# Patient Record
Sex: MALE | Race: WHITE | NOT HISPANIC OR LATINO | Employment: OTHER | URBAN - METROPOLITAN AREA
[De-identification: names, ages, dates, MRNs, and addresses within clinical notes are randomized per-mention and may not be internally consistent; named-entity substitution may affect disease eponyms.]

---

## 2017-02-28 ENCOUNTER — ANTICOAGULATION VISIT (OUTPATIENT)
Dept: VASCULAR LAB | Facility: MEDICAL CENTER | Age: 72
End: 2017-02-28
Attending: INTERNAL MEDICINE
Payer: MEDICARE

## 2017-02-28 DIAGNOSIS — I73.9 PVD (PERIPHERAL VASCULAR DISEASE) WITH CLAUDICATION (HCC): ICD-10-CM

## 2017-02-28 DIAGNOSIS — Z79.01 LONG TERM CURRENT USE OF ANTICOAGULANT THERAPY: ICD-10-CM

## 2017-02-28 DIAGNOSIS — I70.90 ARTERY OCCLUSION: ICD-10-CM

## 2017-02-28 LAB
INR BLD: 3.2 (ref 0.9–1.2)
INR PPP: 3.2 (ref 2–3.5)

## 2017-02-28 PROCEDURE — 99212 OFFICE O/P EST SF 10 MIN: CPT | Performed by: NURSE PRACTITIONER

## 2017-02-28 PROCEDURE — 85610 PROTHROMBIN TIME: CPT

## 2017-02-28 NOTE — PROGRESS NOTES
Anticoagulation Summary as of 2/28/2017     INR goal 2.0-3.0   Selected INR 3.2! (2/28/2017)   Maintenance plan 6 mg (6 mg x 1) on Tue, Thu, Sat; 3 mg (6 mg x 0.5) all other days   Weekly total 30 mg   Weekly max dose 33 mg   Plan last modified [place], PHARMD (6/9/2015)   Next INR check 4/11/2017   Target end date Indefinite    Indications   Artery occlusion (CMS-HCC) [I74.9]  Long term current use of anticoagulant therapy [Z79.01]  PVD (peripheral vascular disease) with claudication (CMS-[facility]) [I73.9]         Anticoagulation Episode Summary     INR check location [facility]    Preferred lab [facility]    Send INR reminders to     Comments       Anticoagulation Care Providers     Provider Role Specialty Phone number    Gwyn Silverman M.D. Referring Family Medicine [phone]    Sanchez Gonzalez M.D. Referring Internal Medicine [phone]    [facility] Anticoagulation Services Responsible  [phone]        Anticoagulation Patient Findings    Pt is supratherapeutic today. Denies any changes in medications or diet. Med rec completed and reviewed. Patient denies any s/sx of bleeding or clotting. Will increase Vit K over the next few days, continue dosing as outlined in calendar. Will follow-up with pt in 6 weeks per pt request.    Ruth ROMAN

## 2017-02-28 NOTE — MR AVS SNAPSHOT
Jimenez Bains   2017 10:00 AM   Anticoagulation Visit   MRN: [id number]    Department:  Vascular Medicine   Dept Phone:  [phone]    Description:  Male : 1945   Provider:  [facility] EXAM 4           Allergies as of 2017     Allergen Noted Reactions    No Known Drug Allergy 2014         You were diagnosed with     Artery occlusion (CMS-HCC)   [926294]       PVD (peripheral vascular disease) with claudication (CMS-HCC)   [955535]       Long term current use of anticoagulant therapy   [3570565]         Vital Signs     Smoking Status                   Current Some Day Smoker           Basic Information     Date Of Birth Sex Race Ethnicity Preferred Language    1945 Male White Non- English      Your appointments     2017 10:30 AM   Established Patient with [facility] EXAM 5   [facility]  (--)    [address] [place] [address]   [phone]              Problem List              ICD-10-CM Priority Class Noted - Resolved    DIABETES MELLITUS  Medium  2011 - Present    Artery occlusion (CMS-HCC) I74.9 High  2011 - Present    Abdominal hernia (Chronic) K46.9 Low  2011 - Present    Hypertension (Chronic) I10 Low  2011 - Present    Tobacco abuse (Chronic) Z72.0 High  2011 - Present    Basal cell carcinoma of earlobe (Chronic) C44.211 Low  2011 - Present    Basal cell carcinoma of face (Chronic) C44.310 Low  2011 - Present    PVD (peripheral vascular disease) with claudication (CMS-HCC) (Chronic) I73.9 High  2011 - Present    HTN (hypertension) I10   2012 - Present    Peripheral artery disease    2012 - Present    Limb ischemia I99.8 High  2012 - Present    Ischemia of extremity I99.8   2012 - Present    Lower limb ischemia I99.8   2012 - Present    Generalized atherosclerosis I70.91   2013 - Present    Arterial insufficiency (CMS-HCC) I77.1   2013 -  Present    Atherosclerosis of native artery of extremity (CMS-HCC) I70.209   2/2/2014 - Present    Other specified disorders of arteries and arterioles (CMS-HCC) I77.89   3/17/2014 - Present    Peripheral arterial occlusive disease (CMS-HCC) I77.9   7/29/2014 - Present    Chronic anticoagulation Z79.01   10/21/2014 - Present    Long term current use of anticoagulant therapy Z79.01   5/12/2015 - Present      Health Maintenance        Date Due Completion Dates    DIABETES MONOFILAMENT / LE EXAM 2/3/1945 ---    RETINAL SCREENING 8/3/1962 ---    IMM DTaP/Tdap/Td Vaccine (1 - Tdap) 8/3/1963 ---    COLONOSCOPY 8/3/1994 ---    IMM ZOSTER VACCINE 8/3/2004 ---    IMM PNEUMOCOCCAL 65+ (ADULT) LOW/MEDIUM RISK SERIES (1 of 2 - PCV13) 8/3/2009 ---    FASTING LIPID PROFILE 12/19/2013 12/19/2012, 9/2/2011    A1C SCREENING 5/8/2014 11/8/2013, 12/18/2012, 12/18/2012, 9/2/2011    URINE ACR / MICROALBUMIN 11/8/2014 11/8/2013    SERUM CREATININE 7/31/2015 7/31/2014, 7/29/2014, 1/30/2014, 12/5/2013, 11/8/2013, 12/24/2012, 12/23/2012, 12/22/2012, 12/21/2012, 12/20/2012, 12/19/2012, 12/18/2012, 12/17/2012, 10/18/2011, 9/1/2011    IMM INFLUENZA (1) 9/1/2016 ---            Results     POCT Protime      Component    INR    3.2                        Current Immunizations     No immunizations on file.      Below and/or attached are the medications your provider expects you to take. Review all of your home medications and newly ordered medications with your provider and/or pharmacist. Follow medication instructions as directed by your provider and/or pharmacist. Please keep your medication list with you and share with your provider. Update the information when medications are discontinued, doses are changed, or new medications (including over-the-counter products) are added; and carry medication information at all times in the event of emergency situations     Allergies:  NO KNOWN DRUG ALLERGY - (reactions not documented)                  Medications  Valid as of: February 28, 2017 - 10:27 AM    Generic Name Brand Name Tablet Size Instructions for use    Warfarin Sodium (Tab) COUMADIN 6 MG Take one-half to one (1/2-1) tablet daily as directed by coumadin clinic        .                 Medicines prescribed today were sent to:     [facility] #[id number] - [place], [place] - [address]    [address] [address]    Phone: [phone] Fax: [phone]    Open 24 Hours?: No      Medication refill instructions:       If your prescription bottle indicates you have medication refills left, it is not necessary to call your provider’s office. Please contact your pharmacy and they will refill your medication.    If your prescription bottle indicates you do not have any refills left, you may request refills at any time through one of the following ways: The online [employer] system (except Urgent Care), by calling your provider’s office, or by asking your pharmacy to contact your provider’s office with a refill request. Medication refills are processed only during regular business hours and may not be available until the next business day. Your provider may request additional information or to have a follow-up visit with you prior to refilling your medication.   *Please Note: Medication refills are assigned a new Rx number when refilled electronically. Your pharmacy may indicate that no refills were authorized even though a new prescription for the same medication is available at the pharmacy. Please request the medicine by name with the pharmacy before contacting your provider for a refill.        Warfarin Dosing Calendar   February 2017 Details    Sun Mon Tue Wed Thu Fri Sat        1               2               3               4                 5               6               7               8               9               10               11                 12               13               14               15               16                17               18                 19               20               21               22               23               24               25                 26               27               28   3.2   6 mg   See details           Date Details   02/28 This INR check   INR: 3.2               How to take your warfarin dose     To take:  6 mg Take 1 of the 6 mg tablets.           Warfarin Dosing Calendar   March 2017 Details    Sun Mon Tue Wed Thu Fri Sat        1      3 mg         2      6 mg         3      3 mg         4      6 mg           5      3 mg         6      3 mg         7      6 mg         8      3 mg         9      6 mg         10      3 mg         11      6 mg           12      3 mg         13      3 mg         14      6 mg         15      3 mg         16      6 mg         17      3 mg         18      6 mg           19      3 mg         20      3 mg         21      6 mg         22      3 mg         23      6 mg         24      3 mg         25      6 mg           26      3 mg         27      3 mg         28      6 mg         29      3 mg         30      6 mg         31      3 mg           Date Details   No additional details            How to take your warfarin dose     To take:  3 mg Take 0.5 of a 6 mg tablet.    To take:  6 mg Take 1 of the 6 mg tablets.           Warfarin Dosing Calendar   April 2017 Details    Sun Mon Tue Wed Thu Fri Sat           1      6 mg           2      3 mg         3      3 mg         4      6 mg         5      3 mg         6      6 mg         7      3 mg         8      6 mg           9      3 mg         10      3 mg         11      6 mg         12               13               14               15                 16               17               18               19               20               21               22                 23               24               25               26               27               28               29                 30                       Date Details   No additional details    Date of next INR:  4/11/2017         How to take your warfarin dose     To take:  3 mg Take 0.5 of a 6 mg tablet.    To take:  6 mg Take 1 of the 6 mg tablets.              MyChart Status: Patient Declined

## 2017-04-11 ENCOUNTER — ANTICOAGULATION VISIT (OUTPATIENT)
Dept: VASCULAR LAB | Facility: MEDICAL CENTER | Age: 72
End: 2017-04-11
Attending: INTERNAL MEDICINE
Payer: MEDICARE

## 2017-04-11 DIAGNOSIS — I70.90 ARTERY OCCLUSION: ICD-10-CM

## 2017-04-11 DIAGNOSIS — Z79.01 LONG TERM CURRENT USE OF ANTICOAGULANT THERAPY: ICD-10-CM

## 2017-04-11 DIAGNOSIS — I73.9 PVD (PERIPHERAL VASCULAR DISEASE) WITH CLAUDICATION (HCC): ICD-10-CM

## 2017-04-11 LAB — INR PPP: 4.1 (ref 2–3.5)

## 2017-04-11 PROCEDURE — 85610 PROTHROMBIN TIME: CPT

## 2017-04-11 PROCEDURE — 99212 OFFICE O/P EST SF 10 MIN: CPT

## 2017-04-11 NOTE — MR AVS SNAPSHOT
Jimenez Bains   2017 10:30 AM   Anticoagulation Visit   MRN: [id number]    Department:  Vascular Medicine   Dept Phone:  [phone]    Description:  Male : 1945   Provider:  [facility] EXAM 5           Allergies as of 2017     Allergen Noted Reactions    No Known Drug Allergy 2014         You were diagnosed with     Artery occlusion (CMS-HCC)   [589510]       PVD (peripheral vascular disease) with claudication (CMS-HCC)   [052379]       Long term current use of anticoagulant therapy   [3538729]         Vital Signs     Smoking Status                   Current Some Day Smoker           Basic Information     Date Of Birth Sex Race Ethnicity Preferred Language    1945 Male White Non- English      Your appointments     2017 11:15 AM   Established Patient with [facility] EXAM 5   [facility] for Heart and Vascular Health  (--)    [address] [place] [address]   [phone]              Problem List              ICD-10-CM Priority Class Noted - Resolved    DIABETES MELLITUS  Medium  2011 - Present    Artery occlusion (CMS-HCC) I74.9 High  2011 - Present    Abdominal hernia (Chronic) K46.9 Low  2011 - Present    Hypertension (Chronic) I10 Low  2011 - Present    Tobacco abuse (Chronic) Z72.0 High  2011 - Present    Basal cell carcinoma of earlobe (Chronic) C44.211 Low  2011 - Present    Basal cell carcinoma of face (Chronic) C44.310 Low  2011 - Present    PVD (peripheral vascular disease) with claudication (CMS-HCC) (Chronic) I73.9 High  2011 - Present    HTN (hypertension) I10   2012 - Present    Peripheral artery disease    2012 - Present    Limb ischemia I99.8 High  2012 - Present    Ischemia of extremity I99.8   2012 - Present    Lower limb ischemia I99.8   2012 - Present    Generalized atherosclerosis I70.91   2013 - Present    Arterial insufficiency (CMS-HCC) I77.1   2013 -  Present    Atherosclerosis of native artery of extremity (CMS-HCC) I70.209   2/2/2014 - Present    Other specified disorders of arteries and arterioles (CMS-HCC) I77.89   3/17/2014 - Present    Peripheral arterial occlusive disease (CMS-HCC) I77.9   7/29/2014 - Present    Chronic anticoagulation Z79.01   10/21/2014 - Present    Long term current use of anticoagulant therapy Z79.01   5/12/2015 - Present      Health Maintenance        Date Due Completion Dates    DIABETES MONOFILAMENT / LE EXAM 2/3/1945 ---    RETINAL SCREENING 8/3/1962 ---    IMM DTaP/Tdap/Td Vaccine (1 - Tdap) 8/3/1963 ---    COLONOSCOPY 8/3/1994 ---    IMM ZOSTER VACCINE 8/3/2004 ---    IMM PNEUMOCOCCAL 65+ (ADULT) LOW/MEDIUM RISK SERIES (1 of 2 - PCV13) 8/3/2009 ---    FASTING LIPID PROFILE 12/19/2013 12/19/2012, 9/2/2011    A1C SCREENING 5/8/2014 11/8/2013, 12/18/2012, 12/18/2012, 9/2/2011    URINE ACR / MICROALBUMIN 11/8/2014 11/8/2013    SERUM CREATININE 7/31/2015 7/31/2014, 7/29/2014, 1/30/2014, 12/5/2013, 11/8/2013, 12/24/2012, 12/23/2012, 12/22/2012, 12/21/2012, 12/20/2012, 12/19/2012, 12/18/2012, 12/17/2012, 10/18/2011, 9/1/2011            Results     POCT Protime      Component    INR    4.1                        Current Immunizations     No immunizations on file.      Below and/or attached are the medications your provider expects you to take. Review all of your home medications and newly ordered medications with your provider and/or pharmacist. Follow medication instructions as directed by your provider and/or pharmacist. Please keep your medication list with you and share with your provider. Update the information when medications are discontinued, doses are changed, or new medications (including over-the-counter products) are added; and carry medication information at all times in the event of emergency situations     Allergies:  NO KNOWN DRUG ALLERGY - (reactions not documented)               Medications  Valid as of: April 11, 2017  - 10:32 AM    Generic Name Brand Name Tablet Size Instructions for use    Warfarin Sodium (Tab) COUMADIN 6 MG Take one-half to one (1/2-1) tablet daily as directed by coumadin clinic        .                 Medicines prescribed today were sent to:     [facility]/PHARMACY #[id number] - [place], [place] - [address]    [address] [address]    Phone: [phone] Fax: [phone]    Open 24 Hours?: No      Medication refill instructions:       If your prescription bottle indicates you have medication refills left, it is not necessary to call your provider’s office. Please contact your pharmacy and they will refill your medication.    If your prescription bottle indicates you do not have any refills left, you may request refills at any time through one of the following ways: The online [employer] system (except Urgent Care), by calling your provider’s office, or by asking your pharmacy to contact your provider’s office with a refill request. Medication refills are processed only during regular business hours and may not be available until the next business day. Your provider may request additional information or to have a follow-up visit with you prior to refilling your medication.   *Please Note: Medication refills are assigned a new Rx number when refilled electronically. Your pharmacy may indicate that no refills were authorized even though a new prescription for the same medication is available at the pharmacy. Please request the medicine by name with the pharmacy before contacting your provider for a refill.        Warfarin Dosing Calendar   April 2017 Details    Sun Mon Tue Wed Thu Fri Sat           1                 2               3               4               5               6               7               8                 9               10               11   4.1   Hold   See details      12      3 mg         13      3 mg         14      3 mg         15      6 mg           16      3 mg          17      3 mg         18      6 mg         19      3 mg         20      3 mg         21      3 mg         22      6 mg           23      3 mg         24      3 mg         25      6 mg         26      3 mg         27               28               29                 30                      Date Details   04/11 This INR check   INR: 4.1       Date of next INR:  4/26/2017         How to take your warfarin dose     To take:  3 mg Take 0.5 of a 6 mg tablet.    To take:  6 mg Take 1 of the 6 mg tablets.    Hold Do not take your warfarin dose. See the Details table to the right for additional instructions.                   MyChart Status: Patient Declined

## 2017-04-11 NOTE — PROGRESS NOTES
Anticoagulation Summary as of 4/11/2017     INR goal 2.0-3.0   Selected INR 4.1! (4/11/2017)   Maintenance plan 6 mg (6 mg x 1) on Tue, Sat; 3 mg (6 mg x 0.5) all other days   Weekly total 27 mg   Weekly max dose 33 mg   Plan last modified Sukh Gonzalez, PHARMD (4/11/2017)   Next INR check 4/26/2017   Target end date Indefinite    Indications   Artery occlusion (CMS-[facility]) [I74.9]  Long term current use of anticoagulant therapy [Z79.01]  PVD (peripheral vascular disease) with claudication (CMS-[facility]) [I73.9]         Anticoagulation Episode Summary     INR check location [facility]    Preferred lab [facility]    Send INR reminders to     Comments       Anticoagulation Care Providers     Provider Role Specialty Phone number    Gwyn Silverman M.D. Referring Family Medicine [phone]    Sanchez Gonzalez M.D. Referring Internal Medicine [phone]    [facility] Anticoagulation Services Responsible  [phone]        Anticoagulation Patient Findings      Jimenez Fonseca Herson seen in clinic today  INR  supra-therapeutic.    Denies signs/symptoms of bleeding and/or thrombosis.    Denies changes to diet or medications.   Follow up appointment in 2 week(s).    Hold today then decrease weekly warfarin dose as noted    Sukh Gonzalez, ARTEMD

## 2017-04-12 LAB — INR BLD: 4.1 (ref 0.9–1.2)

## 2017-04-26 ENCOUNTER — ANTICOAGULATION VISIT (OUTPATIENT)
Dept: VASCULAR LAB | Facility: MEDICAL CENTER | Age: 72
End: 2017-04-26
Attending: INTERNAL MEDICINE
Payer: MEDICARE

## 2017-04-26 DIAGNOSIS — Z79.01 LONG TERM CURRENT USE OF ANTICOAGULANT THERAPY: ICD-10-CM

## 2017-04-26 DIAGNOSIS — I73.9 PVD (PERIPHERAL VASCULAR DISEASE) WITH CLAUDICATION (HCC): ICD-10-CM

## 2017-04-26 DIAGNOSIS — I70.90 ARTERY OCCLUSION: ICD-10-CM

## 2017-04-26 LAB — INR PPP: 2.3 (ref 2–3.5)

## 2017-04-26 PROCEDURE — 99211 OFF/OP EST MAY X REQ PHY/QHP: CPT

## 2017-04-26 PROCEDURE — 85610 PROTHROMBIN TIME: CPT

## 2017-04-26 NOTE — MR AVS SNAPSHOT
Jimenez Bains   2017 11:15 AM   Anticoagulation Visit   MRN: [id number]    Department:  Vascular Medicine   Dept Phone:  [phone]    Description:  Male : 1945   Provider:  [facility] EXAM 5           Allergies as of 2017     Allergen Noted Reactions    No Known Drug Allergy 2014         You were diagnosed with     Artery occlusion (CMS-HCC)   [943535]       PVD (peripheral vascular disease) with claudication (CMS-HCC)   [661551]       Long term current use of anticoagulant therapy   [4199540]         Vital Signs     Smoking Status                   Current Some Day Smoker           Basic Information     Date Of Birth Sex Race Ethnicity Preferred Language    1945 Male White Non- English      Your appointments     May 23, 2017 11:15 AM   Established Patient with [facility] EXAM 5   [facility]  (--)    [address] [place] [address]   [phone]              Problem List              ICD-10-CM Priority Class Noted - Resolved    DIABETES MELLITUS  Medium  2011 - Present    Artery occlusion (CMS-HCC) I74.9 High  2011 - Present    Abdominal hernia (Chronic) K46.9 Low  2011 - Present    Hypertension (Chronic) I10 Low  2011 - Present    Tobacco abuse (Chronic) Z72.0 High  2011 - Present    Basal cell carcinoma of earlobe (Chronic) C44.211 Low  2011 - Present    Basal cell carcinoma of face (Chronic) C44.310 Low  2011 - Present    PVD (peripheral vascular disease) with claudication (CMS-HCC) (Chronic) I73.9 High  2011 - Present    HTN (hypertension) I10   2012 - Present    Peripheral artery disease    2012 - Present    Limb ischemia I99.8 High  2012 - Present    Ischemia of extremity I99.8   2012 - Present    Lower limb ischemia I99.8   2012 - Present    Generalized atherosclerosis I70.91   2013 - Present    Arterial insufficiency (CMS-HCC) I77.1   2013 -  Present    Atherosclerosis of native artery of extremity (CMS-HCC) I70.209   2/2/2014 - Present    Other specified disorders of arteries and arterioles (CMS-HCC) I77.89   3/17/2014 - Present    Peripheral arterial occlusive disease (CMS-HCC) I77.9   7/29/2014 - Present    Chronic anticoagulation Z79.01   10/21/2014 - Present    Long term current use of anticoagulant therapy Z79.01   5/12/2015 - Present      Health Maintenance        Date Due Completion Dates    DIABETES MONOFILAMENT / LE EXAM 2/3/1945 ---    RETINAL SCREENING 8/3/1962 ---    IMM DTaP/Tdap/Td Vaccine (1 - Tdap) 8/3/1963 ---    COLONOSCOPY 8/3/1994 ---    IMM ZOSTER VACCINE 8/3/2004 ---    IMM PNEUMOCOCCAL 65+ (ADULT) LOW/MEDIUM RISK SERIES (1 of 2 - PCV13) 8/3/2009 ---    FASTING LIPID PROFILE 12/19/2013 12/19/2012, 9/2/2011    A1C SCREENING 5/8/2014 11/8/2013, 12/18/2012, 12/18/2012, 9/2/2011    URINE ACR / MICROALBUMIN 11/8/2014 11/8/2013    SERUM CREATININE 7/31/2015 7/31/2014, 7/29/2014, 1/30/2014, 12/5/2013, 11/8/2013, 12/24/2012, 12/23/2012, 12/22/2012, 12/21/2012, 12/20/2012, 12/19/2012, 12/18/2012, 12/17/2012, 10/18/2011, 9/1/2011            Results     POCT Protime      Component    INR    2.3                        Current Immunizations     No immunizations on file.      Below and/or attached are the medications your provider expects you to take. Review all of your home medications and newly ordered medications with your provider and/or pharmacist. Follow medication instructions as directed by your provider and/or pharmacist. Please keep your medication list with you and share with your provider. Update the information when medications are discontinued, doses are changed, or new medications (including over-the-counter products) are added; and carry medication information at all times in the event of emergency situations     Allergies:  NO KNOWN DRUG ALLERGY - (reactions not documented)               Medications  Valid as of: April 26, 2017  - 11:23 AM    Generic Name Brand Name Tablet Size Instructions for use    Warfarin Sodium (Tab) COUMADIN 6 MG Take one-half to one (1/2-1) tablet daily as directed by coumadin clinic        .                 Medicines prescribed today were sent to:     [facility]/PHARMACY #[id number] - [place], [place] - [address]    [address] [place] [address]    Phone: [phone] Fax: [phone]    Open 24 Hours?: No      Medication refill instructions:       If your prescription bottle indicates you have medication refills left, it is not necessary to call your provider’s office. Please contact your pharmacy and they will refill your medication.    If your prescription bottle indicates you do not have any refills left, you may request refills at any time through one of the following ways: The online EpiCrystals system (except Urgent Care), by calling your provider’s office, or by asking your pharmacy to contact your provider’s office with a refill request. Medication refills are processed only during regular business hours and may not be available until the next business day. Your provider may request additional information or to have a follow-up visit with you prior to refilling your medication.   *Please Note: Medication refills are assigned a new Rx number when refilled electronically. Your pharmacy may indicate that no refills were authorized even though a new prescription for the same medication is available at the pharmacy. Please request the medicine by name with the pharmacy before contacting your provider for a refill.        Warfarin Dosing Calendar   April 2017 Details    Sun Mon Tue Wed Thu Fri Sat           1                 2               3               4               5               6               7               8                 9               10               11               12               13               14               15                 16               17               18               19                 20               21               22                 23               24               25               26   2.3   3 mg   See details      27      6 mg         28      3 mg         29      6 mg           30      3 mg                Date Details   04/26 This INR check   INR: 2.3               How to take your warfarin dose     To take:  3 mg Take 0.5 of a 6 mg tablet.    To take:  6 mg Take 1 of the 6 mg tablets.           Warfarin Dosing Calendar   May 2017 Details    Sun Mon Tue Wed Thu Fri Sat      1      3 mg         2      6 mg         3      3 mg         4      6 mg         5      3 mg         6      6 mg           7      3 mg         8      3 mg         9      6 mg         10      3 mg         11      6 mg         12      3 mg         13      6 mg           14      3 mg         15      3 mg         16      6 mg         17      3 mg         18      6 mg         19      3 mg         20      6 mg           21      3 mg         22      3 mg         23      6 mg         24      3 mg         25               26               27                 28               29               30               31                   Date Details   No additional details    Date of next INR:  5/24/2017         How to take your warfarin dose     To take:  3 mg Take 0.5 of a 6 mg tablet.    To take:  6 mg Take 1 of the 6 mg tablets.              Alinto Access Code: [email or web address]  Expires: 5/26/2017 11:23 AM    Alinto  A secure, online tool to manage your health information     [employer]’s Alinto® is a secure, online tool that connects you to your personalized health information from the privacy of your home -- day or night - making it very easy for you to manage your healthcare. Once the activation process is completed, you can even access your medical information using the Alinto guillermo, which is available for free in the Apple Guillermo store or Google Play store.     Alinto provides the following levels of access (as  shown below):   My Chart Features   [facility] Doctor [facility]  Specialists [facility]  Urgent  Care Non-[facility]  Primary Care  Doctor   Email your healthcare team securely and privately 24/7 X X X    Manage appointments: schedule your next appointment; view details of past/upcoming appointments X      Request prescription refills. X      View recent personal medical records, including lab and immunizations X X X X   View health record, including health history, allergies, medications X X X X   Read reports about your outpatient visits, procedures, consult and ER notes X X X X   See your discharge summary, which is a recap of your hospital and/or ER visit that includes your diagnosis, lab results, and care plan. X X       How to register for [employer]:  1. Go to  [email or web address].  2. Click on the Sign Up Now box, which takes you to the New Member Sign Up page. You will need to provide the following information:  a. Enter your [employer] Access Code exactly as it appears at the top of this page. (You will not need to use this code after you’ve completed the sign-up process. If you do not sign up before the expiration date, you must request a new code.)   b. Enter your date of birth.   c. Enter your home email address.   d. Click Submit, and follow the next screen’s instructions.  3. Create a [employer] ID. This will be your [employer] login ID and cannot be changed, so think of one that is secure and easy to remember.  4. Create a [employer] password. You can change your password at any time.  5. Enter your Password Reset Question and Answer. This can be used at a later time if you forget your password.   6. Enter your e-mail address. This allows you to receive e-mail notifications when new information is available in [employer].  7. Click Sign Up. You can now view your health information.    For assistance activating your [employer] account, call [phone]

## 2017-04-26 NOTE — PROGRESS NOTES
Anticoagulation Summary as of 4/26/2017     INR goal 2.0-3.0   Selected INR 2.3 (4/26/2017)   Maintenance plan 6 mg (6 mg x 1) on Tue, Thu, Sat; 3 mg (6 mg x 0.5) all other days   Weekly total 30 mg   Weekly max dose 33 mg   Plan last modified Camden Gtz, PHARMD (4/26/2017)   Next INR check 5/24/2017   Target end date Indefinite    Indications   Artery occlusion (CMS-[facility]) [I74.9]  Long term current use of anticoagulant therapy [Z79.01]  PVD (peripheral vascular disease) with claudication (CMS-[facility]) [I73.9]         Anticoagulation Episode Summary     [facility]    Preferred lab [facility]    Send INR reminders to     Comments       Anticoagulation Care Providers     Provider Role Specialty Phone number    Gwyn Silverman M.D. Referring Family Medicine [phone]    Sanchez Gonzalez M.D. Referring Internal Medicine [phone]    [facility] Anticoagulation Services Responsible  [phone]        Anticoagulation Patient Findings    Patient's INR was therapeutic today in clinic.    Pt denies any unusual s/s of bleeding, bruising, clotting or any changes to diet or medications.  Confirmed dosing regimen. Pt was taking original regimen and didn't make the dose change as requested at last visit.  Pt is to continue with current warfarin dosing regimen.    Follow up in 4 weeks. Pt refuses to come any earlier.      Camden Gtz, PHARMD

## 2017-05-02 LAB — INR BLD: 2.3 (ref 0.9–1.2)

## 2017-05-23 ENCOUNTER — ANTICOAGULATION VISIT (OUTPATIENT)
Dept: VASCULAR LAB | Facility: MEDICAL CENTER | Age: 72
End: 2017-05-23
Attending: INTERNAL MEDICINE
Payer: MEDICARE

## 2017-05-23 DIAGNOSIS — I70.90 ARTERY OCCLUSION: ICD-10-CM

## 2017-05-23 DIAGNOSIS — Z79.01 LONG TERM CURRENT USE OF ANTICOAGULANT THERAPY: ICD-10-CM

## 2017-05-23 DIAGNOSIS — I73.9 PVD (PERIPHERAL VASCULAR DISEASE) WITH CLAUDICATION (HCC): ICD-10-CM

## 2017-05-23 DIAGNOSIS — I74.9 EMBOLISM AND THROMBOSIS OF UNSPECIFIED ARTERY (HCC): ICD-10-CM

## 2017-05-23 LAB
INR BLD: 3.9 (ref 0.9–1.2)
INR PPP: 3.9 (ref 2–3.5)

## 2017-05-23 PROCEDURE — 99212 OFFICE O/P EST SF 10 MIN: CPT

## 2017-05-23 PROCEDURE — 85610 PROTHROMBIN TIME: CPT

## 2017-05-23 RX ORDER — WARFARIN SODIUM 6 MG/1
TABLET ORAL
Qty: 90 TAB | Refills: 1 | Status: SHIPPED | OUTPATIENT
Start: 2017-05-23 | End: 2018-01-05

## 2017-05-23 NOTE — MR AVS SNAPSHOT
Jimenez Bains   2017 11:15 AM   Anticoagulation Visit   MRN: [id number]    Department:  Vascular Medicine   Dept Phone:  [phone]    Description:  Male : 1945   Provider:  [facility] EXAM 5           Allergies as of 2017     Allergen Noted Reactions    No Known Drug Allergy 2014         You were diagnosed with     Artery occlusion (CMS-HCC)   [318060]       PVD (peripheral vascular disease) with claudication (CMS-HCC)   [993375]       Long term current use of anticoagulant therapy   [9259064]         Vital Signs     Smoking Status                   Current Some Day Smoker           Basic Information     Date Of Birth Sex Race Ethnicity Preferred Language    1945 Male White Non- English      Your appointments     2017 11:30 AM   Established Patient with [facility] EXAM 5   [facility]  (--)    [address] [place] [address]   [phone]              Problem List              ICD-10-CM Priority Class Noted - Resolved    DIABETES MELLITUS  Medium  2011 - Present    Artery occlusion (CMS-HCC) I74.9 High  2011 - Present    Abdominal hernia (Chronic) K46.9 Low  2011 - Present    Hypertension (Chronic) I10 Low  2011 - Present    Tobacco abuse (Chronic) Z72.0 High  2011 - Present    Basal cell carcinoma of earlobe (Chronic) C44.211 Low  2011 - Present    Basal cell carcinoma of face (Chronic) C44.310 Low  2011 - Present    PVD (peripheral vascular disease) with claudication (CMS-HCC) (Chronic) I73.9 High  2011 - Present    HTN (hypertension) I10   2012 - Present    Peripheral artery disease    2012 - Present    Limb ischemia I99.8 High  2012 - Present    Ischemia of extremity I99.8   2012 - Present    Lower limb ischemia I99.8   2012 - Present    Generalized atherosclerosis I70.91   2013 - Present    Arterial insufficiency (CMS-HCC) I77.1   2013 -  Present    Atherosclerosis of native artery of extremity (CMS-HCC) I70.209   2/2/2014 - Present    Other specified disorders of arteries and arterioles (CMS-HCC) I77.89   3/17/2014 - Present    Peripheral arterial occlusive disease (CMS-HCC) I77.9   7/29/2014 - Present    Chronic anticoagulation Z79.01   10/21/2014 - Present    Long term current use of anticoagulant therapy Z79.01   5/12/2015 - Present      Health Maintenance        Date Due Completion Dates    DIABETES MONOFILAMENT / LE EXAM 2/3/1945 ---    RETINAL SCREENING 8/3/1962 ---    IMM DTaP/Tdap/Td Vaccine (1 - Tdap) 8/3/1963 ---    COLONOSCOPY 8/3/1994 ---    IMM ZOSTER VACCINE 8/3/2004 ---    IMM PNEUMOCOCCAL 65+ (ADULT) LOW/MEDIUM RISK SERIES (1 of 2 - PCV13) 8/3/2009 ---    FASTING LIPID PROFILE 12/19/2013 12/19/2012, 9/2/2011    A1C SCREENING 5/8/2014 11/8/2013, 12/18/2012, 12/18/2012, 9/2/2011    URINE ACR / MICROALBUMIN 11/8/2014 11/8/2013    SERUM CREATININE 7/31/2015 7/31/2014, 7/29/2014, 1/30/2014, 12/5/2013, 11/8/2013, 12/24/2012, 12/23/2012, 12/22/2012, 12/21/2012, 12/20/2012, 12/19/2012, 12/18/2012, 12/17/2012, 10/18/2011, 9/1/2011            Results     POCT Protime      Component    INR    3.9                        Current Immunizations     No immunizations on file.      Below and/or attached are the medications your provider expects you to take. Review all of your home medications and newly ordered medications with your provider and/or pharmacist. Follow medication instructions as directed by your provider and/or pharmacist. Please keep your medication list with you and share with your provider. Update the information when medications are discontinued, doses are changed, or new medications (including over-the-counter products) are added; and carry medication information at all times in the event of emergency situations     Allergies:  NO KNOWN DRUG ALLERGY - (reactions not documented)               Medications  Valid as of: May 23, 2017 -  11:30 AM    Generic Name Brand Name Tablet Size Instructions for use    Warfarin Sodium (Tab) COUMADIN 6 MG Take one-half to one (1/2-1) tablet daily as directed by coumadin clinic        .                 Medicines prescribed today were sent to:     [facility]/PHARMACY #[id number] - [place], [place] - [address]    [address] [facility] [address]    Phone: [phone] Fax: [phone]    Open 24 Hours?: No      Medication refill instructions:       If your prescription bottle indicates you have medication refills left, it is not necessary to call your provider’s office. Please contact your pharmacy and they will refill your medication.    If your prescription bottle indicates you do not have any refills left, you may request refills at any time through one of the following ways: The online [employer] system (except Urgent Care), by calling your provider’s office, or by asking your pharmacy to contact your provider’s office with a refill request. Medication refills are processed only during regular business hours and may not be available until the next business day. Your provider may request additional information or to have a follow-up visit with you prior to refilling your medication.   *Please Note: Medication refills are assigned a new Rx number when refilled electronically. Your pharmacy may indicate that no refills were authorized even though a new prescription for the same medication is available at the pharmacy. Please request the medicine by name with the pharmacy before contacting your provider for a refill.        Warfarin Dosing Calendar   May 2017 Details    Sun Mon Tue Wed Thu Fri Sat      1               2               3               4               5               6                 7               8               9               10               11               12               13                 14               15               16               17               18               19                20                 21               22               23   3.9   Hold   See details      24      3 mg         25      6 mg         26      3 mg         27      6 mg           28      3 mg         29      3 mg         30      6 mg         31      3 mg             Date Details   05/23 This INR check   INR: 3.9               How to take your warfarin dose     To take:  3 mg Take 0.5 of a 6 mg tablet.    To take:  6 mg Take 1 of the 6 mg tablets.    Hold Do not take your warfarin dose. See the Details table to the right for additional instructions.                Warfarin Dosing Calendar   June 2017 Details    Sun Mon Tue Wed Thu Fri Sat         1      6 mg         2      3 mg         3      6 mg           4      3 mg         5      3 mg         6      6 mg         7      3 mg         8               9               10                 11               12               13               14               15               16               17                 18               19               20               21               22               23               24                 25               26               27               28               29               30                 Date Details   No additional details    Date of next INR:  6/7/2017         How to take your warfarin dose     To take:  3 mg Take 0.5 of a 6 mg tablet.    To take:  6 mg Take 1 of the 6 mg tablets.              StubHub Access Code: [email or web address]  Expires: 5/26/2017 11:23 AM    StubHub  A secure, online tool to manage your health information     [employer] StubHub® is a secure, online tool that connects you to your personalized health information from the privacy of your home -- day or night - making it very easy for you to manage your healthcare. Once the activation process is completed, you can even access your medical information using the StubHub guillermo, which is available for free in the Apple Guillermo store or Google Play store.     StubHub  provides the following levels of access (as shown below):   My Chart Features   [facility] Doctor [facility]  Specialists [facility]  Urgent  Care Non-[facility]  Primary Care  Doctor   Email your healthcare team securely and privately 24/7 X X X    Manage appointments: schedule your next appointment; view details of past/upcoming appointments X      Request prescription refills. X      View recent personal medical records, including lab and immunizations X X X X   View health record, including health history, allergies, medications X X X X   Read reports about your outpatient visits, procedures, consult and ER notes X X X X   See your discharge summary, which is a recap of your hospital and/or ER visit that includes your diagnosis, lab results, and care plan. X X       How to register for [employer]:  1. Go to  [email or web address].  2. Click on the Sign Up Now box, which takes you to the New Member Sign Up page. You will need to provide the following information:  a. Enter your Mob[employer] Access Code exactly as it appears at the top of this page. (You will not need to use this code after you’ve completed the sign-up process. If you do not sign up before the expiration date, you must request a new code.)   b. Enter your date of birth.   c. Enter your home email address.   d. Click Submit, and follow the next screen’s instructions.  3. Create a Mobovivo ID. This will be your Mobovivo login ID and cannot be changed, so think of one that is secure and easy to remember.  4. Create a Mobovivo password. You can change your password at any time.  5. Enter your Password Reset Question and Answer. This can be used at a later time if you forget your password.   6. Enter your e-mail address. This allows you to receive e-mail notifications when new information is available in Mobovivo.  7. Click Sign Up. You can now view your health information.    For assistance activating your [employer] account, call [phone]

## 2017-05-23 NOTE — PROGRESS NOTES
Anticoagulation Summary as of 5/23/2017     INR goal 2.0-3.0   Selected INR 3.9! (5/23/2017)   Maintenance plan 6 mg (6 mg x 1) on Tue, Thu, Sat; 3 mg (6 mg x 0.5) all other days   Weekly total 30 mg   Weekly max dose 33 mg   Plan last modified Camden Gtz, PHARMD (4/26/2017)   Next INR check 6/7/2017   Target end date Indefinite    Indications   Artery occlusion (CMS-HCC) [I74.9]  Long term current use of anticoagulant therapy [Z79.01]  PVD (peripheral vascular disease) with claudication (CMS-HCC) [I73.9]         Anticoagulation Episode Summary     INR check location [facility]    Preferred lab [facility]    Send INR reminders to     Comments       Anticoagulation Care Providers     Provider Role Specialty Phone number    Gwyn Silverman M.D. Referring Family Medicine [phone]    Sanchez Gonzalez M.D. Referring Internal Medicine [phone]    [facility] Anticoagulation [facility] Responsible  [phone]        Anticoagulation Patient Findings      Current Outpatient Prescriptions on File Prior to Visit   Medication Sig Dispense Refill   • warfarin (COUMADIN) 6 MG Tab Take one-half to one (1/2-1) tablet daily as directed by coumadin clinic 90 Tab 1     No current facility-administered medications on file prior to visit.       Lab Results   Component Value Date/Time    SODIUM 139 07/31/2014 03:32 AM    POTASSIUM 3.8 07/31/2014 03:32 AM    CHLORIDE 105 07/31/2014 03:32 AM    CO2 29 07/31/2014 03:32 AM    GLUCOSE 197* 07/31/2014 03:32 AM    BUN 21 07/31/2014 03:32 AM    CREATININE 1.13 07/31/2014 03:32 AM          Jimenez Bains seen in clinic today  INR  supra-therapeutic.    Denies signs/symptoms of bleeding and/or thrombosis.    Denies changes to diet or medications.   Follow up appointment in 2 week(s).      Hold today then continue weekly warfarin dose as noted       Sukh Gonzalez, PHARMD

## 2017-06-06 ENCOUNTER — ANTICOAGULATION VISIT (OUTPATIENT)
Dept: VASCULAR LAB | Facility: MEDICAL CENTER | Age: 72
End: 2017-06-06
Attending: INTERNAL MEDICINE
Payer: MEDICARE

## 2017-06-06 DIAGNOSIS — I70.90 ARTERY OCCLUSION: ICD-10-CM

## 2017-06-06 DIAGNOSIS — Z79.01 LONG TERM CURRENT USE OF ANTICOAGULANT THERAPY: ICD-10-CM

## 2017-06-06 DIAGNOSIS — I73.9 PVD (PERIPHERAL VASCULAR DISEASE) WITH CLAUDICATION (HCC): ICD-10-CM

## 2017-06-06 LAB
INR BLD: 1.6 (ref 0.9–1.2)
INR PPP: 1.6 (ref 2–3.5)

## 2017-06-06 PROCEDURE — 85610 PROTHROMBIN TIME: CPT

## 2017-06-06 PROCEDURE — 99212 OFFICE O/P EST SF 10 MIN: CPT

## 2017-06-06 NOTE — PROGRESS NOTES
Anticoagulation Summary as of 6/6/2017     INR goal 2.0-3.0   Selected INR 1.6! (6/6/2017)   Maintenance plan 6 mg (6 mg x 1) on Tue, Thu, Sat; 3 mg (6 mg x 0.5) all other days   Weekly total 30 mg   Weekly max dose 33 mg   Plan last modified Camden Gtz, PHARMD (4/26/2017)   Next INR check 6/20/2017   Target end date Indefinite    Indications   Artery occlusion (CMS-HCC) [I74.9]  Long term current use of anticoagulant therapy [Z79.01]  PVD (peripheral vascular disease) with claudication (CMS-HCC) [I73.9]         Anticoagulation Episode Summary     INR check location [facility]    Preferred lab [facility]    Send INR reminders to     Comments       Anticoagulation Care Providers     Provider Role Specialty Phone number    Gwyn Silverman M.D. Referring Family Medicine [phone]    Sanchez Gonzalez M.D. Referring Internal Medicine [phone]    [facility] Anticoagulation Services Responsible  [phone]        Anticoagulation Patient Findings      Current Outpatient Prescriptions on File Prior to Visit   Medication Sig Dispense Refill   • warfarin (COUMADIN) 6 MG Tab Take one-half to one (1/2-1) tablet daily as directed by coumadin clinic 90 Tab 1     No current facility-administered medications on file prior to visit.       Lab Results   Component Value Date/Time    SODIUM 139 07/31/2014 03:32 AM    POTASSIUM 3.8 07/31/2014 03:32 AM    CHLORIDE 105 07/31/2014 03:32 AM    CO2 29 07/31/2014 03:32 AM    GLUCOSE 197* 07/31/2014 03:32 AM    BUN 21 07/31/2014 03:32 AM    CREATININE 1.13 07/31/2014 03:32 AM          Jimenez Bains seen in clinic today  INR  sub-therapeutic.    Denies signs/symptoms of bleeding and/or thrombosis.    Denies changes to diet or medications.   Follow up appointment in 2 week(s).      1.5tabs today then continue weekly warfarin dose as noted       Sukh Gonzalez, PHARMD

## 2017-06-06 NOTE — MR AVS SNAPSHOT
Jimenez Bains   2017 11:30 AM   Anticoagulation Visit   MRN: [id number]    Department:  Vascular Medicine   Dept Phone:  [phone]    Description:  Male : 1945   Provider:  V EXAM 5           Allergies as of 2017     Allergen Noted Reactions    No Known Drug Allergy 2014         You were diagnosed with     Artery occlusion (CMS-HCC)   [741695]       PVD (peripheral vascular disease) with claudication (CMS-HCC)   [261457]       Long term current use of anticoagulant therapy   [6340274]         Vital Signs     Smoking Status                   Current Some Day Smoker           Basic Information     Date Of Birth Sex Race Ethnicity Preferred Language    1945 Male White Non- English      Your appointments     2017 11:30 AM   Established Patient with IHV EXAM 5   [facility]  (--)    [address] [place] [address]   [phone]            2017 10:45 AM   Established Patient with IHV EXAM 4   [facility]  (--)    [address] [place] [address]   [phone]              Problem List              ICD-10-CM Priority Class Noted - Resolved    DIABETES MELLITUS  Medium  2011 - Present    Artery occlusion (CMS-HCC) I74.9 High  2011 - Present    Abdominal hernia (Chronic) K46.9 Low  2011 - Present    Hypertension (Chronic) I10 Low  2011 - Present    Tobacco abuse (Chronic) Z72.0 High  2011 - Present    Basal cell carcinoma of earlobe (Chronic) C44.211 Low  2011 - Present    Basal cell carcinoma of face (Chronic) C44.310 Low  2011 - Present    PVD (peripheral vascular disease) with claudication (CMS-HCC) (Chronic) I73.9 High  2011 - Present    HTN (hypertension) I10   2012 - Present    Peripheral artery disease    2012 - Present    Limb ischemia I99.8 High  2012 - Present    Ischemia of extremity  I99.8   12/19/2012 - Present    Lower limb ischemia I99.8   12/20/2012 - Present    Generalized atherosclerosis I70.91   11/14/2013 - Present    Arterial insufficiency (CMS-HCC) I77.1   12/4/2013 - Present    Atherosclerosis of native artery of extremity (CMS-HCC) I70.209   2/2/2014 - Present    Other specified disorders of arteries and arterioles (CMS-HCC) I77.89   3/17/2014 - Present    Peripheral arterial occlusive disease (CMS-HCC) I77.9   7/29/2014 - Present    Chronic anticoagulation Z79.01   10/21/2014 - Present    Long term current use of anticoagulant therapy Z79.01   5/12/2015 - Present      Health Maintenance        Date Due Completion Dates    DIABETES MONOFILAMENT / LE EXAM 2/3/1945 ---    RETINAL SCREENING 8/3/1962 ---    IMM DTaP/Tdap/Td Vaccine (1 - Tdap) 8/3/1963 ---    COLONOSCOPY 8/3/1994 ---    IMM ZOSTER VACCINE 8/3/2004 ---    IMM PNEUMOCOCCAL 65+ (ADULT) LOW/MEDIUM RISK SERIES (1 of 2 - PCV13) 8/3/2009 ---    FASTING LIPID PROFILE 12/19/2013 12/19/2012, 9/2/2011    A1C SCREENING 5/8/2014 11/8/2013, 12/18/2012, 12/18/2012, 9/2/2011    URINE ACR / MICROALBUMIN 11/8/2014 11/8/2013    SERUM CREATININE 7/31/2015 7/31/2014, 7/29/2014, 1/30/2014, 12/5/2013, 11/8/2013, 12/24/2012, 12/23/2012, 12/22/2012, 12/21/2012, 12/20/2012, 12/19/2012, 12/18/2012, 12/17/2012, 10/18/2011, 9/1/2011            Results     POCT Protime      Component    INR    1.6                        Current Immunizations     No immunizations on file.      Below and/or attached are the medications your provider expects you to take. Review all of your home medications and newly ordered medications with your provider and/or pharmacist. Follow medication instructions as directed by your provider and/or pharmacist. Please keep your medication list with you and share with your provider. Update the information when medications are discontinued, doses are changed, or new medications (including over-the-counter products) are added; and  carry medication information at all times in the event of emergency situations     Allergies:  NO KNOWN DRUG ALLERGY - (reactions not documented)               Medications  Valid as of: June 06, 2017 - 11:27 AM    Generic Name Brand Name Tablet Size Instructions for use    Warfarin Sodium (Tab) COUMADIN 6 MG Take one-half to one (1/2-1) tablet daily as directed by coumadin clinic        .                 Medicines prescribed today were sent to:     [facility]/PHARMACY #[id number] - [facility] [place] - [address]    [address] [address]    Phone: [phone] Fax: [phone]    Open 24 Hours?: No      Medication refill instructions:       If your prescription bottle indicates you have medication refills left, it is not necessary to call your provider’s office. Please contact your pharmacy and they will refill your medication.    If your prescription bottle indicates you do not have any refills left, you may request refills at any time through one of the following ways: The online [employer]iance system (except Urgent Care), by calling your provider’s office, or by asking your pharmacy to contact your provider’s office with a refill request. Medication refills are processed only during regular business hours and may not be available until the next business day. Your provider may request additional information or to have a follow-up visit with you prior to refilling your medication.   *Please Note: Medication refills are assigned a new Rx number when refilled electronically. Your pharmacy may indicate that no refills were authorized even though a new prescription for the same medication is available at the pharmacy. Please request the medicine by name with the pharmacy before contacting your provider for a refill.        Warfarin Dosing Calendar   June 2017 Details    Sun Mon Tue Wed Thu Fri Sat         1               2               3                 4               5               6   1.6   9 mg   See  details      7      3 mg         8      6 mg         9      3 mg         10      6 mg           11      3 mg         12      3 mg         13      6 mg         14      3 mg         15      6 mg         16      3 mg         17      6 mg           18      3 mg         19      3 mg         20      6 mg         21               22               23               24                 25               26               27               28               29               30                 Date Details   06/06 This INR check   INR: 1.6       Date of next INR:  6/20/2017         How to take your warfarin dose     To take:  3 mg Take 0.5 of a 6 mg tablet.    To take:  6 mg Take 1 of the 6 mg tablets.    To take:  9 mg Take 1.5 of the 6 mg tablets.              Cooking.com Access Code: [email or web address]  Expires: 6/27/2017  4:15 AM    Cooking.com  A secure, online tool to manage your health information     [employer]s Cooking.com® is a secure, online tool that connects you to your personalized health information from the privacy of your home -- day or night - making it very easy for you to manage your healthcare. Once the activation process is completed, you can even access your medical information using the Cooking.com guillermo, which is available for free in the Apple Guillermo store or Google Play store.     Cooking.com provides the following levels of access (as shown below):   My Chart Features   [facility] Doctor [facility]  Specialists [facility]  Urgent  Care Non-[facility]  Primary Care  Doctor   Email your healthcare team securely and privately 24/7 X X X    Manage appointments: schedule your next appointment; view details of past/upcoming appointments X      Request prescription refills. X      View recent personal medical records, including lab and immunizations X X X X   View health record, including health history, allergies, medications X X X X   Read reports about your outpatient visits, procedures, consult and ER notes X X X X   See your  discharge summary, which is a recap of your hospital and/or ER visit that includes your diagnosis, lab results, and care plan. X X       How to register for Mirego:  1. Go to  [email or web address].  2. Click on the Sign Up Now box, which takes you to the New Member Sign Up page. You will need to provide the following information:  a. Enter your Mirego Access Code exactly as it appears at the top of this page. (You will not need to use this code after you’ve completed the sign-up process. If you do not sign up before the expiration date, you must request a new code.)   b. Enter your date of birth.   c. Enter your home email address.   d. Click Submit, and follow the next screen’s instructions.  3. Create a Mirego ID. This will be your Mirego login ID and cannot be changed, so think of one that is secure and easy to remember.  4. Create a Mirego password. You can change your password at any time.  5. Enter your Password Reset Question and Answer. This can be used at a later time if you forget your password.   6. Enter your e-mail address. This allows you to receive e-mail notifications when new information is available in Mirego.  7. Click Sign Up. You can now view your health information.    For assistance activating your Mirego account, call [phone]

## 2017-06-20 ENCOUNTER — ANTICOAGULATION VISIT (OUTPATIENT)
Dept: VASCULAR LAB | Facility: MEDICAL CENTER | Age: 72
End: 2017-06-20
Attending: INTERNAL MEDICINE
Payer: MEDICARE

## 2017-06-20 DIAGNOSIS — I70.90 ARTERY OCCLUSION: ICD-10-CM

## 2017-06-20 DIAGNOSIS — I73.9 PVD (PERIPHERAL VASCULAR DISEASE) WITH CLAUDICATION (HCC): ICD-10-CM

## 2017-06-20 DIAGNOSIS — Z79.01 LONG TERM CURRENT USE OF ANTICOAGULANT THERAPY: ICD-10-CM

## 2017-06-20 LAB — INR PPP: 2.4 (ref 2–3.5)

## 2017-06-20 PROCEDURE — 85610 PROTHROMBIN TIME: CPT

## 2017-06-20 PROCEDURE — 99211 OFF/OP EST MAY X REQ PHY/QHP: CPT

## 2017-06-20 NOTE — MR AVS SNAPSHOT
Jimenez Bains   2017 10:45 AM   Anticoagulation Visit   MRN: [id number]    Department:  Vascular Medicine   Dept Phone:  [phone]    Description:  Male : 1945   Provider:  V EXAM 4           Allergies as of 2017     Allergen Noted Reactions    No Known Drug Allergy 2014         You were diagnosed with     Artery occlusion (CMS-HCC)   [314252]       PVD (peripheral vascular disease) with claudication (CMS-HCC)   [796540]       Long term current use of anticoagulant therapy   [3583385]         Vital Signs     Smoking Status                   Current Some Day Smoker           Basic Information     Date Of Birth Sex Race Ethnicity Preferred Language    1945 Male White Non- English      Your appointments     2017 10:45 AM   Established Patient with IHV EXAM 4   [facility]  (--)    [address] [place] [address]   [phone]            2017 10:45 AM   Established Patient with IHV EXAM 4   [facility]  (--)    [address] [place] [address]   [phone]              Problem List              ICD-10-CM Priority Class Noted - Resolved    DIABETES MELLITUS  Medium  2011 - Present    Artery occlusion (CMS-HCC) I74.9 High  2011 - Present    Abdominal hernia (Chronic) K46.9 Low  2011 - Present    Hypertension (Chronic) I10 Low  2011 - Present    Tobacco abuse (Chronic) Z72.0 High  2011 - Present    Basal cell carcinoma of earlobe (Chronic) C44.211 Low  2011 - Present    Basal cell carcinoma of face (Chronic) C44.310 Low  2011 - Present    PVD (peripheral vascular disease) with claudication (CMS-HCC) (Chronic) I73.9 High  2011 - Present    HTN (hypertension) I10   2012 - Present    Peripheral artery disease    2012 - Present    Limb ischemia I99.8 High  2012 - Present    Ischemia of  extremity I99.8   12/19/2012 - Present    Lower limb ischemia I99.8   12/20/2012 - Present    Generalized atherosclerosis I70.91   11/14/2013 - Present    Arterial insufficiency (CMS-HCC) I77.1   12/4/2013 - Present    Atherosclerosis of native artery of extremity (CMS-HCC) I70.209   2/2/2014 - Present    Other specified disorders of arteries and arterioles (CMS-HCC) I77.89   3/17/2014 - Present    Peripheral arterial occlusive disease (CMS-HCC) I77.9   7/29/2014 - Present    Chronic anticoagulation Z79.01   10/21/2014 - Present    Long term current use of anticoagulant therapy Z79.01   5/12/2015 - Present      Health Maintenance        Date Due Completion Dates    DIABETES MONOFILAMENT / LE EXAM 2/3/1945 ---    RETINAL SCREENING 8/3/1962 ---    IMM DTaP/Tdap/Td Vaccine (1 - Tdap) 8/3/1963 ---    COLONOSCOPY 8/3/1994 ---    IMM ZOSTER VACCINE 8/3/2004 ---    IMM PNEUMOCOCCAL 65+ (ADULT) LOW/MEDIUM RISK SERIES (1 of 2 - PCV13) 8/3/2009 ---    FASTING LIPID PROFILE 12/19/2013 12/19/2012, 9/2/2011    A1C SCREENING 5/8/2014 11/8/2013, 12/18/2012, 12/18/2012, 9/2/2011    URINE ACR / MICROALBUMIN 11/8/2014 11/8/2013    SERUM CREATININE 7/31/2015 7/31/2014, 7/29/2014, 1/30/2014, 12/5/2013, 11/8/2013, 12/24/2012, 12/23/2012, 12/22/2012, 12/21/2012, 12/20/2012, 12/19/2012, 12/18/2012, 12/17/2012, 10/18/2011, 9/1/2011            Results     POCT Protime      Component    INR    2.4                        Current Immunizations     No immunizations on file.      Below and/or attached are the medications your provider expects you to take. Review all of your home medications and newly ordered medications with your provider and/or pharmacist. Follow medication instructions as directed by your provider and/or pharmacist. Please keep your medication list with you and share with your provider. Update the information when medications are discontinued, doses are changed, or new medications (including over-the-counter products) are  added; and carry medication information at all times in the event of emergency situations     Allergies:  NO KNOWN DRUG ALLERGY - (reactions not documented)               Medications  Valid as of: June 20, 2017 - 10:41 AM    Generic Name Brand Name Tablet Size Instructions for use    Warfarin Sodium (Tab) COUMADIN 6 MG Take one-half to one (1/2-1) tablet daily as directed by coumadin clinic        .                 Medicines prescribed today were sent to:     [facility]/PHARMACY #[id number] - [place], [place] - [address]    [address] [address]    Phone: [phone] Fax: [phone]    Open 24 Hours?: No      Medication refill instructions:       If your prescription bottle indicates you have medication refills left, it is not necessary to call your provider’s office. Please contact your pharmacy and they will refill your medication.    If your prescription bottle indicates you do not have any refills left, you may request refills at any time through one of the following ways: The online [email or web address] system (except Urgent Care), by calling your provider’s office, or by asking your pharmacy to contact your provider’s office with a refill request. Medication refills are processed only during regular business hours and may not be available until the next business day. Your provider may request additional information or to have a follow-up visit with you prior to refilling your medication.   *Please Note: Medication refills are assigned a new Rx number when refilled electronically. Your pharmacy may indicate that no refills were authorized even though a new prescription for the same medication is available at the pharmacy. Please request the medicine by name with the pharmacy before contacting your provider for a refill.        Warfarin Dosing Calendar   June 2017 Details    Sun Mon Tue Wed Thu Fri Sat         1               2               3                 4               5               6                7               8               9               10                 11               12               13               14               15               16               17                 18               19               20   2.4   6 mg   See details      21      3 mg         22      6 mg         23      3 mg         24      6 mg           25      3 mg         26      3 mg         27      6 mg         28      3 mg         29      6 mg         30      3 mg           Date Details   06/20 This INR check   INR: 2.4               How to take your warfarin dose     To take:  3 mg Take 0.5 of a 6 mg tablet.    To take:  6 mg Take 1 of the 6 mg tablets.           Warfarin Dosing Calendar   July 2017 Details    Sun Mon Tue Wed Thu Fri Sat           1      6 mg           2      3 mg         3      3 mg         4      6 mg         5      3 mg         6               7               8                 9               10               11               12               13               14               15                 16               17               18               19               20               21               22                 23               24               25               26               27               28               29                 30               31                     Date Details   No additional details    Date of next INR:  7/5/2017         How to take your warfarin dose     To take:  3 mg Take 0.5 of a 6 mg tablet.    To take:  6 mg Take 1 of the 6 mg tablets.              Social Intelligence Access Code: [email or web address]  Expires: 6/27/2017  4:15 AM    Social Intelligence  A secure, online tool to manage your health information     [employer]gs Social Intelligence® is a secure, online tool that connects you to your personalized health information from the privacy of your home -- day or night - making it very easy for you to manage your healthcare. Once the activation process is completed, you can even access your medical  information using the Wipebook guillermo, which is available for free in the Apple Guillermo store or Google Play store.     Wipebook provides the following levels of access (as shown below):   My Chart Features   [facility] Doctor [facility]  Specialists [facility]  Urgent  Care [facility]  Primary Care  Doctor   Email your healthcare team securely and privately 24/7 X X X    Manage appointments: schedule your next appointment; view details of past/upcoming appointments X      Request prescription refills. X      View recent personal medical records, including lab and immunizations X X X X   View health record, including health history, allergies, medications X X X X   Read reports about your outpatient visits, procedures, consult and ER notes X X X X   See your discharge summary, which is a recap of your hospital and/or ER visit that includes your diagnosis, lab results, and care plan. X X       How to register for Wipebook:  1. Go to  [email or web address].  2. Click on the Sign Up Now box, which takes you to the New Member Sign Up page. You will need to provide the following information:  a. Enter your Wipebook Access Code exactly as it appears at the top of this page. (You will not need to use this code after you’ve completed the sign-up process. If you do not sign up before the expiration date, you must request a new code.)   b. Enter your date of birth.   c. Enter your home email address.   d. Click Submit, and follow the next screen’s instructions.  3. Create a Wipebook ID. This will be your Wipebook login ID and cannot be changed, so think of one that is secure and easy to remember.  4. Create a Wipebook password. You can change your password at any time.  5. Enter your Password Reset Question and Answer. This can be used at a later time if you forget your password.   6. Enter your e-mail address. This allows you to receive e-mail notifications when new information is available in Wipebook.  7. Click Sign Up. You can now  view your health information.    For assistance activating your Gousto account, call [phone]

## 2017-06-20 NOTE — PROGRESS NOTES
Anticoagulation Summary as of 6/20/2017     INR goal 2.0-3.0   Selected INR 2.4 (6/20/2017)   Maintenance plan 6 mg (6 mg x 1) on Tue, Thu, Sat; 3 mg (6 mg x 0.5) all other days   Weekly total 30 mg   Weekly max dose 33 mg   Plan last modified Camden Gtz, PHARMD (4/26/2017)   Next INR check 7/5/2017   Target end date Indefinite    Indications   Artery occlusion (CMS-[facility]) [I74.9]  Long term current use of anticoagulant therapy [Z79.01]  PVD (peripheral vascular disease) with claudication (CMS-[facility]) [I73.9]         Anticoagulation Episode Summary     [facility]    Preferred lab [facility]    Send INR reminders to     Comments       Anticoagulation Care Providers     Provider Role Specialty Phone number    Gwyn Silverman M.D. Referring Family Medicine [phone]    Sanchez Gonzalez M.D. Referring Internal Medicine [phone]    [facility] Anticoagulation Services Responsible  [phone]        Anticoagulation Patient Findings    Patient's INR was therapeutic today in clinic.    Pt denies any unusual s/s of bleeding, bruising, clotting or any changes to diet or medications.  Confirmed dosing regimen.  Pt is to continue with current warfarin dosing regimen.    Follow up in 2 weeks.    Camden Gtz, ARTEMD

## 2017-06-21 LAB — INR BLD: 2.4 (ref 0.9–1.2)

## 2017-06-29 ENCOUNTER — TELEPHONE (OUTPATIENT)
Dept: VASCULAR LAB | Facility: MEDICAL CENTER | Age: 72
End: 2017-06-29

## 2017-06-29 NOTE — TELEPHONE ENCOUNTER
[facility]    Left VM with pt to f/u with current PCP to establish a referral.    Camden Gtz, PHARMD

## 2017-07-06 ENCOUNTER — ANTICOAGULATION VISIT (OUTPATIENT)
Dept: VASCULAR LAB | Facility: MEDICAL CENTER | Age: 72
End: 2017-07-06
Attending: INTERNAL MEDICINE
Payer: MEDICARE

## 2017-07-06 VITALS — DIASTOLIC BLOOD PRESSURE: 117 MMHG | SYSTOLIC BLOOD PRESSURE: 167 MMHG | HEART RATE: 70 BPM

## 2017-07-06 DIAGNOSIS — Z79.01 LONG TERM CURRENT USE OF ANTICOAGULANT THERAPY: ICD-10-CM

## 2017-07-06 DIAGNOSIS — I70.90 ARTERY OCCLUSION: ICD-10-CM

## 2017-07-06 DIAGNOSIS — I73.9 PVD (PERIPHERAL VASCULAR DISEASE) WITH CLAUDICATION (HCC): ICD-10-CM

## 2017-07-06 LAB — INR PPP: 2.4 (ref 2–3.5)

## 2017-07-06 PROCEDURE — 99211 OFF/OP EST MAY X REQ PHY/QHP: CPT | Performed by: PHARMACIST

## 2017-07-06 PROCEDURE — 85610 PROTHROMBIN TIME: CPT

## 2017-07-06 NOTE — PROGRESS NOTES
Anticoagulation Summary as of 7/6/2017     INR goal 2.0-3.0   Selected INR 2.4 (7/6/2017)   Maintenance plan 6 mg (6 mg x 1) on Tue, Thu, Sat; 3 mg (6 mg x 0.5) all other days   Weekly total 30 mg   Weekly max dose 33 mg   Plan last modified Camden Gtz, PHARMD (4/26/2017)   Next INR check 8/1/2017   Target end date Indefinite    Indications   Artery occlusion (CMS-HCC) [I74.9]  Long term current use of anticoagulant therapy [Z79.01]  PVD (peripheral vascular disease) with claudication (CMS-HCC) [I73.9]         Anticoagulation Episode Summary     [facility]    Preferred lab [facility]    Send INR reminders to     Comments       Anticoagulation Care Providers     Provider Role Specialty Phone number    Gwyn Silverman M.D. Referring Family Medicine [phone]    Sanchez Gonzalez M.D. Referring Internal Medicine [phone]    [facility] Anticoagulation Services Responsible  [phone]        Anticoagulation Patient Findings   Negatives Missed Doses, Extra Doses, Medication Changes, Antibiotic Use, Diet Changes, Dental/Other Procedures, Hospitalization, Bleeding Gums, Nose Bleeds, Blood in Urine, Blood in Stool, Any Bruising, Other Complaints        Spoke with patient to report a therapeutic INR.    Pt instructed to continue with current warfarin dosing regimen.   Pt denies any s/s of bleeding, bruising, clotting or any changes to diet or medication.    Will follow up in 4 weeks.    Sherlyn Abel, PHARMD

## 2017-07-06 NOTE — MR AVS SNAPSHOT
Jimenez Bains   2017 10:45 AM   Anticoagulation Visit   MRN: [id number]    Department:  Vascular Medicine   Dept Phone:  [phone]    Description:  Male : 1945   Provider:  [facility] EXAM 4           Allergies as of 2017     Allergen Noted Reactions    No Known Drug Allergy 2014         You were diagnosed with     Artery occlusion (CMS-HCC)   [952363]       PVD (peripheral vascular disease) with claudication (CMS-HCC)   [329472]       Long term current use of anticoagulant therapy   [2314396]         Vital Signs     Blood Pressure Pulse Smoking Status             167/117 mmHg 70 Current Some Day Smoker         Basic Information     Date Of Birth Sex Race Ethnicity Preferred Language    1945 Male White Non- English      Your appointments     Aug 01, 2017  9:30 AM   Established Patient with [facility] EXAM 5   [facility]  (--)    [facility] [facility] [address]   [phone]              Problem List              ICD-10-CM Priority Class Noted - Resolved    DIABETES MELLITUS  Medium  2011 - Present    Artery occlusion (CMS-HCC) I74.9 High  2011 - Present    Abdominal hernia (Chronic) K46.9 Low  2011 - Present    Hypertension (Chronic) I10 Low  2011 - Present    Tobacco abuse (Chronic) Z72.0 High  2011 - Present    Basal cell carcinoma of earlobe (Chronic) C44.211 Low  2011 - Present    Basal cell carcinoma of face (Chronic) C44.310 Low  2011 - Present    PVD (peripheral vascular disease) with claudication (CMS-HCC) (Chronic) I73.9 High  2011 - Present    HTN (hypertension) I10   2012 - Present    Peripheral artery disease    2012 - Present    Limb ischemia I99.8 High  2012 - Present    Ischemia of extremity I99.8   2012 - Present    Lower limb ischemia I99.8   2012 - Present    Generalized atherosclerosis I70.91   2013 - Present    Arterial insufficiency  (CMS-HCC) I77.1   12/4/2013 - Present    Atherosclerosis of native artery of extremity (CMS-HCC) I70.209   2/2/2014 - Present    Other specified disorders of arteries and arterioles (CMS-HCC) I77.89   3/17/2014 - Present    Peripheral arterial occlusive disease (CMS-HCC) I77.9   7/29/2014 - Present    Chronic anticoagulation Z79.01   10/21/2014 - Present    Long term current use of anticoagulant therapy Z79.01   5/12/2015 - Present      Health Maintenance        Date Due Completion Dates    DIABETES MONOFILAMENT / LE EXAM 2/3/1945 ---    RETINAL SCREENING 8/3/1962 ---    IMM DTaP/Tdap/Td Vaccine (1 - Tdap) 8/3/1963 ---    COLONOSCOPY 8/3/1994 ---    IMM ZOSTER VACCINE 8/3/2004 ---    IMM PNEUMOCOCCAL 65+ (ADULT) LOW/MEDIUM RISK SERIES (1 of 2 - PCV13) 8/3/2009 ---    FASTING LIPID PROFILE 12/19/2013 12/19/2012, 9/2/2011    A1C SCREENING 5/8/2014 11/8/2013, 12/18/2012, 12/18/2012, 9/2/2011    URINE ACR / MICROALBUMIN 11/8/2014 11/8/2013    SERUM CREATININE 7/31/2015 7/31/2014, 7/29/2014, 1/30/2014, 12/5/2013, 11/8/2013, 12/24/2012, 12/23/2012, 12/22/2012, 12/21/2012, 12/20/2012, 12/19/2012, 12/18/2012, 12/17/2012, 10/18/2011, 9/1/2011    IMM INFLUENZA (1) 9/1/2017 ---            Results     POCT Protime      Component    INR    2.4                        Current Immunizations     No immunizations on file.      Below and/or attached are the medications your provider expects you to take. Review all of your home medications and newly ordered medications with your provider and/or pharmacist. Follow medication instructions as directed by your provider and/or pharmacist. Please keep your medication list with you and share with your provider. Update the information when medications are discontinued, doses are changed, or new medications (including over-the-counter products) are added; and carry medication information at all times in the event of emergency situations     Allergies:  NO KNOWN DRUG ALLERGY - (reactions not  documented)               Medications  Valid as of: July 06, 2017 - 11:19 AM    Generic Name Brand Name Tablet Size Instructions for use    Warfarin Sodium (Tab) COUMADIN 6 MG Take one-half to one (1/2-1) tablet daily as directed by coumadin clinic        .                 Medicines prescribed today were sent to:     [facility]/PHARMACY #[id number] - [place], [place] - [address] [facility]    [address] [facility] [address]    Phone: [phone] Fax: [phone]    Open 24 Hours?: No      Medication refill instructions:       If your prescription bottle indicates you have medication refills left, it is not necessary to call your provider’s office. Please contact your pharmacy and they will refill your medication.    If your prescription bottle indicates you do not have any refills left, you may request refills at any time through one of the following ways: The online Cloudmeter system (except Urgent Care), by calling your provider’s office, or by asking your pharmacy to contact your provider’s office with a refill request. Medication refills are processed only during regular business hours and may not be available until the next business day. Your provider may request additional information or to have a follow-up visit with you prior to refilling your medication.   *Please Note: Medication refills are assigned a new Rx number when refilled electronically. Your pharmacy may indicate that no refills were authorized even though a new prescription for the same medication is available at the pharmacy. Please request the medicine by name with the pharmacy before contacting your provider for a refill.        Warfarin Dosing Calendar   July 2017 Details    Sun Mon Tue Wed Thu Fri Sat           1                 2               3               4               5               6   2.4   6 mg   See details      7      3 mg         8      6 mg           9      3 mg         10      3 mg         11      6 mg         12      3 mg          13      6 mg         14      3 mg         15      6 mg           16      3 mg         17      3 mg         18      6 mg         19      3 mg         20      6 mg         21      3 mg         22      6 mg           23      3 mg         24      3 mg         25      6 mg         26      3 mg         27      6 mg         28      3 mg         29      6 mg           30      3 mg         31      3 mg               Date Details   07/06 This INR check   INR: 2.4               How to take your warfarin dose     To take:  3 mg Take 0.5 of a 6 mg tablet.    To take:  6 mg Take 1 of the 6 mg tablets.           Warfarin Dosing Calendar   August 2017 Details    Sun Mon Tue Wed Thu Fri Sat       1      6 mg         2               3               4               5                 6               7               8               9               10               11               12                 13               14               15               16               17               18               19                 20               21               22               23               24               25               26                 27               28               29               30               31                  Date Details   No additional details    Date of next INR:  8/1/2017         How to take your warfarin dose     To take:  6 mg Take 1 of the 6 mg tablets.              Proximagen Access Code: [email or web address]  Expires: 7/26/2017  3:55 AM    Proximagen  A secure, online tool to manage your health information     [employer] Proximagen® is a secure, online tool that connects you to your personalized health information from the privacy of your home -- day or night - making it very easy for you to manage your healthcare. Once the activation process is completed, you can even access your medical information using the Proximagen guillermo, which is available for free in the Apple Guillermo store or Google Play store.     Proximagen provides the  following levels of access (as shown below):   My Chart Features   [facility] Doctor [facility]  Specialists [facility]  Urgent  Care Non-[facility]  Primary Care  Doctor   Email your healthcare team securely and privately 24/7 X X X    Manage appointments: schedule your next appointment; view details of past/upcoming appointments X      Request prescription refills. X      View recent personal medical records, including lab and immunizations X X X X   View health record, including health history, allergies, medications X X X X   Read reports about your outpatient visits, procedures, consult and ER notes X X X X   See your discharge summary, which is a recap of your hospital and/or ER visit that includes your diagnosis, lab results, and care plan. X X       How to register for Docracy:  1. Go to  [email or web address].  2. Click on the Sign Up Now box, which takes you to the New Member Sign Up page. You will need to provide the following information:  a. Enter your Docracy Access Code exactly as it appears at the top of this page. (You will not need to use this code after you’ve completed the sign-up process. If you do not sign up before the expiration date, you must request a new code.)   b. Enter your date of birth.   c. Enter your home email address.   d. Click Submit, and follow the next screen’s instructions.  3. Create a Docracy ID. This will be your Docracy login ID and cannot be changed, so think of one that is secure and easy to remember.  4. Create a Docracy password. You can change your password at any time.  5. Enter your Password Reset Question and Answer. This can be used at a later time if you forget your password.   6. Enter your e-mail address. This allows you to receive e-mail notifications when new information is available in Docracy.  7. Click Sign Up. You can now view your health information.    For assistance activating your Docracy account, call [phone]

## 2017-07-11 ENCOUNTER — ANTICOAGULATION MONITORING (OUTPATIENT)
Dept: VASCULAR LAB | Facility: MEDICAL CENTER | Age: 72
End: 2017-07-11

## 2017-07-11 DIAGNOSIS — I70.90 ARTERY OCCLUSION: ICD-10-CM

## 2017-07-11 DIAGNOSIS — Z79.01 LONG TERM CURRENT USE OF ANTICOAGULANT THERAPY: ICD-10-CM

## 2017-07-11 DIAGNOSIS — I73.9 PVD (PERIPHERAL VASCULAR DISEASE) WITH CLAUDICATION (HCC): ICD-10-CM

## 2017-08-01 ENCOUNTER — ANTICOAGULATION VISIT (OUTPATIENT)
Dept: VASCULAR LAB | Facility: MEDICAL CENTER | Age: 72
End: 2017-08-01
Attending: INTERNAL MEDICINE
Payer: MEDICARE

## 2017-08-01 DIAGNOSIS — I70.90 ARTERY OCCLUSION: ICD-10-CM

## 2017-08-01 DIAGNOSIS — I73.9 PVD (PERIPHERAL VASCULAR DISEASE) WITH CLAUDICATION (HCC): ICD-10-CM

## 2017-08-01 DIAGNOSIS — Z79.01 LONG TERM CURRENT USE OF ANTICOAGULANT THERAPY: ICD-10-CM

## 2017-08-01 LAB — INR PPP: 3.1 (ref 2–3.5)

## 2017-08-01 PROCEDURE — 99212 OFFICE O/P EST SF 10 MIN: CPT

## 2017-08-01 PROCEDURE — 85610 PROTHROMBIN TIME: CPT

## 2017-08-01 NOTE — MR AVS SNAPSHOT
Jimenez Bains   2017 9:30 AM   Anticoagulation Visit   MRN: [id number]    Department:  Vascular Medicine   Dept Phone:  [phone]    Description:  Male : 1945   Provider:  [facility] EXAM 5           Allergies as of 2017     Allergen Noted Reactions    No Known Drug Allergy 2014         You were diagnosed with     Artery occlusion (CMS-HCC)   [245066]       PVD (peripheral vascular disease) with claudication (CMS-HCC)   [765198]       Long term current use of anticoagulant therapy   [9203012]         Vital Signs     Smoking Status                   Current Some Day Smoker           Basic Information     Date Of Birth Sex Race Ethnicity Preferred Language    1945 Male White Non- English      Your appointments     Sep 12, 2017  9:30 AM   Established Patient with [facility] EXAM 4   [facility]  (--)    [address] [place] [address]   [phone]              Problem List              ICD-10-CM Priority Class Noted - Resolved    DIABETES MELLITUS  Medium  2011 - Present    Artery occlusion (CMS-HCC) I74.9 High  2011 - Present    Abdominal hernia (Chronic) K46.9 Low  2011 - Present    Hypertension (Chronic) I10 Low  2011 - Present    Tobacco abuse (Chronic) Z72.0 High  2011 - Present    Basal cell carcinoma of earlobe (Chronic) C44.211 Low  2011 - Present    Basal cell carcinoma of face (Chronic) C44.310 Low  2011 - Present    PVD (peripheral vascular disease) with claudication (CMS-HCC) (Chronic) I73.9 High  2011 - Present    HTN (hypertension) I10   2012 - Present    Peripheral artery disease    2012 - Present    Limb ischemia I99.8 High  2012 - Present    Ischemia of extremity I99.8   2012 - Present    Lower limb ischemia I99.8   2012 - Present    Generalized atherosclerosis I70.91   2013 - Present    Arterial insufficiency (CMS-HCC) I77.1   2013 -  Present    Atherosclerosis of native artery of extremity (CMS-HCC) I70.209   2/2/2014 - Present    Other specified disorders of arteries and arterioles (CMS-HCC) I77.89   3/17/2014 - Present    Peripheral arterial occlusive disease (CMS-HCC) I77.9   7/29/2014 - Present    Chronic anticoagulation Z79.01   10/21/2014 - Present    Long term current use of anticoagulant therapy Z79.01   5/12/2015 - Present      Health Maintenance        Date Due Completion Dates    DIABETES MONOFILAMENT / LE EXAM 2/3/1945 ---    RETINAL SCREENING 8/3/1962 ---    IMM DTaP/Tdap/Td Vaccine (1 - Tdap) 8/3/1963 ---    COLONOSCOPY 8/3/1994 ---    IMM ZOSTER VACCINE 8/3/2004 ---    IMM PNEUMOCOCCAL 65+ (ADULT) LOW/MEDIUM RISK SERIES (1 of 2 - PCV13) 8/3/2009 ---    FASTING LIPID PROFILE 12/19/2013 12/19/2012, 9/2/2011    A1C SCREENING 5/8/2014 11/8/2013, 12/18/2012, 12/18/2012, 9/2/2011    URINE ACR / MICROALBUMIN 11/8/2014 11/8/2013    SERUM CREATININE 7/31/2015 7/31/2014, 7/29/2014, 1/30/2014, 12/5/2013, 11/8/2013, 12/24/2012, 12/23/2012, 12/22/2012, 12/21/2012, 12/20/2012, 12/19/2012, 12/18/2012, 12/17/2012, 10/18/2011, 9/1/2011    IMM INFLUENZA (1) 9/1/2017 ---            Results     POCT Protime      Component    INR    3.1                        Current Immunizations     No immunizations on file.      Below and/or attached are the medications your provider expects you to take. Review all of your home medications and newly ordered medications with your provider and/or pharmacist. Follow medication instructions as directed by your provider and/or pharmacist. Please keep your medication list with you and share with your provider. Update the information when medications are discontinued, doses are changed, or new medications (including over-the-counter products) are added; and carry medication information at all times in the event of emergency situations     Allergies:  NO KNOWN DRUG ALLERGY - (reactions not documented)                  Medications  Valid as of: August 01, 2017 - 10:24 AM    Generic Name Brand Name Tablet Size Instructions for use    Warfarin Sodium (Tab) COUMADIN 6 MG Take one-half to one (1/2-1) tablet daily as directed by coumadin clinic        .                 Medicines prescribed today were sent to:     [facility]/PHARMACY #[id number] - [place], [place] - [address]    [address] [address]    Phone: [phone] Fax: [phone]    Open 24 Hours?: No      Medication refill instructions:       If your prescription bottle indicates you have medication refills left, it is not necessary to call your provider’s office. Please contact your pharmacy and they will refill your medication.    If your prescription bottle indicates you do not have any refills left, you may request refills at any time through one of the following ways: The online Banki.ru system (except Urgent Care), by calling your provider’s office, or by asking your pharmacy to contact your provider’s office with a refill request. Medication refills are processed only during regular business hours and may not be available until the next business day. Your provider may request additional information or to have a follow-up visit with you prior to refilling your medication.   *Please Note: Medication refills are assigned a new Rx number when refilled electronically. Your pharmacy may indicate that no refills were authorized even though a new prescription for the same medication is available at the pharmacy. Please request the medicine by name with the pharmacy before contacting your provider for a refill.        Warfarin Dosing Calendar   August 2017 Details    Sun Mon Tue Wed Thu Fri Sat       1   3.1   3 mg   See details      2      3 mg         3      6 mg         4      3 mg         5      6 mg           6      3 mg         7      3 mg         8      6 mg         9      3 mg         10      6 mg         11      3 mg         12      6 mg           13       3 mg         14      3 mg         15      6 mg         16      3 mg         17      6 mg         18      3 mg         19      6 mg           20      3 mg         21      3 mg         22      6 mg         23      3 mg         24      6 mg         25      3 mg         26      6 mg           27      3 mg         28      3 mg         29      6 mg         30      3 mg         31      6 mg            Date Details   08/01 This INR check   INR: 3.1               How to take your warfarin dose     To take:  3 mg Take 0.5 of a 6 mg tablet.    To take:  6 mg Take 1 of the 6 mg tablets.           Warfarin Dosing Calendar   September 2017 Details    Sun Mon Tue Wed Thu Fri Sat          1      3 mg         2      6 mg           3      3 mg         4      3 mg         5      6 mg         6      3 mg         7      6 mg         8      3 mg         9      6 mg           10      3 mg         11      3 mg         12      6 mg         13               14               15               16                 17               18               19               20               21               22               23                 24               25               26               27               28               29               30                Date Details   No additional details    Date of next INR:  9/12/2017         How to take your warfarin dose     To take:  3 mg Take 0.5 of a 6 mg tablet.    To take:  6 mg Take 1 of the 6 mg tablets.              Clicktree Access Code: [email or web address]  Expires: 8/24/2017  4:12 AM    Clicktree  A secure, online tool to manage your health information     [employer]’s Clicktree® is a secure, online tool that connects you to your personalized health information from the privacy of your home -- day or night - making it very easy for you to manage your healthcare. Once the activation process is completed, you can even access your medical information using the Clicktree avery, which is available for free in  the Apple Guillermo store or Google Play store.     [employer] provides the following levels of access (as shown below):   My Chart Features   [facility] Doctor [facility]  Specialists [facility]  Urgent  Care Non-[facility]  Primary Care  Doctor   Email your healthcare team securely and privately 24/7 X X X    Manage appointments: schedule your next appointment; view details of past/upcoming appointments X      Request prescription refills. X      View recent personal medical records, including lab and immunizations X X X X   View health record, including health history, allergies, medications X X X X   Read reports about your outpatient visits, procedures, consult and ER notes X X X X   See your discharge summary, which is a recap of your hospital and/or ER visit that includes your diagnosis, lab results, and care plan. X X       How to register for [employer]:  1. Go to  [email or web address].  2. Click on the Sign Up Now box, which takes you to the New Member Sign Up page. You will need to provide the following information:  a. Enter your [employer] Access Code exactly as it appears at the top of this page. (You will not need to use this code after you’ve completed the sign-up process. If you do not sign up before the expiration date, you must request a new code.)   b. Enter your date of birth.   c. Enter your home email address.   d. Click Submit, and follow the next screen’s instructions.  3. Create a [employer] ID. This will be your [employer] login ID and cannot be changed, so think of one that is secure and easy to remember.  4. Create a [employer] password. You can change your password at any time.  5. Enter your Password Reset Question and Answer. This can be used at a later time if you forget your password.   6. Enter your e-mail address. This allows you to receive e-mail notifications when new information is available in [employer].  7. Click Sign Up. You can now view your health information.    For assistance activating your  MyChart account, call [phone]

## 2017-08-01 NOTE — PROGRESS NOTES
Anticoagulation Summary as of 8/1/2017     INR goal 2.0-3.0   Selected INR 3.1! (8/1/2017)   Maintenance plan 6 mg (6 mg x 1) on Tue, Thu, Sat; 3 mg (6 mg x 0.5) all other days   Weekly total 30 mg   Weekly max dose 33 mg   Plan last modified Camden Gtz, PHARMD (4/26/2017)   Next INR check 8/29/2017   Target end date Indefinite    Indications   Artery occlusion (CMS-HCC) [I74.9]  Long term current use of anticoagulant therapy [Z79.01]  PVD (peripheral vascular disease) with claudication (CMS-HCC) [I73.9]         Anticoagulation Episode Summary     INR check location [facility]    Preferred lab [facility]    Send INR reminders to     Comments       Anticoagulation Care Providers     Provider Role Specialty Phone number    Gwyn Silverman M.D. Referring Family Medicine [phone]    Sanchez Gonzalez M.D. Referring Internal Medicine [phone]    [facility] Anticoagulation Services Responsible  [phone]        Anticoagulation Patient Findings      Current Outpatient Prescriptions on File Prior to Visit   Medication Sig Dispense Refill   • warfarin (COUMADIN) 6 MG Tab Take one-half to one (1/2-1) tablet daily as directed by coumadin clinic 90 Tab 1     No current facility-administered medications on file prior to visit.       Lab Results   Component Value Date/Time    SODIUM 139 07/31/2014 03:32 AM    POTASSIUM 3.8 07/31/2014 03:32 AM    CHLORIDE 105 07/31/2014 03:32 AM    CO2 29 07/31/2014 03:32 AM    GLUCOSE 197* 07/31/2014 03:32 AM    BUN 21 07/31/2014 03:32 AM    CREATININE 1.13 07/31/2014 03:32 AM          Jimenez Bains seen in clinic today  INR  supra-therapeutic.    Denies signs/symptoms of bleeding and/or thrombosis.    Denies changes to diet or medications.   Follow up appointment in 6 week(s) per pt     3mg today then continue weekly warfarin dose as noted     Sukh Gonzalez, PHARMD

## 2017-09-12 ENCOUNTER — ANTICOAGULATION VISIT (OUTPATIENT)
Dept: VASCULAR LAB | Facility: MEDICAL CENTER | Age: 72
End: 2017-09-12
Attending: INTERNAL MEDICINE
Payer: MEDICARE

## 2017-09-12 VITALS — HEART RATE: 62 BPM | DIASTOLIC BLOOD PRESSURE: 83 MMHG | SYSTOLIC BLOOD PRESSURE: 144 MMHG

## 2017-09-12 DIAGNOSIS — I70.90 ARTERY OCCLUSION: ICD-10-CM

## 2017-09-12 DIAGNOSIS — I73.9 PVD (PERIPHERAL VASCULAR DISEASE) WITH CLAUDICATION (HCC): ICD-10-CM

## 2017-09-12 DIAGNOSIS — Z79.01 LONG TERM CURRENT USE OF ANTICOAGULANT THERAPY: ICD-10-CM

## 2017-09-12 LAB — INR PPP: 5.4 (ref 2–3.5)

## 2017-09-12 PROCEDURE — 85610 PROTHROMBIN TIME: CPT

## 2017-09-12 PROCEDURE — 99212 OFFICE O/P EST SF 10 MIN: CPT

## 2017-09-12 NOTE — PROGRESS NOTES
Anticoagulation Summary  As of 9/12/2017    INR goal:   2.0-3.0   TTR:   75.0 % (2.3 y)   Today's INR:   5.4!   Maintenance plan:   6 mg (6 mg x 1) on Tue, Thu, Sat; 3 mg (6 mg x 0.5) all other days   Weekly total:   30 mg   Weekly max dose:   33 mg   Plan last modified:   Camden Gtz, PharmD (4/26/2017)   Next INR check:   9/20/2017   Target end date:   Indefinite    Indications    Artery occlusion (CMS-HCC) [I74.9]  Long term current use of anticoagulant therapy [Z79.01]  PVD (peripheral vascular disease) with claudication (CMS-HCC) [I73.9]             Anticoagulation Episode Summary     INR check location:   [facility]    Preferred lab:   [facility]    Send INR reminders to:       Comments:         Anticoagulation Care Providers     Provider Role Specialty Phone number    Gwyn Silverman M.D. Referring Family Medicine [phone]    Sanchez Gonzalez M.D. Referring Internal Medicine [phone]    [facility] Anticoagulation Services Responsible  [phone]        Anticoagulation Patient Findings      HPI:  Jimenez Bains seen in clinic today, on anticoagulation therapy with Warfarin for PVD and Artery occlusion  Changes to current medical/health status since last appt: none  Denies signs/symptoms of bleeding and/or thrombosis since the last appt.    Denies any interval changes to diet  No ETOH.   Denies any interval changes to medications since last appt.   Denies any complications or cost restrictions with current therapy.   BP recorded in vitals.    A/P   INR  supra-therapeutic.   Hold two days then continue weekly warfarin dose as noted      Follow up appointment in 1 week(s).     Sukh Gonzalez, MarieD

## 2017-09-13 LAB — INR BLD: 5.4 (ref 0.9–1.2)

## 2017-09-19 ENCOUNTER — TELEPHONE (OUTPATIENT)
Dept: VASCULAR LAB | Facility: MEDICAL CENTER | Age: 72
End: 2017-09-19

## 2017-09-19 NOTE — TELEPHONE ENCOUNTER
[facility]    Requested referral from Dr Gwyn Silverman.    Camden Gtz, PharmD

## 2017-09-20 ENCOUNTER — ANTICOAGULATION VISIT (OUTPATIENT)
Dept: VASCULAR LAB | Facility: MEDICAL CENTER | Age: 72
End: 2017-09-20
Attending: INTERNAL MEDICINE
Payer: MEDICARE

## 2017-09-20 VITALS — DIASTOLIC BLOOD PRESSURE: 67 MMHG | SYSTOLIC BLOOD PRESSURE: 138 MMHG | HEART RATE: 91 BPM

## 2017-09-20 DIAGNOSIS — I70.90 ARTERY OCCLUSION: ICD-10-CM

## 2017-09-20 DIAGNOSIS — I73.9 PVD (PERIPHERAL VASCULAR DISEASE) WITH CLAUDICATION (HCC): ICD-10-CM

## 2017-09-20 DIAGNOSIS — Z79.01 LONG TERM CURRENT USE OF ANTICOAGULANT THERAPY: ICD-10-CM

## 2017-09-20 LAB
INR BLD: 4.1 (ref 0.9–1.2)
INR PPP: 4.1 (ref 2–3.5)

## 2017-09-20 PROCEDURE — 99212 OFFICE O/P EST SF 10 MIN: CPT | Performed by: PHARMACIST

## 2017-09-20 PROCEDURE — 85610 PROTHROMBIN TIME: CPT

## 2017-09-20 NOTE — PROGRESS NOTES
Anticoagulation Summary  As of 9/20/2017    INR goal:   2.0-3.0   TTR:   74.3 % (2.3 y)   Today's INR:   4.1!   Maintenance plan:   6 mg (6 mg x 1) on Tue, Sat; 3 mg (6 mg x 0.5) all other days   Weekly total:   27 mg   Weekly max dose:   33 mg   Plan last modified:   Sherlyn Abel, PharmD (9/20/2017)   Next INR check:   9/27/2017   Target end date:   Indefinite    Indications    Artery occlusion (CMS-HCC) [I74.9]  Long term current use of anticoagulant therapy [Z79.01]  PVD (peripheral vascular disease) with claudication (CMS-[facility]) [I73.9]             Anticoagulation Episode Summary     [facility]R check location:   [facility]    Preferred lab:   [facility]    Send INR reminders to:       Comments:         Anticoagulation Care Providers     Provider Role Specialty Phone number    Gwyn Silverman M.D. Referring Family Medicine [phone]    Sanchez Gonzalez M.D. Referring Internal Medicine [phone]    [facility] Anticoagulation Services Responsible  [phone]        Anticoagulation Patient Findings      HPI:  Jimenez Bains seen in clinic today, on anticoagulation therapy with warfarin for Artery Occlusion  Changes to current medical/health status since last appt: denies  Denies signs/symptoms of bleeding and/or thrombosis since the last appt.    Denies any interval changes to diet  Denies any interval changes to medications since last appt.   Denies any complications or cost restrictions with current therapy.   BP recorded in vitals.      A/P   INR  is SUPRA-therapeutic.   Will have pt hold his warfarin today and then start a 10% reduced weekly dose    Follow up appointment in 1 week(s).    Sherlyn Abel, PharmD

## 2017-09-27 ENCOUNTER — ANTICOAGULATION VISIT (OUTPATIENT)
Dept: VASCULAR LAB | Facility: MEDICAL CENTER | Age: 72
End: 2017-09-27
Attending: INTERNAL MEDICINE
Payer: MEDICARE

## 2017-09-27 DIAGNOSIS — Z79.01 LONG TERM CURRENT USE OF ANTICOAGULANT THERAPY: ICD-10-CM

## 2017-09-27 DIAGNOSIS — I73.9 PVD (PERIPHERAL VASCULAR DISEASE) WITH CLAUDICATION (HCC): ICD-10-CM

## 2017-09-27 DIAGNOSIS — I70.90 ARTERY OCCLUSION: ICD-10-CM

## 2017-09-27 LAB
INR BLD: 4.9 (ref 0.9–1.2)
INR PPP: 4.9 (ref 2–3.5)

## 2017-09-27 PROCEDURE — 85610 PROTHROMBIN TIME: CPT

## 2017-09-27 PROCEDURE — 99212 OFFICE O/P EST SF 10 MIN: CPT | Performed by: PHARMACIST

## 2017-09-27 NOTE — PROGRESS NOTES
Anticoagulation Summary  As of 9/27/2017    INR goal:   2.0-3.0   TTR:   73.7 % (2.4 y)   Today's INR:   4.9!   Maintenance plan:   3 mg (6 mg x 0.5) every day   Weekly total:   21 mg   Weekly max dose:   33 mg   Plan last modified:   Sherlyn Abel, PharmD (9/27/2017)   Next INR check:   10/11/2017   Target end date:   Indefinite    Indications    Artery occlusion (CMS-[facility]) [I74.9]  Long term current use of anticoagulant therapy [Z79.01]  PVD (peripheral vascular disease) with claudication (CMS-[facility]) [I73.9]             Anticoagulation Episode Summary     [facility]   [facility]    Preferred lab:   [facility]    Send INR reminders to:       Comments:         Anticoagulation Care Providers     Provider Role Specialty Phone number    Gwyn Silverman M.D. Referring Family Medicine [phone]    Sanchez Gonzalez M.D. Referring Internal Medicine [phone]    [facility] Anticoagulation Services Responsible  [phone]        Anticoagulation Patient Findings      HPI:  Jimenez Raymundo Bains seen in clinic today, on anticoagulation therapy with warfarin for artery occlusion  Changes to current medical/health status since last appt: denies  Denies signs/symptoms of bleeding and/or thrombosis since the last appt.    Denies any interval changes to diet  Denies any interval changes to medications since last appt.   Denies any complications or cost restrictions with current therapy.   Declined BP check      A/P   INR  is SUPRA-therapeutic.   Nothing different per pt, did follow instructions last week. Will have pt hold dose today and then start a 20% decreased weekly regimen.     Follow up appointment in 2 week(s) per pt preference.    Sherlyn Abel, PharmD

## 2017-10-03 DIAGNOSIS — I82.90 THROMBUS: ICD-10-CM

## 2017-10-11 ENCOUNTER — ANTICOAGULATION VISIT (OUTPATIENT)
Dept: VASCULAR LAB | Facility: MEDICAL CENTER | Age: 72
End: 2017-10-11
Attending: INTERNAL MEDICINE
Payer: MEDICARE

## 2017-10-11 DIAGNOSIS — I73.9 PVD (PERIPHERAL VASCULAR DISEASE) WITH CLAUDICATION (HCC): ICD-10-CM

## 2017-10-11 DIAGNOSIS — Z79.01 LONG TERM CURRENT USE OF ANTICOAGULANT THERAPY: ICD-10-CM

## 2017-10-11 DIAGNOSIS — I70.90 ARTERY OCCLUSION: ICD-10-CM

## 2017-10-11 LAB
INR BLD: 2.4 (ref 0.9–1.2)
INR PPP: 2.4 (ref 2–3.5)

## 2017-10-11 PROCEDURE — 85610 PROTHROMBIN TIME: CPT

## 2017-10-11 PROCEDURE — 99211 OFF/OP EST MAY X REQ PHY/QHP: CPT | Performed by: NURSE PRACTITIONER

## 2017-10-11 NOTE — PROGRESS NOTES
Anticoagulation Summary  As of 10/11/2017    INR goal:   2.0-3.0   TTR:   72.9 % (2.4 y)   Today's INR:   2.4   Maintenance plan:   3 mg (6 mg x 0.5) every day   Weekly total:   21 mg   Weekly max dose:   33 mg   Plan last modified:   Sherlyn Abel, PharmD (9/27/2017)   Next INR check:   11/1/2017   Target end date:   Indefinite    Indications    Artery occlusion (CMS-[facility]) [I74.9]  Long term current use of anticoagulant therapy [Z79.01]  PVD (peripheral vascular disease) with claudication (CMS-[facility]) [I73.9]             Anticoagulation Episode Summary     INR check location[facility]   [facility]    Preferred lab:   [facility]    Send INR reminders to:       Comments:         Anticoagulation Care Providers     Provider Role Specialty Phone number    Gwyn Silverman M.D. Referring Family Medicine [phone]    Sanchez Gonzalez M.D. Referring Internal Medicine [phone]    [facility] Anticoagulation Services Responsible  [phone]        Anticoagulation Patient Findings      HPI:  Jimenez Bains seen in clinic today for follow up on anticoagulation therapy in the presence of PVD. Denies any changes to current medical/health status since last appointment. Denies any medication or diet changes. No current symptoms of bleeding or thrombosis reported.    A/P:   INR therapeutic. Continue current regimen. BP declined.    Follow up appointment in 3 week(s) per pt's preference.    Next Appointment: Wednesday, November 1, 2017 at 8:30 am.     Debbie ROMAN

## 2017-11-01 ENCOUNTER — ANTICOAGULATION VISIT (OUTPATIENT)
Dept: VASCULAR LAB | Facility: MEDICAL CENTER | Age: 72
End: 2017-11-01
Attending: INTERNAL MEDICINE
Payer: MEDICARE

## 2017-11-01 VITALS — DIASTOLIC BLOOD PRESSURE: 75 MMHG | SYSTOLIC BLOOD PRESSURE: 162 MMHG | HEART RATE: 63 BPM

## 2017-11-01 DIAGNOSIS — Z79.01 LONG TERM CURRENT USE OF ANTICOAGULANT THERAPY: ICD-10-CM

## 2017-11-01 DIAGNOSIS — I73.9 PVD (PERIPHERAL VASCULAR DISEASE) WITH CLAUDICATION (HCC): ICD-10-CM

## 2017-11-01 DIAGNOSIS — I70.90 ARTERY OCCLUSION: ICD-10-CM

## 2017-11-01 LAB
INR BLD: 1.6 (ref 0.9–1.2)
INR PPP: 1.6 (ref 2–3.5)

## 2017-11-01 PROCEDURE — 85610 PROTHROMBIN TIME: CPT

## 2017-11-01 PROCEDURE — 99212 OFFICE O/P EST SF 10 MIN: CPT

## 2017-11-01 NOTE — PROGRESS NOTES
Anticoagulation Summary  As of 11/1/2017    INR goal:   2.0-3.0   TTR:   72.4 % (2.4 y)   Today's INR:   1.6!   Maintenance plan:   3 mg (6 mg x 0.5) every day   Weekly total:   21 mg   Weekly max dose:   33 mg   Plan last modified:   Sherlyn Abel, PharmD (9/27/2017)   Next INR check:   11/15/2017   Target end date:   Indefinite    Indications    Artery occlusion (CMS-HCC) [I74.9]  Long term current use of anticoagulant therapy [Z79.01]  PVD (peripheral vascular disease) with claudication (CMS-[facility]) [I73.9]             Anticoagulation Episode Summary     INR check location[facility]   [facility]    Preferred lab:   [facility]    Send INR reminders to:       Comments:         Anticoagulation Care Providers     Provider Role Specialty Phone number    Gwyn Silverman M.D. Referring Family Medicine [phone]    Sanchez Gonzalez M.D. Referring Internal Medicine [phone]    [facility] Anticoagulation Services Responsible  [phone]        Anticoagulation Patient Findings      HPI:  Jimenez Bains seen in clinic today, on anticoagulation therapy with warfarin for PVD.   Changes to current medical/health status since last appt: None.   Denies signs/symptoms of bleeding and/or thrombosis since the last appt.    States he had large amount of greens this week; does not plan to continue the same diet.  Denies any interval changes to medications since last appt.   Denies any complications or cost restrictions with current therapy.   BP recorded in vitals; elevated today.   Requested pt to bring home values for BP.     A/P   INR  SUB-therapeutic.   Bolus today then Pt is to continue with current warfarin dosing regimen.     Follow up appointment in 2 week(s).    Camden Gtz, PharmD

## 2017-11-15 ENCOUNTER — ANTICOAGULATION VISIT (OUTPATIENT)
Dept: VASCULAR LAB | Facility: MEDICAL CENTER | Age: 72
End: 2017-11-15
Attending: INTERNAL MEDICINE
Payer: MEDICARE

## 2017-11-15 DIAGNOSIS — Z79.01 LONG TERM CURRENT USE OF ANTICOAGULANT THERAPY: ICD-10-CM

## 2017-11-15 DIAGNOSIS — I70.90 ARTERY OCCLUSION: ICD-10-CM

## 2017-11-15 DIAGNOSIS — I73.9 PVD (PERIPHERAL VASCULAR DISEASE) WITH CLAUDICATION (HCC): ICD-10-CM

## 2017-11-15 LAB
INR BLD: 2.6 (ref 0.9–1.2)
INR PPP: 2.6 (ref 2–3.5)

## 2017-11-15 PROCEDURE — 85610 PROTHROMBIN TIME: CPT

## 2017-11-15 PROCEDURE — 99211 OFF/OP EST MAY X REQ PHY/QHP: CPT | Performed by: PHARMACIST

## 2017-11-15 NOTE — PROGRESS NOTES
Anticoagulation Summary  As of 11/15/2017    INR goal:   2.0-3.0   TTR:   72.2 % (2.5 y)   Today's INR:   2.6   Maintenance plan:   3 mg (6 mg x 0.5) every day   Weekly total:   21 mg   Weekly max dose:   33 mg   Plan last modified:   Sherlyn Abel, PharmD (9/27/2017)   Next INR check:   12/13/2017   Target end date:   Indefinite    Indications    Artery occlusion (CMS-HCC) [I74.9]  Long term current use of anticoagulant therapy [Z79.01]  PVD (peripheral vascular disease) with claudication (CMS-HCC) [I73.9]             Anticoagulation Episode Summary     [facility]R check [facility]   [facility]    Preferred lab:   [facility]    Send INR reminders to:       Comments:         Anticoagulation Care Providers     Provider Role Specialty Phone number    Gwyn Silverman M.D. Referring Family Medicine [phone]    Sanchez Gonzalez M.D. Referring Internal Medicine [phone]    [facility] Anticoagulation Services Responsible  [phone]        Anticoagulation Patient Findings  Patient Findings     Negatives:   Signs/symptoms of thrombosis, Signs/symptoms of bleeding, Laboratory test error suspected, Change in health, Change in alcohol use, Change in activity, Upcoming invasive procedure, Emergency department visit, Upcoming dental procedure, Missed doses, Extra doses, Change in medications, Change in diet/appetite, Hospital admission, Bruising, Other complaints        HPI:   Jimenez Bains seen in clinic today, on anticoagulation therapy with warfarin for arterial occlusion   Changes to current medical/health status since last appt: NONE  Denies signs/symptoms of bleeding and/or thrombosis since the last appt.    Denies any interval changes to diet  Denies any interval changes to medications since last appt.   Denies any complications or cost restrictions with current therapy.      Vitals:  Patient declines BP/vitals check today      Asssessment:  INR therapeutic at 2.6      Plan:  Pt is to continue with  current warfarin dosing regimen.     Follow up in 4 weeks per patient request.    Favio Solorzano, PharmD

## 2017-12-13 ENCOUNTER — ANTICOAGULATION VISIT (OUTPATIENT)
Dept: VASCULAR LAB | Facility: MEDICAL CENTER | Age: 72
End: 2017-12-13
Attending: INTERNAL MEDICINE
Payer: MEDICARE

## 2017-12-13 DIAGNOSIS — Z79.01 LONG TERM CURRENT USE OF ANTICOAGULANT THERAPY: ICD-10-CM

## 2017-12-13 DIAGNOSIS — I73.9 PVD (PERIPHERAL VASCULAR DISEASE) WITH CLAUDICATION (HCC): ICD-10-CM

## 2017-12-13 DIAGNOSIS — I70.90 ARTERY OCCLUSION: ICD-10-CM

## 2017-12-13 LAB
INR BLD: 3.1 (ref 0.9–1.2)
INR PPP: 3.1 (ref 2–3.5)

## 2017-12-13 PROCEDURE — 99212 OFFICE O/P EST SF 10 MIN: CPT | Performed by: PHARMACIST

## 2017-12-13 PROCEDURE — 85610 PROTHROMBIN TIME: CPT

## 2017-12-13 NOTE — PROGRESS NOTES
Anticoagulation Summary  As of 12/13/2017    INR goal:   2.0-3.0   TTR:   72.4 % (2.6 y)   Today's INR:   3.1!   Maintenance plan:   3 mg (6 mg x 0.5) every day   Weekly total:   21 mg   Weekly max dose:   33 mg   Plan last modified:   Sherlyn Abel, PharmD (9/27/2017)   Next INR check:   1/17/2018   Target end date:   Indefinite    Indications    Artery occlusion (CMS-HCC) [I74.9]  Long term current use of anticoagulant therapy [Z79.01]  PVD (peripheral vascular disease) with claudication (CMS-[facility]) [I73.9]             Anticoagulation Episode Summary     [facility]R check [facility]   [facility]    Preferred lab:   [facility]    Send INR reminders to:       Comments:         Anticoagulation Care Providers     Provider Role Specialty Phone number    Gwyn Silverman M.D. Referring Family Medicine [phone]    Sanchez Gonzalez M.D. Referring Internal Medicine [phone]    [facility] Anticoagulation Services Responsible  [phone]        Anticoagulation Patient Findings      HPI:  Jimenez Bains seen in clinic today, on anticoagulation therapy with warfarin for PVD  Changes to current medical/health status since last appt: has been eating less greens.  Denies signs/symptoms of bleeding and/or thrombosis since the last appt.    Denies any interval changes to diet  Denies any interval changes to medications since last appt.   Denies any complications or cost restrictions with current therapy.   BP declined      A/P   INR  is slightly supra-therapeutic.   Discussed with pt, he would rather add in the greens than hold a dose of warfarin. So will continue with the same warfarin dosing.     Follow up appointment in 5 week(s) per pt preference.    Sherlyn Abel, PharmD

## 2018-01-05 DIAGNOSIS — I74.9 EMBOLISM AND THROMBOSIS OF ARTERY (HCC): ICD-10-CM

## 2018-01-05 RX ORDER — WARFARIN SODIUM 6 MG/1
TABLET ORAL
Qty: 90 TAB | Refills: 1 | Status: SHIPPED | OUTPATIENT
Start: 2018-01-05 | End: 2018-08-24 | Stop reason: SDUPTHER

## 2018-01-17 ENCOUNTER — ANTICOAGULATION VISIT (OUTPATIENT)
Dept: VASCULAR LAB | Facility: MEDICAL CENTER | Age: 73
End: 2018-01-17
Attending: INTERNAL MEDICINE
Payer: MEDICARE

## 2018-01-17 DIAGNOSIS — I73.9 PVD (PERIPHERAL VASCULAR DISEASE) WITH CLAUDICATION (HCC): ICD-10-CM

## 2018-01-17 DIAGNOSIS — Z79.01 LONG TERM CURRENT USE OF ANTICOAGULANT THERAPY: ICD-10-CM

## 2018-01-17 DIAGNOSIS — I70.90 ARTERY OCCLUSION: ICD-10-CM

## 2018-01-17 LAB — INR PPP: 2.7 (ref 2–3.5)

## 2018-01-17 PROCEDURE — 85610 PROTHROMBIN TIME: CPT

## 2018-01-17 PROCEDURE — 99211 OFF/OP EST MAY X REQ PHY/QHP: CPT | Performed by: PHARMACIST

## 2018-01-17 NOTE — PROGRESS NOTES
Anticoagulation Summary  As of 1/17/2018    INR goal:   2.0-3.0   TTR:   72.5 % (2.7 y)   Today's INR:   2.7   Maintenance plan:   3 mg (6 mg x 0.5) every day   Weekly total:   21 mg   Weekly max dose:   33 mg   Plan last modified:   Sherlyn Abel, PharmD (9/27/2017)   Next INR check:   2/28/2018   Target end date:   Indefinite    Indications    Artery occlusion (CMS-HCC) [I74.9]  Long term current use of anticoagulant therapy [Z79.01]  PVD (peripheral vascular disease) with claudication (CMS-HCC) [I73.9]             Anticoagulation Episode Summary     [facility]   [facility]    Preferred lab:   [facility]    Send INR reminders to:       Comments:         Anticoagulation Care Providers     Provider Role Specialty Phone number    Gwyn Silverman M.D. Referring Family Medicine [phone]    Sanchez Gonzalez M.D. Referring Internal Medicine [phone]    [facility] Anticoagulation Services Responsible  [phone]        Anticoagulation Patient Findings  Patient Findings     Negatives:   Signs/symptoms of thrombosis, Signs/symptoms of bleeding, Laboratory test error suspected, Change in health, Change in alcohol use, Change in activity, Upcoming invasive procedure, Emergency department visit, Upcoming dental procedure, Missed doses, Extra doses, Change in medications, Change in diet/appetite, Hospital admission, Bruising, Other complaints        HPI:   Jimenez Bains seen in clinic today, on anticoagulation therapy with warfarin for blood clot prevention due to history of artery occlusion    Patient's previous INR was supratherapeutic at 3.1 on 12-13-17, at which time patient was instructed to continue with current warfarin regimen.  He returns to clinic today to recheck INR to ensure it is therapeutic and thus preventing possible clotting and/or bleeding/bruising complications.    CHADS-VASc = n/a  (unadjusted ischemic stroke risk/year:  n/a)    Does patient have any changes to current  medical/health status since last appt (Y/N):  No  Does patient have any signs/symptoms of bleeding and/or thrombosis since the last appt (Y/N):  NO  Does patient have any interval changes to diet or medications since last appt (Y/N):  NO  Are there any complications or cost restrictions with current therapy (Y/N):  NO      Vitals:  Patient declines BP/vitals check at today's visit.    Asssessment:      INR therapeutic at 2.7, therefore decreasing risk of blood clots and/or bleeding complications   Reason(s) for out of range INR today:  n/a      Plan:  Pt is to continue with current warfarin dosing regimen.     Follow up:  Because warfarin is a high risk medication and current CHEST guidelines recommend regular monitoring intervals (few days up to 12 weeks), will have patient return to clinic in 6 weeks to recheck INR.    Favio Solorzano, PharmD

## 2018-01-18 LAB — INR BLD: 2.7 (ref 0.9–1.2)

## 2018-02-28 ENCOUNTER — ANTICOAGULATION VISIT (OUTPATIENT)
Dept: VASCULAR LAB | Facility: MEDICAL CENTER | Age: 73
End: 2018-02-28
Attending: INTERNAL MEDICINE
Payer: MEDICARE

## 2018-02-28 DIAGNOSIS — I73.9 PVD (PERIPHERAL VASCULAR DISEASE) WITH CLAUDICATION (HCC): ICD-10-CM

## 2018-02-28 DIAGNOSIS — Z79.01 LONG TERM CURRENT USE OF ANTICOAGULANT THERAPY: ICD-10-CM

## 2018-02-28 DIAGNOSIS — I70.90 ARTERY OCCLUSION: ICD-10-CM

## 2018-02-28 LAB — INR PPP: 2.4 (ref 2–3.5)

## 2018-02-28 PROCEDURE — 85610 PROTHROMBIN TIME: CPT

## 2018-02-28 PROCEDURE — 99211 OFF/OP EST MAY X REQ PHY/QHP: CPT | Performed by: PHARMACIST

## 2018-02-28 NOTE — PROGRESS NOTES
Anticoagulation Summary  As of 2/28/2018    INR goal:   2.0-3.0   TTR:   73.6 % (2.8 y)   Today's INR:   2.4   Maintenance plan:   3 mg (6 mg x 0.5) every day   Weekly total:   21 mg   Weekly max dose:   33 mg   Plan last modified:   Marie MendezD (9/27/2017)   Next INR check:   4/26/2018   Target end date:   Indefinite    Indications    Artery occlusion (CMS-[facility]) [I74.9]  Long term current use of anticoagulant therapy [Z79.01]  PVD (peripheral vascular disease) with claudication (CMS-[facility]) [I73.9]             Anticoagulation Episode Summary     [facility]   [facility]    Preferred lab:   [employer]    Send INR reminders to:       Comments:         Anticoagulation Care Providers     Provider Role Specialty Phone number    Gwyn Silverman M.D. Referring Family Medicine [phone]    Sanchez Gonzalez M.D. Referring Internal Medicine [phone]    [facility] Anticoagulation Services Responsible  [phone]        Anticoagulation Patient Findings      HPI:  Jimenez Bains seen in clinic today, on anticoagulation therapy with warfarin for PVD  Changes to current medical/health status since last appt: denies  Denies signs/symptoms of bleeding and/or thrombosis since the last appt.    Denies any interval changes to diet  Denies any interval changes to medications since last appt.   Denies any complications or cost restrictions with current therapy.   BP declined.      A/P   INR  is-therapeutic.   Will continue with the same warfarin dosing.     Follow up appointment in 8 week(s) at [place].    Sherlyn Abel, MarieD

## 2018-03-01 LAB — INR BLD: 2.4 (ref 0.9–1.2)

## 2018-04-26 ENCOUNTER — APPOINTMENT (OUTPATIENT)
Dept: MEDICAL GROUP | Facility: PHYSICIAN GROUP | Age: 73
End: 2018-04-26
Payer: MEDICARE

## 2018-05-03 ENCOUNTER — ANTICOAGULATION VISIT (OUTPATIENT)
Dept: MEDICAL GROUP | Facility: PHYSICIAN GROUP | Age: 73
End: 2018-05-03
Payer: MEDICARE

## 2018-05-03 DIAGNOSIS — Z79.01 LONG TERM CURRENT USE OF ANTICOAGULANT THERAPY: Primary | ICD-10-CM

## 2018-05-03 DIAGNOSIS — I70.90 ARTERY OCCLUSION: ICD-10-CM

## 2018-05-03 DIAGNOSIS — I73.9 PVD (PERIPHERAL VASCULAR DISEASE) WITH CLAUDICATION (HCC): ICD-10-CM

## 2018-05-03 LAB — INR PPP: 2 (ref 2–3.5)

## 2018-05-03 PROCEDURE — 85610 PROTHROMBIN TIME: CPT | Performed by: INTERNAL MEDICINE

## 2018-05-03 PROCEDURE — 99999 PR NO CHARGE: CPT | Performed by: INTERNAL MEDICINE

## 2018-05-03 NOTE — PROGRESS NOTES
OP Anticoagulation Service Note    Date: 5/3/2018  There were no vitals filed for this visit.    Anticoagulation Summary  As of 5/3/2018    INR goal:   2.0-3.0   TTR:   75.2 % (3 y)   Today's INR:   2.0   Warfarin maintenance plan:   3 mg (6 mg x 0.5) every day   Weekly warfarin total:   21 mg   Weekly max warfarin dose:   33 mg   Plan last modified:   Sherlyn Abel, PharmD (9/27/2017)   Next INR check:   7/12/2018   Target end date:   Indefinite    Indications    Artery occlusion [I70.90]  Long term current use of anticoagulant therapy [Z79.01]  PVD (peripheral vascular disease) with claudication (HCC) [I73.9]             Anticoagulation Episode Summary     [facility]   [facility]    Preferred lab:   [facility]    Send INR reminders to:       Comments:         Anticoagulation Care Providers     Provider Role Specialty Phone number    Gwyn Silverman M.D. Referring Family Medicine [phone]    Sanchez Gonzalez M.D. Referring Internal Medicine [phone]    [facility] Anticoagulation Services Responsible  [phone]        Anticoagulation Patient Findings      HPI:   Jimenez Bains seen in clinic today, they are here today for a INR check on anticoagulation therapy with warfarin because they have a artery occlusion and PVD    The reason for today's visit is to prevent morbidity and mortality from a blood clot  and to reduce the risk of bleeding while on a anticoagulant.     Reason for today's visit (per our collaborative practice policy) is because their last INR was 2.4  on  2/28/2018.   Intervention at the last visit:  none    Additional education provided today regarding reducing bleed risk and dietary constraints:  About diet    Any upcoming procedures:   none    Confirmed warfarin dosing regimen  Interval Changes with foods rich in vitamin K: No  Interval Changes in ETOH:   No  Interval Changes in smoking status:  No  Interval Changes in medication:  No   Cost restriction:   No    S/S of bleeding or bruising:  No  Signs/symptoms  thrombosis since the last appt:  No  Bleed risk is:  moderate,     3 vitals included with today's appt :No  (BP, HR, weight, ht, RR)     Assessment:   INR  therapeutic.     No change in dose needed today, they will need to continue with the same dose and diet to prevent adverse events while on a anticoagulant.     They have a TTR of 75.2  which is not at target (TTR target/goal is 100%) and requires close follow up to prevent a adverse event (the lower the TTR the higher risk of clots, strokes, or bleeding).       Plan:  Continue weekly warfarin dose as noted      Follow up:  Follow up appointment in 10 week(s)       Other info:  Pt educated to contact our clinic with any changes in medications or s/s of bleeding or thrombosis    CHEST guidelines recommend frequent INR monitoring at regular intervals (a few days up to a max of 12 weeks) to ensure they are on the proper dose of warfarin and not having any complications from therapy.  INRs can dramatically change over a short time period due to diet, medications, and medical conditions.

## 2018-10-11 ENCOUNTER — ANTICOAGULATION VISIT (OUTPATIENT)
Dept: MEDICAL GROUP | Facility: PHYSICIAN GROUP | Age: 73
End: 2018-10-11
Payer: MEDICARE

## 2018-10-11 DIAGNOSIS — I73.9 PVD (PERIPHERAL VASCULAR DISEASE) WITH CLAUDICATION (HCC): ICD-10-CM

## 2018-10-11 DIAGNOSIS — Z79.01 LONG TERM CURRENT USE OF ANTICOAGULANT THERAPY: ICD-10-CM

## 2018-10-11 DIAGNOSIS — I70.90 ARTERY OCCLUSION: ICD-10-CM

## 2018-10-11 LAB — INR PPP: 1.1 (ref 2–3.5)

## 2018-10-11 PROCEDURE — 99211 OFF/OP EST MAY X REQ PHY/QHP: CPT | Performed by: INTERNAL MEDICINE

## 2018-10-11 PROCEDURE — 85610 PROTHROMBIN TIME: CPT | Performed by: INTERNAL MEDICINE

## 2018-10-11 NOTE — PROGRESS NOTES
OP Anticoagulation Service Note    Date: 10/11/2018  There were no vitals filed for this visit.    Anticoagulation Summary  As of 10/11/2018    INR goal:   2.0-3.0   TTR:   65.4 % (3.4 y)   Today's INR:   1.1!   Warfarin maintenance plan:   3 mg (6 mg x 0.5) every day   Weekly warfarin total:   21 mg   Weekly max warfarin dose:   33 mg   Plan last modified:   Sherlyn Abel, PharmD (9/27/2017)   Next INR check:   10/18/2018   Target end date:   Indefinite    Indications    Artery occlusion [I70.90]  Long term current use of anticoagulant therapy [Z79.01]  PVD (peripheral vascular disease) with claudication (HCC) [I73.9]             Anticoagulation Episode Summary     [facility]   [facility]    Preferred lab:   [facility]    Send INR reminders to:       Comments:         Anticoagulation Care Providers     Provider Role Specialty Phone number    Gwyn Silverman M.D. Referring Family Medicine [phone]    Sanchez Gonzalez M.D. Referring Internal Medicine [phone]    [facility] Anticoagulation Services Responsible  [phone]        Anticoagulation Patient Findings      HPI:   Jimenez Bains seen in clinic today, they are here today for a INR check on anticoagulation therapy with warfarin because they have a arterial occlusion and peripheral vascular disease.    The reason for today's visit is to prevent morbidity and mortality from a blood clot and to reduce the risk of bleeding while on a anticoagulant.     Additional education provided today regarding reducing bleed risk and dietary constraints:  About how vitamin K and foods work with warfarin and the bleeding risk on a anticoagulant     Any upcoming procedures:   none    Confirmed warfarin dosing regimen  Interval Changes with foods rich in vitamin K: No  Interval Changes in ETOH:   No  Interval Changes in smoking status:  No  Interval Changes in medication:  No   Cost restriction:  No    S/S of bleeding or bruising:   No  Signs/symptoms  thrombosis since the last appt:  No  Bleed risk is:  moderate,     3 vitals included with today's appt : Declined vitals today.  (BP, HR, weight, ht, RR)     Assessment:   INR  sub--therapeutic.       A change is needed today because INR is out of range.  He has been lost to follow-up for some time, approximately 5 months.  He does not denies any missed doses or dietary changes.  He says he might be eating more greens but is not sure.  He said a home monitor would be easier for him to use, I sent in a [employer] home monitor request.    They have a TTR of 65.4  which is not at target (TTR target/goal is 100%) and requires close follow up to prevent a adverse event (the lower the TTR the higher risk of clots, strokes, or bleeding).       Plan:  6 mg daily for 3 days then continue warfarin as noted    Follow up:  Follow up appointment in 1 week(s)       Other info:  Pt educated to contact our clinic with any changes in medications or s/s of bleeding or thrombosis    CHEST guidelines recommend frequent INR monitoring at regular intervals (a few days up to a max of 12 weeks) to ensure they are on the proper dose of warfarin and not having any complications from therapy.  INRs can dramatically change over a short time period due to diet, medications, and medical conditions.

## 2018-10-18 ENCOUNTER — ANTICOAGULATION VISIT (OUTPATIENT)
Dept: MEDICAL GROUP | Facility: PHYSICIAN GROUP | Age: 73
End: 2018-10-18
Payer: MEDICARE

## 2018-10-18 VITALS
DIASTOLIC BLOOD PRESSURE: 86 MMHG | BODY MASS INDEX: 24.38 KG/M2 | WEIGHT: 151 LBS | SYSTOLIC BLOOD PRESSURE: 140 MMHG | HEART RATE: 89 BPM

## 2018-10-18 DIAGNOSIS — I73.9 PVD (PERIPHERAL VASCULAR DISEASE) WITH CLAUDICATION (HCC): ICD-10-CM

## 2018-10-18 DIAGNOSIS — I70.90 ARTERY OCCLUSION: ICD-10-CM

## 2018-10-18 DIAGNOSIS — Z79.01 LONG TERM CURRENT USE OF ANTICOAGULANT THERAPY: ICD-10-CM

## 2018-10-18 LAB — INR PPP: 2.5 (ref 2–3.5)

## 2018-10-18 PROCEDURE — 85610 PROTHROMBIN TIME: CPT | Performed by: FAMILY MEDICINE

## 2018-10-18 PROCEDURE — 99211 OFF/OP EST MAY X REQ PHY/QHP: CPT | Performed by: INTERNAL MEDICINE

## 2018-10-18 NOTE — PROGRESS NOTES
OP Anticoagulation Service Note    Date: 10/18/2018  Vitals:    10/18/18 0848   BP: 140/86   Pulse: 89   Weight: 68.5 kg (151 lb)       Anticoagulation Summary  As of 10/18/2018    INR goal:   2.0-3.0   TTR:   65.3 % (3.4 y)   Today's INR:   2.5   Warfarin maintenance plan:   6 mg (6 mg x 1) on Mon, Fri; 3 mg (6 mg x 0.5) all other days   Weekly warfarin total:   27 mg   Weekly max warfarin dose:   33 mg   Plan last modified:   Sukh Gonzalez, PharmD (10/18/2018)   Next INR check:   11/1/2018   Target end date:   Indefinite    Indications    Artery occlusion [I70.90]  Long term current use of anticoagulant therapy [Z79.01]  PVD (peripheral vascular disease) with claudication (HCC) [I73.9]             Anticoagulation Episode Summary     INR check location:   [facility]    Preferred lab:   [facility]    Send INR reminders to:       Comments:         Anticoagulation Care Providers     Provider Role Specialty Phone number    Gwyn Silverman M.D. Referring Family Medicine [phone]    Sanchez Gonzalez M.D. Referring Internal Medicine [phone]    [facility] Anticoagulation Services Responsible  [phone]        Anticoagulation Patient Findings      HPI:   Jimenez Bains seen in clinic today, they are here today for a INR check on anticoagulation therapy with warfarin because they have peripheral vascular disease    The reason for today's visit is to prevent morbidity and mortality from a blood clot and to reduce the risk of bleeding while on a anticoagulant.     Additional education provided today regarding reducing bleed risk and dietary constraints:  About how vitamin K and foods work with warfarin and the bleeding risk on a anticoagulant     Any upcoming procedures:   none    Confirmed warfarin dosing regimen  Interval Changes with foods rich in vitamin K: No  Interval Changes in ETOH:   No  Interval Changes in smoking status:  No  Interval Changes in medication:  No   Cost restriction:   No    S/S of bleeding or bruising:  No  Signs/symptoms  thrombosis since the last appt:  No  Bleed risk is:  moderate,     3 vitals included with today's appt :  (BP, HR, weight, ht, RR)     Assessment:   INR  therapeutic.       His INR was 1.1 last week I bolused 6 mg for 3 days then had him resume his normal dose.  The dose for the last 7 days was 30 mg so I will increase his weekly dose to 27 mg/week.    They have a TTR of 65.3  which is not at target (TTR target/goal is 100%) and requires close follow up to prevent a adverse event (the lower the TTR the higher risk of clots, strokes, or bleeding).       Plan:  Increase weekly warfarin dose as noted      Follow up:  Follow up appointment in 2 week(s)       Other info:  Pt educated to contact our clinic with any changes in medications or s/s of bleeding or thrombosis    CHEST guidelines recommend frequent INR monitoring at regular intervals (a few days up to a max of 12 weeks) to ensure they are on the proper dose of warfarin and not having any complications from therapy.  INRs can dramatically change over a short time period due to diet, medications, and medical conditions.

## 2018-11-01 ENCOUNTER — ANTICOAGULATION VISIT (OUTPATIENT)
Dept: MEDICAL GROUP | Facility: PHYSICIAN GROUP | Age: 73
End: 2018-11-01
Payer: MEDICARE

## 2018-11-01 DIAGNOSIS — I70.90 ARTERY OCCLUSION: ICD-10-CM

## 2018-11-01 DIAGNOSIS — I73.9 PVD (PERIPHERAL VASCULAR DISEASE) WITH CLAUDICATION (HCC): ICD-10-CM

## 2018-11-01 DIAGNOSIS — Z79.01 LONG TERM CURRENT USE OF ANTICOAGULANT THERAPY: ICD-10-CM

## 2018-11-01 LAB — INR PPP: 1.7 (ref 2–3.5)

## 2018-11-01 PROCEDURE — 99211 OFF/OP EST MAY X REQ PHY/QHP: CPT | Performed by: INTERNAL MEDICINE

## 2018-11-01 PROCEDURE — 85610 PROTHROMBIN TIME: CPT | Performed by: FAMILY MEDICINE

## 2018-11-01 NOTE — PROGRESS NOTES
OP Anticoagulation Service Note    Date: 11/1/2018  There were no vitals filed for this visit.    Anticoagulation Summary  As of 11/1/2018    INR goal:   2.0-3.0   TTR:   65.2 % (3.4 y)   Today's INR:   1.7!   Warfarin maintenance plan:   6 mg (6 mg x 1) on Mon, Fri; 3 mg (6 mg x 0.5) all other days   Weekly warfarin total:   27 mg   Weekly max warfarin dose:   33 mg   Plan last modified:   Sukh Gonzalez, PharmD (10/18/2018)   Next INR check:   11/15/2018   Target end date:   Indefinite    Indications    Artery occlusion [I70.90]  Long term current use of anticoagulant therapy [Z79.01]  PVD (peripheral vascular disease) with claudication (HCC) [I73.9]             Anticoagulation Episode Summary     INR check location:   [facility]    Preferred lab:   [facility]    Send INR reminders to:       Comments:         Anticoagulation Care Providers     Provider Role Specialty Phone number    Gwyn Silverman M.D. Referring Family Medicine [phone]    Sanchez Gonzalez M.D. Referring Internal Medicine [phone]    [facility] Anticoagulation Services Responsible  [phone]        Anticoagulation Patient Findings      HPI:   Jimenez Bains seen in clinic today, they are here today for a INR check on anticoagulation therapy with warfarin because they have PVD    The reason for today's visit is to prevent morbidity and mortality from a blood clot  and to reduce the risk of bleeding while on a anticoagulant.     Additional education provided today regarding reducing bleed risk and dietary constraints:  About how vitamin K and foods work with warfarin and the bleeding risk on a anticoagulant     Any upcoming procedures:   none    Confirmed warfarin dosing regimen  Interval Changes with foods rich in vitamin K: No  Interval Changes in ETOH:   No  Interval Changes in smoking status:  No  Interval Changes in medication:  No   Cost restriction:  No    S/S of bleeding or bruising:  No  Signs/symptoms   thrombosis since the last appt:  No  Bleed risk is:  moderate,     3 vitals included with today's appt :  (BP, HR, weight, ht, RR)     Assessment:   INR  sub-therapeutic.        a change is needed today because INR is out of range.      They have a TTR of 65.2  which is not at target (TTR target/goal is 100%) and requires close follow up to prevent a adverse event (the lower the TTR the higher risk of clots, strokes, or bleeding).       Plan:  6mg today then continue weekly warfarin dose as noted      Follow up:  Follow up appointment in 2 week(s)       Other info:  Pt educated to contact our clinic with any changes in medications or s/s of bleeding or thrombosis    CHEST guidelines recommend frequent INR monitoring at regular intervals (a few days up to a max of 12 weeks) to ensure they are on the proper dose of warfarin and not having any complications from therapy.  INRs can dramatically change over a short time period due to diet, medications, and medical conditions.

## 2019-10-04 ENCOUNTER — ANTICOAGULATION MONITORING (OUTPATIENT)
Dept: VASCULAR LAB | Facility: MEDICAL CENTER | Age: 74
End: 2019-10-04

## 2019-10-04 DIAGNOSIS — I73.9 PVD (PERIPHERAL VASCULAR DISEASE) WITH CLAUDICATION (HCC): ICD-10-CM

## 2019-10-04 DIAGNOSIS — Z79.01 LONG TERM CURRENT USE OF ANTICOAGULANT THERAPY: ICD-10-CM

## 2019-10-04 DIAGNOSIS — I70.90 ARTERY OCCLUSION: ICD-10-CM

## 2019-10-04 NOTE — PROGRESS NOTES
Jimenez Bains  [address]  [facility] [address]          We have been unsuccessful in our attempts to contact you regarding your Anticoagulation Service appointments.  Warfarin is a potent blood-thinning agent that requires frequent monitoring to measure its level in the blood.  If your level becomes too high, you could develop serious, sometimes life-threatening bleeding problems.  If your level becomes too low, life-threatening blood clots or stroke could result.    It is extremely important that you have your lab work drawn as soon as possible.  Alternatively, you may schedule an appointment for a fingerstick INR at our clinic.  We are open Monday-Friday 8 am until 5 pm.  You may reach our Service at [phone].    To monitor your warfarin level effectively, we need to be able to communicate with you.  This is a requirement to be followed by our Service.  If we are unable to contact you on three separate occasions, you will be discharged from the Anticoagulation Service.    If you repeatedly fail to keep your lab appointments, you are at risk of being discharged from the Service.        Sincerely,        Yuan Florentino, PharmD, [facility]  Pharmacy Clinical Supervisor  [facility]  Outpatient Anticoagulation Service

## 2019-10-08 DIAGNOSIS — Z79.01 CHRONIC ANTICOAGULATION: ICD-10-CM

## 2019-11-01 ENCOUNTER — ANTICOAGULATION MONITORING (OUTPATIENT)
Dept: VASCULAR LAB | Facility: MEDICAL CENTER | Age: 74
End: 2019-11-01

## 2019-11-01 DIAGNOSIS — I73.9 PVD (PERIPHERAL VASCULAR DISEASE) WITH CLAUDICATION (HCC): ICD-10-CM

## 2019-11-01 DIAGNOSIS — I70.90 ARTERY OCCLUSION: ICD-10-CM

## 2019-11-01 DIAGNOSIS — Z79.01 LONG TERM CURRENT USE OF ANTICOAGULANT THERAPY: ICD-10-CM

## 2019-11-01 NOTE — PROGRESS NOTES
Anticoagulation clinic    Reminder voice message for patient regarding getting INR done ASAP for anticoagulation clinic.     Sukh Gonzalez, PharmD

## 2019-12-02 ENCOUNTER — ANTICOAGULATION MONITORING (OUTPATIENT)
Dept: VASCULAR LAB | Facility: MEDICAL CENTER | Age: 74
End: 2019-12-02

## 2019-12-02 DIAGNOSIS — I73.9 PVD (PERIPHERAL VASCULAR DISEASE) WITH CLAUDICATION (HCC): ICD-10-CM

## 2019-12-02 DIAGNOSIS — Z79.01 LONG TERM CURRENT USE OF ANTICOAGULANT THERAPY: ICD-10-CM

## 2019-12-02 DIAGNOSIS — I70.90 ARTERY OCCLUSION: ICD-10-CM

## 2020-01-07 ENCOUNTER — TELEPHONE (OUTPATIENT)
Dept: VASCULAR LAB | Facility: MEDICAL CENTER | Age: 75
End: 2020-01-07

## 2020-01-07 NOTE — TELEPHONE ENCOUNTER
Left message for pt to have INR checked  5th call, second letter sent  Rebekah Morris, Clinical Pharmacist, LOREE, CACP

## 2020-01-21 ENCOUNTER — TELEPHONE (OUTPATIENT)
Dept: VASCULAR LAB | Facility: MEDICAL CENTER | Age: 75
End: 2020-01-21

## 2020-01-21 NOTE — TELEPHONE ENCOUNTER
"Spoke with Mrs. Bains to remind Jimenez to check INR.  6th call, 3rd letter sent.  She reports that \"she does not know why he hasn't had his INR checked.\"   We discussed need for adherence/compliance, and that if he does not get his INR checked this week, he will likely be discharged from Clinic.  Rebekah Morris, Clinical Pharmacist, CDE, CACP       "

## 2020-02-11 ENCOUNTER — ANTICOAGULATION MONITORING (OUTPATIENT)
Dept: VASCULAR LAB | Facility: MEDICAL CENTER | Age: 75
End: 2020-02-11

## 2020-02-11 DIAGNOSIS — Z79.01 LONG TERM CURRENT USE OF ANTICOAGULANT THERAPY: ICD-10-CM

## 2020-02-11 DIAGNOSIS — I70.90 ARTERY OCCLUSION: ICD-10-CM

## 2020-02-11 DIAGNOSIS — I73.9 PVD (PERIPHERAL VASCULAR DISEASE) WITH CLAUDICATION (HCC): ICD-10-CM

## 2020-02-12 NOTE — PROGRESS NOTES
Discharged from [facility].      Secondary to non adherence/non compliance  Rebekah Morris, Clinical Pharmacist, CDE, CACP

## 2020-06-30 ENCOUNTER — HOSPITAL ENCOUNTER (INPATIENT)
Facility: MEDICAL CENTER | Age: 75
LOS: 3 days | DRG: 241 | End: 2020-07-03
Attending: EMERGENCY MEDICINE | Admitting: HOSPITALIST
Payer: MEDICARE

## 2020-06-30 ENCOUNTER — APPOINTMENT (OUTPATIENT)
Dept: RADIOLOGY | Facility: MEDICAL CENTER | Age: 75
DRG: 241 | End: 2020-06-30
Attending: EMERGENCY MEDICINE
Payer: MEDICARE

## 2020-06-30 ENCOUNTER — ANESTHESIA (OUTPATIENT)
Dept: SURGERY | Facility: MEDICAL CENTER | Age: 75
DRG: 241 | End: 2020-06-30
Payer: MEDICARE

## 2020-06-30 ENCOUNTER — ANESTHESIA EVENT (OUTPATIENT)
Dept: SURGERY | Facility: MEDICAL CENTER | Age: 75
DRG: 241 | End: 2020-06-30
Payer: MEDICARE

## 2020-06-30 DIAGNOSIS — G89.18 POSTOPERATIVE PAIN: ICD-10-CM

## 2020-06-30 DIAGNOSIS — I99.8 LOWER LIMB ISCHEMIA: ICD-10-CM

## 2020-06-30 DIAGNOSIS — I99.8 ISCHEMIA OF EXTREMITY: Primary | ICD-10-CM

## 2020-06-30 DIAGNOSIS — M79.605 PAIN OF LEFT LOWER EXTREMITY: ICD-10-CM

## 2020-06-30 DIAGNOSIS — I70.202 OCCLUSION OF LEFT FEMORAL ARTERY (HCC): ICD-10-CM

## 2020-06-30 LAB
ABO GROUP BLD: NORMAL
ALBUMIN SERPL BCP-MCNC: 3.7 G/DL (ref 3.2–4.9)
ALBUMIN/GLOB SERPL: 1.1 G/DL
ALP SERPL-CCNC: 112 U/L (ref 30–99)
ALT SERPL-CCNC: 14 U/L (ref 2–50)
ANION GAP SERPL CALC-SCNC: 11 MMOL/L (ref 7–16)
APTT PPP: 34.8 SEC (ref 24.7–36)
AST SERPL-CCNC: 27 U/L (ref 12–45)
BASOPHILS # BLD AUTO: 0.7 % (ref 0–1.8)
BASOPHILS # BLD: 0.05 K/UL (ref 0–0.12)
BILIRUB SERPL-MCNC: 0.9 MG/DL (ref 0.1–1.5)
BLD GP AB SCN SERPL QL: NORMAL
BUN SERPL-MCNC: 21 MG/DL (ref 8–22)
CALCIUM SERPL-MCNC: 9.2 MG/DL (ref 8.5–10.5)
CHLORIDE SERPL-SCNC: 106 MMOL/L (ref 96–112)
CO2 SERPL-SCNC: 24 MMOL/L (ref 20–33)
COVID ORDER STATUS COVID19: NORMAL
CREAT SERPL-MCNC: 1.37 MG/DL (ref 0.5–1.4)
EKG IMPRESSION: NORMAL
EOSINOPHIL # BLD AUTO: 0.07 K/UL (ref 0–0.51)
EOSINOPHIL NFR BLD: 1 % (ref 0–6.9)
ERYTHROCYTE [DISTWIDTH] IN BLOOD BY AUTOMATED COUNT: 46.5 FL (ref 35.9–50)
GLOBULIN SER CALC-MCNC: 3.4 G/DL (ref 1.9–3.5)
GLUCOSE BLD-MCNC: 178 MG/DL (ref 65–99)
GLUCOSE SERPL-MCNC: 141 MG/DL (ref 65–99)
HCT VFR BLD AUTO: 46 % (ref 42–52)
HGB BLD-MCNC: 15.2 G/DL (ref 14–18)
IMM GRANULOCYTES # BLD AUTO: 0.02 K/UL (ref 0–0.11)
IMM GRANULOCYTES NFR BLD AUTO: 0.3 % (ref 0–0.9)
INR PPP: 1.06 (ref 0.87–1.13)
LACTATE BLD-SCNC: 1.5 MMOL/L (ref 0.5–2)
LYMPHOCYTES # BLD AUTO: 1.41 K/UL (ref 1–4.8)
LYMPHOCYTES NFR BLD: 19.6 % (ref 22–41)
MCH RBC QN AUTO: 32.8 PG (ref 27–33)
MCHC RBC AUTO-ENTMCNC: 33 G/DL (ref 33.7–35.3)
MCV RBC AUTO: 99.1 FL (ref 81.4–97.8)
MONOCYTES # BLD AUTO: 0.61 K/UL (ref 0–0.85)
MONOCYTES NFR BLD AUTO: 8.5 % (ref 0–13.4)
NEUTROPHILS # BLD AUTO: 5.05 K/UL (ref 1.82–7.42)
NEUTROPHILS NFR BLD: 69.9 % (ref 44–72)
NRBC # BLD AUTO: 0 K/UL
NRBC BLD-RTO: 0 /100 WBC
PLATELET # BLD AUTO: 217 K/UL (ref 164–446)
PMV BLD AUTO: 9.9 FL (ref 9–12.9)
POTASSIUM SERPL-SCNC: 4.3 MMOL/L (ref 3.6–5.5)
PROT SERPL-MCNC: 7.1 G/DL (ref 6–8.2)
PROTHROMBIN TIME: 14.2 SEC (ref 12–14.6)
RBC # BLD AUTO: 4.64 M/UL (ref 4.7–6.1)
RH BLD: NORMAL
SARS-COV-2 RNA RESP QL NAA+PROBE: NOTDETECTED
SODIUM SERPL-SCNC: 141 MMOL/L (ref 135–145)
SPECIMEN SOURCE: NORMAL
WBC # BLD AUTO: 7.2 K/UL (ref 4.8–10.8)

## 2020-06-30 PROCEDURE — 71045 X-RAY EXAM CHEST 1 VIEW: CPT

## 2020-06-30 PROCEDURE — 160028 HCHG SURGERY MINUTES - 1ST 30 MINS LEVEL 3: Performed by: SURGERY

## 2020-06-30 PROCEDURE — 96374 THER/PROPH/DIAG INJ IV PUSH: CPT

## 2020-06-30 PROCEDURE — 80053 COMPREHEN METABOLIC PANEL: CPT

## 2020-06-30 PROCEDURE — 88307 TISSUE EXAM BY PATHOLOGIST: CPT

## 2020-06-30 PROCEDURE — 85730 THROMBOPLASTIN TIME PARTIAL: CPT

## 2020-06-30 PROCEDURE — 93926 LOWER EXTREMITY STUDY: CPT | Mod: LT

## 2020-06-30 PROCEDURE — 86901 BLOOD TYPING SEROLOGIC RH(D): CPT

## 2020-06-30 PROCEDURE — 502240 HCHG MISC OR SUPPLY RC 0272: Performed by: SURGERY

## 2020-06-30 PROCEDURE — 0Y6D0Z3 DETACHMENT AT LEFT UPPER LEG, LOW, OPEN APPROACH: ICD-10-PCS | Performed by: SURGERY

## 2020-06-30 PROCEDURE — 700111 HCHG RX REV CODE 636 W/ 250 OVERRIDE (IP): Performed by: ANESTHESIOLOGY

## 2020-06-30 PROCEDURE — 160048 HCHG OR STATISTICAL LEVEL 1-5: Performed by: SURGERY

## 2020-06-30 PROCEDURE — 86900 BLOOD TYPING SEROLOGIC ABO: CPT

## 2020-06-30 PROCEDURE — 501445 HCHG STAPLER, SKIN DISP: Performed by: SURGERY

## 2020-06-30 PROCEDURE — 501838 HCHG SUTURE GENERAL: Performed by: SURGERY

## 2020-06-30 PROCEDURE — 700101 HCHG RX REV CODE 250

## 2020-06-30 PROCEDURE — 500881 HCHG PACK, EXTREMITY: Performed by: SURGERY

## 2020-06-30 PROCEDURE — 85610 PROTHROMBIN TIME: CPT

## 2020-06-30 PROCEDURE — A9270 NON-COVERED ITEM OR SERVICE: HCPCS

## 2020-06-30 PROCEDURE — 99291 CRITICAL CARE FIRST HOUR: CPT

## 2020-06-30 PROCEDURE — 700102 HCHG RX REV CODE 250 W/ 637 OVERRIDE(OP)

## 2020-06-30 PROCEDURE — 700111 HCHG RX REV CODE 636 W/ 250 OVERRIDE (IP): Performed by: EMERGENCY MEDICINE

## 2020-06-30 PROCEDURE — 83605 ASSAY OF LACTIC ACID: CPT

## 2020-06-30 PROCEDURE — 82962 GLUCOSE BLOOD TEST: CPT

## 2020-06-30 PROCEDURE — 96376 TX/PRO/DX INJ SAME DRUG ADON: CPT

## 2020-06-30 PROCEDURE — 64445 NJX AA&/STRD SCIATIC NRV IMG: CPT | Performed by: SURGERY

## 2020-06-30 PROCEDURE — 160009 HCHG ANES TIME/MIN: Performed by: SURGERY

## 2020-06-30 PROCEDURE — C9803 HOPD COVID-19 SPEC COLLECT: HCPCS | Performed by: EMERGENCY MEDICINE

## 2020-06-30 PROCEDURE — 160039 HCHG SURGERY MINUTES - EA ADDL 1 MIN LEVEL 3: Performed by: SURGERY

## 2020-06-30 PROCEDURE — 160002 HCHG RECOVERY MINUTES (STAT): Performed by: SURGERY

## 2020-06-30 PROCEDURE — 500440 HCHG DRESSING, STERILE ROLL (KERLIX): Performed by: SURGERY

## 2020-06-30 PROCEDURE — 770006 HCHG ROOM/CARE - MED/SURG/GYN SEMI*

## 2020-06-30 PROCEDURE — U0004 COV-19 TEST NON-CDC HGH THRU: HCPCS

## 2020-06-30 PROCEDURE — 85025 COMPLETE CBC W/AUTO DIFF WBC: CPT

## 2020-06-30 PROCEDURE — 86850 RBC ANTIBODY SCREEN: CPT

## 2020-06-30 PROCEDURE — 160036 HCHG PACU - EA ADDL 30 MINS PHASE I: Performed by: SURGERY

## 2020-06-30 PROCEDURE — 700111 HCHG RX REV CODE 636 W/ 250 OVERRIDE (IP): Performed by: SURGERY

## 2020-06-30 PROCEDURE — 64447 NJX AA&/STRD FEMORAL NRV IMG: CPT | Performed by: SURGERY

## 2020-06-30 PROCEDURE — 700101 HCHG RX REV CODE 250: Performed by: SURGERY

## 2020-06-30 PROCEDURE — 700111 HCHG RX REV CODE 636 W/ 250 OVERRIDE (IP)

## 2020-06-30 PROCEDURE — 160035 HCHG PACU - 1ST 60 MINS PHASE I: Performed by: SURGERY

## 2020-06-30 PROCEDURE — 93926 LOWER EXTREMITY STUDY: CPT | Mod: 26 | Performed by: INTERNAL MEDICINE

## 2020-06-30 PROCEDURE — 88311 DECALCIFY TISSUE: CPT

## 2020-06-30 PROCEDURE — 93005 ELECTROCARDIOGRAM TRACING: CPT | Performed by: EMERGENCY MEDICINE

## 2020-06-30 RX ORDER — OXYCODONE HCL 5 MG/5 ML
SOLUTION, ORAL ORAL
Status: COMPLETED
Start: 2020-06-30 | End: 2020-06-30

## 2020-06-30 RX ORDER — DIPHENHYDRAMINE HYDROCHLORIDE 50 MG/ML
12.5 INJECTION INTRAMUSCULAR; INTRAVENOUS
Status: DISCONTINUED | OUTPATIENT
Start: 2020-06-30 | End: 2020-06-30 | Stop reason: HOSPADM

## 2020-06-30 RX ORDER — SCOLOPAMINE TRANSDERMAL SYSTEM 1 MG/1
1 PATCH, EXTENDED RELEASE TRANSDERMAL
Status: DISCONTINUED | OUTPATIENT
Start: 2020-06-30 | End: 2020-07-03 | Stop reason: HOSPADM

## 2020-06-30 RX ORDER — HEPARIN SODIUM 5000 [USP'U]/100ML
INJECTION, SOLUTION INTRAVENOUS CONTINUOUS
Status: DISCONTINUED | OUTPATIENT
Start: 2020-06-30 | End: 2020-06-30

## 2020-06-30 RX ORDER — TRAZODONE HYDROCHLORIDE 50 MG/1
50 TABLET ORAL NIGHTLY PRN
Status: DISCONTINUED | OUTPATIENT
Start: 2020-06-30 | End: 2020-07-03 | Stop reason: HOSPADM

## 2020-06-30 RX ORDER — DIPHENHYDRAMINE HCL 25 MG
25 TABLET ORAL EVERY 6 HOURS PRN
Status: DISCONTINUED | OUTPATIENT
Start: 2020-06-30 | End: 2020-07-03 | Stop reason: HOSPADM

## 2020-06-30 RX ORDER — OXYCODONE HCL 5 MG/5 ML
5 SOLUTION, ORAL ORAL
Status: COMPLETED | OUTPATIENT
Start: 2020-06-30 | End: 2020-06-30

## 2020-06-30 RX ORDER — OXYCODONE HYDROCHLORIDE 10 MG/1
10 TABLET ORAL
Status: DISCONTINUED | OUTPATIENT
Start: 2020-06-30 | End: 2020-07-03 | Stop reason: HOSPADM

## 2020-06-30 RX ORDER — HEPARIN SODIUM 1000 [USP'U]/ML
2600 INJECTION, SOLUTION INTRAVENOUS; SUBCUTANEOUS PRN
Status: DISCONTINUED | OUTPATIENT
Start: 2020-06-30 | End: 2020-06-30

## 2020-06-30 RX ORDER — HALOPERIDOL 5 MG/ML
1 INJECTION INTRAMUSCULAR EVERY 6 HOURS PRN
Status: DISCONTINUED | OUTPATIENT
Start: 2020-06-30 | End: 2020-07-03 | Stop reason: HOSPADM

## 2020-06-30 RX ORDER — ONDANSETRON 2 MG/ML
4 INJECTION INTRAMUSCULAR; INTRAVENOUS EVERY 4 HOURS PRN
Status: DISCONTINUED | OUTPATIENT
Start: 2020-06-30 | End: 2020-07-03 | Stop reason: HOSPADM

## 2020-06-30 RX ORDER — CEFAZOLIN SODIUM 1 G/3ML
INJECTION, POWDER, FOR SOLUTION INTRAMUSCULAR; INTRAVENOUS PRN
Status: DISCONTINUED | OUTPATIENT
Start: 2020-06-30 | End: 2020-09-22

## 2020-06-30 RX ORDER — LABETALOL HYDROCHLORIDE 5 MG/ML
10 INJECTION, SOLUTION INTRAVENOUS ONCE
Status: COMPLETED | OUTPATIENT
Start: 2020-06-30 | End: 2020-06-30

## 2020-06-30 RX ORDER — OXYCODONE HCL 5 MG/5 ML
10 SOLUTION, ORAL ORAL
Status: COMPLETED | OUTPATIENT
Start: 2020-06-30 | End: 2020-06-30

## 2020-06-30 RX ORDER — HEPARIN SODIUM 1000 [USP'U]/ML
5000 INJECTION, SOLUTION INTRAVENOUS; SUBCUTANEOUS ONCE
Status: COMPLETED | OUTPATIENT
Start: 2020-06-30 | End: 2020-06-30

## 2020-06-30 RX ORDER — DEXTROSE MONOHYDRATE, SODIUM CHLORIDE, AND POTASSIUM CHLORIDE 50; 1.49; 4.5 G/1000ML; G/1000ML; G/1000ML
INJECTION, SOLUTION INTRAVENOUS CONTINUOUS
Status: DISCONTINUED | OUTPATIENT
Start: 2020-06-30 | End: 2020-07-01

## 2020-06-30 RX ORDER — MEPERIDINE HYDROCHLORIDE 25 MG/ML
6.25 INJECTION INTRAMUSCULAR; INTRAVENOUS; SUBCUTANEOUS
Status: DISCONTINUED | OUTPATIENT
Start: 2020-06-30 | End: 2020-06-30 | Stop reason: HOSPADM

## 2020-06-30 RX ORDER — BUPIVACAINE HYDROCHLORIDE AND EPINEPHRINE 5; 5 MG/ML; UG/ML
INJECTION, SOLUTION EPIDURAL; INTRACAUDAL; PERINEURAL
Status: COMPLETED | OUTPATIENT
Start: 2020-06-30 | End: 2020-06-30

## 2020-06-30 RX ORDER — DIPHENHYDRAMINE HYDROCHLORIDE 50 MG/ML
25 INJECTION INTRAMUSCULAR; INTRAVENOUS EVERY 6 HOURS PRN
Status: DISCONTINUED | OUTPATIENT
Start: 2020-06-30 | End: 2020-07-03 | Stop reason: HOSPADM

## 2020-06-30 RX ORDER — ONDANSETRON 2 MG/ML
INJECTION INTRAMUSCULAR; INTRAVENOUS PRN
Status: DISCONTINUED | OUTPATIENT
Start: 2020-06-30 | End: 2020-07-27

## 2020-06-30 RX ORDER — CALCIUM CARBONATE 500 MG/1
500 TABLET, CHEWABLE ORAL
Status: DISCONTINUED | OUTPATIENT
Start: 2020-06-30 | End: 2020-07-03 | Stop reason: HOSPADM

## 2020-06-30 RX ORDER — OXYCODONE HYDROCHLORIDE 5 MG/1
5 TABLET ORAL
Status: DISCONTINUED | OUTPATIENT
Start: 2020-06-30 | End: 2020-07-03 | Stop reason: HOSPADM

## 2020-06-30 RX ORDER — ONDANSETRON 2 MG/ML
4 INJECTION INTRAMUSCULAR; INTRAVENOUS
Status: DISCONTINUED | OUTPATIENT
Start: 2020-06-30 | End: 2020-06-30 | Stop reason: HOSPADM

## 2020-06-30 RX ORDER — DEXAMETHASONE SODIUM PHOSPHATE 4 MG/ML
4 INJECTION, SOLUTION INTRA-ARTICULAR; INTRALESIONAL; INTRAMUSCULAR; INTRAVENOUS; SOFT TISSUE
Status: DISCONTINUED | OUTPATIENT
Start: 2020-06-30 | End: 2020-07-03 | Stop reason: HOSPADM

## 2020-06-30 RX ORDER — LABETALOL HYDROCHLORIDE 5 MG/ML
INJECTION, SOLUTION INTRAVENOUS
Status: COMPLETED
Start: 2020-06-30 | End: 2020-06-30

## 2020-06-30 RX ORDER — HALOPERIDOL 5 MG/ML
1 INJECTION INTRAMUSCULAR
Status: DISCONTINUED | OUTPATIENT
Start: 2020-06-30 | End: 2020-06-30 | Stop reason: HOSPADM

## 2020-06-30 RX ORDER — ACETAMINOPHEN 325 MG/1
650 TABLET ORAL EVERY 6 HOURS
Status: DISCONTINUED | OUTPATIENT
Start: 2020-07-01 | End: 2020-07-03 | Stop reason: HOSPADM

## 2020-06-30 RX ORDER — KETOROLAC TROMETHAMINE 30 MG/ML
15 INJECTION, SOLUTION INTRAMUSCULAR; INTRAVENOUS EVERY 6 HOURS
Status: DISCONTINUED | OUTPATIENT
Start: 2020-06-30 | End: 2020-07-03 | Stop reason: HOSPADM

## 2020-06-30 RX ADMIN — FENTANYL CITRATE 25 MCG: 50 INJECTION INTRAMUSCULAR; INTRAVENOUS at 19:41

## 2020-06-30 RX ADMIN — FENTANYL CITRATE 25 MCG: 50 INJECTION INTRAMUSCULAR; INTRAVENOUS at 19:47

## 2020-06-30 RX ADMIN — ONDANSETRON 4 MG: 2 INJECTION INTRAMUSCULAR; INTRAVENOUS at 18:37

## 2020-06-30 RX ADMIN — BUPIVACAINE HYDROCHLORIDE AND EPINEPHRINE 15 ML: 5; 5 INJECTION, SOLUTION EPIDURAL; INTRACAUDAL; PERINEURAL at 18:25

## 2020-06-30 RX ADMIN — HEPARIN SODIUM 5000 UNITS: 1000 INJECTION, SOLUTION INTRAVENOUS; SUBCUTANEOUS at 15:07

## 2020-06-30 RX ADMIN — KETOROLAC TROMETHAMINE 15 MG: 30 INJECTION, SOLUTION INTRAMUSCULAR at 21:56

## 2020-06-30 RX ADMIN — Medication 10 MG: at 19:48

## 2020-06-30 RX ADMIN — FENTANYL CITRATE 50 MCG: 50 INJECTION INTRAMUSCULAR; INTRAVENOUS at 19:50

## 2020-06-30 RX ADMIN — LABETALOL HYDROCHLORIDE 10 MG: 5 INJECTION, SOLUTION INTRAVENOUS at 20:10

## 2020-06-30 RX ADMIN — OXYCODONE HYDROCHLORIDE 10 MG: 5 SOLUTION ORAL at 19:48

## 2020-06-30 RX ADMIN — POTASSIUM CHLORIDE, DEXTROSE MONOHYDRATE AND SODIUM CHLORIDE: 150; 5; 450 INJECTION, SOLUTION INTRAVENOUS at 22:58

## 2020-06-30 RX ADMIN — HEPARIN SODIUM 1050 UNITS/HR: 5000 INJECTION, SOLUTION INTRAVENOUS at 15:07

## 2020-06-30 RX ADMIN — BUPIVACAINE HYDROCHLORIDE AND EPINEPHRINE 25 ML: 5; 5 INJECTION, SOLUTION EPIDURAL; INTRACAUDAL; PERINEURAL at 18:37

## 2020-06-30 RX ADMIN — CEFAZOLIN SODIUM 2 G: 1 INJECTION, POWDER, FOR SOLUTION INTRAMUSCULAR; INTRAVENOUS at 18:37

## 2020-06-30 RX ADMIN — ENOXAPARIN SODIUM 40 MG: 40 INJECTION SUBCUTANEOUS at 23:34

## 2020-06-30 SDOH — ECONOMIC STABILITY: FOOD INSECURITY: WITHIN THE PAST 12 MONTHS, YOU WORRIED THAT YOUR FOOD WOULD RUN OUT BEFORE YOU GOT MONEY TO BUY MORE.: NEVER TRUE

## 2020-06-30 SDOH — HEALTH STABILITY: MENTAL HEALTH: HOW OFTEN DO YOU HAVE 6 OR MORE DRINKS ON ONE OCCASION?: NEVER

## 2020-06-30 SDOH — ECONOMIC STABILITY: FOOD INSECURITY: WITHIN THE PAST 12 MONTHS, THE FOOD YOU BOUGHT JUST DIDN'T LAST AND YOU DIDN'T HAVE MONEY TO GET MORE.: NEVER TRUE

## 2020-06-30 SDOH — ECONOMIC STABILITY: TRANSPORTATION INSECURITY
IN THE PAST 12 MONTHS, HAS THE LACK OF TRANSPORTATION KEPT YOU FROM MEDICAL APPOINTMENTS OR FROM GETTING MEDICATIONS?: NO

## 2020-06-30 SDOH — HEALTH STABILITY: MENTAL HEALTH: HOW MANY STANDARD DRINKS CONTAINING ALCOHOL DO YOU HAVE ON A TYPICAL DAY?: 1 OR 2

## 2020-06-30 SDOH — HEALTH STABILITY: MENTAL HEALTH: HOW OFTEN DO YOU HAVE A DRINK CONTAINING ALCOHOL?: NEVER

## 2020-06-30 SDOH — ECONOMIC STABILITY: TRANSPORTATION INSECURITY
IN THE PAST 12 MONTHS, HAS LACK OF TRANSPORTATION KEPT YOU FROM MEETINGS, WORK, OR FROM GETTING THINGS NEEDED FOR DAILY LIVING?: NO

## 2020-06-30 ASSESSMENT — LIFESTYLE VARIABLES
ALCOHOL_USE: NO
HAVE YOU EVER FELT YOU SHOULD CUT DOWN ON YOUR DRINKING: NO
AVERAGE NUMBER OF DAYS PER WEEK YOU HAVE A DRINK CONTAINING ALCOHOL: 0
EVER HAD A DRINK FIRST THING IN THE MORNING TO STEADY YOUR NERVES TO GET RID OF A HANGOVER: NO
EVER_SMOKED: YES
HAVE PEOPLE ANNOYED YOU BY CRITICIZING YOUR DRINKING: NO
ON A TYPICAL DAY WHEN YOU DRINK ALCOHOL HOW MANY DRINKS DO YOU HAVE: 0
TOTAL SCORE: 0
HOW MANY TIMES IN THE PAST YEAR HAVE YOU HAD 5 OR MORE DRINKS IN A DAY: 0
EVER FELT BAD OR GUILTY ABOUT YOUR DRINKING: NO
TOTAL SCORE: 0
CONSUMPTION TOTAL: NEGATIVE
TOTAL SCORE: 0
DOES PATIENT WANT TO STOP DRINKING: CANNOT ASSESS

## 2020-06-30 ASSESSMENT — COPD QUESTIONNAIRES
DURING THE PAST 4 WEEKS HOW MUCH DID YOU FEEL SHORT OF BREATH: NONE/LITTLE OF THE TIME
HAVE YOU SMOKED AT LEAST 100 CIGARETTES IN YOUR ENTIRE LIFE: YES
IN THE PAST 12 MONTHS DO YOU DO LESS THAN YOU USED TO BECAUSE OF YOUR BREATHING PROBLEMS: DISAGREE/UNSURE
DO YOU EVER COUGH UP ANY MUCUS OR PHLEGM?: NO/ONLY WITH OCCASIONAL COLDS OR INFECTIONS
COPD SCREENING SCORE: 4

## 2020-06-30 ASSESSMENT — PATIENT HEALTH QUESTIONNAIRE - PHQ9
1. LITTLE INTEREST OR PLEASURE IN DOING THINGS: NOT AT ALL
2. FEELING DOWN, DEPRESSED, IRRITABLE, OR HOPELESS: NOT AT ALL
SUM OF ALL RESPONSES TO PHQ9 QUESTIONS 1 AND 2: 0

## 2020-06-30 NOTE — ED NOTES
Med Rec completed per patient   Allergies reviewed  No ORAL antibiotics in last 14 days    Patient is not currently taking any daily medications

## 2020-06-30 NOTE — ED PROVIDER NOTES
ED Provider  Scribed for Jaylan Gordillo D.O. by David Khan. 2020  12:09 PM    Means of arrival:Wheel Chair  History obtained from:Patient  History limited by: None    CHIEF COMPLAINT  Chief Complaint   Patient presents with   • Leg Pain       HPI  Jimenez Bains is a 74 y.o. male with a history of DVT who presents for evaluation of acute, constant left leg pain onset two days ago. The patient reports the pain is localized below his left knee and is exacerbated with weight bearing activities like walking. No alleviating factors are reported. He endorses associated mild numbness. The patient does have history of a DVT and required a stent be placed in his left leg by Dr. Phan (Vascular) multiple years ago. He has no complaints of shortness of breath, chest pain, nausea, vomiting, or congestion.     REVIEW OF SYSTEMS  See HPI for further details. All other systems are negative.     PAST MEDICAL HISTORY   has a past medical history of Cancer ([facility]), Pain (14), Pain (3/14/14), Personal history of venous thrombosis and embolism, PVD (peripheral vascular disease) ([facility]), and Unspecified hemorrhagic conditions.    SOCIAL HISTORY  Social History     Tobacco Use   • Smoking status: Former Smoker     Packs/day: 0.50     Years: 50.00     Pack years: 25.00     Types: Cigarettes     Last attempt to quit: 2020     Years since quittin.3   • Smokeless tobacco: Never Used   Substance and Sexual Activity   • Alcohol use: No   • Drug use: No   • Sexual activity: None noted       SURGICAL HISTORY   has a past surgical history that includes femoral popliteal bypass (10/18/2011); aortogram with runoff (2013); iliac angioplasty with stent (2013); toe amputation (2013); femoral femoral bypass (2013); femoral femoral bypass (2014); groin exploration (3/17/2014); angiogram (2014); femoral popliteal bypass (2014); and reimplantion revascularization (2014).    CURRENT  "MEDICATIONS  Home Medications     Reviewed by Zion Matt (Pharmacy Tech) on 06/30/20 at 1444  Med List Status: Complete   Medication Last Dose Status        Patient Juanito Taking any Medications                       ALLERGIES  Allergies   Allergen Reactions   • No Known Drug Allergy        PHYSICAL EXAM  VITAL SIGNS: /80   Pulse 98   Temp 35.9 °C (96.7 °F) (Temporal)   Resp 16   Ht 1.676 m (5' 6\")   Wt 72 kg (158 lb 11.7 oz)   SpO2 97%   BMI 25.62 kg/m²   Constitutional: Alert in no apparent distress.  HENT: No signs of trauma, mucous membranes are moist  Eyes: Conjunctiva normal, Non-icteric.   Neck: Normal range of motion, No tenderness, Supple.  Cardiovascular: Regular rate and rhythm, no murmurs.   Thorax & Lungs: Normal breath sounds, No respiratory distress, No wheezing, No chest tenderness.   Abdomen: Bowel sounds normal, Soft, No tenderness, No masses, No pulsatile masses. No peritoneal signs.  Skin: Warm, Dry, normal color.   Extremities: Left lower extremity is mottled from calf and feet, cool and pedal pulse in non palpable, popliteal pulse is absent, amputation to left great toe.   Musculoskeletal: Good range of motion in all major joints. No major deformities noted.   Neurologic: Alert and oriented x4, Normal motor function, Normal sensory function, No focal deficits noted.   Psychiatric: Affect normal, Judgment normal, Mood normal.     DIAGNOSTIC STUDIES / PROCEDURES    EKG  12 Lead EKG interpreted by me shown below.       LABS  Results for orders placed or performed during the hospital encounter of 06/30/20   CBC WITH DIFFERENTIAL   Result Value Ref Range    WBC 7.2 4.8 - 10.8 K/uL    RBC 4.64 (L) 4.70 - 6.10 M/uL    Hemoglobin 15.2 14.0 - 18.0 g/dL    Hematocrit 46.0 42.0 - 52.0 %    MCV 99.1 (H) 81.4 - 97.8 fL    MCH 32.8 27.0 - 33.0 pg    MCHC 33.0 (L) 33.7 - 35.3 g/dL    RDW 46.5 35.9 - 50.0 fL    Platelet Count 217 164 - 446 K/uL    MPV 9.9 9.0 - 12.9 fL    Neutrophils-Polys " 69.90 44.00 - 72.00 %    Lymphocytes 19.60 (L) 22.00 - 41.00 %    Monocytes 8.50 0.00 - 13.40 %    Eosinophils 1.00 0.00 - 6.90 %    Basophils 0.70 0.00 - 1.80 %    Immature Granulocytes 0.30 0.00 - 0.90 %    Nucleated RBC 0.00 /100 WBC    Neutrophils (Absolute) 5.05 1.82 - 7.42 K/uL    Lymphs (Absolute) 1.41 1.00 - 4.80 K/uL    Monos (Absolute) 0.61 0.00 - 0.85 K/uL    Eos (Absolute) 0.07 0.00 - 0.51 K/uL    Baso (Absolute) 0.05 0.00 - 0.12 K/uL    Immature Granulocytes (abs) 0.02 0.00 - 0.11 K/uL    NRBC (Absolute) 0.00 K/uL   COMP METABOLIC PANEL   Result Value Ref Range    Sodium 141 135 - 145 mmol/L    Potassium 4.3 3.6 - 5.5 mmol/L    Chloride 106 96 - 112 mmol/L    Co2 24 20 - 33 mmol/L    Anion Gap 11.0 7.0 - 16.0    Glucose 141 (H) 65 - 99 mg/dL    Bun 21 8 - 22 mg/dL    Creatinine 1.37 0.50 - 1.40 mg/dL    Calcium 9.2 8.5 - 10.5 mg/dL    AST(SGOT) 27 12 - 45 U/L    ALT(SGPT) 14 2 - 50 U/L    Alkaline Phosphatase 112 (H) 30 - 99 U/L    Total Bilirubin 0.9 0.1 - 1.5 mg/dL    Albumin 3.7 3.2 - 4.9 g/dL    Total Protein 7.1 6.0 - 8.2 g/dL    Globulin 3.4 1.9 - 3.5 g/dL    A-G Ratio 1.1 g/dL   LACTIC ACID   Result Value Ref Range    Lactic Acid 1.5 0.5 - 2.0 mmol/L   APTT   Result Value Ref Range    APTT 34.8 24.7 - 36.0 sec   PROTHROMBIN TIME (INR)   Result Value Ref Range    PT 14.2 12.0 - 14.6 sec    INR 1.06 0.87 - 1.13   COD (ADULT)   Result Value Ref Range    ABO Grouping Only A     Rh Grouping Only NEG     Antibody Screen-Cod NEG    COVID/SARS CoV-2 PCR    Specimen: Nasopharyngeal; Respirate   Result Value Ref Range    COVID Order Status Received    SARS-CoV-2, PCR (In-House)   Result Value Ref Range    SARS-CoV-2 Source NP Swab     SARS-CoV-2 by PCR NotDetected    ESTIMATED GFR   Result Value Ref Range    GFR If African American >60 >60 mL/min/1.73 m 2    GFR If Non  51 (A) >60 mL/min/1.73 m 2   EKG (NOW)   Result Value Ref Range    Report       [facility] Emergency  Dept.    Test Date:  2020  Pt Name:    ASHLI HONG                 Department: ER  MRN:        [id number]                      Room:        19  Gender:     Male                         Technician: [id number]  :        [id number]                   Requested By:GHANSHYAM REGAN  Order #:    [id number]                    Reading MD: GHANSHYAM REGAN D.O.    Measurements  Intervals                                Axis  Rate:       75                           P:          32  OH:         160                          QRS:        -46  QRSD:       106                          T:          35  QT:         392  QTc:        438    Interpretive Statements  SINUS RHYTHM  PROBABLE LEFT ATRIAL ABNORMALITY  LEFT ANTERIOR FASCICULAR BLOCK    BORDERLINE T WAVE ABNORMALITIES  Compared to ECG 2013 09:40:45  Left anterior fascicular block now present  Electronically Signed On 2020 15:07:08 PDT by GHANSHYAM REGAN D.O.         All labs reviewed by me.    RADIOLOGY  DX-CHEST-PORTABLE (1 VIEW)   Final Result      No acute cardiac or pulmonary abnormality is noted.      US-EXTREMITY ARTERY LOWER UNILAT LEFT   Final Result        The radiologist's interpretations of all radiological studies have been reviewed by me.    Films have been independently by me      COURSE  Pertinent Labs & Imaging studies reviewed. (See chart for details) records show that he is going to the vascular clinic for anticoagulation.  His last INR was 2020 which was 2.0    12:09 PM - Patient seen and examined at bedside. Discussed plan of care. Ordered for US-Extremity Artery Left Lower Unilateral, DX-Chest (1 view), COVID/SARS CoV-2 PCR, CBC with differential, CMP, Lactic Acid, APTT, PTT, COD, and EKG to evaluate his symptoms.     1:43 PM - Patient was reevaluated at bedside. He reports he is not taking any anticoagulant medications. Paged Vascular.     2:21 PM - Spoke with Dr. Phan (Vascular) who will contact Dr. Goldman to take the patient. He  also recommends a Heparin drip be initiated. Heparin has been ordered.     3:01 PM I discussed the patient's case and the above findings with Dr. Qureshi (Hospitalist) who agreed to evaluate the patient for hospitalization.    CRITICAL CARE  The very real possibilty of a deterioration of this patient's condition required the highest level of my preparedness for sudden, emergent intervention.  I provided critical care services, which included medication orders, frequent reevaluations of the patient's condition and response to treatment, ordering and reviewing test results, and discussing the case with various consultants.  The critical care time associated with the care of the patient was 30 minutes. Review chart for interventions. This time is exclusive of any other billable procedures.     MEDICAL DECISION MAKING  This is a 74 y.o. male who presents pain, coolness, mottling of the skin of the left lower extremity.  He has known history of vascular disease and has had bypass grafts in the past.  I was unable to get popliteal or pedal pulses, ultrasound was done does show no flow test on the femoropopliteal graft.  Patient has a decrease sensation to the leg, decreased movement of the leg.  His lactic acid level at this time is normal.    I spoke with the vascular surgeon, and the patient was placed on a heparin IV drip for anticoagulation, and will be admitted to medicine with a evaluation by vascular surgeon in house.      DISPOSITION:  Patient will be hospitalized by Dr. Qureshi (Hospitalist) in guarded condition.    FINAL IMPRESSION  1. Pain of left lower extremity    2. Occlusion of left femoral artery (HCC)       The critical care time associated with the care of the patient was 30 minutes. Review chart for interventions. This time is exclusive of any other billable procedures.      IDavid (Scribe), am scribing for, and in the presence of, Jaylan Gordillo D.O..    Electronically signed by: David Khan  (Scribe), 6/30/2020    IJaylan D.O. personally performed the services described in this documentation, as scribed by David Khan in my presence, and it is both accurate and complete.    C.    The note accurately reflects work and decisions made by me.  Jaylan Gordillo D.O.  6/30/2020  7:26 PM

## 2020-06-30 NOTE — CONSULTS
"Vascular Surgery Consult Note  -------------------------------------------------------------------------------------------------  Date: 6/30/2020    Consulting Physician: Usman Fisher M.D. [facility]  -------------------------------------------------------------------------------------------------    Reason for consultation:  Left leg pain    HPI:  This is a 74 y.o. male who is presenting with severe left leg pain that started 2 days ago.  The leg is cold and mottled up to the knee, he can't move the foot or toes, and he can't bear weight on it. He is alert and conversant and apart from the leg pain he doesn't have any specific complaints.    He has an extensive vascular history from 2011 to 2014:  8/2011: BCIA stents,  10/2011: L F-AK with 8mm PTFE  11/2013: BCIA stents again  12/2013: L-to-R F-F with 6mm PTFE, remove left big toe  2/2014: Removal infected Fem-Fem and replaced with rGSV  3/2014: R groin debride and vac  7/2014: L EIA to Profunda bypass with 7mm Dacron, then L-to-R profunda-profunda bypass with 7mm dacron    Interestingly he only remembers having a \"stent\" procedure and \"poison in his right groin\" and a \"toe cut off\" and when I discuss the other procedures with him he denies having them and says \"I never had that\" repeatedly. Despite not recalling his surgical history, he is alert and oriented at present.    Past Medical History:   Diagnosis Date   • Cancer (HCC)     skin cancer   • Pain 01-30-14    right lower leg, 8/10   • Pain 3/14/14    8/10 right groin, feet   • Personal history of venous thrombosis and embolism    • PVD (peripheral vascular disease) (HCC)    • Unspecified hemorrhagic conditions     takes coumadin       Past Surgical History:   Procedure Laterality Date   • ANGIOGRAM  7/30/2014    Performed by Aquiles Phan M.D. at SURGERY [facility]   • FEMORAL POPLITEAL BYPASS  7/30/2014    Performed by Aquiles Phan M.D. at [facility]   • REIMPLANTION " REVASCULARIZATION  2014    Performed by Cyn Phan M.D. at [facility]   • GROIN EXPLORATION  3/17/2014    Performed by Cyn Phan M.D. at [facility]   • FEMORAL FEMORAL BYPASS  2014    Performed by Cyn Phan M.D. at [facility]   • TOE AMPUTATION  2013    Performed by Cyn Phan M.D. at [facility]   • FEMORAL FEMORAL BYPASS  2013    Performed by Cyn Phan M.D. at [facility]   • AORTOGRAM WITH RUNOFF  2013    Performed by Cyn Phan M.D. at [facility]   • ILIAC ANGIOPLASTY WITH STENT  2013    Performed by Cyn Phan M.D. at [facility]   • FEMORAL POPLITEAL BYPASS  10/18/2011    Performed by CYN PHAN at [facility]       Current Facility-Administered Medications   Medication Dose Route Frequency Provider Last Rate Last Dose   • heparin injection 2,600 Units  2,600 Units Intravenous PRN Jaylanbenedicto Santosehl, D.O.        And   • heparin infusion 25,000 units in 500 mL 0.45% NACL   Intravenous Continuous Jaylanbenedicto Santosehl, D.O. 21 mL/hr at 20 1507 1,050 Units/hr at 20 1507     No current outpatient medications on file.       Social History     Socioeconomic History   • Marital status:      Spouse name: Not on file   • Number of children: Not on file   • Years of education: Not on file   • Highest education level: Not on file   Occupational History   • Not on file   Social Needs   • Financial resource strain: Not on file   • Food insecurity     Worry: Not on file     Inability: Not on file   • Transportation needs     Medical: Not on file     Non-medical: Not on file   Tobacco Use   • Smoking status: Former Smoker     Packs/day: 0.50     Years: 50.00     Pack years: 25.00     Types: Cigarettes     Last attempt to quit: 2020     Years since quittin.3   • Smokeless tobacco: Never Used   Substance and Sexual Activity   • Alcohol use: No  "  • Drug use: No   • Sexual activity: Not on file   Lifestyle   • Physical activity     Days per week: Not on file     Minutes per session: Not on file   • Stress: Not on file   Relationships   • Social connections     Talks on phone: Not on file     Gets together: Not on file     Attends Yazidi service: Not on file     Active member of club or organization: Not on file     Attends meetings of clubs or organizations: Not on file     Relationship status: Not on file   • Intimate partner violence     Fear of current or ex partner: Not on file     Emotionally abused: Not on file     Physically abused: Not on file     Forced sexual activity: Not on file   Other Topics Concern   • Not on file   Social History Narrative   • Not on file       History reviewed. No pertinent family history.    Allergies:  No known drug allergy    Review of Systems:  Noncontributory except as per HPI      Physical Exam:  /80   Pulse 98   Temp 35.9 °C (96.7 °F) (Temporal)   Resp 16   Ht 1.676 m (5' 6\")   Wt 72 kg (158 lb 11.7 oz)   SpO2 97%     Constitutional: Alert, oriented, no acute distress  HEENT:  nonicteric, 3cm raised fungating basal cell lesion on the right cheek  Neck:   Supple, no JVD,   Cardiovascular: Regular rate and rhythm,   Pulmonary:  Good air entry bilaterally,    Abdominal:  Soft, non-tender, non-tender  Neurological:  CN II-XII grossly intact, no focal deficits except for insensate and paralyzed right lower leg/foot  Vascular:  Nonpalpable femoral pulses. Non-detectable pedal pulses.    Labs:  Recent Labs     06/30/20  1222   WBC 7.2   RBC 4.64*   HEMOGLOBIN 15.2   HEMATOCRIT 46.0   MCV 99.1*   MCH 32.8   MCHC 33.0*   RDW 46.5   PLATELETCT 217   MPV 9.9     Recent Labs     06/30/20  1222   SODIUM 141   POTASSIUM 4.3   CHLORIDE 106   CO2 24   GLUCOSE 141*   BUN 21   CREATININE 1.37   CALCIUM 9.2     Recent Labs     06/30/20  1222   APTT 34.8   INR 1.06     Recent Labs     06/30/20  1222   ASTSGOT 27   ALTSGPT " 14   TBILIRUBIN 0.9   ALKPHOSPHAT 112*   GLOBULIN 3.4   INR 1.06       Radiology:  BLE Duplex:   Left.    Left fem-pop graft showed no flow as well as all native  arteries did not    show any blood flow.   Left EIA showed monophasic flow with velocity 106 cm/sec.      Right CFA showed monophasic flow with velocity 32 cm/sec.    Assessment/Plan:  -  Terminal ischemia of the left lower extremity  -  PAOD  -  Tobacco use    The left lower extremity exhibits critical ischemia of the lower leg and foot. The leg has been pulseless and paralyzed for 2 days.  There are no reasonable options for attempting revascularization.  Recommend left AKA tonight.    The patient and I, as well as participating family members, discussed the recommendation for amputation as well as alternatives. We discussed the pertinent preoperative, intraoperative, and postoperative aspects of the procedure including anticipated recovery and how that could be affected by potential complications.  We discussed potential risks from the surgery including but not limited to:  Bleeding and possible need for transfusion, if applicable. Infection of the incision or any potential implanted materials. Injury to nearby vessels or nerves. Injury to nearby organs. Risk of xray and contrast exposure. Risks of anesthesia.  We also discussed global risks including but not limited to: Blood clots, Stroke, Heart attack, Pulmonary complications, and not surviving the operation or the recovery.  We discussed that recovering and ambulating on an above-knee amputation is difficult and the patient may not walk again and therefore be wheelchair dependent  All questions were answered. They understand and agree to proceed. Informed consent was obtained.     Usman Fisher MD  [facility] (General and Vascular Surgery)  Cell: [phone] (text or call is fine, if you don't reach me please try my office)  Office: [phone]    6/30/2020    4:22  PM  ___________________________________________________________________  Patient:Jimenez Bains   MRN:[id number]   CSN:[id number]

## 2020-06-30 NOTE — PROGRESS NOTES
Triage officer note /transfer note     Room 19    D/W Dr Panda     CC: leg pain     ED course: US showed occluded leg blood vessel. vascular surgery consulted and will see patient. Started on heparin      Need to do: cont heparin, pending vascular consult     Admit to     Inpatient surgery.    Admitting hospitalist is Dr Mcclendon

## 2020-06-30 NOTE — ED TRIAGE NOTES
75 y/o male bib wheelchair to triage with c/o pain and decreased sensation to his left leg. Pt states symptoms began 2 days ago. This RN unable to palpate pedal or posterior tibial pulses, unable to locate with doppler.

## 2020-07-01 PROBLEM — E78.5 HLD (HYPERLIPIDEMIA): Status: ACTIVE | Noted: 2020-07-01

## 2020-07-01 LAB — PATHOLOGY CONSULT NOTE: NORMAL

## 2020-07-01 PROCEDURE — 700102 HCHG RX REV CODE 250 W/ 637 OVERRIDE(OP): Performed by: SURGERY

## 2020-07-01 PROCEDURE — A9270 NON-COVERED ITEM OR SERVICE: HCPCS | Performed by: SURGERY

## 2020-07-01 PROCEDURE — 700105 HCHG RX REV CODE 258: Performed by: HOSPITALIST

## 2020-07-01 PROCEDURE — 99223 1ST HOSP IP/OBS HIGH 75: CPT | Mod: AI | Performed by: HOSPITALIST

## 2020-07-01 PROCEDURE — A9270 NON-COVERED ITEM OR SERVICE: HCPCS | Performed by: HOSPITALIST

## 2020-07-01 PROCEDURE — 700102 HCHG RX REV CODE 250 W/ 637 OVERRIDE(OP): Performed by: HOSPITALIST

## 2020-07-01 PROCEDURE — 770006 HCHG ROOM/CARE - MED/SURG/GYN SEMI*

## 2020-07-01 PROCEDURE — 700111 HCHG RX REV CODE 636 W/ 250 OVERRIDE (IP): Performed by: SURGERY

## 2020-07-01 RX ORDER — HYDRALAZINE HYDROCHLORIDE 20 MG/ML
10 INJECTION INTRAMUSCULAR; INTRAVENOUS EVERY 6 HOURS PRN
Status: DISCONTINUED | OUTPATIENT
Start: 2020-07-01 | End: 2020-07-03 | Stop reason: HOSPADM

## 2020-07-01 RX ORDER — ATORVASTATIN CALCIUM 40 MG/1
40 TABLET, FILM COATED ORAL EVERY EVENING
Status: DISCONTINUED | OUTPATIENT
Start: 2020-07-01 | End: 2020-07-03 | Stop reason: HOSPADM

## 2020-07-01 RX ORDER — SODIUM CHLORIDE 9 MG/ML
INJECTION, SOLUTION INTRAVENOUS CONTINUOUS
Status: DISCONTINUED | OUTPATIENT
Start: 2020-07-01 | End: 2020-07-02

## 2020-07-01 RX ADMIN — SODIUM CHLORIDE: 9 INJECTION, SOLUTION INTRAVENOUS at 20:18

## 2020-07-01 RX ADMIN — SODIUM CHLORIDE: 9 INJECTION, SOLUTION INTRAVENOUS at 09:02

## 2020-07-01 RX ADMIN — KETOROLAC TROMETHAMINE 15 MG: 30 INJECTION, SOLUTION INTRAMUSCULAR at 08:57

## 2020-07-01 RX ADMIN — KETOROLAC TROMETHAMINE 15 MG: 30 INJECTION, SOLUTION INTRAMUSCULAR at 14:52

## 2020-07-01 RX ADMIN — OXYCODONE HYDROCHLORIDE 10 MG: 10 TABLET ORAL at 12:57

## 2020-07-01 RX ADMIN — KETOROLAC TROMETHAMINE 15 MG: 30 INJECTION, SOLUTION INTRAMUSCULAR at 03:38

## 2020-07-01 RX ADMIN — KETOROLAC TROMETHAMINE 15 MG: 30 INJECTION, SOLUTION INTRAMUSCULAR at 21:28

## 2020-07-01 RX ADMIN — ENOXAPARIN SODIUM 40 MG: 40 INJECTION SUBCUTANEOUS at 23:22

## 2020-07-01 RX ADMIN — OXYCODONE HYDROCHLORIDE 10 MG: 10 TABLET ORAL at 01:38

## 2020-07-01 RX ADMIN — ATORVASTATIN CALCIUM 40 MG: 40 TABLET, FILM COATED ORAL at 17:50

## 2020-07-01 ASSESSMENT — ENCOUNTER SYMPTOMS
NAUSEA: 0
DOUBLE VISION: 0
SHORTNESS OF BREATH: 0
VOMITING: 0
HEARTBURN: 0
SPEECH CHANGE: 0
CHILLS: 0
EYE DISCHARGE: 0
ABDOMINAL PAIN: 0
DIZZINESS: 0
BLURRED VISION: 0
BRUISES/BLEEDS EASILY: 0
HALLUCINATIONS: 0
WEAKNESS: 0
DEPRESSION: 0
FOCAL WEAKNESS: 0
FLANK PAIN: 0
MYALGIAS: 0
HEMOPTYSIS: 0
SENSORY CHANGE: 0
COUGH: 0
FEVER: 0
PALPITATIONS: 0

## 2020-07-01 ASSESSMENT — LIFESTYLE VARIABLES: SUBSTANCE_ABUSE: 0

## 2020-07-01 NOTE — CARE PLAN
Problem: Safety  Goal: Will remain free from injury  Outcome: PROGRESSING AS EXPECTED  Note: Call light in reach. Bed locked in lowest position. Treaded socks on patient. Bed alarm in place. Hourly rounding in place. Fall precautions noted.    Goal: Will remain free from falls  Outcome: PROGRESSING AS EXPECTED     Problem: Pain Management  Goal: Pain level will decrease to patient's comfort goal  Outcome: PROGRESSING AS EXPECTED  Note: Patient post surgery, educated patient about available pain medication and uses of medication. Frequent pain assessments and reassessments. Use elevation as non pharmacological pain option.

## 2020-07-01 NOTE — ANESTHESIA PROCEDURE NOTES
Peripheral Block    Date/Time: 6/30/2020 6:25 PM  Performed by: Xu Causey M.D.  Authorized by: Xu Causey M.D.     Start Time:  6/30/2020 6:25 PM  End Time:  6/30/2020 6:27 PM  Reason for Block: at surgeon's request and post-op pain management    patient identified, IV checked, site marked, risks and benefits discussed, surgical consent, monitors and equipment checked, pre-op evaluation and timeout performed    Patient Position:  Supine  Prep: ChloraPrep    Monitoring:  Heart rate, continuous pulse ox and cardiac monitor  Block Region:  Lower Extremity  Lower Extremity - Block Type:  Selective FEMORAL nerve block at the Adductor Canal    Laterality:  Left  Procedures: ultrasound guided  Image captured, interpreted and electronically stored.  Local Infiltration:  Lidocaine  Strength:  1 %  Dose:  3 ml  Block Type:  Single-shot  Needle Length:  100mm  Needle Gauge:  21 G  Needle Localization:  Ultrasound guidance  Injection Assessment:  Negative aspiration for heme, no paresthesia on injection, incremental injection and local visualized surrounding nerve on ultrasound

## 2020-07-01 NOTE — PROGRESS NOTES
2 RN Skin Check    2 RN skin check complete with Irma MYERS.   Devices in place: oxy mask, IV, prevena wound vac to left BKA  Skin assessed under devices: yes, no redness noted behind ears.  Confirmed pressure ulcers found on: n/a.  New potential pressure ulcers noted on n/a. Redness but blanching noted on sacrum. Scar noted on right lower leg. Old lesion on right side of face and nose noted, and generalized scabs noted.  Wound consult placed Yes.  The following interventions in place Pillows, mepilex, left BKA elevated, Q2 hour turns, Q shift skin assessments.

## 2020-07-01 NOTE — OP REPORT
Vascular Surgery Operative Note  --------------------------------------------    Date of Service:          6/30/2020    Patient Name:             Jimenez Bains    Patient MRN:              [id number]    --------------------------------------------------------------------------------------------------    Preoperative Diagnosis:  - Critical ischemia of the left lower extremity with paralysis due to unreconstructable peripheral arterial occlusive disease    Postoperative Diagnosis:  - Critical ischemia of the left lower extremity with paralysis due to unreconstructable peripheral arterial occlusive disease    Procedure:  1) left above knee amputation    -------------------------------------------------------------------------------------------------    Surgeon:                                 Usman Fisher MD    Assistant:   Román ROMAN    Anesthesia:                            General endotracheal anesthesia    IVF:                                        Per anesthesia report    UOP:                                      not measured    EBL:                                        15 cc    Blood Products:                      none    Specimen:   left leg to pathology    Complications:                        none     Disposition:                             Extubated and to recovery in stable condition    -----------------------------------------------------------------------------------------------------    History:  Jimenez Bains is a 74 y.o. male with critical ischemia of the left lower extremity with paralysis due to unreconstructable peripheral arterial occlusive disease.  I explained to the patient that the foot could not be salvaged and that amputation was recommended.  I explained the details of the operation, alternatives, and potential risks, including but not limited to bleeding, infection, risk of non-healing, and risks of anesthesia.  All questions were answered. They understand and agree to  proceed.    Procedure Summary:  The patient was brought to the operating suite, placed supine on the OR table, and GETA was administered by the anesthesia service.  The left leg was prepped and draped in the usual sterile fashion.  A tourniquet was applied to the thigh. Timeout was called to identify the correct patient procedure and equipment.  Everyone was in agreement.    Incision for the stump was marked and then the skin was incised sharply and then a combination of sharp dissection and electrocautery was used to dissect the soft tissues circumferentially around the femur.  Larger vessels were suture ligated as needed.  The femur was transected with the oscillating saw.  The anterior edge of the femur was beveled. The posterior flap was created with the amputation knife, freeing the specimen and allowing it to be passed off the field.  The flap was inspected for hemostasis, copiously irrigated, excess muscle was debrided as needed.  The soft tissue and fascia were approximated with multiple layers of absorbable sutures.  The skin was reapproximated with interrupted vertical mattress 0-prolene sutures and a Prevena dressing was applied.    All sponge, instrument, and needle counts were correct at the conclusion of the case.  Patient was extubated and sent to PACU in stable condition.    Usman Fisher MD  General and Vascular Surgery  [facility]  Cell: [phone]

## 2020-07-01 NOTE — CARE PLAN
Problem: Knowledge Deficit  Goal: Knowledge of disease process/condition, treatment plan, diagnostic tests, and medications will improve  Outcome: PROGRESSING AS EXPECTED  Note: POC discussed and pt is agreeable. All questions answered     Problem: Pain Management  Goal: Pain level will decrease to patient's comfort goal  Outcome: PROGRESSING AS EXPECTED  Note: Pt reports adequate pain control with PRN medications.

## 2020-07-01 NOTE — ANESTHESIA PROCEDURE NOTES
Peripheral Block    Date/Time: 6/30/2020 6:37 PM  Performed by: Xu Causey M.D.  Authorized by: Xu Causey M.D.     Start Time:  6/30/2020 6:37 PM  Reason for Block: at surgeon's request and post-op pain management    patient identified, IV checked, site marked, risks and benefits discussed, surgical consent, monitors and equipment checked, pre-op evaluation and timeout performed    Patient Position:  Supine  Prep: ChloraPrep    Monitoring:  Heart rate, continuous pulse ox and cardiac monitor  Block Region:  Lower Extremity  Lower Extremity - Block Type:  Sciatic, pos/sub - SCIATIC nerve block, posterior or sub-gluteal approach    Laterality:  Left  Procedures: ultrasound guided  Image captured, interpreted and electronically stored.  Local Infiltration:  Lidocaine  Strength:  1 %  Dose:  3 ml  Block Type:  Single-shot  Needle Length:  100mm  Needle Gauge:  21 G  Needle Localization:  Ultrasound guidance  Injection Assessment:  Negative aspiration for heme, no paresthesia on injection, incremental injection and local visualized surrounding nerve on ultrasound

## 2020-07-01 NOTE — ASSESSMENT & PLAN NOTE
Found to have left fem pop bypass occlusion   POD 2 from left sided AKA   Vascular surgery consulted and OK for discharge from vascular surgery end will need to follow up for removal of dressing 10 days after surgery  Cont with pain control   Pt/ot eval when appropriate   SNF Placement orders placed today

## 2020-07-01 NOTE — PROGRESS NOTES
Per order RN stopped IV fluids and took patient off oxy mask and placed on room air at 0200. Patient on continuous pulse ox monitoring to monitor oxygen saturation.

## 2020-07-01 NOTE — PROGRESS NOTES
Assumed care at 0700, report received from night shift RN.  Pt is sitting up in bed, awake. A&O x 4 , on RA , reports 0/10 pain . Bed is locked and in the lowest position. Call light and belongings within reach. Plan of care discussed and whiteboard updated, Pt has no questions at this time.

## 2020-07-01 NOTE — WOUND TEAM
Received consult for prevena wound vac and sacrum. Prevena on Left AKA. Sacrum intact and blanching. Pt does not require advanced wound care. Re consult as needed for new wounds or related concerns.

## 2020-07-01 NOTE — PROGRESS NOTES
RN assumed care at . Patient arrived via gurney from PACU. When patient arrived from PACU, patient drowsy, but easy to arouse. Originally when patient arrived to floor, patient was oriented to name, , place, but disoriented to year. Now at , patient oriented x4, but forgetful at times. Patient left BKA dressing clean dry and intact and connected to provena wound vac. Assessment completed, vitals stable. Post op vitals in place. Bed locked in lowest position. Call light in reach. Fall precautions maintained. Bed alarm in place. RN to continue to monitor.

## 2020-07-01 NOTE — H&P
Hospital Medicine History & Physical Note    Date of Service  7/1/2020    Primary Care Physician  Pcp Pt States None    Code Status  Full Code    Chief Complaint  Chief Complaint   Patient presents with   • Leg Pain       History of Presenting Illness  74 y.o. male who presented 6/30/2020 with Past medical history of DVT, known peripheral vascular disease who presented to the hospital with acute constant left-sided leg pain.  Started 2 days prior to arrival.  Localized below the knee and worse with any movement.  He also had some mild numbness and tingling to the left lower extremity.  Patient was found to have evidence of arterial occlusion and taken to the operating room for left-sided AKA.  Otherwise no known alleviating or exacerbating factors to his symptoms.    Review of Systems  Review of Systems   Constitutional: Positive for malaise/fatigue. Negative for chills and fever.   HENT: Negative for congestion, hearing loss and tinnitus.    Eyes: Negative for blurred vision, double vision and discharge.   Respiratory: Negative for cough, hemoptysis and shortness of breath.    Cardiovascular: Negative for chest pain, palpitations and leg swelling.   Gastrointestinal: Negative for abdominal pain, heartburn, nausea and vomiting.   Genitourinary: Negative for dysuria and flank pain.   Musculoskeletal: Positive for joint pain. Negative for myalgias.   Skin: Negative for rash.   Neurological: Negative for dizziness, sensory change, speech change, focal weakness and weakness.   Endo/Heme/Allergies: Negative for environmental allergies. Does not bruise/bleed easily.   Psychiatric/Behavioral: Negative for depression, hallucinations and substance abuse.       Past Medical History   has a past medical history of Cancer ([facility]), Hypertension, Pain (01-30-14), Pain (3/14/14), Personal history of venous thrombosis and embolism, PVD (peripheral vascular disease) ([facility]), and Unspecified hemorrhagic conditions.    Surgical History    has a past surgical history that includes femoral popliteal bypass (10/18/2011); aortogram with runoff (11/14/2013); iliac angioplasty with stent (11/14/2013); toe amputation (12/4/2013); femoral femoral bypass (12/4/2013); femoral femoral bypass (2/2/2014); groin exploration (3/17/2014); angiogram (7/30/2014); femoral popliteal bypass (7/30/2014); and reimplantion revascularization (7/30/2014).     Family History  Reviewed and not pertinent     Social History   reports that he quit smoking about 4 months ago. His smoking use included cigarettes. He has a 25.00 pack-year smoking history. He has never used smokeless tobacco. He reports that he does not drink alcohol or use drugs.    Allergies  Allergies   Allergen Reactions   • No Known Drug Allergy        Medications  None       Physical Exam  Temp:  [35.9 °C (96.7 °F)-37.2 °C (98.9 °F)] 36.6 °C (97.9 °F)  Pulse:  [62-98] 64  Resp:  [16-29] 18  BP: (116-198)/(51-91) 159/69  SpO2:  [92 %-100 %] 95 %    Physical Exam  Vitals signs reviewed.   Constitutional:       General: He is not in acute distress.     Appearance: He is ill-appearing.   HENT:      Head: Normocephalic and atraumatic.      Nose: No congestion.      Mouth/Throat:      Mouth: Mucous membranes are dry.   Eyes:      Extraocular Movements: Extraocular movements intact.      Pupils: Pupils are equal, round, and reactive to light.   Neck:      Musculoskeletal: Neck supple.   Cardiovascular:      Rate and Rhythm: Normal rate and regular rhythm.      Pulses: Normal pulses.      Heart sounds: Normal heart sounds.   Pulmonary:      Effort: Pulmonary effort is normal. No respiratory distress.      Breath sounds: Normal breath sounds. No wheezing.   Abdominal:      General: Bowel sounds are normal. There is no distension.      Palpations: Abdomen is soft.      Tenderness: There is no abdominal tenderness.   Musculoskeletal:         General: No swelling.      Comments: Left sided AKA with wound vac in place    Skin:     General: Skin is warm and dry.      Capillary Refill: Capillary refill takes 2 to 3 seconds.   Neurological:      General: No focal deficit present.      Mental Status: He is alert and oriented to person, place, and time. Mental status is at baseline.   Psychiatric:         Mood and Affect: Mood normal.         Behavior: Behavior normal.         Thought Content: Thought content normal.         Judgment: Judgment normal.         Laboratory:  Recent Labs     06/30/20  1222   WBC 7.2   RBC 4.64*   HEMOGLOBIN 15.2   HEMATOCRIT 46.0   MCV 99.1*   MCH 32.8   MCHC 33.0*   RDW 46.5   PLATELETCT 217   MPV 9.9     Recent Labs     06/30/20  1222   SODIUM 141   POTASSIUM 4.3   CHLORIDE 106   CO2 24   GLUCOSE 141*   BUN 21   CREATININE 1.37   CALCIUM 9.2     Recent Labs     06/30/20  1222   ALTSGPT 14   ASTSGOT 27   ALKPHOSPHAT 112*   TBILIRUBIN 0.9   GLUCOSE 141*     Recent Labs     06/30/20  1222   APTT 34.8   INR 1.06     No results for input(s): NTPROBNP in the last 72 hours.      No results for input(s): TROPONINT in the last 72 hours.    Imaging:  DX-CHEST-PORTABLE (1 VIEW)   Final Result      No acute cardiac or pulmonary abnormality is noted.      US-EXTREMITY ARTERY LOWER UNILAT LEFT   Final Result            Assessment/Plan:  Anticipate greater than 2 midnight hospital stay    * Lower limb ischemia- (present on admission)  Assessment & Plan  Found to have left fem pop bypass occlusion   POD 1 from left sided AKA   Vascular surgery consulted   Cont with pain control   Pt/ot eval when appropriate   IVF       PVD (peripheral vascular disease) with claudication (HCC)- (present on admission)  Assessment & Plan  Defer antiplatelet therapy/anticoag to vascular surgery recs    HLD (hyperlipidemia)  Assessment & Plan  Check lipid panel   Started on statin therapy     HTN (hypertension)- (present on admission)  Assessment & Plan  PRN hydralazine ordered to keep SBP <160

## 2020-07-01 NOTE — ANESTHESIA PREPROCEDURE EVALUATION
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)   (+) Generalized atherosclerosis   (+) HTN (hypertension)   (+) Hypertension   (+) Ischemia of extremity   (+) Limb ischemia   (+) Lower limb ischemia   (+) Other specified disorders of arteries and arterioles (HCC)   (+) PVD (peripheral vascular disease) with claudication (HCC)   (+) Peripheral arterial occlusive disease (HCC)   (+) Peripheral artery disease       Physical Exam    Airway   Mallampati: II  TM distance: >3 FB  Neck ROM: full       Cardiovascular - normal exam  Rhythm: regular  Rate: normal  (-) murmur     Dental - normal exam           Pulmonary - normal exam  Breath sounds clear to auscultation     Abdominal    Neurological - normal exam                 Anesthesia Plan    ASA 4- EMERGENT       Plan - general and peripheral nerve block     Peripheral nerve block will be post-op pain control        Induction: intravenous    Postoperative Plan: Postoperative administration of opioids is intended.    Pertinent diagnostic labs and testing reviewed    Informed Consent:    Anesthetic plan and risks discussed with patient.    Use of blood products discussed with: patient whom consented to blood products.

## 2020-07-02 PROCEDURE — 700111 HCHG RX REV CODE 636 W/ 250 OVERRIDE (IP): Performed by: SURGERY

## 2020-07-02 PROCEDURE — 700102 HCHG RX REV CODE 250 W/ 637 OVERRIDE(OP): Performed by: HOSPITALIST

## 2020-07-02 PROCEDURE — A9270 NON-COVERED ITEM OR SERVICE: HCPCS | Performed by: SURGERY

## 2020-07-02 PROCEDURE — 700102 HCHG RX REV CODE 250 W/ 637 OVERRIDE(OP): Performed by: SURGERY

## 2020-07-02 PROCEDURE — 770006 HCHG ROOM/CARE - MED/SURG/GYN SEMI*

## 2020-07-02 PROCEDURE — 97161 PT EVAL LOW COMPLEX 20 MIN: CPT

## 2020-07-02 PROCEDURE — 99232 SBSQ HOSP IP/OBS MODERATE 35: CPT | Performed by: HOSPITALIST

## 2020-07-02 PROCEDURE — A9270 NON-COVERED ITEM OR SERVICE: HCPCS | Performed by: HOSPITALIST

## 2020-07-02 PROCEDURE — 97166 OT EVAL MOD COMPLEX 45 MIN: CPT

## 2020-07-02 PROCEDURE — 700105 HCHG RX REV CODE 258: Performed by: HOSPITALIST

## 2020-07-02 PROCEDURE — 700111 HCHG RX REV CODE 636 W/ 250 OVERRIDE (IP): Performed by: HOSPITALIST

## 2020-07-02 RX ORDER — OXYCODONE HYDROCHLORIDE AND ACETAMINOPHEN 5; 325 MG/1; MG/1
1-2 TABLET ORAL EVERY 6 HOURS PRN
Qty: 40 TAB | Refills: 0 | Status: SHIPPED | OUTPATIENT
Start: 2020-07-02 | End: 2020-07-07

## 2020-07-02 RX ADMIN — KETOROLAC TROMETHAMINE 15 MG: 30 INJECTION, SOLUTION INTRAMUSCULAR at 23:02

## 2020-07-02 RX ADMIN — KETOROLAC TROMETHAMINE 15 MG: 30 INJECTION, SOLUTION INTRAMUSCULAR at 03:04

## 2020-07-02 RX ADMIN — HYDRALAZINE HYDROCHLORIDE 10 MG: 20 INJECTION INTRAMUSCULAR; INTRAVENOUS at 08:27

## 2020-07-02 RX ADMIN — ATORVASTATIN CALCIUM 40 MG: 40 TABLET, FILM COATED ORAL at 17:41

## 2020-07-02 RX ADMIN — OXYCODONE HYDROCHLORIDE 10 MG: 10 TABLET ORAL at 18:15

## 2020-07-02 RX ADMIN — KETOROLAC TROMETHAMINE 15 MG: 30 INJECTION, SOLUTION INTRAMUSCULAR at 08:34

## 2020-07-02 RX ADMIN — ENOXAPARIN SODIUM 40 MG: 40 INJECTION SUBCUTANEOUS at 23:01

## 2020-07-02 RX ADMIN — HYDRALAZINE HYDROCHLORIDE 10 MG: 20 INJECTION INTRAMUSCULAR; INTRAVENOUS at 09:27

## 2020-07-02 RX ADMIN — ACETAMINOPHEN 650 MG: 325 TABLET, FILM COATED ORAL at 12:11

## 2020-07-02 RX ADMIN — KETOROLAC TROMETHAMINE 15 MG: 30 INJECTION, SOLUTION INTRAMUSCULAR at 17:41

## 2020-07-02 RX ADMIN — SODIUM CHLORIDE: 9 INJECTION, SOLUTION INTRAVENOUS at 05:44

## 2020-07-02 ASSESSMENT — COGNITIVE AND FUNCTIONAL STATUS - GENERAL
MOVING TO AND FROM BED TO CHAIR: A LOT
MOVING FROM LYING ON BACK TO SITTING ON SIDE OF FLAT BED: UNABLE
DAILY ACTIVITIY SCORE: 18
DAILY ACTIVITIY SCORE: 19
DRESSING REGULAR UPPER BODY CLOTHING: A LITTLE
DRESSING REGULAR LOWER BODY CLOTHING: A LITTLE
CLIMB 3 TO 5 STEPS WITH RAILING: TOTAL
HELP NEEDED FOR BATHING: A LITTLE
SUGGESTED CMS G CODE MODIFIER DAILY ACTIVITY: CK
SUGGESTED CMS G CODE MODIFIER MOBILITY: CL
DRESSING REGULAR UPPER BODY CLOTHING: A LITTLE
SUGGESTED CMS G CODE MODIFIER DAILY ACTIVITY: CK
STANDING UP FROM CHAIR USING ARMS: A LOT
MOBILITY SCORE: 12
DRESSING REGULAR LOWER BODY CLOTHING: A LITTLE
SUGGESTED CMS G CODE MODIFIER MOBILITY: CL
CLIMB 3 TO 5 STEPS WITH RAILING: TOTAL
TURNING FROM BACK TO SIDE WHILE IN FLAT BAD: A LITTLE
TOILETING: A LOT
WALKING IN HOSPITAL ROOM: TOTAL
MOBILITY SCORE: 10
STANDING UP FROM CHAIR USING ARMS: A LOT
PERSONAL GROOMING: A LITTLE
TURNING FROM BACK TO SIDE WHILE IN FLAT BAD: A LITTLE
MOVING FROM LYING ON BACK TO SITTING ON SIDE OF FLAT BED: A LOT
HELP NEEDED FOR BATHING: A LOT
MOVING TO AND FROM BED TO CHAIR: A LITTLE
WALKING IN HOSPITAL ROOM: TOTAL
TOILETING: A LITTLE

## 2020-07-02 ASSESSMENT — ENCOUNTER SYMPTOMS
MYALGIAS: 0
BRUISES/BLEEDS EASILY: 0
FEVER: 0
DIZZINESS: 0
FALLS: 1
SPEECH CHANGE: 0
FLANK PAIN: 0
HALLUCINATIONS: 0
HEARTBURN: 0
DOUBLE VISION: 0
HEMOPTYSIS: 0
ABDOMINAL PAIN: 0
CHILLS: 0
DEPRESSION: 0
NAUSEA: 0
PALPITATIONS: 0
EYE DISCHARGE: 0
COUGH: 0
BLURRED VISION: 0
WEAKNESS: 0
FOCAL WEAKNESS: 0
VOMITING: 0
SENSORY CHANGE: 0
SHORTNESS OF BREATH: 0

## 2020-07-02 ASSESSMENT — LIFESTYLE VARIABLES: SUBSTANCE_ABUSE: 0

## 2020-07-02 ASSESSMENT — GAIT ASSESSMENTS: GAIT LEVEL OF ASSIST: UNABLE TO PARTICIPATE

## 2020-07-02 ASSESSMENT — ACTIVITIES OF DAILY LIVING (ADL): TOILETING: INDEPENDENT

## 2020-07-02 NOTE — CARE PLAN
Problem: Safety  Goal: Will remain free from falls  Outcome: PROGRESSING AS EXPECTED  Note: Patient uses call light appropriately.     Problem: Pain Management  Goal: Pain level will decrease to patient's comfort goal  Outcome: PROGRESSING AS EXPECTED  Intervention: Educate and implement non-pharmacologic comfort measures. Examples: relaxation, distration, play therapy, activity therapy, massage, etc.  Flowsheets (Taken 7/2/2020 0014)  Intervention: Cold Pack  Note: Offered patient cold pack for any pain and to let us know when he needed it.

## 2020-07-02 NOTE — PROGRESS NOTES
"Patient had unassisted fall at 0640 bed alarm sounded off. VS WNL, Patient is AOx4, no injury noticed. He did not call and stated that \"he forgot he only had one leg\" and then laughed. Physician and family were notified. Pharmacy called and med rec received. Reeducated on the use of the call light and fall precautions when he needs us and how important it is to his safety. Patient verbalized understanding.   "

## 2020-07-02 NOTE — THERAPY
"Occupational Therapy   Initial Evaluation     Patient Name: Jimenez Bains  Age:  74 y.o., Sex:  male  Medical Record #: [id number]  Today's Date: 7/2/2020     Precautions  Precautions: Fall Risk(L AKA 6/30)  Comments: AKA 6/30    Assessment  Patient is 74 y.o. male admitted for worsening LLE pain. Pt has a PMHX of DVT, PVD, HLD and HTN. This admission pt is dx w/lower limb ischemia and required L AKA. Pt is POD#1 and did have a fall this am, pt expresses sadness regarding his situation. Pt is demonstrating newly impaired balance given AKA, and is generally deconditioned. He will benefit from acute OT and will require post acute placement     Plan  Recommend Occupational Therapy 4 times per week until therapy goals are met for the following treatments:  Adaptive Equipment, Manual Therapy Techniques, Neuro Re-Education / Balance, Self Care/Activities of Daily Living, Therapeutic Activities and Therapeutic Exercises.    Discharge recommendations:  Recommend post-acute placement for additional occupational therapy services prior to discharge home.      Subjective    \"I haven't told my family yet what has happened\"     Objective   07/02/20 0929   Prior Living Situation   Housing / Facility Mobile Home   Bathroom Set up Bathtub / Shower Combination;Shower Chair   Equipment Owned Front-Wheel Walker;Crutches;Quad Cane   Lives with - Patient's Self Care Capacity Other (Comments)   Comments pt reports living w/granddaughter but likley won't be able to assist him    Prior Level of ADL Function   Self Feeding Independent   Grooming / Hygiene Independent   Bathing Independent   Dressing Independent   Toileting Independent   Prior Level of IADL Function   Medication Management Independent   Laundry Independent   Kitchen Mobility Independent   Finances Independent   Home Management Independent   Shopping Independent   Prior Level Of Mobility Independent Without Device in Community   Driving / Transportation Driving Independent "   Cognition    Comments cooperative, appears to be struggling w/the emotional aspect of his situation, limited new learning    Strength Upper Body   Gross Strength Generalized Weakness, Equal Bilaterally.    Bed Mobility    Supine to Sit Supervised   Sit to Supine Supervised   Scooting Minimal Assist   ADL Assessment   Grooming Minimal Assist;Seated   Upper Body Dressing Minimal Assist   Lower Body Dressing Minimal Assist   Functional Mobility   Sit to Stand Moderate Assist   Mobility EOB sit>stand then BTB    Activity Tolerance   Comments did have c/o discomfort at surgical site post session    Short Term Goals   Short Term Goal # 1 pt will complete grooming seated w/set up    Short Term Goal # 2 pt will complete txf to BSC w/spv    Short Term Goal # 3 pt will complete LB dressing w/mod I (long sitting in bed)    Anticipated Discharge Equipment   DC Equipment Unable To Determine At This Time

## 2020-07-02 NOTE — FACE TO FACE
Face to Face Note  -  Durable Medical Equipment    Usman Fisher M.D. - NPI: [id number]  I certify that this patient is under my care and that they had a durable medical equipment(DME)face to face encounter by myself that meets the physician DME face-to-face encounter requirements with this patient on:    Date of encounter:   Patient:                    MRN:                       YOB: 2021  Jimenez Bains  [id number]  1945     The encounter with the patient was in whole, or in part, for the following medical condition, which is the primary reason for durable medical equipment:  Post-Op Surgery    I certify that, based on my findings, the following durable medical equipment is medically necessary:  Wheel Chair.    HOME O2 Saturation Measurements:(Values must be present for Home Oxygen orders)         ,     ,         My Clinical findings support the need for the above equipment due to:  Other - new left above-knee amputation    Supporting Symptoms: new amputee, impaired mobility

## 2020-07-02 NOTE — FACE TO FACE
Face to Face Supporting Documentation - Home Health    The encounter with this patient was in whole or in part the primary reason for home health admission.    Date of encounter:   Patient:                    MRN:                       YOB: 2021  Jimenez Bains  [id number]  1945     Home health to see patient for:  Physical Therapy evaluation and treatment    Skilled need for:  Surgical Aftercare physical therpay     Skilled nursing interventions to include:  Comment: PT     Homebound status evidenced by:  Need the aid of supportive devices such as crutches, canes, wheelchairs or walkers. Leaving home requires a considerable and taxing effort. There is a normal inability to leave the home.    Community Physician to provide follow up care: Pcp Pt States None     Optional Interventions? No      I certify the face to face encounter for this home health care referral meets the CMS requirements and the encounter/clinical assessment with the patient was, in whole, or in part, for the medical condition(s) listed above, which is the primary reason for home health care. Based on my clinical findings: the service(s) are medically necessary, support the need for home health care, and the homebound criteria are met.  I certify that this patient has had a face to face encounter by myself.  Adriane Chen M.D. - NPI: [id number]

## 2020-07-02 NOTE — PROGRESS NOTES
"Discharge Instructions    Procedure: Lower Extremity Amputation    1. ACTIVITIES: As tolerated.    2. WOUND: OK to shower and get the stump and bandage wet.  The purple bandage is called a \"Prevena\" bandage and it will be removed in clinic on 7/8/20.    3. PAIN MEDICATION: You will be given a prescription for pain medication at discharge. Please take these as directed. It is important to remember not to take medications on an empty stomach as this may cause nausea. Also, you may get a prescription for a stool softener which you should take as long as you are taking pain medication.    4. BOWEL FUNCTION: After surgery, it is not uncommon for patients to experience constipation. This is due to decreasing activity levels as well as pain medications. You may need to use a laxative (Milk of Magnesia, Ex-lax; Senokot, etc.) if you go more than 1-2 days without a bowel movement.    8 .CALL IF YOU HAVE: (1) Fevers to more than 101.5 F, (2) Unusual or excessive pain, (3) Drainage or fluid from incision that may be foul smelling, increased tenderness or soreness at the wound or the wound edges are no longer together, redness or swelling at the incision site.    If you have any additional questions, please do not hesitate to call the office and speak to either myself or the physician on call.     Office address:   Usman Fisher MD, [facility]  [address] [place] [id number]  [phone]      "

## 2020-07-02 NOTE — DISCHARGE PLANNING
Anticipated Discharge Disposition: Home with HH, 24/7 support from family and Wheelchair     Action: LSW spoke with pt at bedside, along with his daughter, Lien Samaniego. LSW informed pt of what the MD is requested in regards to discharge planning. Pt stated he is going home and will not go to a SNF/Rehab. Lien stated that pt has 4 family members, herself included, that do not work and either live with him or across the street, that can take care of him 24/7.     LSW discussed this with MD. MD agreed for pt to discharge home with HH and 24/7 support.     LSW spoke with pt and Lien to discuss plan. Lien inquired about a wheelchair. Pt stated he may have one at the house. Lien stated she would look but would like to have a referral be sent anyway. Pt does not care which place it is sent as long as Medicare covers it. LSW sent a blank referral to [facility].     Line stated she would like to look into HH agencies before making a decision. Pt agreed that whichever HH agencies Lien chooses are ok with him. Lien stated she would call LSW with her choices. LSW left number at bedside.     Pt does not have a PCP. LSW left a message with scheduling requesting a PCP for pt.     Barriers to Discharge: None    Plan: Awaiting HH choice. Awaiting  acceptance.     Care Transition Team Assessment    Information Source  Orientation : Oriented x 4  Information Given By: Patient, Relative(Child)  Informant's Name: Jimenez  Who is responsible for making decisions for patient? : Patient    Readmission Evaluation  Is this a readmission?: No    Elopement Risk  Legal Hold: No  Ambulatory or Self Mobile in Wheelchair: No-Not an Elopement Risk  Elopement Risk: Not at Risk for Elopement    Interdisciplinary Discharge Planning  Does Admitting Nurse Feel This Could be a Complex Discharge?: No  Lives with - Patient's Self Care Capacity: Other (Comments)(granddaughter)  Patient or legal guardian wants to designate a  caregiver (see row info): No  Support Systems: Family Member(s)  Housing / Facility: Mobile Home  Do You Take your Prescribed Medications Regularly: Yes  Able to Return to Previous ADL's: Future Time w/Therapy  Mobility Issues: Yes  Prior Services: None  Patient Expects to be Discharged to:: home with [facility]  Assistance Needed: No  Durable Medical Equipment: Not Applicable    Discharge Preparedness  What is your plan after discharge?: Home with help, Home health care  What are your discharge supports?: Child, Other (comment)(Cousin, Granddaughter)  Prior Functional Level: Independent with Activities of Daily Living, Independent with Medication Management    Functional Assesment  Prior Functional Level: Independent with Activities of Daily Living, Independent with Medication Management    Finances  Financial Barriers to Discharge: No    Vision / Hearing Impairment  Vision Impairment : No  Hearing Impairment : No    Domestic Abuse  Have you ever been the victim of abuse or violence?: No  Physical Abuse or Sexual Abuse: No  Verbal Abuse or Emotional Abuse: No  Possible Abuse Reported to:: Not Applicable    Psychological Assessment  History of Substance Abuse: None    Discharge Risks or Barriers  Discharge risks or barriers?: No PCP  Patient risk factors: No PCP    Anticipated Discharge Information  Anticipated discharge disposition: Home, HHC, DME  Discharge Address: [address] [place] [address]  Discharge Contact Phone Number: [phone]

## 2020-07-02 NOTE — PROGRESS NOTES
"Bedside report received from Patricia MYERS . Assumed care of pt at 0645 . Pt is awake at this time with no signs of distress. Plan of care discussed with pt. Pt is refusing to allow the  to draw his blood this AM, pt states \"They already have enough of my blood'. Pt was educated extensively about the need to take medications he was prescribed at home. Pt declined despite education. Pt is A&O x 4, not knowing month but knowing year. Pt is on RA. Bed alarm is on, bed in lowest position, bed rails up x 2, belongings and call light within reach. Hourly rounding in place.       Pt was hypertensive with a blood pressure of 170/74. PRN was given - see MAR. Will monitor blood pressure.     0927- 170/80, PRN given again - see MAR  "

## 2020-07-02 NOTE — PROGRESS NOTES
Report received from SHAZIA Willis RN at 1900. Assessment complete. Informed patient of when his next pain medication was, offered ice for his leg if needed. Patient has no other needs at this time. Call light and table within reach. Bed is locked and in lowest position. RN and CNA contact updated. Bed  Alarm on

## 2020-07-02 NOTE — PROGRESS NOTES
"Vascular    CHARLES  Pain controlled  /62   Pulse 97   Temp 37.9 °C (100.2 °F) (Temporal)   Resp 18   Ht 1.676 m (5' 6\")   Wt 72 kg (158 lb 11.7 oz)   SpO2 92%   BMI 25.62 kg/m²   Prevena CDI    WBC 7  Hgb 15    A/P)  6/30/20: Left AKA    Doing well  Home with Prevena once arrangements made  Rx for percocet printed and put in the chart   Weds 7/8    Usman Fisher MD  [facility] (General and Vascular Surgery)  Cell: [phone] (text or call is fine, if you don't reach me please try my office)  Office: [phone]  __________________________________________________________________  Patient:Jimenez Bains   MRN:[id number]   CSN:[id number]    7/2/2020    3:47 PM      "

## 2020-07-02 NOTE — PROGRESS NOTES
[facility] Medicine Daily Progress Note    Date of Service  7/2/2020    Chief Complaint  74 y.o. male admitted 6/30/2020 with Left lower limb ischemia status post AKA.    Hospital Course  74 y.o. male who presented 6/30/2020 with Past medical history of DVT, known peripheral vascular disease who presented to the hospital with acute constant left-sided leg pain.  Started 2 days prior to arrival.  Localized below the knee and worse with any movement.  He also had some mild numbness and tingling to the left lower extremity.  Patient was found to have evidence of arterial occlusion and taken to the operating room for left-sided AKA.    Interval Problem Update  This patient is postop day 2 from left-sided AKA. He did have a ground-level fall this morning.  Secondary to trying to attempt to stand on one leg.  He forgot that he had an AKA.  He had no injuries no loss of consciousness or head trauma.  .    Consultants/Specialty  Vascular surgery    Code Status  Full    Disposition  SNF    Review of Systems  Review of Systems   Constitutional: Positive for malaise/fatigue. Negative for chills and fever.   HENT: Negative for congestion, hearing loss and tinnitus.    Eyes: Negative for blurred vision, double vision and discharge.   Respiratory: Negative for cough, hemoptysis and shortness of breath.    Cardiovascular: Negative for chest pain, palpitations and leg swelling.   Gastrointestinal: Negative for abdominal pain, heartburn, nausea and vomiting.   Genitourinary: Negative for dysuria and flank pain.   Musculoskeletal: Positive for falls and joint pain. Negative for myalgias.   Skin: Negative for rash.   Neurological: Negative for dizziness, sensory change, speech change, focal weakness and weakness.   Endo/Heme/Allergies: Negative for environmental allergies. Does not bruise/bleed easily.   Psychiatric/Behavioral: Negative for depression, hallucinations and substance abuse.        Physical Exam  Temp:  [36.9 °C (98.5  °F)-37.6 °C (99.7 °F)] 37.4 °C (99.3 °F)  Pulse:  [66-87] 66  Resp:  [17-18] 18  BP: (134-171)/(61-86) 170/74  SpO2:  [90 %-92 %] 90 %    Physical Exam  Vitals signs reviewed.   Constitutional:       General: He is not in acute distress.     Appearance: He is ill-appearing.   HENT:      Head: Normocephalic and atraumatic.      Comments: Right sided facial lesion      Nose: No congestion.      Mouth/Throat:      Mouth: Mucous membranes are moist.   Eyes:      Extraocular Movements: Extraocular movements intact.      Pupils: Pupils are equal, round, and reactive to light.   Neck:      Musculoskeletal: Neck supple.   Cardiovascular:      Rate and Rhythm: Normal rate and regular rhythm.      Pulses: Normal pulses.      Heart sounds: Normal heart sounds.   Pulmonary:      Effort: Pulmonary effort is normal. No respiratory distress.      Breath sounds: Normal breath sounds. No wheezing.   Abdominal:      General: Bowel sounds are normal. There is no distension.      Palpations: Abdomen is soft.      Tenderness: There is no abdominal tenderness.   Musculoskeletal:         General: No swelling.      Comments: Left sided AKA, wound vac in place    Skin:     General: Skin is warm and dry.      Capillary Refill: Capillary refill takes less than 2 seconds.   Neurological:      General: No focal deficit present.      Mental Status: He is alert and oriented to person, place, and time. Mental status is at baseline.   Psychiatric:         Mood and Affect: Mood normal.         Behavior: Behavior normal.         Thought Content: Thought content normal.         Judgment: Judgment normal.         Fluids    Intake/Output Summary (Last 24 hours) at 7/2/2020 1024  Last data filed at 7/2/2020 0531  Gross per 24 hour   Intake 1440 ml   Output 600 ml   Net 840 ml       Laboratory  Recent Labs     06/30/20  1222   WBC 7.2   RBC 4.64*   HEMOGLOBIN 15.2   HEMATOCRIT 46.0   MCV 99.1*   MCH 32.8   MCHC 33.0*   RDW 46.5   PLATELETCT 217   MPV 9.9      Recent Labs     06/30/20  1222   SODIUM 141   POTASSIUM 4.3   CHLORIDE 106   CO2 24   GLUCOSE 141*   BUN 21   CREATININE 1.37   CALCIUM 9.2     Recent Labs     06/30/20  1222   APTT 34.8   INR 1.06               Imaging  DX-CHEST-PORTABLE (1 VIEW)   Final Result      No acute cardiac or pulmonary abnormality is noted.      US-EXTREMITY ARTERY LOWER UNILAT LEFT   Final Result           Assessment/Plan  * Lower limb ischemia- (present on admission)  Assessment & Plan  Found to have left fem pop bypass occlusion   POD 2 from left sided AKA   Vascular surgery consulted and OK for discharge from vascular surgery end will need to follow up for removal of dressing 10 days after surgery  Cont with pain control   Pt/ot eval when appropriate   SNF Placement orders placed today       PVD (peripheral vascular disease) with claudication (HCC)- (present on admission)  Assessment & Plan  Defer antiplatelet therapy/anticoag to vascular surgery recs as an outpatient     HLD (hyperlipidemia)  Assessment & Plan  Check lipid panel   Started on statin therapy     HTN (hypertension)- (present on admission)  Assessment & Plan  Uncontrolled PRN hydralazine ordered to keep SBP <160        VTE prophylaxis: lovenox

## 2020-07-03 VITALS
OXYGEN SATURATION: 94 % | WEIGHT: 158.73 LBS | TEMPERATURE: 99.8 F | BODY MASS INDEX: 25.51 KG/M2 | RESPIRATION RATE: 18 BRPM | DIASTOLIC BLOOD PRESSURE: 65 MMHG | HEART RATE: 80 BPM | HEIGHT: 66 IN | SYSTOLIC BLOOD PRESSURE: 168 MMHG

## 2020-07-03 PROCEDURE — 97535 SELF CARE MNGMENT TRAINING: CPT

## 2020-07-03 PROCEDURE — 97530 THERAPEUTIC ACTIVITIES: CPT

## 2020-07-03 PROCEDURE — A9270 NON-COVERED ITEM OR SERVICE: HCPCS | Performed by: SURGERY

## 2020-07-03 PROCEDURE — 99239 HOSP IP/OBS DSCHRG MGMT >30: CPT | Performed by: HOSPITALIST

## 2020-07-03 PROCEDURE — 97116 GAIT TRAINING THERAPY: CPT

## 2020-07-03 PROCEDURE — 700111 HCHG RX REV CODE 636 W/ 250 OVERRIDE (IP): Performed by: SURGERY

## 2020-07-03 PROCEDURE — 700102 HCHG RX REV CODE 250 W/ 637 OVERRIDE(OP): Performed by: SURGERY

## 2020-07-03 RX ORDER — ATORVASTATIN CALCIUM 40 MG/1
40 TABLET, FILM COATED ORAL EVERY EVENING
Qty: 30 TAB | Refills: 0 | Status: SHIPPED | OUTPATIENT
Start: 2020-07-03 | End: 2020-07-18

## 2020-07-03 RX ADMIN — KETOROLAC TROMETHAMINE 15 MG: 30 INJECTION, SOLUTION INTRAMUSCULAR at 13:23

## 2020-07-03 RX ADMIN — KETOROLAC TROMETHAMINE 15 MG: 30 INJECTION, SOLUTION INTRAMUSCULAR at 05:57

## 2020-07-03 RX ADMIN — OXYCODONE 5 MG: 5 TABLET ORAL at 09:18

## 2020-07-03 RX ADMIN — ACETAMINOPHEN 650 MG: 325 TABLET, FILM COATED ORAL at 13:22

## 2020-07-03 ASSESSMENT — GAIT ASSESSMENTS
ASSISTIVE DEVICE: FRONT WHEEL WALKER
GAIT LEVEL OF ASSIST: MINIMAL ASSIST
DISTANCE (FEET): 12

## 2020-07-03 ASSESSMENT — COGNITIVE AND FUNCTIONAL STATUS - GENERAL
PERSONAL GROOMING: A LITTLE
MOVING TO AND FROM BED TO CHAIR: A LITTLE
MOVING FROM LYING ON BACK TO SITTING ON SIDE OF FLAT BED: A LITTLE
TOILETING: A LITTLE
DAILY ACTIVITIY SCORE: 17
MOBILITY SCORE: 17
SUGGESTED CMS G CODE MODIFIER MOBILITY: CK
SUGGESTED CMS G CODE MODIFIER DAILY ACTIVITY: CK
STANDING UP FROM CHAIR USING ARMS: A LITTLE
DRESSING REGULAR LOWER BODY CLOTHING: A LOT
CLIMB 3 TO 5 STEPS WITH RAILING: A LOT
TURNING FROM BACK TO SIDE WHILE IN FLAT BAD: A LITTLE
DRESSING REGULAR UPPER BODY CLOTHING: A LITTLE
HELP NEEDED FOR BATHING: A LOT
WALKING IN HOSPITAL ROOM: A LITTLE

## 2020-07-03 NOTE — DISCHARGE PLANNING
Agency/Facility Name: [facility]  Spoke To: Lida  Outcome: Per Lida, referral is accepted. Start of care will be on Sunday 7/5 or Monday 7/6. Lida will establish PCP for pt. Per Lida, she spoke with pt dtr who was agreeable.     LSW notified.

## 2020-07-03 NOTE — DISCHARGE PLANNING
Care Transition Team Discharge Planning    Anticipated Discharge Disposition: d/c home w/ HH and w/c w/ familyi    Action: Lsw spoke to dtr Lien. She will accept a referral to [facility]. She understands pt will have to have an established PCP to qualify for HH except for [facility] who has an MD on staff.    Lien has her dtr looking for a w/c at local pharmacies as [facility] is not open today and they do not have a w/c at home. Pt has an electric scooter, but it is too big to safely move around in their home.     Dtr is aware pt can d/c once they have a w/c and HH has accepted pt.    Lsw faxed choice for [facility] to [facility].     Barriers to Discharge: see above    Plan: f/u w/ [facility], medical team, etc

## 2020-07-03 NOTE — DISCHARGE PLANNING
[facility]  Outcome: Per voice recording [facility] office is closed today.

## 2020-07-03 NOTE — DISCHARGE PLANNING
Received Choice form at 0800  Agency[facility]Facility Name: [facility]   Referral sent per [facility] form @ 0844   For DME Wheelchair

## 2020-07-03 NOTE — DISCHARGE PLANNING
Care Transition Team Discharge Planning    Anticipated Discharge Disposition: d/c home w/ HH and w/c    Action: Lsw spoke to SAMIRA Rajan. He indicates therapy just assessed pt and he is not going to be able to traverse the 5 steps at his home. He has no ramp.    Lsw called the family member who indicated he and family are prepared and able to assist pt up the steps w/o a ramp at this time. They have a plan for a ramp to be built here shortly.    Lsw provided the 2 local resources to have ramps built w/ obinna support for low income individuals.    [facility] indicates [employer] has dropped off the w/c in the room.    RN states he sees the w/c now. He will call the family and advise they can p/u pt today as HH will see pt either Sunday or Monday coming up. RN is aware family is searching for a w/c at local pharmacies. He will advise them to stop looking as his INS has covered the cost.      Barriers to Discharge: none    Plan: pt to d/c home today w/ family and all needed supplies/services

## 2020-07-03 NOTE — DISCHARGE SUMMARY
Discharge Summary    CHIEF COMPLAINT ON ADMISSION  Chief Complaint   Patient presents with   • Leg Pain       Reason for Admission  Leg Pain     Admission Date  6/30/2020    CODE STATUS  Full Code    HPI & HOSPITAL COURSE  This is a 74 y.o. male here with 74 y.o. male who presented 6/30/2020 with Past medical history of DVT, known peripheral vascular disease who presented to the hospital with acute constant left-sided leg pain.  Started 2 days prior to arrival.  Localized below the knee and worse with any movement.  He also had some mild numbness and tingling to the left lower extremity.  Patient was found to have evidence of arterial occlusion and taken to the operating room for left-sided AKA.Patient status post left-sided AKA.  Follow-up with Dr. Fisher vascular surgery as instructed.  Will go home with home health per his request and family's request.       Therefore, he is discharged in good and stable condition to home with organized home healthcare and close outpatient follow-up.    The patient met 2-midnight criteria for an inpatient stay at the time of discharge.    Discharge Date  7/3/2020    FOLLOW UP ITEMS POST DISCHARGE  Follow up with Dr. Fisher     DISCHARGE DIAGNOSES  Principal Problem (Resolved):    Lower limb ischemia POA: Yes  Active Problems:    PVD (peripheral vascular disease) with claudication (HCC) (Chronic) POA: Yes    HTN (hypertension) POA: Yes    HLD (hyperlipidemia) POA: Unknown      FOLLOW UP  No future appointments.  Usman Fisher M.D.  [address] [place] [address] [address]  [facility] [id number]  [phone]    Schedule an appointment as soon as possible for a visit on 7/8/2020  For Progress Check      MEDICATIONS ON DISCHARGE     Medication List      START taking these medications      Instructions   atorvastatin 40 MG Tabs  Commonly known as:  LIPITOR   Take 1 Tab by mouth every evening.  Dose:  40 mg     oxyCODONE-acetaminophen 5-325 MG Tabs  Commonly known as:  PERCOCET   Take 1-2 Tabs  by mouth every 6 hours as needed for Moderate Pain or Severe Pain (1 tab for moderate, 2 tabs for severe) for up to 5 days.  Dose:  1-2 Tab            Allergies  Allergies   Allergen Reactions   • No Known Drug Allergy        DIET  Orders Placed This Encounter   Procedures   • Diet Order Regular     Standing Status:   Standing     Number of Occurrences:   1     Order Specific Question:   Diet:     Answer:   Regular [1]       ACTIVITY  As tolerated.  Weight bearing as tolerated    CONSULTATIONS  Dr. Fisher     PROCEDURES  Date of Service:          6/30/2020     Patient Name:             Jimenez Bains     Patient MRN:              [id number]     --------------------------------------------------------------------------------------------------     Preoperative Diagnosis:  - Critical ischemia of the left lower extremity with paralysis due to unreconstructable peripheral arterial occlusive disease     Postoperative Diagnosis:  - Critical ischemia of the left lower extremity with paralysis due to unreconstructable peripheral arterial occlusive disease     Procedure:  1) left above knee amputation    LABORATORY  Lab Results   Component Value Date    SODIUM 141 06/30/2020    POTASSIUM 4.3 06/30/2020    CHLORIDE 106 06/30/2020    CO2 24 06/30/2020    GLUCOSE 141 (H) 06/30/2020    BUN 21 06/30/2020    CREATININE 1.37 06/30/2020        Lab Results   Component Value Date    WBC 7.2 06/30/2020    HEMOGLOBIN 15.2 06/30/2020    HEMATOCRIT 46.0 06/30/2020    PLATELETCT 217 06/30/2020        Total time of the discharge process exceeds 33 minutes.

## 2020-07-03 NOTE — CARE PLAN
Problem: Communication  Goal: The ability to communicate needs accurately and effectively will improve  Outcome: PROGRESSING AS EXPECTED   Pt uses call light appropriately and communicates needs effectively  Problem: Safety  Goal: Will remain free from injury  Outcome: PROGRESSING AS EXPECTED     Problem: Knowledge Deficit  Goal: Knowledge of disease process/condition, treatment plan, diagnostic tests, and medications will improve  Outcome: PROGRESSING AS EXPECTED

## 2020-07-03 NOTE — CARE PLAN
"  Problem: Communication  Goal: The ability to communicate needs accurately and effectively will improve  Outcome: PROGRESSING SLOWER THAN EXPECTED  Intervention: [place] patient and significant other/support system to call light to alert staff of needs  Note: Patient was reeducated about the importance of using the call light to call us for assistance with any needs. Reoriented to call light and bed alarm is on and locked in lowest position.     Problem: Knowledge Deficit  Goal: Knowledge of disease process/condition, treatment plan, diagnostic tests, and medications will improve  Outcome: PROGRESSING SLOWER THAN EXPECTED  Note: Reassured patient the need to have daily labs drawn in order to accurately provide care. Stated \"I am in my 70's\" and that it did not matter at this point. Will continue to educate and reassure the needs for diagnostic test.      "

## 2020-07-03 NOTE — PROGRESS NOTES
Report received from PAUL Sutton RN at 1900 . Assumed care, assessment complete. Pt is A & O x 4 although he is slow to respond to questions about orientation.  Pt complains of mild pain, pain medication not due per MAR. Fall precautions and appropriate signs in place. Reeducated on the importance of the use of the call light for assistance. Pt oriented to call light, CNA and RN extension number provided.  Bed alarm in use and lowest position. Pt denies any additional needs at this time. Call light and table within reach.

## 2020-07-03 NOTE — THERAPY
"PT contact note     Patient Name: Jimenez Bains  Age:  74 y.o., Sex:  male  Medical Record #: [id number]  Today's Date: 7/3/2020    Discussed with RN:       07/03/20 2580   Interdisciplinary Plan of Care Collaboration   IDT Collaboration with  Nursing   Collaboration Comments Pt is found in bed, falling asleep during conversation. Per chart, pt is to be discharged to home today with HH and w/c, no mention of ramp in dc plan. Pt was asked how he plans to get up the 5 stairs into home. Pt reports that he will \"climb them\". Pt was educated that it would be more like \"hopping\" if he does not have a ramp and this am, pt was too tired after 12 feet of ambulation to attempt stairs. Pt reports plan for ramp eventually, but will not be done today. Ed done re option for family to tilt w/c backwards and bump w/c up backwards, but pt was unable to name a family member who could help do this. If not, pt was educated about sit on second step and scoot up in sitting. Pt falling asleep during this part of conversation. Pt is unsafe to attempt stairs and will need a safe d/c plan to enter his home. Nsg updated and social work will call family.      "

## 2020-07-03 NOTE — DISCHARGE PLANNING
Agency/Facility Name: [facility]   Spoke To: Lorin (Answering Service)   Outcome: Per Lorin, will let Wiliam know to call [facility] regarding referral. [facility] provided contact information.

## 2020-07-03 NOTE — DISCHARGE INSTRUCTIONS
"Discharge Instructions     Procedure: Lower Extremity Amputation     1. ACTIVITIES: As tolerated.     2. WOUND: OK to shower and get the stump and bandage wet.  The purple bandage is called a \"Prevena\" bandage and it will be removed in clinic on 7/8/20.     3. PAIN MEDICATION: You will be given a prescription for pain medication at discharge. Please take these as directed. It is important to remember not to take medications on an empty stomach as this may cause nausea. Also, you may get a prescription for a stool softener which you should take as long as you are taking pain medication.     4. BOWEL FUNCTION: After surgery, it is not uncommon for patients to experience constipation. This is due to decreasing activity levels as well as pain medications. You may need to use a laxative (Milk of Magnesia, Ex-lax; Senokot, etc.) if you go more than 1-2 days without a bowel movement.     8 .CALL IF YOU HAVE: (1) Fevers to more than 101.5 F, (2) Unusual or excessive pain, (3) Drainage or fluid from incision that may be foul smelling, increased tenderness or soreness at the wound or the wound edges are no longer together, redness or swelling at the incision site.     If you have any additional questions, please do not hesitate to call the office and speak to either myself or the physician on call.      Office address:   Usman Fisher MD, [facility]  [address] [place] [id number]  [phone]     Discharge Instructions    Discharged to home by car with relative. Discharged via wheelchair, hospital escort: Yes.  Special equipment needed: Wheelchair    Be sure to schedule a follow-up appointment with your primary care doctor or any specialists as instructed.     Discharge Plan:   Diet Plan: Discussed  Activity Level: Discussed  Smoking Cessation Offered: Patient Refused  Confirmed Follow up Appointment: Patient to Call and Schedule Appointment  Confirmed Symptoms Management: Discussed  Medication " Reconciliation Updated: Yes    I understand that a diet low in cholesterol, fat, and sodium is recommended for good health. Unless I have been given specific instructions below for another diet, I accept this instruction as my diet prescription.   Other diet: N/A    Special Instructions: None    · Is patient discharged on Warfarin / Coumadin?   No     Depression / Suicide Risk    As you are discharged from this [facility] facility, it is important to learn how to keep safe from harming yourself.    Recognize the warning signs:  · Abrupt changes in personality, positive or negative- including increase in energy   · Giving away possessions  · Change in eating patterns- significant weight changes-  positive or negative  · Change in sleeping patterns- unable to sleep or sleeping all the time   · Unwillingness or inability to communicate  · Depression  · Unusual sadness, discouragement and loneliness  · Talk of wanting to die  · Neglect of personal appearance   · Rebelliousness- reckless behavior  · Withdrawal from people/activities they love  · Confusion- inability to concentrate     If you or a loved one observes any of these behaviors or has concerns about self-harm, here's what you can do:  · Talk about it- your feelings and reasons for harming yourself  · Remove any means that you might use to hurt yourself (examples: pills, rope, extension cords, firearm)  · Get professional help from the community (Mental Health, Substance Abuse, psychological counseling)  · Do not be alone:Call your Safe Contact- someone whom you trust who will be there for you.  · Call your local CRISIS HOTLINE 875-9107 or 825-455-9034  · Call your local Children's Mobile Crisis Response Team [place] (723) 990-6185 or www.GroundLink  · Call the toll free National Suicide Prevention Hotlines   · National Suicide Prevention Lifeline 123-529-ZEDA (7735)  · National Hope Line Network 800-SUICIDE (137-7351)      COVID-19  COVID-19 is a  respiratory infection that is caused by a virus called severe acute respiratory syndrome coronavirus 2 (SARS-CoV-2). The disease is also known as coronavirus disease or novel coronavirus. In some people, the virus may not cause any symptoms. In others, it may cause a serious infection. The infection can get worse quickly and can lead to complications, such as:  · Pneumonia, or infection of the lungs.  · Acute respiratory distress syndrome or ARDS. This is fluid build-up in the lungs.  · Acute respiratory failure. This is a condition in which there is not enough oxygen passing from the lungs to the body.  · Sepsis or septic shock. This is a serious bodily reaction to an infection.  · Blood clotting problems.  · Secondary infections due to bacteria or fungus.  The virus that causes COVID-19 is contagious. This means that it can spread from person to person through droplets from coughs and sneezes (respiratory secretions).  What are the causes?  This illness is caused by a virus. You may catch the virus by:  · Breathing in droplets from an infected person's cough or sneeze.  · Touching something, like a table or a doorknob, that was exposed to the virus (contaminated) and then touching your mouth, nose, or eyes.  What increases the risk?  Risk for infection  You are more likely to be infected with this virus if you:  · Live in or travel to an area with a COVID-19 outbreak.  · Come in contact with a sick person who recently traveled to an area with a COVID-19 outbreak.  · Provide care for or live with a person who is infected with COVID-19.  Risk for serious illness  You are more likely to become seriously ill from the virus if you:  · Are 65 years of age or older.  · Have a long-term disease that lowers your body's ability to fight infection (immunocompromised).  · Live in a nursing home or long-term care facility.  · Have a long-term (chronic) disease such as:  ? Chronic lung disease, including chronic obstructive  pulmonary disease or asthma  ? Heart disease.  ? Diabetes.  ? Chronic kidney disease.  ? Liver disease.  · Are obese.  What are the signs or symptoms?  Symptoms of this condition can range from mild to severe. Symptoms may appear any time from 2 to 14 days after being exposed to the virus. They include:  · A fever.  · A cough.  · Difficulty breathing.  · Chills.  · Muscle pains.  · A sore throat.  · Loss of taste or smell.  Some people may also have stomach problems, such as nausea, vomiting, or diarrhea.  Other people may not have any symptoms of COVID-19.  How is this diagnosed?  This condition may be diagnosed based on:  · Your signs and symptoms, especially if:  ? You live in an area with a COVID-19 outbreak.  ? You recently traveled to or from an area where the virus is common.  ? You provide care for or live with a person who was diagnosed with COVID-19.  · A physical exam.  · Lab tests, which may include:  ? A nasal swab to take a sample of fluid from your nose.  ? A throat swab to take a sample of fluid from your throat.  ? A sample of mucus from your lungs (sputum).  ? Blood tests.  · Imaging tests, which may include, X-rays, CT scan, or ultrasound.  How is this treated?  At present, there is no medicine to treat COVID-19. Medicines that treat other diseases are being used on a trial basis to see if they are effective against COVID-19.  Your health care provider will talk with you about ways to treat your symptoms. For most people, the infection is mild and can be managed at home with rest, fluids, and over-the-counter medicines.  Treatment for a serious infection usually takes places in a hospital intensive care unit (ICU). It may include one or more of the following treatments. These treatments are given until your symptoms improve.  · Receiving fluids and medicines through an IV.  · Supplemental oxygen. Extra oxygen is given through a tube in the nose, a face mask, or a salazar.  · Positioning you to lie on  your stomach (prone position). This makes it easier for oxygen to get into the lungs.  · Continuous positive airway pressure (CPAP) or bi-level positive airway pressure (BPAP) machine. This treatment uses mild air pressure to keep the airways open. A tube that is connected to a motor delivers oxygen to the body.  · Ventilator. This treatment moves air into and out of the lungs by using a tube that is placed in your windpipe.  · Tracheostomy. This is a procedure to create a hole in the neck so that a breathing tube can be inserted.  · Extracorporeal membrane oxygenation (ECMO). This procedure gives the lungs a chance to recover by taking over the functions of the heart and lungs. It supplies oxygen to the body and removes carbon dioxide.  Follow these instructions at home:  Lifestyle  · If you are sick, stay home except to get medical care. Your health care provider will tell you how long to stay home. Call your health care provider before you go for medical care.  · Rest at home as told by your health care provider.  · Do not use any products that contain nicotine or tobacco, such as cigarettes, e-cigarettes, and chewing tobacco. If you need help quitting, ask your health care provider.  · Return to your normal activities as told by your health care provider. Ask your health care provider what activities are safe for you.  General instructions  · Take over-the-counter and prescription medicines only as told by your health care provider.  · Drink enough fluid to keep your urine pale yellow.  · Keep all follow-up visits as told by your health care provider. This is important.  How is this prevented?    There is no vaccine to help prevent COVID-19 infection. However, there are steps you can take to protect yourself and others from this virus.  To protect yourself:   · Do not travel to areas where COVID-19 is a risk. The areas where COVID-19 is reported change often. To identify high-risk areas and travel restrictions,  check the [facility] travel website: wwwnc.cdc.gov/travel/notices  · If you live in, or must travel to, an area where COVID-19 is a risk, take precautions to avoid infection.  ? Stay away from people who are sick.  ? Wash your hands often with soap and water for 20 seconds. If soap and water are not available, use an alcohol-based hand .  ? Avoid touching your mouth, face, eyes, or nose.  ? Avoid going out in public, follow guidance from your state and local health authorities.  ? If you must go out in public, wear a cloth face covering or face mask.  ? Disinfect objects and surfaces that are frequently touched every day. This may include:  § Counters and tables.  § Doorknobs and light switches.  § Sinks and faucets.  § Electronics, such as phones, remote controls, keyboards, computers, and tablets.  To protect others:  If you have symptoms of COVID-19, take steps to prevent the virus from spreading to others.  · If you think you have a COVID-19 infection, contact your health care provider right away. Tell your health care team that you think you may have a COVID-19 infection.  · Stay home. Leave your house only to seek medical care. Do not use public transport.  · Do not travel while you are sick.  · Wash your hands often with soap and water for 20 seconds. If soap and water are not available, use alcohol-based hand .  · Stay away from other members of your household. Let healthy household members care for children and pets, if possible. If you have to care for children or pets, wash your hands often and wear a mask. If possible, stay in your own room, separate from others. Use a different bathroom.  · Make sure that all people in your household wash their hands well and often.  · Cough or sneeze into a tissue or your sleeve or elbow. Do not cough or sneeze into your hand or into the air.  · Wear a cloth face covering or face mask.  Where to find more information  · Centers for Disease Control and  Prevention: www.cdc.gov/coronavirus/2019-ncov/index.html  · World Health Organization: www.who.int/health-topics/coronavirus  Contact a health care provider if:  · You live in or have traveled to an area where COVID-19 is a risk and you have symptoms of the infection.  · You have had contact with someone who has COVID-19 and you have symptoms of the infection.  Get help right away if:  · You have trouble breathing.  · You have pain or pressure in your chest.  · You have confusion.  · You have bluish lips and fingernails.  · You have difficulty waking from sleep.  · You have symptoms that get worse.  These symptoms may represent a serious problem that is an emergency. Do not wait to see if the symptoms will go away. Get medical help right away. Call your local emergency services (911 in the [place] Do not drive yourself to the hospital. Let the emergency medical personnel know if you think you have COVID-19.  Summary  · COVID-19 is a respiratory infection that is caused by a virus. It is also known as coronavirus disease or novel coronavirus. It can cause serious infections, such as pneumonia, acute respiratory distress syndrome, acute respiratory failure, or sepsis.  · The virus that causes COVID-19 is contagious. This means that it can spread from person to person through droplets from coughs and sneezes.  · You are more likely to develop a serious illness if you are 65 years of age or older, have a weak immunity, live in a nursing home, or have chronic disease.  · There is no medicine to treat COVID-19. Your health care provider will talk with you about ways to treat your symptoms.  · Take steps to protect yourself and others from infection. Wash your hands often and disinfect objects and surfaces that are frequently touched every day. Stay away from people who are sick and wear a mask if you are sick.  This information is not intended to replace advice given to you by your health care provider. Make sure you  discuss any questions you have with your health care provider.  Document Released: 01/23/2020 Document Revised: 05/14/2020 Document Reviewed: 01/23/2020  Elsevier Patient Education © 2020 [employer]

## 2020-07-03 NOTE — PROGRESS NOTES
Assumed care at 0700, report received from NOC RN.  Pt A&O x 4, states pain is 7/10. Will continue to monitor. Bed locked, 2 rails up, bed in lowest position. Call light in place, belongings at bedside, no needs at this time and hourly rounding in place.

## 2020-07-03 NOTE — DISCHARGE PLANNING
Care Transition Team Discharge Planning    Anticipated Discharge Disposition: d/c home w/ HH and w/c    Action: Lsw conducted chart review. Lsw spoke to [facility]. Lsw called pt's dtrLien, and spoke to family member on house phone. Family accepted Lsw's contact information and will have Lien contact Lsw to discuss HH and Advanced agency being able to accept pt w/o an established PCP while other agencies would require pt see and establish w/ a PCP first before they would be able to visit.     Barriers to Discharge: see above    Plan: f/u w/ JESIKA Emmanuel, medical team

## 2020-07-03 NOTE — THERAPY
"Occupational Therapy  Daily Treatment     Patient Name: Jimenez Bains  Age:  74 y.o., Sex:  male  Medical Record #: [id number]  Today's Date: 7/3/2020     Precautions  Precautions: Fall Risk  Comments:  AKA 6/30    Assessment  Pt seen for OT tx, pt is motivated for activity, but does appear to lack insight into his deficits. On going phantom pains w/AKA, impaired balance and strength impacting ADL's.     Plan  Continue current treatment plan.    Discharge recommendations:  Recommend post-acute placement for additional occupational therapy services prior to discharge home- Would be best served for optimal recovery and maximum level of independence to receive post acute services, however if pt has 24/7 assist at d/c and someone who is currently able to physically assist pt w/txfs than may be able to d/c home w/HH needs a ramp at this time to enter home.       Subjective  \"If I have the WC I can get out of here\"     Objective   07/03/20 0829   Cognition    Safety Awareness Impaired;Impulsive   Comments cooperative, wanting to d/c home, not aware of new deficits    Bed Mobility    Supine to Sit Supervised   Skilled Intervention Compensatory Strategies;Facilitation;Verbal Cuing   Activities of Daily Living   Grooming Supervision;Seated   Upper Body Dressing Minimal Assist   Lower Body Dressing Maximal Assist   Skilled Intervention Verbal Cuing;Tactile Cuing;Facilitation;Compensatory Strategies   Comments attempted to have pt don LB clothing in long sitting, limited ROM, max w/donning EOB    Functional Mobility   Sit to Stand Moderate Assist   Bed, Chair, Wheelchair Transfer Maximal Assist   Mobility EOB sit>stand x2 then txf to WC    Skilled Intervention Facilitation;Verbal Cuing   Short Term Goals   Short Term Goal # 1 pt will complete grooming seated w/set up    Goal Outcome # 1 Progressing slower than expected   Short Term Goal # 2 pt will complete txf to BSC w/spv    Goal Outcome # 2 Goal not met   Short Term Goal # " 3 pt will complete LB dressing w/mod I (long sitting in bed)    Goal Outcome # 3 Goal not met   Anticipated Discharge Equipment   DC Equipment Wheelchair;Ramp   Session Information   Date / Session Number  7/3 #2 (2/4, 7/8)

## 2020-07-03 NOTE — DISCHARGE PLANNING
Care Transition Team Discharge Planning    Anticipated Discharge Disposition: d/c home w/ family, HH, DME-w/c    Action: Lsw spoke to MD who requested f/u regarding HH referral.    Lsw explained [facility] is only agency to immediately assist pt following d/c as they do not require a PCP. Lsw is waiting to hear back from pt's dtr, Lien, who is reportedly at the vet office.    Barriers to Discharge: see above    Plan: f/u w/ family, [facility], medical team

## 2020-07-03 NOTE — DISCHARGE PLANNING
Agency/Facility Name: [facility]  Spoke To: Camden  Outcome:  Per Camden, wheelchair was delivered to pt.

## 2020-07-03 NOTE — DISCHARGE PLANNING
Received Choice form at 2422  Agency/Facility Name: [facility]   Referral sent per Choice form @ [id number]

## 2020-07-03 NOTE — DISCHARGE PLANNING
Agency/Facility Name: [facility]   Spoke To: Lida   Outcome: [facility] faxed discharge summary per request to fax # [phone]. Notified Lida pt is discharging today.

## 2020-07-03 NOTE — DISCHARGE PLANNING
Agency/Facility Name: [facility]   Spoke To: Wiliam  Outcome: Per Wiliam, referral was not received. [facility] manually faxed referral per request to fax # [phone].

## 2020-07-15 NOTE — DOCUMENTATION QUERY
[facility]                                                                       Query Response Note      PATIENT:               ASHLI HONG  ACCT #:                  [id number]  MRN:                     [id number]  :                      1945  ADMIT DATE:       2020 11:24 AM  DISCH DATE:        7/3/2020 4:10 PM  RESPONDING  PROVIDER #:        [id number]           QUERY TEXT:    Patient underwent an AKA on ,The level of amputation is not documented in the Operative Report.  An ICD 10 procedure code cannot be assigned.  Please specify the level of amputation.    NOTE:  If an appropriate response is not listed below, please respond with a new note.      The patient's Clinical Indicators include:  op report -Incision for the stump was marked and then the skin was incised sharply and then a combination of sharp dissection and electrocautery was used to dissect the soft tissues circumferentially around the femur.  Larger vessels were suture ligated as needed.  The femur was transected with the oscillating saw.  The anterior edge of the femur was beveled.  Options provided:   -- Low   -- Medium   -- High   -- Complete   -- Unable to determine      Query created by: Olga Lidia Sharma on 7/10/2020 3:15 PM    RESPONSE TEXT:    Low          Electronically signed by:  SYBIL PHILLIPS MD 7/15/2020 12:48 PM

## 2020-07-18 ENCOUNTER — HOSPITAL ENCOUNTER (EMERGENCY)
Facility: MEDICAL CENTER | Age: 75
End: 2020-07-18
Attending: EMERGENCY MEDICINE
Payer: MEDICARE

## 2020-07-18 VITALS
WEIGHT: 154.76 LBS | BODY MASS INDEX: 24.98 KG/M2 | SYSTOLIC BLOOD PRESSURE: 141 MMHG | HEART RATE: 88 BPM | DIASTOLIC BLOOD PRESSURE: 70 MMHG | RESPIRATION RATE: 16 BRPM | TEMPERATURE: 98.6 F | OXYGEN SATURATION: 92 %

## 2020-07-18 DIAGNOSIS — Z51.89 ENCOUNTER FOR WOUND RE-CHECK: ICD-10-CM

## 2020-07-18 PROCEDURE — 700101 HCHG RX REV CODE 250: Performed by: EMERGENCY MEDICINE

## 2020-07-18 PROCEDURE — 99282 EMERGENCY DEPT VISIT SF MDM: CPT

## 2020-07-18 RX ADMIN — MICROFIBRILLAR COLLAGEN HEMOSTAT POWDER: POWDER at 20:30

## 2020-07-18 ASSESSMENT — FIBROSIS 4 INDEX: FIB4 SCORE: 2.49

## 2020-07-19 NOTE — ED TRIAGE NOTES
Chief Complaint   Patient presents with   • Wound Check     pt had AKA done on 6/30. incision well approximated. staples in place. pt reports bleeding that began yesterday to surgical site that stopped just prior to coming to ED.        Pt to triage for above complaint. Had left AKA performed on 6/30 2/2 arterial occlusion. Staples in place to surgical side. Incision well approximated. slight redness noted to area. Pt reports pain 6/10 to surgical site. No bleeding noted in traige    Pt/staff masked and in appropriate PPE during encounter.  Pt denies fever/travel or being in contact with anyone testing positive for COVID/Corona.  Explained to pt triage process, made pt aware to tell this RN/staff of any changes/concerns, pt verbalized understanding of process and instructions given. Pt to ER lobby.

## 2020-07-19 NOTE — DISCHARGE INSTRUCTIONS
Call your surgeon on Monday to notify him you are in the emergency department after your fall.  If the wound continues to bleed, you develop redness or a fever or you have worsening pain return for a recheck.

## 2020-07-19 NOTE — ED PROVIDER NOTES
ED Provider Note    Scribed for Tierney Ludwig M.D. by Carline Obregon. 7/18/2020, 7:59 PM.    Primary care provider: Pcp Pt States None  Means of arrival: wheel chair  History obtained from: patient  History limited by: none    CHIEF COMPLAINT  Chief Complaint   Patient presents with   • Wound Check     pt had AKA done on 6/30. incision well approximated. staples in place. pt reports bleeding that began yesterday to surgical site that stopped just prior to coming to ED.        HPI  Jimenez Bains is a 74 y.o. male who presents to the Emergency Department for evaluation of a surgical site along his left AKA stump. Patient underwent this AKA 18 days ago on 6/30/2020, completed by Dr. Fisher, Surgery, needed secondary to an arterial occlusion. He followed up with Dr. Fisher's office yesterday to have the surgical staples removed yesterday, however, once the surgeon evaluated the stump, it was advised that they remain in place for a few more days. Yesterday evening, after that follow up the patient lost his balance and fell to the ground. The surgical site began to bleed soaking through bandages, and continued to bleed at an amount that concerned him and his family members. This bleeding discontinued just prior to arrival to the ED today. Patient is not currently on any blood thinners. No complaints of fever, purulent drainage from the stump, increased pain or swelling to the site. Patient is still experiencing post op pain along his stump, however, this is managed with prescribed pain medication.    PPE Note: I personally donned full PPE for all patient encounters during this visit, including with an N95 respirator mask, gloves, gown, and goggles.     Scribe remained outside the patient's room and did not have any contact with the patient for the duration of patient encounter.     REVIEW OF SYSTEMS  Pertinent positives include regular post op AKA pain, fall, bleeding from AKA. Pertinent negatives include no fever,  purulent drainage from the stump, increased pain or swelling to the site.    PAST MEDICAL HISTORY   has a past medical history of Cancer (HCC), Hypertension, Pain (14), Pain (3/14/14), Personal history of venous thrombosis and embolism, PVD (peripheral vascular disease) ([facility]), and Unspecified hemorrhagic conditions.    SURGICAL HISTORY   has a past surgical history that includes femoral popliteal bypass (10/18/2011); aortogram with runoff (2013); iliac angioplasty with stent (2013); toe amputation (2013); femoral femoral bypass (2013); femoral femoral bypass (2014); groin exploration (3/17/2014); angiogram (2014); femoral popliteal bypass (2014); reimplantion revascularization (2014); and knee amputation above (Left, 2020).    SOCIAL HISTORY  Social History     Tobacco Use   • Smoking status: Former Smoker     Packs/day: 0.50     Years: 50.00     Pack years: 25.00     Types: Cigarettes     Last attempt to quit: 2020     Years since quittin.3   • Smokeless tobacco: Never Used   Substance Use Topics   • Alcohol use: No     Frequency: Never     Drinks per session: 1 or 2     Binge frequency: Never     Comment: patient quit in    • Drug use: No      Social History     Substance and Sexual Activity   Drug Use No       FAMILY HISTORY  History reviewed. No pertinent family history.    CURRENT MEDICATIONS  Home Medications     Reviewed by Bibiana Moreno R.N. (Registered Nurse) on 20 at 1941  Med List Status: Not Addressed   Medication Last Dose Status        Patient Juanito Taking any Medications                       ALLERGIES  Allergies   Allergen Reactions   • No Known Drug Allergy        PHYSICAL EXAM  VITAL SIGNS: /64   Pulse 91   Temp 37.2 °C (99 °F) (Temporal)   Resp 16   Wt 70.2 kg (154 lb 12.2 oz)   SpO2 90%   BMI 24.98 kg/m²       Constitutional:  Alert in no apparent distress.  HENT: Normocephalic, Bilateral external ears normal.  Nose normal.   Eyes: Pupils are equal and reactive. Conjunctiva normal, non-icteric.   Thorax & Lungs: Easy unlabored respirations  Skin: Visualized skin is  Dry, see extremity exam  Extremities:  Left AKA, with staples in place. No active bleeding. No warmth or erythema to the stump. No wound dehiscence, staples and sutures intact  Neurologic: Alert, Grossly non-focal.   Psychiatric: Affect and Mood normal                                                 COURSE & MEDICAL DECISION MAKING  Nursing notes, VS, PMSFHx reviewed in chart.     7:59 PM Patient seen and examined at bedside. The patient presents in no obvious distress 18 days post AKA for evaluation of  bleeding from the stump that began yesterday evening after a fall. I informed the patient and his family that at this time, the surgical site looks good. There is currently no active bleeding, wound dehiscence, or indications of any process that requires intervention.  Do not suspect infection.  The family does note that they are just concerned that the site could start bleeding again, such as like the last night.  I explained that I would order for a specific type of dressing, called Surgecell, which should help maintain the surgical site and prevent any further heavy bleeding. They understand and agree with this plan. Patient will be discharged home with this.    Patient on reevaluation did have a very small area that appeared to be oozing a little bit of blood.  The Surgicel was placed over the wound and help stop the bleeding.  I do not believe further intervention is necessary at this time.  Wound care instructions were given.  The patient will return for new or worsening symptoms and is stable at the time of discharge.    DISPOSITION:  Patient will be discharged home in stable condition.    FOLLOW UP:  Usman Fisher M.D.  [address] [place] [address] [id number] [phone]    Call in 1 day  If symptoms worsen, return to the  er.      OUTPATIENT MEDICATIONS:  There are no discharge medications for this patient.      FINAL IMPRESSION  1. Encounter for wound re-check          I, Carline Obregon (Scribe), am scribing for, and in the presence of, Tierney Ludwig M.D..    Electronically signed by: Carline Obregon (Scribe), 7/18/2020    ITierney M.D. personally performed the services described in this documentation, as scribed by Carline Obregon in my presence, and it is both accurate and complete.    The note accurately reflects work and decisions made by me.  Tierney Ludwig M.D.  7/18/2020  9:09 PM

## 2020-07-25 NOTE — PROGRESS NOTES
COVID-19 Pre-surgery screenin. Do you have an undiagnosed respiratory illness or symptoms such as coughing or sneezing? NO  a. Onset of Sx N/A  b. Acute vs. chronic respiratory illness N/A    2. Do you have an unexplained fever greater than 100.4 degrees Fahrenheit or 38 degrees Celsius?     NO BUT HE HAS HAD A TEMP OF  degrees -pt's daughter states he has had no other symptoms-she believes that the reason for the temp is because the home health nurse checks his temperature in the afternoon when his room is very warm and lilliam. I alerted the pt's daughter that if anything changes and he develops other symptoms, they must call us either  or Monday prior to arrival to surgery    3. Have you had direct exposure to a patient who tested positive for Covid-19?    NO    4. Have you traveled outside [facility] in the last 14 days? NO    5. Have you had any loss of your sense of taste or smell? Have you had N/V or sore throat? NO    Patient has been informed of visitor policy and asked to wear a mask upon entering the hospital   YES

## 2020-07-26 RX ORDER — SODIUM CHLORIDE, SODIUM LACTATE, POTASSIUM CHLORIDE, CALCIUM CHLORIDE 600; 310; 30; 20 MG/100ML; MG/100ML; MG/100ML; MG/100ML
INJECTION, SOLUTION INTRAVENOUS CONTINUOUS
Status: CANCELLED | OUTPATIENT
Start: 2020-07-26 | End: 2020-07-26

## 2020-07-27 ENCOUNTER — ANESTHESIA (OUTPATIENT)
Dept: SURGERY | Facility: MEDICAL CENTER | Age: 75
DRG: 475 | End: 2020-07-27
Payer: MEDICARE

## 2020-07-27 ENCOUNTER — HOSPITAL ENCOUNTER (INPATIENT)
Facility: MEDICAL CENTER | Age: 75
LOS: 16 days | DRG: 475 | End: 2020-08-12
Attending: SURGERY | Admitting: SURGERY
Payer: MEDICARE

## 2020-07-27 ENCOUNTER — ANESTHESIA EVENT (OUTPATIENT)
Dept: SURGERY | Facility: MEDICAL CENTER | Age: 75
DRG: 475 | End: 2020-07-27
Payer: MEDICARE

## 2020-07-27 DIAGNOSIS — I77.9 PERIPHERAL ARTERIAL OCCLUSIVE DISEASE (HCC): ICD-10-CM

## 2020-07-27 DIAGNOSIS — I73.9 PVD (PERIPHERAL VASCULAR DISEASE) WITH CLAUDICATION (HCC): Chronic | ICD-10-CM

## 2020-07-27 PROBLEM — T87.9 AKA STUMP COMPLICATION (HCC): Status: ACTIVE | Noted: 2020-07-27

## 2020-07-27 PROBLEM — E11.9 DIABETES (HCC): Status: ACTIVE | Noted: 2020-07-27

## 2020-07-27 LAB
COVID ORDER STATUS COVID19: NORMAL
EST. AVERAGE GLUCOSE BLD GHB EST-MCNC: 163 MG/DL
GLUCOSE BLD-MCNC: 241 MG/DL (ref 65–99)
GLUCOSE BLD-MCNC: 298 MG/DL (ref 65–99)
GLUCOSE BLD-MCNC: 377 MG/DL (ref 65–99)
GLUCOSE BLD-MCNC: 441 MG/DL (ref 65–99)
GRAM STN SPEC: ABNORMAL
HBA1C MFR BLD: 7.3 % (ref 0–5.6)
PATHOLOGY CONSULT NOTE: NORMAL
SARS-COV-2 RDRP RESP QL NAA+PROBE: NOTDETECTED
SIGNIFICANT IND 70042: ABNORMAL
SITE SITE: ABNORMAL
SOURCE SOURCE: ABNORMAL
SPECIMEN SOURCE: NORMAL

## 2020-07-27 PROCEDURE — 700111 HCHG RX REV CODE 636 W/ 250 OVERRIDE (IP): Performed by: SURGERY

## 2020-07-27 PROCEDURE — 87186 SC STD MICRODIL/AGAR DIL: CPT | Mod: 91

## 2020-07-27 PROCEDURE — 87116 MYCOBACTERIA CULTURE: CPT

## 2020-07-27 PROCEDURE — 87147 CULTURE TYPE IMMUNOLOGIC: CPT

## 2020-07-27 PROCEDURE — 160048 HCHG OR STATISTICAL LEVEL 1-5: Performed by: SURGERY

## 2020-07-27 PROCEDURE — 99223 1ST HOSP IP/OBS HIGH 75: CPT | Performed by: HOSPITALIST

## 2020-07-27 PROCEDURE — 501445 HCHG STAPLER, SKIN DISP: Performed by: SURGERY

## 2020-07-27 PROCEDURE — C9803 HOPD COVID-19 SPEC COLLECT: HCPCS | Performed by: SURGERY

## 2020-07-27 PROCEDURE — 160035 HCHG PACU - 1ST 60 MINS PHASE I: Performed by: SURGERY

## 2020-07-27 PROCEDURE — 160002 HCHG RECOVERY MINUTES (STAT): Performed by: SURGERY

## 2020-07-27 PROCEDURE — 87206 SMEAR FLUORESCENT/ACID STAI: CPT

## 2020-07-27 PROCEDURE — 36415 COLL VENOUS BLD VENIPUNCTURE: CPT

## 2020-07-27 PROCEDURE — 500881 HCHG PACK, EXTREMITY: Performed by: SURGERY

## 2020-07-27 PROCEDURE — 87076 CULTURE ANAEROBE IDENT EACH: CPT

## 2020-07-27 PROCEDURE — 700111 HCHG RX REV CODE 636 W/ 250 OVERRIDE (IP): Performed by: NURSE PRACTITIONER

## 2020-07-27 PROCEDURE — 88307 TISSUE EXAM BY PATHOLOGIST: CPT

## 2020-07-27 PROCEDURE — 87015 SPECIMEN INFECT AGNT CONCNTJ: CPT | Mod: 91

## 2020-07-27 PROCEDURE — 500371 HCHG DRAIN, BLAKE 10MM: Performed by: SURGERY

## 2020-07-27 PROCEDURE — A9270 NON-COVERED ITEM OR SERVICE: HCPCS | Performed by: SURGERY

## 2020-07-27 PROCEDURE — 501838 HCHG SUTURE GENERAL: Performed by: SURGERY

## 2020-07-27 PROCEDURE — U0004 COV-19 TEST NON-CDC HGH THRU: HCPCS

## 2020-07-27 PROCEDURE — A9270 NON-COVERED ITEM OR SERVICE: HCPCS | Performed by: HOSPITALIST

## 2020-07-27 PROCEDURE — 87102 FUNGUS ISOLATION CULTURE: CPT

## 2020-07-27 PROCEDURE — 700105 HCHG RX REV CODE 258: Performed by: SURGERY

## 2020-07-27 PROCEDURE — 160036 HCHG PACU - EA ADDL 30 MINS PHASE I: Performed by: SURGERY

## 2020-07-27 PROCEDURE — 0Y6D0Z2 DETACHMENT AT LEFT UPPER LEG, MID, OPEN APPROACH: ICD-10-PCS | Performed by: SURGERY

## 2020-07-27 PROCEDURE — 770006 HCHG ROOM/CARE - MED/SURG/GYN SEMI*

## 2020-07-27 PROCEDURE — 87077 CULTURE AEROBIC IDENTIFY: CPT | Mod: 91

## 2020-07-27 PROCEDURE — 700101 HCHG RX REV CODE 250: Performed by: ANESTHESIOLOGY

## 2020-07-27 PROCEDURE — 87070 CULTURE OTHR SPECIMN AEROBIC: CPT

## 2020-07-27 PROCEDURE — 82962 GLUCOSE BLOOD TEST: CPT | Mod: 91

## 2020-07-27 PROCEDURE — 87075 CULTR BACTERIA EXCEPT BLOOD: CPT

## 2020-07-27 PROCEDURE — 700102 HCHG RX REV CODE 250 W/ 637 OVERRIDE(OP): Performed by: SURGERY

## 2020-07-27 PROCEDURE — 700105 HCHG RX REV CODE 258: Performed by: HOSPITALIST

## 2020-07-27 PROCEDURE — 700111 HCHG RX REV CODE 636 W/ 250 OVERRIDE (IP): Performed by: ANESTHESIOLOGY

## 2020-07-27 PROCEDURE — 88311 DECALCIFY TISSUE: CPT

## 2020-07-27 PROCEDURE — 87205 SMEAR GRAM STAIN: CPT

## 2020-07-27 PROCEDURE — 700102 HCHG RX REV CODE 250 W/ 637 OVERRIDE(OP): Performed by: HOSPITALIST

## 2020-07-27 PROCEDURE — 160039 HCHG SURGERY MINUTES - EA ADDL 1 MIN LEVEL 3: Performed by: SURGERY

## 2020-07-27 PROCEDURE — 160028 HCHG SURGERY MINUTES - 1ST 30 MINS LEVEL 3: Performed by: SURGERY

## 2020-07-27 PROCEDURE — A9270 NON-COVERED ITEM OR SERVICE: HCPCS | Performed by: NURSE PRACTITIONER

## 2020-07-27 PROCEDURE — 83036 HEMOGLOBIN GLYCOSYLATED A1C: CPT

## 2020-07-27 PROCEDURE — 160009 HCHG ANES TIME/MIN: Performed by: SURGERY

## 2020-07-27 PROCEDURE — 700102 HCHG RX REV CODE 250 W/ 637 OVERRIDE(OP): Performed by: NURSE PRACTITIONER

## 2020-07-27 RX ORDER — IPRATROPIUM BROMIDE AND ALBUTEROL SULFATE 2.5; .5 MG/3ML; MG/3ML
3 SOLUTION RESPIRATORY (INHALATION)
Status: DISCONTINUED | OUTPATIENT
Start: 2020-07-27 | End: 2020-07-27 | Stop reason: HOSPADM

## 2020-07-27 RX ORDER — SODIUM CHLORIDE, SODIUM LACTATE, POTASSIUM CHLORIDE, CALCIUM CHLORIDE 600; 310; 30; 20 MG/100ML; MG/100ML; MG/100ML; MG/100ML
INJECTION, SOLUTION INTRAVENOUS CONTINUOUS
Status: DISPENSED | OUTPATIENT
Start: 2020-07-27 | End: 2020-07-27

## 2020-07-27 RX ORDER — KETOROLAC TROMETHAMINE 30 MG/ML
INJECTION, SOLUTION INTRAMUSCULAR; INTRAVENOUS PRN
Status: DISCONTINUED | OUTPATIENT
Start: 2020-07-27 | End: 2020-07-27 | Stop reason: SURG

## 2020-07-27 RX ORDER — MEPERIDINE HYDROCHLORIDE 25 MG/ML
25 INJECTION INTRAMUSCULAR; INTRAVENOUS; SUBCUTANEOUS
Status: DISCONTINUED | OUTPATIENT
Start: 2020-07-27 | End: 2020-07-27 | Stop reason: HOSPADM

## 2020-07-27 RX ORDER — HEPARIN SODIUM 5000 [USP'U]/ML
5000 INJECTION, SOLUTION INTRAVENOUS; SUBCUTANEOUS EVERY 8 HOURS
Status: DISCONTINUED | OUTPATIENT
Start: 2020-07-27 | End: 2020-07-27

## 2020-07-27 RX ORDER — DIPHENHYDRAMINE HYDROCHLORIDE 50 MG/ML
12.5 INJECTION INTRAMUSCULAR; INTRAVENOUS
Status: DISCONTINUED | OUTPATIENT
Start: 2020-07-27 | End: 2020-07-27 | Stop reason: HOSPADM

## 2020-07-27 RX ORDER — SODIUM CHLORIDE, SODIUM LACTATE, POTASSIUM CHLORIDE, CALCIUM CHLORIDE 600; 310; 30; 20 MG/100ML; MG/100ML; MG/100ML; MG/100ML
INJECTION, SOLUTION INTRAVENOUS CONTINUOUS
Status: DISCONTINUED | OUTPATIENT
Start: 2020-07-27 | End: 2020-07-27 | Stop reason: HOSPADM

## 2020-07-27 RX ORDER — DEXTROSE MONOHYDRATE 25 G/50ML
50 INJECTION, SOLUTION INTRAVENOUS
Status: DISCONTINUED | OUTPATIENT
Start: 2020-07-27 | End: 2020-08-12 | Stop reason: HOSPADM

## 2020-07-27 RX ORDER — OXYCODONE HCL 5 MG/5 ML
10 SOLUTION, ORAL ORAL
Status: DISCONTINUED | OUTPATIENT
Start: 2020-07-27 | End: 2020-07-27 | Stop reason: HOSPADM

## 2020-07-27 RX ORDER — ENALAPRILAT 1.25 MG/ML
1.25 INJECTION INTRAVENOUS EVERY 6 HOURS PRN
Status: DISCONTINUED | OUTPATIENT
Start: 2020-07-27 | End: 2020-08-12 | Stop reason: HOSPADM

## 2020-07-27 RX ORDER — BISACODYL 10 MG
10 SUPPOSITORY, RECTAL RECTAL
Status: DISCONTINUED | OUTPATIENT
Start: 2020-07-27 | End: 2020-08-12 | Stop reason: HOSPADM

## 2020-07-27 RX ORDER — ACETAMINOPHEN 325 MG/1
650 TABLET ORAL EVERY 6 HOURS
Status: DISPENSED | OUTPATIENT
Start: 2020-07-27 | End: 2020-08-01

## 2020-07-27 RX ORDER — POLYETHYLENE GLYCOL 3350 17 G/17G
1 POWDER, FOR SOLUTION ORAL
Status: DISCONTINUED | OUTPATIENT
Start: 2020-07-27 | End: 2020-08-12 | Stop reason: HOSPADM

## 2020-07-27 RX ORDER — CEFAZOLIN SODIUM 1 G/3ML
INJECTION, POWDER, FOR SOLUTION INTRAMUSCULAR; INTRAVENOUS PRN
Status: DISCONTINUED | OUTPATIENT
Start: 2020-07-27 | End: 2020-07-27 | Stop reason: SURG

## 2020-07-27 RX ORDER — HYDROMORPHONE HYDROCHLORIDE 1 MG/ML
0.5 INJECTION, SOLUTION INTRAMUSCULAR; INTRAVENOUS; SUBCUTANEOUS
Status: DISCONTINUED | OUTPATIENT
Start: 2020-07-27 | End: 2020-07-27 | Stop reason: HOSPADM

## 2020-07-27 RX ORDER — ONDANSETRON 2 MG/ML
4 INJECTION INTRAMUSCULAR; INTRAVENOUS
Status: DISCONTINUED | OUTPATIENT
Start: 2020-07-27 | End: 2020-07-27 | Stop reason: HOSPADM

## 2020-07-27 RX ORDER — HYDRALAZINE HYDROCHLORIDE 20 MG/ML
10 INJECTION INTRAMUSCULAR; INTRAVENOUS
Status: DISCONTINUED | OUTPATIENT
Start: 2020-07-27 | End: 2020-07-27 | Stop reason: HOSPADM

## 2020-07-27 RX ORDER — OXYCODONE HYDROCHLORIDE AND ACETAMINOPHEN 5; 325 MG/1; MG/1
1 TABLET ORAL PRN
Status: ON HOLD | COMMUNITY
End: 2020-07-27

## 2020-07-27 RX ORDER — ONDANSETRON 2 MG/ML
INJECTION INTRAMUSCULAR; INTRAVENOUS PRN
Status: DISCONTINUED | OUTPATIENT
Start: 2020-07-27 | End: 2020-07-27 | Stop reason: SURG

## 2020-07-27 RX ORDER — OXYCODONE HCL 5 MG/5 ML
5 SOLUTION, ORAL ORAL
Status: DISCONTINUED | OUTPATIENT
Start: 2020-07-27 | End: 2020-07-27 | Stop reason: HOSPADM

## 2020-07-27 RX ORDER — AMOXICILLIN 250 MG
2 CAPSULE ORAL 2 TIMES DAILY
Status: DISCONTINUED | OUTPATIENT
Start: 2020-07-27 | End: 2020-08-12 | Stop reason: HOSPADM

## 2020-07-27 RX ORDER — HYDROMORPHONE HYDROCHLORIDE 1 MG/ML
0.1 INJECTION, SOLUTION INTRAMUSCULAR; INTRAVENOUS; SUBCUTANEOUS
Status: DISCONTINUED | OUTPATIENT
Start: 2020-07-27 | End: 2020-07-27 | Stop reason: HOSPADM

## 2020-07-27 RX ORDER — ONDANSETRON 4 MG/1
4 TABLET, ORALLY DISINTEGRATING ORAL EVERY 4 HOURS PRN
Status: DISCONTINUED | OUTPATIENT
Start: 2020-07-27 | End: 2020-08-12 | Stop reason: HOSPADM

## 2020-07-27 RX ORDER — DEXAMETHASONE SODIUM PHOSPHATE 4 MG/ML
INJECTION, SOLUTION INTRA-ARTICULAR; INTRALESIONAL; INTRAMUSCULAR; INTRAVENOUS; SOFT TISSUE PRN
Status: DISCONTINUED | OUTPATIENT
Start: 2020-07-27 | End: 2020-07-27 | Stop reason: SURG

## 2020-07-27 RX ORDER — SODIUM CHLORIDE 9 MG/ML
INJECTION, SOLUTION INTRAVENOUS CONTINUOUS
Status: DISCONTINUED | OUTPATIENT
Start: 2020-07-27 | End: 2020-08-12 | Stop reason: HOSPADM

## 2020-07-27 RX ORDER — SODIUM CHLORIDE 9 MG/ML
INJECTION, SOLUTION INTRAVENOUS
Status: COMPLETED
Start: 2020-07-27 | End: 2020-07-27

## 2020-07-27 RX ORDER — MIDAZOLAM HYDROCHLORIDE 1 MG/ML
INJECTION INTRAMUSCULAR; INTRAVENOUS PRN
Status: DISCONTINUED | OUTPATIENT
Start: 2020-07-27 | End: 2020-07-27 | Stop reason: SURG

## 2020-07-27 RX ORDER — ONDANSETRON 2 MG/ML
4 INJECTION INTRAMUSCULAR; INTRAVENOUS EVERY 4 HOURS PRN
Status: DISCONTINUED | OUTPATIENT
Start: 2020-07-27 | End: 2020-08-12 | Stop reason: HOSPADM

## 2020-07-27 RX ORDER — ROCURONIUM BROMIDE 10 MG/ML
INJECTION, SOLUTION INTRAVENOUS PRN
Status: DISCONTINUED | OUTPATIENT
Start: 2020-07-27 | End: 2020-07-27 | Stop reason: SURG

## 2020-07-27 RX ORDER — HYDROMORPHONE HYDROCHLORIDE 1 MG/ML
0.2 INJECTION, SOLUTION INTRAMUSCULAR; INTRAVENOUS; SUBCUTANEOUS
Status: DISCONTINUED | OUTPATIENT
Start: 2020-07-27 | End: 2020-07-27 | Stop reason: HOSPADM

## 2020-07-27 RX ORDER — LIDOCAINE HYDROCHLORIDE 20 MG/ML
INJECTION, SOLUTION EPIDURAL; INFILTRATION; INTRACAUDAL; PERINEURAL PRN
Status: DISCONTINUED | OUTPATIENT
Start: 2020-07-27 | End: 2020-07-27 | Stop reason: SURG

## 2020-07-27 RX ORDER — ATORVASTATIN CALCIUM 40 MG/1
40 TABLET, FILM COATED ORAL EVERY EVENING
Status: DISCONTINUED | OUTPATIENT
Start: 2020-07-27 | End: 2020-08-12 | Stop reason: HOSPADM

## 2020-07-27 RX ORDER — MIDAZOLAM HYDROCHLORIDE 1 MG/ML
1 INJECTION INTRAMUSCULAR; INTRAVENOUS
Status: DISCONTINUED | OUTPATIENT
Start: 2020-07-27 | End: 2020-07-27 | Stop reason: HOSPADM

## 2020-07-27 RX ORDER — LABETALOL HYDROCHLORIDE 5 MG/ML
5 INJECTION, SOLUTION INTRAVENOUS
Status: DISCONTINUED | OUTPATIENT
Start: 2020-07-27 | End: 2020-07-27 | Stop reason: HOSPADM

## 2020-07-27 RX ORDER — HYDROCODONE BITARTRATE AND ACETAMINOPHEN 5; 325 MG/1; MG/1
1-2 TABLET ORAL EVERY 6 HOURS PRN
Status: DISCONTINUED | OUTPATIENT
Start: 2020-07-27 | End: 2020-07-27

## 2020-07-27 RX ADMIN — INSULIN HUMAN 6 UNITS: 100 INJECTION, SOLUTION PARENTERAL at 21:06

## 2020-07-27 RX ADMIN — ATORVASTATIN CALCIUM 40 MG: 40 TABLET, FILM COATED ORAL at 17:19

## 2020-07-27 RX ADMIN — FENTANYL CITRATE 50 MCG: 50 INJECTION INTRAMUSCULAR; INTRAVENOUS at 11:25

## 2020-07-27 RX ADMIN — ROCURONIUM BROMIDE 50 MG: 10 INJECTION, SOLUTION INTRAVENOUS at 10:17

## 2020-07-27 RX ADMIN — VANCOMYCIN HYDROCHLORIDE 1500 MG: 500 INJECTION, POWDER, LYOPHILIZED, FOR SOLUTION INTRAVENOUS at 14:36

## 2020-07-27 RX ADMIN — PROPOFOL 100 MG: 10 INJECTION, EMULSION INTRAVENOUS at 10:17

## 2020-07-27 RX ADMIN — PIPERACILLIN AND TAZOBACTAM 3.38 G: 3; .375 INJECTION, POWDER, LYOPHILIZED, FOR SOLUTION INTRAVENOUS; PARENTERAL at 23:19

## 2020-07-27 RX ADMIN — FENTANYL CITRATE 50 MCG: 50 INJECTION INTRAMUSCULAR; INTRAVENOUS at 12:04

## 2020-07-27 RX ADMIN — HYDROMORPHONE HYDROCHLORIDE 0.2 MG: 1 INJECTION, SOLUTION INTRAMUSCULAR; INTRAVENOUS; SUBCUTANEOUS at 12:10

## 2020-07-27 RX ADMIN — ONDANSETRON 4 MG: 2 INJECTION INTRAMUSCULAR; INTRAVENOUS at 10:30

## 2020-07-27 RX ADMIN — INSULIN HUMAN 3 UNITS: 100 INJECTION, SOLUTION PARENTERAL at 16:15

## 2020-07-27 RX ADMIN — HYDROMORPHONE HYDROCHLORIDE 0.2 MG: 1 INJECTION, SOLUTION INTRAMUSCULAR; INTRAVENOUS; SUBCUTANEOUS at 12:26

## 2020-07-27 RX ADMIN — HYDROMORPHONE HYDROCHLORIDE 0.2 MG: 1 INJECTION, SOLUTION INTRAMUSCULAR; INTRAVENOUS; SUBCUTANEOUS at 11:30

## 2020-07-27 RX ADMIN — Medication: at 15:23

## 2020-07-27 RX ADMIN — FENTANYL CITRATE 50 MCG: 50 INJECTION INTRAMUSCULAR; INTRAVENOUS at 12:12

## 2020-07-27 RX ADMIN — ENOXAPARIN SODIUM 40 MG: 40 INJECTION SUBCUTANEOUS at 15:23

## 2020-07-27 RX ADMIN — PIPERACILLIN AND TAZOBACTAM 3.38 G: 3; .375 INJECTION, POWDER, LYOPHILIZED, FOR SOLUTION INTRAVENOUS; PARENTERAL at 16:16

## 2020-07-27 RX ADMIN — SODIUM CHLORIDE: 9 INJECTION, SOLUTION INTRAVENOUS at 14:23

## 2020-07-27 RX ADMIN — FENTANYL CITRATE 50 MCG: 50 INJECTION INTRAMUSCULAR; INTRAVENOUS at 10:17

## 2020-07-27 RX ADMIN — ACETAMINOPHEN 650 MG: 325 TABLET, FILM COATED ORAL at 17:19

## 2020-07-27 RX ADMIN — KETOROLAC TROMETHAMINE 30 MG: 30 INJECTION, SOLUTION INTRAMUSCULAR at 11:08

## 2020-07-27 RX ADMIN — MIDAZOLAM HYDROCHLORIDE 2 MG: 1 INJECTION, SOLUTION INTRAMUSCULAR; INTRAVENOUS at 10:10

## 2020-07-27 RX ADMIN — FENTANYL CITRATE 50 MCG: 50 INJECTION INTRAMUSCULAR; INTRAVENOUS at 11:08

## 2020-07-27 RX ADMIN — CEFAZOLIN 1 G: 330 INJECTION, POWDER, FOR SOLUTION INTRAMUSCULAR; INTRAVENOUS at 10:10

## 2020-07-27 RX ADMIN — POVIDONE-IODINE 15 ML: 10 SOLUTION TOPICAL at 08:54

## 2020-07-27 RX ADMIN — DEXAMETHASONE SODIUM PHOSPHATE 8 MG: 4 INJECTION, SOLUTION INTRA-ARTICULAR; INTRALESIONAL; INTRAMUSCULAR; INTRAVENOUS; SOFT TISSUE at 10:30

## 2020-07-27 RX ADMIN — SODIUM CHLORIDE, POTASSIUM CHLORIDE, SODIUM LACTATE AND CALCIUM CHLORIDE 1000 ML: 600; 310; 30; 20 INJECTION, SOLUTION INTRAVENOUS at 08:48

## 2020-07-27 RX ADMIN — LIDOCAINE HYDROCHLORIDE 40 MG: 20 INJECTION, SOLUTION EPIDURAL; INFILTRATION; INTRACAUDAL at 10:17

## 2020-07-27 RX ADMIN — SUGAMMADEX 200 MG: 100 INJECTION, SOLUTION INTRAVENOUS at 11:08

## 2020-07-27 ASSESSMENT — ENCOUNTER SYMPTOMS
MYALGIAS: 0
BRUISES/BLEEDS EASILY: 0
PALPITATIONS: 0
HEMOPTYSIS: 0
SENSORY CHANGE: 0
FOCAL WEAKNESS: 0
DOUBLE VISION: 0
BLURRED VISION: 0
COUGH: 0
HEARTBURN: 0
WEAKNESS: 1
EYE DISCHARGE: 0
ABDOMINAL PAIN: 0
CHILLS: 0
SHORTNESS OF BREATH: 0
HALLUCINATIONS: 0
SPEECH CHANGE: 0
FEVER: 1
FLANK PAIN: 0
DIZZINESS: 0
NAUSEA: 0
DEPRESSION: 0
VOMITING: 0

## 2020-07-27 ASSESSMENT — PATIENT HEALTH QUESTIONNAIRE - PHQ9
2. FEELING DOWN, DEPRESSED, IRRITABLE, OR HOPELESS: NOT AT ALL
1. LITTLE INTEREST OR PLEASURE IN DOING THINGS: NOT AT ALL
SUM OF ALL RESPONSES TO PHQ9 QUESTIONS 1 AND 2: 0

## 2020-07-27 ASSESSMENT — COPD QUESTIONNAIRES
DO YOU EVER COUGH UP ANY MUCUS OR PHLEGM?: NO/ONLY WITH OCCASIONAL COLDS OR INFECTIONS
DURING THE PAST 4 WEEKS HOW MUCH DID YOU FEEL SHORT OF BREATH: NONE/LITTLE OF THE TIME
COPD SCREENING SCORE: 4
HAVE YOU SMOKED AT LEAST 100 CIGARETTES IN YOUR ENTIRE LIFE: YES
IN THE PAST 12 MONTHS DO YOU DO LESS THAN YOU USED TO BECAUSE OF YOUR BREATHING PROBLEMS: DISAGREE/UNSURE

## 2020-07-27 ASSESSMENT — PAIN SCALES - GENERAL: PAIN_LEVEL: 6

## 2020-07-27 ASSESSMENT — COGNITIVE AND FUNCTIONAL STATUS - GENERAL
SUGGESTED CMS G CODE MODIFIER MOBILITY: CL
TOILETING: A LITTLE
MOVING FROM LYING ON BACK TO SITTING ON SIDE OF FLAT BED: A LITTLE
HELP NEEDED FOR BATHING: A LITTLE
DRESSING REGULAR LOWER BODY CLOTHING: A LITTLE
TURNING FROM BACK TO SIDE WHILE IN FLAT BAD: A LITTLE
STANDING UP FROM CHAIR USING ARMS: A LOT
DAILY ACTIVITIY SCORE: 21
MOBILITY SCORE: 13
CLIMB 3 TO 5 STEPS WITH RAILING: TOTAL
SUGGESTED CMS G CODE MODIFIER DAILY ACTIVITY: CJ
WALKING IN HOSPITAL ROOM: TOTAL
MOVING TO AND FROM BED TO CHAIR: A LITTLE

## 2020-07-27 ASSESSMENT — LIFESTYLE VARIABLES
ALCOHOL_USE: NO
SUBSTANCE_ABUSE: 0
EVER_SMOKED: YES
TOTAL SCORE: 0
HOW MANY TIMES IN THE PAST YEAR HAVE YOU HAD 5 OR MORE DRINKS IN A DAY: 0
ON A TYPICAL DAY WHEN YOU DRINK ALCOHOL HOW MANY DRINKS DO YOU HAVE: 0
CONSUMPTION TOTAL: NEGATIVE
HAVE PEOPLE ANNOYED YOU BY CRITICIZING YOUR DRINKING: NO
TOTAL SCORE: 0
AVERAGE NUMBER OF DAYS PER WEEK YOU HAVE A DRINK CONTAINING ALCOHOL: 0
EVER HAD A DRINK FIRST THING IN THE MORNING TO STEADY YOUR NERVES TO GET RID OF A HANGOVER: NO
DOES PATIENT WANT TO STOP DRINKING: NO
EVER FELT BAD OR GUILTY ABOUT YOUR DRINKING: NO
HAVE YOU EVER FELT YOU SHOULD CUT DOWN ON YOUR DRINKING: NO
TOTAL SCORE: 0

## 2020-07-27 ASSESSMENT — FIBROSIS 4 INDEX
FIB4 SCORE: 2.49

## 2020-07-27 NOTE — ANESTHESIA POSTPROCEDURE EVALUATION
Patient: Jimenez Bains    Procedure Summary     Date:  07/27/20 Room / Location:  [facility] 12 / SURGERY [facility]    Anesthesia Start:  1010 Anesthesia Stop:  1118    Procedure:  AMPUTATION, ABOVE KNEE-FOR REVISION (Left Leg Upper) Diagnosis:  (DEHISCENCE OF AMPUTATION STUMP)    Surgeon:  Usman Fisher M.D. Responsible Provider:  Tre Clemons M.D.    Anesthesia Type:  general ASA Status:  3          Final Anesthesia Type: general  Last vitals  BP   Blood Pressure : (!) 162/77    Temp   37.4 °C (99.3 °F)    Pulse   Pulse: 95   Resp   (!) 25    SpO2   97 %      Anesthesia Post Evaluation    Patient location during evaluation: PACU  Patient participation: complete - patient participated  Level of consciousness: awake and alert  Pain score: 6    Airway patency: patent  Anesthetic complications: no  Cardiovascular status: hemodynamically stable  Respiratory status: acceptable  Hydration status: euvolemic    PONV: none           Nurse Pain Score: 8 (NPRS)

## 2020-07-27 NOTE — ASSESSMENT & PLAN NOTE
Infected AKA, s/p I&D   Cultures positive for MRSA and group D enterococcus, ID consulted   S/p I&D Jul 27, 31 & 8/2      I.D input is noted  Surgical input is noted and pt is cleared for discharge   Vancomycin if the facility has pharmacist to dose and unasyn  And the last day would be 9/6/10  OR   unasyn + daptomycin and the last day would be 9/6/20  Wound care  Wound vac is in place  WILL D/W THE staff

## 2020-07-27 NOTE — ASSESSMENT & PLAN NOTE
With hyperglycemia  Glycated hemoglobin 7.3%  Short acting insulin   Accu-Checks, hypoglycemia protocol

## 2020-07-27 NOTE — CONSULTS
Hospital Medicine Consultation    Date of Service  7/27/2020    Referring Physician  Usman Fisher M.D.    Consulting Physician  Adriane Chen M.D.    Reason for Consultation  Medical management    History of Presenting Illness  74 y.o. male who presented 7/27/2020 with past medical history of hypertension, chronic pain, history of DVT and peripheral vascular disease who presents to the hospital with revision of necrotic left AKA stump.  This patient will be admitted to the hospital for cancer pain control and IV antibiotic therapy.  He was found to have necrotic stump and revision done by Dr. Fisher earlier today.  He  was found to have dirty wound with fever this morning.  Cultures were taken in the operating room. Otherwise at the time of my examination he denies any pain. No known alleviating or exacerbating factors to his symptoms.     Review of Systems  Review of Systems   Constitutional: Positive for fever and malaise/fatigue. Negative for chills.   HENT: Negative for congestion, hearing loss and tinnitus.    Eyes: Negative for blurred vision, double vision and discharge.   Respiratory: Negative for cough, hemoptysis and shortness of breath.    Cardiovascular: Positive for leg swelling. Negative for chest pain and palpitations.   Gastrointestinal: Negative for abdominal pain, heartburn, nausea and vomiting.   Genitourinary: Negative for dysuria and flank pain.   Musculoskeletal: Negative for joint pain and myalgias.   Skin: Negative for rash.   Neurological: Positive for weakness. Negative for dizziness, sensory change, speech change and focal weakness.   Endo/Heme/Allergies: Negative for environmental allergies. Does not bruise/bleed easily.   Psychiatric/Behavioral: Negative for depression, hallucinations and substance abuse.       Past Medical History   has a past medical history of Cancer (HCC), Hypertension, Pain (01-30-14), Pain (3/14/14), Personal history of venous thrombosis and embolism,  PVD (peripheral vascular disease) (HCC), and Unspecified hemorrhagic conditions.    Surgical History   has a past surgical history that includes femoral popliteal bypass (10/18/2011); aortogram with runoff (11/14/2013); iliac angioplasty with stent (11/14/2013); toe amputation (12/4/2013); femoral femoral bypass (12/4/2013); femoral femoral bypass (2/2/2014); groin exploration (3/17/2014); angiogram (7/30/2014); femoral popliteal bypass (7/30/2014); reimplantion revascularization (7/30/2014); and knee amputation above (Left, 6/30/2020).    Family History  Reviewed and not pertinent    Social History   reports that he quit smoking about 4 months ago. His smoking use included cigarettes. He has a 25.00 pack-year smoking history. He has never used smokeless tobacco. He reports that he does not drink alcohol or use drugs.    Medications  None       Allergies  Allergies   Allergen Reactions   • No Known Drug Allergy        Physical Exam  Temp:  [37.4 °C (99.3 °F)-38.1 °C (100.5 °F)] 37.4 °C (99.3 °F)  Pulse:  [87-95] 92  Resp:  [15-48] 15  BP: (139-162)/() 160/78  SpO2:  [91 %-99 %] 91 %    Physical Exam  Vitals signs reviewed.   Constitutional:       General: He is not in acute distress.     Appearance: He is not ill-appearing.   HENT:      Head: Normocephalic and atraumatic.      Ears:      Comments: Right ear lesion      Nose: No congestion.      Mouth/Throat:      Mouth: Mucous membranes are dry.      Comments: Right mid face lesion   Eyes:      Extraocular Movements: Extraocular movements intact.      Pupils: Pupils are equal, round, and reactive to light.   Neck:      Musculoskeletal: Neck supple.   Cardiovascular:      Rate and Rhythm: Normal rate and regular rhythm.      Pulses: Normal pulses.      Heart sounds: Normal heart sounds.   Pulmonary:      Effort: Pulmonary effort is normal. No respiratory distress.      Breath sounds: Normal breath sounds. No wheezing.   Abdominal:      General: Bowel sounds are  normal. There is no distension.      Palpations: Abdomen is soft.      Tenderness: There is no abdominal tenderness.   Musculoskeletal:         General: No swelling.      Comments: Left AKA wound vac   Skin:     General: Skin is warm and dry.      Capillary Refill: Capillary refill takes 2 to 3 seconds.   Neurological:      General: No focal deficit present.      Mental Status: He is alert and oriented to person, place, and time. Mental status is at baseline.   Psychiatric:         Mood and Affect: Mood normal.         Behavior: Behavior normal.         Thought Content: Thought content normal.         Judgment: Judgment normal.         Fluids  Date 07/27/20 0700 - 07/28/20 0659   Shift 7775-4228 9055-0230 9724-6489 24 Hour Total   INTAKE   I.V. 1000   1000   Shift Total 1000   1000   OUTPUT   Blood 25   25   Shift Total 25   25   Weight (kg) 55.2 55.2 55.2 55.2       Laboratory                          Imaging  No orders to display       Assessment/Plan  * AKA stump complication (HCC)  Assessment & Plan  Infected AKA, s/p I&D today   Cont with IV zosyn and vanco   Follow cutlures  Plan repeat I&D in 24 hr   Dr Fisher following   Pain control, pt/ot  Wound care     Diabetes (HCC)  Assessment & Plan  SSI ordered   accu checks     HLD (hyperlipidemia)- (present on admission)  Assessment & Plan  Resume home statin therpay     Arterial insufficiency (HCC)- (present on admission)  Assessment & Plan  Known history of  Needs antiplatelet and statin when cleared by vascular    HTN (hypertension)- (present on admission)  Assessment & Plan  Prn anti hypertensive medications ordered    Basal cell carcinoma of face- (present on admission)  Assessment & Plan  Chronic     Basal cell carcinoma of earlobe- (present on admission)  Assessment & Plan  chroinc     Ppx: lovenox

## 2020-07-27 NOTE — ANESTHESIA PROCEDURE NOTES
Airway    Date/Time: 7/27/2020 10:18 AM  Performed by: Tre Clemons M.D.  Authorized by: Tre Clemons M.D.     Location:  OR  Urgency:  Elective  Indications for Airway Management:  Anesthesia      Spontaneous Ventilation: absent    Sedation Level:  Deep  Preoxygenated: Yes    Patient Position:  Sniffing  Final Airway Type:  Endotracheal airway  Final Endotracheal Airway:  ETT  Cuffed: Yes    Technique Used for Successful ETT Placement:  Direct laryngoscopy    Insertion Site:  Oral  Blade Type:  August  Laryngoscope Blade/Videolaryngoscope Blade Size:  3  ETT Size (mm):  7.5  Measured from:  Teeth  ETT to Teeth (cm):  24  Placement Verified by: auscultation and capnometry    Cormack-Lehane Classification:  Grade I - full view of glottis  Number of Attempts at Approach:  1   Pt arrived to OR with LOWER Dentures only.

## 2020-07-27 NOTE — PROGRESS NOTES
"Pharmacy Kinetics 74 y.o. male on vancomycin day # 1 2020    Currently on Vancomycin 1500 mg iv (load) followed by vancomycin 1250 mg IV q24 hrs at 1430 starting 20.  Provider specified end date: TBD, 72 hr total empiric stop time placed per protocol.  Will be further evaluated.    Indication for Treatment: SSTI of left AKA stump    Pertinent history per medical record: Admitted on 2020 for scheduled surgery for revision of necrotic left AKA stump.  Patient is now s/p stump revision.  Procedure note states extensive necrotic muscle and fascia with purulent fluid in the muscle.  Cultures sent.  Vancomycin and zosyn ordered empirically post-op.  Wound VAC in place.  CMP ordered with AM labs .     Other antibiotics: Zosyn 3.375g IV q8 hrs x7 days (ending 8/3/20)    Allergies: No known drug allergy     List concerns for renal function: SCr 1.37 on 20, DM    Pertinent cultures to date:   20 - Left leg vascular graft - preliminary: Many Gram positive cocci. Rare Gram negative rods.     No results for input(s): WBC, NEUTSPOLYS, BANDSSTABS in the last 72 hours.  No results for input(s): BUN, CREATININE, ALBUMIN in the last 72 hours.  No results for input(s): VANCOTROUGH, VANCOPEAK, VANCORANDOM in the last 72 hours.    Intake/Output Summary (Last 24 hours) at 2020 1310  Last data filed at 2020 1118  Gross per 24 hour   Intake 1000 ml   Output 25 ml   Net 975 ml      /78   Pulse 92   Temp 37.4 °C (99.3 °F) (Temporal)   Resp 15   Ht 1.676 m (5' 6\")   Wt 55.2 kg (121 lb 11.1 oz)   SpO2 91%  Temp (24hrs), Av.7 °C (99.9 °F), Min:37.4 °C (99.3 °F), Max:38.1 °C (100.5 °F)      A/P   1. Vancomycin dose change: new start, no changes  2. Next vancomycin level: No trough scheduled at this time.  3. Goal trough: 10-15 mcg/mL  4. Comments: Most recent SCr on 6/30/20 elevated at 1.37.  CMP ordered w/ AM labs . Surgical plan is to return to OR in 48 hrs for wound VAC removal, stump " irrigation, and further evaluation.  Vancomycin trough has not been scheduled at this time.  Pharmacy will evaluate renal labs when available in AM and scheduled trough level accordingly prior to 2nd or 3rd total dose.  Pharmacy will continue to monitor.     Marie ReinosoD

## 2020-07-27 NOTE — ANESTHESIA PREPROCEDURE EVALUATION
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)   (+) Generalized atherosclerosis   (+) HTN (hypertension)   (+) Ischemia of extremity   (+) Limb ischemia   (+) Other specified disorders of arteries and arterioles (HCC)   (+) PVD (peripheral vascular disease) with claudication (HCC)   (+) Peripheral arterial occlusive disease (HCC)   (+) Peripheral artery disease       Physical Exam    Airway   Mallampati: II  TM distance: >3 FB  Neck ROM: full       Cardiovascular - normal exam  Rhythm: regular  Rate: normal  (-) murmur     Dental - normal exam           Pulmonary - normal exam  Breath sounds clear to auscultation     Abdominal    Neurological - normal exam                 Anesthesia Plan    ASA 3       Plan - general       Airway plan will be ETT        Induction: intravenous    Postoperative Plan: Postoperative administration of opioids is intended.    Pertinent diagnostic labs and testing reviewed    Informed Consent:    Anesthetic plan and risks discussed with patient.    Use of blood products discussed with: patient whom consented to blood products.

## 2020-07-27 NOTE — OR NURSING
Called family member with patient status and room delay; will contact via cell phone when room assigned.

## 2020-07-27 NOTE — PROGRESS NOTES
Triage note:      Consulted by     He did a revision on infected left AKA stump, s/p wound vac placement.  On PCA and empiric abx      Consulting MD: Dr. Chen

## 2020-07-27 NOTE — OP REPORT
Operative Report     Patient:   Jimenez Bains  :    1945  MRN:    [id number]  [facility]:    [id number]    Date of Surgery:  2020    Surgeon:   Usman Fisher M.D.     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 Pre-operative antibiotics were administered prior to the procedure. Anesthesia was smoothly induced. The patient was prepped and draped in the usual sterile fashion. A surgical time out was called to identify the correct patient, procedure, and equipment.  Everyone was in agreement.    An elliptical incision was made around the end of the stump to incorporate all the necrotic skin.  The necrotic skin extended up the stump for a length of about 3 to 4cm circumferentially around the entire incision.  This elliptical segment of skin which included the old incision was then removed from the underlying soft tissue.  There was a significant amount of necrotic muscle tissue and purulent drainage predominantly from the medial aspect of the stump but also involving the lateral aspect as well.  The necrotic muscle tissue was sharply debrided and cautery was used as needed for hemostasis.  We encountered a prosthetic bypass conduit on the medial aspect of the stump and  when I grasped it in order to resect it up higher into the soft tissue it actually  entirely from the soft tissue and came out as a single piece of prosthetic that would have been long enough to reach all the way up to the groin.  Fortunately there was no bleeding at all from wherever the grafted  from proximally.  It is possible this graft may have been removed from the artery at a previous operation and was lying dormant in the thigh.  The distal segment of the prosthetic graft was sent for culture and sensitivity.    Once all the muscle had been debrided back to healthy tissue we then transected the bone about 4 cm higher than the initial level of transection and we beveled the anterior edge with the oscillating saw.  Grossly the bone appeared healthy at this level.  The soft tissues were copiously irrigated with hydrogen peroxide and also pulse irrigation and then scrubbed clean.  Hemostasis was achieved with stick ties and cautery as needed.  The anterior and posterior flaps were held in approximation and it appeared that the soft tissues would closed nicely after controlling the infection.    At this point I applied a black foam wound VAC to the end of the stump and it was connected to suction and found to have a good seal.    Sponge and needle counts were correct at the end of the case.     The patient was awakened from anesthesia and was taken to the recovery room in stable condition.     Postoperative plan:  Patient will be admitted to the hospital for pain control and IV antibiotic therapy.  We will start him on vancomycin and Zosyn empirically.  He will be brought back to the operating room in 48 hours to remove the wound VAC and irrigate the stump again and if there is no gross evidence of necrotic tissue or ongoing infection then we will likely be able to close the incision at that time.    Usman Fisher MD  General and Vascular Surgery  [facility]  Cell  [phone]  [facility] [phone]

## 2020-07-27 NOTE — PROGRESS NOTES
General Surgery H&P  -------------------------------------------------------------------------------------------------  Date: 7/27/2020    Surgeon: Usman Fisher M.D. - [facility]  -------------------------------------------------------------------------------------------------    HPI:  This is a 74 y.o. male who is presenting today for scheduled surgery for revision of necrotic Left AKA stump  No specific complaints at this time. Questions addressed.      Past Medical History:   Diagnosis Date   • Cancer (HCC)     skin cancer   • Hypertension    • Pain 01-30-14    right lower leg, 8/10   • Pain 3/14/14    8/10 right groin, feet   • Personal history of venous thrombosis and embolism    • PVD (peripheral vascular disease) ([facility])    • Unspecified hemorrhagic conditions     takes coumadin       Past Surgical History:   Procedure Laterality Date   • KNEE AMPUTATION ABOVE Left 6/30/2020    Procedure: AMPUTATION, ABOVE KNEE;  Surgeon: Usman Fisher M.D.;  Location: [facility];  Service: Vascular   • ANGIOGRAM  7/30/2014    Performed by Aquiles Phan M.D. at [facility]   • FEMORAL POPLITEAL BYPASS  7/30/2014    Performed by Aquiles Phan M.D. at [facility]   • REIMPLANTION REVASCULARIZATION  7/30/2014    Performed by Aquiles Phan M.D. at [facility]   • GROIN EXPLORATION  3/17/2014    Performed by Aquiles Phan M.D. at [facility]   • FEMORAL FEMORAL BYPASS  2/2/2014    Performed by Aquiles Phan M.D. at [facility]   • TOE AMPUTATION  12/4/2013    Performed by Aquiles Phan M.D. at [facility]   • FEMORAL FEMORAL BYPASS  12/4/2013    Performed by Aquiles Phan M.D. at [facility]   • AORTOGRAM WITH RUNOFF  11/14/2013    Performed by Aquiles Phan M.D. at [facility]   • ILIAC ANGIOPLASTY WITH STENT  11/14/2013    Performed by Aquiles Phan M.D. at [facility]   • FEMORAL  POPLITEAL BYPASS  10/18/2011    Performed by CYN JOYA at [facility]       Current Facility-Administered Medications   Medication Dose Route Frequency Provider Last Rate Last Dose   • lidocaine (XYLOCAINE) 1 % injection 0.5 mL  0.5 mL Intradermal Once PRN Usman Fisher M.D.       • lactated ringers infusion   Intravenous Continuous Usman Fisher M.D. 10 mL/hr at 20 0848 1,000 mL at 20 0848     Facility-Administered Medications Ordered in Other Encounters   Medication Dose Route Frequency Provider Last Rate Last Dose   • fentaNYL (SUBLIMAZE) injection    PRN Xu Causey M.D.   50 mcg at 20   • propofol    PRTAHIR Causey M.D.   140 mg at 20   • ondansetron (ZOFRAN) syringe/vial injection    PRTAHIR Causey M.D.   4 mg at 20   • ceFAZolin (ANCEF) injection    PRTAHIR Causey M.D.   2 g at 20       Social History     Socioeconomic History   • Marital status:      Spouse name: Not on file   • Number of children: Not on file   • Years of education: Not on file   • Highest education level: Not on file   Occupational History   • Not on file   Social Needs   • Financial resource strain: Not on file   • Food insecurity     Worry: Never true     Inability: Never true   • Transportation needs     Medical: No     Non-medical: No   Tobacco Use   • Smoking status: Former Smoker     Packs/day: 0.50     Years: 50.00     Pack years: 25.00     Types: Cigarettes     Last attempt to quit: 2020     Years since quittin.4   • Smokeless tobacco: Never Used   Substance and Sexual Activity   • Alcohol use: No     Frequency: Never     Drinks per session: 1 or 2     Binge frequency: Never     Comment: patient quit in    • Drug use: No   • Sexual activity: Not on file   Lifestyle   • Physical activity     Days per week: Not on file     Minutes per session: Not on file   • Stress: Not on file   Relationships   • Social  "connections     Talks on phone: Not on file     Gets together: Not on file     Attends Buddhist service: Not on file     Active member of club or organization: Not on file     Attends meetings of clubs or organizations: Not on file     Relationship status: Not on file   • Intimate partner violence     Fear of current or ex partner: Not on file     Emotionally abused: Not on file     Physically abused: Not on file     Forced sexual activity: Not on file   Other Topics Concern   • Not on file   Social History Narrative   • Not on file       History reviewed. No pertinent family history.    Allergies:  No known drug allergy    Review of Systems:  Noncontributory except as per HPI    Physical Exam:  /78   Pulse 89   Temp (!) 38.1 °C (100.5 °F) (Temporal)   Resp 18   Ht 1.676 m (5' 6\")   Wt 55.2 kg (121 lb 11.1 oz)   SpO2 91%     Constitutional: Alert, oriented, no acute distress  HEENT:  Normocephalic and atraumatic, EOMI  Neck:   Supple, no JVD,   Cardiovascular: Regular rate and rhythm,   Pulmonary:  Good air entry bilaterally,    Abdominal:  Soft, non-tender, non-distended  Musculoskeletal: No edema, no tenderness except on the necrotic left AKA  Neurological:  CN II-XII grossly intact, no focal deficits  Skin:   Skin is warm and dry. No rash noted.  Psychiatric:  Normal mood and affect.    Labs:                    Assessment/Plan:  This is a 74 y.o. male here today for elective surgery: revision of necrotic left AKA stump    I explained the details of the operation, alternatives, and potential risks, including but not limited to bleeding, infection, and risks of anesthesia.  All questions were answered. Patient understands and agrees to proceed.      Usman Fisher MD  [facility] (General and Vascular Surgery)  Cell: [phone] (text or call is fine, if you don't reach me please try my office)  Office: [phone]    7/27/2020    9:54 " AM  ___________________________________________________________________  Patient:Jimenez Bains   MRN:[id number]   CSN:[id number]

## 2020-07-27 NOTE — ANESTHESIA TIME REPORT
Anesthesia Start and Stop Event Times     Date Time Event    7/27/2020 0936 Ready for Procedure     1010 Anesthesia Start     1118 Anesthesia Stop        Responsible Staff  07/27/20    Name Role Begin End    Tre Clemons M.D. Anesth 1010 1118        Preop Diagnosis (Free Text):  Pre-op Diagnosis     DEHISCENCE OF AMPUTATION STUMP        Preop Diagnosis (Codes):    Post op Diagnosis  Dehiscence of amputation stump      Premium Reason  Non-Premium    Comments:

## 2020-07-28 LAB
ALBUMIN SERPL BCP-MCNC: 1.7 G/DL (ref 3.2–4.9)
ALBUMIN/GLOB SERPL: 0.5 G/DL
ALP SERPL-CCNC: 108 U/L (ref 30–99)
ALT SERPL-CCNC: 29 U/L (ref 2–50)
ANION GAP SERPL CALC-SCNC: 11 MMOL/L (ref 7–16)
AST SERPL-CCNC: 30 U/L (ref 12–45)
BASOPHILS # BLD AUTO: 0.3 % (ref 0–1.8)
BASOPHILS # BLD: 0.06 K/UL (ref 0–0.12)
BILIRUB SERPL-MCNC: 0.4 MG/DL (ref 0.1–1.5)
BUN SERPL-MCNC: 24 MG/DL (ref 8–22)
CALCIUM SERPL-MCNC: 8.1 MG/DL (ref 8.5–10.5)
CHLORIDE SERPL-SCNC: 103 MMOL/L (ref 96–112)
CO2 SERPL-SCNC: 21 MMOL/L (ref 20–33)
CREAT SERPL-MCNC: 1.11 MG/DL (ref 0.5–1.4)
EOSINOPHIL # BLD AUTO: 0 K/UL (ref 0–0.51)
EOSINOPHIL NFR BLD: 0 % (ref 0–6.9)
ERYTHROCYTE [DISTWIDTH] IN BLOOD BY AUTOMATED COUNT: 45.3 FL (ref 35.9–50)
GLOBULIN SER CALC-MCNC: 3.7 G/DL (ref 1.9–3.5)
GLUCOSE BLD-MCNC: 174 MG/DL (ref 65–99)
GLUCOSE BLD-MCNC: 225 MG/DL (ref 65–99)
GLUCOSE BLD-MCNC: 229 MG/DL (ref 65–99)
GLUCOSE SERPL-MCNC: 251 MG/DL (ref 65–99)
HCT VFR BLD AUTO: 33.7 % (ref 42–52)
HGB BLD-MCNC: 10.7 G/DL (ref 14–18)
IMM GRANULOCYTES # BLD AUTO: 0.2 K/UL (ref 0–0.11)
IMM GRANULOCYTES NFR BLD AUTO: 1 % (ref 0–0.9)
INR PPP: 1.2 (ref 0.87–1.13)
LYMPHOCYTES # BLD AUTO: 0.88 K/UL (ref 1–4.8)
LYMPHOCYTES NFR BLD: 4.3 % (ref 22–41)
MCH RBC QN AUTO: 31.8 PG (ref 27–33)
MCHC RBC AUTO-ENTMCNC: 31.8 G/DL (ref 33.7–35.3)
MCV RBC AUTO: 100.3 FL (ref 81.4–97.8)
MONOCYTES # BLD AUTO: 0.52 K/UL (ref 0–0.85)
MONOCYTES NFR BLD AUTO: 2.5 % (ref 0–13.4)
NEUTROPHILS # BLD AUTO: 18.99 K/UL (ref 1.82–7.42)
NEUTROPHILS NFR BLD: 91.9 % (ref 44–72)
NRBC # BLD AUTO: 0 K/UL
NRBC BLD-RTO: 0 /100 WBC
PLATELET # BLD AUTO: 318 K/UL (ref 164–446)
PMV BLD AUTO: 10.2 FL (ref 9–12.9)
POTASSIUM SERPL-SCNC: 4.4 MMOL/L (ref 3.6–5.5)
PROT SERPL-MCNC: 5.4 G/DL (ref 6–8.2)
PROTHROMBIN TIME: 15.6 SEC (ref 12–14.6)
RBC # BLD AUTO: 3.36 M/UL (ref 4.7–6.1)
RHODAMINE-AURAMINE STN SPEC: NORMAL
SIGNIFICANT IND 70042: NORMAL
SITE SITE: NORMAL
SODIUM SERPL-SCNC: 135 MMOL/L (ref 135–145)
SOURCE SOURCE: NORMAL
WBC # BLD AUTO: 20.7 K/UL (ref 4.8–10.8)

## 2020-07-28 PROCEDURE — 80053 COMPREHEN METABOLIC PANEL: CPT

## 2020-07-28 PROCEDURE — A9270 NON-COVERED ITEM OR SERVICE: HCPCS | Performed by: NURSE PRACTITIONER

## 2020-07-28 PROCEDURE — 700105 HCHG RX REV CODE 258: Performed by: HOSPITALIST

## 2020-07-28 PROCEDURE — 85025 COMPLETE CBC W/AUTO DIFF WBC: CPT

## 2020-07-28 PROCEDURE — 36415 COLL VENOUS BLD VENIPUNCTURE: CPT

## 2020-07-28 PROCEDURE — 770021 HCHG ROOM/CARE - ISO PRIVATE

## 2020-07-28 PROCEDURE — 700111 HCHG RX REV CODE 636 W/ 250 OVERRIDE (IP): Performed by: HOSPITALIST

## 2020-07-28 PROCEDURE — 82962 GLUCOSE BLOOD TEST: CPT

## 2020-07-28 PROCEDURE — 85610 PROTHROMBIN TIME: CPT

## 2020-07-28 PROCEDURE — 93005 ELECTROCARDIOGRAM TRACING: CPT | Performed by: HOSPITALIST

## 2020-07-28 PROCEDURE — 51798 US URINE CAPACITY MEASURE: CPT

## 2020-07-28 PROCEDURE — A9270 NON-COVERED ITEM OR SERVICE: HCPCS | Performed by: HOSPITALIST

## 2020-07-28 PROCEDURE — 700102 HCHG RX REV CODE 250 W/ 637 OVERRIDE(OP): Performed by: NURSE PRACTITIONER

## 2020-07-28 PROCEDURE — 99232 SBSQ HOSP IP/OBS MODERATE 35: CPT | Performed by: HOSPITALIST

## 2020-07-28 PROCEDURE — 700102 HCHG RX REV CODE 250 W/ 637 OVERRIDE(OP): Performed by: HOSPITALIST

## 2020-07-28 PROCEDURE — 700111 HCHG RX REV CODE 636 W/ 250 OVERRIDE (IP): Performed by: SURGERY

## 2020-07-28 PROCEDURE — 700105 HCHG RX REV CODE 258: Performed by: SURGERY

## 2020-07-28 RX ORDER — HALOPERIDOL 5 MG/ML
2-5 INJECTION INTRAMUSCULAR EVERY 6 HOURS PRN
Status: DISCONTINUED | OUTPATIENT
Start: 2020-07-28 | End: 2020-07-29

## 2020-07-28 RX ORDER — HALOPERIDOL 5 MG/ML
2-5 INJECTION INTRAMUSCULAR EVERY 6 HOURS PRN
Status: DISCONTINUED | OUTPATIENT
Start: 2020-07-28 | End: 2020-07-28

## 2020-07-28 RX ADMIN — INSULIN HUMAN 2 UNITS: 100 INJECTION, SOLUTION PARENTERAL at 11:39

## 2020-07-28 RX ADMIN — ACETAMINOPHEN 650 MG: 325 TABLET, FILM COATED ORAL at 11:40

## 2020-07-28 RX ADMIN — VANCOMYCIN HYDROCHLORIDE 1250 MG: 500 INJECTION, POWDER, LYOPHILIZED, FOR SOLUTION INTRAVENOUS at 21:47

## 2020-07-28 RX ADMIN — HALOPERIDOL LACTATE 5 MG: 5 INJECTION, SOLUTION INTRAMUSCULAR at 20:05

## 2020-07-28 RX ADMIN — INSULIN HUMAN 2 UNITS: 100 INJECTION, SOLUTION PARENTERAL at 06:26

## 2020-07-28 RX ADMIN — ACETAMINOPHEN 650 MG: 325 TABLET, FILM COATED ORAL at 23:08

## 2020-07-28 RX ADMIN — SODIUM CHLORIDE: 9 INJECTION, SOLUTION INTRAVENOUS at 04:37

## 2020-07-28 RX ADMIN — PIPERACILLIN AND TAZOBACTAM 3.38 G: 3; .375 INJECTION, POWDER, LYOPHILIZED, FOR SOLUTION INTRAVENOUS; PARENTERAL at 21:46

## 2020-07-28 RX ADMIN — INSULIN HUMAN 1 UNITS: 100 INJECTION, SOLUTION PARENTERAL at 20:15

## 2020-07-28 RX ADMIN — ACETAMINOPHEN 650 MG: 325 TABLET, FILM COATED ORAL at 04:55

## 2020-07-28 RX ADMIN — SODIUM CHLORIDE: 9 INJECTION, SOLUTION INTRAVENOUS at 15:00

## 2020-07-28 RX ADMIN — PIPERACILLIN AND TAZOBACTAM 3.38 G: 3; .375 INJECTION, POWDER, LYOPHILIZED, FOR SOLUTION INTRAVENOUS; PARENTERAL at 08:26

## 2020-07-28 RX ADMIN — ENOXAPARIN SODIUM 40 MG: 40 INJECTION SUBCUTANEOUS at 04:56

## 2020-07-28 ASSESSMENT — ENCOUNTER SYMPTOMS
MYALGIAS: 1
NEUROLOGICAL NEGATIVE: 1
EYES NEGATIVE: 1
CARDIOVASCULAR NEGATIVE: 1
BACK PAIN: 1
RESPIRATORY NEGATIVE: 1
PSYCHIATRIC NEGATIVE: 1
CONSTITUTIONAL NEGATIVE: 1
GASTROINTESTINAL NEGATIVE: 1

## 2020-07-28 NOTE — DISCHARGE PLANNING
"Care Transition Team Assessment      Anticipated Discharge Disposition:     Action:   · Discussed pt's POC with DONNIE Holder liaison.   · Completed chart review  · Per chart  · 7/27: revision of left AKA with vac placement    · Per MD note pt back to OR tomorrow, 7/29, for wshout and hopefully closure of the stump.   · Tx empiric ABX and f/u cultures    Barriers to Discharge: medical clearance     Plan: Continue to collaborate with the pt, pt's family, and health care team to provide social and discharge support as needed.      1230: Discussed pt's POC in IDT rounds. MD adjusted pain medication.     1630: Discussed pt with Hunter GARZON liaison for [facility]. Pt is on service with [facility]. Brain states [facility] is establishing the pt with PCP and will update CM.     Assessment:    LOUIS HODGES attempted to assess the pt at bedside. Pt was disoriented. LOUIS HODGES spoke with Lien Samaniego, pt's daughter JAEL, on the phone to obtain the information used in this assessment. Lien verified the accuracy of the demographic face sheet.    Lien states the pt lives in a double wide mobile home with his granddaughter, her , and child. Lien states 6-7 stairs lead up to the porch and entry to the pt's home; the family is working on building a ramp up to the front porch. Pt has a WC at home. Lien lives across the street from the pt's home. Pt has consistent family support. Lien states the pt has someone to care for him 24/7. Lien states the pt refused SNF in the past and \"that is why he is in this position.\"    Prior to this hospitalization the pt was in service with [facility]. Prior to the Left AKA, pt was completely independent with all ADLS/IADLS, except for driving. Lien states the pt has impaired eye sight, which inhibits his ability to drive.     Pt uses the [facility] on [address]. Pt does not currently have a PCP.    Lien denies knowledge of the pt having a history of mental " health disorders or substance abuse history.     Pt has a wheel chair, shower chair, and transfer bench at home.           Information Source  Orientation : Disoriented to Event, Disoriented to Time  Information Given By: Relative  Informant's Name: Lien Clark  Who is responsible for making decisions for patient? : Patient         Elopement Risk  Legal Hold: No  Ambulatory or Self Mobile in Wheelchair: No-Not an Elopement Risk  Elopement Risk: Not at Risk for Elopement    Interdisciplinary Discharge Planning  Does Admitting Nurse Feel This Could be a Complex Discharge?: Yes  Primary Care Physician: no primary care provider   Lives with - Patient's Self Care Capacity: Adult Children  Patient or legal guardian wants to designate a caregiver (see row info): No  Support Systems: Children, Family Member(s)  Housing / Facility: Mobile Home  Name of Care Facility: home health([facility] )  Able to Return to Previous ADL's: No  Mobility Issues: Yes  Prior Services: Other (Comments)([facility] )  Assistance Needed: Yes  DME Provider / Phone: pt has a WC    Discharge Preparedness  What is your plan after discharge?: Skilled nursing facility  What are your discharge supports?: Child, Other (comment)(grandchildren)  Prior Functional Level: Independent with Activities of Daily Living, Independent with Medication Management, Wheelchair Dependent  Difficulity with ADLs: Walking, None  Difficulity with IADLs: Cooking, Driving    Functional Assesment  Prior Functional Level: Independent with Activities of Daily Living, Independent with Medication Management, Wheelchair Dependent    Finances  Financial Barriers to Discharge: No  Prescription Coverage: Yes    Vision / Hearing Impairment  Vision Impairment : Yes  Right Eye Vision: Impaired  Left Eye Vision: Impaired  Hearing Impairment : No         Advance Directive  Advance Directive?: None  Advance Directive offered?: AD Booklet given    Domestic Abuse  Have you ever  been the victim of abuse or violence?: No  Physical Abuse or Sexual Abuse: No  Verbal Abuse or Emotional Abuse: No  Possible Abuse Reported to:: Not Applicable    Psychological Assessment  History of Substance Abuse: None  History of Psychiatric Problems: No  Non-compliant with Treatment: No  Newly Diagnosed Illness: Yes    Discharge Risks or Barriers  Discharge risks or barriers?: No PCP  Patient risk factors: Complex medical needs    Anticipated Discharge Information  Anticipated discharge disposition: SNF  Discharge Address: [address] [place] [address]. [place], [place]   Discharge Contact Phone Number: [phone]

## 2020-07-28 NOTE — PROGRESS NOTES
"9650-6509: AOx2, d/o time and situation. VSS on RA. C/o pain to left AKA stump, fentanyl PCA in use with good effect. LR running at 100. Incontinent b/b, no BM this shift. Skin per flowsheet. Wound vac in place to left AKA stump. Safety maintained, bed alarm set. WCTM.     1849-2214 Pt found to have removed all mepilex dressings and his IV. When this RN asked why the pt responded \"because it is poison\". Pt unable to correctly answer orientation questions, d/o time, situation and place. Expressing paranoia and refusing all care including IV placement, FSBS wound care and medications. RN attempted to reorient and educate pt, no evidence of learning. MD aware, getting order for haldol and EKG to check QTc prior to haldol. Pharm made aware IV Abx need to be re-timed. WCTM    1730: Pt refusing dinner tray    1830: Awaiting EKG  "

## 2020-07-28 NOTE — WOUND TEAM
Wound consult received for sacrum and groin. Pt refused this RN to assess. Update Primary RN Hollie Carerno overlay in place. Per RN also has Sacral Mepilex in place with q2 turns. Wound team will attempt to reassess.

## 2020-07-28 NOTE — THERAPY
PT consult received, eval attempted. Pt very lethargic upon PT arrival, unable to maintain alertness to answer PT's questions. Pt has PCA pump in use. Will defer PT evaluation until pt is able to participate more meaningfully.     Radha Sauer, PT, DPT Pager: [phone]

## 2020-07-28 NOTE — CARE PLAN
Problem: Knowledge Deficit  Goal: Knowledge of disease process/condition, treatment plan, diagnostic tests, and medications will improve  Outcome: PROGRESSING AS EXPECTED  Intervention: Explain information regarding disease process/condition, treatment plan, diagnostic tests, and medications and document in education  Note: Pt updated no POC      Problem: Pain Management  Goal: Pain level will decrease to patient's comfort goal  Outcome: PROGRESSING AS EXPECTED  Intervention: Follow pain managment plan developed in collaboration with patient and Interdisciplinary Team  Note: Pt educated on PCA pump bolus and self managing pain

## 2020-07-28 NOTE — PROGRESS NOTES
When RN was attempting to straight cath pt urine was coming around catheter unable to get accurate measurement so a repeat bladder scan was performed to make sure bladder was emptied BS 93ml

## 2020-07-28 NOTE — CARE PLAN
Problem: Communication  Goal: The ability to communicate needs accurately and effectively will improve  Outcome: PROGRESSING AS EXPECTED   Pt updated on POC  Problem: Safety  Goal: Will remain free from falls  Outcome: PROGRESSING AS EXPECTED   Pt use call light appropriately   Problem: Pain Management  Goal: Pain level will decrease to patient's comfort goal  Outcome: PROGRESSING AS EXPECTED   Pain managed with PCA fentanyl   Problem: Skin Integrity  Goal: Risk for impaired skin integrity will decrease  Outcome: PROGRESSING AS EXPECTED   Potential for skin breakdown, wound nurse consulted. Waffle overlay placed on bed, preventive meplixes applied and pt turned and repositioned every 2 hrs

## 2020-07-28 NOTE — PROGRESS NOTES
RN paged MD @[phone] to notify that pt has not voided since beginning of shift. . Dr. Jasmine returned call @[phone] and  gave order to straight cath pt.

## 2020-07-28 NOTE — PROGRESS NOTES
Report received   Assumed care of patient at 1900  Pt is A&O x4  RA denies SOB.  Pain denies managed with PCA fentanyl  Nausea denies  Tolerating Regular Diet   Surgical wound to left stump with wound vac and dressing c/d/i  + Urine output  Last BM 7/27, PTA  SCD's Right leg only, left AKA  Bed in lowest position and locked.  Bed alarm in place  Pt resting comfortably now.  Review plan of care with patient  Call light within reach  Hourly rounds in place  All needs met at this time

## 2020-07-28 NOTE — PROGRESS NOTES
2 RN skin check complete.   Generalized Skin is dry fragile and flaky   Dry blanching redness noted to back   Missing part of left ear  Cancerous growth noted to right cheek and nose  Right elbow pink/ red, nonblanching mepilex applied  Left elbow pink and blanching; mepilex applied   Coccyx/sacrum bright red and blanching, mepilex applied  Old scab noted to right knee  Left leg AKA with surgical dressing and wound vac in place. Dressing c/d/i, elevated on pillow  Left inner thigh/groin redness noted  Groin/scrotum red and blanching, janette cream applied  Right inner thigh old healed incison  Right foot dry and intact  PIV in right forearm  Devices in place SCD to right leg, skin assessed under.  Wound consult placed   The following interventions in place preventive mepilex applied, waffle overlay placed on bed, pt turned and repositioned every 2hrs. Bed alarm in place

## 2020-07-28 NOTE — PROGRESS NOTES
[facility] Medicine Daily Progress Note    Date of Service  7/28/2020    Chief Complaint  74 y.o. male admitted 7/27/2020 with with a history of hypertension, chronic pain, history of DVT, peripheral vascular disease, basal cell carcinoma of the face and ear, admitted with difficulty with a recent AKA site, admitted with vascular surgery for revision of a necrotic left AKA stump.    Hospital Course    Admitted for revision of infected necrotic left AKA stump      Interval Problem Update  Patient seen and examined today.    Patient tolerating treatment and therapies.  All Data, Medication data reviewed.  Case discussed with nursing as available.  Plan of Care reviewed with patient and notified of changes.  7/28 the patient complaining of pain, will have additional revision later today, denies fevers or chills, the patient lives in [place], appears poorly kempt.  He denies nausea vomiting or other acute difficulty, preliminary culture positive for MRSA    Consultants/Specialty  Vascular surgery    Code Status  Full code    Disposition  TBD    Review of Systems  Review of Systems   Constitutional: Negative.    HENT: Negative.         Facial cancer   Eyes: Negative.    Respiratory: Negative.    Cardiovascular: Negative.    Gastrointestinal: Negative.    Genitourinary: Negative.    Musculoskeletal: Positive for back pain, joint pain and myalgias.   Skin: Negative.    Neurological: Negative.    Endo/Heme/Allergies: Negative.    Psychiatric/Behavioral: Negative.    All other systems reviewed and are negative.       Physical Exam  Temp:  [36.1 °C (97 °F)-38.1 °C (100.5 °F)] 36.7 °C (98.1 °F)  Pulse:  [60-95] 66  Resp:  [15-48] 20  BP: (118-162)/() 121/61  SpO2:  [91 %-99 %] 94 %    Physical Exam  Vitals signs and nursing note reviewed.   Constitutional:       Appearance: He is well-developed.      Comments: Pt seen and examined.   HENT:      Head: Normocephalic and atraumatic.      Comments: Right facial, right ear  cancer lesion  Eyes:      Pupils: Pupils are equal, round, and reactive to light.   Neck:      Musculoskeletal: Normal range of motion and neck supple.   Cardiovascular:      Rate and Rhythm: Normal rate.      Heart sounds: Normal heart sounds.   Pulmonary:      Effort: Pulmonary effort is normal.      Breath sounds: Normal breath sounds.   Abdominal:      General: Bowel sounds are normal.      Palpations: Abdomen is soft.   Genitourinary:     Penis: Normal.    Musculoskeletal: Normal range of motion.      Comments: Left AKA, dressing in place   Skin:     General: Skin is warm and dry.   Neurological:      Mental Status: He is alert and oriented to person, place, and time.   Psychiatric:         Behavior: Behavior normal.         Fluids    Intake/Output Summary (Last 24 hours) at 7/28/2020 0751  Last data filed at 7/28/2020 0634  Gross per 24 hour   Intake 1127.5 ml   Output 465 ml   Net 662.5 ml       Laboratory  Recent Labs     07/28/20  0538   WBC 20.7*   RBC 3.36*   HEMOGLOBIN 10.7*   HEMATOCRIT 33.7*   .3*   MCH 31.8   MCHC 31.8*   RDW 45.3   PLATELETCT 318   MPV 10.2     Recent Labs     07/28/20  0538   SODIUM 135   POTASSIUM 4.4   CHLORIDE 103   CO2 21   GLUCOSE 251*   BUN 24*   CREATININE 1.11   CALCIUM 8.1*     Recent Labs     07/28/20  0538   INR 1.20*               Imaging  No orders to display        Assessment/Plan  * AKA stump complication (HCC)  Assessment & Plan  Infected AKA, s/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&D   Cont with IV zosyn and vanco, preliminary culture positive for MRSA  Plan repeat I&D   Dr Fisher following   Pain control, pt/ot  Wound care     Diabetes (HCC)- (present on admission)  Assessment & Plan  SSI ordered   accu checks     HLD (hyperlipidemia)- (present on admission)  Assessment & Plan  Resume home statin therpay     Arterial insufficiency (HCC)- (present on admission)  Assessment & Plan  Known history of  Needs antiplatelet and statin when cleared by vascular    HTN (hypertension)- (present on  admission)  Assessment & Plan  Prn anti hypertensive medications ordered    Basal cell carcinoma of face- (present on admission)  Assessment & Plan  Chronic     Basal cell carcinoma of earlobe- (present on admission)  Assessment & Plan  Chronic, outpatient care     Plan  Continue with IV antibiotics until cultures are finalizing  Continue with pain control, surgical care as planned   to assist  See orders  Medically complex high risk  Would need to evaluate home environment and see if the patient needs to be discharged to skilled nursing to allow appropriate wound healing    VTE prophylaxis: Lovenox    I have performed a physical exam and reviewed and updated ROS and Plan today . In review of yesterday's note , there are no changes except as documented above.        Please note that this dictation was created using voice recognition software. I have made every reasonable attempt to correct obvious errors, but I expect that there are errors of grammar and possibly context that I did not discover before finalizing the note.

## 2020-07-28 NOTE — PROGRESS NOTES
"Bedside report received.  Assessment complete.  A&O x 4. Patient calls appropriately.  Patient turns with one assist. Bed alarm on.   Patient has 0/10 pain. Pain managed with prescribed medications.  Denies N&V. Tolerating regular diet.  Surgical dressing and wound vac CDI.  + void, + flatus, last BM 7/27 PTA.  Patient denies SOB.  SCD's on RLE.  Patient is calm and cooperative.  Review plan with of care with patient. Call light and personal belongings with in reach. Hourly rounding in place. All needs met at this time.  /74   Pulse 74   Temp 36.5 °C (97.7 °F) (Temporal)   Resp 16   Ht 1.676 m (5' 6\")   Wt 63 kg (138 lb 14.2 oz)   SpO2 99%   BMI 22.42 kg/m²     "

## 2020-07-29 ENCOUNTER — ANESTHESIA (OUTPATIENT)
Dept: SURGERY | Facility: MEDICAL CENTER | Age: 75
DRG: 475 | End: 2020-07-29
Payer: MEDICARE

## 2020-07-29 ENCOUNTER — ANESTHESIA EVENT (OUTPATIENT)
Dept: SURGERY | Facility: MEDICAL CENTER | Age: 75
DRG: 475 | End: 2020-07-29
Payer: MEDICARE

## 2020-07-29 PROBLEM — R41.0 DELIRIUM: Status: ACTIVE | Noted: 2020-07-29

## 2020-07-29 LAB
ANION GAP SERPL CALC-SCNC: 5 MMOL/L (ref 7–16)
BUN SERPL-MCNC: 15 MG/DL (ref 8–22)
CALCIUM SERPL-MCNC: 8.1 MG/DL (ref 8.5–10.5)
CHLORIDE SERPL-SCNC: 105 MMOL/L (ref 96–112)
CO2 SERPL-SCNC: 26 MMOL/L (ref 20–33)
CREAT SERPL-MCNC: 1.13 MG/DL (ref 0.5–1.4)
EKG IMPRESSION: NORMAL
ERYTHROCYTE [DISTWIDTH] IN BLOOD BY AUTOMATED COUNT: 44.9 FL (ref 35.9–50)
GLUCOSE BLD-MCNC: 151 MG/DL (ref 65–99)
GLUCOSE BLD-MCNC: 151 MG/DL (ref 65–99)
GLUCOSE BLD-MCNC: 254 MG/DL (ref 65–99)
GLUCOSE SERPL-MCNC: 166 MG/DL (ref 65–99)
HCT VFR BLD AUTO: 33.4 % (ref 42–52)
HGB BLD-MCNC: 10.7 G/DL (ref 14–18)
MAGNESIUM SERPL-MCNC: 1.9 MG/DL (ref 1.5–2.5)
MCH RBC QN AUTO: 31.7 PG (ref 27–33)
MCHC RBC AUTO-ENTMCNC: 32 G/DL (ref 33.7–35.3)
MCV RBC AUTO: 98.8 FL (ref 81.4–97.8)
PHOSPHATE SERPL-MCNC: 1.8 MG/DL (ref 2.5–4.5)
PLATELET # BLD AUTO: 330 K/UL (ref 164–446)
PMV BLD AUTO: 10.2 FL (ref 9–12.9)
POTASSIUM SERPL-SCNC: 4.2 MMOL/L (ref 3.6–5.5)
RBC # BLD AUTO: 3.38 M/UL (ref 4.7–6.1)
SODIUM SERPL-SCNC: 136 MMOL/L (ref 135–145)
WBC # BLD AUTO: 13 K/UL (ref 4.8–10.8)

## 2020-07-29 PROCEDURE — 99232 SBSQ HOSP IP/OBS MODERATE 35: CPT | Performed by: HOSPITALIST

## 2020-07-29 PROCEDURE — 160048 HCHG OR STATISTICAL LEVEL 1-5: Performed by: SURGERY

## 2020-07-29 PROCEDURE — 80048 BASIC METABOLIC PNL TOTAL CA: CPT

## 2020-07-29 PROCEDURE — 770021 HCHG ROOM/CARE - ISO PRIVATE

## 2020-07-29 PROCEDURE — 700105 HCHG RX REV CODE 258: Performed by: SURGERY

## 2020-07-29 PROCEDURE — 160027 HCHG SURGERY MINUTES - 1ST 30 MINS LEVEL 2: Performed by: SURGERY

## 2020-07-29 PROCEDURE — 82962 GLUCOSE BLOOD TEST: CPT | Mod: 91

## 2020-07-29 PROCEDURE — A9270 NON-COVERED ITEM OR SERVICE: HCPCS | Performed by: ANESTHESIOLOGY

## 2020-07-29 PROCEDURE — 700105 HCHG RX REV CODE 258: Performed by: HOSPITALIST

## 2020-07-29 PROCEDURE — 700102 HCHG RX REV CODE 250 W/ 637 OVERRIDE(OP): Performed by: ANESTHESIOLOGY

## 2020-07-29 PROCEDURE — 160038 HCHG SURGERY MINUTES - EA ADDL 1 MIN LEVEL 2: Performed by: SURGERY

## 2020-07-29 PROCEDURE — 160002 HCHG RECOVERY MINUTES (STAT): Performed by: SURGERY

## 2020-07-29 PROCEDURE — 85027 COMPLETE CBC AUTOMATED: CPT

## 2020-07-29 PROCEDURE — 501445 HCHG STAPLER, SKIN DISP: Performed by: SURGERY

## 2020-07-29 PROCEDURE — 84100 ASSAY OF PHOSPHORUS: CPT

## 2020-07-29 PROCEDURE — 700102 HCHG RX REV CODE 250 W/ 637 OVERRIDE(OP): Performed by: NURSE PRACTITIONER

## 2020-07-29 PROCEDURE — 700105 HCHG RX REV CODE 258: Performed by: ANESTHESIOLOGY

## 2020-07-29 PROCEDURE — 700105 HCHG RX REV CODE 258

## 2020-07-29 PROCEDURE — 36415 COLL VENOUS BLD VENIPUNCTURE: CPT

## 2020-07-29 PROCEDURE — A9270 NON-COVERED ITEM OR SERVICE: HCPCS | Performed by: NURSE PRACTITIONER

## 2020-07-29 PROCEDURE — 501838 HCHG SUTURE GENERAL: Performed by: SURGERY

## 2020-07-29 PROCEDURE — 700101 HCHG RX REV CODE 250: Performed by: SURGERY

## 2020-07-29 PROCEDURE — 700111 HCHG RX REV CODE 636 W/ 250 OVERRIDE (IP): Performed by: SURGERY

## 2020-07-29 PROCEDURE — 93010 ELECTROCARDIOGRAM REPORT: CPT | Performed by: INTERNAL MEDICINE

## 2020-07-29 PROCEDURE — 83735 ASSAY OF MAGNESIUM: CPT

## 2020-07-29 PROCEDURE — 700101 HCHG RX REV CODE 250: Performed by: ANESTHESIOLOGY

## 2020-07-29 PROCEDURE — 0KBP0ZZ EXCISION OF LEFT HIP MUSCLE, OPEN APPROACH: ICD-10-PCS | Performed by: SURGERY

## 2020-07-29 PROCEDURE — A9270 NON-COVERED ITEM OR SERVICE: HCPCS | Performed by: HOSPITALIST

## 2020-07-29 PROCEDURE — 160009 HCHG ANES TIME/MIN: Performed by: SURGERY

## 2020-07-29 PROCEDURE — 700111 HCHG RX REV CODE 636 W/ 250 OVERRIDE (IP): Performed by: ANESTHESIOLOGY

## 2020-07-29 PROCEDURE — 700102 HCHG RX REV CODE 250 W/ 637 OVERRIDE(OP): Performed by: HOSPITALIST

## 2020-07-29 PROCEDURE — 160035 HCHG PACU - 1ST 60 MINS PHASE I: Performed by: SURGERY

## 2020-07-29 RX ORDER — HYDROMORPHONE HYDROCHLORIDE 1 MG/ML
0.4 INJECTION, SOLUTION INTRAMUSCULAR; INTRAVENOUS; SUBCUTANEOUS
Status: DISCONTINUED | OUTPATIENT
Start: 2020-07-29 | End: 2020-07-29 | Stop reason: HOSPADM

## 2020-07-29 RX ORDER — HALOPERIDOL 5 MG/ML
1 INJECTION INTRAMUSCULAR
Status: DISCONTINUED | OUTPATIENT
Start: 2020-07-29 | End: 2020-07-29 | Stop reason: HOSPADM

## 2020-07-29 RX ORDER — OXYCODONE HCL 5 MG/5 ML
10 SOLUTION, ORAL ORAL
Status: COMPLETED | OUTPATIENT
Start: 2020-07-29 | End: 2020-07-29

## 2020-07-29 RX ORDER — ONDANSETRON 2 MG/ML
INJECTION INTRAMUSCULAR; INTRAVENOUS PRN
Status: DISCONTINUED | OUTPATIENT
Start: 2020-07-29 | End: 2020-07-29 | Stop reason: SURG

## 2020-07-29 RX ORDER — HYDROMORPHONE HYDROCHLORIDE 2 MG/ML
INJECTION, SOLUTION INTRAMUSCULAR; INTRAVENOUS; SUBCUTANEOUS PRN
Status: DISCONTINUED | OUTPATIENT
Start: 2020-07-29 | End: 2020-07-29 | Stop reason: SURG

## 2020-07-29 RX ORDER — MAGNESIUM SULFATE HEPTAHYDRATE 500 MG/ML
INJECTION, SOLUTION INTRAMUSCULAR; INTRAVENOUS PRN
Status: DISCONTINUED | OUTPATIENT
Start: 2020-07-29 | End: 2020-07-29 | Stop reason: SURG

## 2020-07-29 RX ORDER — HALOPERIDOL 5 MG/ML
2-5 INJECTION INTRAMUSCULAR EVERY 6 HOURS PRN
Status: DISCONTINUED | OUTPATIENT
Start: 2020-07-29 | End: 2020-08-12 | Stop reason: HOSPADM

## 2020-07-29 RX ORDER — LORAZEPAM 2 MG/ML
0.5 INJECTION INTRAMUSCULAR
Status: DISCONTINUED | OUTPATIENT
Start: 2020-07-29 | End: 2020-07-29 | Stop reason: HOSPADM

## 2020-07-29 RX ORDER — PHENYLEPHRINE HCL IN 0.9% NACL 0.5 MG/5ML
SYRINGE (ML) INTRAVENOUS PRN
Status: DISCONTINUED | OUTPATIENT
Start: 2020-07-29 | End: 2020-07-29 | Stop reason: SURG

## 2020-07-29 RX ORDER — OXYCODONE HCL 5 MG/5 ML
5 SOLUTION, ORAL ORAL
Status: COMPLETED | OUTPATIENT
Start: 2020-07-29 | End: 2020-07-29

## 2020-07-29 RX ORDER — SODIUM CHLORIDE 9 MG/ML
INJECTION, SOLUTION INTRAVENOUS
Status: COMPLETED
Start: 2020-07-29 | End: 2020-07-29

## 2020-07-29 RX ORDER — DIPHENHYDRAMINE HYDROCHLORIDE 50 MG/ML
12.5 INJECTION INTRAMUSCULAR; INTRAVENOUS
Status: DISCONTINUED | OUTPATIENT
Start: 2020-07-29 | End: 2020-07-29 | Stop reason: HOSPADM

## 2020-07-29 RX ORDER — LIDOCAINE HYDROCHLORIDE 20 MG/ML
INJECTION, SOLUTION EPIDURAL; INFILTRATION; INTRACAUDAL; PERINEURAL PRN
Status: DISCONTINUED | OUTPATIENT
Start: 2020-07-29 | End: 2020-07-29 | Stop reason: SURG

## 2020-07-29 RX ORDER — LABETALOL HYDROCHLORIDE 5 MG/ML
5 INJECTION, SOLUTION INTRAVENOUS
Status: DISCONTINUED | OUTPATIENT
Start: 2020-07-29 | End: 2020-07-29 | Stop reason: HOSPADM

## 2020-07-29 RX ORDER — HYDROMORPHONE HYDROCHLORIDE 1 MG/ML
0.2 INJECTION, SOLUTION INTRAMUSCULAR; INTRAVENOUS; SUBCUTANEOUS
Status: DISCONTINUED | OUTPATIENT
Start: 2020-07-29 | End: 2020-07-29 | Stop reason: HOSPADM

## 2020-07-29 RX ORDER — HYDRALAZINE HYDROCHLORIDE 20 MG/ML
5 INJECTION INTRAMUSCULAR; INTRAVENOUS
Status: DISCONTINUED | OUTPATIENT
Start: 2020-07-29 | End: 2020-07-29 | Stop reason: HOSPADM

## 2020-07-29 RX ORDER — DEXMEDETOMIDINE HYDROCHLORIDE 100 UG/ML
INJECTION, SOLUTION INTRAVENOUS PRN
Status: DISCONTINUED | OUTPATIENT
Start: 2020-07-29 | End: 2020-07-29 | Stop reason: SURG

## 2020-07-29 RX ORDER — SODIUM CHLORIDE, SODIUM LACTATE, POTASSIUM CHLORIDE, CALCIUM CHLORIDE 600; 310; 30; 20 MG/100ML; MG/100ML; MG/100ML; MG/100ML
INJECTION, SOLUTION INTRAVENOUS
Status: DISCONTINUED | OUTPATIENT
Start: 2020-07-29 | End: 2020-07-29 | Stop reason: SURG

## 2020-07-29 RX ORDER — MAGNESIUM HYDROXIDE 1200 MG/15ML
LIQUID ORAL
Status: COMPLETED | OUTPATIENT
Start: 2020-07-29 | End: 2020-07-29

## 2020-07-29 RX ORDER — MEPERIDINE HYDROCHLORIDE 25 MG/ML
6.25 INJECTION INTRAMUSCULAR; INTRAVENOUS; SUBCUTANEOUS
Status: DISCONTINUED | OUTPATIENT
Start: 2020-07-29 | End: 2020-07-29 | Stop reason: HOSPADM

## 2020-07-29 RX ORDER — ROCURONIUM BROMIDE 10 MG/ML
INJECTION, SOLUTION INTRAVENOUS PRN
Status: DISCONTINUED | OUTPATIENT
Start: 2020-07-29 | End: 2020-07-29 | Stop reason: SURG

## 2020-07-29 RX ORDER — HYDROMORPHONE HYDROCHLORIDE 1 MG/ML
0.1 INJECTION, SOLUTION INTRAMUSCULAR; INTRAVENOUS; SUBCUTANEOUS
Status: DISCONTINUED | OUTPATIENT
Start: 2020-07-29 | End: 2020-07-29 | Stop reason: HOSPADM

## 2020-07-29 RX ORDER — ONDANSETRON 2 MG/ML
4 INJECTION INTRAMUSCULAR; INTRAVENOUS
Status: DISCONTINUED | OUTPATIENT
Start: 2020-07-29 | End: 2020-07-29 | Stop reason: HOSPADM

## 2020-07-29 RX ADMIN — INSULIN HUMAN 3 UNITS: 100 INJECTION, SOLUTION PARENTERAL at 21:04

## 2020-07-29 RX ADMIN — ACETAMINOPHEN 650 MG: 325 TABLET, FILM COATED ORAL at 17:01

## 2020-07-29 RX ADMIN — SODIUM CHLORIDE: 9 INJECTION, SOLUTION INTRAVENOUS at 14:58

## 2020-07-29 RX ADMIN — DOCUSATE SODIUM 50 MG AND SENNOSIDES 8.6 MG 2 TABLET: 8.6; 5 TABLET, FILM COATED ORAL at 17:01

## 2020-07-29 RX ADMIN — FENTANYL CITRATE 100 MCG: 50 INJECTION, SOLUTION INTRAMUSCULAR; INTRAVENOUS at 12:46

## 2020-07-29 RX ADMIN — DEXMEDETOMIDINE HYDROCHLORIDE 20 MCG: 100 INJECTION, SOLUTION INTRAVENOUS at 12:10

## 2020-07-29 RX ADMIN — PIPERACILLIN AND TAZOBACTAM 3.38 G: 3; .375 INJECTION, POWDER, LYOPHILIZED, FOR SOLUTION INTRAVENOUS; PARENTERAL at 21:06

## 2020-07-29 RX ADMIN — SODIUM CHLORIDE 500 ML: 9 INJECTION, SOLUTION INTRAVENOUS at 21:10

## 2020-07-29 RX ADMIN — OXYCODONE HYDROCHLORIDE 10 MG: 5 SOLUTION ORAL at 13:32

## 2020-07-29 RX ADMIN — HYDROMORPHONE HYDROCHLORIDE 1000 MCG: 2 INJECTION, SOLUTION INTRAMUSCULAR; INTRAVENOUS; SUBCUTANEOUS at 12:45

## 2020-07-29 RX ADMIN — ACETAMINOPHEN 650 MG: 325 TABLET, FILM COATED ORAL at 04:50

## 2020-07-29 RX ADMIN — Medication 50 MCG: at 12:05

## 2020-07-29 RX ADMIN — FENTANYL CITRATE 50 MCG: 50 INJECTION, SOLUTION INTRAMUSCULAR; INTRAVENOUS at 12:03

## 2020-07-29 RX ADMIN — PIPERACILLIN AND TAZOBACTAM 3.38 G: 3; .375 INJECTION, POWDER, LYOPHILIZED, FOR SOLUTION INTRAVENOUS; PARENTERAL at 04:50

## 2020-07-29 RX ADMIN — Medication 50 MCG: at 12:14

## 2020-07-29 RX ADMIN — VANCOMYCIN HYDROCHLORIDE 1 G: 1 INJECTION, POWDER, LYOPHILIZED, FOR SOLUTION INTRAVENOUS at 12:10

## 2020-07-29 RX ADMIN — MAGNESIUM SULFATE HEPTAHYDRATE 2 G: 500 INJECTION, SOLUTION INTRAMUSCULAR; INTRAVENOUS at 12:30

## 2020-07-29 RX ADMIN — PROPOFOL 100 MG: 10 INJECTION, EMULSION INTRAVENOUS at 12:05

## 2020-07-29 RX ADMIN — ATORVASTATIN CALCIUM 40 MG: 40 TABLET, FILM COATED ORAL at 17:01

## 2020-07-29 RX ADMIN — FENTANYL CITRATE 50 MCG: 50 INJECTION, SOLUTION INTRAMUSCULAR; INTRAVENOUS at 12:32

## 2020-07-29 RX ADMIN — SODIUM PHOSPHATE, MONOBASIC, MONOHYDRATE 30 MMOL: 276; 142 INJECTION, SOLUTION INTRAVENOUS at 17:01

## 2020-07-29 RX ADMIN — FENTANYL CITRATE 50 MCG: 50 INJECTION, SOLUTION INTRAMUSCULAR; INTRAVENOUS at 12:26

## 2020-07-29 RX ADMIN — SODIUM CHLORIDE, POTASSIUM CHLORIDE, SODIUM LACTATE AND CALCIUM CHLORIDE: 600; 310; 30; 20 INJECTION, SOLUTION INTRAVENOUS at 12:00

## 2020-07-29 RX ADMIN — INSULIN HUMAN 1 UNITS: 100 INJECTION, SOLUTION PARENTERAL at 06:09

## 2020-07-29 RX ADMIN — SUGAMMADEX 200 MG: 100 INJECTION, SOLUTION INTRAVENOUS at 12:45

## 2020-07-29 RX ADMIN — ROCURONIUM BROMIDE 50 MG: 10 INJECTION, SOLUTION INTRAVENOUS at 12:05

## 2020-07-29 RX ADMIN — LIDOCAINE HYDROCHLORIDE 40 MG: 20 INJECTION, SOLUTION EPIDURAL; INFILTRATION; INTRACAUDAL at 12:04

## 2020-07-29 RX ADMIN — INSULIN HUMAN 1 UNITS: 100 INJECTION, SOLUTION PARENTERAL at 17:04

## 2020-07-29 RX ADMIN — ONDANSETRON 4 MG: 2 INJECTION INTRAMUSCULAR; INTRAVENOUS at 12:10

## 2020-07-29 ASSESSMENT — PAIN SCALES - GENERAL: PAIN_LEVEL: 3

## 2020-07-29 ASSESSMENT — ENCOUNTER SYMPTOMS
CARDIOVASCULAR NEGATIVE: 1
RESPIRATORY NEGATIVE: 1
BACK PAIN: 1
EYES NEGATIVE: 1
NEUROLOGICAL NEGATIVE: 1
CONSTITUTIONAL NEGATIVE: 1
GASTROINTESTINAL NEGATIVE: 1
PSYCHIATRIC NEGATIVE: 1
MYALGIAS: 1

## 2020-07-29 ASSESSMENT — PATIENT HEALTH QUESTIONNAIRE - PHQ9
SUM OF ALL RESPONSES TO PHQ9 QUESTIONS 1 AND 2: 0
1. LITTLE INTEREST OR PLEASURE IN DOING THINGS: NOT AT ALL
2. FEELING DOWN, DEPRESSED, IRRITABLE, OR HOPELESS: NOT AT ALL

## 2020-07-29 NOTE — CARE PLAN
Problem: Safety  Goal: Will remain free from injury  Outcome: PROGRESSING AS EXPECTED  Goal: Will remain free from falls  Outcome: PROGRESSING AS EXPECTED   Bed alarm in place, pt uses call light appropriately   Problem: Pain Management  Goal: Pain level will decrease to patient's comfort goal  Outcome: PROGRESSING AS EXPECTED   Pain managed with PCA fentanyl   Problem: Psychosocial Needs:  Goal: Level of anxiety will decrease  Outcome: PROGRESSING AS EXPECTED   At beginning of shift pt confused and disoriented x4 now pt back alert and oriented x4. Compliant with instructions

## 2020-07-29 NOTE — PROGRESS NOTES
Report received   Assumed care of patient at 1905 at this time pt is confused & disoriented unable to answer orientation question. Agitated and restless, refusing all treatment. PRN haldol given. Pt then calmed down and was back alert and oriented x4. RN was able to reinsert PIV and get IV fluids, PCA fentanyl, and IV abx restarted. Pt ate dinner that he had previously refused.   Pain reported at 5/10. Educated on use of PCA fentanyl, pt able to return demonstration and push buttom  Nausea denies  RA denies SOB  Tolerating Diabetic Diet and NPO at midnight   Wound vac to left stump c/d/i  Condom cath applied due to incontinence   + BM this shift, incontience   SCD's right leg, left AKA  Daughter called to check on father and was updated on his improving conditon  Bed in lowest position and locked.  Bed alarm in place  Pt has waffle overlay in place and q2 hour turns   Pt resting comfortably now.  Review plan of care with patient  Call light within reach  Hourly rounds in place  All needs met at this time

## 2020-07-29 NOTE — PROGRESS NOTES
"Pharmacy Kinetics      74 y.o. male on Vancomycin Day # 3             2020     Currently on Vancomycin 1,250 mg iv q24hr (1200) - starting tomorrow.   Doses of IV Vancomycin received to date:   · 20: Vancomycin 1,500 mg loading dose at 1436   · 20: Vancomycin 1,250 mg at 2147 (delayed d/t loss of IV access)  · 20: Vancomycin 1,000 mg in OR at 1210      Provider specified end date: TBD - ID consulted. Generic stop date of 20 currently applied to order.      Indication for Treatment: SSTI of left AKA stump     Pertinent history per medical record: Admitted on 2020 for scheduled surgery for revision of necrotic left AKA stump.  Patient is now s/p stump revision.  Procedure note states extensive necrotic muscle and fascia with purulent fluid in the muscle.  Cultures sent.  Vancomycin and zosyn ordered empirically post-op.  Wound VAC in place.      Other antibiotics: Zosyn 3.375g IV q8 hrs x 7 days (ending 8/3/20)     Allergies: No known drug allergy      List concerns for renal function: DM +hyperglycemia (Hgb A1c 7.3), Hypoalbuminemia     Pertinent cultures to date:   20: Left leg vascular graft tissue culture - MRSA (Vanco MASOOD 1) and Group D Enterococcus (further speciation pending)    Recent Labs     20  0538 20  0645   WBC 20.7* 13.0*   NEUTSPOLYS 91.90*  --      Recent Labs     20  0538 20  0645   BUN 24* 15   CREATININE 1.11 1.13   ALBUMIN 1.7*  --      No results for input(s): VANCOTROUGH, VANCOPEAK, VANCORANDOM in the last 72 hours.    Intake/Output Summary (Last 24 hours) at 2020 1532  Last data filed at 2020 1330  Gross per 24 hour   Intake 1496.3 ml   Output 1555 ml   Net -58.7 ml      /65   Pulse 91   Temp 37.2 °C (99 °F) (Temporal)   Resp 18   Ht 1.676 m (5' 6\")   Wt 63 kg (138 lb 14.2 oz)   SpO2 97%  Temp (24hrs), Av.9 °C (98.4 °F), Min:36.4 °C (97.5 °F), Max:37.5 °C (99.5 °F)      A/P   1. Vancomycin dose change: No. "   2. Next vancomycin level: Tomorrow 7/30 at 1130   3. Goal trough: 10 - 15 mcg/mL   4. Comments:   · Plan for a preliminary Vancomycin level tomorrow morning taking into consideration the dosing schedule as outlined up top.   · Renal indices are stable, minimal concerns for accumulation provided renal function remains stable. Leukocytosis resolving. VS WNL. Tmax 99.9 F.   · OR excursion today with Dr. Fisher for stump washout and possible closure.   · ID consulted today - continue current ABX for now, full consult pending.     Kristin Obregon, PharmD, BCPS

## 2020-07-29 NOTE — PROGRESS NOTES
RN paged MD @1909 Dr. Martinez returned page @1952. Rn notified of EKG results; borderline prolonged QT interval and QTc 490. MD gave ok to give 5mg of haldol

## 2020-07-29 NOTE — PROGRESS NOTES
2 RN skin check complete.     Generalized Skin is dry fragile and flaky   Dry blanching redness noted to back   Missing part of left ear  Cancerous growth noted to right cheek and nose  Right elbow pink/ red, and scab will reapply mepilex  Left elbow pink and blanching; will reapply mepilex   Coccyx/sacrum bright red and blanching,will reapply mepilex  Old scab noted to right knee  Left leg AKA wound vac in place.  elevated on pillow  Left inner thigh/groin redness noted  Groin/scrotum red and blanching, janette cream applied  Healed incision in right inner thigh  Right foot dry and intact  PIV in right forearm  Devices in place SCD to right leg    The following interventions in place preventive mepilex reapplied, waffle overlay placed on bed, pt turned and repositioned every 2hrs. Bed alarm in place

## 2020-07-29 NOTE — ANESTHESIA POSTPROCEDURE EVALUATION
Patient: Jimenez Bains    Procedure Summary     Date:  07/29/20 Room / Location:  [place] [id number] / SURGERY [facility]    Anesthesia Start:  1200 Anesthesia Stop:  1303    Procedure:  IRRIGATION AND DEBRIDEMENT, WOUND- WAHOUT AND CLOSURE OF ABOVE KNEE AMPUTATION (Left ) Diagnosis:      Surgeon:  Usman Fisher M.D. Responsible Provider:  Chelsea Vega M.D.    Anesthesia Type:  general ASA Status:  3          Final Anesthesia Type: general  Last vitals  BP   Blood Pressure : 159/75    Temp   36.7 °C (98.1 °F)    Pulse   Pulse: 85   Resp   16    SpO2   96 %      Anesthesia Post Evaluation    Patient location during evaluation: PACU  Patient participation: complete - patient participated  Level of consciousness: awake and alert  Pain score: 3    Airway patency: patent  Anesthetic complications: no  Cardiovascular status: hemodynamically stable  Respiratory status: acceptable  Hydration status: euvolemic    PONV: none           Nurse Pain Score: 3 (NPRS)

## 2020-07-29 NOTE — ANESTHESIA TIME REPORT
Anesthesia Start and Stop Event Times     Date Time Event    7/29/2020 1200 Anesthesia Start     1303 Anesthesia Stop        Responsible Staff  07/29/20    Name Role Begin End    Chelsea Vega M.D. Anesth 1154 1303        Preop Diagnosis (Free Text):  Pre-op Diagnosis             Preop Diagnosis (Codes):    Post op Diagnosis  Amputation stump infection (HCC)      Premium Reason  Non-Premium    Comments:

## 2020-07-29 NOTE — PROGRESS NOTES
[facility] Medicine Daily Progress Note    Date of Service  7/29/2020    Chief Complaint  74 y.o. male admitted 7/27/2020 with with a history of hypertension, chronic pain, history of DVT, peripheral vascular disease, basal cell carcinoma of the face and ear, admitted with difficulty with a recent AKA site, admitted with vascular surgery for revision of a necrotic left AKA stump.    Hospital Course    Admitted for revision of infected necrotic left AKA stump      Interval Problem Update  Patient seen and examined today.    Patient tolerating treatment and therapies.  All Data, Medication data reviewed.  Case discussed with nursing as available.  Plan of Care reviewed with patient and notified of changes.  7/28 the patient complaining of pain, will have additional revision later today, denies fevers or chills, the patient lives in [place], appears poorly kempt.  He denies nausea vomiting or other acute difficulty, preliminary culture positive for MRSA  7/29 the patient is undergoing additional I&D today with surgery, cultures are positive for MRSA and group D enterococcus.  The patient had an episode of agitation and delirium yesterday, this apparently cleared somewhat  Consultants/Specialty  Vascular surgery  Infectious disease  Code Status  Full code    Disposition  TBD    Review of Systems  Review of Systems   Constitutional: Negative.    HENT: Negative.         Facial cancer   Eyes: Negative.    Respiratory: Negative.    Cardiovascular: Negative.    Gastrointestinal: Negative.    Genitourinary: Negative.    Musculoskeletal: Positive for back pain, joint pain and myalgias.   Skin: Negative.    Neurological: Negative.    Endo/Heme/Allergies: Negative.    Psychiatric/Behavioral: Negative.    All other systems reviewed and are negative.       Physical Exam  Temp:  [36.8 °C (98.2 °F)-37.1 °C (98.8 °F)] 37.1 °C (98.8 °F)  Pulse:  [64-90] 72  Resp:  [16-18] 16  BP: (112-157)/(60-66) 152/60  SpO2:  [93 %-97 %] 95  %    Physical Exam  Vitals signs and nursing note reviewed.   Constitutional:       Appearance: He is well-developed.      Comments: Pt seen and examined.   HENT:      Head: Normocephalic and atraumatic.      Comments: Right facial, right ear cancer lesion  Eyes:      Pupils: Pupils are equal, round, and reactive to light.   Neck:      Musculoskeletal: Normal range of motion and neck supple.   Cardiovascular:      Rate and Rhythm: Normal rate.      Heart sounds: Normal heart sounds.   Pulmonary:      Effort: Pulmonary effort is normal.      Breath sounds: Normal breath sounds.   Abdominal:      General: Bowel sounds are normal.      Palpations: Abdomen is soft.   Genitourinary:     Penis: Normal.    Musculoskeletal: Normal range of motion.      Comments: Left AKA, dressing in place   Skin:     General: Skin is warm and dry.   Neurological:      Mental Status: He is alert and oriented to person, place, and time.   Psychiatric:         Behavior: Behavior normal.         Fluids    Intake/Output Summary (Last 24 hours) at 7/29/2020 1126  Last data filed at 7/29/2020 0824  Gross per 24 hour   Intake 236.3 ml   Output 900 ml   Net -663.7 ml       Laboratory  Recent Labs     07/28/20  0538 07/29/20  0645   WBC 20.7* 13.0*   RBC 3.36* 3.38*   HEMOGLOBIN 10.7* 10.7*   HEMATOCRIT 33.7* 33.4*   .3* 98.8*   MCH 31.8 31.7   MCHC 31.8* 32.0*   RDW 45.3 44.9   PLATELETCT 318 330   MPV 10.2 10.2     Recent Labs     07/28/20  0538 07/29/20  0645   SODIUM 135 136   POTASSIUM 4.4 4.2   CHLORIDE 103 105   CO2 21 26   GLUCOSE 251* 166*   BUN 24* 15   CREATININE 1.11 1.13   CALCIUM 8.1* 8.1*     Recent Labs     07/28/20  0538   INR 1.20*               Imaging  No orders to display        Assessment/Plan  * AKA stump complication (HCC)  Assessment & Plan  Infected AKA, s/p I&D   Cultures positive for MRSA and group D enterococcus, ID consult  Plan repeat I&D   Dr Fisher following   Pain control, pt/ot  Wound care      Delirium  Assessment & Plan  Intermittent, likely multifactorial    Diabetes (HCC)- (present on admission)  Assessment & Plan  SSI ordered   accu checks     HLD (hyperlipidemia)- (present on admission)  Assessment & Plan  Resume home statin therpay     Arterial insufficiency (HCC)- (present on admission)  Assessment & Plan  Known history of  Needs antiplatelet and statin when cleared by vascular    HTN (hypertension)- (present on admission)  Assessment & Plan  Prn anti hypertensive medications ordered    Basal cell carcinoma of face- (present on admission)  Assessment & Plan  Chronic     Basal cell carcinoma of earlobe- (present on admission)  Assessment & Plan  Chronic, outpatient care     Plan  ID consult in light of the patient's MRSA and enterococcus infection  Continue with pain control, surgical care as planned   to assist  See orders  Medically complex high risk  Would need to evaluate home environment and see if the patient needs to be discharged to skilled nursing to allow appropriate wound healing    VTE prophylaxis: Lovenox    I have performed a physical exam and reviewed and updated ROS and Plan today . In review of yesterday's note , there are no changes except as documented above.        Please note that this dictation was created using voice recognition software. I have made every reasonable attempt to correct obvious errors, but I expect that there are errors of grammar and possibly context that I did not discover before finalizing the note.

## 2020-07-29 NOTE — PROGRESS NOTES
Report received from [facility] PACU RN. Pt arrived back to the floor via bed with transport. Pt denies any pain or discomfort. Wound vac dressing to the right BKA in place with scant serosanguinous output, intact, on continuous suction. No further needs at this time, hourly rounding in progress.

## 2020-07-29 NOTE — WOUND TEAM
[facility]  Inpatient Services  Initial Wound and Skin Care Evaluation    Admission Date: 7/27/2020     Last order of IP CONSULT TO WOUND CARE was found on 7/27/2020 from Hospital Encounter on 7/24/2020     HPI, PMH, SH: Reviewed    Unit where seen by Wound Team: [id number]     WOUND CONSULT/FOLLOW UP RELATED TO:  Bilateral groin and buttocks/sacrum/coccyx      Self Report / Pain Level:  Pain with movement of left limb        OBJECTIVE:  In bed, surgery today with Dr. Fisher.  Black foam wound VAC dressing in place.     WOUND TYPE, LOCATION, CHARACTERISTICS (Pressure Injuries: location, stage, POA or date identified)      Wound 07/27/20 Pressure Injury Leg;Groin Left sDTI? POA (Active)   Wound Image   07/29/20 1500   Site Assessment Purple;Red    Periwound Assessment Red    Margins Attached edges    Closure None    Drainage Amount None    Treatments Site care    Dressing Cleansing/Solutions Not Applicable    Dressing Options Open to Air    Dressing Status Clean;Dry;Intact    Dressing Change/Treatment Frequency As Needed    NEXT Weekly Photo (Inpatient Only) 08/05/20    Wound Length (cm) 0.9 cm    Wound Width (cm) 14 cm    Wound Surface Area (cm^2) 12.6 cm^2    WOUND NURSE ONLY - Time Spent with Patient (mins) 60    Number of days: 2        Vascular:    PETRONA:   EPTRONA Results, Last 30 Days Us-extremity Artery Lower Unilat Left    Result Date: 6/30/2020  Narrative Lower Extremity  Arterial Duplex Report  Vascular Laboratory  CONCLUSIONS  Left.  Left fem-pop graft showed no flow as well as all native  arteries did not  show any blood flow.  Left EIA showed monophasic flow with velocity 106 cm/sec.  ASHLI HONG  Exam Date:     06/30/2020 12:33  Room #:     Inpatient  Priority:     Stat  Ht (in):     66      Wt (lb):     158  Ordering Physician:        GHANSHYAM REGAN  Referring Physician:  Sonographer:               Lupillo Cast RDCS, RVT  Study Type:                 Complete Unilateral  Technical Quality:         Adequate  Age:    74    Gender:     M  MRN:    [id number]  :    1945      BSA:    1.81  Indications:     Pain in Limb  CPT Codes:       51056  ICD Codes:       729.5  History:         Previous Vascular Surgery - Left Femoral to Popliteal Bypass                    Graft  Limitations:                RIGHT  Waveform        Peak Systolic Velocity (cm/s)                  Prox    Prox-Mid  Mid    Mid-Dist  Distal  Monophasic                        32                       CFA                                                             PFA                                                             SFA                                                             POP                                                             AT                                                             PT                                                             SIRI                LEFT  Waveform        Peak Systolic Velocity (cm/s)                  Prox    Prox-Mid  Mid    Mid-Dist  Distal                                                             CFA                                                             PFA                                                             SFA                                                             POP                                                             AT                                                             PT                                                             SIRI  FINDINGS  Left.  Left fem-pop graft showed no flow as well as all native  arteries did not  show any blood flow.  Left EIA showed monophasic flow with velocity 106 cm/sec.  Right CFA showed monophasic flow with velocity 32 cm/sec.  Gage Melo MD  (Electronically Signed)  Final Date:      2020                   13:36      Lab Values:    Lab Results   Component Value Date/Time    WBC 13.0 (H) 2020 06:45 AM    RBC 3.38 (L) 2020 06:45  AM    HEMOGLOBIN 10.7 (L) 07/29/2020 06:45 AM    HEMATOCRIT 33.4 (L) 07/29/2020 06:45 AM    HBA1C 7.3 (H) 07/27/2020 04:57 PM        Culture Results show:  No results found for this or any previous visit (from the past 720 hour(s)).    INTERVENTIONS BY WOUND TEAM:  Assessed bilateral groin.  Left groin with dark discoloration.  New photo taken.  Left ELIDA.  Turned patient to right side with RN.  Buttocks assessed, peeled back mepilex, area noted to be pink and blanching.  mepilex replaced.  Pillow placed to left side.  Right heel floated with pillow.         Interdisciplinary consultation: Patient, Bedside RN (Hernandez)     EVALUATION: patient went to surgery today with Dr. Fisher for left stump revision.  Wound VAC in place.  Patient with poor blood flow.  Continue to monitor left groin discoloration.      Goals: Steady decrease in wound area and depth weekly.    NURSING PLAN OF CARE ORDERS (X):    Dressing changes: See Dressing Care orders:   Skin care: See Skin Care orders: X  Rectal tube care: See Rectal Tube Care orders:   Other orders:    RSKIN:   CURRENTLY IN PLACE (X), APPLIED THIS VISIT (A), ORDERED (O):   Q shift Alonso:  X  Q shift pressure point assessments:  X  Pressure redistribution mattress          X  Low Airloss          Bariatric SHANNAN         Bariatric foam           Heel float boots     Heel Silicone dressing      X  Float Heels off Bed with Pillows             A  Barrier wipes         Barrier Cream         Barrier paste          Sacral silicone dressing         Silicone O2 tubing         Anchorfast         Cannula fixation Device (Tender )          Gray Foam Ear protectors     High flow offloading Clip    Elastic head band offloading device                                                      Trach with Optifoam split foam       Z Yvan Pillow                             Waffle cushion        Waffle Overlay       X  Rectal tube or BMS    Purwick/Condom Cath          Antifungal tx      Interdry           Reposition q 2 hours      TAPs Turning system                 Up to chair        Ambulate      PT/OT        Dietician        Diabetes Education      PO     TF     TPN     NPO  X # days   Other        WOUND TEAM PLAN OF CARE:   Dressing changes by wound team:                   Follow up 3 times weekly:                NPWT change 3 times weekly:     Follow up 1-2 times weekly:    X  Follow up Bi-Monthly:                   Follow up as needed:       Other (explain):     Anticipated discharge plans: TBA  LTACH:        SNF/Rehab:                  Home Health Care:           Outpatient Wound Center:            Self Care:

## 2020-07-29 NOTE — OR NURSING
"Pt. transported back to room.Verbalized \"some pain\"-oral PRN med. given.MAR.New condom cath placed.  "

## 2020-07-29 NOTE — THERAPY
PT consult received. Planning on return to OR today for washout and possible closure. Will initiate PT evaluation following return to OR. Thanks    Radha Sauer, PT, DPT Pager: [phone]

## 2020-07-29 NOTE — CARE PLAN
A/O x4. VSS. Pain managed well with fentanyl PCA. No concerns, care continues.     Problem: Communication  Goal: The ability to communicate needs accurately and effectively will improve  Outcome: PROGRESSING AS EXPECTED     Problem: Safety  Goal: Will remain free from injury  Outcome: PROGRESSING AS EXPECTED  Goal: Will remain free from falls  Outcome: PROGRESSING AS EXPECTED     Problem: Infection  Goal: Will remain free from infection  Outcome: PROGRESSING AS EXPECTED     Problem: Venous Thromboembolism (VTW)/Deep Vein Thrombosis (DVT) Prevention:  Goal: Patient will participate in Venous Thrombosis (VTE)/Deep Vein Thrombosis (DVT)Prevention Measures  Outcome: PROGRESSING AS EXPECTED     Problem: Bowel/Gastric:  Goal: Normal bowel function is maintained or improved  Outcome: PROGRESSING AS EXPECTED  Goal: Will not experience complications related to bowel motility  Outcome: PROGRESSING AS EXPECTED     Problem: Knowledge Deficit  Goal: Knowledge of disease process/condition, treatment plan, diagnostic tests, and medications will improve  Outcome: PROGRESSING AS EXPECTED  Goal: Knowledge of the prescribed therapeutic regimen will improve  Outcome: PROGRESSING AS EXPECTED     Problem: Discharge Barriers/Planning  Goal: Patient's continuum of care needs will be met  Outcome: PROGRESSING AS EXPECTED     Problem: Pain Management  Goal: Pain level will decrease to patient's comfort goal  Outcome: PROGRESSING AS EXPECTED     Problem: Fluid Volume:  Goal: Will maintain balanced intake and output  Outcome: PROGRESSING AS EXPECTED     Problem: Respiratory:  Goal: Respiratory status will improve  Outcome: PROGRESSING AS EXPECTED     Problem: Skin Integrity  Goal: Risk for impaired skin integrity will decrease  Outcome: PROGRESSING AS EXPECTED     Problem: Mobility  Goal: Risk for activity intolerance will decrease  Outcome: PROGRESSING AS EXPECTED     Problem: Psychosocial Needs:  Goal: Level of anxiety will  decrease  Outcome: PROGRESSING AS EXPECTED

## 2020-07-29 NOTE — PROGRESS NOTES
Vascular    CHARLES    OR today for stump washout, possible closure    Tentatively around noon      Usman Fisher MD  [facility] (General and Vascular Surgery)  Cell: [phone] (text or call is fine, if you don't reach me please try my office)  Office: [phone]  __________________________________________________________________  Patient:Jimenez Fonseca Bains   MRN:[id number]   CSN:[id number]    7/29/2020    8:54 AM

## 2020-07-29 NOTE — ANESTHESIA PROCEDURE NOTES
Airway    Date/Time: 7/29/2020 12:06 PM  Performed by: Chelsea Vega M.D.  Authorized by: Chelsea Vega M.D.     Location:  OR  Urgency:  Elective  Difficult Airway: No    Indications for Airway Management:  Anesthesia      Spontaneous Ventilation: absent    Sedation Level:  Deep  Preoxygenated: Yes    Patient Position:  Sniffing  MILS Maintained Throughout: Yes    Mask Difficulty Assessment:  0 - not attempted  Final Airway Type:  Endotracheal airway  Final Endotracheal Airway:  ETT  Cuffed: Yes    Technique Used for Successful ETT Placement:  Direct laryngoscopy    Insertion Site:  Oral  Blade Type:  August  Laryngoscope Blade/Videolaryngoscope Blade Size:  3  ETT Size (mm):  7.5  Measured from:  Teeth  ETT to Teeth (cm):  22  Placement Verified by: auscultation and capnometry    Cormack-Lehane Classification:  Grade I - full view of glottis  Number of Attempts at Approach:  1  Ventilation Between Attempts:  None  Number of Other Approaches Attempted:  0

## 2020-07-29 NOTE — CONSULTS
ID consult from Dr Danielson  Briefly 74 y.o. male admitted 7/27/2020 with basal cell cancer and peripheral vascular disease who presents to the hospital for revision of necrotic left AKA stump   On 6/30/2020 had LLE critical limb ischemia and underwent AKA  Staples removed 7/17  Seen in ED 7/18 after falling on his stump +bleeding. No infection seen at that time. Surgicel placed over bleeding site  Surgical site subsequently necrosed.    Admitted 7/27 and on S/p I and D 7/27: extensive necrotic muscle and fascia with purulent fluid in the muscle seen intra-op  In OR today for repeat I and D and possible closure   Wound cxs are MRSA and enterococcus  No pictures in media for review  Continue current abx for now  Full consult to follow

## 2020-07-29 NOTE — PROGRESS NOTES
RN paged MD at [phone], returned page [phone]. Rn requesting order to hold lovenox this am due to pt going to surgery. MD gave order

## 2020-07-29 NOTE — THERAPY
Missed Therapy     Patient Name: Jimenez Bains  Age:  74 y.o., Sex:  male  Medical Record #: [id number]  Today's Date: 7/29/2020    Discussed missed therapy with nursing    Planned return to OR today, will hold OT eval until post op.

## 2020-07-29 NOTE — ANESTHESIA PREPROCEDURE EVALUATION
75 yo M with PAD, HTN, BCC, T2DM presenting for I&D and washout of L AKA    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)   (+) Generalized atherosclerosis   (+) HTN (hypertension)   (+) Ischemia of extremity   (+) Limb ischemia   (+) Other specified disorders of arteries and arterioles (HCC)   (+) PVD (peripheral vascular disease) with claudication (HCC)   (+) Peripheral arterial occlusive disease (HCC)   (+) Peripheral artery disease       Physical Exam    Airway   Mallampati: III  TM distance: >3 FB  Neck ROM: full       Cardiovascular - normal exam  Rhythm: regular  Rate: normal  (-) murmur     Dental   (+) upper dentures, lower dentures        Facial Hair   Pulmonary - normal exam  Breath sounds clear to auscultation     Abdominal   (-) obese     Neurological - normal exam         Other findings: Large eschar over facial tumor on right cheek              Anesthesia Plan    ASA 3 (PAD)   ASA physical status 3 criteria: other (comment)    Plan - general       Airway plan will be ETT        Induction: intravenous    Postoperative Plan: Postoperative administration of opioids is intended.    Pertinent diagnostic labs and testing reviewed    Informed Consent:    Anesthetic plan and risks discussed with patient.    Use of blood products discussed with: patient whom consented to blood products.

## 2020-07-30 LAB
ALBUMIN SERPL BCP-MCNC: 1.9 G/DL (ref 3.2–4.9)
ALBUMIN/GLOB SERPL: 0.6 G/DL
ALP SERPL-CCNC: 138 U/L (ref 30–99)
ALT SERPL-CCNC: 35 U/L (ref 2–50)
ANION GAP SERPL CALC-SCNC: 8 MMOL/L (ref 7–16)
AST SERPL-CCNC: 67 U/L (ref 12–45)
BASOPHILS # BLD AUTO: 0.1 % (ref 0–1.8)
BASOPHILS # BLD: 0.02 K/UL (ref 0–0.12)
BILIRUB SERPL-MCNC: 0.5 MG/DL (ref 0.1–1.5)
BUN SERPL-MCNC: 12 MG/DL (ref 8–22)
CALCIUM SERPL-MCNC: 7.8 MG/DL (ref 8.5–10.5)
CHLORIDE SERPL-SCNC: 100 MMOL/L (ref 96–112)
CO2 SERPL-SCNC: 24 MMOL/L (ref 20–33)
CREAT SERPL-MCNC: 0.96 MG/DL (ref 0.5–1.4)
EOSINOPHIL # BLD AUTO: 0.04 K/UL (ref 0–0.51)
EOSINOPHIL NFR BLD: 0.3 % (ref 0–6.9)
ERYTHROCYTE [DISTWIDTH] IN BLOOD BY AUTOMATED COUNT: 45.7 FL (ref 35.9–50)
GLOBULIN SER CALC-MCNC: 3.3 G/DL (ref 1.9–3.5)
GLUCOSE BLD-MCNC: 142 MG/DL (ref 65–99)
GLUCOSE BLD-MCNC: 176 MG/DL (ref 65–99)
GLUCOSE BLD-MCNC: 180 MG/DL (ref 65–99)
GLUCOSE BLD-MCNC: 193 MG/DL (ref 65–99)
GLUCOSE SERPL-MCNC: 154 MG/DL (ref 65–99)
HCT VFR BLD AUTO: 33 % (ref 42–52)
HGB BLD-MCNC: 10.5 G/DL (ref 14–18)
IMM GRANULOCYTES # BLD AUTO: 0.14 K/UL (ref 0–0.11)
IMM GRANULOCYTES NFR BLD AUTO: 1 % (ref 0–0.9)
LYMPHOCYTES # BLD AUTO: 1.01 K/UL (ref 1–4.8)
LYMPHOCYTES NFR BLD: 7.5 % (ref 22–41)
MAGNESIUM SERPL-MCNC: 2.2 MG/DL (ref 1.5–2.5)
MCH RBC QN AUTO: 31.3 PG (ref 27–33)
MCHC RBC AUTO-ENTMCNC: 31.8 G/DL (ref 33.7–35.3)
MCV RBC AUTO: 98.5 FL (ref 81.4–97.8)
MONOCYTES # BLD AUTO: 0.37 K/UL (ref 0–0.85)
MONOCYTES NFR BLD AUTO: 2.7 % (ref 0–13.4)
NEUTROPHILS # BLD AUTO: 11.94 K/UL (ref 1.82–7.42)
NEUTROPHILS NFR BLD: 88.4 % (ref 44–72)
NRBC # BLD AUTO: 0 K/UL
NRBC BLD-RTO: 0 /100 WBC
PLATELET # BLD AUTO: 326 K/UL (ref 164–446)
PMV BLD AUTO: 10.4 FL (ref 9–12.9)
POTASSIUM SERPL-SCNC: 3.9 MMOL/L (ref 3.6–5.5)
PROT SERPL-MCNC: 5.2 G/DL (ref 6–8.2)
RBC # BLD AUTO: 3.35 M/UL (ref 4.7–6.1)
SODIUM SERPL-SCNC: 132 MMOL/L (ref 135–145)
VANCOMYCIN TROUGH SERPL-MCNC: 10.7 UG/ML (ref 10–20)
WBC # BLD AUTO: 13.5 K/UL (ref 4.8–10.8)

## 2020-07-30 PROCEDURE — 700105 HCHG RX REV CODE 258: Performed by: HOSPITALIST

## 2020-07-30 PROCEDURE — 99232 SBSQ HOSP IP/OBS MODERATE 35: CPT | Performed by: HOSPITALIST

## 2020-07-30 PROCEDURE — 36415 COLL VENOUS BLD VENIPUNCTURE: CPT

## 2020-07-30 PROCEDURE — 97166 OT EVAL MOD COMPLEX 45 MIN: CPT

## 2020-07-30 PROCEDURE — 83735 ASSAY OF MAGNESIUM: CPT

## 2020-07-30 PROCEDURE — 700111 HCHG RX REV CODE 636 W/ 250 OVERRIDE (IP): Performed by: INTERNAL MEDICINE

## 2020-07-30 PROCEDURE — A9270 NON-COVERED ITEM OR SERVICE: HCPCS | Performed by: HOSPITALIST

## 2020-07-30 PROCEDURE — 82962 GLUCOSE BLOOD TEST: CPT

## 2020-07-30 PROCEDURE — A9270 NON-COVERED ITEM OR SERVICE: HCPCS | Performed by: NURSE PRACTITIONER

## 2020-07-30 PROCEDURE — 97162 PT EVAL MOD COMPLEX 30 MIN: CPT

## 2020-07-30 PROCEDURE — 700105 HCHG RX REV CODE 258: Performed by: INTERNAL MEDICINE

## 2020-07-30 PROCEDURE — 99223 1ST HOSP IP/OBS HIGH 75: CPT | Performed by: INTERNAL MEDICINE

## 2020-07-30 PROCEDURE — 700111 HCHG RX REV CODE 636 W/ 250 OVERRIDE (IP): Performed by: HOSPITALIST

## 2020-07-30 PROCEDURE — 80053 COMPREHEN METABOLIC PANEL: CPT

## 2020-07-30 PROCEDURE — 700102 HCHG RX REV CODE 250 W/ 637 OVERRIDE(OP): Performed by: NURSE PRACTITIONER

## 2020-07-30 PROCEDURE — 85025 COMPLETE CBC W/AUTO DIFF WBC: CPT

## 2020-07-30 PROCEDURE — 700102 HCHG RX REV CODE 250 W/ 637 OVERRIDE(OP): Performed by: HOSPITALIST

## 2020-07-30 PROCEDURE — 700111 HCHG RX REV CODE 636 W/ 250 OVERRIDE (IP): Performed by: NURSE PRACTITIONER

## 2020-07-30 PROCEDURE — 770021 HCHG ROOM/CARE - ISO PRIVATE

## 2020-07-30 PROCEDURE — 700105 HCHG RX REV CODE 258: Performed by: SURGERY

## 2020-07-30 PROCEDURE — 97535 SELF CARE MNGMENT TRAINING: CPT

## 2020-07-30 PROCEDURE — 80202 ASSAY OF VANCOMYCIN: CPT

## 2020-07-30 PROCEDURE — 700111 HCHG RX REV CODE 636 W/ 250 OVERRIDE (IP): Performed by: SURGERY

## 2020-07-30 RX ORDER — HALOPERIDOL 5 MG/ML
4 INJECTION INTRAMUSCULAR EVERY 8 HOURS PRN
Status: DISCONTINUED | OUTPATIENT
Start: 2020-07-30 | End: 2020-08-12 | Stop reason: HOSPADM

## 2020-07-30 RX ORDER — LORAZEPAM 2 MG/ML
.5-1 INJECTION INTRAMUSCULAR EVERY 6 HOURS PRN
Status: DISCONTINUED | OUTPATIENT
Start: 2020-07-30 | End: 2020-08-12 | Stop reason: HOSPADM

## 2020-07-30 RX ADMIN — ATORVASTATIN CALCIUM 40 MG: 40 TABLET, FILM COATED ORAL at 17:35

## 2020-07-30 RX ADMIN — ENOXAPARIN SODIUM 40 MG: 40 INJECTION SUBCUTANEOUS at 05:46

## 2020-07-30 RX ADMIN — ACETAMINOPHEN 650 MG: 325 TABLET, FILM COATED ORAL at 11:55

## 2020-07-30 RX ADMIN — VANCOMYCIN HYDROCHLORIDE 1250 MG: 500 INJECTION, POWDER, LYOPHILIZED, FOR SOLUTION INTRAVENOUS at 11:53

## 2020-07-30 RX ADMIN — Medication: at 03:28

## 2020-07-30 RX ADMIN — DOCUSATE SODIUM 50 MG AND SENNOSIDES 8.6 MG 2 TABLET: 8.6; 5 TABLET, FILM COATED ORAL at 17:35

## 2020-07-30 RX ADMIN — INSULIN HUMAN 1 UNITS: 100 INJECTION, SOLUTION PARENTERAL at 11:53

## 2020-07-30 RX ADMIN — SODIUM CHLORIDE: 9 INJECTION, SOLUTION INTRAVENOUS at 05:49

## 2020-07-30 RX ADMIN — HALOPERIDOL LACTATE 4 MG: 5 INJECTION, SOLUTION INTRAMUSCULAR at 15:58

## 2020-07-30 RX ADMIN — INSULIN HUMAN 1 UNITS: 100 INJECTION, SOLUTION PARENTERAL at 22:06

## 2020-07-30 RX ADMIN — INSULIN HUMAN 1 UNITS: 100 INJECTION, SOLUTION PARENTERAL at 16:43

## 2020-07-30 RX ADMIN — ACETAMINOPHEN 650 MG: 325 TABLET, FILM COATED ORAL at 05:46

## 2020-07-30 RX ADMIN — DOCUSATE SODIUM 50 MG AND SENNOSIDES 8.6 MG 2 TABLET: 8.6; 5 TABLET, FILM COATED ORAL at 05:46

## 2020-07-30 RX ADMIN — ACETAMINOPHEN 650 MG: 325 TABLET, FILM COATED ORAL at 17:35

## 2020-07-30 RX ADMIN — PIPERACILLIN AND TAZOBACTAM 3.38 G: 3; .375 INJECTION, POWDER, LYOPHILIZED, FOR SOLUTION INTRAVENOUS; PARENTERAL at 05:46

## 2020-07-30 RX ADMIN — AMPICILLIN SODIUM AND SULBACTAM SODIUM 3 G: 2; 1 INJECTION, POWDER, FOR SOLUTION INTRAMUSCULAR; INTRAVENOUS at 17:35

## 2020-07-30 RX ADMIN — SODIUM CHLORIDE: 9 INJECTION, SOLUTION INTRAVENOUS at 20:40

## 2020-07-30 ASSESSMENT — ENCOUNTER SYMPTOMS
EYE REDNESS: 0
STRIDOR: 0
CHILLS: 0
VOMITING: 0
ABDOMINAL PAIN: 0
MEMORY LOSS: 1
FLANK PAIN: 0
SPEECH CHANGE: 0
BACK PAIN: 1
MYALGIAS: 1
EYE DISCHARGE: 0
SHORTNESS OF BREATH: 0
EYE PAIN: 0

## 2020-07-30 ASSESSMENT — COGNITIVE AND FUNCTIONAL STATUS - GENERAL
DAILY ACTIVITIY SCORE: 14
MOBILITY SCORE: 9
TOILETING: A LOT
WALKING IN HOSPITAL ROOM: TOTAL
STANDING UP FROM CHAIR USING ARMS: TOTAL
SUGGESTED CMS G CODE MODIFIER DAILY ACTIVITY: CK
DRESSING REGULAR UPPER BODY CLOTHING: A LOT
MOVING FROM LYING ON BACK TO SITTING ON SIDE OF FLAT BED: UNABLE
TURNING FROM BACK TO SIDE WHILE IN FLAT BAD: A LITTLE
DRESSING REGULAR LOWER BODY CLOTHING: A LOT
EATING MEALS: A LITTLE
HELP NEEDED FOR BATHING: A LOT
CLIMB 3 TO 5 STEPS WITH RAILING: TOTAL
SUGGESTED CMS G CODE MODIFIER MOBILITY: CM
MOVING TO AND FROM BED TO CHAIR: A LOT
PERSONAL GROOMING: A LITTLE

## 2020-07-30 ASSESSMENT — ACTIVITIES OF DAILY LIVING (ADL): TOILETING: INDEPENDENT

## 2020-07-30 ASSESSMENT — GAIT ASSESSMENTS: GAIT LEVEL OF ASSIST: UNABLE TO PARTICIPATE

## 2020-07-30 NOTE — PROGRESS NOTES
[facility] Medicine Daily Progress Note    Date of Service  7/30/2020    Chief Complaint  Necrotic left above-knee amputation stump    Hospital Course      74 y.o. male with a past medical history of hypertension, chronic pain, peripheral vascular disease, basal cell carcinoma of the face admitted 7/27/2020 with with vascular surgery for revision of a necrotic left AKA stump. 7/27 I&D Cul > MRSA & enterococcus. ID consulted . Repeat ID on 7/29          Interval Problem Update  Heart rate 80s-90s, saturating well on room air this morning.  Intermittently agitated confused.  Intermittently refusing care, try to avoid sedating medications unless patient poses a threat on self.  Discussed with nursing.  Status post third I&D yesterday, continue antibiotics according to infectious disease recommendations  Discussed with patient's nurse and with multidisciplinary team during rounds including , and charge nurse.      Consultants/Specialty  Vascular surgery  Infectious disease    Code Status  Full code    Disposition  TBD, intravenous antibiotics    Review of Systems  Review of Systems   Constitutional: Positive for malaise/fatigue. Negative for chills.   HENT:        Facial cancer   Eyes: Negative for pain, discharge and redness.   Respiratory: Negative for shortness of breath and stridor.    Cardiovascular: Negative for chest pain.   Gastrointestinal: Negative for abdominal pain and vomiting.   Genitourinary: Negative for flank pain.   Musculoskeletal: Positive for back pain, joint pain and myalgias.   Skin: Negative for rash.   Neurological: Negative for speech change.   Endo/Heme/Allergies: Negative.    Psychiatric/Behavioral: Positive for memory loss.      Physical Exam  Temp:  [36.6 °C (97.8 °F)-37.7 °C (99.9 °F)] 37.2 °C (98.9 °F)  Pulse:  [79-98] 82  Resp:  [15-18] 18  BP: (108-160)/(61-71) 159/69  SpO2:  [91 %-96 %] 92 %    Physical Exam  Vitals signs and nursing note reviewed.   Constitutional:        Appearance: He is well-developed. He is ill-appearing.      Comments: Chronically ill-appearing   HENT:      Head: Normocephalic and atraumatic.      Comments: Right facial, right ear cancer lesion     Nose:      Comments: Skin lesions on the nares and right face consistent with skin cancer     Mouth/Throat:      Mouth: Mucous membranes are moist.   Eyes:      Pupils: Pupils are equal, round, and reactive to light.   Neck:      Musculoskeletal: Normal range of motion and neck supple.   Cardiovascular:      Rate and Rhythm: Normal rate.      Heart sounds: Normal heart sounds.   Pulmonary:      Effort: Pulmonary effort is normal.      Breath sounds: Normal breath sounds.   Abdominal:      General: Bowel sounds are normal.      Palpations: Abdomen is soft.   Genitourinary:     Penis: Normal.    Musculoskeletal: Normal range of motion.      Comments: Left AKA, dressing in place   Skin:     General: Skin is warm and dry.      Capillary Refill: Capillary refill takes 2 to 3 seconds.   Neurological:      Mental Status: He is alert. He is disoriented.      Comments: Intermittently and variably oriented x1-x3, intermittently agitated.   Psychiatric:      Comments: Intermittently agitated       Fluids    Intake/Output Summary (Last 24 hours) at 7/30/2020 1524  Last data filed at 7/30/2020 1200  Gross per 24 hour   Intake 1704 ml   Output 1400 ml   Net 304 ml     Laboratory  Recent Labs     07/28/20  0538 07/29/20  0645 07/30/20  0535   WBC 20.7* 13.0* 13.5*   RBC 3.36* 3.38* 3.35*   HEMOGLOBIN 10.7* 10.7* 10.5*   HEMATOCRIT 33.7* 33.4* 33.0*   .3* 98.8* 98.5*   MCH 31.8 31.7 31.3   MCHC 31.8* 32.0* 31.8*   RDW 45.3 44.9 45.7   PLATELETCT 318 330 326   MPV 10.2 10.2 10.4     Recent Labs     07/28/20  0538 07/29/20  0645 07/30/20  0535   SODIUM 135 136 132*   POTASSIUM 4.4 4.2 3.9   CHLORIDE 103 105 100   CO2 21 26 24   GLUCOSE 251* 166* 154*   BUN 24* 15 12   CREATININE 1.11 1.13 0.96   CALCIUM 8.1* 8.1* 7.8*     Recent  Labs     07/28/20  0538   INR 1.20*               Imaging  No orders to display     Assessment/Plan  * AKA stump complication (HCC)  Assessment & Plan  Infected AKA, s/p I&D   Cultures positive for MRSA and group D enterococcus, ID consulted   S/p I&D Jul 27 and 29  Pain control,  PT/OT, wound care, antibiotics per ID     Delirium  Assessment & Plan  Intermittent, multifactorial, infection, anesthesia    Arterial insufficiency (HCC)- (present on admission)  Assessment & Plan  Known history, admitted for amputation  Consider antiplatelet when okay with vascular    Diabetes (HCC)- (present on admission)  Assessment & Plan  With hyperglycemia  Glycated hemoglobin 7.3%  Short acting insulin   Accu-Checks, hypoglycemia protocol     HLD (hyperlipidemia)- (present on admission)  Assessment & Plan  Resume home statin therpay      HTN (hypertension)- (present on admission)  Assessment & Plan  As needed enalapril    Basal cell carcinoma of face- (present on admission)  Assessment & Plan  Chronic, outpatient follow-up    Basal cell carcinoma of earlobe- (present on admission)  Assessment & Plan  Chronic, outpatient follow-up     VTE prophylaxis: Lovenox

## 2020-07-30 NOTE — PROGRESS NOTES
"This RN rounded on pt, and noticed pt agitated and pulling at IV lines.   This RN asked pt to stop pulling on IV and covered the IV site. Pt then ripped the IV line and broke it off, then pulled out PIV.  This RN educated the pt on the importance of the IV for both pain medication admin and Antibx admin. When questioned why he pulled it off pt replied \" I don't need it anymore\"   Pt will not answer any orientation questions at this time.   Updated Dr. Miguel, and MD is placing orders for meds   "

## 2020-07-30 NOTE — CARE PLAN
Problem: Communication  Goal: The ability to communicate needs accurately and effectively will improve  Outcome: PROGRESSING AS EXPECTED   Pt updated on POC  Problem: Safety  Goal: Will remain free from injury  Outcome: PROGRESSING AS EXPECTED  Goal: Will remain free from falls  Outcome: PROGRESSING AS EXPECTED   Pt uses call light   Problem: Pain Management  Goal: Pain level will decrease to patient's comfort goal  Outcome: PROGRESSING AS EXPECTED   Pain managed with fentanyl PCA  Problem: Skin Integrity  Goal: Risk for impaired skin integrity will decrease  Outcome: PROGRESSING AS EXPECTED   Pt turned and repositioned every 2 hrs, preventive mepilex to bilateral elbows and sacrum, waffle overlay on mattress

## 2020-07-30 NOTE — PROGRESS NOTES
"Bedside report received.  Assessment complete.  A&O x 2. Patient disoriented to time and situation. \"I don't know the date, 2011? 2019? \" Pt reoriented and educated to use call light for assistnace  Patient  Q2 turns with x1 assist. Bed alarm on.   Patient has 5/10 pain. Pain managed PCA.  Denies N&V. Tolerating regular diabetic diet.  Surgical incision to L BKA with wound vac, no leaks at this time.  + void with condom cath on, + flatus, + BM.  Patient denies SOB.  Review plan with of care with patient. Call light and personal belongings with in reach. Hourly rounding in place. All needs met at this time.  "

## 2020-07-30 NOTE — THERAPY
"Physical Therapy   Initial Evaluation     Patient Name: Jimenez Bains  Age:  74 y.o., Sex:  male  Medical Record #: [id number]  Today's Date: 7/30/2020     Precautions: (P) Fall Risk, Non Weight Bearing Left Lower Extremity    Assessment  Patient is 74 y.o. male s/p L AKA debridement presents with AMS, pain and impaired mobility. Patient is a poor historian and reports that he hasn't been OOB since his AKA. However, notes indicate that he was DC'd home. He was unable to tolerate EOB today and will benefit from continued PT prior to DC. He will need to demo bed mobility, transfers and WC mobility indep for safe DC home. Will need to ensure patient's family has a built a ramp to get into the mobile home. For now, rec placement for further PT.       Plan    Recommend Physical Therapy 3 times per week until therapy goals are met for the following treatments:  Bed Mobility, Gait Training, Manual Therapy, Neuro Re-Education / Balance, Self Care/Home Evaluation, Stair Training, Therapeutic Activities and Therapeutic Exercises    Discharge recommendations:  Recommend post-acute placement for continued physical therapy services prior to discharge home.       Subjective    \"I don't know\"     Objective       07/30/20 1030   Precautions   Precautions Fall Risk;Non Weight Bearing Left Lower Extremity   Comments AKA 6/30   Pain 0 - 10 Group   Location Leg   Location Orientation Left   Pain Rating Scale (NPRS) 8   Therapist Pain Assessment During Activity   Prior Living Situation   Prior Services Unable To Determine At This Time   Housing / Facility Mobile Home   Steps Into Home 5   Steps In Home 0   Bathroom Set up Walk In Shower   Equipment Owned Quad Cane;Front-Wheel Walker;Tub / Shower Seat;Wheelchair   Lives with - Patient's Self Care Capacity Adult Children   Comments Patient is a poor historian. He reports that he doesn't have a WC at home. However, per PT notes from one month ago, he DC'd home with a WC and assist from " family. Patient reports that he's not sure if anyone will be able to help him    Prior Level of Functional Mobility   Bed Mobility Independent   Transfer Status Required Assist   Ambulation Dependent   Distance Ambulation (Feet)   (uses WC since AKA 1 month ago)   Assistive Devices Used Wheelchair   Stairs Dependent   Comments per notes patient's family was planning on building a ramp   History of Falls   History of Falls   (unknown)   Cognition    Cognition / Consciousness X   Orientation Level Not Oriented to Month;Not Oriented to Day;Not Oriented to Reason   Level of Consciousness Alert   Safety Awareness Impulsive;Impaired   Attention Impaired   Sequencing Impaired   Comments patient demos difficulty problem-solving mobility despite Max cueing    Active ROM Lower Body    Active ROM Lower Body  X   Comments L AKA   Strength Lower Body   Lower Body Strength  X   Comments L AKA, R LE with AG strength observed    Strength Upper Body   Upper Body Strength  X   Comments generalized weakness bilaterally    Balance Assessment   Comments unable to assess, as patient unable to get to EOB    Gait Analysis   Gait Level Of Assist Unable to Participate   Bed Mobility    Supine to Sit Maximal Assist  (Max A to get close to EOB but patient in pain and refused)   Scooting Supervised   Rolling Supervised   Skilled Intervention Compensatory Strategies   Functional Mobility   Sit to Stand Unable to Participate   Short Term Goals    Short Term Goal # 1 in 6 visits patient will perform bed mobility with supervision for safe DC home   Short Term Goal # 2 in 6 visits patient will perform all transfers Lucille for safe DC home   Short Term Goal # 3 in 6 visits patient will self-propel 400' in WC with supervision    Education Group   Education Provided Role of Physical Therapist;Weight Bearing Precautions   Role of Physical Therapist Patient Response Patient;Acceptance;Explanation;Demonstration;Verbal Demonstration   Problem List     Problems Pain;Impaired Bed Mobility;Impaired Transfers;Impaired Ambulation;Functional Strength Deficit;Impaired Balance;Decreased Activity Tolerance;Safety Awareness Deficits / Cognition;Motor Planning / Sequencing       Aurea Tavares, PT, DPT, GCS

## 2020-07-30 NOTE — CONSULTS
"DATE OF SERVICE:  07/30/2020    INFECTIOUS DISEASE CONSULTATION    REASON FOR CONSULT:  Infected above-the-knee amputation and vascular graft.      CONSULTING PHYSICIAN:  Abel Danielson MD    HISTORY OF PRESENT ILLNESS:  Please note majority of the history is obtained   from the treating physicians and the medical records as patient is a poor   historian.  This is a 74-year-old white male with severe peripheral vascular   disease who underwent an above-the-knee amputation on 06/30/2020 due to   critical limb ischemia.  He had been by report doing fairly well   postoperatively, was planned to have staple removal in midJuly; however, they   were left in place and the patient subsequently had a ground level fall.  He   \"lost his balance.\"  He struck his surgical site, which \"began to bleed,   soaking through his bandages\" and due to continued bleeding, he decided to   come to the ER for further evaluation on 07/18.  He was not on blood thinners   at that time.  He had no fever, purulent drainage or increased pain or   swelling of the site.  Per the ED physician evaluation, he did have a small   area that had persistent oozing, so Surgicel was placed over that site.  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  On 07/27/2020, he underwent partial prosthetic graft removal, revision   of the left AKA with transection of the mid femur and VAC placement.  During   surgery, he was found to have extensive necrotic muscle and fascia with   purulent fluid within the muscle.  The prosthetic graft material was planned   initially for resection, but came out as a single piece with concern that it   reached all the way up to the groin.  He has been started on vancomycin and   Zosyn.  Patient is denying any current fever, chills, nausea, vomiting, or   diarrhea.  His pain is currently controlled.  He is denying any side effects   related to the antibiotics.  Wound cultures " and prosthetic graft cultures are   positive for MRSA and Enterococcus.  He is currently on vancomycin and Zosyn. Infectious Diseases consulted for antibiotic recommendation and management    REVIEW OF SYSTEMS:  Negative for fever or chills.  He does have pain in his   surgical site.  All other systems are reviewed and are negative.      ALLERGIES:  He has no known drug allergies.      PAST MEDICAL HISTORY:  DVT, chronic pain, hypertension, facial cancer,   peripheral vascular disease.      PAST SURGICAL HISTORY:  He is status post fem-pop bypass in 10/2011,   angioplasty with stent in 2013, toe amputation in 2013, fem-fem bypass in   12/2013, revision of fem-fem bypass in 02/2014, groin exploration in 03/2014,   angiogram and another fem-pop bypass on 07/30/2014, reimplantation   revascularization 07/30/2014, above-the-knee amputation on 06/30/2020.      FAMILY HISTORY:  Patient denied diabetes, heart disease.      SOCIAL HISTORY:  He is a smoker, quit about 4 months ago.  Denies alcohol or   illicit drug use.      PHYSICAL EXAMINATION:    GENERAL:  He is a chronically ill-appearing disheveled male in no acute   distress, looks older than his stated age.    VITAL SIGNS:  T-max on admission was 100.5, current temperature 99, blood   pressure 159/69, pulse of 82, respiratory rate 18, oxygen saturation 92-96% on   room air.  He weighs 63 kilos.    HEENT:  Shows a large nodular lesion on his right cheek, possible associated   lesions on his nose with overlying eschar versus necrosis of the skin.    NECK:  Supple.  There is no JVD or stridor.    CARDIOVASCULAR:  Regular rate and rhythm, unable to auscultate murmurs, rubs,   or gallops.    CHEST:  Grossly clear to auscultation bilaterally, unlabored.  There is no   wheezing or rhonchi.    ABDOMEN:  Soft, nontender, nondistended.  There is no rebound or guarding.    EXTREMITIES:  Show no cyanosis or clubbing on the right.  He has a left high   above-the-knee amputation  with wound VAC in place.  There is no surrounding   erythema or necrosis of the tissues.    SKIN:  Otherwise, warm.  He has facial lesions as stated above.    NEUROLOGIC:  He is awake and alert.  He does follow commands, but he is not   able to really answer any questions or explain why he was admitted to the   hospital or what the events preceded this hospitalization.    PSYCHIATRIC:  Affect is flat.      CURRENT LABORATORY DATA:  White blood cell count on admission was 20.7, now   13.5, H and H of 10.5 and 33, platelets of 326.  Sodium 132, potassium 3.9,   chloride 100, bicarbonate 24, glucose 154, BUN 12, creatinine 0.96, AST of 67,   , alkaline phosphatase 138.  Glycosylated hemoglobin was 7.3.  COVID   was negative on 06/30 and 07/27.  Pathology from his left above-the-knee   amputation revision showed segment of bone demonstrating hemorrhage and   necrosis with bacterial colonization at the prior amputation site.  Bone   marrow from new margin appeared viable, but did show fat necrosis, histiocytes   and some adjacent inflammation.  Prior amputation site showed vascular margin   with moderate stenosis, there is dystrophic calcifications, no osteomyelitis.        ASSESSMENT AND PLAN:  A 74-year-old male with severe peripheral vascular   disease, admitted with kamryn necrosis of his left above-the-knee amputation   site, significant associated purulent myositis, osteomyelitis and   polymicrobial infection.  Concerningly, vascular graft material was found to   be affected and those cultures are also positive.  He is status post revision   amputation on 07/27, but margins are not clear.  Operative cultures are   showing methicillin-resistant staphylococcus aureus and Enterococcus faecalis,   not vancomycin resistant enterococcus.  He is currently receiving vancomycin   and Zosyn.  We will change the Zosyn to Unasyn for the enterococcus and also   for potential concomitant anaerobes given the significant  tissue necrosis.  We   will continue the vancomycin and continue to monitor vanco troughs and renal   function for nephrotoxicity.  He does have diabetes, his hemoglobin A1c was   7.3.  He will need good blood sugar control.  He does have hyperlipidemia, he   is on a statin.  So if he is to be changed to daptomycin some point, this will   need to be monitored closely.  He does have severe arterial insufficiency.    His prior amputation did show evidence of vascular calcifications and vascular   disease at the amputation site.  Anticipate he may have continued poor wound   healing.  He has basal cell carcinomas of the face that are quite large,   should have oncology evaluation if feasible and/or surgical.  Further   recommendations per culture results and clinical course.  Discussed with   hospitalist.      Thank you and we will follow with you.       ____________________________________     MD AMAURY MELENDEZ / BISI    DD:  07/30/2020 12:55:24  DT:  07/30/2020 16:01:49    D#:  [id number]  Job#:  [id number]

## 2020-07-30 NOTE — PROGRESS NOTES
"Pharmacy Kinetics 74 y.o. male on vancomycin day # 4 2020    Currently on Vancomycin 1,250 mg iv q24hr (1200)   Doses of IV Vancomycin received to date:   ? 20: Vancomycin 1,500 mg loading dose at 1436   ? 20: Vancomycin 1,250 mg at 2147 (delayed d/t loss of IV access)  ? 20: Vancomycin 1,000 mg in OR at 1210      Provider specified end date: TBD - ID consulted. Generic stop date of 20 currently applied to order.      Indication for Treatment: SSTI of left AKA stump     Pertinent history per medical record: Admitted on 2020 for scheduled surgery for revision of necrotic left AKA stump.  Patient is now s/p stump revision.  Procedure note states extensive necrotic muscle and fascia with purulent fluid in the muscle.  Cultures sent.  Vancomycin and zosyn ordered empirically post-op.  Wound VAC in place.      Other antibiotics: Unasyn     Allergies: No known drug allergy      List concerns for renal function: DM +hyperglycemia (Hgb A1c 7.3), Hypoalbuminemia     Pertinent cultures to date:   20: Left leg vascular graft tissue culture - MRSA (Vanco MASOOD 1) and Group D Enterococcus (further speciation pending)    Recent Labs     20  0538 20  0645 20  0535   WBC 20.7* 13.0* 13.5*   NEUTSPOLYS 91.90*  --  88.40*     Recent Labs     20  0538 20  0645 20  0535   BUN 24* 15 12   CREATININE 1.11 1.13 0.96   ALBUMIN 1.7*  --  1.9*     Recent Labs     20  1149   VANCOTROUGH 10.7       Intake/Output Summary (Last 24 hours) at 2020 1318  Last data filed at 2020 1200  Gross per 24 hour   Intake 2364 ml   Output 1400 ml   Net 964 ml      /69   Pulse 82   Temp 37.2 °C (98.9 °F) (Temporal)   Resp 18   Ht 1.676 m (5' 6\")   Wt 63 kg (138 lb 14.2 oz)   SpO2 92%  Temp (24hrs), Av.1 °C (98.8 °F), Min:36.4 °C (97.6 °F), Max:37.7 °C (99.9 °F)      A/P   1. Vancomycin dose change:Not indicated at this time. Continue with vancomycin 1250 mg IV " q24H (1200)  2. Next vancomycin level: ~3 days or sooner pending renal function  3. Goal trough: 10-15 mcg/mL  4. Comments: Vancomycin trough therapeutic. Scr improving. CMP X 7 days ordered to assess renal function while on vancomycin. Patient was switched from Zosyn to Unasyn. ID is following. Pharmacy will continue to monitor.    Bia Harris, MarieD, BCPS

## 2020-07-30 NOTE — CARE PLAN
Problem: Pain Management  Goal: Pain level will decrease to patient's comfort goal  Outcome: PROGRESSING AS EXPECTED   PCA in use, pt pain is managed.   Problem: Mobility  Goal: Risk for activity intolerance will decrease  Outcome: PROGRESSING SLOWER THAN EXPECTED   PT worked with pt, did not tolerate well. L AKA is very painful with movement

## 2020-07-30 NOTE — PROGRESS NOTES
Bedside Report received   Assumed care of patient at 1859.    Pt is A&O x3 disoriented to time  RA denies SOB  Pain reported at 3/10. Pain managed with fentanyl PCA  Nausea denies  Tolerating Diabetic Diet   Wound vac to left stump c/d/i  Preventive mepilex to bilateral elbows and sacrum   + Urine output, condom cath in place  last BM 7/28   + Flatus  SCD's right leg only  Daughter visiting   Bed in lowest position and locked.  Bed alarm in place  Pt resting comfortably now.  Review plan of care with patient  Call light within reach  Hourly rounds in place  All needs met at this time

## 2020-07-30 NOTE — PROGRESS NOTES
2 RN skin check    Generalized Skin is dry fragile and flaky   Dry blanching redness noted to back   Missing part of left ear  Cancerous growth noted to right cheek and nose  Right elbow pink/ red, and scab will reapply mepilex  Left elbow pink and blanching; will reapply mepilex   Coccyx/sacrum bright red and blanching,will reapply mepilex  Old scab noted to right knee  Left leg AKA wound vac in place.  elevated on pillow  Left inner thigh/groin redness noted  Groin/scrotum red and blanching, janette cream applied  Healed incision in right inner thigh  Right foot dry and intact  PIV in right forearm  Devices in place SCD to right leg     The following interventions in place preventive mepilex reapplied, waffle overlay placed on bed, pt turned and repositioned every 2hrs. Bed alarm in place

## 2020-07-31 ENCOUNTER — ANESTHESIA EVENT (OUTPATIENT)
Dept: SURGERY | Facility: MEDICAL CENTER | Age: 75
DRG: 475 | End: 2020-07-31
Payer: MEDICARE

## 2020-07-31 ENCOUNTER — ANESTHESIA (OUTPATIENT)
Dept: SURGERY | Facility: MEDICAL CENTER | Age: 75
DRG: 475 | End: 2020-07-31
Payer: MEDICARE

## 2020-07-31 LAB
ALBUMIN SERPL BCP-MCNC: 2.1 G/DL (ref 3.2–4.9)
ALBUMIN/GLOB SERPL: 0.7 G/DL
ALP SERPL-CCNC: 138 U/L (ref 30–99)
ALT SERPL-CCNC: 36 U/L (ref 2–50)
ANION GAP SERPL CALC-SCNC: 10 MMOL/L (ref 7–16)
AST SERPL-CCNC: 65 U/L (ref 12–45)
BASOPHILS # BLD AUTO: 0.2 % (ref 0–1.8)
BASOPHILS # BLD: 0.02 K/UL (ref 0–0.12)
BILIRUB SERPL-MCNC: 0.5 MG/DL (ref 0.1–1.5)
BUN SERPL-MCNC: 9 MG/DL (ref 8–22)
CALCIUM SERPL-MCNC: 8.1 MG/DL (ref 8.5–10.5)
CHLORIDE SERPL-SCNC: 100 MMOL/L (ref 96–112)
CO2 SERPL-SCNC: 24 MMOL/L (ref 20–33)
CREAT SERPL-MCNC: 0.85 MG/DL (ref 0.5–1.4)
EOSINOPHIL # BLD AUTO: 0.07 K/UL (ref 0–0.51)
EOSINOPHIL NFR BLD: 0.5 % (ref 0–6.9)
ERYTHROCYTE [DISTWIDTH] IN BLOOD BY AUTOMATED COUNT: 45.2 FL (ref 35.9–50)
GLOBULIN SER CALC-MCNC: 3.1 G/DL (ref 1.9–3.5)
GLUCOSE BLD-MCNC: 128 MG/DL (ref 65–99)
GLUCOSE BLD-MCNC: 132 MG/DL (ref 65–99)
GLUCOSE BLD-MCNC: 144 MG/DL (ref 65–99)
GLUCOSE SERPL-MCNC: 159 MG/DL (ref 65–99)
HCT VFR BLD AUTO: 32.3 % (ref 42–52)
HGB BLD-MCNC: 10.3 G/DL (ref 14–18)
IMM GRANULOCYTES # BLD AUTO: 0.11 K/UL (ref 0–0.11)
IMM GRANULOCYTES NFR BLD AUTO: 0.9 % (ref 0–0.9)
LYMPHOCYTES # BLD AUTO: 1.02 K/UL (ref 1–4.8)
LYMPHOCYTES NFR BLD: 8 % (ref 22–41)
MAGNESIUM SERPL-MCNC: 1.9 MG/DL (ref 1.5–2.5)
MCH RBC QN AUTO: 31.6 PG (ref 27–33)
MCHC RBC AUTO-ENTMCNC: 31.9 G/DL (ref 33.7–35.3)
MCV RBC AUTO: 99.1 FL (ref 81.4–97.8)
MONOCYTES # BLD AUTO: 0.35 K/UL (ref 0–0.85)
MONOCYTES NFR BLD AUTO: 2.7 % (ref 0–13.4)
NEUTROPHILS # BLD AUTO: 11.16 K/UL (ref 1.82–7.42)
NEUTROPHILS NFR BLD: 87.7 % (ref 44–72)
NRBC # BLD AUTO: 0 K/UL
NRBC BLD-RTO: 0 /100 WBC
PLATELET # BLD AUTO: 362 K/UL (ref 164–446)
PMV BLD AUTO: 9.3 FL (ref 9–12.9)
POTASSIUM SERPL-SCNC: 3.6 MMOL/L (ref 3.6–5.5)
PROT SERPL-MCNC: 5.2 G/DL (ref 6–8.2)
RBC # BLD AUTO: 3.26 M/UL (ref 4.7–6.1)
SODIUM SERPL-SCNC: 134 MMOL/L (ref 135–145)
WBC # BLD AUTO: 12.7 K/UL (ref 4.8–10.8)

## 2020-07-31 PROCEDURE — 501838 HCHG SUTURE GENERAL: Performed by: SURGERY

## 2020-07-31 PROCEDURE — 700105 HCHG RX REV CODE 258: Performed by: HOSPITALIST

## 2020-07-31 PROCEDURE — 83735 ASSAY OF MAGNESIUM: CPT

## 2020-07-31 PROCEDURE — 700111 HCHG RX REV CODE 636 W/ 250 OVERRIDE (IP): Performed by: ANESTHESIOLOGY

## 2020-07-31 PROCEDURE — 502240 HCHG MISC OR SUPPLY RC 0272: Performed by: SURGERY

## 2020-07-31 PROCEDURE — 99233 SBSQ HOSP IP/OBS HIGH 50: CPT | Performed by: INTERNAL MEDICINE

## 2020-07-31 PROCEDURE — A9270 NON-COVERED ITEM OR SERVICE: HCPCS | Performed by: HOSPITALIST

## 2020-07-31 PROCEDURE — 160038 HCHG SURGERY MINUTES - EA ADDL 1 MIN LEVEL 2: Performed by: SURGERY

## 2020-07-31 PROCEDURE — 85025 COMPLETE CBC W/AUTO DIFF WBC: CPT

## 2020-07-31 PROCEDURE — 80053 COMPREHEN METABOLIC PANEL: CPT

## 2020-07-31 PROCEDURE — 700105 HCHG RX REV CODE 258: Performed by: INTERNAL MEDICINE

## 2020-07-31 PROCEDURE — 700102 HCHG RX REV CODE 250 W/ 637 OVERRIDE(OP): Performed by: ANESTHESIOLOGY

## 2020-07-31 PROCEDURE — 160048 HCHG OR STATISTICAL LEVEL 1-5: Performed by: SURGERY

## 2020-07-31 PROCEDURE — 160027 HCHG SURGERY MINUTES - 1ST 30 MINS LEVEL 2: Performed by: SURGERY

## 2020-07-31 PROCEDURE — 82962 GLUCOSE BLOOD TEST: CPT | Mod: 91

## 2020-07-31 PROCEDURE — 700105 HCHG RX REV CODE 258: Performed by: SURGERY

## 2020-07-31 PROCEDURE — 501445 HCHG STAPLER, SKIN DISP: Performed by: SURGERY

## 2020-07-31 PROCEDURE — 770021 HCHG ROOM/CARE - ISO PRIVATE

## 2020-07-31 PROCEDURE — 700101 HCHG RX REV CODE 250: Performed by: ANESTHESIOLOGY

## 2020-07-31 PROCEDURE — 700102 HCHG RX REV CODE 250 W/ 637 OVERRIDE(OP): Performed by: NURSE PRACTITIONER

## 2020-07-31 PROCEDURE — 0KBP0ZZ EXCISION OF LEFT HIP MUSCLE, OPEN APPROACH: ICD-10-PCS | Performed by: SURGERY

## 2020-07-31 PROCEDURE — 36415 COLL VENOUS BLD VENIPUNCTURE: CPT

## 2020-07-31 PROCEDURE — 99232 SBSQ HOSP IP/OBS MODERATE 35: CPT | Performed by: HOSPITALIST

## 2020-07-31 PROCEDURE — 700105 HCHG RX REV CODE 258: Performed by: ANESTHESIOLOGY

## 2020-07-31 PROCEDURE — 700102 HCHG RX REV CODE 250 W/ 637 OVERRIDE(OP): Performed by: HOSPITALIST

## 2020-07-31 PROCEDURE — 160009 HCHG ANES TIME/MIN: Performed by: SURGERY

## 2020-07-31 PROCEDURE — 160002 HCHG RECOVERY MINUTES (STAT): Performed by: SURGERY

## 2020-07-31 PROCEDURE — 160035 HCHG PACU - 1ST 60 MINS PHASE I: Performed by: SURGERY

## 2020-07-31 PROCEDURE — 700111 HCHG RX REV CODE 636 W/ 250 OVERRIDE (IP): Performed by: INTERNAL MEDICINE

## 2020-07-31 PROCEDURE — 160036 HCHG PACU - EA ADDL 30 MINS PHASE I: Performed by: SURGERY

## 2020-07-31 PROCEDURE — A9270 NON-COVERED ITEM OR SERVICE: HCPCS | Performed by: NURSE PRACTITIONER

## 2020-07-31 PROCEDURE — 700111 HCHG RX REV CODE 636 W/ 250 OVERRIDE (IP): Performed by: SURGERY

## 2020-07-31 PROCEDURE — A9270 NON-COVERED ITEM OR SERVICE: HCPCS | Performed by: ANESTHESIOLOGY

## 2020-07-31 RX ORDER — OXYCODONE HCL 5 MG/5 ML
10 SOLUTION, ORAL ORAL
Status: COMPLETED | OUTPATIENT
Start: 2020-07-31 | End: 2020-07-31

## 2020-07-31 RX ORDER — SODIUM CHLORIDE, SODIUM LACTATE, POTASSIUM CHLORIDE, CALCIUM CHLORIDE 600; 310; 30; 20 MG/100ML; MG/100ML; MG/100ML; MG/100ML
INJECTION, SOLUTION INTRAVENOUS
Status: DISCONTINUED | OUTPATIENT
Start: 2020-07-31 | End: 2020-07-31 | Stop reason: HOSPADM

## 2020-07-31 RX ORDER — CEFAZOLIN SODIUM 1 G/3ML
INJECTION, POWDER, FOR SOLUTION INTRAMUSCULAR; INTRAVENOUS PRN
Status: DISCONTINUED | OUTPATIENT
Start: 2020-07-31 | End: 2020-07-31 | Stop reason: SURG

## 2020-07-31 RX ORDER — HYDROMORPHONE HYDROCHLORIDE 1 MG/ML
0.4 INJECTION, SOLUTION INTRAMUSCULAR; INTRAVENOUS; SUBCUTANEOUS
Status: DISCONTINUED | OUTPATIENT
Start: 2020-07-31 | End: 2020-07-31 | Stop reason: HOSPADM

## 2020-07-31 RX ORDER — HYDROMORPHONE HYDROCHLORIDE 1 MG/ML
0.1 INJECTION, SOLUTION INTRAMUSCULAR; INTRAVENOUS; SUBCUTANEOUS
Status: DISCONTINUED | OUTPATIENT
Start: 2020-07-31 | End: 2020-07-31 | Stop reason: HOSPADM

## 2020-07-31 RX ORDER — HALOPERIDOL 5 MG/ML
1 INJECTION INTRAMUSCULAR
Status: DISCONTINUED | OUTPATIENT
Start: 2020-07-31 | End: 2020-07-31 | Stop reason: HOSPADM

## 2020-07-31 RX ORDER — OXYCODONE HCL 5 MG/5 ML
5 SOLUTION, ORAL ORAL
Status: COMPLETED | OUTPATIENT
Start: 2020-07-31 | End: 2020-07-31

## 2020-07-31 RX ORDER — LIDOCAINE HYDROCHLORIDE 20 MG/ML
INJECTION, SOLUTION EPIDURAL; INFILTRATION; INTRACAUDAL; PERINEURAL PRN
Status: DISCONTINUED | OUTPATIENT
Start: 2020-07-31 | End: 2020-07-31 | Stop reason: SURG

## 2020-07-31 RX ORDER — ONDANSETRON 2 MG/ML
4 INJECTION INTRAMUSCULAR; INTRAVENOUS
Status: DISCONTINUED | OUTPATIENT
Start: 2020-07-31 | End: 2020-07-31 | Stop reason: HOSPADM

## 2020-07-31 RX ORDER — ONDANSETRON 2 MG/ML
INJECTION INTRAMUSCULAR; INTRAVENOUS PRN
Status: DISCONTINUED | OUTPATIENT
Start: 2020-07-31 | End: 2020-07-31 | Stop reason: SURG

## 2020-07-31 RX ORDER — DEXMEDETOMIDINE HYDROCHLORIDE 100 UG/ML
INJECTION, SOLUTION INTRAVENOUS PRN
Status: DISCONTINUED | OUTPATIENT
Start: 2020-07-31 | End: 2020-07-31 | Stop reason: SURG

## 2020-07-31 RX ORDER — HYDROMORPHONE HYDROCHLORIDE 1 MG/ML
0.2 INJECTION, SOLUTION INTRAMUSCULAR; INTRAVENOUS; SUBCUTANEOUS
Status: DISCONTINUED | OUTPATIENT
Start: 2020-07-31 | End: 2020-07-31 | Stop reason: HOSPADM

## 2020-07-31 RX ORDER — HYDROMORPHONE HYDROCHLORIDE 2 MG/ML
INJECTION, SOLUTION INTRAMUSCULAR; INTRAVENOUS; SUBCUTANEOUS PRN
Status: DISCONTINUED | OUTPATIENT
Start: 2020-07-31 | End: 2020-07-31 | Stop reason: SURG

## 2020-07-31 RX ADMIN — ONDANSETRON 4 MG: 2 INJECTION INTRAMUSCULAR; INTRAVENOUS at 17:36

## 2020-07-31 RX ADMIN — ACETAMINOPHEN 650 MG: 325 TABLET, FILM COATED ORAL at 06:05

## 2020-07-31 RX ADMIN — AMPICILLIN SODIUM AND SULBACTAM SODIUM 3 G: 2; 1 INJECTION, POWDER, FOR SOLUTION INTRAMUSCULAR; INTRAVENOUS at 06:04

## 2020-07-31 RX ADMIN — ATORVASTATIN CALCIUM 40 MG: 40 TABLET, FILM COATED ORAL at 20:44

## 2020-07-31 RX ADMIN — VANCOMYCIN HYDROCHLORIDE 1250 MG: 500 INJECTION, POWDER, LYOPHILIZED, FOR SOLUTION INTRAVENOUS at 12:12

## 2020-07-31 RX ADMIN — PROPOFOL 50 MG: 10 INJECTION, EMULSION INTRAVENOUS at 17:04

## 2020-07-31 RX ADMIN — HYDROMORPHONE HYDROCHLORIDE 1 MG: 2 INJECTION, SOLUTION INTRAMUSCULAR; INTRAVENOUS; SUBCUTANEOUS at 17:00

## 2020-07-31 RX ADMIN — OXYCODONE HYDROCHLORIDE 10 MG: 5 SOLUTION ORAL at 18:22

## 2020-07-31 RX ADMIN — SODIUM CHLORIDE: 9 INJECTION, SOLUTION INTRAVENOUS at 20:36

## 2020-07-31 RX ADMIN — SODIUM CHLORIDE: 9 INJECTION, SOLUTION INTRAVENOUS at 06:09

## 2020-07-31 RX ADMIN — ENOXAPARIN SODIUM 40 MG: 40 INJECTION SUBCUTANEOUS at 06:04

## 2020-07-31 RX ADMIN — HYDROMORPHONE HYDROCHLORIDE 1 MG: 2 INJECTION, SOLUTION INTRAMUSCULAR; INTRAVENOUS; SUBCUTANEOUS at 17:10

## 2020-07-31 RX ADMIN — AMPICILLIN SODIUM AND SULBACTAM SODIUM 3 G: 2; 1 INJECTION, POWDER, FOR SOLUTION INTRAMUSCULAR; INTRAVENOUS at 12:12

## 2020-07-31 RX ADMIN — SODIUM CHLORIDE, POTASSIUM CHLORIDE, SODIUM LACTATE AND CALCIUM CHLORIDE: 600; 310; 30; 20 INJECTION, SOLUTION INTRAVENOUS at 16:54

## 2020-07-31 RX ADMIN — DEXMEDETOMIDINE HYDROCHLORIDE 20 MCG: 100 INJECTION, SOLUTION INTRAVENOUS at 17:04

## 2020-07-31 RX ADMIN — AMPICILLIN SODIUM AND SULBACTAM SODIUM 3 G: 2; 1 INJECTION, POWDER, FOR SOLUTION INTRAMUSCULAR; INTRAVENOUS at 23:44

## 2020-07-31 RX ADMIN — CEFAZOLIN 2 G: 330 INJECTION, POWDER, FOR SOLUTION INTRAMUSCULAR; INTRAVENOUS at 17:00

## 2020-07-31 RX ADMIN — LIDOCAINE HYDROCHLORIDE 60 MG: 20 INJECTION, SOLUTION EPIDURAL; INFILTRATION; INTRACAUDAL at 17:04

## 2020-07-31 RX ADMIN — AMPICILLIN SODIUM AND SULBACTAM SODIUM 3 G: 2; 1 INJECTION, POWDER, FOR SOLUTION INTRAMUSCULAR; INTRAVENOUS at 00:30

## 2020-07-31 ASSESSMENT — ENCOUNTER SYMPTOMS
EYE REDNESS: 0
SPEECH CHANGE: 0
BACK PAIN: 1
MEMORY LOSS: 1
CHILLS: 0
MYALGIAS: 1
SHORTNESS OF BREATH: 0
STRIDOR: 0
ABDOMINAL PAIN: 0
VOMITING: 0
EYE DISCHARGE: 0
EYE PAIN: 0
FLANK PAIN: 0
FEVER: 0

## 2020-07-31 ASSESSMENT — PAIN SCALES - GENERAL: PAIN_LEVEL: 5

## 2020-07-31 NOTE — PROGRESS NOTES
"Pharmacy Kinetics 74 y.o. male on vancomycin day # 5 2020    Currently on Vancomycin 1,250 mg iv q24hr (1200)   Doses of IV Vancomycin received to date:   ? 20: Vancomycin 1,500 mg loading dose at 1436   ? 20: Vancomycin 1,250 mg at 2147 (delayed d/t loss of IV access)  ? 20: Vancomycin 1,000 mg in OR at 1210      Provider specified end date: TBD - ID consulted. Generic stop date of 20 currently applied to order.      Indication for Treatment: SSTI of left AKA stump     Pertinent history per medical record: Admitted on 2020 for scheduled surgery for revision of necrotic left AKA stump.  Patient is now s/p stump revision.  Procedure note states extensive necrotic muscle and fascia with purulent fluid in the muscle.  Cultures sent.  Vancomycin and zosyn ordered empirically post-op.  Wound VAC in place.      Other antibiotics: Unasyn     Allergies: No known drug allergy      List concerns for renal function: DM +hyperglycemia (Hgb A1c 7.3), Hypoalbuminemia     Pertinent cultures to date:   20: Left leg vascular graft tissue culture - MRSA (Vanco MASOOD 1) and Group D Enterococcus     Recent Labs     20  0645 20  0535 20  0632   WBC 13.0* 13.5* 12.7*   NEUTSPOLYS  --  88.40* 87.70*     Recent Labs     20  0645 20  0535 20  0632   BUN 15 12 9   CREATININE 1.13 0.96 0.85   ALBUMIN  --  1.9* 2.1*     Recent Labs     20  1149   VANCOTROUGH 10.7       Intake/Output Summary (Last 24 hours) at 2020 0908  Last data filed at 2020 0709  Gross per 24 hour   Intake 816.45 ml   Output 1200 ml   Net -383.55 ml      /93   Pulse 79   Temp 36.4 °C (97.6 °F) (Temporal)   Resp 17   Ht 1.676 m (5' 6\")   Wt 63 kg (138 lb 14.2 oz)   SpO2 98%  Temp (24hrs), Av.2 °C (98.9 °F), Min:36.4 °C (97.6 °F), Max:37.5 °C (99.5 °F)      A/P   1. Vancomycin dose change:Not indicated at this time. Continue with vancomycin 1250 mg IV q24H (1200)  2. Next " vancomycin level: ~3 days or sooner pending renal function  3. Goal trough: 10-15 mcg/mL  4. Comments: Scr stable. Daily CMP to monitor renal function while on vancomycin. WBC trending down. Patient is being followed by ID. Pharmacy will continue to monitor.       Bia Harris, MarieD, BCPS

## 2020-07-31 NOTE — ANESTHESIA PREPROCEDURE EVALUATION
75 y/o male with HTN, PVD, Type 2 DM, with recent BKA now with infection of stump with MRSA; No problems with anesthesia in the past, Has had some delerium with this hospitalization.    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)   (+) Generalized atherosclerosis   (+) HTN (hypertension)   (+) Ischemia of extremity   (+) Limb ischemia   (+) Other specified disorders of arteries and arterioles (HCC)   (+) PVD (peripheral vascular disease) with claudication (HCC)   (+) Peripheral arterial occlusive disease (HCC)   (+) Peripheral artery disease       Physical Exam    Airway   Mallampati: II  TM distance: >3 FB  Neck ROM: full       Cardiovascular - normal exam  Rhythm: regular  Rate: normal  (-) murmur     Dental   (+) upper dentures, lower dentures           Pulmonary - normal exam  Breath sounds clear to auscultation     Abdominal    Neurological - normal exam                 Anesthesia Plan    ASA 3   ASA physical status 3 criteria: diabetes - poorly controlled    Plan - general       Airway plan will be LMA        Induction: intravenous      Pertinent diagnostic labs and testing reviewed    Informed Consent:    Anesthetic plan and risks discussed with patient.

## 2020-07-31 NOTE — PROGRESS NOTES
[facility] Medicine Daily Progress Note    Date of Service  7/31/2020    Chief Complaint  Necrotic left above-knee amputation stump    Hospital Course      74 y.o. male with a past medical history of hypertension, chronic pain, peripheral vascular disease, basal cell carcinoma of the face admitted 7/27/2020 with with vascular surgery for revision of a necrotic left AKA stump. 7/27 I&D Cul > MRSA & enterococcus. ID consulted . Repeat ID on 7/29          Interval Problem Update  Heart rate 70s-100, saturating well on room air.  More alert and oriented, x2-x3.  Plan for I&D today.  Continue antibiotics according to infectious disease recommendations, anticipated 6 weeks  Discussed with patient's nurse and with multidisciplinary team during rounds including , and charge nurse.      Consultants/Specialty  Vascular surgery  Infectious disease    Code Status  Full code    Disposition  TBD, intravenous antibiotics, order 7/31, anticipated need for wound VAC    Review of Systems  Review of Systems   Constitutional: Positive for malaise/fatigue. Negative for chills.   HENT:        Facial cancer   Eyes: Negative for pain, discharge and redness.   Respiratory: Negative for shortness of breath and stridor.    Cardiovascular: Negative for chest pain.   Gastrointestinal: Negative for abdominal pain and vomiting.   Genitourinary: Negative for flank pain.   Musculoskeletal: Positive for back pain, joint pain and myalgias.   Skin: Negative for rash.   Neurological: Negative for speech change.   Endo/Heme/Allergies: Negative.    Psychiatric/Behavioral: Positive for memory loss.      Physical Exam  Temp:  [36.4 °C (97.6 °F)-37.5 °C (99.5 °F)] 36.5 °C (97.7 °F)  Pulse:  [] 87  Resp:  [16-19] 16  BP: (139-177)/(61-93) 171/84  SpO2:  [90 %-98 %] 91 %    Physical Exam  Vitals signs and nursing note reviewed.   Constitutional:       Appearance: He is well-developed. He is ill-appearing.      Comments: Chronically  ill-appearing   HENT:      Head: Normocephalic and atraumatic.      Comments: Right facial, right ear cancer lesion     Nose:      Comments: Skin lesions on the nares and right face consistent with skin cancer     Mouth/Throat:      Mouth: Mucous membranes are moist.   Eyes:      Pupils: Pupils are equal, round, and reactive to light.   Neck:      Musculoskeletal: Normal range of motion and neck supple.   Cardiovascular:      Rate and Rhythm: Normal rate.      Heart sounds: Normal heart sounds.   Pulmonary:      Effort: Pulmonary effort is normal.      Breath sounds: Normal breath sounds.   Abdominal:      General: Bowel sounds are normal.      Palpations: Abdomen is soft.   Genitourinary:     Penis: Normal.    Musculoskeletal: Normal range of motion.      Comments: Left AKA, dressing in place   Skin:     General: Skin is warm and dry.      Capillary Refill: Capillary refill takes 2 to 3 seconds.   Neurological:      Mental Status: He is alert. He is disoriented.      Comments: Intermittently and variably oriented x1-x3, intermittently agitated.   Psychiatric:      Comments: Intermittently agitated       Fluids    Intake/Output Summary (Last 24 hours) at 7/31/2020 1544  Last data filed at 7/31/2020 1300  Gross per 24 hour   Intake 16.45 ml   Output 2500 ml   Net -2483.55 ml     Laboratory  Recent Labs     07/29/20  0645 07/30/20  0535 07/31/20  0632   WBC 13.0* 13.5* 12.7*   RBC 3.38* 3.35* 3.26*   HEMOGLOBIN 10.7* 10.5* 10.3*   HEMATOCRIT 33.4* 33.0* 32.3*   MCV 98.8* 98.5* 99.1*   MCH 31.7 31.3 31.6   MCHC 32.0* 31.8* 31.9*   RDW 44.9 45.7 45.2   PLATELETCT 330 326 362   MPV 10.2 10.4 9.3     Recent Labs     07/29/20  0645 07/30/20  0535 07/31/20  0632   SODIUM 136 132* 134*   POTASSIUM 4.2 3.9 3.6   CHLORIDE 105 100 100   CO2 26 24 24   GLUCOSE 166* 154* 159*   BUN 15 12 9   CREATININE 1.13 0.96 0.85   CALCIUM 8.1* 7.8* 8.1*                   Imaging  No orders to display     Assessment/Plan  * AKA stump  complication (HCC)  Assessment & Plan  Infected AKA, s/p I&D   Cultures positive for MRSA and group D enterococcus, ID consulted   S/p I&D Jul 27 & 31  Pain control,  PT/OT, wound care, antibiotics per ID     Delirium  Assessment & Plan  Intermittent, multifactorial, infection, anesthesia    Arterial insufficiency (HCC)- (present on admission)  Assessment & Plan  Known history, admitted for amputation  Consider antiplatelet when okay with vascular    Diabetes (HCC)- (present on admission)  Assessment & Plan  With hyperglycemia  Glycated hemoglobin 7.3%  Short acting insulin   Accu-Checks, hypoglycemia protocol     HLD (hyperlipidemia)- (present on admission)  Assessment & Plan  Resume home statin therpay      HTN (hypertension)- (present on admission)  Assessment & Plan  As needed enalapril    Basal cell carcinoma of face- (present on admission)  Assessment & Plan  Chronic, outpatient follow-up    Basal cell carcinoma of earlobe- (present on admission)  Assessment & Plan  Chronic, outpatient follow-up     VTE prophylaxis: Lovenox

## 2020-07-31 NOTE — PROGRESS NOTES
Infectious Disease Progress Note    Author: Nara Wade M.D. 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    Review of Systems:  Review of Systems   Unable to perform ROS: Mental status change   Constitutional: Negative for fever.       Hemodynamics:  Temp (24hrs), Av.2 °C (98.9 °F), Min:36.4 °C (97.6 °F), Max:37.5 °C (99.5 °F)  Temperature: 36.4 °C (97.6 °F)  Pulse  Av  Min: 60  Max: 103   Blood Pressure : 148/93       Physical Exam:  Physical Exam  Vitals signs and nursing note reviewed.   Constitutional:       General: He is not in acute distress.     Appearance: He is ill-appearing. He is not toxic-appearing or diaphoretic.      Comments: disheveled   HENT:      Nose: No rhinorrhea.      Mouth/Throat:      Pharynx: No oropharyngeal exudate.   Eyes:      General: No scleral icterus.     Extraocular Movements: Extraocular movements intact.      Pupils: Pupils are equal, round, and reactive to light.   Cardiovascular:      Rate and Rhythm: Normal rate.   Pulmonary:      Effort: Pulmonary effort is normal. No respiratory distress.      Breath sounds: Normal breath sounds. No stridor.   Abdominal:      General: There is no distension.      Palpations: Abdomen is soft.   Musculoskeletal:      Comments: VAC Left AKA site   Skin:     General: Skin is warm.      Coloration: Skin is not jaundiced.      Findings: Bruising present.   Neurological:      Comments: disoriented         Meds:    Current Facility-Administered Medications:   •   ampicillin-sulbactam (UNASYN) IV  •  haloperidol lactate  •  LORazepam  •  haloperidol lactate  •  vancomycin  •  acetaminophen  •  fentaNYL  •  MD Alert...Vancomycin per Pharmacy  •  senna-docusate **AND** polyethylene glycol/lytes **AND** magnesium hydroxide **AND** bisacodyl  •  NS  •  ondansetron  •  ondansetron  •  enalaprilat  •  atorvastatin  •  insulin regular **AND** POC Blood Glucose **AND** NOTIFY MD and PharmD **AND** glucose **AND** dextrose 50%  •  enoxaparin (LOVENOX) injection    Facility-Administered Medications Ordered in Other Encounters:   •  fentaNYL  •  propofol  •  ceFAZolin    Labs:  Recent Labs     07/29/20  0645 07/30/20  0535 07/31/20  0632   WBC 13.0* 13.5* 12.7*   RBC 3.38* 3.35* 3.26*   HEMOGLOBIN 10.7* 10.5* 10.3*   HEMATOCRIT 33.4* 33.0* 32.3*   MCV 98.8* 98.5* 99.1*   MCH 31.7 31.3 31.6   RDW 44.9 45.7 45.2   PLATELETCT 330 326 362   MPV 10.2 10.4 9.3   NEUTSPOLYS  --  88.40* 87.70*   LYMPHOCYTES  --  7.50* 8.00*   MONOCYTES  --  2.70 2.70   EOSINOPHILS  --  0.30 0.50   BASOPHILS  --  0.10 0.20     Recent Labs     07/29/20  0645 07/30/20  0535 07/31/20  0632   SODIUM 136 132* 134*   POTASSIUM 4.2 3.9 3.6   CHLORIDE 105 100 100   CO2 26 24 24   GLUCOSE 166* 154* 159*   BUN 15 12 9     Recent Labs     07/29/20  0645 07/30/20  0535 07/31/20  0632   ALBUMIN  --  1.9* 2.1*   TBILIRUBIN  --  0.5 0.5   ALKPHOSPHAT  --  138* 138*   TOTPROTEIN  --  5.2* 5.2*   ALTSGPT  --  35 36   ASTSGOT  --  67* 65*   CREATININE 1.13 0.96 0.85       Imaging:  No results found.    Micro:  Results     Procedure Component Value Units Date/Time    Fungal Culture [[id number]] Collected:  07/27/20 1040    Order Status:  Completed Specimen:  Tissue Updated:  07/30/20 0811     Significant Indicator NEG     Source TISS     Site Left Leg Vascular Graft     Culture Result No fungal growth to date.    Narrative:       CALL  [place]  141 tel. [phone],  CALLED  141 tel. [phone] 07/28/2020, 13:59, RB PERF. RESULTS  CALLED  TO:Taty Panda Rn  Surgery Specimen    AFB Culture [[id number]] Collected:  07/27/20 1040    Order Status:  Completed Specimen:  Tissue Updated:  07/30/20 0811     Significant Indicator NEG     Source TISS     Site Left Leg Vascular Graft     Culture Result Culture in progress.     AFB Smear Results No acid fast bacilli seen.    Narrative:       CALL  [place]  141 tel. [phone],  CALLED  141 tel. [phone] 07/28/2020, 13:59, RB PERF. RESULTS CALLED  TO:Taty Panda Rn  Surgery Specimen    CULTURE TISSUE W/ GRM STAIN [[id number]]  (Abnormal)  (Susceptibility) Collected:  07/27/20 1040    Order Status:  Completed Specimen:  Tissue Updated:  07/30/20 0811     Significant Indicator POS     Source TISS     Site Left Leg Vascular Graft     Culture Result -     Gram Stain Result Few WBCs.  Many Gram positive cocci.  Rare Gram negative rods.       Culture Result Methicillin Resistant Staphylococcus aureus  Moderate growth  This isolate is presumed to be clindamycin resistant based on  detection of inducible resistance.  Clindamycin may still  be effective in some patients.        Enterococcus faecalis  Light growth  Combination therapy with ampicillin, penicillin, or  vancomycin (for susceptible strains) plus an aminoglycoside  is usually indicated for serious enterococcal infections,  such as endocarditis unless high-level resistance to both  gentamicin and streptomycin is documented; such combinations  are predicted to result in synergistic killing of the  Enterococcus.  The susceptibility profile for this organism indicates that  Streptomycin would not be an effective component of  combination therapy.  The susceptibility profile for this organism indicates that  Gentamycin would not be an effective component of combination  therapy.      Narrative:       CALL  [place]  141 tel. [phone],  CALLED  141 tel. [phone] 07/28/2020, 13:59, RB PERF. RESULTS CALLED  TO:Taty Panda Rn  Surgery Specimen    Susceptibility      Methicillin resistant staphylococcus aureus (1)     Antibiotic Interpretation Microscan Method Status    Azithromycin Resistant >4 mcg/mL MASOOD Preliminary    Clindamycin Resistant <=0.5 mcg/mL MASOOD Preliminary    Cefazolin Resistant <=8 mcg/mL MASOOD Preliminary    Ceftaroline Sensitive <=0.5 mcg/mL MASOOD Preliminary    Daptomycin Sensitive <=1 mcg/mL MASOOD Preliminary    Erythromycin Resistant >4 mcg/mL MASOOD Preliminary    Ampicillin/sulbactam Resistant <=8/4 mcg/mL MASOOD Preliminary    Oxacillin Resistant >2 mcg/mL MASOOD Preliminary    Penicillin Resistant >8 mcg/mL MASOOD Preliminary    Trimeth/Sulfa Sensitive <=0.5/9.5 mcg/mL MASOOD Preliminary    Tetracycline Resistant >8 mcg/mL MASOOD Preliminary    Vancomycin Sensitive 1 mcg/mL MASOOD Preliminary          Enterococcus faecalis (2)     Antibiotic Interpretation Microscan Method Status    Daptomycin Sensitive <=1 mcg/mL MASOOD Preliminary    Strep Synergy Resistant >1000 mcg/mL MASOOD Preliminary    Penicillin Sensitive 8 mcg/mL MASOOD Preliminary    Vancomycin Sensitive 1 mcg/mL MASOOD Preliminary    Ampicillin Sensitive <=2 mcg/mL MASOOD Preliminary    Gent Synergy Resistant >500 mcg/mL MASOOD Preliminary                   Anaerobic Culture [[id number]] Collected:  07/27/20 1040    Order Status:  Completed Specimen:  Tissue Updated:  07/30/20 0811     Significant Indicator NEG     Source TISS     Site Left Leg Vascular Graft     Culture Result Light growth possible anaerobes;  further incubation required.      Narrative:       CALL  [place]  141 tel. [phone],  CALLED  141 tel. [phone] 07/28/2020, 13:59, RB PERF. RESULTS CALLED  TO:Taty 08100 Rn  Surgery Specimen    GRAM STAIN [[id number]]  (Abnormal) Collected:  07/27/20 1040    Order Status:  Completed Specimen:  Tissue Updated:  07/28/20 1846     Significant Indicator .     Source TISS     Site Left Leg Vascular Graft     Gram Stain Result Few WBCs.  Many Gram positive cocci.  Rare Gram negative rods.      Narrative:       CALL  [place]  141 tel.  [phone],  CALLED  141 tel. [phone] 07/28/2020, 13:59, RB PERF. RESULTS CALLED  TO:Taty Panda Rn  Surgery Specimen    Acid Fast Stain [[id number]] Collected:  07/27/20 1040    Order Status:  Completed Specimen:  Tissue Updated:  07/28/20 1846     Significant Indicator NEG     Source TISS     Site Left Leg Vascular Graft     AFB Smear Results No acid fast bacilli seen.    Narrative:       CALL  [place]  141 tel. [phone],  CALLED  141 tel. [phone] 07/28/2020, 13:59, RB PERF. RESULTS CALLED  TO:Taty Panda Rn  Surgery Specimen    SARS-CoV-2, PCR (In-House) [[id number]] Collected:  07/27/20 0735    Order Status:  Completed Updated:  07/27/20 0823     SARS-CoV-2 Source Nasal Swab     SARS-CoV-2 (RdRp gene) NotDetected     Comment: [facility] providers: PLEASE REFER TO DE-ESCALATION AND RETESTING PROTOCOL  on [email or web address]  **The Abbott ID Now COVID-19 Test has been made available for use under the  Emergency Use Authorization (EUA) only.         Narrative:       Collected By:ARIK HATHAWAY  Pt for surgery today, please run rapid test.  Thank you!  Is patient being admitted?->Yes  Does this patient meet criteria for Rush/Cepheid per [facility]  Inpatient Workflow? (See workflow link below)->Yes  Expected turn around time?->Rush (Cepheid 2-4 hours)    COVID/SARS CoV-2 PCR [[id number]] Collected:  07/27/20 0735    Order Status:  Completed Specimen:  Respirate from Nasal Updated:  07/27/20 0755     COVID Order Status Received     Comment: The order for SARS CoV-2 testing has been received by the  Laboratory. This result is neither positive nor negative.  Final results of testing will report in 24-48 hours, separately.         Narrative:       Collected By:ARIK HATHAWAY  Pt for surgery today, please run rapid test.  Thank you!  Is patient being admitted?->Yes  Does this patient meet criteria for [facility]? (See workflow link below)->Yes  Expected turn around time?->Rush  (Cepheid 2-4 hours)          Assessment:  Active Hospital Problems    Diagnosis   • *AKA stump complication (HCC) [T87.9]   • Delirium [R41.0]   • Arterial insufficiency (HCC) [I77.1]   • Diabetes (HCC) [E11.9]   • HLD (hyperlipidemia) [E78.5]   • HTN (hypertension) [I10]   • Basal cell carcinoma of earlobe [C44.211]   • Basal cell carcinoma of face [C44.310]       Plan:  Infected above-the-knee amputation/OM left  Infected vascular graft   Afebrile  Decreased leukocytosis  s/p revision amputation on 07/27, but margins are not clear.    Operative cultures are MRSA and Efaecalis  Continue vancomycin and Unasyn  Continue to monitor vanco troughs and renal function for nephrotoxicity.    Anticipate 6 week course    Diabetes  Hemoglobin A1c 7.3.   Keep BS under 150 to help control current infection    Severe arterial insufficiency/PVD.    His amputation did show evidence of vascular calcifications and vascular   disease at the amputation site.    Anticipate he may have continued poor wound healing.    May require additional surgery    Basal cell carcinomas, face and ear  Per oncology    Discussed with internal medicine.

## 2020-07-31 NOTE — PROGRESS NOTES
"Vascular    CHARLES  Pain controlled    BP (!) 171/84   Pulse 87   Temp 36.5 °C (97.7 °F) (Temporal)   Resp 16   Ht 1.676 m (5' 6\")   Wt 63 kg (138 lb 14.2 oz)   SpO2 91%   BMI 22.42 kg/m²     Vac intact    A/P)  OR today for repeat washout, debridement, and possibly at least partial closure of the incision.    Most likely will still need vac therapy for a while to continue controlling the infection    Usman Fisher MD  [facility] (General and Vascular Surgery)  Cell: [phone] (text or call is fine, if you don't reach me please try my office)  Office: [phone]  __________________________________________________________________  Patient:Jimenez Bains   MRN:[id number]   CSN:[id number]    7/31/2020    2:06 PM    "

## 2020-07-31 NOTE — PROGRESS NOTES
"Bedside report received.  Assessment complete.  A&O x person and situ. Patient calls appropriately.  Patient turns Q2 with one assist. Bed alarm on.   Patient has 7/10 pain. Pain managed with PCA and prescribed medications.  Denies N&V. NPO diet.  Surgical dressing / wound vac CDI.  + void, + flatus, + BM.  Patient denies SOB.  SCD's on.  Patient is calm and cooperative.  Review plan with of care with patient. Call light and personal belongings with in reach. Hourly rounding in place. All needs met at this time.  /93   Pulse 79   Temp 36.4 °C (97.6 °F) (Temporal)   Resp 17   Ht 1.676 m (5' 6\")   Wt 63 kg (138 lb 14.2 oz)   SpO2 98%   BMI 22.42 kg/m²     "

## 2020-07-31 NOTE — PROGRESS NOTES
Report received   Assumed care of patient at 1915   Pt is A&O x 3, disoriented to time - per report pt will have occasional episodes of disorientation to situation and in need of reorientation  Pain reported at 2/10 - PCA pump in place  Denies N/V/D, Denies chest pain, denies dizziness, denies SOB  Tolerating Diet - NPO at midnight for I&D tomorrow  Wound vac to left AKA - CDI, serosanguinous output in cannister  Adequate Urine output - condom cath in place  7/29 last incontinent BM   Passing Flatus    SCD's in place  Bed in lowest position and locked.  Bed alarm in place  Pt resting comfortably now.  Review plan of care with patient  Call light within reach  Hourly rounds in place  All needs met at this time

## 2020-07-31 NOTE — THERAPY
"Occupational Therapy   Initial Evaluation     Patient Name: Jimenez Bains  Age:  74 y.o., Sex:  male  Medical Record #: [id number]  Today's Date: 7/30/2020     Precautions  Precautions: Fall Risk, Non Weight Bearing Left Lower Extremity  Comments: AKA debridement 7/29    Assessment  Patient seen for OT eval s/p L AKA debridement initially admitted with AMS, pain and impaired mobility complicated by recent hospitalization. Now L LE nonweightbearing and recently agitated necessitating haldol. Rec post acute placement    Plan    Recommend Occupational Therapy 3 times per week until therapy goals are met for the following treatments:  Self Care/Activities of Daily Living, Therapeutic Activities and Therapeutic Exercises.    Discharge recommendations:  Recommend post-acute placement for additional occupational therapy services prior to discharge home.    Subjective    \"I can't move but I can sit up\"     Objective       07/30/20 1620   Prior Living Situation   Prior Services Unable To Determine At This Time   Housing / Facility Mobile Home   Steps Into Home 5   Steps In Home 0   Bathroom Set up Walk In Shower   Equipment Owned Quad Cane;Tub / Shower Seat;Wheelchair;Front-Wheel Walker   Lives with - Patient's Self Care Capacity Adult Children   Comments Patient issued haldol prior to OT session. Patient agreeable to session however poor historian at baseline resulting in poor confirmation of chart information   Prior Level of ADL Function   Self Feeding Independent   Grooming / Hygiene Independent   Bathing Independent   Dressing Independent   Toileting Independent   Prior Level of IADL Function   Medication Management Independent   Laundry Independent   Kitchen Mobility Independent   Finances Independent   Home Management Independent   Shopping Independent   Prior Level Of Mobility Independent Without Device in Community   Driving / Transportation Driving Independent   Precautions   Precautions Fall Risk;Non Weight " "Bearing Left Lower Extremity   Comments AKA debridement 7/29   Cognition    Cognition / Consciousness X   Comments friendly however increased agitation as frustrated with inability to perform tasks \"right\"   Balance Assessment   Sitting Balance (Static) Fair   Sitting Balance (Dynamic) Fair -   Bed Mobility    Supine to Sit Maximal Assist   ADL Assessment   Eating Supervision   Grooming Minimal Assist   Lower Body Dressing Maximal Assist   Activity Tolerance   Sitting Edge of Bed < 5 minutes total   Patient / Family Goals   Patient / Family Goal #1 to eat   Short Term Goals   Short Term Goal # 1 pt will complete grooming seated w/set up    Short Term Goal # 2 pt will complete txf to BSC w/spv    Short Term Goal # 3 pt will complete LB dressing w/mod I (long sitting in bed)    Anticipated Discharge Equipment   DC Equipment Unable To Determine At This Time                 "

## 2020-08-01 LAB
ALBUMIN SERPL BCP-MCNC: 2.4 G/DL (ref 3.2–4.9)
ALBUMIN/GLOB SERPL: 0.8 G/DL
ALP SERPL-CCNC: 154 U/L (ref 30–99)
ALT SERPL-CCNC: 30 U/L (ref 2–50)
ANION GAP SERPL CALC-SCNC: 12 MMOL/L (ref 7–16)
AST SERPL-CCNC: 62 U/L (ref 12–45)
BACTERIA SPEC ANAEROBE CULT: ABNORMAL
BACTERIA TISS AEROBE CULT: ABNORMAL
BASOPHILS # BLD AUTO: 0.1 % (ref 0–1.8)
BASOPHILS # BLD: 0.01 K/UL (ref 0–0.12)
BILIRUB SERPL-MCNC: 0.4 MG/DL (ref 0.1–1.5)
BUN SERPL-MCNC: 14 MG/DL (ref 8–22)
CALCIUM SERPL-MCNC: 7.9 MG/DL (ref 8.5–10.5)
CHLORIDE SERPL-SCNC: 101 MMOL/L (ref 96–112)
CO2 SERPL-SCNC: 24 MMOL/L (ref 20–33)
CREAT SERPL-MCNC: 1.08 MG/DL (ref 0.5–1.4)
EOSINOPHIL # BLD AUTO: 0.09 K/UL (ref 0–0.51)
EOSINOPHIL NFR BLD: 0.7 % (ref 0–6.9)
ERYTHROCYTE [DISTWIDTH] IN BLOOD BY AUTOMATED COUNT: 46.3 FL (ref 35.9–50)
GLOBULIN SER CALC-MCNC: 3.2 G/DL (ref 1.9–3.5)
GLUCOSE BLD-MCNC: 107 MG/DL (ref 65–99)
GLUCOSE BLD-MCNC: 156 MG/DL (ref 65–99)
GLUCOSE BLD-MCNC: 169 MG/DL (ref 65–99)
GLUCOSE BLD-MCNC: 215 MG/DL (ref 65–99)
GLUCOSE SERPL-MCNC: 200 MG/DL (ref 65–99)
GRAM STN SPEC: ABNORMAL
HCT VFR BLD AUTO: 35.6 % (ref 42–52)
HGB BLD-MCNC: 11.1 G/DL (ref 14–18)
IMM GRANULOCYTES # BLD AUTO: 0.07 K/UL (ref 0–0.11)
IMM GRANULOCYTES NFR BLD AUTO: 0.5 % (ref 0–0.9)
LYMPHOCYTES # BLD AUTO: 0.88 K/UL (ref 1–4.8)
LYMPHOCYTES NFR BLD: 6.8 % (ref 22–41)
MAGNESIUM SERPL-MCNC: 2 MG/DL (ref 1.5–2.5)
MCH RBC QN AUTO: 31.3 PG (ref 27–33)
MCHC RBC AUTO-ENTMCNC: 31.2 G/DL (ref 33.7–35.3)
MCV RBC AUTO: 100.3 FL (ref 81.4–97.8)
MONOCYTES # BLD AUTO: 0.46 K/UL (ref 0–0.85)
MONOCYTES NFR BLD AUTO: 3.6 % (ref 0–13.4)
NEUTROPHILS # BLD AUTO: 11.39 K/UL (ref 1.82–7.42)
NEUTROPHILS NFR BLD: 88.3 % (ref 44–72)
NRBC # BLD AUTO: 0 K/UL
NRBC BLD-RTO: 0 /100 WBC
PLATELET # BLD AUTO: 399 K/UL (ref 164–446)
PMV BLD AUTO: 9.2 FL (ref 9–12.9)
POTASSIUM SERPL-SCNC: 3.9 MMOL/L (ref 3.6–5.5)
PROT SERPL-MCNC: 5.6 G/DL (ref 6–8.2)
RBC # BLD AUTO: 3.55 M/UL (ref 4.7–6.1)
SIGNIFICANT IND 70042: ABNORMAL
SIGNIFICANT IND 70042: ABNORMAL
SITE SITE: ABNORMAL
SITE SITE: ABNORMAL
SODIUM SERPL-SCNC: 137 MMOL/L (ref 135–145)
SOURCE SOURCE: ABNORMAL
SOURCE SOURCE: ABNORMAL
WBC # BLD AUTO: 12.9 K/UL (ref 4.8–10.8)

## 2020-08-01 PROCEDURE — 80053 COMPREHEN METABOLIC PANEL: CPT

## 2020-08-01 PROCEDURE — 700111 HCHG RX REV CODE 636 W/ 250 OVERRIDE (IP): Performed by: SURGERY

## 2020-08-01 PROCEDURE — 700105 HCHG RX REV CODE 258: Performed by: SURGERY

## 2020-08-01 PROCEDURE — 83735 ASSAY OF MAGNESIUM: CPT

## 2020-08-01 PROCEDURE — 770021 HCHG ROOM/CARE - ISO PRIVATE

## 2020-08-01 PROCEDURE — 700105 HCHG RX REV CODE 258: Performed by: HOSPITALIST

## 2020-08-01 PROCEDURE — 700102 HCHG RX REV CODE 250 W/ 637 OVERRIDE(OP): Performed by: NURSE PRACTITIONER

## 2020-08-01 PROCEDURE — 700111 HCHG RX REV CODE 636 W/ 250 OVERRIDE (IP): Performed by: INTERNAL MEDICINE

## 2020-08-01 PROCEDURE — A9270 NON-COVERED ITEM OR SERVICE: HCPCS | Performed by: NURSE PRACTITIONER

## 2020-08-01 PROCEDURE — A9270 NON-COVERED ITEM OR SERVICE: HCPCS | Performed by: HOSPITALIST

## 2020-08-01 PROCEDURE — 85025 COMPLETE CBC W/AUTO DIFF WBC: CPT

## 2020-08-01 PROCEDURE — 99233 SBSQ HOSP IP/OBS HIGH 50: CPT | Performed by: INTERNAL MEDICINE

## 2020-08-01 PROCEDURE — 82962 GLUCOSE BLOOD TEST: CPT | Mod: 91

## 2020-08-01 PROCEDURE — 99232 SBSQ HOSP IP/OBS MODERATE 35: CPT | Performed by: HOSPITALIST

## 2020-08-01 PROCEDURE — 700102 HCHG RX REV CODE 250 W/ 637 OVERRIDE(OP): Performed by: HOSPITALIST

## 2020-08-01 PROCEDURE — 36415 COLL VENOUS BLD VENIPUNCTURE: CPT

## 2020-08-01 PROCEDURE — 700105 HCHG RX REV CODE 258: Performed by: INTERNAL MEDICINE

## 2020-08-01 RX ADMIN — ATORVASTATIN CALCIUM 40 MG: 40 TABLET, FILM COATED ORAL at 17:47

## 2020-08-01 RX ADMIN — INSULIN HUMAN 1 UNITS: 100 INJECTION, SOLUTION PARENTERAL at 16:54

## 2020-08-01 RX ADMIN — VANCOMYCIN HYDROCHLORIDE 1250 MG: 500 INJECTION, POWDER, LYOPHILIZED, FOR SOLUTION INTRAVENOUS at 12:14

## 2020-08-01 RX ADMIN — SODIUM CHLORIDE: 9 INJECTION, SOLUTION INTRAVENOUS at 21:23

## 2020-08-01 RX ADMIN — SODIUM CHLORIDE: 9 INJECTION, SOLUTION INTRAVENOUS at 07:27

## 2020-08-01 RX ADMIN — INSULIN HUMAN 1 UNITS: 100 INJECTION, SOLUTION PARENTERAL at 21:20

## 2020-08-01 RX ADMIN — ACETAMINOPHEN 650 MG: 325 TABLET, FILM COATED ORAL at 04:59

## 2020-08-01 RX ADMIN — INSULIN HUMAN 2 UNITS: 100 INJECTION, SOLUTION PARENTERAL at 11:20

## 2020-08-01 RX ADMIN — ENOXAPARIN SODIUM 40 MG: 40 INJECTION SUBCUTANEOUS at 05:00

## 2020-08-01 RX ADMIN — AMPICILLIN SODIUM AND SULBACTAM SODIUM 3 G: 2; 1 INJECTION, POWDER, FOR SOLUTION INTRAMUSCULAR; INTRAVENOUS at 17:47

## 2020-08-01 RX ADMIN — AMPICILLIN SODIUM AND SULBACTAM SODIUM 3 G: 2; 1 INJECTION, POWDER, FOR SOLUTION INTRAMUSCULAR; INTRAVENOUS at 04:59

## 2020-08-01 RX ADMIN — AMPICILLIN SODIUM AND SULBACTAM SODIUM 3 G: 2; 1 INJECTION, POWDER, FOR SOLUTION INTRAMUSCULAR; INTRAVENOUS at 12:14

## 2020-08-01 ASSESSMENT — COGNITIVE AND FUNCTIONAL STATUS - GENERAL
DRESSING REGULAR LOWER BODY CLOTHING: A LOT
MOBILITY SCORE: 10
SUGGESTED CMS G CODE MODIFIER MOBILITY: CL
MOVING FROM LYING ON BACK TO SITTING ON SIDE OF FLAT BED: UNABLE
TURNING FROM BACK TO SIDE WHILE IN FLAT BAD: A LITTLE
DAILY ACTIVITIY SCORE: 20
TOILETING: A LITTLE
MOVING TO AND FROM BED TO CHAIR: A LOT
CLIMB 3 TO 5 STEPS WITH RAILING: TOTAL
SUGGESTED CMS G CODE MODIFIER DAILY ACTIVITY: CJ
WALKING IN HOSPITAL ROOM: TOTAL
STANDING UP FROM CHAIR USING ARMS: A LOT
HELP NEEDED FOR BATHING: A LITTLE

## 2020-08-01 ASSESSMENT — ENCOUNTER SYMPTOMS
BACK PAIN: 1
MEMORY LOSS: 1
EYE DISCHARGE: 0
SHORTNESS OF BREATH: 0
EYE REDNESS: 0
EYE PAIN: 0
FLANK PAIN: 0
VOMITING: 0
SPEECH CHANGE: 0
STRIDOR: 0
FEVER: 0
MYALGIAS: 1
CHILLS: 0
DIARRHEA: 0
ABDOMINAL PAIN: 0
NAUSEA: 0

## 2020-08-01 NOTE — DISCHARGE PLANNING
Anticipated Discharge Disposition: TBD    Action: Spoke to pts daughter Lien by phone, Lien states that her father might be resistant to SNF as his wife  one week after coming home from a SNF.  Lien states she would like to wait until after pt has surgery to assess whether or not pt will need or want SNF.    Barriers to Discharge: Medical Clearance, SNF choice and acceptance    Plan: f/u with medical team, pt, pts daughter Lien

## 2020-08-01 NOTE — ANESTHESIA TIME REPORT
Anesthesia Start and Stop Event Times     Date Time Event    7/31/2020 1405 Ready for Procedure     1654 Anesthesia Start     1754 Anesthesia Stop        Responsible Staff  07/31/20    Name Role Begin End    Lluvia Baca M.D. Anesth 1654 1754        Preop Diagnosis (Free Text):  Pre-op Diagnosis     NECROTIC LEFT AKA        Preop Diagnosis (Codes):    Post op Diagnosis  AKA stump complication (HCC)      Premium Reason  A. 3PM - 7AM    Comments:

## 2020-08-01 NOTE — CARE PLAN
Problem: Communication  Goal: The ability to communicate needs accurately and effectively will improve  Outcome: PROGRESSING AS EXPECTED     Problem: Safety  Goal: Will remain free from injury  Outcome: PROGRESSING AS EXPECTED  Goal: Will remain free from falls  Outcome: PROGRESSING AS EXPECTED     Problem: Pain Management  Goal: Pain level will decrease to patient's comfort goal  Outcome: PROGRESSING AS EXPECTED     Problem: Fluid Volume:  Goal: Will maintain balanced intake and output  Outcome: PROGRESSING AS EXPECTED

## 2020-08-01 NOTE — PROGRESS NOTES
"Pharmacy Kinetics 74 y.o. male on vancomycin day # 6 2020    Currently on Vancomycin 1250 mg iv q24hr (1200)  Provider specified end date: tbd - tentative plan for 6 weeks from date of final surgery    Indication for Treatment: SSTI of left AKA stump    Pertinent history per medical record: Admitted on 2020 for scheduled surgery for revision of necrotic left AKA stump.  Patient is now s/p stump revision.  Procedure note states extensive necrotic muscle and fascia with purulent fluid in the muscle.  Cultures sent.  Vancomycin and zosyn ordered empirically post-op.  Wound VAC in place. ID is following    Other antibiotics: Unasyn 3g IV q6H    Allergies: No known drug allergy     List concerns for renal function: low albumin, age    Pertinent cultures to date:    L Leg vascular graft Cx: MRSA, E. Faecalis      MRSA nares swab if pneumonia is a concern (ordered/positive/negative/n-a): n/a    Recent Labs     20  0535 20  0632   WBC 13.5* 12.7*   NEUTSPOLYS 88.40* 87.70*     Recent Labs     20  0535 20  0632   BUN 12 9   CREATININE 0.96 0.85   ALBUMIN 1.9* 2.1*     Recent Labs     20  1149   VANCOTROUGH 10.7       Intake/Output Summary (Last 24 hours) at 2020 1520  Last data filed at 2020 0729  Gross per 24 hour   Intake 265.25 ml   Output 50 ml   Net 215.25 ml      /67   Pulse 89   Temp 37.2 °C (99 °F) (Temporal)   Resp 17   Ht 1.676 m (5' 6\")   Wt 63 kg (138 lb 14.2 oz)   SpO2 93%  Temp (24hrs), Av.1 °C (98.8 °F), Min:36.8 °C (98.3 °F), Max:37.3 °C (99.1 °F)      A/P   1. Vancomycin dose change: none  2. Next vancomycin level: 1-2 days; not ordered  3. Goal trough: 10-15 mcg/mL  4. Comments: Renal function adequate; SCR and BUN appear to be below baseline, estimated CrCl 67 mL/min, urine output appears to be adequate (2 voids charted today). Cultures with MRSA, per ID continue vancomycin. Previous vancomycin trough within goal. Will continue current dose " of vancomycin and tentatively plan on checking another vancomycin trough in 1-2 days. Pharmacy will continue to follow.     Ines Ventura, MarieD

## 2020-08-01 NOTE — PROGRESS NOTES
Hospital Medicine Daily Progress Note    Date of Service  8/1/2020    Chief Complaint  Necrotic left above-knee amputation stump    Hospital Course      74 y.o. male with a past medical history of hypertension, chronic pain, peripheral vascular disease, basal cell carcinoma of the face admitted 7/27/2020 with with vascular surgery for revision of a necrotic left AKA stump. 7/27 I&D Cul > MRSA & enterococcus. ID consulted . Repeat ID on 7/29          Interval Problem Update  Hemodynamically stable overnight, saturating well on room air this morning.  Wound VAC in place  Surgery following, plan for debridement and washout tomorrow  Alert and oriented, x2 this morning  Continue antibiotics according to infectious disease recommendations, anticipated ~ 6 weeks  Discussed with patient, and patient's nurse      Consultants/Specialty  Vascular surgery  Infectious disease     Code Status  Full code    Disposition  TBD, intravenous antibiotics, OR 8/2, anticipated need for wound VAC    Review of Systems  Review of Systems   Constitutional: Positive for malaise/fatigue. Negative for chills.   HENT:        Facial cancer   Eyes: Negative for pain, discharge and redness.   Respiratory: Negative for shortness of breath and stridor.    Cardiovascular: Negative for chest pain.   Gastrointestinal: Negative for abdominal pain and vomiting.   Genitourinary: Negative for flank pain.   Musculoskeletal: Positive for back pain, joint pain and myalgias.   Skin: Negative for rash.   Neurological: Negative for speech change.   Endo/Heme/Allergies: Negative.    Psychiatric/Behavioral: Positive for memory loss.      Physical Exam  Temp:  [36.5 °C (97.7 °F)-37.3 °C (99.1 °F)] 37.2 °C (99 °F)  Pulse:  [] 89  Resp:  [16-26] 17  BP: (137-171)/(60-90) 141/67  SpO2:  [89 %-100 %] 93 %    Physical Exam  Vitals signs and nursing note reviewed.   Constitutional:       Appearance: He is well-developed. He is ill-appearing.      Comments:  Chronically ill-appearing   HENT:      Head: Normocephalic and atraumatic.      Comments: Right facial, right ear cancer lesion     Nose:      Comments: Skin lesions on the nares and right face consistent with skin cancer     Mouth/Throat:      Mouth: Mucous membranes are moist.   Eyes:      Pupils: Pupils are equal, round, and reactive to light.   Neck:      Musculoskeletal: Normal range of motion and neck supple.   Cardiovascular:      Rate and Rhythm: Normal rate.      Heart sounds: Normal heart sounds.   Pulmonary:      Effort: Pulmonary effort is normal.      Breath sounds: Normal breath sounds.   Abdominal:      General: Bowel sounds are normal.      Palpations: Abdomen is soft.   Genitourinary:     Penis: Normal.    Musculoskeletal: Normal range of motion.      Comments: Left AKA, dressing in place   Skin:     General: Skin is warm and dry.      Capillary Refill: Capillary refill takes 2 to 3 seconds.   Neurological:      Mental Status: He is alert. He is disoriented.      Comments: Intermittently and variably oriented x1-x3, intermittently agitated.   Psychiatric:      Comments: Intermittently agitated       Fluids    Intake/Output Summary (Last 24 hours) at 8/1/2020 1228  Last data filed at 8/1/2020 0729  Gross per 24 hour   Intake 265.25 ml   Output 2050 ml   Net -1784.75 ml     Laboratory  Recent Labs     07/30/20  0535 07/31/20  0632   WBC 13.5* 12.7*   RBC 3.35* 3.26*   HEMOGLOBIN 10.5* 10.3*   HEMATOCRIT 33.0* 32.3*   MCV 98.5* 99.1*   MCH 31.3 31.6   MCHC 31.8* 31.9*   RDW 45.7 45.2   PLATELETCT 326 362   MPV 10.4 9.3     Recent Labs     07/30/20  0535 07/31/20  0632   SODIUM 132* 134*   POTASSIUM 3.9 3.6   CHLORIDE 100 100   CO2 24 24   GLUCOSE 154* 159*   BUN 12 9   CREATININE 0.96 0.85   CALCIUM 7.8* 8.1*                   Imaging  No orders to display     Assessment/Plan  * AKA stump complication (HCC)  Assessment & Plan  Infected AKA, s/p I&D   Cultures positive for MRSA and group D enterococcus,  ID consulted   S/p I&D Jul 27 & 31  Pain control,  PT/OT, wound care, antibiotics per ID   Plan for washout and debridement 8/2     Delirium  Assessment & Plan  Intermittent, multifactorial, infection, anesthesia    Arterial insufficiency (HCC)- (present on admission)  Assessment & Plan  Known history, admitted for amputation  Consider antiplatelet when okay with vascular    Diabetes (HCC)- (present on admission)  Assessment & Plan  With hyperglycemia  Glycated hemoglobin 7.3%  Short acting insulin   Accu-Checks, hypoglycemia protocol     HLD (hyperlipidemia)- (present on admission)  Assessment & Plan  Resume home statin therpay      HTN (hypertension)- (present on admission)  Assessment & Plan  As needed enalapril    Basal cell carcinoma of face- (present on admission)  Assessment & Plan  Chronic, outpatient follow-up    Basal cell carcinoma of earlobe- (present on admission)  Assessment & Plan  Chronic, outpatient follow-up     VTE prophylaxis: Lovenox

## 2020-08-01 NOTE — OR NURSING
Report  Pain tolerable. rec'd oxycodone 10 mg PO at 1822.  Dilaudid 2 mg IV in surgery by Dr Baca.    Reports he feels OK. Does not need anything at this time.  Waiting for after shift change to give report.

## 2020-08-01 NOTE — OP REPORT
Operative Report     Patient:   Jimenez Bains  :    1945  MRN:    [id number]  [facility]:    [id number]    Date of Surgery:  20    Surgeon:   Usman Fisher M.D.     Assistant:    None    Pre-operative Diagnosis:    - severe necrosis and infection of recent left AKA stump    Post-operative Diagnosis:   - severe necrosis and infection of recent left AKA stump    Procedure:     - sharp excisional debridement of left AKA stump including muscle and fascia, 8 cubic cm of tissue removed  - application of negative pressure dressing greater than 50 sq cm    Anesthesia:   General    Blood Loss:   Minimal    Specimen:   None sent    Findings:   Significantly improvement overall with much less necrotic tissue and less purulent drainage    Wound classification: Dirty/infected    Disposition:   PACU in stable condition      Procedure in detail:  The patient was identified in the pre-operative holding area and brought to the operating room. Pre-operative antibiotics were administered prior to the procedure. Anesthesia was smoothly induced. The patient was prepped and draped in the usual sterile fashion. A surgical time out was called to identify the correct patient, procedure, and equipment.  Everyone was in agreement.    The existing wound VAC bandage was removed and the stump was examined.  There was much less necrotic tissue and less purulent drainage compared to the previous operation.  We performed a sharp excisional debridement removing about 8 cm³ of necrotic muscle and fascia.  We then copiously irrigated with hydrogen peroxide and saline including pulse irrigation.  We used interrupted 0 Vicryl sutures to secure the vastus medialis muscle over the end of the femur. We packed Kerlix gauze into the infected tract where the graft has been removed previously along the medial aspect of the stump.  We then applied a black foam negative pressure dressing and it was connected to suction and found to have a good  seal.    Sponge and needle counts were correct at the end of the case.     The patient was awakened from anesthesia and was taken to the recovery room in stable condition.     Postoperative plan:  Patient will be brought back to the operating room in 48 hours for repeat washout and likely we will be able to at least partially close the incision..    Usman Fisher MD  General and Vascular Surgery  [facility]  Cell [phone]  Office [phone]

## 2020-08-01 NOTE — ANESTHESIA POSTPROCEDURE EVALUATION
Patient: Jimenez Bains    Procedure Summary     Date:  07/31/20 Room / Location:  [place] [address] / SURGERY [facility]    Anesthesia Start:  1654 Anesthesia Stop:  1754    Procedure:  IRRIGATION AND DEBRIDEMENT, WOUND- WASH OUT OF AKA, WOUND VAC (Left Leg Upper) Diagnosis:  (NECROTIC LEFT AKA)    Surgeon:  Usman Fisher M.D. Responsible Provider:  Lluvia Baca M.D.    Anesthesia Type:  general ASA Status:  3          Final Anesthesia Type: general  Last vitals  BP   Blood Pressure : (!) 163/83    Temp   36.9 °C (98.5 °F)    Pulse   Pulse: 99   Resp   (!) 21    SpO2   97 %      Anesthesia Post Evaluation    Patient location during evaluation: PACU  Patient participation: complete - patient participated  Level of consciousness: awake and alert  Pain score: 5    Airway patency: patent  Anesthetic complications: no  Cardiovascular status: hemodynamically stable  Respiratory status: acceptable  Hydration status: euvolemic    PONV: none

## 2020-08-01 NOTE — OR NURSING
Report to   I and D left stump. packed w/ kerlix and wound vac placed on top.    rec'd dilaudid 2 mg Iv in OR and oxycodone 10 mg PO at 1822. Report pain tolerable.     VSS HR 90 -150's systolic. 96% 2L NC . Surgical mask on. RR 17.    ( O2 tank is full).

## 2020-08-01 NOTE — PROGRESS NOTES
Report received from PACU post I&D with Dr. AGUILLON  Assumed care of patient at 2000   Pt is A&O x 3 - Disoriented to time  Pain reported at 4/10 - PCA pump in place  Denies N/V/D, Denies dizziness, denies SOB, denies chest pain   Tolerating Diet - low appetite  Wound vac to left aka   Adequate Urine output  7/31 last BM - loose  Passing Flatus    SCD's in place  Family updated over the phone  Bed in lowest position and locked.  Bed alarm in place  Pt resting comfortably now.  Review plan of care with patient  Call light within reach  Hourly rounds in place  All needs met at this time

## 2020-08-01 NOTE — ANESTHESIA PROCEDURE NOTES
Airway    Date/Time: 7/31/2020 5:05 PM  Performed by: Lluvia Baca M.D.  Authorized by: Lluvia Baca M.D.     Location:  OR  Urgency:  Elective  Indications for Airway Management:  Anesthesia      Spontaneous Ventilation: absent    Sedation Level:  Deep  Preoxygenated: Yes    Patient Position:  Sniffing  Mask Difficulty Assessment:  0 - not attempted  Final Airway Type:  Supraglottic airway  Final Supraglottic Airway:  Standard LMA    SGA Size:  4  Cuff Pressure (cm H2O):  40  Airway Seal Pressure (cm H2O):  20  Number of Attempts at Approach:  1   Cuff pressure measured via integrated color-based cuff pressure indicator.

## 2020-08-01 NOTE — PROGRESS NOTES
Infectious Disease Progress Note    Author: Nara Wade M.D. Date & Time of service: 2020  12:16 PM    Chief Complaint:  Infected above-the-knee amputation and vascular graft       Interval History:   74-year-old male with severe peripheral vascular disease, admitted with kamryn necrosis of his left above-the-knee amputation   site, significant associated purulent myositis, osteomyelitis and polymicrobial infection after AKA for critical limb ischemia   AF WBC 12.7 disoriented no acute distress   AF plan for OR again tomorrow noted-VAC in place-denies SE abx. No new complaints    Labs Reviewed, Medications Reviewed and Wound Reviewed.    Review of Systems:  Review of Systems   Constitutional: Negative for fever.   Cardiovascular: Negative for chest pain.   Gastrointestinal: Negative for diarrhea, nausea and vomiting.   Musculoskeletal: Positive for joint pain and myalgias.   All other systems reviewed and are negative.      Hemodynamics:  Temp (24hrs), Av.1 °C (98.7 °F), Min:36.5 °C (97.7 °F), Max:37.3 °C (99.1 °F)  Temperature: 37.2 °C (99 °F)  Pulse  Av.6  Min: 60  Max: 103   Blood Pressure : 141/67       Physical Exam:  Physical Exam  Vitals signs and nursing note reviewed.   Constitutional:       General: He is not in acute distress.     Appearance: He is ill-appearing. He is not toxic-appearing or diaphoretic.      Comments: disheveled   HENT:      Nose: No rhinorrhea.   Eyes:      General: No scleral icterus.     Extraocular Movements: Extraocular movements intact.      Pupils: Pupils are equal, round, and reactive to light.   Cardiovascular:      Rate and Rhythm: Normal rate.   Pulmonary:      Effort: Pulmonary effort is normal. No respiratory distress.      Breath sounds: No stridor. No wheezing.   Abdominal:      General: There is no distension.      Palpations: Abdomen is soft.      Tenderness: There is no abdominal tenderness.   Musculoskeletal:      Comments: VAC Left AKA  site-no surrounding erythema   Skin:     General: Skin is warm.      Coloration: Skin is not jaundiced.      Findings: Bruising present.   Neurological:      General: No focal deficit present.      Mental Status: He is alert.   Psychiatric:      Comments: Poor insight         Meds:    Current Facility-Administered Medications:   •  ampicillin-sulbactam (UNASYN) IV  •  haloperidol lactate  •  LORazepam  •  haloperidol lactate  •  vancomycin  •  fentaNYL  •  MD Alert...Vancomycin per Pharmacy  •  senna-docusate **AND** polyethylene glycol/lytes **AND** magnesium hydroxide **AND** bisacodyl  •  NS  •  ondansetron  •  ondansetron  •  enalaprilat  •  atorvastatin  •  insulin regular **AND** POC Blood Glucose **AND** NOTIFY MD and PharmD **AND** glucose **AND** dextrose 50%  •  enoxaparin (LOVENOX) injection    Facility-Administered Medications Ordered in Other Encounters:   •  fentaNYL  •  propofol  •  ceFAZolin    Labs:  Recent Labs     07/30/20  0535 07/31/20  0632   WBC 13.5* 12.7*   RBC 3.35* 3.26*   HEMOGLOBIN 10.5* 10.3*   HEMATOCRIT 33.0* 32.3*   MCV 98.5* 99.1*   MCH 31.3 31.6   RDW 45.7 45.2   PLATELETCT 326 362   MPV 10.4 9.3   NEUTSPOLYS 88.40* 87.70*   LYMPHOCYTES 7.50* 8.00*   MONOCYTES 2.70 2.70   EOSINOPHILS 0.30 0.50   BASOPHILS 0.10 0.20     Recent Labs     07/30/20  0535 07/31/20  0632   SODIUM 132* 134*   POTASSIUM 3.9 3.6   CHLORIDE 100 100   CO2 24 24   GLUCOSE 154* 159*   BUN 12 9     Recent Labs     07/30/20  0535 07/31/20  0632   ALBUMIN 1.9* 2.1*   TBILIRUBIN 0.5 0.5   ALKPHOSPHAT 138* 138*   TOTPROTEIN 5.2* 5.2*   ALTSGPT 35 36   ASTSGOT 67* 65*   CREATININE 0.96 0.85       Imaging:  No results found.    Micro:  Results     Procedure Component Value Units Date/Time    Fungal Culture [[id number]] Collected:  07/27/20 1040    Order Status:  Completed Specimen:  Tissue Updated:  07/30/20 0811     Significant Indicator NEG     Source TISS     Site Left Leg Vascular Graft     Culture Result No fungal  growth to date.    Narrative:       CALL  [place]  141 tel. [phone],  CALLED  141 tel. [phone] 07/28/2020, 13:59, RB PERF. RESULTS CALLED  TO:Taty Panda Rn  Surgery Specimen    AFB Culture [[id number]] Collected:  07/27/20 1040    Order Status:  Completed Specimen:  Tissue Updated:  07/30/20 0811     Significant Indicator NEG     Source TISS     Site Left Leg Vascular Graft     Culture Result Culture in progress.     AFB Smear Results No acid fast bacilli seen.    Narrative:       CALL  [place]  141 tel. [phone],  CALLED  141 tel. [phone] 07/28/2020, 13:59, RB PERF. RESULTS CALLED  TO:Taty Panda Rn  Surgery Specimen    CULTURE TISSUE W/ GRM STAIN [[id number]]  (Abnormal)  (Susceptibility) Collected:  07/27/20 1040    Order Status:  Completed Specimen:  Tissue Updated:  07/30/20 0811     Significant Indicator POS     Source TISS     Site Left Leg Vascular Graft     Culture Result -     Gram Stain Result Few WBCs.  Many Gram positive cocci.  Rare Gram negative rods.       Culture Result Methicillin Resistant Staphylococcus aureus  Moderate growth  This isolate is presumed to be clindamycin resistant based on  detection of inducible resistance.  Clindamycin may still  be effective in some patients.        Enterococcus faecalis  Light growth  Combination therapy with ampicillin, penicillin, or  vancomycin (for susceptible strains) plus an aminoglycoside  is usually indicated for serious enterococcal infections,  such as endocarditis unless high-level resistance to both  gentamicin and streptomycin is documented; such combinations  are predicted to result in synergistic killing of the  Enterococcus.  The susceptibility profile for this organism indicates that  Streptomycin would not be an effective component of  combination therapy.  The susceptibility profile for this organism indicates that  Gentamycin would not be an effective component of combination  therapy.      Narrative:       CALL  [place]  141 tel.  [phone],  CALLED  141 tel. [phone] 07/28/2020, 13:59, RB PERF. RESULTS CALLED  TO:Taty Panda Rn  Surgery Specimen    Susceptibility     Methicillin resistant staphylococcus aureus (1)     Antibiotic Interpretation Microscan Method Status    Azithromycin Resistant >4 mcg/mL MASOOD Preliminary    Clindamycin Resistant <=0.5 mcg/mL MASOOD Preliminary    Cefazolin Resistant <=8 mcg/mL MASOOD Preliminary    Ceftaroline Sensitive <=0.5 mcg/mL MASOOD Preliminary    Daptomycin Sensitive <=1 mcg/mL MASOOD Preliminary    Erythromycin Resistant >4 mcg/mL MASOOD Preliminary    Ampicillin/sulbactam Resistant <=8/4 mcg/mL MASOOD Preliminary    Oxacillin Resistant >2 mcg/mL MASOOD Preliminary    Penicillin Resistant >8 mcg/mL MASOOD Preliminary    Trimeth/Sulfa Sensitive <=0.5/9.5 mcg/mL MASOOD Preliminary    Tetracycline Resistant >8 mcg/mL MASOOD Preliminary    Vancomycin Sensitive 1 mcg/mL MASOOD Preliminary          Enterococcus faecalis (2)     Antibiotic Interpretation Microscan Method Status    Daptomycin Sensitive <=1 mcg/mL MASOOD Preliminary    Strep Synergy Resistant >1000 mcg/mL MASOOD Preliminary    Penicillin Sensitive 8 mcg/mL MASOOD Preliminary    Vancomycin Sensitive 1 mcg/mL MASOOD Preliminary    Ampicillin Sensitive <=2 mcg/mL MASOOD Preliminary    Gent Synergy Resistant >500 mcg/mL MASOOD Preliminary                   Anaerobic Culture [[id number]] Collected:  07/27/20 1040    Order Status:  Completed Specimen:  Tissue Updated:  07/30/20 0811     Significant Indicator NEG     Source TISS     Site Left Leg Vascular Graft     Culture Result Light growth possible anaerobes;  further incubation required.      Narrative:       CALL  [place]  141 tel. [phone],  CALLED  141 tel. [phone] 07/28/2020, 13:59, RB PERF. RESULTS CALLED  TO:Taty Panda Rn  Surgery Specimen    GRAM STAIN [[id number]]  (Abnormal) Collected:  07/27/20 1040    Order Status:  Completed Specimen:  Tissue Updated:  07/28/20 1846     Significant Indicator .     Source TISS     Site Left Leg  Vascular Graft     Gram Stain Result Few WBCs.  Many Gram positive cocci.  Rare Gram negative rods.      Narrative:       CALL  [place]  141 tel. [phone],  CALLED  141 tel. [phone] 07/28/2020, 13:59, RB PERF. RESULTS CALLED  TO:Taty Panda Rn  Surgery Specimen    Acid Fast Stain [[id number]] Collected:  07/27/20 1040    Order Status:  Completed Specimen:  Tissue Updated:  07/28/20 1846     Significant Indicator NEG     Source TISS     Site Left Leg Vascular Graft     AFB Smear Results No acid fast bacilli seen.    Narrative:       CALL  [place]  141 tel. [phone],  CALLED  141 tel. [phone] 07/28/2020, 13:59, RB PERF. RESULTS CALLED  TO:Taty Panda Rn  Surgery Specimen    SARS-CoV-2, PCR (In-House) [[id number]] Collected:  07/27/20 0735    Order Status:  Completed Updated:  07/27/20 0823     SARS-CoV-2 Source Nasal Swab     SARS-CoV-2 (RdRp gene) NotDetected     Comment: [facility] providers: PLEASE REFER TO DE-ESCALATION AND RETESTING PROTOCOL  on [email or web address]  **The Abbott ID Now COVID-19 Test has been made available for use under the  Emergency Use Authorization (EUA) only.         Narrative:       Collected By:ARIK HATHAWAY  Pt for surgery today, please run rapid test.  Thank you!  Is patient being admitted?->Yes  Does this patient meet criteria for Rush/Cepheid per [facility]  Inpatient Workflow? (See workflow link below)->Yes  Expected turn around time?->Rush (Cepheid 2-4 hours)    COVID/SARS CoV-2 PCR [[id number]] Collected:  07/27/20 0735    Order Status:  Completed Specimen:  Respirate from Nasal Updated:  07/27/20 0755     COVID Order Status Received     Comment: The order for SARS CoV-2 testing has been received by the  Laboratory. This result is neither positive nor negative.  Final results of testing will report in 24-48 hours, separately.         Narrative:       Collected By:ARIK HATHAWAY  Pt for surgery today, please run rapid test.  Thank you!  Is patient being admitted?->Yes  Does  this patient meet criteria for Rush/Cepheid [facility]  [facility] Workflow? (See workflow link below)->Yes  Expected turn around time?->Rush (Cepheid 2-4 hours)          Assessment:  Active Hospital Problems    Diagnosis   • *AKA stump complication (HCC) [T87.9]   • Delirium [R41.0]   • Arterial insufficiency (HCC) [I77.1]   • Diabetes (HCC) [E11.9]   • HLD (hyperlipidemia) [E78.5]   • HTN (hypertension) [I10]   • Basal cell carcinoma of earlobe [C44.211]   • Basal cell carcinoma of face [C44.310]       Plan:  Infected above-the-knee amputation/OM left  Infected vascular graft   Afebrile  Decreased leukocytosis at last check  s/p revision amputation on 07/27, but margins are not clear.    Repeat I and D necritc muscle and fascia 7/31-less necrosis and purulence than 7/27  Operative cultures are MRSA and Efaecalis  Continue vancomycin and Unasyn  Continue to monitor vanco troughs and renal function for nephrotoxicity.    Anticipate 6 week course from date of final surgery  Plan for OR again tomorrow    Diabetes  Hemoglobin A1c 7.3.   Keep BS under 150 to help control current infection      Severe arterial insufficiency/PVD.    His amputation did show evidence of vascular calcifications and vascular   disease at the amputation site.    Anticipate he may have continued poor wound healing.      Basal cell carcinomas, face and ear  Per oncology    Prognosis guarded

## 2020-08-01 NOTE — PROGRESS NOTES
"Vascular    CHARLES  Pain controlled    /67   Pulse 89   Temp 37.2 °C (99 °F) (Temporal)   Resp 17   Ht 1.676 m (5' 6\")   Wt 63 kg (138 lb 14.2 oz)   SpO2 93%   BMI 22.42 kg/m²     Vac intact    A/P)  OR tomorrow for repeat washout, debridement, and possibly at least partial closure of the incision.    Most likely will still need vac therapy for a while to continue controlling the infection    Usman Fisher MD  [facility] (General and Vascular Surgery)  Cell: [phone] (text or call is fine, if you don't reach me please try my office)  Office: [phone]  __________________________________________________________________  Patient:Jimenez Bains   MRN:[id number]   CSN:[id number]    "

## 2020-08-01 NOTE — PROGRESS NOTES
"Vascular    CHARLES  Pain controlled    /67   Pulse 89   Temp 37.2 °C (99 °F) (Temporal)   Resp 17   Ht 1.676 m (5' 6\")   Wt 63 kg (138 lb 14.2 oz)   SpO2 93%   BMI 22.42 kg/m²     Vac intact    A/P)  OR tomorrow for repeat washout, debridement, and possibly at least partial closure of the incision.    Most likely will still need vac therapy for a while to continue controlling the infection    Tentatively 1300 Sunday    Usman Fisher MD  [facility] (General and Vascular Surgery)  Cell: [phone] (text or call is fine, if you don't reach me please try my office)  Office: [phone]  __________________________________________________________________  Patient:Jimenez Raymundo Bains   MRN:[id number]   CSN:[id number]    "

## 2020-08-02 ENCOUNTER — ANESTHESIA EVENT (OUTPATIENT)
Dept: SURGERY | Facility: MEDICAL CENTER | Age: 75
DRG: 475 | End: 2020-08-02
Payer: MEDICARE

## 2020-08-02 ENCOUNTER — ANESTHESIA (OUTPATIENT)
Dept: SURGERY | Facility: MEDICAL CENTER | Age: 75
DRG: 475 | End: 2020-08-02
Payer: MEDICARE

## 2020-08-02 LAB
ALBUMIN SERPL BCP-MCNC: 2.1 G/DL (ref 3.2–4.9)
ALBUMIN/GLOB SERPL: 0.6 G/DL
ALP SERPL-CCNC: 141 U/L (ref 30–99)
ALT SERPL-CCNC: 31 U/L (ref 2–50)
ANION GAP SERPL CALC-SCNC: 11 MMOL/L (ref 7–16)
AST SERPL-CCNC: 66 U/L (ref 12–45)
BASOPHILS # BLD AUTO: 0.1 % (ref 0–1.8)
BASOPHILS # BLD: 0.01 K/UL (ref 0–0.12)
BILIRUB SERPL-MCNC: 0.4 MG/DL (ref 0.1–1.5)
BUN SERPL-MCNC: 10 MG/DL (ref 8–22)
CALCIUM SERPL-MCNC: 7.8 MG/DL (ref 8.5–10.5)
CHLORIDE SERPL-SCNC: 103 MMOL/L (ref 96–112)
CO2 SERPL-SCNC: 24 MMOL/L (ref 20–33)
CREAT SERPL-MCNC: 0.81 MG/DL (ref 0.5–1.4)
EKG IMPRESSION: NORMAL
EKG IMPRESSION: NORMAL
EOSINOPHIL # BLD AUTO: 0.09 K/UL (ref 0–0.51)
EOSINOPHIL NFR BLD: 0.8 % (ref 0–6.9)
ERYTHROCYTE [DISTWIDTH] IN BLOOD BY AUTOMATED COUNT: 45.8 FL (ref 35.9–50)
GLOBULIN SER CALC-MCNC: 3.4 G/DL (ref 1.9–3.5)
GLUCOSE BLD-MCNC: 111 MG/DL (ref 65–99)
GLUCOSE BLD-MCNC: 112 MG/DL (ref 65–99)
GLUCOSE BLD-MCNC: 116 MG/DL (ref 65–99)
GLUCOSE SERPL-MCNC: 139 MG/DL (ref 65–99)
HCT VFR BLD AUTO: 33.7 % (ref 42–52)
HGB BLD-MCNC: 10.7 G/DL (ref 14–18)
IMM GRANULOCYTES # BLD AUTO: 0.06 K/UL (ref 0–0.11)
IMM GRANULOCYTES NFR BLD AUTO: 0.5 % (ref 0–0.9)
LYMPHOCYTES # BLD AUTO: 0.99 K/UL (ref 1–4.8)
LYMPHOCYTES NFR BLD: 8.7 % (ref 22–41)
MAGNESIUM SERPL-MCNC: 2 MG/DL (ref 1.5–2.5)
MCH RBC QN AUTO: 31.7 PG (ref 27–33)
MCHC RBC AUTO-ENTMCNC: 31.8 G/DL (ref 33.7–35.3)
MCV RBC AUTO: 99.7 FL (ref 81.4–97.8)
MONOCYTES # BLD AUTO: 0.43 K/UL (ref 0–0.85)
MONOCYTES NFR BLD AUTO: 3.8 % (ref 0–13.4)
NEUTROPHILS # BLD AUTO: 9.75 K/UL (ref 1.82–7.42)
NEUTROPHILS NFR BLD: 86.1 % (ref 44–72)
NRBC # BLD AUTO: 0 K/UL
NRBC BLD-RTO: 0 /100 WBC
PLATELET # BLD AUTO: 251 K/UL (ref 164–446)
PMV BLD AUTO: 10.3 FL (ref 9–12.9)
POTASSIUM SERPL-SCNC: 3.7 MMOL/L (ref 3.6–5.5)
PROT SERPL-MCNC: 5.5 G/DL (ref 6–8.2)
RBC # BLD AUTO: 3.38 M/UL (ref 4.7–6.1)
SODIUM SERPL-SCNC: 138 MMOL/L (ref 135–145)
WBC # BLD AUTO: 11.3 K/UL (ref 4.8–10.8)

## 2020-08-02 PROCEDURE — 700111 HCHG RX REV CODE 636 W/ 250 OVERRIDE (IP): Performed by: HOSPITALIST

## 2020-08-02 PROCEDURE — 160002 HCHG RECOVERY MINUTES (STAT): Performed by: SURGERY

## 2020-08-02 PROCEDURE — 93005 ELECTROCARDIOGRAM TRACING: CPT | Performed by: NURSE PRACTITIONER

## 2020-08-02 PROCEDURE — 93005 ELECTROCARDIOGRAM TRACING: CPT | Performed by: SURGERY

## 2020-08-02 PROCEDURE — 160036 HCHG PACU - EA ADDL 30 MINS PHASE I: Performed by: SURGERY

## 2020-08-02 PROCEDURE — 0KBP0ZZ EXCISION OF LEFT HIP MUSCLE, OPEN APPROACH: ICD-10-PCS | Performed by: SURGERY

## 2020-08-02 PROCEDURE — 700105 HCHG RX REV CODE 258: Performed by: INTERNAL MEDICINE

## 2020-08-02 PROCEDURE — 99232 SBSQ HOSP IP/OBS MODERATE 35: CPT | Performed by: HOSPITALIST

## 2020-08-02 PROCEDURE — 500371 HCHG DRAIN, BLAKE 10MM: Performed by: SURGERY

## 2020-08-02 PROCEDURE — 700105 HCHG RX REV CODE 258: Performed by: HOSPITALIST

## 2020-08-02 PROCEDURE — 500881 HCHG PACK, EXTREMITY: Performed by: SURGERY

## 2020-08-02 PROCEDURE — 700101 HCHG RX REV CODE 250: Performed by: ANESTHESIOLOGY

## 2020-08-02 PROCEDURE — 700105 HCHG RX REV CODE 258: Performed by: ANESTHESIOLOGY

## 2020-08-02 PROCEDURE — 501445 HCHG STAPLER, SKIN DISP: Performed by: SURGERY

## 2020-08-02 PROCEDURE — 160039 HCHG SURGERY MINUTES - EA ADDL 1 MIN LEVEL 3: Performed by: SURGERY

## 2020-08-02 PROCEDURE — 83735 ASSAY OF MAGNESIUM: CPT

## 2020-08-02 PROCEDURE — 93010 ELECTROCARDIOGRAM REPORT: CPT | Mod: 77 | Performed by: INTERNAL MEDICINE

## 2020-08-02 PROCEDURE — 160028 HCHG SURGERY MINUTES - 1ST 30 MINS LEVEL 3: Performed by: SURGERY

## 2020-08-02 PROCEDURE — 700111 HCHG RX REV CODE 636 W/ 250 OVERRIDE (IP): Performed by: ANESTHESIOLOGY

## 2020-08-02 PROCEDURE — 36415 COLL VENOUS BLD VENIPUNCTURE: CPT

## 2020-08-02 PROCEDURE — 160035 HCHG PACU - 1ST 60 MINS PHASE I: Performed by: SURGERY

## 2020-08-02 PROCEDURE — 160048 HCHG OR STATISTICAL LEVEL 1-5: Performed by: SURGERY

## 2020-08-02 PROCEDURE — 93010 ELECTROCARDIOGRAM REPORT: CPT | Performed by: INTERNAL MEDICINE

## 2020-08-02 PROCEDURE — 700111 HCHG RX REV CODE 636 W/ 250 OVERRIDE (IP): Performed by: NURSE PRACTITIONER

## 2020-08-02 PROCEDURE — 160009 HCHG ANES TIME/MIN: Performed by: SURGERY

## 2020-08-02 PROCEDURE — 700111 HCHG RX REV CODE 636 W/ 250 OVERRIDE (IP): Performed by: SURGERY

## 2020-08-02 PROCEDURE — 501838 HCHG SUTURE GENERAL: Performed by: SURGERY

## 2020-08-02 PROCEDURE — 85025 COMPLETE CBC W/AUTO DIFF WBC: CPT

## 2020-08-02 PROCEDURE — 700105 HCHG RX REV CODE 258: Performed by: SURGERY

## 2020-08-02 PROCEDURE — 82962 GLUCOSE BLOOD TEST: CPT | Mod: 91

## 2020-08-02 PROCEDURE — 770021 HCHG ROOM/CARE - ISO PRIVATE

## 2020-08-02 PROCEDURE — 80053 COMPREHEN METABOLIC PANEL: CPT

## 2020-08-02 PROCEDURE — 700111 HCHG RX REV CODE 636 W/ 250 OVERRIDE (IP): Performed by: INTERNAL MEDICINE

## 2020-08-02 PROCEDURE — 99233 SBSQ HOSP IP/OBS HIGH 50: CPT | Performed by: INTERNAL MEDICINE

## 2020-08-02 RX ORDER — SODIUM CHLORIDE, SODIUM LACTATE, POTASSIUM CHLORIDE, CALCIUM CHLORIDE 600; 310; 30; 20 MG/100ML; MG/100ML; MG/100ML; MG/100ML
1000 INJECTION, SOLUTION INTRAVENOUS CONTINUOUS
Status: DISCONTINUED | OUTPATIENT
Start: 2020-08-02 | End: 2020-08-02 | Stop reason: HOSPADM

## 2020-08-02 RX ORDER — HYDROMORPHONE HYDROCHLORIDE 1 MG/ML
0.4 INJECTION, SOLUTION INTRAMUSCULAR; INTRAVENOUS; SUBCUTANEOUS
Status: DISCONTINUED | OUTPATIENT
Start: 2020-08-02 | End: 2020-08-02 | Stop reason: HOSPADM

## 2020-08-02 RX ORDER — HYDROMORPHONE HYDROCHLORIDE 1 MG/ML
1 INJECTION, SOLUTION INTRAMUSCULAR; INTRAVENOUS; SUBCUTANEOUS
Status: DISCONTINUED | OUTPATIENT
Start: 2020-08-02 | End: 2020-08-12 | Stop reason: HOSPADM

## 2020-08-02 RX ORDER — HYDROMORPHONE HYDROCHLORIDE 1 MG/ML
0.1 INJECTION, SOLUTION INTRAMUSCULAR; INTRAVENOUS; SUBCUTANEOUS
Status: DISCONTINUED | OUTPATIENT
Start: 2020-08-02 | End: 2020-08-02 | Stop reason: HOSPADM

## 2020-08-02 RX ORDER — ONDANSETRON 2 MG/ML
4 INJECTION INTRAMUSCULAR; INTRAVENOUS
Status: DISCONTINUED | OUTPATIENT
Start: 2020-08-02 | End: 2020-08-02 | Stop reason: HOSPADM

## 2020-08-02 RX ORDER — LIDOCAINE HYDROCHLORIDE 20 MG/ML
INJECTION, SOLUTION EPIDURAL; INFILTRATION; INTRACAUDAL; PERINEURAL PRN
Status: DISCONTINUED | OUTPATIENT
Start: 2020-08-02 | End: 2020-08-02 | Stop reason: SURG

## 2020-08-02 RX ORDER — CEFAZOLIN SODIUM 1 G/3ML
INJECTION, POWDER, FOR SOLUTION INTRAMUSCULAR; INTRAVENOUS PRN
Status: DISCONTINUED | OUTPATIENT
Start: 2020-08-02 | End: 2020-08-02 | Stop reason: SURG

## 2020-08-02 RX ORDER — HYDRALAZINE HYDROCHLORIDE 20 MG/ML
5 INJECTION INTRAMUSCULAR; INTRAVENOUS
Status: DISCONTINUED | OUTPATIENT
Start: 2020-08-02 | End: 2020-08-02 | Stop reason: HOSPADM

## 2020-08-02 RX ORDER — SODIUM CHLORIDE, SODIUM LACTATE, POTASSIUM CHLORIDE, CALCIUM CHLORIDE 600; 310; 30; 20 MG/100ML; MG/100ML; MG/100ML; MG/100ML
INJECTION, SOLUTION INTRAVENOUS
Status: DISCONTINUED | OUTPATIENT
Start: 2020-08-02 | End: 2020-08-02

## 2020-08-02 RX ORDER — HYDROMORPHONE HYDROCHLORIDE 1 MG/ML
0.2 INJECTION, SOLUTION INTRAMUSCULAR; INTRAVENOUS; SUBCUTANEOUS
Status: DISCONTINUED | OUTPATIENT
Start: 2020-08-02 | End: 2020-08-02 | Stop reason: HOSPADM

## 2020-08-02 RX ADMIN — AMPICILLIN SODIUM AND SULBACTAM SODIUM 3 G: 2; 1 INJECTION, POWDER, FOR SOLUTION INTRAMUSCULAR; INTRAVENOUS at 00:42

## 2020-08-02 RX ADMIN — AMPICILLIN SODIUM AND SULBACTAM SODIUM 3 G: 2; 1 INJECTION, POWDER, FOR SOLUTION INTRAMUSCULAR; INTRAVENOUS at 12:00

## 2020-08-02 RX ADMIN — LIDOCAINE HYDROCHLORIDE 80 MG: 20 INJECTION, SOLUTION EPIDURAL; INFILTRATION; INTRACAUDAL at 17:30

## 2020-08-02 RX ADMIN — Medication: at 19:46

## 2020-08-02 RX ADMIN — PROPOFOL 150 MG: 10 INJECTION, EMULSION INTRAVENOUS at 17:30

## 2020-08-02 RX ADMIN — SODIUM CHLORIDE: 9 INJECTION, SOLUTION INTRAVENOUS at 05:31

## 2020-08-02 RX ADMIN — FENTANYL CITRATE 50 MCG: 50 INJECTION INTRAMUSCULAR; INTRAVENOUS at 17:30

## 2020-08-02 RX ADMIN — ENALAPRILAT 1.25 MG: 1.25 INJECTION INTRAVENOUS at 01:56

## 2020-08-02 RX ADMIN — ENALAPRILAT 1.25 MG: 1.25 INJECTION INTRAVENOUS at 12:22

## 2020-08-02 RX ADMIN — VANCOMYCIN HYDROCHLORIDE 1250 MG: 500 INJECTION, POWDER, LYOPHILIZED, FOR SOLUTION INTRAVENOUS at 13:29

## 2020-08-02 RX ADMIN — FENTANYL CITRATE 50 MCG: 50 INJECTION INTRAMUSCULAR; INTRAVENOUS at 17:45

## 2020-08-02 RX ADMIN — CEFAZOLIN 2 G: 330 INJECTION, POWDER, FOR SOLUTION INTRAMUSCULAR; INTRAVENOUS at 17:30

## 2020-08-02 RX ADMIN — AMPICILLIN SODIUM AND SULBACTAM SODIUM 3 G: 2; 1 INJECTION, POWDER, FOR SOLUTION INTRAMUSCULAR; INTRAVENOUS at 05:26

## 2020-08-02 RX ADMIN — SODIUM CHLORIDE, POTASSIUM CHLORIDE, SODIUM LACTATE AND CALCIUM CHLORIDE: 600; 310; 30; 20 INJECTION, SOLUTION INTRAVENOUS at 17:26

## 2020-08-02 ASSESSMENT — ENCOUNTER SYMPTOMS
SHORTNESS OF BREATH: 0
DIARRHEA: 0
CHILLS: 0
ABDOMINAL PAIN: 0
FEVER: 0
MEMORY LOSS: 1
EYE REDNESS: 0
MYALGIAS: 1
EYE PAIN: 0
SPEECH CHANGE: 0
STRIDOR: 0
EYE DISCHARGE: 0
NAUSEA: 0
COUGH: 0
BACK PAIN: 1
VOMITING: 0
FLANK PAIN: 0

## 2020-08-02 ASSESSMENT — PAIN SCALES - GENERAL: PAIN_LEVEL: 0

## 2020-08-02 NOTE — PROGRESS NOTES
Infectious Disease Progress Note    Author: Nara Wade M.D. 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 No new complaints   AF WBC 11.3 NPO for return to OR today-denies SE abx. Pain is controlled. No new complaints    Labs Reviewed, Medications Reviewed and Wound Reviewed.    Review of Systems:  Review of Systems   Constitutional: Negative for chills and fever.   Respiratory: Negative for cough and shortness of breath.    Cardiovascular: Negative for chest pain.   Gastrointestinal: Negative for diarrhea, nausea and vomiting.   Musculoskeletal: Positive for joint pain and myalgias.   Skin: Negative for rash.   All other systems reviewed and are negative.      Hemodynamics:  Temp (24hrs), Av.9 °C (98.5 °F), Min:36.4 °C (97.6 °F), Max:37.4 °C (99.4 °F)  Temperature: 36.7 °C (98 °F)  Pulse  Av  Min: 47  Max: 103   Blood Pressure : (!) 176/70(RN notified)       Physical Exam:  Physical Exam  Vitals signs and nursing note reviewed.   Constitutional:       General: He is not in acute distress.     Appearance: He is ill-appearing. He is not toxic-appearing or diaphoretic.      Comments: disheveled   HENT:      Nose: No congestion or rhinorrhea.   Eyes:      General: No scleral icterus.     Extraocular Movements: Extraocular movements intact.      Pupils: Pupils are equal, round, and reactive to light.   Cardiovascular:      Rate and Rhythm: Normal rate.   Pulmonary:      Effort: Pulmonary effort is normal. No respiratory distress.    Breath sounds: No stridor. No wheezing or rhonchi.   Abdominal:      General: There is no distension.      Palpations: Abdomen is soft.      Tenderness: There is no abdominal tenderness. There is no guarding.   Musculoskeletal:      Comments: VAC Left AKA site-no surrounding erythema   Skin:     General: Skin is warm.      Coloration: Skin is not jaundiced.      Findings: Bruising present.   Neurological:      General: No focal deficit present.      Mental Status: He is alert.   Psychiatric:         Mood and Affect: Mood normal.      Comments: Poor insight         Meds:    Current Facility-Administered Medications:   •  ampicillin-sulbactam (UNASYN) IV  •  haloperidol lactate  •  LORazepam  •  haloperidol lactate  •  vancomycin  •  fentaNYL  •  MD Alert...Vancomycin per Pharmacy  •  senna-docusate **AND** polyethylene glycol/lytes **AND** magnesium hydroxide **AND** bisacodyl  •  NS  •  ondansetron  •  ondansetron  •  enalaprilat  •  atorvastatin  •  insulin regular **AND** POC Blood Glucose **AND** NOTIFY MD and PharmD **AND** glucose **AND** dextrose 50%  •  enoxaparin (LOVENOX) injection    Facility-Administered Medications Ordered in Other Encounters:   •  fentaNYL  •  propofol  •  ceFAZolin    Labs:  Recent Labs     07/31/20 0632 08/01/20 1829 08/02/20 0621   WBC 12.7* 12.9* 11.3*   RBC 3.26* 3.55* 3.38*   HEMOGLOBIN 10.3* 11.1* 10.7*   HEMATOCRIT 32.3* 35.6* 33.7*   MCV 99.1* 100.3* 99.7*   MCH 31.6 31.3 31.7   RDW 45.2 46.3 45.8   PLATELETCT 362 399 251   MPV 9.3 9.2 10.3   NEUTSPOLYS 87.70* 88.30* 86.10*   LYMPHOCYTES 8.00* 6.80* 8.70*   MONOCYTES 2.70 3.60 3.80   EOSINOPHILS 0.50 0.70 0.80   BASOPHILS 0.20 0.10 0.10     Recent Labs     07/31/20 0632 08/01/20 1829 08/02/20 0621   SODIUM 134* 137 138   POTASSIUM 3.6 3.9 3.7   CHLORIDE 100 101 103   CO2 24 24 24   GLUCOSE 159* 200* 139*   BUN 9 14 10     Recent Labs     07/31/20  0632 08/01/20  1829 08/02/20  0621   ALBUMIN 2.1* 2.4* 2.1*   TBILIRUBIN  0.5 0.4 0.4   ALKPHOSPHAT 138* 154* 141*   TOTPROTEIN 5.2* 5.6* 5.5*   ALTSGPT 36 30 31   ASTSGOT 65* 62* 66*   CREATININE 0.85 1.08 0.81       Imaging:  No results found.    Micro:  Results     Procedure Component Value Units Date/Time    Anaerobic Culture [[id number]]  (Abnormal) Collected:  07/27/20 1040    Order Status:  Completed Specimen:  Tissue Updated:  08/01/20 1803     Significant Indicator POS     Source TISS     Site Left Leg Vascular Graft     Culture Result -      Peptostreptococcus asaccharolyticus  Light growth        Prevotella oralis  Light growth        Propionibacterium species  Light growth      Narrative:       CALL  [place]  141 tel. [phone],  CALLED  141 tel. [phone] 07/28/2020, 13:59, RB PERF. RESULTS CALLED  TO:Taty Panda Rn  Surgery Specimen    Fungal Culture [[id number]] Collected:  07/27/20 1040    Order Status:  Completed Specimen:  Tissue Updated:  08/01/20 1803     Significant Indicator NEG     Source TISS     Site Left Leg Vascular Graft     Culture Result No fungal growth to date.    Narrative:       CALL  [place]  141 tel. [phone],  CALLED  141 tel. [phone] 07/28/2020, 13:59, RB PERF. RESULTS CALLED  TO:Taty Panda Rn  Surgery Specimen    AFB Culture [[id number]] Collected:  07/27/20 1040    Order Status:  Completed Specimen:  Tissue Updated:  08/01/20 1803     Significant Indicator NEG     Source TISS     Site Left Leg Vascular Graft     Culture Result Culture in progress.     AFB Smear Results No acid fast bacilli seen.    Narrative:       CALL  [place]  141 tel. [phone],  CALLED  141 tel. [phone] 07/28/2020, 13:59, RB PERF. RESULTS CALLED  TO:Taty Panda Rn  Surgery Specimen    CULTURE TISSUE W/ GRM STAIN [[id number]]  (Abnormal)  (Susceptibility) Collected:  07/27/20 1040    Order Status:  Completed Specimen:  Tissue Updated:  08/01/20 1803     Significant Indicator POS     Source TISS     Site Left Leg Vascular Graft     Culture Result -     Gram Stain Result Few  WBCs.  Many Gram positive cocci.  Rare Gram negative rods.       Culture Result Methicillin Resistant Staphylococcus aureus  Moderate growth  This isolate is presumed to be clindamycin resistant based on  detection of inducible resistance.  Clindamycin may still  be effective in some patients.        Enterococcus faecalis  Light growth  Combination therapy with ampicillin, penicillin, or  vancomycin (for susceptible strains) plus an aminoglycoside  is usually indicated for serious enterococcal infections,  such as endocarditis unless high-level resistance to both  gentamicin and streptomycin is documented; such combinations  are predicted to result in synergistic killing of the  Enterococcus.  The susceptibility profile for this organism indicates that  Streptomycin would not be an effective component of  combination therapy.  The susceptibility profile for this organism indicates that  Gentamycin would not be an effective component of combination  therapy.      Narrative:       CALL  [place]  141 tel. [phone],  CALLED  141 tel. [phone] 07/28/2020, 13:59, RB PERF. RESULTS CALLED  TO:Taty [id number]   Surgery Specimen    Susceptibility     Methicillin resistant staphylococcus aureus (1)     Antibiotic Interpretation Microscan Method Status    Azithromycin Resistant >4 mcg/mL MASOOD Final    Clindamycin Resistant <=0.5 mcg/mL MASOOD Final    Cefazolin Resistant <=8 mcg/mL MASOOD Final    Ceftaroline Sensitive <=0.5 mcg/mL MASOOD Final    Daptomycin Sensitive <=1 mcg/mL MASOOD Final    Erythromycin Resistant >4 mcg/mL MASOOD Final    Ampicillin/sulbactam Resistant <=8/4 mcg/mL MASOOD Final    Oxacillin Resistant >2 mcg/mL MASOOD Final    Penicillin Resistant >8 mcg/mL MASOOD Final    Trimeth/Sulfa Sensitive <=0.5/9.5 mcg/mL MASOOD Final    Tetracycline Resistant >8 mcg/mL MASOOD Final    Vancomycin Sensitive 1 mcg/mL MASOOD Final          Enterococcus faecalis (2)     Antibiotic Interpretation Microscan Method Status    Daptomycin Sensitive <=1 mcg/mL  MASOOD Final    Strep Synergy Resistant >1000 mcg/mL MASOOD Final    Penicillin Sensitive 8 mcg/mL MASOOD Final    Vancomycin Sensitive 1 mcg/mL MASOOD Final    Ampicillin Sensitive <=2 mcg/mL MASOOD Final    Gent Synergy Resistant >500 mcg/mL MASOOD Final                   GRAM STAIN [[id number]]  (Abnormal) Collected:  07/27/20 1040    Order Status:  Completed Specimen:  Tissue Updated:  07/28/20 1846     Significant Indicator .     Source TISS     Site Left Leg Vascular Graft     Gram Stain Result Few WBCs.  Many Gram positive cocci.  Rare Gram negative rods.      Narrative:       CALL  [place]  141 tel. [phone],  CALLED  141 tel. [phone] 07/28/2020, 13:59, RB PERF. RESULTS CALLED  TO:Taty Panda Rn  Surgery Specimen    Acid Fast Stain [[id number]] Collected:  07/27/20 1040    Order Status:  Completed Specimen:  Tissue Updated:  07/28/20 1846     Significant Indicator NEG     Source TISS     Site Left Leg Vascular Graft     AFB Smear Results No acid fast bacilli seen.    Narrative:       CALL  [place]  141 tel. [phone],  CALLED  141 tel. [phone] 07/28/2020, 13:59, RB PERF. RESULTS CALLED  TO:Taty Panda Rn  Surgery Specimen    SARS-CoV-2, PCR (In-House) [[id number]] Collected:  07/27/20 0735    Order Status:  Completed Updated:  07/27/20 0823     SARS-CoV-2 Source Nasal Swab     SARS-CoV-2 (RdRp gene) NotDetected     Comment: [facility] providers: PLEASE REFER TO DE-ESCALATION AND RETESTING PROTOCOL  on [email or web address]  **The Abbott ID Now COVID-19 Test has been made available for use under the  Emergency Use Authorization (EUA) only.         Narrative:       Collected By:ARIK HATHAWAY Pt for surgery today, please run rapid test.  Thank you!  Is patient being admitted?->Yes  Does this patient meet criteria for Rush/Cepheid per [facility]  Inpatient Workflow? (See workflow link below)->Yes  Expected turn around time?->Rush (Cepheid 2-4 hours)    COVID/SARS CoV-2 PCR [[id number]] Collected:  07/27/20 0735    Order Status:   Completed Specimen:  Respirate from Nasal Updated:  07/27/20 0755     COVID Order Status Received     Comment: The order for SARS CoV-2 testing has been received by the  Laboratory. This result is neither positive nor negative.  Final results of testing will report in 24-48 hours, separately.         Narrative:       Collected By:ARIK HATHAWAY  Pt for surgery today, please run rapid test.  Thank you!  Is patient being admitted?->Yes  Does this patient meet criteria for Rush/Cepheid per [facility]  Inpatient Workflow? (See workflow link below)->Yes  Expected turn around time?->Rush (Cepheid 2-4 hours)          Assessment:  Active Hospital Problems    Diagnosis   • *AKA stump complication (HCC) [T87.9]   • Delirium [R41.0]   • Arterial insufficiency (HCC) [I77.1]   • Diabetes (HCC) [E11.9]   • HLD (hyperlipidemia) [E78.5]   • HTN (hypertension) [I10]   • Basal cell carcinoma of earlobe [C44.211]   • Basal cell carcinoma of face [C44.310]       Plan:  Infected above-the-knee amputation/OM left  Infected vascular graft   Afebrile  Mild leukocytosis   s/p revision amputation on 07/27, but margins are not clear.    Repeat I and D necrotic muscle and fascia 7/31-less necrosis and purulence than 7/27  Operative cultures are polymicrobial: MRSA, Efaecalis, peptostrep, Prevotella, and propionibacterium  Continue vancomycin and Unasyn  Continue to monitor vanco troughs and renal function for nephrotoxicity.    Anticipate 6 week course from date of final surgery  Plan for OR again today    Diabetes, chronic  Hemoglobin A1c 7.3.   Keep BS under 150 to help control current infection  -116    Severe arterial insufficiency/PVD.    His amputation did show evidence of vascular calcifications and vascular   disease at the amputation site.    Anticipate he will have continued poor wound healing.      Basal cell carcinomas, face and ear  Per oncology    Prognosis guarded

## 2020-08-02 NOTE — PROGRESS NOTES
Hospital Medicine Daily Progress Note    Date of Service  8/2/2020    Chief Complaint  Necrotic left above-knee amputation stump    Hospital Course      74 y.o. male with a past medical history of hypertension, chronic pain, peripheral vascular disease, basal cell carcinoma of the face admitted 7/27/2020 with with vascular surgery for revision of a necrotic left AKA stump. 7/27 I&D Cul > MRSA & enterococcus. ID consulted . Repeat ID on 7/29          Interval Problem Update  Intermittent bradycardia, without hypotension likely 2/2 narcotics, on PCA, encourage weaning off as tolerated. D/W nursing  Plan for I&D / washout today   Wound VAC in place  Alert and intermittently oriented 1-3, , Ox2 this morning  Sugars reasonability controlled    Continue antibiotics according to infectious disease recommendations, anticipated ~ 6 weeks  Discussed with patient, and patient's nurse       Consultants/Specialty  Vascular surgery  Infectious disease     Code Status  Full code    Disposition  TBD, intravenous antibiotics, OR 8/2, anticipated need for wound VAC    Review of Systems  Review of Systems   Constitutional: Positive for malaise/fatigue. Negative for chills.   HENT:        Facial cancer   Eyes: Negative for pain, discharge and redness.   Respiratory: Negative for shortness of breath and stridor.    Cardiovascular: Negative for chest pain.   Gastrointestinal: Negative for abdominal pain and vomiting.   Genitourinary: Negative for flank pain.   Musculoskeletal: Positive for back pain, joint pain and myalgias.   Skin: Negative for rash.   Neurological: Negative for speech change.   Endo/Heme/Allergies: Negative.    Psychiatric/Behavioral: Positive for memory loss.      Physical Exam  Temp:  [36.4 °C (97.6 °F)-37.4 °C (99.4 °F)] 36.4 °C (97.6 °F)  Pulse:  [47-95] 78  Resp:  [16-17] 16  BP: (141-165)/(64-72) 152/69  SpO2:  [90 %-98 %] 98 %    Physical Exam  Vitals signs and nursing note reviewed.   Constitutional:        Appearance: He is well-developed. He is ill-appearing.      Comments: Chronically ill-appearing   HENT:      Head: Normocephalic and atraumatic.      Comments: Right facial, right ear cancer lesion     Nose:      Comments: Skin lesions on the nares and right face consistent with skin cancer     Mouth/Throat:      Mouth: Mucous membranes are moist.   Eyes:      Pupils: Pupils are equal, round, and reactive to light.   Neck:      Musculoskeletal: Normal range of motion and neck supple.   Cardiovascular:      Rate and Rhythm: Normal rate.      Heart sounds: Normal heart sounds.   Pulmonary:      Effort: Pulmonary effort is normal.      Breath sounds: Normal breath sounds.   Abdominal:      General: Bowel sounds are normal.      Palpations: Abdomen is soft.   Genitourinary:     Penis: Normal.    Musculoskeletal: Normal range of motion.      Comments: Left AKA, dressing in place   Skin:     General: Skin is warm and dry.      Capillary Refill: Capillary refill takes 2 to 3 seconds.   Neurological:      Mental Status: He is alert. He is disoriented.      Comments: Intermittently and variably oriented x1-x3, intermittently agitated.   Psychiatric:      Comments: Intermittently agitated       Fluids    Intake/Output Summary (Last 24 hours) at 8/2/2020 0835  Last data filed at 8/2/2020 0646  Gross per 24 hour   Intake 1611.5 ml   Output 50 ml   Net 1561.5 ml     Laboratory  Recent Labs     07/31/20 0632 08/01/20 1829 08/02/20  0621   WBC 12.7* 12.9* 11.3*   RBC 3.26* 3.55* 3.38*   HEMOGLOBIN 10.3* 11.1* 10.7*   HEMATOCRIT 32.3* 35.6* 33.7*   MCV 99.1* 100.3* 99.7*   MCH 31.6 31.3 31.7   MCHC 31.9* 31.2* 31.8*   RDW 45.2 46.3 45.8   PLATELETCT 362 399 251   MPV 9.3 9.2 10.3     Recent Labs     07/31/20 0632 08/01/20 1829 08/02/20 0621   SODIUM 134* 137 138   POTASSIUM 3.6 3.9 3.7   CHLORIDE 100 101 103   CO2 24 24 24   GLUCOSE 159* 200* 139*   BUN 9 14 10   CREATININE 0.85 1.08 0.81   CALCIUM 8.1* 7.9* 7.8*                    Imaging  No orders to display     Assessment/Plan  * AKA stump complication (HCC)  Assessment & Plan  Infected AKA, s/p I&D   Cultures positive for MRSA and group D enterococcus, ID consulted   S/p I&D Jul 27 & 31  Pain control,  PT/OT, wound care, antibiotics per ID   Plan for washout and debridement 8/2     Delirium  Assessment & Plan  Intermittent, multifactorial, infection, anesthesia    Arterial insufficiency (HCC)- (present on admission)  Assessment & Plan  Known history, admitted for amputation  Consider antiplatelet when okay with vascular    Diabetes (HCC)- (present on admission)  Assessment & Plan  With hyperglycemia  Glycated hemoglobin 7.3%  Short acting insulin   Accu-Checks, hypoglycemia protocol     HLD (hyperlipidemia)- (present on admission)  Assessment & Plan  Resume home statin therpay      HTN (hypertension)- (present on admission)  Assessment & Plan  As needed enalapril    Basal cell carcinoma of face- (present on admission)  Assessment & Plan  Chronic, outpatient follow-up    Basal cell carcinoma of earlobe- (present on admission)  Assessment & Plan  Chronic, outpatient follow-up     VTE prophylaxis: Lovenox

## 2020-08-02 NOTE — PROGRESS NOTES
Pt HR dropped to 40s for 1 min then back up to 80bpm 2x, then sustained in 40s for 5 min. Notified hospitalist, Matt Banks, EKG ordered.

## 2020-08-02 NOTE — PROGRESS NOTES
Pt AO x 3, disoriented to time and occasionally situation.   Vitals signs stable  Pt denies chest pain or SOB  O2 sat >90% on 2L breathing unlabored.   Pt denies pain, fentanyl drip running per orders.   Pt denies N/V/D  Pt is incontinent, condom cath in place   L AKA WV in place, no air leaks.     Updated plan of care discussed with pt. Safety education done. Falls precautions in place.   Pt safety maintained. Hourly rounding done.

## 2020-08-02 NOTE — PROGRESS NOTES
"Vascular    CHARLES  Pain controlled    /66   Pulse 80   Temp 37 °C (98.6 °F) (Temporal)   Resp 17   Ht 1.676 m (5' 6\")   Wt 63 kg (138 lb 14.2 oz)   SpO2 97%   BMI 22.42 kg/m²     Vac intact    A/P)  OR today for repeat washout, debridement, and possibly at least partial closure of the incision.    Most likely will still need vac therapy for a while to continue controlling the infection    Tentatively 1600 today    Usman Fisher MD  [facility] (General and Vascular Surgery)  Cell: [phone] (text or call is fine, if you don't reach me please try my office)  Office: [phone]  __________________________________________________________________  Patient:Jimenez Raymundo Bains   MRN:[id number]   CSN:[id number]    "

## 2020-08-02 NOTE — PROGRESS NOTES
"Pharmacy Kinetics 74 y.o. male on vancomycin day # 7  2020    Currently on Vancomycin 1250 mg iv q24hr (1200)  Provider specified end date: planning for 6 weeks from date of final surgery per ID    Indication for Treatment: SSTI, osteomyelitis of left AKA stump    Pertinent history per medical record: Admitted on 2020 for scheduled surgery for revision of necrotic left AKA stump.  Patient is now s/p stump revision.  Procedure note states extensive necrotic muscle and fascia with purulent fluid in the muscle.  Cultures sent.  Vancomycin and zosyn ordered empirically post-op.  Wound VAC in place. ID is following    Other antibiotics: ampicillin/sulbactam 3 grams iv q6h    Allergies: No known drug allergy     List concerns for renal function: age, low albumin    Pertinent cultures to date:    L-leg vascular graft: MRSA, E faecalis    MRSA nares swab if pneumonia is a concern: n-a    Recent Labs     20  0632 20  18220  0621   WBC 12.7* 12.9* 11.3*   NEUTSPOLYS 87.70* 88.30* 86.10*     Recent Labs     20  0632 20  1829 20  0621   BUN 9 14 10   CREATININE 0.85 1.08 0.81   ALBUMIN 2.1* 2.4* 2.1*       Intake/Output Summary (Last 24 hours) at 2020 1321  Last data filed at 2020 0815  Gross per 24 hour   Intake 2011.5 ml   Output 350 ml   Net 1661.5 ml      BP (!) 176/70   Pulse 80   Temp 36.7 °C (98 °F) (Temporal)   Resp 16   Ht 1.676 m (5' 6\")   Wt 63 kg (138 lb 14.2 oz)   SpO2 99%  Temp (24hrs), Av.9 °C (98.5 °F), Min:36.4 °C (97.6 °F), Max:37.4 °C (99.4 °F)      A/P   1. Vancomycin dose change: continue current regimen  2. Next vancomycin level: 8/3 at 1130  3. Goal trough: 12-16 mcg/mL  4. Assessment: repeat washout and debridement with partial closure of incision.  Renal indices remain stable.    5. Plan:  Continue current regimen with a follow up level tomorrow.    Marysol Sharma, Pharm.D., BCPS        "

## 2020-08-02 NOTE — PROGRESS NOTES
EKG done. Notified [facility] regarding prolonged QT interval. No further orders, will continue to monitor.

## 2020-08-02 NOTE — PROGRESS NOTES
Bedside report received.  Assessment complete.  A&O x 2. Patient disoriented to time and event.  Patient up with max assist.    Patient has 0/10 pain. Pain managed by Fentanyl PCA.   Denies N&V. Tolerating regular diet. NPO since 0000  Wound vac to left AKA, CDI, functioning properly at 125. Scabbing to face, ELIDA.   + void via condom cath, - flatus, - BM.  Patient denies SOB.  SCD's on.  Patient on contact isolation for MRSA in wound.  NOC RN reports intermittent period of sustained bradycardia in high 30's-40's, non symptomatic.   Review plan with of care with patient. Call light and personal belongings with in reach. Hourly rounding in place. All needs met at this time.

## 2020-08-03 LAB
ALBUMIN SERPL BCP-MCNC: 2.1 G/DL (ref 3.2–4.9)
ALBUMIN/GLOB SERPL: 0.7 G/DL
ALP SERPL-CCNC: 145 U/L (ref 30–99)
ALT SERPL-CCNC: 38 U/L (ref 2–50)
ANION GAP SERPL CALC-SCNC: 12 MMOL/L (ref 7–16)
AST SERPL-CCNC: 113 U/L (ref 12–45)
BASOPHILS # BLD AUTO: 0.2 % (ref 0–1.8)
BASOPHILS # BLD: 0.02 K/UL (ref 0–0.12)
BILIRUB SERPL-MCNC: 0.4 MG/DL (ref 0.1–1.5)
BUN SERPL-MCNC: 12 MG/DL (ref 8–22)
CALCIUM SERPL-MCNC: 7.5 MG/DL (ref 8.5–10.5)
CHLORIDE SERPL-SCNC: 103 MMOL/L (ref 96–112)
CO2 SERPL-SCNC: 22 MMOL/L (ref 20–33)
CREAT SERPL-MCNC: 0.92 MG/DL (ref 0.5–1.4)
EOSINOPHIL # BLD AUTO: 0.08 K/UL (ref 0–0.51)
EOSINOPHIL NFR BLD: 0.9 % (ref 0–6.9)
ERYTHROCYTE [DISTWIDTH] IN BLOOD BY AUTOMATED COUNT: 44.8 FL (ref 35.9–50)
GLOBULIN SER CALC-MCNC: 2.9 G/DL (ref 1.9–3.5)
GLUCOSE BLD-MCNC: 134 MG/DL (ref 65–99)
GLUCOSE BLD-MCNC: 174 MG/DL (ref 65–99)
GLUCOSE BLD-MCNC: 204 MG/DL (ref 65–99)
GLUCOSE BLD-MCNC: 79 MG/DL (ref 65–99)
GLUCOSE SERPL-MCNC: 136 MG/DL (ref 65–99)
HCT VFR BLD AUTO: 30.9 % (ref 42–52)
HGB BLD-MCNC: 10.2 G/DL (ref 14–18)
IMM GRANULOCYTES # BLD AUTO: 0.05 K/UL (ref 0–0.11)
IMM GRANULOCYTES NFR BLD AUTO: 0.6 % (ref 0–0.9)
LYMPHOCYTES # BLD AUTO: 0.59 K/UL (ref 1–4.8)
LYMPHOCYTES NFR BLD: 6.5 % (ref 22–41)
MAGNESIUM SERPL-MCNC: 1.9 MG/DL (ref 1.5–2.5)
MCH RBC QN AUTO: 32.1 PG (ref 27–33)
MCHC RBC AUTO-ENTMCNC: 33 G/DL (ref 33.7–35.3)
MCV RBC AUTO: 97.2 FL (ref 81.4–97.8)
MONOCYTES # BLD AUTO: 0.27 K/UL (ref 0–0.85)
MONOCYTES NFR BLD AUTO: 3 % (ref 0–13.4)
NEUTROPHILS # BLD AUTO: 8.06 K/UL (ref 1.82–7.42)
NEUTROPHILS NFR BLD: 88.8 % (ref 44–72)
NRBC # BLD AUTO: 0 K/UL
NRBC BLD-RTO: 0 /100 WBC
PLATELET # BLD AUTO: 226 K/UL (ref 164–446)
PMV BLD AUTO: 10.5 FL (ref 9–12.9)
POTASSIUM SERPL-SCNC: 3.2 MMOL/L (ref 3.6–5.5)
PROT SERPL-MCNC: 5 G/DL (ref 6–8.2)
RBC # BLD AUTO: 3.18 M/UL (ref 4.7–6.1)
SODIUM SERPL-SCNC: 137 MMOL/L (ref 135–145)
VANCOMYCIN TROUGH SERPL-MCNC: 10.3 UG/ML (ref 10–20)
WBC # BLD AUTO: 9.1 K/UL (ref 4.8–10.8)

## 2020-08-03 PROCEDURE — 770021 HCHG ROOM/CARE - ISO PRIVATE

## 2020-08-03 PROCEDURE — 700105 HCHG RX REV CODE 258: Performed by: HOSPITALIST

## 2020-08-03 PROCEDURE — 85025 COMPLETE CBC W/AUTO DIFF WBC: CPT

## 2020-08-03 PROCEDURE — A9270 NON-COVERED ITEM OR SERVICE: HCPCS | Performed by: HOSPITALIST

## 2020-08-03 PROCEDURE — 99232 SBSQ HOSP IP/OBS MODERATE 35: CPT | Performed by: HOSPITALIST

## 2020-08-03 PROCEDURE — 82962 GLUCOSE BLOOD TEST: CPT | Mod: 91

## 2020-08-03 PROCEDURE — 700105 HCHG RX REV CODE 258: Performed by: INTERNAL MEDICINE

## 2020-08-03 PROCEDURE — 700111 HCHG RX REV CODE 636 W/ 250 OVERRIDE (IP): Performed by: INTERNAL MEDICINE

## 2020-08-03 PROCEDURE — 83735 ASSAY OF MAGNESIUM: CPT

## 2020-08-03 PROCEDURE — 99232 SBSQ HOSP IP/OBS MODERATE 35: CPT | Performed by: INTERNAL MEDICINE

## 2020-08-03 PROCEDURE — 700105 HCHG RX REV CODE 258

## 2020-08-03 PROCEDURE — 80053 COMPREHEN METABOLIC PANEL: CPT

## 2020-08-03 PROCEDURE — 700102 HCHG RX REV CODE 250 W/ 637 OVERRIDE(OP): Performed by: HOSPITALIST

## 2020-08-03 PROCEDURE — 700111 HCHG RX REV CODE 636 W/ 250 OVERRIDE (IP): Performed by: HOSPITALIST

## 2020-08-03 PROCEDURE — 80202 ASSAY OF VANCOMYCIN: CPT

## 2020-08-03 PROCEDURE — 700111 HCHG RX REV CODE 636 W/ 250 OVERRIDE (IP): Performed by: SURGERY

## 2020-08-03 PROCEDURE — 36415 COLL VENOUS BLD VENIPUNCTURE: CPT

## 2020-08-03 RX ORDER — POTASSIUM CHLORIDE 20 MEQ/1
40 TABLET, EXTENDED RELEASE ORAL ONCE
Status: COMPLETED | OUTPATIENT
Start: 2020-08-03 | End: 2020-08-03

## 2020-08-03 RX ORDER — SODIUM CHLORIDE 9 MG/ML
INJECTION, SOLUTION INTRAVENOUS
Status: COMPLETED
Start: 2020-08-03 | End: 2020-08-03

## 2020-08-03 RX ADMIN — AMPICILLIN SODIUM AND SULBACTAM SODIUM 3 G: 2; 1 INJECTION, POWDER, FOR SOLUTION INTRAMUSCULAR; INTRAVENOUS at 00:08

## 2020-08-03 RX ADMIN — INSULIN HUMAN 2 UNITS: 100 INJECTION, SOLUTION PARENTERAL at 22:13

## 2020-08-03 RX ADMIN — SODIUM CHLORIDE 1000 ML: 9 INJECTION, SOLUTION INTRAVENOUS at 04:35

## 2020-08-03 RX ADMIN — ENOXAPARIN SODIUM 40 MG: 40 INJECTION SUBCUTANEOUS at 04:35

## 2020-08-03 RX ADMIN — ATORVASTATIN CALCIUM 40 MG: 40 TABLET, FILM COATED ORAL at 18:13

## 2020-08-03 RX ADMIN — AMPICILLIN SODIUM AND SULBACTAM SODIUM 3 G: 2; 1 INJECTION, POWDER, FOR SOLUTION INTRAMUSCULAR; INTRAVENOUS at 04:35

## 2020-08-03 RX ADMIN — POTASSIUM CHLORIDE 40 MEQ: 1500 TABLET, EXTENDED RELEASE ORAL at 18:13

## 2020-08-03 RX ADMIN — VANCOMYCIN HYDROCHLORIDE 1250 MG: 500 INJECTION, POWDER, LYOPHILIZED, FOR SOLUTION INTRAVENOUS at 13:50

## 2020-08-03 RX ADMIN — AMPICILLIN SODIUM AND SULBACTAM SODIUM 3 G: 2; 1 INJECTION, POWDER, FOR SOLUTION INTRAMUSCULAR; INTRAVENOUS at 12:48

## 2020-08-03 RX ADMIN — INSULIN HUMAN 1 UNITS: 100 INJECTION, SOLUTION PARENTERAL at 18:13

## 2020-08-03 RX ADMIN — AMPICILLIN SODIUM AND SULBACTAM SODIUM 3 G: 2; 1 INJECTION, POWDER, FOR SOLUTION INTRAMUSCULAR; INTRAVENOUS at 18:17

## 2020-08-03 RX ADMIN — SODIUM CHLORIDE: 9 INJECTION, SOLUTION INTRAVENOUS at 14:03

## 2020-08-03 ASSESSMENT — ENCOUNTER SYMPTOMS
BACK PAIN: 1
ABDOMINAL PAIN: 0
VOMITING: 0
CONSTIPATION: 0
EYE REDNESS: 0
SHORTNESS OF BREATH: 0
DIARRHEA: 0
COUGH: 0
EYE DISCHARGE: 0
STRIDOR: 0
CHILLS: 0
MEMORY LOSS: 1
MYALGIAS: 1
EYE PAIN: 0
FLANK PAIN: 0
FEVER: 0
SPEECH CHANGE: 0
NAUSEA: 0

## 2020-08-03 NOTE — ANESTHESIA POSTPROCEDURE EVALUATION
Patient: Jimenez Bains    Procedure Summary     Date:  08/02/20 Room / Location:  [facility] 05 / SURGERY [facility]    Anesthesia Start:  1726 Anesthesia Stop:  1809    Procedure:  I&D AKA WOUND (Left Leg Upper) Diagnosis:  (left AKA site infection)    Surgeon:  Usman Fisher M.D. Responsible Provider:  Alberto Dunne M.D.    Anesthesia Type:  general ASA Status:  3          Final Anesthesia Type: general  Last vitals  BP   Blood Pressure : 159/73    Temp   36.8 °C (98.3 °F)    Pulse   Pulse: 83   Resp   19    SpO2   94 %      Anesthesia Post Evaluation    Patient location during evaluation: PACU  Patient participation: complete - patient participated  Level of consciousness: awake and alert  Pain score: 0    Airway patency: patent  Anesthetic complications: no  Cardiovascular status: hemodynamically stable  Respiratory status: acceptable  Hydration status: euvolemic    PONV: none           Nurse Pain Score: 0 (NPRS)

## 2020-08-03 NOTE — PROGRESS NOTES
"Pharmacy Kinetics 75 y.o. male on vancomycin day # 8 (8/3/2020)    Currently on Vancomycin 1250 mg iv q24hr (1200)  Provider specified end date: planning for 6 weeks from date of final surgery per ID     Indication for Treatment: SSTI, osteomyelitis of left AKA stump     Pertinent history per medical record: Admitted on 2020 for scheduled surgery for revision of necrotic left AKA stump.  Patient is now s/p stump revision.  Procedure note states extensive necrotic muscle and fascia with purulent fluid in the muscle.  Cultures sent.  Vancomycin and zosyn ordered empirically post-op.  Wound VAC in place. ID is following     Other antibiotics: ampicillin/sulbactam 3 grams iv q6h     Allergies: No known drug allergy      List concerns for renal function: age, low albumin     Pertinent cultures to date:    L-leg vascular graft: MRSA, E faecalis     MRSA nares swab if pneumonia is a concern: n-a    Recent Labs     20  1126   WBC 12.9* 11.3* 9.1   NEUTSPOLYS 88.30* 86.10* 88.80*     Recent Labs     20  1126   BUN 14 10 12   CREATININE 1.08 0.81 0.92   ALBUMIN 2.4* 2.1* 2.1*     Recent Labs     20  1126   VANCOTROUGH 10.3       Intake/Output Summary (Last 24 hours) at 8/3/2020 1333  Last data filed at 8/3/2020 0940  Gross per 24 hour   Intake 3045.7 ml   Output 1330 ml   Net 1715.7 ml      /48   Pulse 73   Temp 37.2 °C (98.9 °F) (Temporal)   Resp 18   Ht 1.676 m (5' 6\")   Wt 63 kg (138 lb 14.2 oz)   SpO2 94%  Temp (24hrs), Av °C (98.6 °F), Min:36.7 °C (98.1 °F), Max:37.2 °C (98.9 °F)      A/P   1. Vancomycin dose change: Not indicated at this time, continue with  Vancomycin 1250 mg iv q24hr (1200)  2. Next vancomycin level: ~4 days or sooner pending renal function (will need to be ordered)  3. Goal trough: 10-15 mcg/mL  4. Comments: Vancomycin trough remains therapeutic. No changes to current regimen. Scr stable- patient " has Scr on file to continue monitoring renal function. ID is following. WBC trending down. Pharmacy will continue to monitor.    Bia Harris, PharmD, BCPS

## 2020-08-03 NOTE — CARE PLAN
Problem: Pain Management  Goal: Pain level will decrease to patient's comfort goal  Outcome: PROGRESSING AS EXPECTED  Note: Assess pain and sedation Q4H, Fentanyl PCA in place      Problem: Skin Integrity  Goal: Risk for impaired skin integrity will decrease  Outcome: PROGRESSING AS EXPECTED  Note: Waffle mattress in place, Q2H turns, frequent incontinence checks, mepilex contraindicated due to stool incontinence

## 2020-08-03 NOTE — PROGRESS NOTES
Hospital Medicine Daily Progress Note    Date of Service  8/3/2020    Chief Complaint  Necrotic left above-knee amputation stump    Hospital Course      74 y.o. male with a past medical history of hypertension, chronic pain, peripheral vascular disease, basal cell carcinoma of the face admitted 7/27/2020 with with vascular surgery for revision of a necrotic left AKA stump. 7/27 I&D Cul > MRSA & enterococcus. ID consulted . Repeat ID on 7/29           Interval Problem Update  No longer having bradycardia  More alert and oriented today, x3 this morning    Hypokalemia, replacing    Sugars reasonability controlled  80's-130's   Continue antibiotics according to infectious disease recommendations, anticipated ~ 6 weeks  Discussed with patient, patient's nurse and with multidisciplinary team during rounds including , and charge nurse.      Consultants/Specialty  Vascular surgery  Infectious disease      Code Status  Full code     Disposition  TBD, intravenous antibiotics, OR 8/2, anticipated need for wound VAC     Review of Systems  Review of Systems   Constitutional: Positive for malaise/fatigue. Negative for chills.   HENT:        Facial cancer   Eyes: Negative for pain, discharge and redness.   Respiratory: Negative for shortness of breath and stridor.    Cardiovascular: Negative for chest pain.   Gastrointestinal: Negative for abdominal pain and vomiting.   Genitourinary: Negative for flank pain.   Musculoskeletal: Positive for back pain, joint pain and myalgias.   Skin: Negative for rash.   Neurological: Negative for speech change.   Endo/Heme/Allergies: Negative.    Psychiatric/Behavioral: Positive for memory loss.      Physical Exam  Temp:  [36.7 °C (98.1 °F)-37.3 °C (99.1 °F)] 37.3 °C (99.1 °F)  Pulse:  [72-92] 92  Resp:  [14-19] 18  BP: (118-163)/(48-85) 136/53  SpO2:  [90 %-99 %] 90 %    Physical Exam  Vitals signs and nursing note reviewed.   Constitutional:       Appearance: He is well-developed.  He is ill-appearing.      Comments: Chronically ill-appearing   HENT:      Head: Normocephalic and atraumatic.      Comments: Right facial, right ear cancer lesion     Nose:      Comments: Skin lesions on the nares and right face consistent with skin cancer     Mouth/Throat:      Mouth: Mucous membranes are moist.   Eyes:      Pupils: Pupils are equal, round, and reactive to light.   Neck:      Musculoskeletal: Normal range of motion and neck supple.   Cardiovascular:      Rate and Rhythm: Normal rate.      Heart sounds: Normal heart sounds.   Pulmonary:      Effort: Pulmonary effort is normal.      Breath sounds: Normal breath sounds.   Abdominal:      General: Bowel sounds are normal.      Palpations: Abdomen is soft.   Genitourinary:     Penis: Normal.    Musculoskeletal: Normal range of motion.      Comments: Left AKA, dressing in place   Skin:     General: Skin is warm and dry.      Capillary Refill: Capillary refill takes 2 to 3 seconds.   Neurological:      Mental Status: He is alert. He is disoriented.      Comments: Intermittently and variably oriented x1-x3, intermittently agitated.   Psychiatric:      Comments: Intermittently agitated       Fluids    Intake/Output Summary (Last 24 hours) at 8/3/2020 1546  Last data filed at 8/3/2020 1130  Gross per 24 hour   Intake 3285.7 ml   Output 1530 ml   Net 1755.7 ml     Laboratory  Recent Labs     08/01/20  1829 08/02/20  0621 08/03/20  1126   WBC 12.9* 11.3* 9.1   RBC 3.55* 3.38* 3.18*   HEMOGLOBIN 11.1* 10.7* 10.2*   HEMATOCRIT 35.6* 33.7* 30.9*   .3* 99.7* 97.2   MCH 31.3 31.7 32.1   MCHC 31.2* 31.8* 33.0*   RDW 46.3 45.8 44.8   PLATELETCT 399 251 226   MPV 9.2 10.3 10.5     Recent Labs     08/01/20  1829 08/02/20  0621 08/03/20  1126   SODIUM 137 138 137   POTASSIUM 3.9 3.7 3.2*   CHLORIDE 101 103 103   CO2 24 24 22   GLUCOSE 200* 139* 136*   BUN 14 10 12   CREATININE 1.08 0.81 0.92   CALCIUM 7.9* 7.8* 7.5*                   Imaging  No orders to  display     Assessment/Plan  * AKA stump complication (HCC)  Assessment & Plan  Infected AKA, s/p I&D   Cultures positive for MRSA and group D enterococcus, ID consulted   S/p I&D Jul 27, 31 & 8/2      Pain control,  PT/OT, wound care, antibiotics per ID      Delirium  Assessment & Plan  Intermittent, multifactorial, infection, anesthesia    Arterial insufficiency (HCC)- (present on admission)  Assessment & Plan  Known history, admitted for amputation  Consider antiplatelet when okay with vascular    Diabetes (HCC)- (present on admission)  Assessment & Plan  With hyperglycemia  Glycated hemoglobin 7.3%  Short acting insulin   Accu-Checks, hypoglycemia protocol     HLD (hyperlipidemia)- (present on admission)  Assessment & Plan  Resume home statin therpay      HTN (hypertension)- (present on admission)  Assessment & Plan  As needed enalapril    Basal cell carcinoma of face- (present on admission)  Assessment & Plan  Chronic, outpatient follow-up     Basal cell carcinoma of earlobe- (present on admission)  Assessment & Plan  Chronic, outpatient follow-up     VTE prophylaxis: Lovenox

## 2020-08-03 NOTE — PROGRESS NOTES
2 RN skin check complete    Generalized flaky, red fragile skin  Elbows pink and blanching BL  Sacrum red with blanching  Left AKA with wound vac, CDI  Red linear chris noted to patient's left groin/buttock area, non blanching   Red area noted to patient's penis, non blanching  Left heel with red/pink, blanching, ELIDA    Barrier cream applied, frequent incontinence checks, waffle mattress in place, Q2H turns, condom cath

## 2020-08-03 NOTE — PROGRESS NOTES
Bedside report received.  Assessment complete.  A&O x 3, Disoriented to time Patient calls appropriately.  Patient up with max assist. Bed alarm on.   Patient has 2/10 pain. Fentanyl PCA in place  Denies N&V. Tolerating diabetic diet.  Right AKA with wound vac, functioning properly at 125.  + void via condom cath, + flatus, - BM.  Patient denies SOB.  SCD's on.    Review plan with of care with patient. Call light and personal belongings with in reach. Hourly rounding in place. All needs met at this time.

## 2020-08-03 NOTE — ANESTHESIA PREPROCEDURE EVALUATION
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)   (+) Generalized atherosclerosis   (+) HTN (hypertension)   (+) Ischemia of extremity   (+) Limb ischemia   (+) Other specified disorders of arteries and arterioles ([facility])   (+) PVD (peripheral vascular disease) with claudication ([facility])   (+) Peripheral arterial occlusive disease (HCC)   (+) Peripheral artery disease      Other   (+) AKA stump complication ([facility])   (+) Delirium   (+) Diabetes ([facility])       Physical Exam    Airway   Mallampati: IV  TM distance: >3 FB  Neck ROM: full       Cardiovascular - normal exam  Rhythm: regular  Rate: normal  (-) murmur     Dental - normal exam  (+) lower dentures, upper dentures             Pulmonary - normal exam  Breath sounds clear to auscultation     Abdominal    Neurological - normal exam                 Anesthesia Plan    ASA 3 (PAD; )   ASA physical status 3 criteria: diabetes - poorly controlled    Plan - general       Airway plan will be LMA      Plan Factors:   Patient was not previously instructed to abstain from smoking on day of procedure.  Patient did not smoke on day of procedure.      Induction: intravenous    Postoperative Plan: Postoperative administration of opioids is intended.    Pertinent diagnostic labs and testing reviewed    Informed Consent:    Anesthetic plan and risks discussed with patient.    Use of blood products discussed with: patient whom consented to blood products.

## 2020-08-03 NOTE — ANESTHESIA QCDR
2019 [facility] Clinical Data Registry (for Quality Improvement)     Postoperative nausea/vomiting risk protocol (Adult = 18 yrs and Pediatric 3-17 yrs)- (430 and 463)  General inhalation anesthetic (NOT TIVA) with PONV risk factors: No  Provision of anti-emetic therapy with at least 2 different classes of agents: N/A  Patient DID NOT receive anti-emetic therapy and reason is documented in Medical Record: N/A    Multimodal Pain Management- (477)  Non-emergent surgery AND patient age >= 18: No  Use of Multimodal Pain Management, two or more drugs and/or interventions, NOT including systemic opioids:   Exception: Documented allergy to multiple classes of analgesics:     Smoking Abstinence (404)  Patient is current smoker (cigarette, pipe, e-cig, marijuanna): No  Elective Surgery:   Abstinence instructions provided prior to day of surgery:   Patient abstained from smoking on day of surgery:     Pre-Op Beta-Blocker in Isolated CABG (44)  Isolated CABG AND patient age >= 18: No  Beta-blocker admin within 24 hours of surgical incision:   Exception:of medical reason(s) for not administering beta blocker within 24 hours prior to surgical incision (e.g., not  indicated,other medical reason):     PACU assessment of acute postoperative pain prior to Anesthesia Care End- Applies to Patients Age = 18- (ABG7)  Initial PACU pain score is which of the following: < 7/10  Patient unable to report pain score: N/A    Post-anesthetic transfer of care checklist/protocol to PACU/ICU- (426 and 427)  Upon conclusion of case, patient transferred to which of the following locations: PACU/Non-ICU  Use of transfer checklist/protocol: Yes  Exclusion: Service Performed in Patient Hospital Room (and thus did not require transfer): N/A  Unplanned admission to ICU related to anesthesia service up through end of PACU care- (MD51)  Unplanned admission to ICU (not initially anticipated at anesthesia start time): No

## 2020-08-03 NOTE — ANESTHESIA PROCEDURE NOTES
Airway    Date/Time: 8/2/2020 5:30 PM  Performed by: Alberto Dunne M.D.  Authorized by: Alberto Dunne M.D.     Location:  OR  Urgency:  Elective  Indications for Airway Management:  Anesthesia      Spontaneous Ventilation: absent    Sedation Level:  Deep  Preoxygenated: Yes    Patient Position:  Sniffing  MILS Maintained Throughout: No    Mask Difficulty Assessment:  0 - not attempted  Final Airway Type:  Supraglottic airway  Final Supraglottic Airway:  Standard LMA    SGA Size:  4  Number of Attempts at Approach:  1  Number of Other Approaches Attempted:  0

## 2020-08-03 NOTE — OR NURSING
Pt recovering well at this time. Denies pain or nausea. A&Ox4, VSS. Incision to LLE is CDI with wound vac in place. Pt daughter was called and updated with pt status and plan to return to room shortly. Pt has no belongings in PACU. Currently resting in bed in no distress.

## 2020-08-03 NOTE — PROGRESS NOTES
2 RN skin check complete     Generalized flaky, red fragile skin  Elbows pink and blanching BL  Sacrum red with blanching  Left AKA with wound vac, CDI  Red linear chris noted to patient's left groin/buttock area, non blanching   Red area noted to patient's penis, non blanching  Left heel with red/pink, blanching, ELIDA     Barrier cream applied, frequent incontinence checks, waffle mattress in place, Pt declines Q2 turns despite education, pt declines mepelixs to elbows, condom cath to down bag, pillows in use for positioning of extremities. .

## 2020-08-03 NOTE — WOUND TEAM
"Received consult for wound team to change VAC.  Per Dr Fisher, \"Two wounds along an AKA incision: one is 4cm long by 2cm wide, the other 3cm long by 2cm wide. There is kerlix packing in the medial wound attached to the black foam. Vac was placed on Sunday 8/2/20.  First change 8/4 or 8/5 at bedside please. Dilaudid premedication ordered on PRN basis.\" Wound team to see pt either tomorrow or Wed for 1st drsg change.    "

## 2020-08-03 NOTE — DISCHARGE PLANNING
Anticipated Discharge Disposition: TBD    Action:   · Completed chart review  · Per CM note pt's family is resistant to SNF placement.   · Pt on Vancomycin and Unasyn for an anticipated 6 week course from date of final surgery.   · Will reassess was pt becomes stable for placement    Barriers to Discharge: medical stability, placement    Plan: Continue to collaborate with the pt, pt's family, and health care team to provide social and discharge support as needed.

## 2020-08-03 NOTE — ANESTHESIA TIME REPORT
Anesthesia Start and Stop Event Times     Date Time Event    8/2/2020 05:16 PM Ready for Procedure    8/2/2020 05:26 PM Anesthesia Start    8/2/2020 06:09 PM Anesthesia Stop        Responsible Staff  08/02/20    Name Role Begin End    Alberto Dunne M.D. Anesthesiologist 08/02/20 05:26 PM 08/02/20 06:09 PM        Preop Diagnosis (Free Text):  Pre-op Diagnosis     left AKA site infection        Preop Diagnosis (Codes):    Post op Diagnosis  Infected open wound  s/p Left AKA    Premium Reason  E. Weekend    Comments: Wound washout/I & D w/ application of wound vac - left AKA

## 2020-08-03 NOTE — PROGRESS NOTES
Infectious Disease Progress Note    Author: Nieves Solomon M.D. Date & Time of service: 8/3/2020  7:39 AM    Chief Complaint:  Infected above-the-knee amputation and vascular graft       Interval History:   AF WBC 12.7 disoriented no acute distress   AF plan for OR again tomorrow noted-VAC in place-denies SE abx. No new complaints   AF WBC 11.3 NPO for return to OR today-denies SE abx. Pain is controlled. No new complaints     Review of Systems:  Review of Systems   Constitutional: Negative for chills and fever.   Respiratory: Negative for cough and shortness of breath.    Cardiovascular: Negative for chest pain.   Gastrointestinal: Negative for abdominal pain, constipation, diarrhea, nausea and vomiting.   Musculoskeletal: Positive for myalgias.       Hemodynamics:  Temp (24hrs), Av.9 °C (98.5 °F), Min:36.7 °C (98 °F), Max:37.2 °C (98.9 °F)  Temperature: 37.2 °C (98.9 °F)  Pulse  Av  Min: 47  Max: 103   Blood Pressure : 150/60       Physical Exam:  Physical Exam  Constitutional:       Appearance: Normal appearance.   HENT:      Head:      Comments: Large discolored lesion on right face  Cardiovascular:      Rate and Rhythm: Normal rate and regular rhythm.   Pulmonary:      Effort: Pulmonary effort is normal.      Breath sounds: Normal breath sounds.   Abdominal:      General: Abdomen is flat. Bowel sounds are normal.      Palpations: Abdomen is soft.   Musculoskeletal:         General: Swelling present. No tenderness.      Left lower leg: Edema present.      Comments: Left leg ANUPAM, edema, no erythema, no significant tenderness, wound VAC in place   Skin:     General: Skin is warm and dry.   Neurological:      General: No focal deficit present.      Mental Status: He is alert.   Psychiatric:         Mood and Affect: Mood normal.         Behavior: Behavior normal.         Meds:    Current Facility-Administered Medications:   •  HYDROmorphone  •  ampicillin-sulbactam (UNASYN) IV  •   haloperidol lactate  •  LORazepam  •  haloperidol lactate  •  vancomycin  •  fentaNYL  •  MD Alert...Vancomycin per Pharmacy  •  senna-docusate **AND** polyethylene glycol/lytes **AND** magnesium hydroxide **AND** bisacodyl  •  NS  •  ondansetron  •  ondansetron  •  enalaprilat  •  atorvastatin  •  insulin regular **AND** POC Blood Glucose **AND** NOTIFY MD and PharmD **AND** glucose **AND** dextrose 50%  •  enoxaparin (LOVENOX) injection    Facility-Administered Medications Ordered in Other Encounters:   •  fentaNYL  •  propofol  •  ceFAZolin    Labs:  Recent Labs     08/01/20  1829 08/02/20  0621   WBC 12.9* 11.3*   RBC 3.55* 3.38*   HEMOGLOBIN 11.1* 10.7*   HEMATOCRIT 35.6* 33.7*   .3* 99.7*   MCH 31.3 31.7   RDW 46.3 45.8   PLATELETCT 399 251   MPV 9.2 10.3   NEUTSPOLYS 88.30* 86.10*   LYMPHOCYTES 6.80* 8.70*   MONOCYTES 3.60 3.80   EOSINOPHILS 0.70 0.80   BASOPHILS 0.10 0.10     Recent Labs     08/01/20 1829 08/02/20  0621   SODIUM 137 138   POTASSIUM 3.9 3.7   CHLORIDE 101 103   CO2 24 24   GLUCOSE 200* 139*   BUN 14 10     Recent Labs     08/01/20 1829 08/02/20  0621   ALBUMIN 2.4* 2.1*   TBILIRUBIN 0.4 0.4   ALKPHOSPHAT 154* 141*   TOTPROTEIN 5.6* 5.5*   ALTSGPT 30 31   ASTSGOT 62* 66*   CREATININE 1.08 0.81       Imaging:  No results found.    Micro:  Results     Procedure Component Value Units Date/Time    Anaerobic Culture [[id number]]  (Abnormal) Collected:  07/27/20 1040    Order Status:  Completed Specimen:  Tissue Updated:  08/01/20 1803     Significant Indicator POS     Source TISS     Site Left Leg Vascular Graft     Culture Result -      Peptostreptococcus asaccharolyticus  Light growth        Prevotella oralis  Light growth        Propionibacterium species  Light growth      Narrative:       CALL  [place]  141 tel. [phone],  CALLED  141 tel. [phone] 07/28/2020, 13:59, RB PERF. RESULTS CALLED  TO:Taty [id number] Rn  Surgery Specimen    Fungal Culture [[id number]] Collected:  07/27/20 1040     Order Status:  Completed Specimen:  Tissue Updated:  08/01/20 1803     Significant Indicator NEG     Source TISS     Site Left Leg Vascular Graft     Culture Result No fungal growth to date.    Narrative:       CALL  [place]  141 tel. [phone],  CALLED  141 tel. [phone] 07/28/2020, 13:59, RB PERF. RESULTS CALLED  TO:Taty Panda Rn  Surgery Specimen    AFB Culture [[id number]] Collected:  07/27/20 1040    Order Status:  Completed Specimen:  Tissue Updated:  08/01/20 1803     Significant Indicator NEG     Source TISS     Site Left Leg Vascular Graft     Culture Result Culture in progress.     AFB Smear Results No acid fast bacilli seen.    Narrative:       CALL  [place]  141 tel. [phone],  CALLED  141 tel. [phone] 07/28/2020, 13:59, RB PERF. RESULTS CALLED  TO:Taty Panda Rn  Surgery Specimen    CULTURE TISSUE W/ GRM STAIN [[id number]]  (Abnormal)  (Susceptibility) Collected:  07/27/20 1040    Order Status:  Completed Specimen:  Tissue Updated:  08/01/20 1803     Significant Indicator POS     Source TISS     Site Left Leg Vascular Graft     Culture Result -     Gram Stain Result Few WBCs.  Many Gram positive cocci.  Rare Gram negative rods.       Culture Result Methicillin Resistant Staphylococcus aureus  Moderate growth  This isolate is presumed to be clindamycin resistant based on  detection of inducible resistance.  Clindamycin may still  be effective in some patients.        Enterococcus faecalis  Light growth  Combination therapy with ampicillin, penicillin, or  vancomycin (for susceptible strains) plus an aminoglycoside  is usually indicated for serious enterococcal infections,  such as endocarditis unless high-level resistance to both  gentamicin and streptomycin is documented; such combinations  are predicted to result in synergistic killing of the  Enterococcus.  The susceptibility profile for this organism indicates that  Streptomycin would not be an effective component of  combination therapy.  The  susceptibility profile for this organism indicates that  Gentamycin would not be an effective component of combination  therapy.      Narrative:       CALL  [place]  141 tel. [phone],  CALLED  141 tel. [phone] 07/28/2020, 13:59, RB PERF. RESULTS CALLED  TO:Taty Mccarthy37 Rn  Surgery Specimen    Susceptibility     Methicillin resistant staphylococcus aureus (1)     Antibiotic Interpretation Microscan Method Status    Azithromycin Resistant >4 mcg/mL MASOOD Final    Clindamycin Resistant <=0.5 mcg/mL MASOOD Final    Cefazolin Resistant <=8 mcg/mL MASOOD Final    Ceftaroline Sensitive <=0.5 mcg/mL MASOOD Final    Daptomycin Sensitive <=1 mcg/mL MASOOD Final    Erythromycin Resistant >4 mcg/mL MASOOD Final    Ampicillin/sulbactam Resistant <=8/4 mcg/mL MASOOD Final    Oxacillin Resistant >2 mcg/mL MASOOD Final    Penicillin Resistant >8 mcg/mL MASOOD Final    Trimeth/Sulfa Sensitive <=0.5/9.5 mcg/mL MASOOD Final    Tetracycline Resistant >8 mcg/mL MASOOD Final    Vancomycin Sensitive 1 mcg/mL MASOOD Final          Enterococcus faecalis (2)     Antibiotic Interpretation Microscan Method Status    Daptomycin Sensitive <=1 mcg/mL MASOOD Final    Strep Synergy Resistant >1000 mcg/mL MASOOD Final    Penicillin Sensitive 8 mcg/mL MASOOD Final    Vancomycin Sensitive 1 mcg/mL MASOOD Final    Ampicillin Sensitive <=2 mcg/mL MASOOD Final    Gent Synergy Resistant >500 mcg/mL MASOOD Final                   GRAM STAIN [[id number]]  (Abnormal) Collected:  07/27/20 1040    Order Status:  Completed Specimen:  Tissue Updated:  07/28/20 1846     Significant Indicator .     Source TISS     Site Left Leg Vascular Graft     Gram Stain Result Few WBCs.  Many Gram positive cocci.  Rare Gram negative rods.      Narrative:       CALL  [place]  141 tel. [phone],  CALLED  141 tel. [phone] 07/28/2020, 13:59, RB PERF. RESULTS CALLED  TO:Taty Mccarthy37 Rn  Surgery Specimen    Acid Fast Stain [[id number]] Collected:  07/27/20 1040    Order Status:  Completed Specimen:  Tissue Updated:  07/28/20 1846      Significant Indicator NEG     Source TISS     Site Left Leg Vascular Graft     AFB Smear Results No acid fast bacilli seen.    Narrative:       CALL  [place]  141 tel. [phone],  CALLED  141 tel. [phone] 07/28/2020, 13:59, RB PERF. RESULTS CALLED  TO:Taty [id number] Rn  Surgery Specimen    SARS-CoV-2, PCR (In-House) [[id number]] Collected:  07/27/20 0735    Order Status:  Completed Updated:  07/27/20 0823     SARS-CoV-2 Source Nasal Swab     SARS-CoV-2 (RdRp gene) NotDetected     Comment: [facility] providers: PLEASE REFER TO DE-ESCALATION AND RETESTING PROTOCOL  on [email or web address]  **The Abbott ID Now COVID-19 Test has been made available for use under the  Emergency Use Authorization (EUA) only.         Narrative:       Collected By:ARIK HATHAWAY  Pt for surgery today, please run rapid test.  Thank you!  Is patient being admitted?->Yes  Does this patient meet criteria for Rush/Cepheid per [facility]  Inpatient Workflow? (See workflow link below)->Yes  Expected turn around time?->Rush (Cepheid 2-4 hours)    COVID/SARS CoV-2 PCR [[id number]] Collected:  07/27/20 0735    Order Status:  Completed Specimen:  Respirate from Nasal Updated:  07/27/20 0755     COVID Order Status Received     Comment: The order for SARS CoV-2 testing has been received by the  Laboratory. This result is neither positive nor negative.  Final results of testing will report in 24-48 hours, separately.         Narrative:       Collected By:ARIK HATHAWAY  Pt for surgery today, please run rapid test.  Thank you!  Is patient being admitted?->Yes  Does this patient meet criteria for Rush/Cepheid per [facility]  Inpatient Workflow? (See workflow link below)->Yes  Expected turn around time?->Rush (Cepheid 2-4 hours)          Assessment:  Active Hospital Problems    Diagnosis   • *AKA stump complication (HCC) [T87.9]   • Delirium [R41.0]   • Arterial insufficiency (HCC) [I77.1]   • PVD (peripheral vascular disease) with claudication (HCC) [I73.9]     • Diabetes (HCC) [E11.9]   • HLD (hyperlipidemia) [E78.5]   • HTN (hypertension) [I10]   • Basal cell carcinoma of earlobe [C44.211]   • Basal cell carcinoma of face [C44.310]   • Long term current use of anticoagulant therapy [Z79.01]   • Chronic anticoagulation [Z79.01]   • Ischemia of extremity [I99.8]     Interval 24 hours:      AF, O2 RA  Labs reviewed, WBC 11.3  Studies reviewed  Micro reviewed    Events: I&D on 8/2     Patient reporting some pain at the amputation site.  Patient continued on Unasyn and vancomycin.      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       Plan:  Infected above-the-knee amputation/OM left  Infected vascular graft  Leukocytosis - mild, stable   s/p revision amputation on 07/27, but margins are not clear.    Repeat I and D necrotic muscle and fascia 7/31-less necrosis and purulence than 7/27  Operative cultures are polymicrobial: MRSA, Efaecalis, peptostrep, Prevotella, and propionibacterium    --- Continue vancomycin and Unasyn. Anticipate 6 week course from date of final surgery. Graft appears to have been removed entirely per op note on 7/27.   --- Follow-up op note from 8/2     Diabetes, chronic  Hemoglobin A1c 7.3.  --- Monitor and control blood sugar      Severe arterial insufficiency/PAD.    His amputation did show evidence of vascular calcifications and vascular   disease at the amputation site.    Anticipate he will have continued poor wound healing.       Basal cell carcinomas, face and ear  Per oncology     Discussed with internal medicine, Dr. Miguel. ID will follow.

## 2020-08-03 NOTE — PROGRESS NOTES
"Vascular    CHARLES  Pain controlled  More alert, no complaints    /48   Pulse 73   Temp 37.2 °C (98.9 °F) (Temporal)   Resp 18   Ht 1.676 m (5' 6\")   Wt 63 kg (138 lb 14.2 oz)   SpO2 94%   BMI 22.42 kg/m²     Vac intact    A/P)  At this point I do not believe the patient will need any additional surgical debridements. He does however still have a small wound vac in his incision and I have asked wound care to evaluate to start doing bedside changes.  If we heal the remainder of the wound with vac therapy then the patient has a better change of avoiding any additional infection or surgery.      Appreciate Hospitalist and ID support  Following along.    Usman Fisher MD  [facility] (General and Vascular Surgery)  Cell: [phone] (text or call is fine, if you don't reach me please try my office)  Office: [phone]  __________________________________________________________________  Patient:Jimenez Bains   MRN:[id number]   CSN:[id number]    "

## 2020-08-03 NOTE — CARE PLAN
Problem: Communication  Goal: The ability to communicate needs accurately and effectively will improve  Outcome: PROGRESSING AS EXPECTED  Note: Pt demonstrates appropriate use of call light to alert staff to needs.       Problem: Venous Thromboembolism (VTW)/Deep Vein Thrombosis (DVT) Prevention:  Goal: Patient will participate in Venous Thrombosis (VTE)/Deep Vein Thrombosis (DVT)Prevention Measures  Outcome: PROGRESSING AS EXPECTED  Note: Pt on mechanical VTE and Lovenox. Educated on VTE risk and verbalizes teaching.

## 2020-08-03 NOTE — PROGRESS NOTES
Bedside report received.  Assessment complete.  Pt on Isolation for MRSA in wound  A&O x 2. Pt disoriented to time and event.  Patient up with max assist. Bed alarm on.   Patient has 0/10 pain. Pain managed with Fentanyl PCA.  Denies N&V. Tolerating NPO diet.  Wound vac to L AKA, CDI no leaks present.  + void via condom cath, - flatus, - BM.  Patient denies SOB.  SCD's on.  Review plan with of care with patient. Call light and personal belongings with in reach. Hourly rounding in place. All needs met at this time.

## 2020-08-04 LAB
ALBUMIN SERPL BCP-MCNC: 1.9 G/DL (ref 3.2–4.9)
ALBUMIN/GLOB SERPL: 0.7 G/DL
ALP SERPL-CCNC: 131 U/L (ref 30–99)
ALT SERPL-CCNC: 36 U/L (ref 2–50)
ANION GAP SERPL CALC-SCNC: 8 MMOL/L (ref 7–16)
AST SERPL-CCNC: 86 U/L (ref 12–45)
BASOPHILS # BLD AUTO: 0.3 % (ref 0–1.8)
BASOPHILS # BLD: 0.02 K/UL (ref 0–0.12)
BILIRUB SERPL-MCNC: 0.4 MG/DL (ref 0.1–1.5)
BUN SERPL-MCNC: 11 MG/DL (ref 8–22)
CALCIUM SERPL-MCNC: 7.3 MG/DL (ref 8.5–10.5)
CHLORIDE SERPL-SCNC: 105 MMOL/L (ref 96–112)
CO2 SERPL-SCNC: 23 MMOL/L (ref 20–33)
CREAT SERPL-MCNC: 0.85 MG/DL (ref 0.5–1.4)
EOSINOPHIL # BLD AUTO: 0.1 K/UL (ref 0–0.51)
EOSINOPHIL NFR BLD: 1.5 % (ref 0–6.9)
ERYTHROCYTE [DISTWIDTH] IN BLOOD BY AUTOMATED COUNT: 45.6 FL (ref 35.9–50)
GLOBULIN SER CALC-MCNC: 2.7 G/DL (ref 1.9–3.5)
GLUCOSE BLD-MCNC: 114 MG/DL (ref 65–99)
GLUCOSE BLD-MCNC: 154 MG/DL (ref 65–99)
GLUCOSE BLD-MCNC: 163 MG/DL (ref 65–99)
GLUCOSE BLD-MCNC: 205 MG/DL (ref 65–99)
GLUCOSE SERPL-MCNC: 127 MG/DL (ref 65–99)
HCT VFR BLD AUTO: 28.2 % (ref 42–52)
HGB BLD-MCNC: 9.2 G/DL (ref 14–18)
IMM GRANULOCYTES # BLD AUTO: 0.04 K/UL (ref 0–0.11)
IMM GRANULOCYTES NFR BLD AUTO: 0.6 % (ref 0–0.9)
LYMPHOCYTES # BLD AUTO: 0.91 K/UL (ref 1–4.8)
LYMPHOCYTES NFR BLD: 13.3 % (ref 22–41)
MAGNESIUM SERPL-MCNC: 1.9 MG/DL (ref 1.5–2.5)
MCH RBC QN AUTO: 31.8 PG (ref 27–33)
MCHC RBC AUTO-ENTMCNC: 32.6 G/DL (ref 33.7–35.3)
MCV RBC AUTO: 97.6 FL (ref 81.4–97.8)
MONOCYTES # BLD AUTO: 0.31 K/UL (ref 0–0.85)
MONOCYTES NFR BLD AUTO: 4.5 % (ref 0–13.4)
NEUTROPHILS # BLD AUTO: 5.44 K/UL (ref 1.82–7.42)
NEUTROPHILS NFR BLD: 79.8 % (ref 44–72)
NRBC # BLD AUTO: 0 K/UL
NRBC BLD-RTO: 0 /100 WBC
PLATELET # BLD AUTO: 290 K/UL (ref 164–446)
PMV BLD AUTO: 9.7 FL (ref 9–12.9)
POTASSIUM SERPL-SCNC: 3.5 MMOL/L (ref 3.6–5.5)
PROT SERPL-MCNC: 4.6 G/DL (ref 6–8.2)
RBC # BLD AUTO: 2.89 M/UL (ref 4.7–6.1)
SODIUM SERPL-SCNC: 136 MMOL/L (ref 135–145)
WBC # BLD AUTO: 6.8 K/UL (ref 4.8–10.8)

## 2020-08-04 PROCEDURE — 700111 HCHG RX REV CODE 636 W/ 250 OVERRIDE (IP): Performed by: INTERNAL MEDICINE

## 2020-08-04 PROCEDURE — 80053 COMPREHEN METABOLIC PANEL: CPT

## 2020-08-04 PROCEDURE — 99232 SBSQ HOSP IP/OBS MODERATE 35: CPT | Performed by: FAMILY MEDICINE

## 2020-08-04 PROCEDURE — 770021 HCHG ROOM/CARE - ISO PRIVATE

## 2020-08-04 PROCEDURE — 83735 ASSAY OF MAGNESIUM: CPT

## 2020-08-04 PROCEDURE — 82962 GLUCOSE BLOOD TEST: CPT

## 2020-08-04 PROCEDURE — 700105 HCHG RX REV CODE 258: Performed by: HOSPITALIST

## 2020-08-04 PROCEDURE — 97530 THERAPEUTIC ACTIVITIES: CPT

## 2020-08-04 PROCEDURE — 36415 COLL VENOUS BLD VENIPUNCTURE: CPT

## 2020-08-04 PROCEDURE — 700105 HCHG RX REV CODE 258: Performed by: INTERNAL MEDICINE

## 2020-08-04 PROCEDURE — 700111 HCHG RX REV CODE 636 W/ 250 OVERRIDE (IP): Performed by: SURGERY

## 2020-08-04 PROCEDURE — 700111 HCHG RX REV CODE 636 W/ 250 OVERRIDE (IP): Performed by: HOSPITALIST

## 2020-08-04 PROCEDURE — A9270 NON-COVERED ITEM OR SERVICE: HCPCS | Performed by: HOSPITALIST

## 2020-08-04 PROCEDURE — 700105 HCHG RX REV CODE 258: Performed by: FAMILY MEDICINE

## 2020-08-04 PROCEDURE — 85025 COMPLETE CBC W/AUTO DIFF WBC: CPT

## 2020-08-04 PROCEDURE — 700102 HCHG RX REV CODE 250 W/ 637 OVERRIDE(OP): Performed by: HOSPITALIST

## 2020-08-04 RX ADMIN — VANCOMYCIN HYDROCHLORIDE 1250 MG: 500 INJECTION, POWDER, LYOPHILIZED, FOR SOLUTION INTRAVENOUS at 13:21

## 2020-08-04 RX ADMIN — INSULIN HUMAN 1 UNITS: 100 INJECTION, SOLUTION PARENTERAL at 11:26

## 2020-08-04 RX ADMIN — AMPICILLIN SODIUM AND SULBACTAM SODIUM 3 G: 2; 1 INJECTION, POWDER, FOR SOLUTION INTRAMUSCULAR; INTRAVENOUS at 23:07

## 2020-08-04 RX ADMIN — AMPICILLIN SODIUM AND SULBACTAM SODIUM 3 G: 2; 1 INJECTION, POWDER, FOR SOLUTION INTRAMUSCULAR; INTRAVENOUS at 16:57

## 2020-08-04 RX ADMIN — SODIUM CHLORIDE: 9 INJECTION, SOLUTION INTRAVENOUS at 15:55

## 2020-08-04 RX ADMIN — AMPICILLIN SODIUM AND SULBACTAM SODIUM 3 G: 2; 1 INJECTION, POWDER, FOR SOLUTION INTRAMUSCULAR; INTRAVENOUS at 04:59

## 2020-08-04 RX ADMIN — INSULIN HUMAN 1 UNITS: 100 INJECTION, SOLUTION PARENTERAL at 16:58

## 2020-08-04 RX ADMIN — SODIUM CHLORIDE: 9 INJECTION, SOLUTION INTRAVENOUS at 05:00

## 2020-08-04 RX ADMIN — INSULIN HUMAN 2 UNITS: 100 INJECTION, SOLUTION PARENTERAL at 20:23

## 2020-08-04 RX ADMIN — AMPICILLIN SODIUM AND SULBACTAM SODIUM 3 G: 2; 1 INJECTION, POWDER, FOR SOLUTION INTRAMUSCULAR; INTRAVENOUS at 11:18

## 2020-08-04 RX ADMIN — DOCUSATE SODIUM 50 MG AND SENNOSIDES 8.6 MG 2 TABLET: 8.6; 5 TABLET, FILM COATED ORAL at 04:59

## 2020-08-04 RX ADMIN — AMPICILLIN SODIUM AND SULBACTAM SODIUM 3 G: 2; 1 INJECTION, POWDER, FOR SOLUTION INTRAMUSCULAR; INTRAVENOUS at 00:04

## 2020-08-04 RX ADMIN — ENOXAPARIN SODIUM 40 MG: 40 INJECTION SUBCUTANEOUS at 04:59

## 2020-08-04 RX ADMIN — ATORVASTATIN CALCIUM 40 MG: 40 TABLET, FILM COATED ORAL at 16:57

## 2020-08-04 ASSESSMENT — COGNITIVE AND FUNCTIONAL STATUS - GENERAL
MOVING FROM LYING ON BACK TO SITTING ON SIDE OF FLAT BED: UNABLE
MOBILITY SCORE: 6
MOVING TO AND FROM BED TO CHAIR: UNABLE
CLIMB 3 TO 5 STEPS WITH RAILING: TOTAL
SUGGESTED CMS G CODE MODIFIER MOBILITY: CN
WALKING IN HOSPITAL ROOM: TOTAL
TURNING FROM BACK TO SIDE WHILE IN FLAT BAD: UNABLE
STANDING UP FROM CHAIR USING ARMS: TOTAL

## 2020-08-04 ASSESSMENT — GAIT ASSESSMENTS: GAIT LEVEL OF ASSIST: UNABLE TO PARTICIPATE

## 2020-08-04 NOTE — PROGRESS NOTES
"Pharmacy Kinetics 75 y.o. male on vancomycin day #9 (2020)    Currently on Vancomycin 1250 mg iv q24hr (1200)  Provider specified end date: planning for 6 weeks from date of final surgery per ID     Indication for Treatment: SSTI, osteomyelitis of left AKA stump     Pertinent history per medical record: Admitted on 2020 for scheduled surgery for revision of necrotic left AKA stump.  Patient is now s/p stump revision.  Procedure note states extensive necrotic muscle and fascia with purulent fluid in the muscle.  Cultures sent.  Vancomycin and zosyn ordered empirically post-op.  Wound VAC in place. ID is following     Other antibiotics: ampicillin/sulbactam 3 grams iv q6h     Allergies: No known drug allergy      List concerns for renal function: age, low albumin     Pertinent cultures to date:    L-leg vascular graft: MRSA, E faecalis     MRSA nares swab if pneumonia is a concern: n-a       Recent Labs     20  1829 20  0621 20  1126 20  0703   WBC 12.9* 11.3* 9.1 6.8   NEUTSPOLYS 88.30* 86.10* 88.80* 79.80*     Recent Labs     20  1829 20  0621 20  1126 20  0703   BUN 14 10 12 11   CREATININE 1.08 0.81 0.92 0.85   ALBUMIN 2.4* 2.1* 2.1* 1.9*     Recent Labs     20  1126   VANCOTROUGH 10.3       Intake/Output Summary (Last 24 hours) at 2020 1004  Last data filed at 2020 0750  Gross per 24 hour   Intake 2497.5 ml   Output 950 ml   Net 1547.5 ml      /65   Pulse 71   Temp 37.1 °C (98.7 °F) (Temporal)   Resp 18   Ht 1.676 m (5' 6\")   Wt 63 kg (138 lb 14.2 oz)   SpO2 96%  Temp (24hrs), Av.4 °C (99.3 °F), Min:37.1 °C (98.7 °F), Max:37.8 °C (100 °F)      A/P   1. Vancomycin dose change:Not indicated at this time, continue with  Vancomycin 1250 mg iv q24hr (1200)  2. Next vancomycin level: 2-3 days or sooner pending renal function (will need to be ordered)  3. Goal trough: 10-15 mcg/mL  4. Comments: Scr stable. WBC trending down. No " changes to current regimen. Pharmacy will continue to monitor.    Bia Harris, PharmD

## 2020-08-04 NOTE — CARE PLAN
Problem: Knowledge Deficit  Goal: Knowledge of the prescribed therapeutic regimen will improve  Outcome: PROGRESSING AS EXPECTED  Note: Pt educated on POC, verbalizes understanding.      Problem: Pain Management  Goal: Pain level will decrease to patient's comfort goal  Outcome: PROGRESSING AS EXPECTED  Note: Pt was educated on 0-10 pain scale, non-pharm methods of pain relief and MAR. Pt demonstrates understanding by rating pain as a 2 on 0-10 pain scale. Pt states that their pain is well controlled with PCA.

## 2020-08-04 NOTE — CARE PLAN
Problem: Infection  Goal: Will remain free from infection  Outcome: PROGRESSING AS EXPECTED  Note: Wound vac in place. IV abx in use. Contact precautions in use for MRSA in wound.      Problem: Mobility  Goal: Risk for activity intolerance will decrease  Outcome: PROGRESSING SLOWER THAN EXPECTED  Note: Worked with PT and OT today. Tired easily, tolerated poorly due to pain.

## 2020-08-04 NOTE — PROGRESS NOTES
Bedside report received.  Assessment complete.  Pt on Isolation for MRSA in wound  A&O x 3. Pt disoriented to time and event.  Patient up with max assist. Bed alarm on.   Patient has 0/10 pain. Pain managed with Fentanyl PCA.  Denies N&V. Tolerating DM diet.  Wound vac to  L AKA, CDI no leaks present.  + void via condom cath, - flatus, - BM.  Patient denies SOB.  SCD's on.  Review plan with of care with patient. Call light and personal belongings with in reach. Hourly rounding in place. All needs met at this time.

## 2020-08-04 NOTE — THERAPY
Physical Therapy   Daily Treatment     Patient Name: Jimenez Bains  Age:  75 y.o., Sex:  male  Medical Record #: [id number]  Today's Date: 8/4/2020     Precautions: Fall Risk, Non Weight Bearing Left Lower Extremity    Assessment  Pt seen for PT treatment session, confused and reported L LE pain throughout mobility. Pt heavily soiled upon PT arrival. Required Max A to roll multiple times to provide pericare and change bed linens. Pt required Max A x2 people to complete supine to sit EOB, tolerated sitting EOB ~5 min with encouragement. Unable to attempt standing today. PT will continue to follow while in house    Plan    Continue current treatment plan.    Discharge recommendations:  Recommend post-acute placement for continued physical therapy services prior to discharge home.     Per prior admission PT/OT notes, pt lives with his granddtr and her spouse. Granddtr is pregnant and unable to physically assist upon DC home. Pt has 5-6 steps to enter home and unknown if ramp access has been built. Currently pt is not physically capable or safe to DC home unless family is able to provide total care, including management of WC up/down steps.          08/04/20 0930   Precautions   Precautions Fall Risk;Non Weight Bearing Left Lower Extremity   Comments AKA debridement 7/29/2020, inital AKA 6/30   Vitals   O2 Delivery Device Nasal Cannula   Pain 0 - 10 Group   Therapist Pain Assessment During Activity;Nurse Notified  (L LE pain during mobility)   Cognition    Level of Consciousness Confused   Safety Awareness Impaired   Attention Impaired   Comments requires extra cues and time to initiate mobility   Balance   Sitting Balance (Static) Fair -   Sitting Balance (Dynamic) Poor +   Weight Shift Sitting Poor   Skilled Intervention Verbal Cuing;Sequencing;Compensatory Strategies   Comments able to maintain balance at EOB once positioned at EOB   Gait Analysis   Gait Level Of Assist Unable to Participate   Bed Mobility    Supine  to Sit Maximal Assist  (x2 people)   Sit to Supine Maximal Assist  (x2 people)   Scooting Maximal Assist   Rolling Maximum Assist to Lt.;Maximal Assist to Rt.   Skilled Intervention Verbal Cuing;Sequencing;Postural Facilitation   Functional Mobility   Sit to Stand Unable to Participate   Activity Tolerance   Sitting Edge of Bed ~5 min with encouragement   Short Term Goals    Short Term Goal # 1 in 6 visits patient will perform bed mobility with supervision for safe DC home   Goal Outcome # 1 goal not met   Short Term Goal # 2 in 6 visits patient will perform all transfers Lucille for safe DC home   Goal Outcome # 2 Goal not met   Short Term Goal # 3 in 6 visits patient will self-propel 400' in WC with supervision    Goal Outcome # 3 Goal not met

## 2020-08-04 NOTE — PROGRESS NOTES
Hospital Medicine Daily Progress Note    Date of Service  8/4/2020    Chief Complaint  75 y.o. male admitted 7/27/2020 with    Leg Pain         Hospital Course    74 y.o. male with a past medical history of hypertension, chronic pain, peripheral vascular disease, basal cell carcinoma of the face admitted 7/27/2020 with with vascular surgery for revision of a necrotic left AKA stump. 7/27 I&D Cul > MRSA & enterococcus. ID consulted . Repeat ID on 7/29    Interval Problem Update  none    Consultants/Specialty  Vascular surgery  Infectious disease      Code Status  full    Disposition  pending    Review of Systems  Review of Systems   Unable to perform ROS: Acuity of condition        Physical Exam  Temp:  [37.1 °C (98.7 °F)-37.8 °C (100 °F)] 37.1 °C (98.7 °F)  Pulse:  [66-85] 71  Resp:  [18] 18  BP: (144-157)/(60-66) 151/65  SpO2:  [94 %-97 %] 96 %    Physical Exam  Constitutional:       General: He is not in acute distress.     Appearance: He is not toxic-appearing.   HENT:      Head: Normocephalic.      Comments: Cancer lesion on the right side of the face     Nose: No congestion or rhinorrhea.      Mouth/Throat:      Mouth: Mucous membranes are dry.   Eyes:      Extraocular Movements: Extraocular movements intact.      Pupils: Pupils are equal, round, and reactive to light.   Neck:      Musculoskeletal: Normal range of motion and neck supple.   Cardiovascular:      Rate and Rhythm: Normal rate and regular rhythm.      Pulses: Normal pulses.      Heart sounds: Normal heart sounds.   Pulmonary:      Effort: Pulmonary effort is normal.      Breath sounds: Normal breath sounds.   Abdominal:      General: Abdomen is flat.      Palpations: Abdomen is soft.   Musculoskeletal:      Comments: The incision site on left AKA is noted   Skin:     General: Skin is warm and dry.   Neurological:      Mental Status: He is alert. He is disoriented.         Fluids    Intake/Output Summary (Last 24 hours) at 8/4/2020 1446  Last data  filed at 8/4/2020 0750  Gross per 24 hour   Intake 1857.5 ml   Output 750 ml   Net 1107.5 ml       Laboratory  Recent Labs     08/02/20  0621 08/03/20  1126 08/04/20  0703   WBC 11.3* 9.1 6.8   RBC 3.38* 3.18* 2.89*   HEMOGLOBIN 10.7* 10.2* 9.2*   HEMATOCRIT 33.7* 30.9* 28.2*   MCV 99.7* 97.2 97.6   MCH 31.7 32.1 31.8   MCHC 31.8* 33.0* 32.6*   RDW 45.8 44.8 45.6   PLATELETCT 251 226 290   MPV 10.3 10.5 9.7     Recent Labs     08/02/20 0621 08/03/20  1126 08/04/20  0703   SODIUM 138 137 136   POTASSIUM 3.7 3.2* 3.5*   CHLORIDE 103 103 105   CO2 24 22 23   GLUCOSE 139* 136* 127*   BUN 10 12 11   CREATININE 0.81 0.92 0.85   CALCIUM 7.8* 7.5* 7.3*                   Imaging       Assessment/Plan  * AKA stump complication (HCC)  Assessment & Plan  Infected AKA, s/p I&D   Cultures positive for MRSA and group D enterococcus, ID consulted   S/p I&D Jul 27, 31 & 8/2      I.D input is noted  Vancomycin   unasyn    Delirium  Assessment & Plan  Intermittent, multifactorial, infection, anesthesia    Arterial insufficiency (HCC)- (present on admission)  Assessment & Plan  Known history, admitted for amputation  Consider antiplatelet when okay with vascular    Diabetes (HCC)- (present on admission)  Assessment & Plan  With hyperglycemia  Glycated hemoglobin 7.3%  Short acting insulin   Accu-Checks, hypoglycemia protocol     HLD (hyperlipidemia)- (present on admission)  Assessment & Plan  Resume home statin therpay      HTN (hypertension)- (present on admission)  Assessment & Plan  As needed enalapril    Basal cell carcinoma of face- (present on admission)  Assessment & Plan  Chronic, outpatient follow-up     Basal cell carcinoma of earlobe- (present on admission)  Assessment & Plan  Chronic, outpatient follow-up       VTE prophylaxis: lovenox

## 2020-08-04 NOTE — PROGRESS NOTES
Brief ID note    Patient is scheduled for VAC change tomorrow and will examine wound with wound care team.  Per surgical note it appears that no further debridements are planned.     --- Pathology report is somewhat ambiguous as to whether the margins are clear or not.  We will need to review final op note, examined wound and then make assessment for antibiotic duration.        Nieves Solomon MD  Infectious Diseases

## 2020-08-04 NOTE — PROGRESS NOTES
"Vascular    CHARLES  Pain controlled  emotional today    /65   Pulse 71   Temp 37.1 °C (98.7 °F) (Temporal)   Resp 18   Ht 1.676 m (5' 6\")   Wt 63 kg (138 lb 14.2 oz)   SpO2 96%   BMI 22.42 kg/m²     Vac intact    A/P)  At this point I do not believe the patient will need any additional surgical debridements. He does however still have a small wound vac in his incision and I have asked wound care to evaluate to start doing bedside changes.  If we heal the remainder of the wound with vac therapy then the patient has a better chance of avoiding any additional infection or surgery.      Anticipate bedside vac change today or tomorrow.    Appreciate Hospitalist and ID support  Following along.    Usman Fisher MD  [facility] (General and Vascular Surgery)  Cell: [phone] (text or call is fine, if you don't reach me please try my office)  Office: [phone]  __________________________________________________________________  Patient:Jimenez Bains   MRN:[id number]   CSN:[id number]    "

## 2020-08-04 NOTE — PROGRESS NOTES
"Pt A&Ox2-3 this AM, disoriented to year but knows august and the president, intermittently disoriented to event, this AM pt states \"I just don't know what happened to my leg\" pt tearful.   L AKA with wound vac, c/o pain 7/10 PCA in use.   Worked with PT/OT this AM.   Tolerating diet, denies n/v. + bowel sounds, + flatus, LBM this AM 8/4.  Saturating >90% on RA.  Pt ambulates MAX assist to edge of bed, didn't tolerate well.  Updated on plan of care. Safety education provided. Bed locked in low. Call light within reach. Rounding in place.   "

## 2020-08-04 NOTE — PROGRESS NOTES
2 RN skin check complete     Generalized flaky, red fragile skin with scattered abbrasions  Elbows pink and blanching BL  Sacrum red with blanching  Left AKA with wound vac, CDI  Red linear chris noted to patient's left groin/buttock area, non blanching   Red area noted to patient's penis, non blanching  Left heel with red/pink, blanching, ELIDA     Barrier cream applied, frequent incontinence checks, waffle mattress in place, Pt declines Q2 turns despite education, pt declines mepelixs to elbows, condom cath to down bag, pillows in use for positioning of extremities. .

## 2020-08-05 LAB
ALBUMIN SERPL BCP-MCNC: 2.1 G/DL (ref 3.2–4.9)
ALBUMIN/GLOB SERPL: 0.7 G/DL
ALP SERPL-CCNC: 141 U/L (ref 30–99)
ALT SERPL-CCNC: 41 U/L (ref 2–50)
ANION GAP SERPL CALC-SCNC: 11 MMOL/L (ref 7–16)
AST SERPL-CCNC: 79 U/L (ref 12–45)
BILIRUB SERPL-MCNC: 0.4 MG/DL (ref 0.1–1.5)
BUN SERPL-MCNC: 8 MG/DL (ref 8–22)
CALCIUM SERPL-MCNC: 7.9 MG/DL (ref 8.5–10.5)
CHLORIDE SERPL-SCNC: 105 MMOL/L (ref 96–112)
CO2 SERPL-SCNC: 24 MMOL/L (ref 20–33)
CREAT SERPL-MCNC: 0.86 MG/DL (ref 0.5–1.4)
GLOBULIN SER CALC-MCNC: 3.2 G/DL (ref 1.9–3.5)
GLUCOSE BLD-MCNC: 107 MG/DL (ref 65–99)
GLUCOSE BLD-MCNC: 114 MG/DL (ref 65–99)
GLUCOSE BLD-MCNC: 130 MG/DL (ref 65–99)
GLUCOSE BLD-MCNC: 170 MG/DL (ref 65–99)
GLUCOSE SERPL-MCNC: 125 MG/DL (ref 65–99)
POTASSIUM SERPL-SCNC: 3.7 MMOL/L (ref 3.6–5.5)
PROT SERPL-MCNC: 5.3 G/DL (ref 6–8.2)
SODIUM SERPL-SCNC: 140 MMOL/L (ref 135–145)

## 2020-08-05 PROCEDURE — 97535 SELF CARE MNGMENT TRAINING: CPT

## 2020-08-05 PROCEDURE — 700105 HCHG RX REV CODE 258: Performed by: FAMILY MEDICINE

## 2020-08-05 PROCEDURE — 700105 HCHG RX REV CODE 258: Performed by: INTERNAL MEDICINE

## 2020-08-05 PROCEDURE — 302098 PASTE RING (FLAT): Performed by: FAMILY MEDICINE

## 2020-08-05 PROCEDURE — A9270 NON-COVERED ITEM OR SERVICE: HCPCS | Performed by: HOSPITALIST

## 2020-08-05 PROCEDURE — 700111 HCHG RX REV CODE 636 W/ 250 OVERRIDE (IP): Performed by: HOSPITALIST

## 2020-08-05 PROCEDURE — 700105 HCHG RX REV CODE 258: Performed by: HOSPITALIST

## 2020-08-05 PROCEDURE — 99233 SBSQ HOSP IP/OBS HIGH 50: CPT | Performed by: INTERNAL MEDICINE

## 2020-08-05 PROCEDURE — 700111 HCHG RX REV CODE 636 W/ 250 OVERRIDE (IP): Performed by: INTERNAL MEDICINE

## 2020-08-05 PROCEDURE — A9270 NON-COVERED ITEM OR SERVICE: HCPCS | Performed by: FAMILY MEDICINE

## 2020-08-05 PROCEDURE — 700111 HCHG RX REV CODE 636 W/ 250 OVERRIDE (IP): Performed by: NURSE PRACTITIONER

## 2020-08-05 PROCEDURE — 99232 SBSQ HOSP IP/OBS MODERATE 35: CPT | Performed by: FAMILY MEDICINE

## 2020-08-05 PROCEDURE — 36415 COLL VENOUS BLD VENIPUNCTURE: CPT

## 2020-08-05 PROCEDURE — 97605 NEG PRS WND THER DME<=50SQCM: CPT

## 2020-08-05 PROCEDURE — 770001 HCHG ROOM/CARE - MED/SURG/GYN PRIV*

## 2020-08-05 PROCEDURE — 700102 HCHG RX REV CODE 250 W/ 637 OVERRIDE(OP): Performed by: HOSPITALIST

## 2020-08-05 PROCEDURE — 80053 COMPREHEN METABOLIC PANEL: CPT

## 2020-08-05 PROCEDURE — 700101 HCHG RX REV CODE 250: Performed by: SURGERY

## 2020-08-05 PROCEDURE — 700111 HCHG RX REV CODE 636 W/ 250 OVERRIDE (IP): Performed by: SURGERY

## 2020-08-05 PROCEDURE — 82962 GLUCOSE BLOOD TEST: CPT

## 2020-08-05 PROCEDURE — 700102 HCHG RX REV CODE 250 W/ 637 OVERRIDE(OP): Performed by: FAMILY MEDICINE

## 2020-08-05 RX ORDER — SODIUM HYPOCHLORITE 1.25 MG/ML
SOLUTION TOPICAL DAILY
Status: DISCONTINUED | OUTPATIENT
Start: 2020-08-05 | End: 2020-08-12 | Stop reason: HOSPADM

## 2020-08-05 RX ORDER — GABAPENTIN 100 MG/1
100 CAPSULE ORAL 3 TIMES DAILY
Status: DISCONTINUED | OUTPATIENT
Start: 2020-08-05 | End: 2020-08-12 | Stop reason: HOSPADM

## 2020-08-05 RX ORDER — LIDOCAINE HYDROCHLORIDE 40 MG/ML
SOLUTION TOPICAL
Status: COMPLETED | OUTPATIENT
Start: 2020-08-05 | End: 2020-08-07

## 2020-08-05 RX ADMIN — INSULIN HUMAN 1 UNITS: 100 INJECTION, SOLUTION PARENTERAL at 11:25

## 2020-08-05 RX ADMIN — GABAPENTIN 100 MG: 100 CAPSULE ORAL at 17:07

## 2020-08-05 RX ADMIN — AMPICILLIN SODIUM AND SULBACTAM SODIUM 3 G: 2; 1 INJECTION, POWDER, FOR SOLUTION INTRAMUSCULAR; INTRAVENOUS at 23:44

## 2020-08-05 RX ADMIN — Medication: at 19:40

## 2020-08-05 RX ADMIN — AMPICILLIN SODIUM AND SULBACTAM SODIUM 3 G: 2; 1 INJECTION, POWDER, FOR SOLUTION INTRAMUSCULAR; INTRAVENOUS at 11:19

## 2020-08-05 RX ADMIN — SODIUM CHLORIDE 1000 ML: 9 INJECTION, SOLUTION INTRAVENOUS at 22:00

## 2020-08-05 RX ADMIN — AMPICILLIN SODIUM AND SULBACTAM SODIUM 3 G: 2; 1 INJECTION, POWDER, FOR SOLUTION INTRAMUSCULAR; INTRAVENOUS at 06:16

## 2020-08-05 RX ADMIN — DAKIN'S SOLUTION 0.125% (QUARTER STRENGTH) 1 ML: 0.12 SOLUTION at 17:08

## 2020-08-05 RX ADMIN — ATORVASTATIN CALCIUM 40 MG: 40 TABLET, FILM COATED ORAL at 17:07

## 2020-08-05 RX ADMIN — AMPICILLIN SODIUM AND SULBACTAM SODIUM 3 G: 2; 1 INJECTION, POWDER, FOR SOLUTION INTRAMUSCULAR; INTRAVENOUS at 17:08

## 2020-08-05 RX ADMIN — VANCOMYCIN HYDROCHLORIDE 1250 MG: 500 INJECTION, POWDER, LYOPHILIZED, FOR SOLUTION INTRAVENOUS at 12:48

## 2020-08-05 RX ADMIN — HYDROMORPHONE HYDROCHLORIDE 1 MG: 1 INJECTION, SOLUTION INTRAMUSCULAR; INTRAVENOUS; SUBCUTANEOUS at 14:50

## 2020-08-05 RX ADMIN — ENOXAPARIN SODIUM 40 MG: 40 INJECTION SUBCUTANEOUS at 06:16

## 2020-08-05 ASSESSMENT — ENCOUNTER SYMPTOMS
DIARRHEA: 0
FEVER: 0
NAUSEA: 0
CONSTIPATION: 0
ABDOMINAL PAIN: 0
MYALGIAS: 1
CHILLS: 0
VOMITING: 0

## 2020-08-05 ASSESSMENT — COGNITIVE AND FUNCTIONAL STATUS - GENERAL
DRESSING REGULAR LOWER BODY CLOTHING: A LOT
HELP NEEDED FOR BATHING: A LOT
DAILY ACTIVITIY SCORE: 16
DRESSING REGULAR UPPER BODY CLOTHING: A LITTLE
TOILETING: A LOT
SUGGESTED CMS G CODE MODIFIER DAILY ACTIVITY: CK
PERSONAL GROOMING: A LITTLE

## 2020-08-05 NOTE — PROGRESS NOTES
"Pharmacy Kinetics 75 y.o. male on vancomycin day # 10 (2020)       Currently on Vancomycin 1250 mg iv q24hr (1200)  Provider specified end date: planning for 6 weeks from date of final surgery per ID     Indication for Treatment: SSTI, osteomyelitis of left AKA stump     Pertinent history per medical record: Admitted on 2020 for scheduled surgery for revision of necrotic left AKA stump.  Patient is now s/p stump revision.  Procedure note states extensive necrotic muscle and fascia with purulent fluid in the muscle.  Cultures sent.  Vancomycin and zosyn ordered empirically post-op.  Wound VAC in place. ID is following     Other antibiotics: ampicillin/sulbactam 3 grams iv q6h     Allergies: No known drug allergy      List concerns for renal function: age, low albumin     Pertinent cultures to date:    L-leg vascular graft: MRSA, E faecalis     MRSA nares swab if pneumonia is a concern: n-a       Recent Labs     20  1126 20  0703   WBC 9.1 6.8   NEUTSPOLYS 88.80* 79.80*     Recent Labs     20  1126 20  0703 20  0836   BUN 12 11 8   CREATININE 0.92 0.85 0.86   ALBUMIN 2.1* 1.9* 2.1*     Recent Labs     20  1126   VANCOTROUGH 10.3       Intake/Output Summary (Last 24 hours) at 2020 1125  Last data filed at 2020 0616  Gross per 24 hour   Intake 3705.93 ml   Output 800 ml   Net 2905.93 ml      /58   Pulse 73   Temp 37.3 °C (99.2 °F) (Temporal)   Resp 18   Ht 1.676 m (5' 6\")   Wt 63 kg (138 lb 14.2 oz)   SpO2 94%  Temp (24hrs), Av.3 °C (99.2 °F), Min:37.2 °C (98.9 °F), Max:37.6 °C (99.6 °F)      A/P   1. Vancomycin dose change: Not indicated at this time, continue with  Vancomycin 1250 mg iv q24hr (1200)  2. Next vancomycin level: 2-3 days or sooner pending renal function (will need to be ordered)  3. Goal trough: 10-15 mcg/mL  4. Comments: Patient seen by vascular this AM and they are not recommending further surgery. Additionally, ID waiting for op " note in order to make final assessment for antibiotic duration. Patient's renal function stable. No change to current regimen. Pharmacy will continue to monitor.    Bia Harris, PharmD, BCPS

## 2020-08-05 NOTE — PROGRESS NOTES
Hospital Medicine Daily Progress Note    Date of Service  8/5/2020    Chief Complaint  75 y.o. male admitted 7/27/2020 with    Leg Pain         Hospital Course    74 y.o. male with a past medical history of hypertension, chronic pain, peripheral vascular disease, basal cell carcinoma of the face admitted 7/27/2020 with with vascular surgery for revision of a necrotic left AKA stump. 7/27 I&D Cul > MRSA & enterococcus. ID consulted . Repeat ID on 7/29    Interval Problem Update  none    Consultants/Specialty  Vascular surgery  Infectious disease      Code Status  full    Disposition  pending    Review of Systems  Review of Systems   Unable to perform ROS: Acuity of condition        Physical Exam  Temp:  [37.2 °C (98.9 °F)-37.6 °C (99.6 °F)] 37.6 °C (99.6 °F)  Pulse:  [71-86] 71  Resp:  [16-18] 18  BP: (143-147)/(57-62) 143/57  SpO2:  [93 %-96 %] 93 %    Physical Exam  Constitutional:       General: He is not in acute distress.     Appearance: He is not toxic-appearing.   HENT:      Head: Normocephalic.      Comments: Cancer lesion on the right side of the face     Nose: No congestion or rhinorrhea.      Mouth/Throat:      Mouth: Mucous membranes are dry.   Eyes:      Extraocular Movements: Extraocular movements intact.      Pupils: Pupils are equal, round, and reactive to light.   Neck:      Musculoskeletal: Normal range of motion and neck supple.   Cardiovascular:      Rate and Rhythm: Normal rate and regular rhythm.      Pulses: Normal pulses.      Heart sounds: Normal heart sounds.   Pulmonary:      Effort: Pulmonary effort is normal.      Breath sounds: Normal breath sounds.   Abdominal:      General: Abdomen is flat.      Palpations: Abdomen is soft.   Musculoskeletal:      Comments: The incision site on left AKA is noted   Skin:     General: Skin is warm and dry.   Neurological:      Mental Status: He is alert. He is disoriented.         Fluids    Intake/Output Summary (Last 24 hours) at 8/5/2020 0962  Last  data filed at 8/5/2020 0616  Gross per 24 hour   Intake 3705.93 ml   Output 800 ml   Net 2905.93 ml       Laboratory  Recent Labs     08/03/20  1126 08/04/20  0703   WBC 9.1 6.8   RBC 3.18* 2.89*   HEMOGLOBIN 10.2* 9.2*   HEMATOCRIT 30.9* 28.2*   MCV 97.2 97.6   MCH 32.1 31.8   MCHC 33.0* 32.6*   RDW 44.8 45.6   PLATELETCT 226 290   MPV 10.5 9.7     Recent Labs     08/03/20  1126 08/04/20  0703   SODIUM 137 136   POTASSIUM 3.2* 3.5*   CHLORIDE 103 105   CO2 22 23   GLUCOSE 136* 127*   BUN 12 11   CREATININE 0.92 0.85   CALCIUM 7.5* 7.3*                   Imaging       Assessment/Plan  * AKA stump complication (HCC)  Assessment & Plan  Infected AKA, s/p I&D   Cultures positive for MRSA and group D enterococcus, ID consulted   S/p I&D Jul 27, 31 & 8/2      I.D input is noted  Vancomycin   unasyn  Wound care  Needs placement    Delirium  Assessment & Plan  Intermittent, multifactorial, infection, anesthesia    Arterial insufficiency (HCC)- (present on admission)  Assessment & Plan  Known history, admitted for amputation  Consider antiplatelet when okay with vascular    Diabetes (HCC)- (present on admission)  Assessment & Plan  With hyperglycemia  Glycated hemoglobin 7.3%  Short acting insulin   Accu-Checks, hypoglycemia protocol     HLD (hyperlipidemia)- (present on admission)  Assessment & Plan  Resume home statin therpay      HTN (hypertension)- (present on admission)  Assessment & Plan  As needed enalapril    Basal cell carcinoma of face- (present on admission)  Assessment & Plan  Chronic, outpatient follow-up  With dermatology    Basal cell carcinoma of earlobe- (present on admission)  Assessment & Plan  Chronic, outpatient follow-up       VTE prophylaxis: lovenox

## 2020-08-05 NOTE — PROGRESS NOTES
Infectious Disease Progress Note    Author: Nieves Solomon M.D. Date & Time of service: 2020  3:13 PM    Chief Complaint:  Infected above-the-knee amputation and vascular graft       Interval History:   AF WBC 12.7 disoriented no acute distress   AF plan for OR again tomorrow noted-VAC in place-denies SE abx. No new complaints   AF WBC 11.3 NPO for return to OR today-denies SE abx. Pain is controlled. No new complaints  8/3 AF, O2 RA,  I&D on       Review of Systems:  Review of Systems   Constitutional: Negative for chills and fever.   Gastrointestinal: Negative for abdominal pain, constipation, diarrhea, nausea and vomiting.   Musculoskeletal: Positive for joint pain and myalgias.       Hemodynamics:  Temp (24hrs), Av.2 °C (99 °F), Min:36.9 °C (98.4 °F), Max:37.6 °C (99.6 °F)  Temperature: 36.9 °C (98.4 °F)  Pulse  Av.5  Min: 47  Max: 103   Blood Pressure : 141/57       Physical Exam:  Physical Exam  Constitutional:       Appearance: Normal appearance.   Cardiovascular:      Rate and Rhythm: Normal rate and regular rhythm.      Heart sounds: Normal heart sounds.   Pulmonary:      Effort: Pulmonary effort is normal.      Comments: Diffuse crackles  Abdominal:      General: Abdomen is flat. Bowel sounds are normal.      Palpations: Abdomen is soft.   Musculoskeletal:         General: Swelling, tenderness, deformity and signs of injury present.      Left lower leg: Edema present.      Comments: Viewed left leg wound during wound VAC exchange, deep tunneling, necrotic tissue and slough   Skin:     General: Skin is warm and dry.   Neurological:      General: No focal deficit present.      Mental Status: He is alert and oriented to person, place, and time.   Psychiatric:         Mood and Affect: Mood normal.         Behavior: Behavior normal.      Comments: Emotional due to pain from dressing change         Meds:    Current Facility-Administered Medications:   •  HYDROmorphone  •   ampicillin-sulbactam (UNASYN) IV  •  haloperidol lactate  •  LORazepam  •  haloperidol lactate  •  vancomycin  •  fentaNYL  •  MD Alert...Vancomycin per Pharmacy  •  senna-docusate **AND** polyethylene glycol/lytes **AND** magnesium hydroxide **AND** bisacodyl  •  NS  •  ondansetron  •  ondansetron  •  enalaprilat  •  atorvastatin  •  insulin regular **AND** POC Blood Glucose **AND** NOTIFY MD and PharmD **AND** glucose **AND** dextrose 50%  •  enoxaparin (LOVENOX) injection    Facility-Administered Medications Ordered in Other Encounters:   •  fentaNYL  •  propofol  •  ceFAZolin    Labs:  Recent Labs     08/03/20  1126 08/04/20  0703   WBC 9.1 6.8   RBC 3.18* 2.89*   HEMOGLOBIN 10.2* 9.2*   HEMATOCRIT 30.9* 28.2*   MCV 97.2 97.6   MCH 32.1 31.8   RDW 44.8 45.6   PLATELETCT 226 290   MPV 10.5 9.7   NEUTSPOLYS 88.80* 79.80*   LYMPHOCYTES 6.50* 13.30*   MONOCYTES 3.00 4.50   EOSINOPHILS 0.90 1.50   BASOPHILS 0.20 0.30     Recent Labs     08/03/20  1126 08/04/20  0703 08/05/20  0836   SODIUM 137 136 140   POTASSIUM 3.2* 3.5* 3.7   CHLORIDE 103 105 105   CO2 22 23 24   GLUCOSE 136* 127* 125*   BUN 12 11 8     Recent Labs     08/03/20  1126 08/04/20  0703 08/05/20  0836   ALBUMIN 2.1* 1.9* 2.1*   TBILIRUBIN 0.4 0.4 0.4   ALKPHOSPHAT 145* 131* 141*   TOTPROTEIN 5.0* 4.6* 5.3*   ALTSGPT 38 36 41   ASTSGOT 113* 86* 79*   CREATININE 0.92 0.85 0.86       Imaging:  No results found.    Micro:  Results     Procedure Component Value Units Date/Time    Anaerobic Culture [[id number]]  (Abnormal) Collected: 07/27/20 1040    Order Status: Completed Specimen: Tissue Updated: 08/01/20 1803     Significant Indicator POS     Source TISS     Site Left Leg Vascular Graft     Culture Result -      Peptostreptococcus asaccharolyticus  Light growth        Prevotella oralis  Light growth        Propionibacterium species  Light growth      Narrative:      CALL  [place]  141 tel. [phone],  CALLED  141 tel. [phone] 07/28/2020, 13:59, RB PERF.  RESULTS CALLED  TO:Taty Panda Rn  Surgery Specimen    Fungal Culture [[id number]] Collected: 07/27/20 1040    Order Status: Completed Specimen: Tissue Updated: 08/01/20 1803     Significant Indicator NEG     Source TISS     Site Left Leg Vascular Graft     Culture Result No fungal growth to date.    Narrative:      CALL  [place]  141 tel. [phone],  CALLED  141 tel. [phone] 07/28/2020, 13:59, RB PERF. RESULTS CALLED  TO:Taty Panda Rn  Surgery Specimen    AFB Culture [[id number]] Collected: 07/27/20 1040    Order Status: Completed Specimen: Tissue Updated: 08/01/20 1803     Significant Indicator NEG     Source TISS     Site Left Leg Vascular Graft     Culture Result Culture in progress.     AFB Smear Results No acid fast bacilli seen.    Narrative:      CALL  [place]  141 tel. [phone],  CALLED  141 tel. [phone] 07/28/2020, 13:59, RB PERF. RESULTS CALLED  TO:Taty Panda Rn  Surgery Specimen    CULTURE TISSUE W/ GRM STAIN [[id number]]  (Abnormal)  (Susceptibility) Collected: 07/27/20 1040    Order Status: Completed Specimen: Tissue Updated: 08/01/20 1803     Significant Indicator POS     Source TISS     Site Left Leg Vascular Graft     Culture Result -     Gram Stain Result Few WBCs.  Many Gram positive cocci.  Rare Gram negative rods.       Culture Result Methicillin Resistant Staphylococcus aureus  Moderate growth  This isolate is presumed to be clindamycin resistant based on  detection of inducible resistance.  Clindamycin may still  be effective in some patients.        Enterococcus faecalis  Light growth  Combination therapy with ampicillin, penicillin, or  vancomycin (for susceptible strains) plus an aminoglycoside  is usually indicated for serious enterococcal infections,  such as endocarditis unless high-level resistance to both  gentamicin and streptomycin is documented; such combinations  are predicted to result in synergistic killing of the  Enterococcus.  The susceptibility profile for this organism  indicates that  Streptomycin would not be an effective component of  combination therapy.  The susceptibility profile for this organism indicates that  Gentamycin would not be an effective component of combination  therapy.      Narrative:      CALL  [place]  141 tel. [phone],  CALLED  141 tel. [phone] 07/28/2020, 13:59, RB PERF. RESULTS CALLED  TO:Taty [id number] Rn  Surgery Specimen    Susceptibility     Methicillin resistant staphylococcus aureus (1)     Antibiotic Interpretation Microscan Method Status    Azithromycin Resistant >4 mcg/mL MASOOD Final    Clindamycin Resistant <=0.5 mcg/mL MASOOD Final    Cefazolin Resistant <=8 mcg/mL MASOOD Final    Ceftaroline Sensitive <=0.5 mcg/mL MASOOD Final    Daptomycin Sensitive <=1 mcg/mL MASOOD Final    Erythromycin Resistant >4 mcg/mL MASOOD Final    Ampicillin/sulbactam Resistant <=8/4 mcg/mL MASOOD Final    Oxacillin Resistant >2 mcg/mL MASOOD Final    Penicillin Resistant >8 mcg/mL MASOOD Final    Trimeth/Sulfa Sensitive <=0.5/9.5 mcg/mL MASOOD Final    Tetracycline Resistant >8 mcg/mL MASOOD Final    Vancomycin Sensitive 1 mcg/mL MASOOD Final          Enterococcus faecalis (2)     Antibiotic Interpretation Microscan Method Status    Daptomycin Sensitive <=1 mcg/mL MASOOD Final    Strep Synergy Resistant >1000 mcg/mL MASOOD Final    Penicillin Sensitive 8 mcg/mL MASOOD Final    Vancomycin Sensitive 1 mcg/mL MASOOD Final    Ampicillin Sensitive <=2 mcg/mL MASOOD Final    Gent Synergy Resistant >500 mcg/mL MASOOD Final                         Assessment:  Active Hospital Problems    Diagnosis   • *AKA stump complication (HCC) [T87.9]   • Delirium [R41.0]   • Arterial insufficiency (HCC) [I77.1]   • PVD (peripheral vascular disease) with claudication (HCC) [I73.9]   • Diabetes (HCC) [E11.9]   • HLD (hyperlipidemia) [E78.5]   • HTN (hypertension) [I10]   • Basal cell carcinoma of earlobe [C44.211]   • Basal cell carcinoma of face [C44.310]   • Long term current use of anticoagulant therapy [Z79.01]   • Chronic  anticoagulation [Z79.01]   • Ischemia of extremity [I99.8]     Interval 24 hours:      AF, O2 0.5 L NC   Labs reviewed, Vanco trough 10.3  Micro reviewed    Wound VAC exchange today, strongly painful for the patient.  Discussed with wound team and significant slough and necrotic material with deep tunneling.  Patient continued on antibiotics as below.       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       Plan:  Infected above-the-knee amputation/OM left  Infected vascular graft- now removed   Leukocytosis - improved   s/p revision amputation on 07/27, but margins are not clear.    Repeat I and D necrotic muscle and fascia 7/31-less necrosis and purulence than 7/27  Operative cultures are polymicrobial: MRSA, Efaecalis, peptostrep, Prevotella, and propionibacterium     --- Continue vancomycin and Unasyn. Anticipate 6 week course from date of final surgery. Graft appears to have been removed entirely per op note on 7/27.   --- Due to appearance of wound today may need further procedures     Diabetes, chronic  Hemoglobin A1c 7.3.  --- Monitor and control blood sugar      Severe arterial insufficiency/PAD.    His amputation did show evidence of vascular calcifications and vascular   disease at the amputation site.    Anticipate he will have continued poor wound healing.       Basal cell carcinomas, face and ear  Per oncology     Discussed with wound care team at bedside.  ID will follow.

## 2020-08-05 NOTE — PROGRESS NOTES
"Bedside report received.  Assessment complete.  Pt on Isolation for MRSA in wound  A&O x 2. Pt disoriented to time/year but knows it's August and event states \"they are here for pain in their leg\" .  Patient up with max assist. Bed alarm on.   Patient has 2/10 pain. Pain managed with Fentanyl PCA.  Denies N&V. Tolerating DM diet.  Wound vac to  L AKA, CDI no leaks present.  + void via condom cath, - flatus, - BM.  Patient denies SOB.  SCD's on.  Review plan with of care with patient. Call light and personal belongings with in reach. Hourly rounding in place. All needs met at this time.                    "

## 2020-08-05 NOTE — THERAPY
"Occupational Therapy  Daily Treatment     Patient Name: Jimenez Bains  Age:  75 y.o., Sex:  male  Medical Record #: [id number]  Today's Date: 8/5/2020     Precautions  Precautions: Fall Risk, Non Weight Bearing Left Lower Extremity  Comments: wound vac, initial AKA 6/30, debridement 7/29    Assessment    Pt making progress toward goals. He demonstrates improved cognition and increased independence with bed mobility. Pt limited by pain today.     Plan    Continue current treatment plan.    DC Equipment Recommendations: Unable to determine at this time  Discharge Recommendations: Recommend post-acute placement for additional occupational therapy services prior to discharge home    Subjective    \"Sure. I'll try.\"     Objective       08/05/20 1120   Bed Mobility    Supine to Sit Minimal Assist  (with use of side rail and bed flat, exit R side of bed  )   Sit to Supine Minimal Assist   Scooting Minimal Assist   Activities of Daily Living   Upper Body Dressing Minimal Assist  (change gown )   Toileting   (condom cath in place )   Comments Pt limited by pain after sitting EOB, extensive time helping pt re-position in bed to increase comfort   Functional Mobility   Sit to Stand Unable to Participate   Activity Tolerance   Sitting Edge of Bed 15 min    Short Term Goals   Short Term Goal # 1 pt will complete grooming seated w/set up    Goal Outcome # 1 Progressing as expected   Short Term Goal # 2 pt will complete txf to BSC w/spv    Goal Outcome # 2 Progressing slower than expected   Short Term Goal # 3 pt will complete LB dressing w/mod I (long sitting in bed)    Goal Outcome # 3 Progressing slower than expected         "

## 2020-08-05 NOTE — PROGRESS NOTES
2RN skin check    L AKA with wound vac.   Scab R anterior shin.  Scab R lateral elbow.  Sacrum red blanching, no mepilex due to incontinence.   Linear red chris to penis, non blanching.  Scrotum red/dermatits.  Linear chris L buttock, non blanching.  L heel red/pink resolved at this time.    Interventions in place: Waffle cushion, silicone nasal cannula, condom cath, frequent incontinent checks, q2hr turns, pillows in use to float heels and elbows.

## 2020-08-05 NOTE — PROGRESS NOTES
Pt A&Ox2-3, disoriented to year and intermittently event. Calm this AM, less tearful.   L AKA with wound vac, c/o pain 3/10 PCA in use.   Worked with PT/OT this AM.   Tolerating diet, denies n/v. + bowel sounds, + flatus, LBM yesterday 8/4.  Saturating >90% on RA.  Pt ambulates MAX assist to edge of bed, didn't tolerate well.  Updated on plan of care. Safety education provided. Bed locked in low. Call light within reach. Rounding in place.

## 2020-08-05 NOTE — PROGRESS NOTES
[facility] Medicine Daily Progress Note    Date of Service  8/5/2020    Chief Complaint  75 y.o. male admitted 7/27/2020 with    Leg Pain         Hospital Course    74 y.o. male with a past medical history of hypertension, chronic pain, peripheral vascular disease, basal cell carcinoma of the face admitted 7/27/2020 with with vascular surgery for revision of a necrotic left AKA stump. 7/27 I&D Cul > MRSA & enterococcus. ID consulted . Repeat ID on 7/29    Interval Problem Update  none    Consultants/Specialty  Vascular surgery  Infectious disease      Code Status  full    Disposition  pending    Review of Systems  Review of Systems   Unable to perform ROS: Acuity of condition        Physical Exam  Temp:  [37.2 °C (98.9 °F)-37.6 °C (99.6 °F)] 37.6 °C (99.6 °F)  Pulse:  [71-86] 71  Resp:  [16-18] 18  BP: (143-147)/(57-62) 143/57  SpO2:  [93 %-96 %] 93 %    Physical Exam  Constitutional:       Appearance: He is not toxic-appearing or diaphoretic.   HENT:      Head: Normocephalic.      Comments: Cancer lesion on the right side of the face     Nose: Nose normal.      Mouth/Throat:      Mouth: Mucous membranes are dry.   Eyes:      Extraocular Movements: Extraocular movements intact.      Conjunctiva/sclera: Conjunctivae normal.   Neck:      Musculoskeletal: No neck rigidity or muscular tenderness.   Cardiovascular:      Rate and Rhythm: Normal rate and regular rhythm.      Heart sounds: No murmur. No friction rub.   Pulmonary:      Effort: No respiratory distress.      Breath sounds: No stridor.   Abdominal:      General: Abdomen is flat. Bowel sounds are normal.   Musculoskeletal:      Comments: The incision site on left AKA is noted   Skin:     General: Skin is warm and dry.      Comments: Has cancer lesion on th right side of the face   Neurological:      Mental Status: He is alert. He is disoriented.         Fluids    Intake/Output Summary (Last 24 hours) at 8/5/2020 0932  Last data filed at 8/5/2020 0616  Gross per 24  hour   Intake 3705.93 ml   Output 800 ml   Net 2905.93 ml       Laboratory  Recent Labs     08/03/20  1126 08/04/20  0703   WBC 9.1 6.8   RBC 3.18* 2.89*   HEMOGLOBIN 10.2* 9.2*   HEMATOCRIT 30.9* 28.2*   MCV 97.2 97.6   MCH 32.1 31.8   MCHC 33.0* 32.6*   RDW 44.8 45.6   PLATELETCT 226 290   MPV 10.5 9.7     Recent Labs     08/03/20  1126 08/04/20  0703   SODIUM 137 136   POTASSIUM 3.2* 3.5*   CHLORIDE 103 105   CO2 22 23   GLUCOSE 136* 127*   BUN 12 11   CREATININE 0.92 0.85   CALCIUM 7.5* 7.3*                   Imaging       Assessment/Plan  * AKA stump complication (HCC)  Assessment & Plan  Infected AKA, s/p I&D   Cultures positive for MRSA and group D enterococcus, ID consulted   S/p I&D Jul 27, 31 & 8/2      I.D input is noted  Vancomycin   unasyn  Wound care  Needs placement    Delirium  Assessment & Plan  Intermittent, multifactorial, infection, anesthesia    Arterial insufficiency (HCC)- (present on admission)  Assessment & Plan  Known history, admitted for amputation  Consider antiplatelet when okay with vascular    Diabetes (HCC)- (present on admission)  Assessment & Plan  With hyperglycemia  Glycated hemoglobin 7.3%  Short acting insulin   Accu-Checks, hypoglycemia protocol     HLD (hyperlipidemia)- (present on admission)  Assessment & Plan  Resume home statin therpay      HTN (hypertension)- (present on admission)  Assessment & Plan  As needed enalapril    Basal cell carcinoma of face- (present on admission)  Assessment & Plan  Chronic, outpatient follow-up     Basal cell carcinoma of earlobe- (present on admission)  Assessment & Plan  Chronic, outpatient follow-up       VTE prophylaxis: lovenox

## 2020-08-05 NOTE — PROGRESS NOTES
2 RN skin check complete     Generalized flaky, red fragile skin with scattered abbrasions  Elbows pink and blanching BL  Sacrum red with blanching  Left AKA with wound vac, CDI  Red linear chris noted to patient's left groin/buttock area, non blanching   Red area noted to patient's penis, non blanching  Scrotum red/dermatitis  Left heel with red/pink, blanching, ELIDA     Barrier cream applied, frequent incontinence checks, waffle mattress in place, Q2 turns in place, pt declines mepelixs to elbows, condom cath to down bag, pillows in use for positioning of extremities. .

## 2020-08-05 NOTE — WOUND TEAM
[facility]  Inpatient Services  Initial Wound and Skin Care Evaluation    Admission Date: 7/27/2020     Last order of IP CONSULT TO WOUND CARE was found on 8/2/2020 from Hospital Encounter on 7/24/2020       HPI, PMH, SH: Reviewed    Unit where seen by Wound Team: [id number]     WOUND CONSULT/FOLLOW UP RELATED TO: Vac change     Self Report / Pain Level:  Premedicated with IV pain meds, liquid 4% lidocaine.      OBJECTIVE:  Pt lying in bed, vac dressing in place. On waffle overlay.    WOUND TYPE, LOCATION, CHARACTERISTICS (Pressure Injuries: location, stage, POA or date identified)    Negative Pressure Wound Therapy 06/30/20 (Active)   Vacuum Serial Number [id number] 07/31/20    NPWT Pump Mode / Pressure Setting Intermittent;125 mmHg 08/05/20    Dressing Type Gray Foam (Cleanse Choice)    Number of Foam Pieces Used 6    Canister Changed No    Output (mL) 0 mL    NEXT Dressing Change/Treatment Date 08/07/20    VAC VeraFlo Irrigant 1/4 Strength Dakins    VAC VeraFlo Soak Time (mins) 5    VAC VeraFlo Instill Volume (ml) 16    VAC VeraFlo - Therapy Time (hrs) 1    VAC VeraFlo Pressure (mm/Hg) Intermittent;125 mmHg        Wound 06/30/20 Full Thickness Wound Leg Left open surgical full thickness (Active)   Wound Image   08/05/20     Site Assessment Tan;Pale;Pink;Slough 08/05/20     Periwound Assessment Intact;Pink     Margins Defined edges;Unattached edges    Closure Secondary intention    Drainage Amount Small    Drainage Description Serosanguineous    Treatments Cleansed;Site care    Wound Cleansing Approved Wound Cleanser    Periwound Protectant Benzoin;Drape;Paste Ring    Dressing Cleansing/Solutions 1/4 Strength Dakin's Solution    Dressing Options Wound Vac    Dressing Changed New    Dressing Status Clean;Dry;Intact    Dressing Change/Treatment Frequency Monday, Wednesday, Friday, and As Needed    NEXT Dressing Change/Treatment Date 08/07/20    NEXT Weekly Photo (Inpatient Only) 08/12/20    Non-staged  Wound Description Full thickness    Wound Length (cm) 2.5 cm    Wound Width (cm) 3.5 cm    Wound Depth (cm) 4.5 cm    Wound Surface Area (cm^2) 8.75 cm^2    Wound Volume (cm^3) 39.38 cm^3    Tunneling (cm) 8 cm    Tunneling Clock Position of Wound 12    Shape Irregular    Wound Odor None    Pulses N/A    Exposed Structures Muscle;MARYJANE    WOUND NURSE ONLY - Time Spent with Patient (mins) 120      Left / Medial: 2.5 x 3.5 x 4.5 -- 8 cm at 12 o'clock    Right / Lateral: 1.5 x 3.5 x 2.4      Vascular:    PETRONA:   No results found.      Lab Values:    Lab Results   Component Value Date/Time    WBC 6.8 08/04/2020 07:03 AM    RBC 2.89 (L) 08/04/2020 07:03 AM    HEMOGLOBIN 9.2 (L) 08/04/2020 07:03 AM    HEMATOCRIT 28.2 (L) 08/04/2020 07:03 AM    HBA1C 7.3 (H) 07/27/2020 04:57 PM          Culture:   Obtained 7/27:  Culture Result Abnormal   Peptostreptococcus asaccharolyticus   Light growth     Culture Result Abnormal   Prevotella oralis   Light growth     Culture Result Abnormal   Propionibacterium species   Light growth          INTERVENTIONS BY WOUND TEAM:  Wound vac removed from wound, all foam removed. Kerlix packing removed. Cleansed with wound cleanser. Measurements and photos taken. Updated Dr. Fisher on wound. Dr. Solomon in to see patient. In with [facility] Rep Michelle to see pt. Yana wound prepped with benzoin, and paste ring. Sutures covered in Mepitel, draped. Gray thin foam to depth on left wound, arrow point. Cleanse choice honey comb to both wound beds, covered in thin gray foam. Both wounds bridged. Sealed with drape, button applied and trac pad applied. Veraflo therapy initiated with Dakins 1/4 strength. Instilling 16 ml for 5 minutes every 1 hour.     Interdisciplinary consultation: Patient, Bedside RN, Dr. Fisher, Dr. Solomon, [facility] Rep Michelle      EVALUATION:  Veraflo therapy with Dakins will help debride slough and promote granulation tissue.     Goals: Steady decrease in wound area and depth weekly.    NURSING  PLAN OF CARE ORDERS (X):    Dressing changes: See Dressing Care orders: X  Skin care: See Skin Care orders:    Rectal tube care: See Rectal Tube Care orders:   Other orders:    RSKIN:   CURRENTLY IN PLACE (X), APPLIED THIS VISIT (A), ORDERED (O):    Q shift Alonso:    Q shift pressure point assessments:    Pressure redistribution mattress      X      Low Airloss          Bariatric SHANNAN         Bariatric foam           Heel float boots     Heel Silicone dressing        Float Heels off Bed with Pillows               Barrier wipes         Barrier Cream         Barrier paste          Sacral silicone dressing         Silicone O2 tubing         Anchorfast         Cannula fixation Device (Tender )          Gray Foam Ear protectors           Trach with Optifoam split foam                 Waffle cushion        Waffle Overlay       X  Rectal tube or BMS    Purwick/Condom Cath          Antifungal tx      Interdry          Reposition q 2 hours      X  Up to chair        Ambulate      PT/OT        Dietician        Diabetes Education      PO     TF     TPN     NPO   # days   Other        WOUND TEAM PLAN OF CARE    Dressing changes by wound team:          Follow up 1-2 times weekly:               Follow up 3 times weekly:                NPWT change 3 times weekly:   X  Follow up as needed:       Other (explain):     Anticipated discharge plans:  LTACH:        SNF/Rehab:  X                Home Care:           Outpatient Wound Center:            Self Care:

## 2020-08-05 NOTE — CARE PLAN
Problem: Pain Management  Goal: Pain level will decrease to patient's comfort goal  Outcome: PROGRESSING AS EXPECTED  Note: PCA in use with positive effect.      Problem: Infection  Goal: Will remain free from infection  Outcome: PROGRESSING AS EXPECTED  Note: Wound vac in place. IV abx in use. Contact precautions in use for MRSA in wound.      Problem: Mobility  Goal: Risk for activity intolerance will decrease  Outcome: PROGRESSING SLOWER THAN EXPECTED  Note: Worked with PT and OT today. Tired easily, tolerated poorly due to pain.

## 2020-08-06 LAB
ALBUMIN SERPL BCP-MCNC: 2.2 G/DL (ref 3.2–4.9)
ALBUMIN/GLOB SERPL: 0.6 G/DL
ALP SERPL-CCNC: 140 U/L (ref 30–99)
ALT SERPL-CCNC: 40 U/L (ref 2–50)
ANION GAP SERPL CALC-SCNC: 10 MMOL/L (ref 7–16)
AST SERPL-CCNC: 63 U/L (ref 12–45)
BASOPHILS # BLD AUTO: 0.7 % (ref 0–1.8)
BASOPHILS # BLD: 0.04 K/UL (ref 0–0.12)
BILIRUB SERPL-MCNC: 0.5 MG/DL (ref 0.1–1.5)
BUN SERPL-MCNC: 10 MG/DL (ref 8–22)
CALCIUM SERPL-MCNC: 8.3 MG/DL (ref 8.5–10.5)
CHLORIDE SERPL-SCNC: 102 MMOL/L (ref 96–112)
CO2 SERPL-SCNC: 27 MMOL/L (ref 20–33)
CREAT SERPL-MCNC: 0.88 MG/DL (ref 0.5–1.4)
EOSINOPHIL # BLD AUTO: 0.08 K/UL (ref 0–0.51)
EOSINOPHIL NFR BLD: 1.3 % (ref 0–6.9)
ERYTHROCYTE [DISTWIDTH] IN BLOOD BY AUTOMATED COUNT: 47.1 FL (ref 35.9–50)
GLOBULIN SER CALC-MCNC: 3.4 G/DL (ref 1.9–3.5)
GLUCOSE BLD-MCNC: 108 MG/DL (ref 65–99)
GLUCOSE BLD-MCNC: 121 MG/DL (ref 65–99)
GLUCOSE BLD-MCNC: 131 MG/DL (ref 65–99)
GLUCOSE BLD-MCNC: 170 MG/DL (ref 65–99)
GLUCOSE SERPL-MCNC: 113 MG/DL (ref 65–99)
HCT VFR BLD AUTO: 33.3 % (ref 42–52)
HGB BLD-MCNC: 10.5 G/DL (ref 14–18)
IMM GRANULOCYTES # BLD AUTO: 0.02 K/UL (ref 0–0.11)
IMM GRANULOCYTES NFR BLD AUTO: 0.3 % (ref 0–0.9)
LYMPHOCYTES # BLD AUTO: 0.96 K/UL (ref 1–4.8)
LYMPHOCYTES NFR BLD: 16 % (ref 22–41)
MCH RBC QN AUTO: 31.3 PG (ref 27–33)
MCHC RBC AUTO-ENTMCNC: 31.5 G/DL (ref 33.7–35.3)
MCV RBC AUTO: 99.1 FL (ref 81.4–97.8)
MONOCYTES # BLD AUTO: 0.21 K/UL (ref 0–0.85)
MONOCYTES NFR BLD AUTO: 3.5 % (ref 0–13.4)
NEUTROPHILS # BLD AUTO: 4.69 K/UL (ref 1.82–7.42)
NEUTROPHILS NFR BLD: 78.2 % (ref 44–72)
NRBC # BLD AUTO: 0 K/UL
NRBC BLD-RTO: 0 /100 WBC
PLATELET # BLD AUTO: 262 K/UL (ref 164–446)
PMV BLD AUTO: 9.9 FL (ref 9–12.9)
POTASSIUM SERPL-SCNC: 3.7 MMOL/L (ref 3.6–5.5)
PROT SERPL-MCNC: 5.6 G/DL (ref 6–8.2)
RBC # BLD AUTO: 3.36 M/UL (ref 4.7–6.1)
SODIUM SERPL-SCNC: 139 MMOL/L (ref 135–145)
WBC # BLD AUTO: 6 K/UL (ref 4.8–10.8)

## 2020-08-06 PROCEDURE — A9270 NON-COVERED ITEM OR SERVICE: HCPCS | Performed by: HOSPITALIST

## 2020-08-06 PROCEDURE — 700111 HCHG RX REV CODE 636 W/ 250 OVERRIDE (IP): Performed by: INTERNAL MEDICINE

## 2020-08-06 PROCEDURE — 770001 HCHG ROOM/CARE - MED/SURG/GYN PRIV*

## 2020-08-06 PROCEDURE — 36415 COLL VENOUS BLD VENIPUNCTURE: CPT

## 2020-08-06 PROCEDURE — 82962 GLUCOSE BLOOD TEST: CPT

## 2020-08-06 PROCEDURE — 85025 COMPLETE CBC W/AUTO DIFF WBC: CPT

## 2020-08-06 PROCEDURE — 700102 HCHG RX REV CODE 250 W/ 637 OVERRIDE(OP): Performed by: HOSPITALIST

## 2020-08-06 PROCEDURE — 700111 HCHG RX REV CODE 636 W/ 250 OVERRIDE (IP): Performed by: SURGERY

## 2020-08-06 PROCEDURE — 700105 HCHG RX REV CODE 258: Performed by: FAMILY MEDICINE

## 2020-08-06 PROCEDURE — 700102 HCHG RX REV CODE 250 W/ 637 OVERRIDE(OP): Performed by: FAMILY MEDICINE

## 2020-08-06 PROCEDURE — 700105 HCHG RX REV CODE 258: Performed by: INTERNAL MEDICINE

## 2020-08-06 PROCEDURE — 99232 SBSQ HOSP IP/OBS MODERATE 35: CPT | Performed by: FAMILY MEDICINE

## 2020-08-06 PROCEDURE — 700111 HCHG RX REV CODE 636 W/ 250 OVERRIDE (IP): Performed by: HOSPITALIST

## 2020-08-06 PROCEDURE — A9270 NON-COVERED ITEM OR SERVICE: HCPCS | Performed by: FAMILY MEDICINE

## 2020-08-06 PROCEDURE — 97535 SELF CARE MNGMENT TRAINING: CPT | Mod: CQ

## 2020-08-06 PROCEDURE — 80053 COMPREHEN METABOLIC PANEL: CPT

## 2020-08-06 PROCEDURE — 97530 THERAPEUTIC ACTIVITIES: CPT | Mod: CQ

## 2020-08-06 PROCEDURE — 700105 HCHG RX REV CODE 258: Performed by: HOSPITALIST

## 2020-08-06 RX ADMIN — VANCOMYCIN HYDROCHLORIDE 1250 MG: 500 INJECTION, POWDER, LYOPHILIZED, FOR SOLUTION INTRAVENOUS at 13:18

## 2020-08-06 RX ADMIN — SODIUM CHLORIDE 1000 ML: 9 INJECTION, SOLUTION INTRAVENOUS at 23:10

## 2020-08-06 RX ADMIN — AMPICILLIN SODIUM AND SULBACTAM SODIUM 3 G: 2; 1 INJECTION, POWDER, FOR SOLUTION INTRAMUSCULAR; INTRAVENOUS at 06:14

## 2020-08-06 RX ADMIN — GABAPENTIN 100 MG: 100 CAPSULE ORAL at 11:50

## 2020-08-06 RX ADMIN — AMPICILLIN SODIUM AND SULBACTAM SODIUM 3 G: 2; 1 INJECTION, POWDER, FOR SOLUTION INTRAMUSCULAR; INTRAVENOUS at 23:10

## 2020-08-06 RX ADMIN — ENOXAPARIN SODIUM 40 MG: 40 INJECTION SUBCUTANEOUS at 06:13

## 2020-08-06 RX ADMIN — GABAPENTIN 100 MG: 100 CAPSULE ORAL at 06:14

## 2020-08-06 RX ADMIN — AMPICILLIN SODIUM AND SULBACTAM SODIUM 3 G: 2; 1 INJECTION, POWDER, FOR SOLUTION INTRAMUSCULAR; INTRAVENOUS at 11:50

## 2020-08-06 RX ADMIN — DOCUSATE SODIUM 50 MG AND SENNOSIDES 8.6 MG 2 TABLET: 8.6; 5 TABLET, FILM COATED ORAL at 16:58

## 2020-08-06 RX ADMIN — INSULIN HUMAN 1 UNITS: 100 INJECTION, SOLUTION PARENTERAL at 21:00

## 2020-08-06 RX ADMIN — DAKIN'S SOLUTION 0.125% (QUARTER STRENGTH) 200 ML: 0.12 SOLUTION at 06:29

## 2020-08-06 RX ADMIN — AMPICILLIN SODIUM AND SULBACTAM SODIUM 3 G: 2; 1 INJECTION, POWDER, FOR SOLUTION INTRAMUSCULAR; INTRAVENOUS at 16:59

## 2020-08-06 RX ADMIN — ATORVASTATIN CALCIUM 40 MG: 40 TABLET, FILM COATED ORAL at 16:59

## 2020-08-06 RX ADMIN — GABAPENTIN 100 MG: 100 CAPSULE ORAL at 16:59

## 2020-08-06 ASSESSMENT — COGNITIVE AND FUNCTIONAL STATUS - GENERAL
STANDING UP FROM CHAIR USING ARMS: TOTAL
CLIMB 3 TO 5 STEPS WITH RAILING: TOTAL
TURNING FROM BACK TO SIDE WHILE IN FLAT BAD: UNABLE
MOBILITY SCORE: 6
MOVING TO AND FROM BED TO CHAIR: UNABLE
MOVING FROM LYING ON BACK TO SITTING ON SIDE OF FLAT BED: UNABLE
WALKING IN HOSPITAL ROOM: TOTAL
SUGGESTED CMS G CODE MODIFIER MOBILITY: CN

## 2020-08-06 ASSESSMENT — GAIT ASSESSMENTS: GAIT LEVEL OF ASSIST: UNABLE TO PARTICIPATE

## 2020-08-06 NOTE — CARE PLAN
Problem: Communication  Goal: The ability to communicate needs accurately and effectively will improve  Outcome: PROGRESSING AS EXPECTED  Note: Updated on POC, pt able to communicate needs appropriately, and call light is within reach     Problem: Infection  Goal: Will remain free from infection  Outcome: PROGRESSING AS EXPECTED  Note: IV ABX in place, hand hygiene performed before entering and after exiting room, contact isolation precautions in place

## 2020-08-06 NOTE — PROGRESS NOTES
Bedside report received.  Assessment complete.  A&O x 3, disoriented to time. Patient calls appropriately.  Patient turns with 2 assist. Bed alarm on.   Pain managed with prescribed medications.  PCA running  Denies N&V. Tolerating diet.  Surgical incision to L leg at AKA site, wound vac in place.  Lesions to face x2, one on R cheek, one on R nare  + void via condom cath, + flatus, + BM.  Patient denies SOB.  SCD on RLE  Review plan with of care with patient. Call light and personal belongings with in reach. Hourly rounding in place. All needs met at this time.

## 2020-08-06 NOTE — PROGRESS NOTES
[facility] Medicine Daily Progress Note    Date of Service  8/6/2020    Chief Complaint  75 y.o. male admitted 7/27/2020 with    Leg Pain         Hospital Course    74 y.o. male with a past medical history of hypertension, chronic pain, peripheral vascular disease, basal cell carcinoma of the face admitted 7/27/2020 with with vascular surgery for revision of a necrotic left AKA stump. 7/27 I&D Cul > MRSA & enterococcus. ID consulted . Repeat ID on 7/29    Interval Problem Update  none    Consultants/Specialty  Vascular surgery  Infectious disease      Code Status  full    Disposition  pending    Review of Systems  Review of Systems   Unable to perform ROS: Acuity of condition        Physical Exam  Temp:  [36.6 °C (97.8 °F)-37.2 °C (99 °F)] 37.2 °C (99 °F)  Pulse:  [67-86] 67  Resp:  [18] 18  BP: (141-159)/(49-69) 149/49  SpO2:  [92 %-97 %] 95 %    Physical Exam  Constitutional:       General: He is not in acute distress.     Appearance: He is not toxic-appearing.   HENT:      Head: Normocephalic.      Comments: Cancer lesion on the right side of the face     Nose: No congestion or rhinorrhea.      Mouth/Throat:      Mouth: Mucous membranes are dry.   Eyes:      Extraocular Movements: Extraocular movements intact.      Pupils: Pupils are equal, round, and reactive to light.   Neck:      Musculoskeletal: Normal range of motion and neck supple.   Cardiovascular:      Rate and Rhythm: Normal rate and regular rhythm.      Pulses: Normal pulses.      Heart sounds: Normal heart sounds.   Pulmonary:      Breath sounds: No wheezing or rhonchi.   Abdominal:      General: Abdomen is flat.      Palpations: Abdomen is soft.   Musculoskeletal:      Comments: The incision site on left AKA is noted   Skin:     Coloration: Skin is not jaundiced or pale.      Comments: Has cancer lesion on th right side of the face   Neurological:      Mental Status: He is alert. He is disoriented.         Fluids    Intake/Output Summary (Last 24  hours) at 8/6/2020 1009  Last data filed at 8/6/2020 0500  Gross per 24 hour   Intake 2138.5 ml   Output 1400 ml   Net 738.5 ml       Laboratory  Recent Labs     08/03/20  1126 08/04/20  0703 08/06/20  0928   WBC 9.1 6.8 6.0   RBC 3.18* 2.89* 3.36*   HEMOGLOBIN 10.2* 9.2* 10.5*   HEMATOCRIT 30.9* 28.2* 33.3*   MCV 97.2 97.6 99.1*   MCH 32.1 31.8 31.3   MCHC 33.0* 32.6* 31.5*   RDW 44.8 45.6 47.1   PLATELETCT 226 290 262   MPV 10.5 9.7 9.9     Recent Labs     08/03/20  1126 08/04/20  0703 08/05/20  0836   SODIUM 137 136 140   POTASSIUM 3.2* 3.5* 3.7   CHLORIDE 103 105 105   CO2 22 23 24   GLUCOSE 136* 127* 125*   BUN 12 11 8   CREATININE 0.92 0.85 0.86   CALCIUM 7.5* 7.3* 7.9*                   Imaging       Assessment/Plan  * AKA stump complication (HCC)  Assessment & Plan  Infected AKA, s/p I&D   Cultures positive for MRSA and group D enterococcus, ID consulted   S/p I&D Jul 27, 31 & 8/2      I.D input is noted  Surgical input is noted  Vancomycin   unasyn  Wound care  Probably going to O.R    Delirium  Assessment & Plan  Intermittent, multifactorial, infection, anesthesia    Arterial insufficiency (HCC)- (present on admission)  Assessment & Plan  Known history, admitted for amputation  Consider antiplatelet when okay with vascular    Diabetes (HCC)- (present on admission)  Assessment & Plan  With hyperglycemia  Glycated hemoglobin 7.3%  Short acting insulin   Accu-Checks, hypoglycemia protocol     HLD (hyperlipidemia)- (present on admission)  Assessment & Plan  Resume home statin therpay      HTN (hypertension)- (present on admission)  Assessment & Plan  As needed enalapril    Basal cell carcinoma of face- (present on admission)  Assessment & Plan  Chronic, outpatient follow-up  With dermatology    Basal cell carcinoma of earlobe- (present on admission)  Assessment & Plan  Chronic, outpatient follow-up       VTE prophylaxis: lovenox

## 2020-08-06 NOTE — PROGRESS NOTES
"Pharmacy Kinetics 75 y.o. male on vancomycin day # 11(2020)    Currently on Vancomycin 1250 mg iv q24hr (1200)    Provider specified end date: planning for 6 weeks from date of final surgery per ID     Indication for Treatment: SSTI, osteomyelitis of left AKA stump     Pertinent history per medical record: Admitted on 2020 for scheduled surgery for revision of necrotic left AKA stump.  Patient is now s/p stump revision.  Procedure note states extensive necrotic muscle and fascia with purulent fluid in the muscle.  Cultures sent.  Vancomycin and zosyn ordered empirically post-op.  Wound VAC in place. ID is following     Other antibiotics: ampicillin/sulbactam 3 grams iv q6h     Allergies: No known drug allergy      List concerns for renal function: age, low albumin     Pertinent cultures to date:    L-leg vascular graft: MRSA, E faecalis     MRSA nares swab if pneumonia is a concern: n-a       Recent Labs     20  1126 20  0703   WBC 9.1 6.8   NEUTSPOLYS 88.80* 79.80*     Recent Labs     20  1126 20  0703 20  0836   BUN 12 11 8   CREATININE 0.92 0.85 0.86   ALBUMIN 2.1* 1.9* 2.1*     Recent Labs     20  1126   VANCOTROUGH 10.3       Intake/Output Summary (Last 24 hours) at 2020 0945  Last data filed at 2020 0500  Gross per 24 hour   Intake 2138.5 ml   Output 1400 ml   Net 738.5 ml      /49   Pulse 67   Temp 37.2 °C (99 °F) (Temporal)   Resp 18   Ht 1.676 m (5' 6\")   Wt 63 kg (138 lb 14.2 oz)   SpO2 95%  Temp (24hrs), Av.8 °C (98.2 °F), Min:36.6 °C (97.8 °F), Max:37.2 °C (99 °F)      A/P   1. Vancomycin dose change: Continue  vancomycin 1250 mg iv q24hr (1200)  2. Next vancomycin level: 20 @ 1130 (ordered)  3. Goal trough: 10-15 mcg/mL  4. Comments: Patient being followed by ID. Has been therapeutic on current dose of vancomycin. Will order repeat trough in AM to ensure adequate clearance. Pharmacy will continue to monitor.    Bia Harris, " PharmD, BCPS

## 2020-08-06 NOTE — THERAPY
Physical Therapy   Daily Treatment     Patient Name: Jimenez Bains  Age:  75 y.o., Sex:  male  Medical Record #: [id number]  Today's Date: 8/6/2020     Precautions: Fall Risk, Non Weight Bearing Left Lower Extremity    Assessment    Pt pleasant upon arrival and agreeable. He reports he has consistent pain but willing to try. As we reached EOB position with modA. Pt immediately tearful and laying back supine. Expressed frustrations and feeling sad. Provided emotional support and active listening. Educated on exercises for RLE to maintain strength. Will continue to follow while in house.     Plan    Continue current treatment plan.     Recommend post-acute placement for continued physical therapy services prior to discharge home.            08/06/20 1026   Other Treatments   Other Treatments Provided extra time spent providing emotional support. As we reached EOB pt very tearful. Educated on supine exercises for RLE to maintain strengthening    Balance   Sitting Balance (Static) Fair -   Sitting Balance (Dynamic) Poor +   Comments unable to maintain EOB position due to emotions and pain    Gait Analysis   Gait Level Of Assist Unable to Participate   Bed Mobility    Supine to Sit Moderate Assist   Sit to Supine Minimal Assist   Skilled Intervention Verbal Cuing;Sequencing   Functional Mobility   Sit to Stand Unable to Participate   Short Term Goals    Short Term Goal # 1 in 6 visits patient will perform bed mobility with supervision for safe DC home   Goal Outcome # 1 goal not met   Short Term Goal # 2 in 6 visits patient will perform all transfers Lucille for safe DC home   Goal Outcome # 2 Goal not met   Short Term Goal # 3 in 6 visits patient will self-propel 400' in WC with supervision    Goal Outcome # 3 Goal not met

## 2020-08-06 NOTE — PROGRESS NOTES
"Vascular    CHARLES  Pain controlled    /49   Pulse 67   Temp 37.2 °C (99 °F) (Temporal)   Resp 18   Ht 1.676 m (5' 6\")   Wt 63 kg (138 lb 14.2 oz)   SpO2 95%   BMI 22.42 kg/m²     VeraFlo Vac intact    A/P)  Vac changed to veraflo with dakins, will change it a couple times and determine if repeat operative debridement is necessary    Anticipate bedside vac change tomorrow    Appreciate Hospitalist and ID support  Following along.    Usman Fisher MD  [facility] (General and Vascular Surgery)  Cell: [phone] (text or call is fine, if you don't reach me please try my office)  Office: [phone]  __________________________________________________________________  Patient:Jimenez Bains   MRN:[id number]   CSN:[id number]    "

## 2020-08-06 NOTE — PROGRESS NOTES
Assessment complete.  A&O x 3, disoriented to time. Patient calls appropriately.  Patient chairfast, q2 turns in place. Bed alarm on.   Patient has 0/10 pain. Pain managed with fentanyl PCA pump.  Denies N&V. Tolerating DM diet.  Wound dressing CDI.  + void into condom catheter, + flatus, LBM 8/4.  Patient denies SOB.  SCD's on RLE.    Review plan with of care with patient. Call light and personal belongings with in reach. Hourly rounding in place. All needs met at this time.

## 2020-08-06 NOTE — OP REPORT
DATE OF SERVICE:  08/02/2020    PREOPERATIVE DIAGNOSIS:  Necrotic infected left above-knee amputation stump.    POSTOPERATIVE DIAGNOSIS:  Necrotic infected left above-knee amputation stump.    PROCEDURES:  1.  Sharp excisional debridement of left AKA stump including fascia and   muscle, 5 cubic cm of tissue was removed.  2.  Application of negative pressure dressing less than 50 square cm.    SURGEON:  Usman Fisher MD    ASSISTANT:  None.    ANESTHESIA:  General.    BLOOD LOSS:  Minimal.    SPECIMENS:  None sent.    DISPOSITION:  The patient tolerated the procedure well.  He was sent to   recovery in stable condition.    PROCEDURE:  Following informed consent, the patient was placed supine on the   operating table and general anesthesia was administered.  The left AKA stump   was prepped and draped sterile.  Surgical time-out was called.  The patient   was on preoperative therapeutic antibiotics.    We began by washing out and inspecting the left AKA stump.  We found a small   amount of residual necrotic muscle tissue about 5 cubic cm in total, which was   sharply debrided.  The wound was irrigated again and suctioned clean.  The   infected tract from the prosthetic graft that had been previously removed on   the medial side of the stump was copiously irrigated and suctioned clean and   then packed with Kerlix gauze.  We then placed a few interrupted vertical   mattress 0 Prolene sutures to partially reapproximate the stump incision,   particularly with the skin overlying the bone.  We then filled the gaps in the   incision on the medial and lateral aspect with black foam, negative pressure   dressing and it was connected to suction and found to have a good seal.  All   counts were correct.  The patient tolerated the procedure well.  He was sent   to recovery in stable condition.    POSTOPERATIVE PLAN:  Based on today's intraoperative findings, the patient may   be able to transition entirely at the bedside  wound VAC changes and hopefully   if the residual openings in the incision healed by secondary intention, then   the patient can avoid any further infectious or ischemic complications, and be   able to save what is left of his AKA stump.       ____________________________________     MD RON Lewis / BISI    DD:  08/06/2020 11:53:03  DT:  08/06/2020 16:13:00    D#:  [id number]  Job#:  [id number]

## 2020-08-06 NOTE — CARE PLAN
Problem: Safety  Goal: Will remain free from injury  Outcome: PROGRESSING AS EXPECTED  Note: Patient free from accidental injury at this time. Safety precautions in place, including bed alarm. Hourly rounding in place.

## 2020-08-06 NOTE — DISCHARGE PLANNING
Anticipated Discharge Disposition: SNF vs LTACH    Action:  ·  Discussed pt at IDT rounds   · Veraflow in place  · PCA for pain management  · IV ABX- Hyun maddensyn   · RN CM called pt's JAEL and dtrLien to discuss the possibility of LTAC. No answer. Left VM.  · Rn CARROLL sent a tiger text to the MD to discuss the possibility of LTACH and place order if appropriate.   · Called [facility] liaison for [facility]UL, to assess the pt and possibly discuss the facility to the family.    Barriers to Discharge: veraflow, PCA, IV ABX, medically clear    Plan: f/u with MD [facility], and family. Continue to collaborate with the pt, pt's family, and health care team to provide social and discharge support as needed.

## 2020-08-07 LAB
ALBUMIN SERPL BCP-MCNC: 2.1 G/DL (ref 3.2–4.9)
ALBUMIN/GLOB SERPL: 0.7 G/DL
ALP SERPL-CCNC: 122 U/L (ref 30–99)
ALT SERPL-CCNC: 31 U/L (ref 2–50)
ANION GAP SERPL CALC-SCNC: 9 MMOL/L (ref 7–16)
AST SERPL-CCNC: 39 U/L (ref 12–45)
BASOPHILS # BLD AUTO: 0.3 % (ref 0–1.8)
BASOPHILS # BLD: 0.02 K/UL (ref 0–0.12)
BILIRUB SERPL-MCNC: 0.4 MG/DL (ref 0.1–1.5)
BUN SERPL-MCNC: 11 MG/DL (ref 8–22)
CALCIUM SERPL-MCNC: 7.8 MG/DL (ref 8.5–10.5)
CHLORIDE SERPL-SCNC: 105 MMOL/L (ref 96–112)
CO2 SERPL-SCNC: 26 MMOL/L (ref 20–33)
CREAT SERPL-MCNC: 0.86 MG/DL (ref 0.5–1.4)
EOSINOPHIL # BLD AUTO: 0.1 K/UL (ref 0–0.51)
EOSINOPHIL NFR BLD: 1.7 % (ref 0–6.9)
ERYTHROCYTE [DISTWIDTH] IN BLOOD BY AUTOMATED COUNT: 47.9 FL (ref 35.9–50)
GLOBULIN SER CALC-MCNC: 3 G/DL (ref 1.9–3.5)
GLUCOSE BLD-MCNC: 108 MG/DL (ref 65–99)
GLUCOSE BLD-MCNC: 130 MG/DL (ref 65–99)
GLUCOSE BLD-MCNC: 148 MG/DL (ref 65–99)
GLUCOSE BLD-MCNC: 150 MG/DL (ref 65–99)
GLUCOSE SERPL-MCNC: 124 MG/DL (ref 65–99)
HCT VFR BLD AUTO: 32.4 % (ref 42–52)
HGB BLD-MCNC: 10.2 G/DL (ref 14–18)
IMM GRANULOCYTES # BLD AUTO: 0.02 K/UL (ref 0–0.11)
IMM GRANULOCYTES NFR BLD AUTO: 0.3 % (ref 0–0.9)
LYMPHOCYTES # BLD AUTO: 1.18 K/UL (ref 1–4.8)
LYMPHOCYTES NFR BLD: 19.9 % (ref 22–41)
MCH RBC QN AUTO: 31.4 PG (ref 27–33)
MCHC RBC AUTO-ENTMCNC: 31.5 G/DL (ref 33.7–35.3)
MCV RBC AUTO: 99.7 FL (ref 81.4–97.8)
MONOCYTES # BLD AUTO: 0.24 K/UL (ref 0–0.85)
MONOCYTES NFR BLD AUTO: 4 % (ref 0–13.4)
NEUTROPHILS # BLD AUTO: 4.37 K/UL (ref 1.82–7.42)
NEUTROPHILS NFR BLD: 73.8 % (ref 44–72)
NRBC # BLD AUTO: 0 K/UL
NRBC BLD-RTO: 0 /100 WBC
PLATELET # BLD AUTO: 207 K/UL (ref 164–446)
PMV BLD AUTO: 10.4 FL (ref 9–12.9)
POTASSIUM SERPL-SCNC: 3.6 MMOL/L (ref 3.6–5.5)
PROT SERPL-MCNC: 5.1 G/DL (ref 6–8.2)
RBC # BLD AUTO: 3.25 M/UL (ref 4.7–6.1)
SODIUM SERPL-SCNC: 140 MMOL/L (ref 135–145)
VANCOMYCIN TROUGH SERPL-MCNC: 8.8 UG/ML (ref 10–20)
WBC # BLD AUTO: 5.9 K/UL (ref 4.8–10.8)

## 2020-08-07 PROCEDURE — A9270 NON-COVERED ITEM OR SERVICE: HCPCS | Performed by: FAMILY MEDICINE

## 2020-08-07 PROCEDURE — 85025 COMPLETE CBC W/AUTO DIFF WBC: CPT

## 2020-08-07 PROCEDURE — A9270 NON-COVERED ITEM OR SERVICE: HCPCS | Performed by: HOSPITALIST

## 2020-08-07 PROCEDURE — 97606 NEG PRS WND THER DME>50 SQCM: CPT

## 2020-08-07 PROCEDURE — 80053 COMPREHEN METABOLIC PANEL: CPT

## 2020-08-07 PROCEDURE — 82962 GLUCOSE BLOOD TEST: CPT

## 2020-08-07 PROCEDURE — 700102 HCHG RX REV CODE 250 W/ 637 OVERRIDE(OP): Performed by: FAMILY MEDICINE

## 2020-08-07 PROCEDURE — 700111 HCHG RX REV CODE 636 W/ 250 OVERRIDE (IP): Performed by: INTERNAL MEDICINE

## 2020-08-07 PROCEDURE — 770001 HCHG ROOM/CARE - MED/SURG/GYN PRIV*

## 2020-08-07 PROCEDURE — 700111 HCHG RX REV CODE 636 W/ 250 OVERRIDE (IP): Performed by: SURGERY

## 2020-08-07 PROCEDURE — 700101 HCHG RX REV CODE 250: Performed by: SURGERY

## 2020-08-07 PROCEDURE — 700105 HCHG RX REV CODE 258: Performed by: HOSPITALIST

## 2020-08-07 PROCEDURE — 700105 HCHG RX REV CODE 258: Performed by: INTERNAL MEDICINE

## 2020-08-07 PROCEDURE — 80202 ASSAY OF VANCOMYCIN: CPT

## 2020-08-07 PROCEDURE — 700105 HCHG RX REV CODE 258: Performed by: FAMILY MEDICINE

## 2020-08-07 PROCEDURE — 36415 COLL VENOUS BLD VENIPUNCTURE: CPT

## 2020-08-07 PROCEDURE — 99232 SBSQ HOSP IP/OBS MODERATE 35: CPT | Performed by: FAMILY MEDICINE

## 2020-08-07 PROCEDURE — 700111 HCHG RX REV CODE 636 W/ 250 OVERRIDE (IP): Performed by: HOSPITALIST

## 2020-08-07 PROCEDURE — 302098 PASTE RING (FLAT): Performed by: FAMILY MEDICINE

## 2020-08-07 PROCEDURE — 700102 HCHG RX REV CODE 250 W/ 637 OVERRIDE(OP): Performed by: HOSPITALIST

## 2020-08-07 RX ORDER — LISINOPRIL 10 MG/1
10 TABLET ORAL
Status: DISCONTINUED | OUTPATIENT
Start: 2020-08-07 | End: 2020-08-10

## 2020-08-07 RX ADMIN — DAKIN'S SOLUTION 0.125% (QUARTER STRENGTH) 473 ML: 0.12 SOLUTION at 05:49

## 2020-08-07 RX ADMIN — AMPICILLIN SODIUM AND SULBACTAM SODIUM 3 G: 2; 1 INJECTION, POWDER, FOR SOLUTION INTRAMUSCULAR; INTRAVENOUS at 17:18

## 2020-08-07 RX ADMIN — AMPICILLIN SODIUM AND SULBACTAM SODIUM 3 G: 2; 1 INJECTION, POWDER, FOR SOLUTION INTRAMUSCULAR; INTRAVENOUS at 05:49

## 2020-08-07 RX ADMIN — ENOXAPARIN SODIUM 40 MG: 40 INJECTION SUBCUTANEOUS at 05:49

## 2020-08-07 RX ADMIN — GABAPENTIN 100 MG: 100 CAPSULE ORAL at 05:49

## 2020-08-07 RX ADMIN — AMPICILLIN SODIUM AND SULBACTAM SODIUM 3 G: 2; 1 INJECTION, POWDER, FOR SOLUTION INTRAMUSCULAR; INTRAVENOUS at 12:14

## 2020-08-07 RX ADMIN — ATORVASTATIN CALCIUM 40 MG: 40 TABLET, FILM COATED ORAL at 17:18

## 2020-08-07 RX ADMIN — GABAPENTIN 100 MG: 100 CAPSULE ORAL at 17:18

## 2020-08-07 RX ADMIN — VANCOMYCIN HYDROCHLORIDE 1250 MG: 500 INJECTION, POWDER, LYOPHILIZED, FOR SOLUTION INTRAVENOUS at 13:51

## 2020-08-07 RX ADMIN — DOCUSATE SODIUM 50 MG AND SENNOSIDES 8.6 MG 2 TABLET: 8.6; 5 TABLET, FILM COATED ORAL at 17:18

## 2020-08-07 RX ADMIN — HYDROMORPHONE HYDROCHLORIDE 1 MG: 1 INJECTION, SOLUTION INTRAMUSCULAR; INTRAVENOUS; SUBCUTANEOUS at 10:22

## 2020-08-07 RX ADMIN — AMPICILLIN SODIUM AND SULBACTAM SODIUM 3 G: 2; 1 INJECTION, POWDER, FOR SOLUTION INTRAMUSCULAR; INTRAVENOUS at 23:12

## 2020-08-07 RX ADMIN — GABAPENTIN 100 MG: 100 CAPSULE ORAL at 12:14

## 2020-08-07 RX ADMIN — SODIUM CHLORIDE 1000 ML: 9 INJECTION, SOLUTION INTRAVENOUS at 23:12

## 2020-08-07 RX ADMIN — LISINOPRIL 10 MG: 10 TABLET ORAL at 12:14

## 2020-08-07 RX ADMIN — LIDOCAINE HYDROCHLORIDE 20 ML: 40 SOLUTION TOPICAL at 11:01

## 2020-08-07 RX ADMIN — DOCUSATE SODIUM 50 MG AND SENNOSIDES 8.6 MG 2 TABLET: 8.6; 5 TABLET, FILM COATED ORAL at 05:49

## 2020-08-07 NOTE — PROGRESS NOTES
[facility] Medicine Daily Progress Note    Date of Service  8/7/2020    Chief Complaint  75 y.o. male admitted 7/27/2020 with    Leg Pain         Hospital Course    74 y.o. male with a past medical history of hypertension, chronic pain, peripheral vascular disease, basal cell carcinoma of the face admitted 7/27/2020 with with vascular surgery for revision of a necrotic left AKA stump. 7/27 I&D Cul > MRSA & enterococcus. ID consulted . Repeat ID on 7/29    Interval Problem Update  none    Consultants/Specialty  Vascular surgery  Infectious disease      Code Status  full    Disposition  pending    Review of Systems  Review of Systems   Unable to perform ROS: Acuity of condition        Physical Exam  Temp:  [36.9 °C (98.4 °F)-37.6 °C (99.7 °F)] 36.9 °C (98.5 °F)  Pulse:  [67-90] 67  Resp:  [16-19] 16  BP: (146-162)/(50-87) 162/56  SpO2:  [93 %-97 %] 93 %    Physical Exam  Constitutional:       Appearance: He is not toxic-appearing or diaphoretic.   HENT:      Head: Normocephalic.      Comments: Cancer lesion on the right side of the face     Nose: Nose normal.      Mouth/Throat:      Mouth: Mucous membranes are dry.   Eyes:      Conjunctiva/sclera: Conjunctivae normal.      Pupils: Pupils are equal, round, and reactive to light.   Neck:      Musculoskeletal: No neck rigidity or muscular tenderness.   Cardiovascular:      Rate and Rhythm: Normal rate and regular rhythm.      Heart sounds: No murmur. No friction rub.   Pulmonary:      Effort: Pulmonary effort is normal.      Breath sounds: Normal breath sounds.   Abdominal:      General: Abdomen is flat. Bowel sounds are normal.   Musculoskeletal:      Comments: Wound vac is in place   Skin:     Coloration: Skin is not jaundiced or pale.      Comments: Has cancer lesion on th right side of the face   Neurological:      Mental Status: He is alert. He is disoriented.         Fluids    Intake/Output Summary (Last 24 hours) at 8/7/2020 1109  Last data filed at 8/7/2020  0900  Gross per 24 hour   Intake 1625.6 ml   Output 2125 ml   Net -499.4 ml       Laboratory  Recent Labs     08/06/20  0928 08/07/20  0644   WBC 6.0 5.9   RBC 3.36* 3.25*   HEMOGLOBIN 10.5* 10.2*   HEMATOCRIT 33.3* 32.4*   MCV 99.1* 99.7*   MCH 31.3 31.4   MCHC 31.5* 31.5*   RDW 47.1 47.9   PLATELETCT 262 207   MPV 9.9 10.4     Recent Labs     08/05/20  0836 08/06/20  0928 08/07/20  0644   SODIUM 140 139 140   POTASSIUM 3.7 3.7 3.6   CHLORIDE 105 102 105   CO2 24 27 26   GLUCOSE 125* 113* 124*   BUN 8 10 11   CREATININE 0.86 0.88 0.86   CALCIUM 7.9* 8.3* 7.8*                   Imaging       Assessment/Plan  * AKA stump complication (HCC)  Assessment & Plan  Infected AKA, s/p I&D   Cultures positive for MRSA and group D enterococcus, ID consulted   S/p I&D Jul 27, 31 & 8/2      I.D input is noted  Surgical input is noted with ?of need for surgical intervention  Vancomycin   unasyn  Wound care  Wound vac is in place    Delirium  Assessment & Plan  Intermittent, multifactorial, infection, anesthesia    Arterial insufficiency (HCC)- (present on admission)  Assessment & Plan  Known history, admitted for amputation  Consider antiplatelet when okay with vascular    Diabetes (HCC)- (present on admission)  Assessment & Plan  With hyperglycemia  Glycated hemoglobin 7.3%  Short acting insulin   Accu-Checks, hypoglycemia protocol     HLD (hyperlipidemia)- (present on admission)  Assessment & Plan  Resume home statin therpay      HTN (hypertension)- (present on admission)  Assessment & Plan  Not well controlled  Started lisinopril    Basal cell carcinoma of face- (present on admission)  Assessment & Plan  Chronic, outpatient follow-up  With dermatology    Basal cell carcinoma of earlobe- (present on admission)  Assessment & Plan  Chronic, outpatient follow-up       VTE prophylaxis: lovenox

## 2020-08-07 NOTE — WOUND TEAM
[facility]  Inpatient Services  Initial Wound and Skin Care Evaluation    Admission Date: 7/27/2020     Last order of IP CONSULT TO WOUND CARE was found on 8/2/2020 from Hospital Encounter on 7/24/2020       HPI, PMH, SH: Reviewed    Unit where seen by Wound Team: [id number]     WOUND CONSULT/FOLLOW UP RELATED TO: Vac change     Self Report / Pain Level:  Premedicated with IV pain meds, liquid 4% lidocaine.      OBJECTIVE:  Pt lying in bed, vac dressing in place. On waffle overlay.    WOUND TYPE, LOCATION, CHARACTERISTICS (Pressure Injuries: location, stage, POA or date identified)    Negative Pressure Wound Therapy 06/30/20 (Active)   NPWT Pump Mode / Pressure Setting Intermittent;125 mmHg 08/07/20    Dressing Type Gray Foam (Cleanse Choice)    Number of Foam Pieces Used 7    Canister Changed No    Output (mL) 0 mL    NEXT Dressing Change/Treatment Date 08/10/20    VAC VeraFlo Irrigant 1/4 Strength Dakins    VAC VeraFlo Soak Time (mins) 5    VAC VeraFlo Instill Volume (ml) 12    VAC VeraFlo - Therapy Time (hrs) 1    VAC VeraFlo Pressure (mm/Hg) Intermittent;125 mmHg      Wound 06/30/20 Full Thickness Wound Leg Left open surgical full thickness (Active)   Wound Image   08/05/20    Site Assessment Tan;Pale;Pink;Slough 08/07/20    Periwound Assessment Intact;Pink    Margins Defined edges;Unattached edges    Closure Secondary intention;Surface sutures    Drainage Amount Small    Drainage Description Serosanguineous    Treatments Cleansed;CSWD - Conservative Sharp Wound Debridement;Site care    Wound Cleansing Approved Wound Cleanser    Periwound Protectant Benzoin;Drape;Paste Ring    Dressing Cleansing/Solutions 1/4 Strength Dakin's Solution    Dressing Options Wound Vac    Dressing Changed Changed    Dressing Status Clean;Dry;Intact    Dressing Change/Treatment Frequency Monday, Wednesday, Friday, and As Needed    NEXT Dressing Change/Treatment Date 08/10/20    NEXT Weekly Photo (Inpatient Only) 08/12/20       Non-staged Wound Description Full thickness    Wound Length (cm) 2.5 cm 08/05/20    Wound Width (cm) 3.5 cm    Wound Depth (cm) 4.5 cm    Wound Surface Area (cm^2) 8.75 cm^2    Wound Volume (cm^3) 39.38 cm^3    Tunneling (cm) 8 cm    Tunneling Clock Position of Wound 12    Shape Irregular 08/07/20    Wound Odor None    Pulses N/A    Exposed Structures Muscle;Sutures;MARYJANE      Left / Medial: 2.5 x 3.5 x 4.5 -- 8 cm at 12 o'clock    Right / Lateral: 1.5 x 3.5 x 2.4      Vascular:    PETRONA:   No results found.      Lab Values:    Lab Results   Component Value Date/Time    WBC 5.9 08/07/2020 06:44 AM    RBC 3.25 (L) 08/07/2020 06:44 AM    HEMOGLOBIN 10.2 (L) 08/07/2020 06:44 AM    HEMATOCRIT 32.4 (L) 08/07/2020 06:44 AM    HBA1C 7.3 (H) 07/27/2020 04:57 PM          Culture:   Obtained 7/27:  Culture Result Abnormal   Peptostreptococcus asaccharolyticus   Light growth     Culture Result Abnormal   Prevotella oralis   Light growth     Culture Result Abnormal   Propionibacterium species   Light growth          INTERVENTIONS BY WOUND TEAM:  Instilled 20 ml of 4% lidocaine in to vac sponge allowed to dwell for 15 minutes. Wound vac removed from wound, all foam removed. Cleansed with wound cleanser. Debrided <20 cm of slough / non viable tissue from wound. Scant bleeding noted, controlled with minimal pressure. Yana wound prepped with benzoin, and paste ring. Sutures covered in Mepitel, draped. Gray thin foam to depth on left wound, arrow point. Cleanse choice honey comb to both wound beds, covered in thin gray foam. Both wounds bridged. Sealed with drape, button applied and trac pad applied. Veraflo therapy initiated with Dakins 1/4 strength. Instilling 12 ml for 5 minutes every 1 hour - fluid decreased as previous paste rings were swollen.    Interdisciplinary consultation: Patient, Bedside RN     EVALUATION:  Veraflo therapy with Dakins will help debride slough and promote granulation tissue.     Goals: Steady decrease in  wound area and depth weekly.    NURSING PLAN OF CARE ORDERS (X):    Dressing changes: See Dressing Care orders: X  Skin care: See Skin Care orders:    Rectal tube care: See Rectal Tube Care orders:   Other orders:     WOUND TEAM PLAN OF CARE    Dressing changes by wound team:          Follow up 1-2 times weekly:               Follow up 3 times weekly:                NPWT change 3 times weekly:   X  Follow up as needed:       Other (explain):     Anticipated discharge plans:  LTACH:        SNF/Rehab:  X                Home Care:           Outpatient Wound Center:            Self Care:

## 2020-08-07 NOTE — DISCHARGE PLANNING
Anticipated Discharge Disposition: LTACH    Action:   · Discussed pt's POC with [facility] pt's dtr., provided contact information for [employer], liaison for [facility].   · Spoke to [employer] on the phone. [employer] states pt is appropriate for [facility].   · Obtained choice from pt at bedside for [facility]. Faxed to [facility].     Barriers to Discharge: medical clearance, acceptance at facility    Plan: Continue to collaborate with the pt, pt's family, and health care team to provide social and discharge support as needed.

## 2020-08-07 NOTE — PROGRESS NOTES
"Vascular    CHARLES  Pain controlled    /61   Pulse 83   Temp 37 °C (98.6 °F) (Temporal)   Resp 16   Ht 1.676 m (5' 6\")   Wt 63 kg (138 lb 14.2 oz)   SpO2 93%   BMI 22.42 kg/m²     VeraFlo Vac intact    A/P)  Vac changed to veraflo with dakins 2 days ago, will continue vac changes for now and if the wound regresses will reassess and determine if repeat operative debridement is necessary    Anticipate bedside vac change tomorrow    Appreciate Hospitalist and ID support  Following along.  Patient can be discharged to snf and follow-up through outpatient clinic if cleared by medicine.    Usman Fisher MD  [facility] (General and Vascular Surgery)  Cell: [phone] (text or call is fine, if you don't reach me please try my office)  Office: [phone]  __________________________________________________________________  Patient:Jimenez Bains   MRN:[id number]   CSN:[id number]    "

## 2020-08-07 NOTE — PROGRESS NOTES
Assessment complete.  A&O x 3, disoriented to time. Patient calls appropriately.  Patient chairfast, q2 turns in place. Bed alarm on.   Patient has 2/10 pain. Pain managed with fentanyl PCA pump.  Denies N&V. Tolerating DM diet.  Wound dressing CDI.  + void into condom catheter, + flatus, LBM 8/4.  Patient denies SOB.  SCD's on RLE.    Review plan with of care with patient. Call light and personal belongings with in reach. Hourly rounding in place. All needs met at this time.

## 2020-08-07 NOTE — PROGRESS NOTES
"Pharmacy Kinetics 75 y.o. male on vancomycin day # 12    2020    Currently on Vancomycin 1250 mg iv q24hr (1200)     Provider specified end date: planning for 6 weeks from date of final surgery per ID     Indication for Treatment: SSTI, osteomyelitis of left AKA stump     Pertinent history per medical record: Admitted on 2020 for scheduled surgery for revision of necrotic left AKA stump.  Patient is now s/p stump revision.  Procedure note states extensive necrotic muscle and fascia with purulent fluid in the muscle.  Cultures sent.  Vancomycin and zosyn ordered empirically post-op.  Wound VAC in place. ID is following     Other antibiotics: ampicillin/sulbactam 3 grams iv q6h     Allergies: No known drug allergy      List concerns for renal function: age, low albumin     Pertinent cultures to date:   20: Lt-leg vascular graft,TISS: MRSA, E faecalis  20: Lt-leg vascular graft,TISS: Peptostreptococcus asaccharolyticus,Prevotella oralis ,Propionibacterium species      MRSA nares swab if pneumonia is a concern: NA    Recent Labs     20  0928 20  0644   WBC 6.0 5.9   NEUTSPOLYS 78.20* 73.80*     Recent Labs     20  0836 20  0928 20  0644   BUN 8 10 11   CREATININE 0.86 0.88 0.86   ALBUMIN 2.1* 2.2* 2.1*     No results for input(s): VANCOTROUGH, VANCOPEAK, VANCORANDOM in the last 72 hours.    Intake/Output Summary (Last 24 hours) at 2020 0935  Last data filed at 2020 0600  Gross per 24 hour   Intake 1185.6 ml   Output 1575 ml   Net -389.4 ml      /87   Pulse 90   Temp 36.9 °C (98.4 °F) (Temporal)   Resp 16   Ht 1.676 m (5' 6\")   Wt 63 kg (138 lb 14.2 oz)   SpO2 97%  Temp (24hrs), Av.3 °C (99.2 °F), Min:36.9 °C (98.4 °F), Max:37.6 °C (99.7 °F)      A/P   1. Vancomycin dose change: no change   2. Next vancomycin level: 20@1130  3. Goal trough: 10-15 mcg/mL   4. Comments: No leukocytosis, afebrile. Renal indices stable over interval , continue " [facility]  Physical Medicine & Rehab  Consult Note    Patient Name: Kaylee Romero  MRN: [id number]  Admission Date: 8/27/2019  Hospital Length of Stay: 8 days  Attending Physician: Peterson Ventura MD     Inpatient consult to Physical Medicine & Rehabilitation  Consult performed by: Elvia Brewer NP  Consult requested by:  Peterson Ventura MD    Reason for Consult:  assess rehabilitation needs  Consults  Subjective:     Principal Problem: S/P CABG (coronary artery bypass graft)     HPI: Kaylee Romero is a 71-year-old female with PMHx of CAD s/p CABG on 8/12/19 (discharged on 8/21/19 with HH), HTN, CKD III, obesity, & KAMRAN .  Patient presented to [facility] on 8/27 with fever, tachycardic, tachypneic, and hypotension secondary to LLE cellulitis infection and DOREEN. Blood cultures drawn in ED + for GNR. Started on fluids and currently on Zosyn IV with no end date specified. Hospital course further complicated by C. Diff (PCR resulted as negative), HTN (Cards consulted-rec to increased losartan 100mg daily and d/c isosorbide mononitrate), HA, debility, low serum phosphorus, & anemia. Currently receiving Lasix IVP.    Functional History: Patient lives in [place] with spouse in a single story home with 4 steps to enter.  Prior to admission, (I) with ADLs and mobility. DME: walker, rolling, cane, straight, CPAP, shower chair.     Hospital Course: 09/04/2019: BM [place]. Sit to stand and transfers SBA-SV.  Ambulated 2 x 100 ft SBA w/ occasional standing rest breaks 2/2 reports of fatigue.  ADLs SV.     Past Medical History:   Diagnosis Date    Acid reflux     Age-related osteoporosis without current pathological fracture 1/11/2019    Cataract     CKD (chronic kidney disease) stage 3, GFR 30-59 ml/min     Dry mouth     History of TIA (transient ischemic attack) 12/11/2018    Hyperlipidemia 12/2/2014    Hypertensive heart disease with diastolic heart failure 11/28/2012    Morbid obesity with body  current dose. Vancomycin level today. ID following; Continue vancomycin and Unasyn. Anticipate 6 week course from date of final surgery.    Luis Cortez PharmD BCPS        8/7/2020 11:51   Vancomycin Trough 8.8 (L)       A/P   5. Vancomycin dose change:Vancomycin 750mg mg iv q12hr (0900 2100)  6. Next vancomycin level: 2 days   7. Goal trough: 10-15 mcg/mL   8. Comments: Vancomycin level below goal , maintenance dose increased.      Luis Cortez PharmD BCPS                  mass index (BMI) of 40.0 or higher 2016    KAMRAN (obstructive sleep apnea)     KAMRAN on CPAP 2016    Osteoporosis without current pathological fracture 2018    Physical deconditioning 2018    Status post total left knee replacement 2017    Type 2 diabetes mellitus with stage 3 chronic kidney disease, without long-term current use of insulin 2019     Past Surgical History:   Procedure Laterality Date    ankle surgery (l)      APPENDECTOMY       SECTION      CORONARY ARTERY BYPASS GRAFT (CABG)X3 N/A 2019    Performed by [facility] at [facility]    DILATION AND CURETTAGE OF UTERUS      KNEE ARTHROSCOPY W/ DEBRIDEMENT      KNEE SURGERY Left 2017    TKR    Left heart cath Left 2019    Performed by Ronak Gibbons MD at [facility] CATH LAB    REPLACEMENT-KNEE-TOTAL Left 2017    [facility]    SURGICAL PROCUREMENT, VEIN, ENDOSCOPIC Left 2019    Per[facility] by [facility]     Review of patient's allergies indicates:   Allergen Reactions    Bactrim [sulfamethoxazole-trimethoprim] Other (See Comments)     Generic version  Sulfa makes her sick    Keflex [cephalexin] Other (See Comments)     Turns orange       Scheduled Medications:    aspirin  325 mg Oral Daily    atorvastatin  80 mg Oral Daily    famotidine  20 mg Oral Daily    furosemide  40 mg Intravenous BID    heparin (porcine)  5,000 Units Subcutaneous Q8H    insulin aspart U-100  10 Units Subcutaneous TIDWM    insulin detemir U-100  10 Units Subcutaneous Daily    losartan  100 mg Oral Daily    magnesium oxide  800 mg Oral BID    metoprolol tartrate  50 mg Oral BID    polyethylene glycol  17 g Oral Daily    potassium chloride  20 mEq Oral BID    senna-docusate 8.6-50 mg  1 tablet Oral BID       PRN Medications: acetaminophen, Dextrose 10% Bolus, Dextrose 10% Bolus, Dextrose 10% Bolus,  glucagon (human recombinant), glucose, glucose, insulin aspart U-100, ondansetron, oxyCODONE, sodium chloride 0.9%, traMADol    Family History     Problem Relation (Age of Onset)    Diabetes Father    Heart disease Mother, Father    Heart failure Mother, Father    No Known Problems Brother, Son    Stroke Father, Paternal Grandfather        Tobacco Use    Smoking status: Never Smoker    Smokeless tobacco: Never Used   Substance and Sexual Activity    Alcohol use: Yes     Alcohol/week: 0.0 oz     Comment: social drinker    Drug use: No    Sexual activity: Yes     Partners: Male     Birth control/protection: Post-menopausal     Review of Systems   Constitutional: Positive for activity change. Negative for fatigue and fever.   HENT: Negative for trouble swallowing and voice change.    Eyes: Negative for photophobia and visual disturbance.   Respiratory: Negative for cough and shortness of breath.    Cardiovascular: Positive for leg swelling. Negative for chest pain and palpitations.   Gastrointestinal: Negative for abdominal distention, nausea and vomiting.   Genitourinary: Negative for difficulty urinating and flank pain.   Musculoskeletal: Positive for gait problem. Negative for arthralgias.   Skin: Positive for wound (surgical). Negative for rash.   Neurological: Positive for weakness. Negative for facial asymmetry, speech difficulty, numbness and headaches.   Psychiatric/Behavioral: Negative for agitation and confusion.     Objective:     Vital Signs (Most Recent):  Temp: 97.9 °F (36.6 °C) (09/05/19 0858)  Pulse: 89 (09/05/19 0900)  Resp: 18 (09/05/19 0858)  BP: (!) 173/79 (09/05/19 0858)  SpO2: 96 % (09/05/19 0858)    Vital Signs (24h Range):  Temp:  [97.4 °F (36.3 °C)-98.1 °F (36.7 °C)] 97.9 °F (36.6 °C)  Pulse:  [67-92] 89  Resp:  [18] 18  SpO2:  [94 %-98 %] 96 %  BP: (119-173)/(58-79) 173/79     Body mass index is 37.42 kg/m².    Physical Exam   Constitutional: She is oriented to person, place, and time.  She appears well-developed and well-nourished.   HENT:   Head: Normocephalic and atraumatic.   Eyes: Right eye exhibits no discharge. Left eye exhibits no discharge.   Neck: Neck supple.   Cardiovascular: Intact distal pulses.   BLE edema    Pulmonary/Chest: Effort normal. No respiratory distress.   Abdominal: Soft. There is no tenderness.   Musculoskeletal: She exhibits no edema or deformity.   Generalized deconditioning   LLE healing lesions   Neurological: She is alert and oriented to person, place, and time. No sensory deficit. She exhibits normal muscle tone.   Follows commands    Skin: Skin is warm and dry.   Sternal incision   Psychiatric: She has a normal mood and affect. Her behavior is normal.   Vitals reviewed.    Diagnostic Results:   Labs: Reviewed  ECG: Reviewed  X-Ray: Reviewed    Assessment/Plan:     * S/P CABG (coronary artery bypass graft)  -s/p CABG 8/12/19  -on sternal precautions     Acute blood loss anemia  -CTS monitoring     Physical deconditioning  -IV Zosyn with no end date specified     Recommendations  -  Encourage mobility, OOB in chair at least 3 hours per day, and early ambulation as appropriate  -  PT/OT evaluate and treat  -  Pain management  -  Monitor for and prevent skin breakdown and pressure ulcers  · Early mobility, repositioning/weight shifting every 20-30 minutes when sitting, turn patient every 2 hours, proper mattress/overlay and chair cushioning, pressure relief/heel protector boots  -  DVT prophylaxis    -  Reviewed discharge options (IP rehab, SNF, HH therapy, and OP therapy)    CKD (chronic kidney disease) stage 3, GFR 30-59 ml/min  -DOREEN on CKD 3 s/p volume resuscitation  -receiving Lasix 40 mg IVP     Hypertension  -Cards consulted and managing     Reviewed case w/ PM&R staff. May be a good rehab candidate. Receiving Lasix IVP. Will follow progress and discuss with rehab team for post acute care/rehab recommendation.      Thank you for your consult.     Elvia Brewer,  NP  Department of Physical Medicine & Rehab  [facility]

## 2020-08-07 NOTE — PROGRESS NOTES
"Assumed care of patient from night shift RN.  Patient is alert and oriented times 4, states pain of 2/10, Fentanyl PCA in use.  VSS BP (!) 162/56 Comment: rn notified  Pulse 67   Temp 36.9 °C (98.5 °F) (Temporal)   Resp 16   Ht 1.676 m (5' 6\")   Wt 63 kg (138 lb 14.2 oz)   SpO2 93%   BMI 22.42 kg/m²    Isolation for MRSA of the wound.  PIV in the LFA, patent and running NS at 50mL/hr with Fentanyl PCA.  25mg basal rate, 10mg bolus every 6 minutes with 600mcg limit in 4 hours.  On 0.5L oxygen with saturations in the mid 90s,  in use.  Last BM 8/4, bowel protocol in use.  Incontinent of stool.  Incontinent of urine, condom cath in use.  L AKA with wound vac with Dakin's Veraflow.  Dressing change today.  Wound to the the L inner thigh/groin.  Redness noted to the penis.  R scrotum red and flaky.  Growth to the R cheek and nose.  Portion of L ear missing.  Patient is a moderate risk for skin breakdown, Q 2 hour turns, waffle overlay in use.  Pillows in use for support and positioning.  POC discussed for the day, bed is locked and in the lowest position, call light is within reach.  All needs are met at this time, hourly rounding is in place.  "

## 2020-08-07 NOTE — CARE PLAN
Problem: Safety  Goal: Will remain free from injury  Outcome: PROGRESSING AS EXPECTED  Note: High fall risk, bed alarm in use.  Patient educated not to get out of bed without staff present     Problem: Pain Management  Goal: Pain level will decrease to patient's comfort goal  Outcome: PROGRESSING AS EXPECTED  Note: Fentanyl PCA in use, patient reports adequate pain control.

## 2020-08-07 NOTE — DISCHARGE PLANNING
Received Choice form at [phone]  Agency/Facility Name: [facility]   Referral sent per Choice form @ [phone]

## 2020-08-07 NOTE — CARE PLAN
Problem: Communication  Goal: The ability to communicate needs accurately and effectively will improve  Outcome: PROGRESSING AS EXPECTED  Note: Updated on POC, pt able to communicate needs appropriately, and call light is within reach      Problem: Venous Thromboembolism (VTW)/Deep Vein Thrombosis (DVT) Prevention:  Goal: Patient will participate in Venous Thrombosis (VTE)/Deep Vein Thrombosis (DVT)Prevention Measures  Outcome: PROGRESSING AS EXPECTED  Note: SCDs in place, lovenox administered per MAR     Problem: Pain Management  Goal: Pain level will decrease to patient's comfort goal  Outcome: PROGRESSING AS EXPECTED  Note: Fentanyl PCA in place, assessing pain q4h

## 2020-08-08 LAB
GLUCOSE BLD-MCNC: 119 MG/DL (ref 65–99)
GLUCOSE BLD-MCNC: 127 MG/DL (ref 65–99)
GLUCOSE BLD-MCNC: 136 MG/DL (ref 65–99)
GLUCOSE BLD-MCNC: 169 MG/DL (ref 65–99)
VANCOMYCIN TROUGH SERPL-MCNC: 14.6 UG/ML (ref 10–20)

## 2020-08-08 PROCEDURE — 700111 HCHG RX REV CODE 636 W/ 250 OVERRIDE (IP): Performed by: FAMILY MEDICINE

## 2020-08-08 PROCEDURE — 700105 HCHG RX REV CODE 258: Performed by: FAMILY MEDICINE

## 2020-08-08 PROCEDURE — 700102 HCHG RX REV CODE 250 W/ 637 OVERRIDE(OP): Performed by: HOSPITALIST

## 2020-08-08 PROCEDURE — 700111 HCHG RX REV CODE 636 W/ 250 OVERRIDE (IP): Performed by: INTERNAL MEDICINE

## 2020-08-08 PROCEDURE — 700105 HCHG RX REV CODE 258: Performed by: INTERNAL MEDICINE

## 2020-08-08 PROCEDURE — A9270 NON-COVERED ITEM OR SERVICE: HCPCS | Performed by: HOSPITALIST

## 2020-08-08 PROCEDURE — 700102 HCHG RX REV CODE 250 W/ 637 OVERRIDE(OP): Performed by: FAMILY MEDICINE

## 2020-08-08 PROCEDURE — 99232 SBSQ HOSP IP/OBS MODERATE 35: CPT | Performed by: FAMILY MEDICINE

## 2020-08-08 PROCEDURE — 770001 HCHG ROOM/CARE - MED/SURG/GYN PRIV*

## 2020-08-08 PROCEDURE — 36415 COLL VENOUS BLD VENIPUNCTURE: CPT

## 2020-08-08 PROCEDURE — A9270 NON-COVERED ITEM OR SERVICE: HCPCS | Performed by: FAMILY MEDICINE

## 2020-08-08 PROCEDURE — 82962 GLUCOSE BLOOD TEST: CPT | Mod: 91

## 2020-08-08 PROCEDURE — 80202 ASSAY OF VANCOMYCIN: CPT

## 2020-08-08 PROCEDURE — 700111 HCHG RX REV CODE 636 W/ 250 OVERRIDE (IP): Performed by: SURGERY

## 2020-08-08 RX ADMIN — DOCUSATE SODIUM 50 MG AND SENNOSIDES 8.6 MG 2 TABLET: 8.6; 5 TABLET, FILM COATED ORAL at 16:37

## 2020-08-08 RX ADMIN — ENOXAPARIN SODIUM 40 MG: 40 INJECTION SUBCUTANEOUS at 06:04

## 2020-08-08 RX ADMIN — GABAPENTIN 100 MG: 100 CAPSULE ORAL at 06:04

## 2020-08-08 RX ADMIN — INSULIN HUMAN 1 UNITS: 100 INJECTION, SOLUTION PARENTERAL at 21:01

## 2020-08-08 RX ADMIN — AMPICILLIN SODIUM AND SULBACTAM SODIUM 3 G: 2; 1 INJECTION, POWDER, FOR SOLUTION INTRAMUSCULAR; INTRAVENOUS at 11:33

## 2020-08-08 RX ADMIN — SODIUM CHLORIDE 1000 ML: 9 INJECTION, SOLUTION INTRAVENOUS at 21:26

## 2020-08-08 RX ADMIN — GABAPENTIN 100 MG: 100 CAPSULE ORAL at 11:33

## 2020-08-08 RX ADMIN — VANCOMYCIN HYDROCHLORIDE 750 MG: 500 INJECTION, POWDER, LYOPHILIZED, FOR SOLUTION INTRAVENOUS at 21:25

## 2020-08-08 RX ADMIN — GABAPENTIN 100 MG: 100 CAPSULE ORAL at 16:37

## 2020-08-08 RX ADMIN — AMPICILLIN SODIUM AND SULBACTAM SODIUM 3 G: 2; 1 INJECTION, POWDER, FOR SOLUTION INTRAMUSCULAR; INTRAVENOUS at 16:37

## 2020-08-08 RX ADMIN — AMPICILLIN SODIUM AND SULBACTAM SODIUM 3 G: 2; 1 INJECTION, POWDER, FOR SOLUTION INTRAMUSCULAR; INTRAVENOUS at 06:04

## 2020-08-08 RX ADMIN — AMPICILLIN SODIUM AND SULBACTAM SODIUM 3 G: 2; 1 INJECTION, POWDER, FOR SOLUTION INTRAMUSCULAR; INTRAVENOUS at 23:58

## 2020-08-08 RX ADMIN — ATORVASTATIN CALCIUM 40 MG: 40 TABLET, FILM COATED ORAL at 16:37

## 2020-08-08 RX ADMIN — DOCUSATE SODIUM 50 MG AND SENNOSIDES 8.6 MG 2 TABLET: 8.6; 5 TABLET, FILM COATED ORAL at 06:04

## 2020-08-08 RX ADMIN — VANCOMYCIN HYDROCHLORIDE 750 MG: 500 INJECTION, POWDER, LYOPHILIZED, FOR SOLUTION INTRAVENOUS at 08:46

## 2020-08-08 RX ADMIN — LISINOPRIL 10 MG: 10 TABLET ORAL at 06:04

## 2020-08-08 NOTE — PROGRESS NOTES
"Pharmacy Kinetics 75 y.o. male on vancomycin day # 13 2020    Currently on Vancomycin 750mg mg iv q12hr (0900 2100)  Provider specified end date: Planning for 6 weeks from date of final surgery per ID     Indication for Treatment: SSTI, osteomyelitis of left AKA stump     Pertinent history per medical record: Admitted on 2020 for scheduled surgery for revision of necrotic left AKA stump.  Patient is now s/p stump revision.  Procedure note states extensive necrotic muscle and fascia with purulent fluid in the muscle.  Cultures sent.  Vancomycin and zosyn ordered empirically post-op.  Wound VAC in place. ID is following     Other antibiotics: ampicillin/sulbactam 3 grams iv q6h     Allergies: No known drug allergy      List concerns for renal function: age, low albumin     Pertinent cultures to date:   20: Lt-leg vascular graft,TISS: Methicillin Resistant Staphylococcus aureus, Enterococcus faecalis   20: Lt-leg vascular graft,TISS: Peptostreptococcus asaccharolyticus,Prevotella oralis ,Propionibacterium species     Recent Labs     20  0928 20  0644   WBC 6.0 5.9   NEUTSPOLYS 78.20* 73.80*     Recent Labs     20  0928 20  0644   BUN 10 11   CREATININE 0.88 0.86   ALBUMIN 2.2* 2.1*     Recent Labs     20  1151   VANCOTROUGH 8.8*       Intake/Output Summary (Last 24 hours) at 2020 0903  Last data filed at 2020 0730  Gross per 24 hour   Intake 2075.9 ml   Output 1300 ml   Net 775.9 ml      /58   Pulse 75   Temp 36.9 °C (98.5 °F) (Temporal)   Resp 17   Ht 1.676 m (5' 6\")   Wt 63 kg (138 lb 14.2 oz)   SpO2 92%  Temp (24hrs), Av.4 °C (99.3 °F), Min:36.9 °C (98.5 °F), Max:37.9 °C (100.3 °F)    A/P        1. Vancomycin dose change: No change   2. Next vancomycin level: 20@  3. Goal trough: 10-15 mcg/mL   4. Comments: Tmax 37.9. No leukocytosis. Last renal indices stable, continue current dose.BMP ordered daily. ID following; Continue " vancomycin and Unasyn. Anticipate 6 week course from date of final surgery.     Luis SalazarD BCPS

## 2020-08-08 NOTE — PROGRESS NOTES
Assessment complete.  A&O x 4. Patient calls appropriately.  Patient chairfast, q2 turns &waffle overlay in place. Bed alarm on.   Patient has 1/10 pain. Pain managed with fentanyl PCA pump.  Denies N&V. Tolerating DM diet.  Wound dressing CDI.  + void into condom catheter, - flatus, LBM 8/4.  Patient denies SOB.  SCD's on RLE.    Review plan with of care with patient. Call light and personal belongings with in reach. Hourly rounding in place. All needs met at this time.

## 2020-08-08 NOTE — PROGRESS NOTES
"Assumed care of patient from night shift RN.  Patient is alert and oriented times 4, states pain of 2/10, Fentanyl PCA in use.  VSS /58   Pulse 75   Temp 36.9 °C (98.5 °F) (Temporal)   Resp 17   Ht 1.676 m (5' 6\")   Wt 63 kg (138 lb 14.2 oz)   SpO2 92%   BMI 22.42 kg/m²    PIV in the LFA, patent and running NS at 50mL/hr.  On 0.5L oxygen with saturations in the mid 90s,  in use.  Last BM 8/4, bowel protocol in use.  Incontinent of stool.  Incontinent of urine, condom cath in use.  Diabetic diet, tolerating well.  L AKA with wound vac, running veraflow with Dakin's.  Dressing intact.  Wound to the L inner thigh, barrier cream in use.  Redness noted to the penis.  R side of scrotum red and flaky.  Growth to the R cheek and nose.  Patient is a moderate risk for skin breakdown, waffle overlay in use, Q 2 hour turns, pillows in use for support and positioning.  POC discussed for the day, bed is locked and in the lowest position, call light is within reach.  All needs are met at this time, hourly rounding is in place.  "

## 2020-08-08 NOTE — CARE PLAN
Problem: Safety  Goal: Will remain free from injury  Outcome: PROGRESSING AS EXPECTED  Note: Bed alarm in use, high fall risk     Problem: Pain Management  Goal: Pain level will decrease to patient's comfort goal  Outcome: PROGRESSING AS EXPECTED  Note: Fentanyl PCA, patient reports adequate pain control

## 2020-08-08 NOTE — PROGRESS NOTES
Hospital Medicine Daily Progress Note    Date of Service  8/8/2020    Chief Complaint  75 y.o. male admitted 7/27/2020 with    Leg Pain         Hospital Course    74 y.o. male with a past medical history of hypertension, chronic pain, peripheral vascular disease, basal cell carcinoma of the face admitted 7/27/2020 with with vascular surgery for revision of a necrotic left AKA stump. 7/27 I&D Cul > MRSA & enterococcus. ID consulted . Repeat ID on 7/29    Interval Problem Update  none    Consultants/Specialty  Vascular surgery  Infectious disease      Code Status  full    Disposition  pending    Review of Systems  Review of Systems   Unable to perform ROS: Acuity of condition        Physical Exam  Temp:  [36.9 °C (98.5 °F)-37.9 °C (100.3 °F)] 36.9 °C (98.5 °F)  Pulse:  [64-83] 75  Resp:  [16-20] 17  BP: (133-153)/(52-67) 153/58  SpO2:  [90 %-93 %] 92 %    Physical Exam  Constitutional:       General: He is not in acute distress.     Appearance: He is not toxic-appearing.   HENT:      Head: Normocephalic.      Comments: Cancer lesion on the right side of the face     Nose: No congestion or rhinorrhea.      Mouth/Throat:      Mouth: Mucous membranes are dry.   Eyes:      Extraocular Movements: Extraocular movements intact.      Pupils: Pupils are equal, round, and reactive to light.   Neck:      Musculoskeletal: Normal range of motion and neck supple.   Cardiovascular:      Rate and Rhythm: Normal rate and regular rhythm.      Pulses: Normal pulses.      Heart sounds: Normal heart sounds.   Pulmonary:      Effort: No respiratory distress.      Breath sounds: No stridor.   Abdominal:      General: Abdomen is flat.      Palpations: Abdomen is soft.   Musculoskeletal:      Comments: Wound vac is in place   Skin:     Coloration: Skin is not jaundiced or pale.      Comments: Has cancer lesion on the right side of the face and is chronic   Neurological:      Mental Status: He is alert. He is disoriented.          Fluids    Intake/Output Summary (Last 24 hours) at 8/8/2020 1106  Last data filed at 8/8/2020 0846  Gross per 24 hour   Intake 2525.9 ml   Output 1300 ml   Net 1225.9 ml       Laboratory  Recent Labs     08/06/20  0928 08/07/20  0644   WBC 6.0 5.9   RBC 3.36* 3.25*   HEMOGLOBIN 10.5* 10.2*   HEMATOCRIT 33.3* 32.4*   MCV 99.1* 99.7*   MCH 31.3 31.4   MCHC 31.5* 31.5*   RDW 47.1 47.9   PLATELETCT 262 207   MPV 9.9 10.4     Recent Labs     08/06/20 0928 08/07/20  0644   SODIUM 139 140   POTASSIUM 3.7 3.6   CHLORIDE 102 105   CO2 27 26   GLUCOSE 113* 124*   BUN 10 11   CREATININE 0.88 0.86   CALCIUM 8.3* 7.8*                   Imaging       Assessment/Plan  * AKA stump complication (HCC)  Assessment & Plan  Infected AKA, s/p I&D   Cultures positive for MRSA and group D enterococcus, ID consulted   S/p I&D Jul 27, 31 & 8/2      I.D input is noted  Surgical input is noted and the need for  surgical intervention  Will be determined with next wound vac change  Vancomycin   unasyn  Wound care  Wound vac is in place    Delirium  Assessment & Plan  Intermittent, multifactorial, infection, anesthesia    Arterial insufficiency (HCC)- (present on admission)  Assessment & Plan  Known history, admitted for amputation  Consider antiplatelet when okay with vascular    Diabetes (HCC)- (present on admission)  Assessment & Plan  With hyperglycemia  Glycated hemoglobin 7.3%  Short acting insulin   Accu-Checks, hypoglycemia protocol     HLD (hyperlipidemia)- (present on admission)  Assessment & Plan  Resume home statin therpay      HTN (hypertension)- (present on admission)  Assessment & Plan  Not well controlled  Started lisinopril    Basal cell carcinoma of face- (present on admission)  Assessment & Plan  Chronic, outpatient follow-up  With dermatology    Basal cell carcinoma of earlobe- (present on admission)  Assessment & Plan  Chronic, outpatient follow-up       VTE prophylaxis: lovenox

## 2020-08-08 NOTE — PROGRESS NOTES
Vascular Surgery     Covering for Dr. Fisher     Wound vac in place   Continue would vac care     Will observe wound during next vac change and determine if patient warrants additoinal debridement     Aquiles Phan MD

## 2020-08-09 LAB
ANION GAP SERPL CALC-SCNC: 10 MMOL/L (ref 7–16)
BUN SERPL-MCNC: 9 MG/DL (ref 8–22)
CALCIUM SERPL-MCNC: 7.5 MG/DL (ref 8.5–10.5)
CHLORIDE SERPL-SCNC: 106 MMOL/L (ref 96–112)
CO2 SERPL-SCNC: 24 MMOL/L (ref 20–33)
CREAT SERPL-MCNC: 0.85 MG/DL (ref 0.5–1.4)
GLUCOSE BLD-MCNC: 101 MG/DL (ref 65–99)
GLUCOSE BLD-MCNC: 117 MG/DL (ref 65–99)
GLUCOSE BLD-MCNC: 132 MG/DL (ref 65–99)
GLUCOSE BLD-MCNC: 148 MG/DL (ref 65–99)
GLUCOSE SERPL-MCNC: 118 MG/DL (ref 65–99)
POTASSIUM SERPL-SCNC: 3.2 MMOL/L (ref 3.6–5.5)
SODIUM SERPL-SCNC: 140 MMOL/L (ref 135–145)

## 2020-08-09 PROCEDURE — 700111 HCHG RX REV CODE 636 W/ 250 OVERRIDE (IP): Performed by: FAMILY MEDICINE

## 2020-08-09 PROCEDURE — 700105 HCHG RX REV CODE 258: Performed by: FAMILY MEDICINE

## 2020-08-09 PROCEDURE — 700101 HCHG RX REV CODE 250: Performed by: HOSPITALIST

## 2020-08-09 PROCEDURE — A9270 NON-COVERED ITEM OR SERVICE: HCPCS | Performed by: FAMILY MEDICINE

## 2020-08-09 PROCEDURE — 36415 COLL VENOUS BLD VENIPUNCTURE: CPT

## 2020-08-09 PROCEDURE — 82962 GLUCOSE BLOOD TEST: CPT

## 2020-08-09 PROCEDURE — 99232 SBSQ HOSP IP/OBS MODERATE 35: CPT | Performed by: FAMILY MEDICINE

## 2020-08-09 PROCEDURE — 700102 HCHG RX REV CODE 250 W/ 637 OVERRIDE(OP): Performed by: FAMILY MEDICINE

## 2020-08-09 PROCEDURE — 700102 HCHG RX REV CODE 250 W/ 637 OVERRIDE(OP): Performed by: HOSPITALIST

## 2020-08-09 PROCEDURE — A9270 NON-COVERED ITEM OR SERVICE: HCPCS | Performed by: HOSPITALIST

## 2020-08-09 PROCEDURE — 700111 HCHG RX REV CODE 636 W/ 250 OVERRIDE (IP): Performed by: SURGERY

## 2020-08-09 PROCEDURE — 770021 HCHG ROOM/CARE - ISO PRIVATE

## 2020-08-09 PROCEDURE — 80048 BASIC METABOLIC PNL TOTAL CA: CPT

## 2020-08-09 PROCEDURE — 700111 HCHG RX REV CODE 636 W/ 250 OVERRIDE (IP): Performed by: INTERNAL MEDICINE

## 2020-08-09 PROCEDURE — 700105 HCHG RX REV CODE 258: Performed by: INTERNAL MEDICINE

## 2020-08-09 PROCEDURE — 700101 HCHG RX REV CODE 250: Performed by: SURGERY

## 2020-08-09 PROCEDURE — 700111 HCHG RX REV CODE 636 W/ 250 OVERRIDE (IP): Performed by: NURSE PRACTITIONER

## 2020-08-09 RX ORDER — POTASSIUM CHLORIDE 20 MEQ/1
40 TABLET, EXTENDED RELEASE ORAL ONCE
Status: COMPLETED | OUTPATIENT
Start: 2020-08-09 | End: 2020-08-09

## 2020-08-09 RX ADMIN — LISINOPRIL 10 MG: 10 TABLET ORAL at 06:08

## 2020-08-09 RX ADMIN — AMPICILLIN SODIUM AND SULBACTAM SODIUM 3 G: 2; 1 INJECTION, POWDER, FOR SOLUTION INTRAMUSCULAR; INTRAVENOUS at 06:08

## 2020-08-09 RX ADMIN — VANCOMYCIN HYDROCHLORIDE 750 MG: 500 INJECTION, POWDER, LYOPHILIZED, FOR SOLUTION INTRAVENOUS at 09:09

## 2020-08-09 RX ADMIN — DOCUSATE SODIUM 50 MG AND SENNOSIDES 8.6 MG 2 TABLET: 8.6; 5 TABLET, FILM COATED ORAL at 06:08

## 2020-08-09 RX ADMIN — VANCOMYCIN HYDROCHLORIDE 750 MG: 500 INJECTION, POWDER, LYOPHILIZED, FOR SOLUTION INTRAVENOUS at 22:03

## 2020-08-09 RX ADMIN — GABAPENTIN 100 MG: 100 CAPSULE ORAL at 06:08

## 2020-08-09 RX ADMIN — AMPICILLIN SODIUM AND SULBACTAM SODIUM 3 G: 2; 1 INJECTION, POWDER, FOR SOLUTION INTRAMUSCULAR; INTRAVENOUS at 16:49

## 2020-08-09 RX ADMIN — POLYETHYLENE GLYCOL 3350 1 PACKET: 17 POWDER, FOR SOLUTION ORAL at 06:08

## 2020-08-09 RX ADMIN — GABAPENTIN 100 MG: 100 CAPSULE ORAL at 16:49

## 2020-08-09 RX ADMIN — ATORVASTATIN CALCIUM 40 MG: 40 TABLET, FILM COATED ORAL at 16:49

## 2020-08-09 RX ADMIN — AMPICILLIN SODIUM AND SULBACTAM SODIUM 3 G: 2; 1 INJECTION, POWDER, FOR SOLUTION INTRAMUSCULAR; INTRAVENOUS at 11:31

## 2020-08-09 RX ADMIN — DAKIN'S SOLUTION 0.125% (QUARTER STRENGTH) 200 ML: 0.12 SOLUTION at 06:10

## 2020-08-09 RX ADMIN — Medication: at 11:33

## 2020-08-09 RX ADMIN — GABAPENTIN 100 MG: 100 CAPSULE ORAL at 11:31

## 2020-08-09 RX ADMIN — ENOXAPARIN SODIUM 40 MG: 40 INJECTION SUBCUTANEOUS at 06:07

## 2020-08-09 RX ADMIN — POTASSIUM CHLORIDE 40 MEQ: 1500 TABLET, EXTENDED RELEASE ORAL at 11:31

## 2020-08-09 ASSESSMENT — FIBROSIS 4 INDEX: FIB4 SCORE: 2.57

## 2020-08-09 NOTE — PROGRESS NOTES
0745: Assumed care of pt after report. Pt A/Ox4 resting in bed. PCA verified. No concerns voiced. No s/s of distress noted

## 2020-08-09 NOTE — PROGRESS NOTES
"Pharmacy Kinetics 75 y.o. male on vancomycin day # 14       2020    Currently on Vancomycin 750mg mg iv q12hr (0900 2100)  Provider specified end date: Planning for 6 weeks from date of final surgery per ID     Indication for Treatment: SSTI, osteomyelitis of left AKA stump     Pertinent history per medical record: Admitted on 2020 for scheduled surgery for revision of necrotic left AKA stump. Patient is now s/p stump revision. Procedure note states extensive necrotic muscle and fascia with purulent fluid in the muscle.  Cultures sent. Vancomycin and zosyn ordered empirically post-op.  Wound VAC in place. ID is following     Other antibiotics: ampicillin/sulbactam 3 grams iv q6h     Allergies: No known drug allergy      List concerns for renal function: age, low albumin     Pertinent cultures to date:   20: Lt-leg vascular graft,TISS: Methicillin Resistant Staphylococcus aureus, Enterococcus faecalis   20: Lt-leg vascular graft,TISS: Peptostreptococcus asaccharolyticus,Prevotella oralis ,Propionibacterium species     MRSA nares swab if pneumonia is a concern (ordered/positive/negative/n-a): NA    Recent Labs     20  0928 20  0644   WBC 6.0 5.9   NEUTSPOLYS 78.20* 73.80*     Recent Labs     20  0928 20  0644 20  0420   BUN 10 11 9   CREATININE 0.88 0.86 0.85   ALBUMIN 2.2* 2.1*  --      Recent Labs     20  1151 20   [facility]       Intake/Output Summary (Last 24 hours) at 2020 0906  Last data filed at 2020 0608  Gross per 24 hour   Intake 1546.25 ml   Output 1300 ml   Net 246.25 ml      /61   Pulse 72   Temp 37 °C (98.6 °F) (Temporal)   Resp 18   Ht 1.676 m (5' 6\")   Wt 63 kg (138 lb 14.2 oz)   SpO2 93%  Temp (24hrs), Av.2 °C (98.9 °F), Min:37 °C (98.6 °F), Max:37.5 °C (99.5 °F)    A/P        1. Vancomycin dose change: No change   2. Next vancomycin level: ~3 days  3. Goal trough: 10-15 mcg/mL   4. Comments: No " leukocytosis, afebrile. Renal indices stable. Vancomycin level at goal,continue current dose. ID following; Continue vancomycin and Unasyn. Anticipate 6 week course from date of final surgery.     Luis Cortez PharmD BCPS

## 2020-08-09 NOTE — PROGRESS NOTES
Vascular Surgery     Vac scheduled to be changed tomorrow   Will inspect would at that time     Follow     Aquiles Phan MD

## 2020-08-09 NOTE — PROGRESS NOTES
ID Brief Note     Patient hemodynamically stable and labs unremarkable.  Continue Unasyn and vancomycin and awaiting further surgical decisions.  Surgery plans to examine wound during VAC change tomorrow and then decide on whether further debridement is needed.       Nieves Solomon MD  Infectious Diseases

## 2020-08-09 NOTE — PROGRESS NOTES
Assessment complete.  A&O x 4. Patient calls appropriately.  Patient chairfast, q2 turns & waffle overlay in place. Bed alarm on.   Patient does not appear to be in pain. Pain managed with fentanyl PCA pump.  Denies N&V. Tolerating DM diet.  Wound dressing CDI.  + void into condom catheter, + flatus, LBM 8/4. Escalating bowel protocol.  Patient denies SOB.  SCD's on RLE.    Review plan with of care with patient. Call light and personal belongings with in reach. Hourly rounding in place. All needs met at this time.

## 2020-08-09 NOTE — CARE PLAN
Problem: Communication  Goal: The ability to communicate needs accurately and effectively will improve  Outcome: PROGRESSING AS EXPECTED  Intervention: Educate patient and significant other/support system about the plan of care, procedures, treatments, medications and allow for questions  Flowsheets (Taken 8/9/2020 0924)  Pt & Family Have Been Educated on Methods Available to Report Concerns Related to Care, Treatment, Services, and Patient Safety Issues: Yes  Note: Keep pt educated about treatment plan and procedures     Problem: Safety  Goal: Will remain free from injury  Outcome: PROGRESSING AS EXPECTED  Goal: Will remain free from falls  Outcome: PROGRESSING AS EXPECTED  Intervention: Assess risk factors for falls  Flowsheets (Taken 8/9/2020 0924)  Fall Risk: High Risk to Fall - 2 or more points   Risk for Injury-Any positive answers results in the pt being at high risk for fall related injury: Surgery:  Thoracic or Abdominal Surgery or Lower Limb Amputation  Note: Maintain fall precautions and round on pt hourly     Problem: Infection  Goal: Will remain free from infection  Outcome: PROGRESSING AS EXPECTED  Intervention: Assess signs and symptoms of infection  Note: Monitor for s/s of infection in assessment, vitals, and labs

## 2020-08-09 NOTE — PROGRESS NOTES
Hospital Medicine Daily Progress Note    Date of Service  8/9/2020    Chief Complaint  75 y.o. male admitted 7/27/2020 with    Leg Pain         Hospital Course    74 y.o. male with a past medical history of hypertension, chronic pain, peripheral vascular disease, basal cell carcinoma of the face admitted 7/27/2020 with with vascular surgery for revision of a necrotic left AKA stump. 7/27 I&D Cul > MRSA & enterococcus. ID consulted . Repeat ID on 7/29    Interval Problem Update  none    Consultants/Specialty  Vascular surgery  Infectious disease      Code Status  full    Disposition  pending    Review of Systems  Review of Systems   Unable to perform ROS: Acuity of condition        Physical Exam  Temp:  [36.8 °C (98.3 °F)-37.5 °C (99.5 °F)] 36.8 °C (98.3 °F)  Pulse:  [70-87] 87  Resp:  [16-20] 17  BP: (132-162)/(51-85) 140/73  SpO2:  [91 %-95 %] 95 %    Physical Exam  Constitutional:       Appearance: He is not toxic-appearing or diaphoretic.   HENT:      Head: Normocephalic.      Comments: Cancer lesion on the right side of the face     Nose: Nose normal.      Mouth/Throat:      Mouth: Mucous membranes are dry.   Eyes:      Extraocular Movements: Extraocular movements intact.      Conjunctiva/sclera: Conjunctivae normal.   Neck:      Musculoskeletal: No neck rigidity or muscular tenderness.   Cardiovascular:      Rate and Rhythm: Normal rate and regular rhythm.      Heart sounds: No murmur. No friction rub.   Pulmonary:      Effort: No respiratory distress.      Breath sounds: No wheezing.   Abdominal:      General: Abdomen is flat. Bowel sounds are normal.   Musculoskeletal:      Comments: Wound vac is in place   Skin:     General: Skin is warm and dry.      Comments: Has cancer lesion on the right side of the face and is chronic   Neurological:      Mental Status: He is alert. He is disoriented.         Fluids    Intake/Output Summary (Last 24 hours) at 8/9/2020 0967  Last data filed at 8/9/2020 0720  Gross per 24  hour   Intake 1546.25 ml   Output 1650 ml   Net -103.75 ml       Laboratory  Recent Labs     08/07/20  0644   WBC 5.9   RBC 3.25*   HEMOGLOBIN 10.2*   HEMATOCRIT 32.4*   MCV 99.7*   MCH 31.4   MCHC 31.5*   RDW 47.9   PLATELETCT 207   MPV 10.4     Recent Labs     08/07/20  0644 08/09/20  0420   SODIUM 140 140   POTASSIUM 3.6 3.2*   CHLORIDE 105 106   CO2 26 24   GLUCOSE 124* 118*   BUN 11 9   CREATININE 0.86 0.85   CALCIUM 7.8* 7.5*                   Imaging       Assessment/Plan  * AKA stump complication (HCC)  Assessment & Plan  Infected AKA, s/p I&D   Cultures positive for MRSA and group D enterococcus, ID consulted   S/p I&D Jul 27, 31 & 8/2      I.D input is noted  Surgical input is noted and the need for  surgical intervention  Will be determined with next wound vac change which will be on  8/10/20  Vancomycin   unasyn  Wound care  Wound vac is in place    Delirium  Assessment & Plan  Intermittent, multifactorial, infection, anesthesia    Arterial insufficiency (HCC)- (present on admission)  Assessment & Plan  Known history, admitted for amputation  Consider antiplatelet when okay with vascular    Diabetes (HCC)- (present on admission)  Assessment & Plan  With hyperglycemia  Glycated hemoglobin 7.3%  Short acting insulin   Accu-Checks, hypoglycemia protocol     HLD (hyperlipidemia)- (present on admission)  Assessment & Plan  Resume home statin therpay      HTN (hypertension)- (present on admission)  Assessment & Plan  Not well controlled  Started lisinopril    Basal cell carcinoma of face- (present on admission)  Assessment & Plan  Chronic, outpatient follow-up  With dermatology    Basal cell carcinoma of earlobe- (present on admission)  Assessment & Plan  Chronic, outpatient follow-up       VTE prophylaxis: lovenox

## 2020-08-09 NOTE — CARE PLAN
Problem: Communication  Goal: The ability to communicate needs accurately and effectively will improve  Outcome: PROGRESSING AS EXPECTED  Note: Updated on POC, pt able to communicate needs appropriately, and call light is within reach     Problem: Bowel/Gastric:  Goal: Normal bowel function is maintained or improved  Outcome: PROGRESSING AS EXPECTED  Goal: Will not experience complications related to bowel motility  Outcome: PROGRESSING AS EXPECTED  Note: + flatus, escalating bowel protocol today

## 2020-08-10 LAB
ANION GAP SERPL CALC-SCNC: 9 MMOL/L (ref 7–16)
BASOPHILS # BLD AUTO: 0.6 % (ref 0–1.8)
BASOPHILS # BLD: 0.03 K/UL (ref 0–0.12)
BUN SERPL-MCNC: 8 MG/DL (ref 8–22)
CALCIUM SERPL-MCNC: 7.7 MG/DL (ref 8.5–10.5)
CHLORIDE SERPL-SCNC: 106 MMOL/L (ref 96–112)
CO2 SERPL-SCNC: 25 MMOL/L (ref 20–33)
CREAT SERPL-MCNC: 0.91 MG/DL (ref 0.5–1.4)
EOSINOPHIL # BLD AUTO: 0.12 K/UL (ref 0–0.51)
EOSINOPHIL NFR BLD: 2.5 % (ref 0–6.9)
ERYTHROCYTE [DISTWIDTH] IN BLOOD BY AUTOMATED COUNT: 50.4 FL (ref 35.9–50)
GLUCOSE BLD-MCNC: 120 MG/DL (ref 65–99)
GLUCOSE BLD-MCNC: 146 MG/DL (ref 65–99)
GLUCOSE BLD-MCNC: 163 MG/DL (ref 65–99)
GLUCOSE BLD-MCNC: 187 MG/DL (ref 65–99)
GLUCOSE SERPL-MCNC: 183 MG/DL (ref 65–99)
HCT VFR BLD AUTO: 28.5 % (ref 42–52)
HGB BLD-MCNC: 9.2 G/DL (ref 14–18)
IMM GRANULOCYTES # BLD AUTO: 0.02 K/UL (ref 0–0.11)
IMM GRANULOCYTES NFR BLD AUTO: 0.4 % (ref 0–0.9)
LYMPHOCYTES # BLD AUTO: 1.29 K/UL (ref 1–4.8)
LYMPHOCYTES NFR BLD: 26.6 % (ref 22–41)
MCH RBC QN AUTO: 31.9 PG (ref 27–33)
MCHC RBC AUTO-ENTMCNC: 32.3 G/DL (ref 33.7–35.3)
MCV RBC AUTO: 99 FL (ref 81.4–97.8)
MONOCYTES # BLD AUTO: 0.21 K/UL (ref 0–0.85)
MONOCYTES NFR BLD AUTO: 4.3 % (ref 0–13.4)
NEUTROPHILS # BLD AUTO: 3.18 K/UL (ref 1.82–7.42)
NEUTROPHILS NFR BLD: 65.6 % (ref 44–72)
NRBC # BLD AUTO: 0 K/UL
NRBC BLD-RTO: 0 /100 WBC
PLATELET # BLD AUTO: 221 K/UL (ref 164–446)
PMV BLD AUTO: 9.4 FL (ref 9–12.9)
POTASSIUM SERPL-SCNC: 3.4 MMOL/L (ref 3.6–5.5)
RBC # BLD AUTO: 2.88 M/UL (ref 4.7–6.1)
SODIUM SERPL-SCNC: 140 MMOL/L (ref 135–145)
WBC # BLD AUTO: 4.9 K/UL (ref 4.8–10.8)

## 2020-08-10 PROCEDURE — 85025 COMPLETE CBC W/AUTO DIFF WBC: CPT

## 2020-08-10 PROCEDURE — 99233 SBSQ HOSP IP/OBS HIGH 50: CPT | Performed by: INTERNAL MEDICINE

## 2020-08-10 PROCEDURE — 700111 HCHG RX REV CODE 636 W/ 250 OVERRIDE (IP): Performed by: FAMILY MEDICINE

## 2020-08-10 PROCEDURE — A9270 NON-COVERED ITEM OR SERVICE: HCPCS | Performed by: FAMILY MEDICINE

## 2020-08-10 PROCEDURE — 700111 HCHG RX REV CODE 636 W/ 250 OVERRIDE (IP): Performed by: SURGERY

## 2020-08-10 PROCEDURE — 99232 SBSQ HOSP IP/OBS MODERATE 35: CPT | Performed by: FAMILY MEDICINE

## 2020-08-10 PROCEDURE — 700105 HCHG RX REV CODE 258: Performed by: FAMILY MEDICINE

## 2020-08-10 PROCEDURE — 700102 HCHG RX REV CODE 250 W/ 637 OVERRIDE(OP): Performed by: FAMILY MEDICINE

## 2020-08-10 PROCEDURE — 700111 HCHG RX REV CODE 636 W/ 250 OVERRIDE (IP): Performed by: INTERNAL MEDICINE

## 2020-08-10 PROCEDURE — 700102 HCHG RX REV CODE 250 W/ 637 OVERRIDE(OP): Performed by: HOSPITALIST

## 2020-08-10 PROCEDURE — 82962 GLUCOSE BLOOD TEST: CPT

## 2020-08-10 PROCEDURE — 700105 HCHG RX REV CODE 258: Performed by: INTERNAL MEDICINE

## 2020-08-10 PROCEDURE — 302098 PASTE RING (FLAT): Performed by: FAMILY MEDICINE

## 2020-08-10 PROCEDURE — 36415 COLL VENOUS BLD VENIPUNCTURE: CPT

## 2020-08-10 PROCEDURE — A9270 NON-COVERED ITEM OR SERVICE: HCPCS | Performed by: HOSPITALIST

## 2020-08-10 PROCEDURE — 97606 NEG PRS WND THER DME>50 SQCM: CPT

## 2020-08-10 PROCEDURE — 80048 BASIC METABOLIC PNL TOTAL CA: CPT

## 2020-08-10 PROCEDURE — 770021 HCHG ROOM/CARE - ISO PRIVATE

## 2020-08-10 RX ORDER — POTASSIUM CHLORIDE 20 MEQ/1
40 TABLET, EXTENDED RELEASE ORAL ONCE
Status: COMPLETED | OUTPATIENT
Start: 2020-08-10 | End: 2020-08-10

## 2020-08-10 RX ADMIN — INSULIN HUMAN 1 UNITS: 100 INJECTION, SOLUTION PARENTERAL at 06:47

## 2020-08-10 RX ADMIN — ATORVASTATIN CALCIUM 40 MG: 40 TABLET, FILM COATED ORAL at 17:20

## 2020-08-10 RX ADMIN — AMPICILLIN SODIUM AND SULBACTAM SODIUM 3 G: 2; 1 INJECTION, POWDER, FOR SOLUTION INTRAMUSCULAR; INTRAVENOUS at 23:44

## 2020-08-10 RX ADMIN — ENOXAPARIN SODIUM 40 MG: 40 INJECTION SUBCUTANEOUS at 05:15

## 2020-08-10 RX ADMIN — VANCOMYCIN HYDROCHLORIDE 750 MG: 500 INJECTION, POWDER, LYOPHILIZED, FOR SOLUTION INTRAVENOUS at 08:56

## 2020-08-10 RX ADMIN — AMPICILLIN SODIUM AND SULBACTAM SODIUM 3 G: 2; 1 INJECTION, POWDER, FOR SOLUTION INTRAMUSCULAR; INTRAVENOUS at 01:51

## 2020-08-10 RX ADMIN — AMPICILLIN SODIUM AND SULBACTAM SODIUM 3 G: 2; 1 INJECTION, POWDER, FOR SOLUTION INTRAMUSCULAR; INTRAVENOUS at 05:14

## 2020-08-10 RX ADMIN — VANCOMYCIN HYDROCHLORIDE 750 MG: 500 INJECTION, POWDER, LYOPHILIZED, FOR SOLUTION INTRAVENOUS at 20:37

## 2020-08-10 RX ADMIN — AMPICILLIN SODIUM AND SULBACTAM SODIUM 3 G: 2; 1 INJECTION, POWDER, FOR SOLUTION INTRAMUSCULAR; INTRAVENOUS at 17:20

## 2020-08-10 RX ADMIN — AMPICILLIN SODIUM AND SULBACTAM SODIUM 3 G: 2; 1 INJECTION, POWDER, FOR SOLUTION INTRAMUSCULAR; INTRAVENOUS at 11:31

## 2020-08-10 RX ADMIN — LISINOPRIL 10 MG: 10 TABLET ORAL at 05:15

## 2020-08-10 RX ADMIN — GABAPENTIN 100 MG: 100 CAPSULE ORAL at 17:20

## 2020-08-10 RX ADMIN — GABAPENTIN 100 MG: 100 CAPSULE ORAL at 05:15

## 2020-08-10 RX ADMIN — GABAPENTIN 100 MG: 100 CAPSULE ORAL at 11:31

## 2020-08-10 RX ADMIN — SODIUM CHLORIDE: 9 INJECTION, SOLUTION INTRAVENOUS at 06:47

## 2020-08-10 RX ADMIN — POTASSIUM CHLORIDE 40 MEQ: 1500 TABLET, EXTENDED RELEASE ORAL at 11:30

## 2020-08-10 RX ADMIN — INSULIN HUMAN 1 UNITS: 100 INJECTION, SOLUTION PARENTERAL at 20:40

## 2020-08-10 ASSESSMENT — ENCOUNTER SYMPTOMS
CONSTIPATION: 0
DIARRHEA: 0
NAUSEA: 0
ABDOMINAL PAIN: 0
VOMITING: 0
MYALGIAS: 1
FEVER: 0
CHILLS: 0

## 2020-08-10 NOTE — PROGRESS NOTES
"Pharmacy Kinetics 75 y.o. male on vancomycin day # 15 8/10/2020    Currently on Vancomycin 750mg mg iv q12hr (0900 2100)  Provider specified end date: Planning for 6 weeks from date of final surgery per ID     Indication for Treatment: SSTI, osteomyelitis of left AKA stump     Pertinent history per medical record: Admitted on 2020 for scheduled surgery for revision of necrotic left AKA stump. Patient is now s/p stump revision. Procedure note states extensive necrotic muscle and fascia with purulent fluid in the muscle.  Cultures sent. Vancomycin and zosyn ordered empirically post-op.  Wound VAC in place. ID is following     Other antibiotics: ampicillin/sulbactam 3 grams iv q6h     Allergies: No known drug allergy      List concerns for renal function: age, low albumin     Pertinent cultures to date:   20: Lt-leg vascular graft,TISS: Methicillin Resistant Staphylococcus aureus, Enterococcus faecalis   20: Lt-leg vascular graft,TISS: Peptostreptococcus asaccharolyticus,Prevotella oralis ,Propionibacterium species      MRSA nares swab if pneumonia is a concern (ordered/positive/negative/n-a): NA    Recent Labs     08/10/20  0639   WBC 4.9   NEUTSPOLYS 65.60     Recent Labs     20  0420 08/10/20  0639   BUN 9 8   CREATININE 0.85 0.91     Recent Labs     20  1151 20   VANCOTROUGH 8.8* 14.6       Intake/Output Summary (Last 24 hours) at 8/10/2020 0911  Last data filed at 8/10/2020 0800  Gross per 24 hour   Intake 695.7 ml   Output 1625 ml   Net -929.3 ml      /63   Pulse 75   Temp 37.4 °C (99.4 °F) (Temporal)   Resp 16   Ht 1.676 m (5' 6\")   Wt 66 kg (145 lb 8.1 oz)   SpO2 95%  Temp (24hrs), Av.3 °C (99.1 °F), Min:36.8 °C (98.2 °F), Max:38 °C (100.4 °F)      A/P   1. Vancomycin dose change: none  2. Next vancomycin level:  at 0830; ordered  3. Goal trough: 10-15 mcg/mL  4. Comments: No significant changes overnight, temp of 100.4 noted within past 24hrs.  Plan " for wound vac exchange today with potential further debridement if determined by surgery/ID team.  Will assess trough in ~2 days.      Ion Sylvester, MarieD, BCPS

## 2020-08-10 NOTE — PROGRESS NOTES
Bedside report received. Assessment completed.  Pt is A&O x4. Pt on room air.   Pain well controlled with PCA pump.   Denies nausea.   - numbness, - tingling.  L AKA with wound vac in place, CDI, no signs of air leak. Dressing changes MWF  R cheek carcinoma, ELIDA  L ear carcinoma, ELIDA  Pt incontinent of stool. Last BM 8/10. +flatus,   Pt incontinent of urine. Condom cath in place.   Diabetic dietdiet. Tolerates well.   Pt up x2 assist.  Call light within reach. All needs met at this time. Fall Precautions and hourly rounding in place.

## 2020-08-10 NOTE — WOUND TEAM
[facility]  Inpatient Services  Initial Wound and Skin Care Evaluation    Admission Date: 7/27/2020     Last order of IP CONSULT TO WOUND CARE was found on 8/2/2020 from Hospital Encounter on 7/24/2020     HPI, PMH, SH: Reviewed    Unit where seen by Wound Team: [id number]     WOUND CONSULT/FOLLOW UP RELATED TO: Vac change     Self Report / Pain Level:  Premedicated with IV pain meds, liquid 4% lidocaine.      OBJECTIVE:  Pt lying in bed, vac dressing in place. On waffle overlay.    WOUND TYPE, LOCATION, CHARACTERISTICS (Pressure Injuries: location, stage, POA or date identified)2+     Negative Pressure Wound Therapy 06/30/20 (Active)   Vacuum Serial Number [id number]    Dressing Type Gray Foam (Cleanse Choice)    Number of Foam Pieces Used 7    Canister Changed No    Output (mL) 0 mL    NEXT Dressing Change/Treatment Date 08/12/20    VAC VeraFlo Irrigant 1/4 Strength Dakins    VAC VeraFlo Soak Time (mins) 5    VAC VeraFlo Instill Volume (ml) 12    VAC VeraFlo - Therapy Time (hrs) 1    VAC VeraFlo Pressure (mm/Hg) Continuous;125 mmHg      Wound 06/30/20 Full Thickness Wound Leg Left open surgical full thickness (Active)   Wound Image      08/10/20 1158   Site Assessment Ramon;Yellow    Periwound Assessment Intact    Margins Attached edges    Closure Secondary intention    Drainage Amount Scant    Drainage Description Serosanguineous    Treatments Cleansed;Site care    Wound Cleansing Approved Wound Cleanser    Periwound Protectant Paste Ring;Drape;Benzoin    Dressing Cleansing/Solutions 1/4 Strength Dakin's Solution    Dressing Options Wound Vac    Dressing Changed Changed    Dressing Status Clean;Dry;Intact    Dressing Change/Treatment Frequency Monday, Wednesday, Friday, and As Needed    NEXT Dressing Change/Treatment Date 08/12/20    NEXT Weekly Photo (Inpatient Only) 08/17/20    Non-staged Wound Description Full thickness    Wound Length (cm)   Medial      Lateral   3.5 cm     1.3    Wound Width (cm) 4.9  cm     4.5    Wound Depth (cm) 4.1 cm     3.2    Wound Surface Area (cm^2) 17.15 cm^2    Wound Volume (cm^3) 70.32 cm^3    Wound Healing % -79    Wound Bed Slough (%) 70 %    Tunneling (cm) 9 cm    Tunneling Clock Position of Wound 1                11:00 6.2    Shape Irregular    Wound Odor None    Pulses N/A    Exposed Structures Muscle    WOUND NURSE ONLY - Time Spent with Patient (mins) 90        Wound 07/27/20 Pressure Injury Leg;Groin Left unstageable POA (Active)   Wound Image   08/10/20 1158   Site Assessment White;Yellow    Periwound Assessment Intact    Margins Attached edges    Closure None    Drainage Amount Small    Drainage Description Serosanguineous    Treatments Cleansed;Site care    Wound Cleansing Not Applicable    Periwound Protectant Skin Protectant Wipes to Periwound    Dressing Cleansing/Solutions Not Applicable    Dressing Options Hydrocolloid Thin    Dressing Changed New    Dressing Status Dry;Clean;Intact    Dressing Change/Treatment Frequency Every 48 hrs, and As Needed    NEXT Dressing Change/Treatment Date 08/12/20    NEXT Weekly Photo (Inpatient Only) 08/17/20    Wound Length (cm) 5 cm    Wound Width (cm) 1 cm    Wound Surface Area (cm^2) 5 cm^2    Shape Linear    Wound Odor None    Pulses N/A    Exposed Structures None    WOUND NURSE ONLY - Time Spent with Patient (mins) 30      Vascular:    PETRONA:   No results found.    Lab Values:    Lab Results   Component Value Date/Time    WBC 4.9 08/10/2020 06:39 AM    RBC 2.88 (L) 08/10/2020 06:39 AM    HEMOGLOBIN 9.2 (L) 08/10/2020 06:39 AM    HEMATOCRIT 28.5 (L) 08/10/2020 06:39 AM    HBA1C 7.3 (H) 07/27/2020 04:57 PM          Culture:   Obtained 7/27:  Culture Result Abnormal   Peptostreptococcus asaccharolyticus   Light growth     Culture Result Abnormal   Prevotella oralis   Light growth     Culture Result Abnormal   Propionibacterium species   Light growth          INTERVENTIONS BY WOUND TEAM:  Instilled 30 ml of fluid into VAC and aloud to  soak for 15 minutes.  Removed previous dressing, no retained foam. Cleansed wound with wound cleanser.  Measurements and pictures obtained of limb and left medial groin. Benzoin and paste ring to koko-wound. Mepitel one over sutures between wounds.  Strip of grey foam into tunnel of medial and lateral stump wound, filled depth with grey foam, covered with honey comb layer, and additional foam. bridged together, lateral wound filled with honey comb and covered with grey foam.  Sealed, suction obtained.  No leaks identified. Left medial thigh wound then cleansed, assessed, measured and photographed. Koko-wound prepped with no sting skin prep. A piece of thin hydrocolloid was applied over wound bed.     Medial groin wound cleansed, no sting skin prep to koko wound and covered with hydrocolloid thin.  Repositioned with pillows.     Interdisciplinary consultation: Patient, Bedside RN, Wound RN (Declan), Dr. Phan, ID doc    EVALUATION:  Veraflo therapy with Dakins will help debride slough and promote granulation tissue. Left medial thigh wound evolved into POA unstageable. Thin hydrocolloid applied to debride yellow tissue from wound bed.     Goals: Steady decrease in wound area and depth weekly.    NURSING PLAN OF CARE ORDERS (X):    Dressing changes: See Dressing Care orders: X  Skin care: See Skin Care orders:    Rectal tube care: See Rectal Tube Care orders:   Other orders:     WOUND TEAM PLAN OF CARE    Dressing changes by wound team:          Follow up 1-2 times weekly:               Follow up 3 times weekly:                NPWT change 3 times weekly:   X  Follow up as needed:       Other (explain):     Anticipated discharge plans:  LTACH:        SNF/Rehab:  X                Home Care:           Outpatient Wound Center:            Self Care:

## 2020-08-10 NOTE — CARE PLAN
Problem: Knowledge Deficit  Goal: Knowledge of disease process/condition, treatment plan, diagnostic tests, and medications will improve  Outcome: PROGRESSING AS EXPECTED  Note: Updated on POC and educated about medications     Problem: Pain Management  Goal: Pain level will decrease to patient's comfort goal  Outcome: PROGRESSING AS EXPECTED  Note: PCA in use, pt educated on use and medication

## 2020-08-10 NOTE — DISCHARGE PLANNING
Agency/Facility Name: [facility]  Spoke To: Minal  Outcome: Accepted, waiting on bed availability and discharge.

## 2020-08-10 NOTE — PROGRESS NOTES
Hospital Medicine Daily Progress Note    Date of Service  8/10/2020    Chief Complaint  75 y.o. male admitted 7/27/2020 with    Leg Pain         Hospital Course    74 y.o. male with a past medical history of hypertension, chronic pain, peripheral vascular disease, basal cell carcinoma of the face admitted 7/27/2020 with with vascular surgery for revision of a necrotic left AKA stump. 7/27 I&D Cul > MRSA & enterococcus. ID consulted . Repeat ID on 7/29    Interval Problem Update  none    Consultants/Specialty  Vascular surgery  Infectious disease      Code Status  full    Disposition  pending    Review of Systems  Review of Systems   Unable to perform ROS: Acuity of condition        Physical Exam  Temp:  [36.8 °C (98.2 °F)-38 °C (100.4 °F)] 37.4 °C (99.4 °F)  Pulse:  [75-86] 75  Resp:  [16-17] 16  BP: (131-151)/(59-68) 151/63  SpO2:  [92 %-95 %] 95 %    Physical Exam  Constitutional:       General: He is not in acute distress.     Appearance: He is not toxic-appearing.   HENT:      Head: Normocephalic.      Comments: Cancer lesion on the right side of the face     Nose: No congestion or rhinorrhea.      Mouth/Throat:      Mouth: Mucous membranes are moist.   Eyes:      Extraocular Movements: Extraocular movements intact.      Pupils: Pupils are equal, round, and reactive to light.   Neck:      Musculoskeletal: Normal range of motion and neck supple.   Cardiovascular:      Rate and Rhythm: Normal rate and regular rhythm.      Pulses: Normal pulses.      Heart sounds: Normal heart sounds.   Pulmonary:      Effort: Pulmonary effort is normal.      Breath sounds: Normal breath sounds.   Abdominal:      General: Abdomen is flat.      Palpations: Abdomen is soft.   Musculoskeletal:      Comments: Wound vac is in place   Skin:     Coloration: Skin is not jaundiced or pale.      Comments: Has cancer lesion on the right side of the face and is chronic   Neurological:      Mental Status: He is alert. He is disoriented.          Fluids    Intake/Output Summary (Last 24 hours) at 8/10/2020 1004  Last data filed at 8/10/2020 0800  Gross per 24 hour   Intake 695.7 ml   Output 1625 ml   Net -929.3 ml       Laboratory  Recent Labs     08/10/20  0639   WBC 4.9   RBC 2.88*   HEMOGLOBIN 9.2*   HEMATOCRIT 28.5*   MCV 99.0*   MCH 31.9   MCHC 32.3*   RDW 50.4*   PLATELETCT 221   MPV 9.4     Recent Labs     08/09/20  0420 08/10/20  0639   SODIUM 140 140   POTASSIUM 3.2* 3.4*   CHLORIDE 106 106   CO2 24 25   GLUCOSE 118* 183*   BUN 9 8   CREATININE 0.85 0.91   CALCIUM 7.5* 7.7*                   Imaging       Assessment/Plan  * AKA stump complication (HCC)  Assessment & Plan  Infected AKA, s/p I&D   Cultures positive for MRSA and group D enterococcus, ID consulted   S/p I&D Jul 27, 31 & 8/2      I.D input is noted  Surgical input is noted and the need for  surgical intervention  Will be determined with next wound vac change  on  8/10/20  Vancomycin   unasyn  Wound care  Wound vac is in place    Delirium  Assessment & Plan  Intermittent, multifactorial, infection, anesthesia    Arterial insufficiency (HCC)- (present on admission)  Assessment & Plan  Known history, admitted for amputation  Consider antiplatelet when okay with vascular    Diabetes (HCC)- (present on admission)  Assessment & Plan  With hyperglycemia  Glycated hemoglobin 7.3%  Short acting insulin   Accu-Checks, hypoglycemia protocol     HLD (hyperlipidemia)- (present on admission)  Assessment & Plan  Resume home statin therpay      HTN (hypertension)- (present on admission)  Assessment & Plan  Not well controlled  increasted lisinopril    Basal cell carcinoma of face- (present on admission)  Assessment & Plan  Chronic, outpatient follow-up  With dermatology    Basal cell carcinoma of earlobe- (present on admission)  Assessment & Plan  Chronic, outpatient follow-up       VTE prophylaxis: lovenox

## 2020-08-10 NOTE — PROGRESS NOTES
Infectious Disease Progress Note    Author: Roberto Osuna M.D. Date & Time of service: 8/10/2020  8:56 AM    Chief Complaint:  Infected above-the-knee amputation and vascular graft       Interval History:   AF WBC 12.7 disoriented no acute distress   AF plan for OR again tomorrow noted-VAC in place-denies SE abx. No new complaints   AF WBC 11.3 NPO for return to OR today-denies SE abx. Pain is controlled. No new complaints  8/3 AF, O2 RA,  I&D on 8/2   8/10 T-max 100.4 last night, white count 4.9, tolerating antibiotics, reexamination of wound today during wound VAC change     Review of Systems:  Review of Systems   Constitutional: Negative for chills and fever.   Gastrointestinal: Negative for abdominal pain, constipation, diarrhea, nausea and vomiting.   Musculoskeletal: Positive for joint pain and myalgias.       Hemodynamics:  Temp (24hrs), Av.3 °C (99.1 °F), Min:36.8 °C (98.2 °F), Max:38 °C (100.4 °F)  Temperature: 37.4 °C (99.4 °F)  Pulse  Av.7  Min: 47  Max: 103   Blood Pressure : 151/63       Physical Exam:  Physical Exam  Constitutional:       Comments: Appears frail and disheveled   Cardiovascular:      Rate and Rhythm: Normal rate and regular rhythm.      Heart sounds: Normal heart sounds.   Pulmonary:      Effort: Pulmonary effort is normal.   Abdominal:      General: Abdomen is flat. Bowel sounds are normal.      Palpations: Abdomen is soft.   Musculoskeletal:         General: Swelling, tenderness, deformity and signs of injury present.      Left lower leg: Edema present.      Comments: Deep tunneling, necrotic tissue and slough -did not reexamine today, wound VAC change planned today   Skin:     General: Skin is warm and dry.   Neurological:      General: No focal deficit present.      Mental Status: He is alert and oriented to person, place, and time.   Psychiatric:         Mood and Affect: Mood normal.         Behavior: Behavior normal.       Meds:    Current  Facility-Administered Medications:   •  lisinopril  •  vancomycin  •  dakins 0.125% (1/4 strength)  •  gabapentin  •  HYDROmorphone  •  ampicillin-sulbactam (UNASYN) IV  •  haloperidol lactate  •  LORazepam  •  haloperidol lactate  •  fentaNYL  •  MD Alert...Vancomycin per Pharmacy  •  senna-docusate **AND** polyethylene glycol/lytes **AND** magnesium hydroxide **AND** bisacodyl  •  NS  •  ondansetron  •  ondansetron  •  enalaprilat  •  atorvastatin  •  insulin regular **AND** POC Blood Glucose **AND** NOTIFY MD and PharmD **AND** glucose **AND** dextrose 50%  •  enoxaparin (LOVENOX) injection    Facility-Administered Medications Ordered in Other Encounters:   •  fentaNYL  •  propofol  •  ceFAZolin    Labs:  Recent Labs     08/10/20  0639   WBC 4.9   RBC 2.88*   HEMOGLOBIN 9.2*   HEMATOCRIT 28.5*   MCV 99.0*   MCH 31.9   RDW 50.4*   PLATELETCT 221   MPV 9.4   NEUTSPOLYS 65.60   LYMPHOCYTES 26.60   MONOCYTES 4.30   EOSINOPHILS 2.50   BASOPHILS 0.60     Recent Labs     08/09/20  0420 08/10/20  0639   SODIUM 140 140   POTASSIUM 3.2* 3.4*   CHLORIDE 106 106   CO2 24 25   GLUCOSE 118* 183*   BUN 9 8     Recent Labs     08/09/20  0420 08/10/20  0639   CREATININE 0.85 0.91       Imaging:  No results found.    Micro:  Results     ** No results found for the last 168 hours. **          Assessment:  74-year-old male with severe peripheral vascular disease, admitted with kamryn necrosis of his left above-the-knee amputation site, significant associated purulent myositis, osteomyelitis and polymicrobial infection after AKA for critical limb ischemia.       Plan:  Infected above-the-knee amputation/OM left  Infected vascular graft- now removed   s/p revision amputation on 07/27, but margins are not clear.    Repeat I and D necrotic muscle and fascia 7/31-less necrosis and purulence than 7/27  Operative cultures are polymicrobial: MRSA, Efaecalis, peptostrep, Prevotella, and propionibacterium  Per wound care, continues to exhibit  significant necrosis and deep tunneling  --- Continue IV vancomycin and Unasyn. Anticipate 6 week course from date of final surgery. Graft appears to have been removed entirely per op note on 7/27  -Monitor vancomycin levels and renal function  --- Surgical reassessment for possible return to the OR pending today     Diabetes, chronic  Hemoglobin A1c 7.3.  --- Will adversely affect wound healing and resolution of infection     Severe arterial insufficiency/PAD.    His amputation did show evidence of vascular calcifications and vascular   disease at the amputation site.    Anticipate he will have continued poor wound healing.       Basal cell carcinomas, face and ear  Per oncology     Discussed with primary, Dr. Nemuann.  ID will follow.  Please call with questions.

## 2020-08-10 NOTE — PROGRESS NOTES
Vascular surgery    Wound inspected at the time of VAC change  Moderate superficial necrotic material present within the wound    Continue wound VAC therapy  No plans for surgical debridement at this time    Okay to disposition patient with wound VAC therapy    We will follow along while the patient is here and arrange close follow-up once discharged.    Aquiles Phan MD

## 2020-08-11 LAB
ANION GAP SERPL CALC-SCNC: 8 MMOL/L (ref 7–16)
BASOPHILS # BLD AUTO: 0.5 % (ref 0–1.8)
BASOPHILS # BLD: 0.03 K/UL (ref 0–0.12)
BUN SERPL-MCNC: 7 MG/DL (ref 8–22)
CALCIUM SERPL-MCNC: 8.1 MG/DL (ref 8.5–10.5)
CHLORIDE SERPL-SCNC: 107 MMOL/L (ref 96–112)
CO2 SERPL-SCNC: 26 MMOL/L (ref 20–33)
CREAT SERPL-MCNC: 0.84 MG/DL (ref 0.5–1.4)
EOSINOPHIL # BLD AUTO: 0.14 K/UL (ref 0–0.51)
EOSINOPHIL NFR BLD: 2.5 % (ref 0–6.9)
ERYTHROCYTE [DISTWIDTH] IN BLOOD BY AUTOMATED COUNT: 51.2 FL (ref 35.9–50)
GLUCOSE BLD-MCNC: 125 MG/DL (ref 65–99)
GLUCOSE BLD-MCNC: 148 MG/DL (ref 65–99)
GLUCOSE BLD-MCNC: 161 MG/DL (ref 65–99)
GLUCOSE BLD-MCNC: 179 MG/DL (ref 65–99)
GLUCOSE SERPL-MCNC: 119 MG/DL (ref 65–99)
HCT VFR BLD AUTO: 29.8 % (ref 42–52)
HGB BLD-MCNC: 9.5 G/DL (ref 14–18)
IMM GRANULOCYTES # BLD AUTO: 0.01 K/UL (ref 0–0.11)
IMM GRANULOCYTES NFR BLD AUTO: 0.2 % (ref 0–0.9)
LYMPHOCYTES # BLD AUTO: 1.83 K/UL (ref 1–4.8)
LYMPHOCYTES NFR BLD: 33.1 % (ref 22–41)
MCH RBC QN AUTO: 31.5 PG (ref 27–33)
MCHC RBC AUTO-ENTMCNC: 31.9 G/DL (ref 33.7–35.3)
MCV RBC AUTO: 98.7 FL (ref 81.4–97.8)
MONOCYTES # BLD AUTO: 0.31 K/UL (ref 0–0.85)
MONOCYTES NFR BLD AUTO: 5.6 % (ref 0–13.4)
NEUTROPHILS # BLD AUTO: 3.21 K/UL (ref 1.82–7.42)
NEUTROPHILS NFR BLD: 58.1 % (ref 44–72)
NRBC # BLD AUTO: 0 K/UL
NRBC BLD-RTO: 0 /100 WBC
PLATELET # BLD AUTO: 228 K/UL (ref 164–446)
PMV BLD AUTO: 9.7 FL (ref 9–12.9)
POTASSIUM SERPL-SCNC: 4 MMOL/L (ref 3.6–5.5)
RBC # BLD AUTO: 3.02 M/UL (ref 4.7–6.1)
SODIUM SERPL-SCNC: 141 MMOL/L (ref 135–145)
WBC # BLD AUTO: 5.5 K/UL (ref 4.8–10.8)

## 2020-08-11 PROCEDURE — 700102 HCHG RX REV CODE 250 W/ 637 OVERRIDE(OP): Performed by: FAMILY MEDICINE

## 2020-08-11 PROCEDURE — 80048 BASIC METABOLIC PNL TOTAL CA: CPT

## 2020-08-11 PROCEDURE — 82962 GLUCOSE BLOOD TEST: CPT

## 2020-08-11 PROCEDURE — 700111 HCHG RX REV CODE 636 W/ 250 OVERRIDE (IP): Performed by: FAMILY MEDICINE

## 2020-08-11 PROCEDURE — 85025 COMPLETE CBC W/AUTO DIFF WBC: CPT

## 2020-08-11 PROCEDURE — 700101 HCHG RX REV CODE 250: Performed by: SURGERY

## 2020-08-11 PROCEDURE — 700111 HCHG RX REV CODE 636 W/ 250 OVERRIDE (IP): Performed by: INTERNAL MEDICINE

## 2020-08-11 PROCEDURE — 700105 HCHG RX REV CODE 258: Performed by: INTERNAL MEDICINE

## 2020-08-11 PROCEDURE — 36415 COLL VENOUS BLD VENIPUNCTURE: CPT

## 2020-08-11 PROCEDURE — A9270 NON-COVERED ITEM OR SERVICE: HCPCS | Performed by: HOSPITALIST

## 2020-08-11 PROCEDURE — 700105 HCHG RX REV CODE 258: Performed by: FAMILY MEDICINE

## 2020-08-11 PROCEDURE — 700111 HCHG RX REV CODE 636 W/ 250 OVERRIDE (IP): Performed by: SURGERY

## 2020-08-11 PROCEDURE — A9270 NON-COVERED ITEM OR SERVICE: HCPCS | Performed by: FAMILY MEDICINE

## 2020-08-11 PROCEDURE — 99232 SBSQ HOSP IP/OBS MODERATE 35: CPT | Performed by: FAMILY MEDICINE

## 2020-08-11 PROCEDURE — 700102 HCHG RX REV CODE 250 W/ 637 OVERRIDE(OP): Performed by: HOSPITALIST

## 2020-08-11 PROCEDURE — 99233 SBSQ HOSP IP/OBS HIGH 50: CPT | Performed by: INTERNAL MEDICINE

## 2020-08-11 PROCEDURE — 770021 HCHG ROOM/CARE - ISO PRIVATE

## 2020-08-11 RX ADMIN — GABAPENTIN 100 MG: 100 CAPSULE ORAL at 12:06

## 2020-08-11 RX ADMIN — LISINOPRIL 15 MG: 5 TABLET ORAL at 06:05

## 2020-08-11 RX ADMIN — GABAPENTIN 100 MG: 100 CAPSULE ORAL at 06:05

## 2020-08-11 RX ADMIN — INSULIN HUMAN 1 UNITS: 100 INJECTION, SOLUTION PARENTERAL at 20:30

## 2020-08-11 RX ADMIN — INSULIN HUMAN 1 UNITS: 100 INJECTION, SOLUTION PARENTERAL at 16:34

## 2020-08-11 RX ADMIN — AMPICILLIN SODIUM AND SULBACTAM SODIUM 3 G: 2; 1 INJECTION, POWDER, FOR SOLUTION INTRAMUSCULAR; INTRAVENOUS at 16:32

## 2020-08-11 RX ADMIN — ATORVASTATIN CALCIUM 40 MG: 40 TABLET, FILM COATED ORAL at 16:32

## 2020-08-11 RX ADMIN — SODIUM CHLORIDE: 9 INJECTION, SOLUTION INTRAVENOUS at 08:58

## 2020-08-11 RX ADMIN — SODIUM CHLORIDE: 9 INJECTION, SOLUTION INTRAVENOUS at 06:05

## 2020-08-11 RX ADMIN — ENOXAPARIN SODIUM 40 MG: 40 INJECTION SUBCUTANEOUS at 06:06

## 2020-08-11 RX ADMIN — VANCOMYCIN HYDROCHLORIDE 750 MG: 500 INJECTION, POWDER, LYOPHILIZED, FOR SOLUTION INTRAVENOUS at 09:52

## 2020-08-11 RX ADMIN — DAKIN'S SOLUTION 0.125% (QUARTER STRENGTH) 1 ML: 0.12 SOLUTION at 06:14

## 2020-08-11 RX ADMIN — AMPICILLIN SODIUM AND SULBACTAM SODIUM 3 G: 2; 1 INJECTION, POWDER, FOR SOLUTION INTRAMUSCULAR; INTRAVENOUS at 23:39

## 2020-08-11 RX ADMIN — VANCOMYCIN HYDROCHLORIDE 750 MG: 500 INJECTION, POWDER, LYOPHILIZED, FOR SOLUTION INTRAVENOUS at 20:34

## 2020-08-11 RX ADMIN — AMPICILLIN SODIUM AND SULBACTAM SODIUM 3 G: 2; 1 INJECTION, POWDER, FOR SOLUTION INTRAMUSCULAR; INTRAVENOUS at 06:05

## 2020-08-11 RX ADMIN — AMPICILLIN SODIUM AND SULBACTAM SODIUM 3 G: 2; 1 INJECTION, POWDER, FOR SOLUTION INTRAMUSCULAR; INTRAVENOUS at 12:06

## 2020-08-11 RX ADMIN — GABAPENTIN 100 MG: 100 CAPSULE ORAL at 16:32

## 2020-08-11 ASSESSMENT — ENCOUNTER SYMPTOMS
VOMITING: 0
DIARRHEA: 0
CHILLS: 0
ABDOMINAL PAIN: 0
NAUSEA: 0
MYALGIAS: 1
CONSTIPATION: 0
FEVER: 0

## 2020-08-11 NOTE — PROGRESS NOTES
"Pharmacy Kinetics 75 y.o. male on vancomycin day # 16 2020    Currently on Vancomycin 750 mg iv q12hr  Provider specified end date: 20 - 6 weeks after final surgery on     Indication for Treatment: osteomyelitis of AKA stump and infected vascular graft (graft fully removed on 20)     Pertinent history per medical record: Admitted on 2020 for osteomyelitis of AKA stump and infected vascular graft.  OR on  for I&D of muscle and fascia and graft removal.  Wound VAC in place. ID is following    Other antibiotics: ampicillin-sulbactam 3 gm IV q6h    Allergies: No known drug allergy     List concerns for renal function: age    Pertinent cultures to date:   20 tissue - left leg vascular graft: MRSA, E.faecalis  20 tissue - left leg vascular graft: Peptostreptococcus, Prevotella, Propionibacterium sp.     Recent Labs     08/10/20  0639 20  0525   WBC 4.9 5.5   NEUTSPOLYS 65.60 58.10     Recent Labs     20  0420 08/10/20  0639 20  0525   BUN 9 8 7*   CREATININE 0.85 0.91 0.84     Recent Labs     20   VANCOTROUGH 14.6       Intake/Output Summary (Last 24 hours) at 2020 0924  Last data filed at 2020 0815  Gross per 24 hour   Intake 1407.45 ml   Output 2200 ml   Net -792.55 ml      /73   Pulse (!) 51   Temp 37.6 °C (99.6 °F) (Temporal)   Resp 18   Ht 1.676 m (5' 6\")   Wt 66 kg (145 lb 8.1 oz)   SpO2 93%  Temp (24hrs), Av.3 °C (99.2 °F), Min:37.1 °C (98.7 °F), Max:37.7 °C (99.9 °F)      A/P   1. Vancomycin dose change: continue current dose - 750 mg IV q12h  2. Next vancomycin level:  @ 0830 - ordered  3. Goal trough: 10-15 mcg/mL  4. Comments: Renal function is stable.  Trough scheduled for tomorrow morning.  Jaelyn Reyna, PharmD, BCPS-ID      "

## 2020-08-11 NOTE — PROGRESS NOTES
2 RN skin check complete    Carcinoma to R cheek, ELIDA  Carcinoma to L ear, ELIDA  RUE dry, flaky, with scattered abrasions  LUE dry and flaky  Small scab to L knee, ELIDA  Sacrum red and blanching, R buttocks with moisture dermatitis, blanching.  Groin/ scrotal region with moisture dermatits, blanching, small tear ELIDA  Penis with small abrasion.   Red nonblanching. nonblanching red chris on penis.   Condom catheter changed.  L medial thigh with wound dressing in place. CDI. Orders to change Q 72.  L AKA with wound vac in place, CDI, no signs of air leak    Perineum cleansed with soap and water. Q 2 turns in place, waffle overlay in place, barrier paste applied.

## 2020-08-11 NOTE — PROGRESS NOTES
Hospital Medicine Daily Progress Note    Date of Service  8/11/2020    Chief Complaint  75 y.o. male admitted 7/27/2020 with    Leg Pain         Hospital Course    74 y.o. male with a past medical history of hypertension, chronic pain, peripheral vascular disease, basal cell carcinoma of the face admitted 7/27/2020 with with vascular surgery for revision of a necrotic left AKA stump. 7/27 I&D Cul > MRSA & enterococcus. ID consulted . Repeat ID on 7/29    Interval Problem Update  none    Consultants/Specialty  Vascular surgery  Infectious disease      Code Status  full    Disposition  pending    Review of Systems  Review of Systems   Unable to perform ROS: Acuity of condition        Physical Exam  Temp:  [37.1 °C (98.7 °F)-37.7 °C (99.9 °F)] 37.6 °C (99.6 °F)  Pulse:  [51-80] 51  Resp:  [16-18] 18  BP: (136-153)/(53-73) 153/73  SpO2:  [91 %-93 %] 93 %    Physical Exam  Constitutional:       Appearance: He is not toxic-appearing or diaphoretic.   HENT:      Head: Normocephalic.      Comments: Cancer lesion on the right side of the face     Nose: Nose normal.      Mouth/Throat:      Mouth: Mucous membranes are moist.   Eyes:      Conjunctiva/sclera: Conjunctivae normal.      Pupils: Pupils are equal, round, and reactive to light.   Neck:      Musculoskeletal: No neck rigidity or muscular tenderness.   Cardiovascular:      Rate and Rhythm: Normal rate and regular rhythm.      Heart sounds: No murmur. No friction rub.   Pulmonary:      Effort: No respiratory distress.      Breath sounds: No stridor.   Abdominal:      General: Abdomen is flat. Bowel sounds are normal.   Musculoskeletal:      Comments: Wound vac is in place   Skin:     General: Skin is warm and dry.      Comments: Has cancer lesion on the right side of the face and is chronic   Neurological:      Mental Status: He is alert. He is disoriented.         Fluids    Intake/Output Summary (Last 24 hours) at 8/11/2020 1046  Last data filed at 8/11/2020 0815  Gross  per 24 hour   Intake 1407.45 ml   Output 2200 ml   Net -792.55 ml       Laboratory  Recent Labs     08/10/20  0639 08/11/20  0525   WBC 4.9 5.5   RBC 2.88* 3.02*   HEMOGLOBIN 9.2* 9.5*   HEMATOCRIT 28.5* 29.8*   MCV 99.0* 98.7*   MCH 31.9 31.5   MCHC 32.3* 31.9*   RDW 50.4* 51.2*   PLATELETCT 221 228   MPV 9.4 9.7     Recent Labs     08/09/20  0420 08/10/20  0639 08/11/20  0525   SODIUM 140 140 141   POTASSIUM 3.2* 3.4* 4.0   CHLORIDE 106 106 107   CO2 24 25 26   GLUCOSE 118* 183* 119*   BUN 9 8 7*   CREATININE 0.85 0.91 0.84   CALCIUM 7.5* 7.7* 8.1*                   Imaging       Assessment/Plan  * AKA stump complication (HCC)  Assessment & Plan  Infected AKA, s/p I&D   Cultures positive for MRSA and group D enterococcus, ID consulted   S/p I&D Jul 27, 31 & 8/2      I.D input is noted  Surgical input is noted and pt is cleared for discharge   Vancomycin if the facility has pharmacist to dose and unasyn  And the last day would be 9/6/10  OR   unasyn + daptomycin and the last day would be 9/6/20  Wound care  Wound vac is in place  WILL D/W THE staff    Delirium  Assessment & Plan  Intermittent, multifactorial, infection, anesthesia    Arterial insufficiency (HCC)- (present on admission)  Assessment & Plan  Known history, admitted for amputation  Consider antiplatelet when okay with vascular    Diabetes (HCC)- (present on admission)  Assessment & Plan  With hyperglycemia  Glycated hemoglobin 7.3%  Short acting insulin   Accu-Checks, hypoglycemia protocol     HLD (hyperlipidemia)- (present on admission)  Assessment & Plan  Resume home statin therpay      HTN (hypertension)- (present on admission)  Assessment & Plan  Not well controlled  increasted lisinopril    Basal cell carcinoma of face- (present on admission)  Assessment & Plan  Chronic, outpatient follow-up  With dermatology    Basal cell carcinoma of earlobe- (present on admission)  Assessment & Plan  Chronic, outpatient follow-up       VTE prophylaxis:  lovenox

## 2020-08-11 NOTE — PROGRESS NOTES
Infectious Disease Progress Note    Author: Roberto Osuna M.D. Date & Time of service: 2020  9:28 AM    Chief Complaint:  Infected above-the-knee amputation and vascular graft       Interval History:   AF WBC 12.7 disoriented no acute distress   AF plan for OR again tomorrow noted-VAC in place-denies SE abx. No new complaints   AF WBC 11.3 NPO for return to OR today-denies SE abx. Pain is controlled. No new complaints  8/3 AF, O2 RA,  I&D on 8/2   8/10 T-max 100.4 last night, white count 4.9, tolerating antibiotics, reexamination of wound today during wound VAC change   afebrile overnight, white count 5.5, wound VAC change yesterday with necrotic appearing material at the wound.  Surgery recommending continued wound VAC therapy and discharge, no further surgical plans.  Patient tolerating antibiotics no new issues.     Review of Systems:  Review of Systems   Constitutional: Negative for chills and fever.   Gastrointestinal: Negative for abdominal pain, constipation, diarrhea, nausea and vomiting.   Musculoskeletal: Positive for joint pain and myalgias.     Hemodynamics:  Temp (24hrs), Av.3 °C (99.2 °F), Min:37.1 °C (98.7 °F), Max:37.7 °C (99.9 °F)  Temperature: 37.6 °C (99.6 °F)  Pulse  Av.4  Min: 47  Max: 103   Blood Pressure : 153/73       Physical Exam:  Physical Exam  Constitutional:       Comments: Appears frail and disheveled   Cardiovascular:      Rate and Rhythm: Normal rate and regular rhythm.      Heart sounds: Normal heart sounds.   Pulmonary:      Effort: Pulmonary effort is normal.   Abdominal:      General: Abdomen is flat. Bowel sounds are normal.      Palpations: Abdomen is soft.   Musculoskeletal:         General: Swelling, tenderness, deformity and signs of injury present.      Left lower leg: Edema present.      Comments: Deep tunneling, necrotic tissue and slough noted on wound VAC change pictures obtained from yesterday   Skin:     General: Skin is warm and dry.       Comments: Basal cell carcinoma right side of face   Neurological:      General: No focal deficit present.      Mental Status: He is alert and oriented to person, place, and time.   Psychiatric:         Mood and Affect: Mood normal.         Behavior: Behavior normal.       Meds:    Current Facility-Administered Medications:   •  lisinopril  •  vancomycin  •  dakins 0.125% (1/4 strength)  •  gabapentin  •  HYDROmorphone  •  ampicillin-sulbactam (UNASYN) IV  •  haloperidol lactate  •  LORazepam  •  haloperidol lactate  •  fentaNYL  •  MD Alert...Vancomycin per Pharmacy  •  senna-docusate **AND** polyethylene glycol/lytes **AND** magnesium hydroxide **AND** bisacodyl  •  NS  •  ondansetron  •  ondansetron  •  enalaprilat  •  atorvastatin  •  insulin regular **AND** POC Blood Glucose **AND** NOTIFY MD and PharmD **AND** glucose **AND** dextrose 50%  •  enoxaparin (LOVENOX) injection    Facility-Administered Medications Ordered in Other Encounters:   •  fentaNYL  •  propofol  •  ceFAZolin    Labs:  Recent Labs     08/10/20  0639 08/11/20  0525   WBC 4.9 5.5   RBC 2.88* 3.02*   HEMOGLOBIN 9.2* 9.5*   HEMATOCRIT 28.5* 29.8*   MCV 99.0* 98.7*   MCH 31.9 31.5   RDW 50.4* 51.2*   PLATELETCT 221 228   MPV 9.4 9.7   NEUTSPOLYS 65.60 58.10   LYMPHOCYTES 26.60 33.10   MONOCYTES 4.30 5.60   EOSINOPHILS 2.50 2.50   BASOPHILS 0.60 0.50     Recent Labs     08/09/20  0420 08/10/20  0639 08/11/20  0525   SODIUM 140 140 141   POTASSIUM 3.2* 3.4* 4.0   CHLORIDE 106 106 107   CO2 24 25 26   GLUCOSE 118* 183* 119*   BUN 9 8 7*     Recent Labs     08/09/20  0420 08/10/20  0639 08/11/20  0525   CREATININE 0.85 0.91 0.84       Imaging:  No results found.    Micro:  Results     ** No results found for the last 168 hours. 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       Plan:  Infected above-the-knee amputation/OM left  Infected vascular graft- now removed   s/p revision amputation on 07/27, but margins are not clear.    Repeat I and D necrotic muscle and fascia 7/31-less necrosis and purulence than 7/27  Operative cultures are polymicrobial: MRSA, Efaecalis, peptostrep, Prevotella, and propionibacterium  Per wound care, continues to exhibit significant necrosis and deep tunneling  --- Continue IV vancomycin and Unasyn. Anticipate 6 week course from date of final surgery. Graft appears to have been removed entirely per op note on 7/27  -Monitor vancomycin levels and renal function  --- Wound reexamined on 8/10 showed continued presence of deep tunneling, necrotic slough.  Surgery recommends no further surgical debridement, recommending continued wound VAC care and outpatient follow-up  -We will treat with IV antibiotics for 6 weeks as above.  Patient remains at risk of failure with conservative management, recommend close follow-up with wound care and surgery and frequent reassessment for need for surgery  -Plan is for discharge to LTAC.  Recommend IV daptomycin 8 mg/KG every 24 hours + IV Unasyn 3 g every 6 hours through 9/6/2020. If facility prefers to use vancomycin instead of daptomycin due to cost, would recommend doing so only in collaboration with a trained pharmacist to monitor vancomycin levels and renal function  -At least weekly CBC with differential, CMP, CPK while on IV antibiotics.  ESR and CRP every 2 weeks    Diabetes, chronic  Hemoglobin A1c 7.3.  --- Will adversely affect wound healing and resolution of infection     Severe arterial insufficiency/PAD.    His amputation did show evidence of vascular calcifications and vascular   disease at the amputation site.    Anticipate he will have continued poor wound healing. Remains at significant risk for reinfection     Basal cell carcinomas, face and  ear  Per oncology     Discussed with primary, Dr. Neumann.  ID will sign off.  Please call with questions.  Please call us back if any worsening or if surgery is performed    ID clinic follow-up in 4 to 6 weeks

## 2020-08-11 NOTE — CARE PLAN
Problem: Pain Management  Goal: Pain level will decrease to patient's comfort goal  Outcome: PROGRESSING AS EXPECTED  Note: PCA in use, pt educated on use     Problem: Skin Integrity  Goal: Risk for impaired skin integrity will decrease  Outcome: PROGRESSING AS EXPECTED  Note: Q2 turns in place, 2 RN skin checks in place, waffle cushion, barrier paste applied

## 2020-08-11 NOTE — PROGRESS NOTES
Pt AO x 3  Vitals signs stable  Pt denies chest pain or SOB  O2 sat >90% on RA breathing unlabored  Pt endorses moderate LLE pain, Fentanyl pump running per orders.   Pt denies N/V/D  + voiding in condom cath, + flatus, + small BM  WV in place on L AKA. Seal intact. Dakins running.     Updated plan of care discussed with pt. Safety education done. Falls precautions in place.   Pt safety maintained. Hourly rounding done.

## 2020-08-11 NOTE — PROGRESS NOTES
Bedside report received. Assessment completed.  Pt is A&O x4. Pt on room air.   Pain well controlled with PCA pump.   Denies nausea.   - numbness, - tingling.  L AKA with wound vac in place, CDI, no signs of air leak. Dressing changes MWF  R cheek carcinoma, ELIDA  L ear carcinoma, ELIDA  Pt incontinent of stool. Last BM 8/11. +flatus,   Pt incontinent of urine. Condom cath in place.   Diabetic diet. Tolerates well.   Pt up x2 assist.  Q2 turns in place. Waffle overlay in place  Call light within reach. All needs met at this time. Fall Precautions and hourly rounding in place.

## 2020-08-12 ENCOUNTER — APPOINTMENT (OUTPATIENT)
Dept: RADIOLOGY | Facility: MEDICAL CENTER | Age: 75
DRG: 475 | End: 2020-08-12
Attending: FAMILY MEDICINE
Payer: MEDICARE

## 2020-08-12 VITALS
SYSTOLIC BLOOD PRESSURE: 147 MMHG | OXYGEN SATURATION: 90 % | WEIGHT: 145.5 LBS | DIASTOLIC BLOOD PRESSURE: 55 MMHG | TEMPERATURE: 97.9 F | RESPIRATION RATE: 18 BRPM | HEIGHT: 66 IN | HEART RATE: 73 BPM | BODY MASS INDEX: 23.38 KG/M2

## 2020-08-12 LAB
ANION GAP SERPL CALC-SCNC: 10 MMOL/L (ref 7–16)
BUN SERPL-MCNC: 9 MG/DL (ref 8–22)
CALCIUM SERPL-MCNC: 8 MG/DL (ref 8.5–10.5)
CHLORIDE SERPL-SCNC: 107 MMOL/L (ref 96–112)
CO2 SERPL-SCNC: 23 MMOL/L (ref 20–33)
CREAT SERPL-MCNC: 0.94 MG/DL (ref 0.5–1.4)
GLUCOSE BLD-MCNC: 107 MG/DL (ref 65–99)
GLUCOSE BLD-MCNC: 122 MG/DL (ref 65–99)
GLUCOSE BLD-MCNC: 162 MG/DL (ref 65–99)
GLUCOSE SERPL-MCNC: 120 MG/DL (ref 65–99)
POTASSIUM SERPL-SCNC: 3.5 MMOL/L (ref 3.6–5.5)
SODIUM SERPL-SCNC: 140 MMOL/L (ref 135–145)
VANCOMYCIN TROUGH SERPL-MCNC: 14.6 UG/ML (ref 10–20)

## 2020-08-12 PROCEDURE — 700111 HCHG RX REV CODE 636 W/ 250 OVERRIDE (IP): Performed by: NURSE PRACTITIONER

## 2020-08-12 PROCEDURE — 700111 HCHG RX REV CODE 636 W/ 250 OVERRIDE (IP): Performed by: SURGERY

## 2020-08-12 PROCEDURE — 700105 HCHG RX REV CODE 258: Performed by: FAMILY MEDICINE

## 2020-08-12 PROCEDURE — 36415 COLL VENOUS BLD VENIPUNCTURE: CPT

## 2020-08-12 PROCEDURE — 97535 SELF CARE MNGMENT TRAINING: CPT

## 2020-08-12 PROCEDURE — 700102 HCHG RX REV CODE 250 W/ 637 OVERRIDE(OP): Performed by: HOSPITALIST

## 2020-08-12 PROCEDURE — 82962 GLUCOSE BLOOD TEST: CPT | Mod: 91

## 2020-08-12 PROCEDURE — 76937 US GUIDE VASCULAR ACCESS: CPT

## 2020-08-12 PROCEDURE — 05HY33Z INSERTION OF INFUSION DEVICE INTO UPPER VEIN, PERCUTANEOUS APPROACH: ICD-10-PCS | Performed by: FAMILY MEDICINE

## 2020-08-12 PROCEDURE — 80202 ASSAY OF VANCOMYCIN: CPT

## 2020-08-12 PROCEDURE — A9270 NON-COVERED ITEM OR SERVICE: HCPCS | Performed by: HOSPITALIST

## 2020-08-12 PROCEDURE — A9270 NON-COVERED ITEM OR SERVICE: HCPCS | Performed by: FAMILY MEDICINE

## 2020-08-12 PROCEDURE — 700111 HCHG RX REV CODE 636 W/ 250 OVERRIDE (IP): Performed by: INTERNAL MEDICINE

## 2020-08-12 PROCEDURE — 97530 THERAPEUTIC ACTIVITIES: CPT

## 2020-08-12 PROCEDURE — 302098 PASTE RING (FLAT): Performed by: FAMILY MEDICINE

## 2020-08-12 PROCEDURE — 99239 HOSP IP/OBS DSCHRG MGMT >30: CPT | Performed by: FAMILY MEDICINE

## 2020-08-12 PROCEDURE — 700105 HCHG RX REV CODE 258: Performed by: INTERNAL MEDICINE

## 2020-08-12 PROCEDURE — 700102 HCHG RX REV CODE 250 W/ 637 OVERRIDE(OP): Performed by: FAMILY MEDICINE

## 2020-08-12 PROCEDURE — 700101 HCHG RX REV CODE 250: Performed by: SURGERY

## 2020-08-12 PROCEDURE — 80048 BASIC METABOLIC PNL TOTAL CA: CPT

## 2020-08-12 PROCEDURE — 700111 HCHG RX REV CODE 636 W/ 250 OVERRIDE (IP): Performed by: FAMILY MEDICINE

## 2020-08-12 RX ORDER — LIDOCAINE HYDROCHLORIDE 40 MG/ML
SOLUTION TOPICAL
Status: DISCONTINUED | OUTPATIENT
Start: 2020-08-12 | End: 2020-08-12 | Stop reason: HOSPADM

## 2020-08-12 RX ORDER — ASPIRIN 81 MG/1
81 TABLET ORAL DAILY
Qty: 30 TAB | Refills: 0 | Status: SHIPPED | OUTPATIENT
Start: 2020-08-12

## 2020-08-12 RX ORDER — LISINOPRIL 5 MG/1
15 TABLET ORAL DAILY
Qty: 30 TAB | Refills: 0 | Status: SHIPPED | OUTPATIENT
Start: 2020-08-13

## 2020-08-12 RX ORDER — ATORVASTATIN CALCIUM 40 MG/1
40 TABLET, FILM COATED ORAL EVERY EVENING
Qty: 30 TAB | Refills: 0 | Status: SHIPPED | OUTPATIENT
Start: 2020-08-12

## 2020-08-12 RX ORDER — GABAPENTIN 100 MG/1
100 CAPSULE ORAL 3 TIMES DAILY
Qty: 90 CAP | Refills: 0 | Status: SHIPPED | OUTPATIENT
Start: 2020-08-12

## 2020-08-12 RX ORDER — TRAMADOL HYDROCHLORIDE 50 MG/1
50 TABLET ORAL EVERY 4 HOURS PRN
Qty: 9 TAB | Refills: 0 | Status: SHIPPED | OUTPATIENT
Start: 2020-08-12 | End: 2020-08-15

## 2020-08-12 RX ADMIN — SODIUM CHLORIDE: 9 INJECTION, SOLUTION INTRAVENOUS at 10:15

## 2020-08-12 RX ADMIN — LISINOPRIL 15 MG: 5 TABLET ORAL at 04:43

## 2020-08-12 RX ADMIN — ATORVASTATIN CALCIUM 40 MG: 40 TABLET, FILM COATED ORAL at 17:23

## 2020-08-12 RX ADMIN — INSULIN HUMAN 1 UNITS: 100 INJECTION, SOLUTION PARENTERAL at 11:39

## 2020-08-12 RX ADMIN — AMPICILLIN SODIUM AND SULBACTAM SODIUM 3 G: 2; 1 INJECTION, POWDER, FOR SOLUTION INTRAMUSCULAR; INTRAVENOUS at 12:40

## 2020-08-12 RX ADMIN — VANCOMYCIN HYDROCHLORIDE 750 MG: 500 INJECTION, POWDER, LYOPHILIZED, FOR SOLUTION INTRAVENOUS at 10:15

## 2020-08-12 RX ADMIN — GABAPENTIN 100 MG: 100 CAPSULE ORAL at 17:23

## 2020-08-12 RX ADMIN — AMPICILLIN SODIUM AND SULBACTAM SODIUM 3 G: 2; 1 INJECTION, POWDER, FOR SOLUTION INTRAMUSCULAR; INTRAVENOUS at 17:27

## 2020-08-12 RX ADMIN — AMPICILLIN SODIUM AND SULBACTAM SODIUM 3 G: 2; 1 INJECTION, POWDER, FOR SOLUTION INTRAMUSCULAR; INTRAVENOUS at 04:44

## 2020-08-12 RX ADMIN — GABAPENTIN 100 MG: 100 CAPSULE ORAL at 04:43

## 2020-08-12 RX ADMIN — ENOXAPARIN SODIUM 40 MG: 40 INJECTION SUBCUTANEOUS at 04:43

## 2020-08-12 RX ADMIN — Medication: at 09:29

## 2020-08-12 RX ADMIN — DAKIN'S SOLUTION 0.125% (QUARTER STRENGTH) 473 ML: 0.12 SOLUTION at 04:43

## 2020-08-12 RX ADMIN — GABAPENTIN 100 MG: 100 CAPSULE ORAL at 11:22

## 2020-08-12 ASSESSMENT — COGNITIVE AND FUNCTIONAL STATUS - GENERAL
DAILY ACTIVITIY SCORE: 16
HELP NEEDED FOR BATHING: A LOT
DRESSING REGULAR UPPER BODY CLOTHING: A LITTLE
TOILETING: A LOT
PERSONAL GROOMING: A LITTLE
SUGGESTED CMS G CODE MODIFIER DAILY ACTIVITY: CK
DRESSING REGULAR LOWER BODY CLOTHING: A LOT

## 2020-08-12 NOTE — DISCHARGE SUMMARY
Discharge Summary    CHIEF COMPLAINT ON ADMISSION  No chief complaint on file.      Reason for Admission  DEHISCENCE OF AMPUTATION STUMP     Admission Date  7/27/2020    CODE STATUS  Full Code    HPI & HOSPITAL COURSE  This is a 75 y.o. male here with  Past medical history of DVT, known peripheral vascular disease who presented to the hospital with acute constant left-sided leg pain.  Started 2 days prior to arrival.  Localized below the knee and worse with any movement.  He also had some mild numbness and tingling to the left lower extremity.  Patient was found to have evidence of arterial occlusion and taken to the operating room for left-sided AKA.  Otherwise no known alleviating or exacerbating factors to his symptoms.  He was admitted with left lower limb ischemia and Found to have left fem pop bypass occlusion .  Vascular surgery was consulted  And he had left above knee amputation and he was started on unasyn and vancomycin and wound vac was applied.he was taken to the O.R for the 2nd time and had  revision of left AKA with transection of the mid-femur  - application of negative pressure dressing greater than 50 sq cm.  I.D was consulted and has recommended daptomycin and unasyn   And the last day for both would be 9/6/20.  He is accepted to SNF and will be discharged there and he has been cleared by surgery for discharge.      Constitutional:       Appearance: He is not toxic-appearing or diaphoretic.   HENT:      Head: Normocephalic.      Comments: Cancer lesion on the right side of the face     Nose: Nose normal.      Mouth/Throat:      Mouth: Mucous membranes are moist.   Eyes:      Conjunctiva/sclera: Conjunctivae normal.      Pupils: Pupils are equal, round, and reactive to light.   Neck:      Musculoskeletal: No neck rigidity or muscular tenderness.   Cardiovascular:      Rate and Rhythm: Normal rate and regular rhythm.      Heart sounds: No murmur. No friction rub.   Pulmonary:      Effort: No  respiratory distress.      Breath sounds: No stridor.   Abdominal:      General: Abdomen is flat. Bowel sounds are normal.   Musculoskeletal:      Comments: Wound vac is in place   Skin:     General: Skin is warm and dry.      Comments: Has cancer lesion on the right side of the face and is chronic   Neurological:      Mental Status: He is alert.       AKA stump complication (HCC)  Assessment & Plan  Infected AKA, s/p I&D   Cultures positive for MRSA and group D enterococcus, ID consulted   S/p I&D Jul 27, 31 & 8/2      I.D input is noted  Surgical input is noted and pt is cleared for discharge    unasyn + daptomycin and the last day would be 9/6/20  Wound care  Wound vac is in place            Arterial insufficiency (HCC)- (present on admission)  Assessment & Plan  Known history, admitted for amputation  Aspirin daily     Diabetes (HCC)- (present on admission)  Assessment & Plan  With hyperglycemia  Glycated hemoglobin 7.3%  Short acting insulin   Accu-Checks, hypoglycemia protocol      HLD (hyperlipidemia)- (present on admission)  Assessment & Plan  Resume home statin therpay       HTN (hypertension)- (present on admission)  Assessment & Plan  Not well controlled  increased lisinopril     Basal cell carcinoma of face- (present on admission)  Assessment & Plan  Chronic, outpatient follow-up  With dermatology     Basal cell carcinoma of earlobe- (present on admission)  Assessment & Plan  Chronic, outpatient follow-up      Therefore, he is discharged in fair and stable condition .    The patient met 2-midnight criteria for an inpatient stay at the time of discharge.    Discharge Date  8/12/20    FOLLOW UP ITEMS POST DISCHARGE  PCP NEXT WEEK      DISCHARGE DIAGNOSES  Principal Problem:    AKA stump complication (HCC) POA: Unknown  Active Problems:    PVD (peripheral vascular disease) with claudication (HCC) (Chronic) POA: Yes    Arterial insufficiency (HCC) POA: Yes    Delirium POA: Unknown    Diabetes (HCC) POA:  Yes    Ischemia of extremity POA: Yes    Chronic anticoagulation POA: Yes    Long term current use of anticoagulant therapy POA: Yes      Overview: ICD-10 transition    Basal cell carcinoma of earlobe (Chronic) POA: Yes    Basal cell carcinoma of face (Chronic) POA: Yes    HTN (hypertension) POA: Yes    HLD (hyperlipidemia) POA: Yes  Resolved Problems:    * No resolved hospital problems. *      FOLLOW UP  No future appointments.  [facility]  [address] [facility]  [address]  [facility] [address] [phone]          MEDICATIONS ON DISCHARGE     Medication List      START taking these medications      Instructions   aspirin 81 MG EC tablet   Take 1 Tab by mouth every day.  Dose: 81 mg     atorvastatin 40 MG Tabs  Commonly known as: LIPITOR   Take 1 Tab by mouth every evening.  Dose: 40 mg     gabapentin 100 MG Caps  Commonly known as: NEURONTIN   Take 1 Cap by mouth 3 times a day.  Dose: 100 mg     lisinopril 5 MG Tabs  Start taking on: August 13, 2020  Commonly known as: PRINIVIL   Take 3 Tabs by mouth every day.  Dose: 15 mg     NS SOLN 100 mL with ampicillin/sulbactam 3 (2-1) g SOLR 3 g   Doctor's comments: The last day is 9/6/20  3 g by Intravenous route every 6 hours.  Dose: 3 g     NS SOLN 50 mL with DAPTOmycin 350 MG SOLR 1,000 mg   Doctor's comments: Last day is 9/6/20  Check cpk,cmp and cbc weekly  1,000 mg by Intravenous route every 24 hours.  Dose: 8 mg/kg     tramadol 50 MG Tabs  Commonly known as: ULTRAM   Take 1 Tab by mouth every four hours as needed for up to 3 days.  Dose: 50 mg            Allergies  Allergies   Allergen Reactions   • No Known Drug Allergy        DIET  Orders Placed This Encounter   Procedures   • Diet Order Diabetic     Standing Status:   Standing     Number of Occurrences:   1     Order Specific Question:   Diet:     Answer:   Diabetic [3]       ACTIVITY  As tolerated.      CONSULTATIONS  Surgery  I.D    PROCEDURES  Left AKA    LABORATORY  Lab Results    Component Value Date    SODIUM 140 08/12/2020    POTASSIUM 3.5 (L) 08/12/2020    CHLORIDE 107 08/12/2020    CO2 23 08/12/2020    GLUCOSE 120 (H) 08/12/2020    BUN 9 08/12/2020    CREATININE 0.94 08/12/2020        Lab Results   Component Value Date    WBC 5.5 08/11/2020    HEMOGLOBIN 9.5 (L) 08/11/2020    HEMATOCRIT 29.8 (L) 08/11/2020    PLATELETCT 228 08/11/2020        Total time of the discharge process exceeds 35 minutes.

## 2020-08-12 NOTE — DISCHARGE PLANNING
Agency/Facility Name: [facility]  Spoke To: Petty  Outcome: Transport pushed to 1800 going to [facility] as requested by kassandra Montesinos.    RNCM Elisa notified.

## 2020-08-12 NOTE — PROGRESS NOTES
2 RN skin check complete.   Devices in place wound vac to right AKA, pulse ox, PIV x1, and condom catheter.   Skin assessed under devices: yes.  Wound to left medial thigh covered with dressing, CDI (to be changed Q72). Skin on upper extremities fragile and flaky. Cheek carcinoma ELIDA. Left ear carcinoma ELIDA. Sacrum red and covered with moisture dermatitis - blanching. Small abrasion to penis - non blanching.   Skin overall red and very fragile.  Wound already onboard.   The following interventions in place: Q2 turns, frequent checks for incontinence, condom catheter, barrier paste, and waffle overlay.

## 2020-08-12 NOTE — PROCEDURES
Vascular Access Team    Date of Insertion: 8/12/2020  Arm Circumference: 25  Internal length: 15  External Length: 0  Vein Occupancy %: 31  Reason for Midline: antibiotic therapy   Labs: on 8/11/2020 WBC 5.5, , on 8/12/2020 BUN 9, Cr 0.94, GFR >60, INR na    Orders confirmed, vessel patency confirmed with ultrasound. Risks and benefits of procedure explained to patient and education regarding line associated bloodstream infections provided. Questions answered.     Power Midline placed in LUE per licensed provider order with ultrasound guidance. 4 Fr, single lumen Power Midline placed in basilic vein after 1 attempt(s). 2 mL of 1% lidocaine injected intradermally, 21 gauge microintroducer needle and modified Seldinger technique used. 15 cm catheter inserted with good blood return. Secured at 0 cm marker. Each lumen flushed without resistance with 10 mL 0.9% normal saline. Midline secured with Biopatch and Tegaderm.     Midline placement is confirmed by nurse using ultrasound and ability to flush and draw blood. Midline is appropriate for use at this time.  Patient tolerated procedure well, without complications.  No X-ray is needed for placement confirmation.  Patient condition relayed to unit RN or ordering physician via this post procedure note in the EMR.     Ultrasound images uploaded to PACS and viewable in the EMR - yes  Ultrasound imaged printed and placed in paper chart - no     BARD Power Midline ref # [id number], Lot # [id number], Expiration Date 07/31/2021

## 2020-08-12 NOTE — PROGRESS NOTES
Report given to LOUIS Vital at [facility]. All concerns and questions addressed.     LUE midline catheter established by VAT.   Wet to dry dressing in place over (L) stump, changed by WOCN.   Updated receiving RN regarding tunneling wound (from pressure injury noted in the groin wound).   Per JATINDER Boone, she is to update wound care RN at [facility] regarding wound vac change to (L) stump and the tunneling wound to groin wound.   RN CM and SW updated that patient is still on Fentanyl PCA. [facility] able to administer IV pain medications to help address patient's pain.

## 2020-08-12 NOTE — DISCHARGE PLANNING
Agency/Facility Name: [facility]  Spoke To: [facility]  Outcome: Bed available for patient today.     RNCARROLL Pérez notified.

## 2020-08-12 NOTE — DISCHARGE PLANNING
Received Transport Form @ 4350  Spoke to Petty @ [facility]    Transport is scheduled for 8/12 @1630 going to [facility].    HILL Pérez notified.   [facility] @ [facility] aware.

## 2020-08-12 NOTE — THERAPY
"Occupational Therapy  Daily Treatment     Patient Name: Jimenez Bains  Age:  75 y.o., Sex:  male  Medical Record #: [id number]  Today's Date: 2020     Precautions  Precautions: Fall Risk, Non Weight Bearing Left Lower Extremity  Comments: L AKA with sound vac    Assessment    Patient seen for OT treatment this morning. He required Mod A to get to EOB, and once there he relied heavily on BUE for balance, expressed fear of falling. He does not do well following verbal or tactile cues to move BUE into more supportive position. Patient assisted with scooting to have RLE touch ground for added balance. Patient began to weep and reported high levels of pain. PCA provided for patient to self administer. He was able to tolerate for less than 10 mins. Given poor balance and pain, did not attempt to stand or transfer this session. Will continue to follow.     Plan    Continue current treatment plan.    DC Equipment Recommendations: Unable to determine at this time  Discharge Recommendations: Recommend post-acute placement for additional occupational therapy services prior to discharge home    Subjective    \"It (wound vac) is turned up too high, it's hurting me.\"     Objective       20 0859   Cognition    Cognition / Consciousness X   Orientation Level Not Oriented to Month;Not Oriented to Day;Not Oriented to Age   Level of Consciousness Alert   Ability To Follow Commands 1 Step   Safety Awareness Impaired   Attention Impaired   Sequencing Impaired   Comments Cooperative, but very emotional, crying through session.  entered to get blood and patient wasn't able to provide his correct    Balance   Sitting Balance (Static) Poor +   Sitting Balance (Dynamic) Poor   Weight Shift Sitting Poor   Skilled Intervention Compensatory Strategies;Facilitation;Postural Facilitation;Tactile Cuing;Verbal Cuing   Comments Heavy reliance on BUE, not following directions in regards to positioning for improved balance "   Activities of Daily Living   Grooming Supervision  (supine)   Lower Body Dressing Maximal Assist   Skilled Intervention Verbal Cuing;Compensatory Strategies   Functional Mobility   Sit to Stand Unable to Participate   Bed, Chair, Wheelchair Transfer Unable to Participate   Transfer Method Stand Pivot   Mobility sup><sit   Short Term Goals   Short Term Goal # 1 pt will complete grooming seated w/set up    Goal Outcome # 1 Progressing as expected   Short Term Goal # 2 pt will complete txf to BSC w/spv    Goal Outcome # 2 Progressing slower than expected   Short Term Goal # 3 pt will complete LB dressing w/mod I (long sitting in bed)    Goal Outcome # 3 Progressing slower than expected

## 2020-08-12 NOTE — PROGRESS NOTES
Chart reviewed. Assessment completed.   Pt is A&Ox4.   Reports 3/10 pain on his (L) stump, Fentanyl PCA in place for pain management.   (L) AKA with wound vac in place, dressing CDI, due to be changed today 8/12   Patient requires max assist to mobilize. Bed alarm on.   On RA, denies SOB or chest pain.   Normoactive BS x 4. Tolerating diabetic diet. Denies N&V.   + flatus, LBM 8/11   + void via condom cath, draining clear, yellow urine.   PIV running IVF  Updated on POC. Belongings and call light within reach. All needs met at this time.

## 2020-08-12 NOTE — DISCHARGE INSTRUCTIONS
Discharge Instructions    Discharged to other by medical transportation with escort. Discharged via ambulance, hospital escort: Yes.  Special equipment needed: Not Applicable    Be sure to schedule a follow-up appointment with your primary care doctor or any specialists as instructed.     Discharge Plan:   Diet Plan: Discussed  Activity Level: Discussed  Smoking Cessation Offered: Patient Refused  Confirmed Follow up Appointment: Patient to Call and Schedule Appointment  Confirmed Symptoms Management: Discussed  Medication Reconciliation Updated: Yes    I understand that a diet low in cholesterol, fat, and sodium is recommended for good health. Unless I have been given specific instructions below for another diet, I accept this instruction as my diet prescription.   Other diet: Diabetic    Special Instructions: None    · Is patient discharged on Warfarin / Coumadin?   No     Depression / Suicide Risk    As you are discharged from this [facility] facility, it is important to learn how to keep safe from harming yourself.    Recognize the warning signs:  · Abrupt changes in personality, positive or negative- including increase in energy   · Giving away possessions  · Change in eating patterns- significant weight changes-  positive or negative  · Change in sleeping patterns- unable to sleep or sleeping all the time   · Unwillingness or inability to communicate  · Depression  · Unusual sadness, discouragement and loneliness  · Talk of wanting to die  · Neglect of personal appearance   · Rebelliousness- reckless behavior  · Withdrawal from people/activities they love  · Confusion- inability to concentrate     If you or a loved one observes any of these behaviors or has concerns about self-harm, here's what you can do:  · Talk about it- your feelings and reasons for harming yourself  · Remove any means that you might use to hurt yourself (examples: pills, rope, extension cords, firearm)  · Get professional help from the  community (Mental Health, Substance Abuse, psychological counseling)  · Do not be alone:Call your Safe Contact- someone whom you trust who will be there for you.  · Call your local CRISIS HOTLINE 816-2628 or 796-602-7614  · Call your local Children's Mobile Crisis Response Team [place] (724) 935-1742 or www.[place]  · Call the toll free National Suicide Prevention Hotlines   · National Suicide Prevention Lifeline 056-891-INAR (3146)  · National Hope Line Network 800-SUICIDE (539-9297)              Osteomyelitis, Adult    Bone infections (osteomyelitis) occur when bacteria or other germs get inside a bone. This can happen if you have an infection in another part of your body that spreads through your blood. Germs from your skin or from outside of your body can also cause this type of infection if you have a wound or a broken bone (fracture) that breaks the skin.  Bone infections need to be treated quickly to prevent bone damage and to prevent the infection from spreading to other areas of your body.  What are the causes?  Most bone infections are caused by bacteria. They can also be caused by other germs, such as viruses and funguses.  What increases the risk?  You are more likely to develop this condition if you:  · Recently had surgery, especially bone or joint surgery.  · Have a long-term (chronic) disease, such as:  ? Diabetes.  ? HIV (human immunodeficiency virus).  ? Rheumatoid arthritis.  ? Sickle cell anemia.  ? Kidney disease that requires dialysis.  · Are aged 60 years or older.  · Have a condition or take medicines that block or weaken your body's defense system (immune system).  · Have a condition that reduces your blood flow.  · Have an artificial joint.  · Have had a joint or bone repaired with plates or screws (surgical hardware).  · Use IV drugs.  · Have a central line for IV access.  · Have had trauma, such as stepping on a nail or a broken bone that came through the skin.  What are  the signs or symptoms?  Symptoms vary depending on the type and location of your infection. Common symptoms of bone infections include:  · Fever and chills.  · Skin redness and warmth.  · Swelling.  · Pain and stiffness.  · Drainage of fluid or pus near the infection.  How is this diagnosed?  This condition may be diagnosed based on:  · Your symptoms and medical history.  · A physical exam.  · Tests, such as:  ? A sample of tissue, fluid, or blood taken to be examined under a microscope.  ? Pus or discharge swabbed from a wound for testing to identify germs and to determine what type of medicine will kill them (culture and sensitivity).  ? Blood tests.  · Imaging studies. These may include:  ? X-rays.  ? MRI.  ? CT scan.  ? Bone scan.  ? Ultrasound.  How is this treated?  Treatment for this condition depends on the cause and type of infection. Antibiotic medicines are usually the first treatment for a bone infection. This may be done in a hospital at first. You may have to continue IV antibiotics at home or take antibiotics by mouth for several weeks after that.  Other treatments may include surgery to remove:  · Dead or dying tissue from a bone.  · An infected artificial joint.  · Infected plates or screws that were used to repair a broken bone.  Follow these instructions at home:  Medicines    · Take over-the-counter and prescription medicines only as told by your health care provider.  · Take your antibiotic medicine as told by your health care provider. Do not stop taking the antibiotic even if you start to feel better.  · Follow instructions from your health care provider about how to take IV antibiotics at home. You may need to have a nurse come to your home to give you the IV antibiotics.  General instructions    · Ask your health care provider if you have any restrictions on your activities.  · If directed, put ice on the affected area:  ? Put ice in a plastic bag.  ? Place a towel between your skin and the  bag.  ? Leave the ice on for 20 minutes, 2-3 times a day.  · Wash your hands often with soap and water. If soap and water are not available, use hand .  · Do not use any products that contain nicotine or tobacco, such as cigarettes and e-cigarettes. These can delay bone healing. If you need help quitting, ask your health care provider.  · Keep all follow-up visits as told by your health care provider. This is important.  Contact a health care provider if:  · You develop a fever or chills.  · You have redness, warmth, pain, or swelling that returns after treatment.  Get help right away if:  · You have rapid breathing or you have trouble breathing.  · You have chest pain.  · You cannot drink fluids or make urine.  · The affected area swells, changes color, or turns blue.  · You have numbness or severe pain in the affected area.  Summary  · Bone infections (osteomyelitis) occur when bacteria or other germs get inside a bone.  · You may be more likely to get this type of infection if you have a condition, such as diabetes, that lowers your ability to fight infection or increases your chances of getting an infection.  · Most bone infections are caused by bacteria. They can also be caused by other germs, such as viruses and funguses.  · Treatment for this condition usually starts with taking antibiotics. Further treatment depends on the cause and type of infection.  This information is not intended to replace advice given to you by your health care provider. Make sure you discuss any questions you have with your health care provider.  Document Released: 12/18/2006 Document Revised: 01/03/2019 Document Reviewed: 12/27/2018  [employer] Patient Education © 2020 [employer]      Leg Amputation              Leg amputation is a procedure to remove the leg. You may have this procedure if your leg has been affected by a disease, such as peripheral artery disease or severe circulation disease, and is causing problems.  There are three types of leg amputation:  · Above-the-knee amputation (transfemoral amputation). This type is done to remove the leg from above the knee.  · Below-the-knee amputation (transtibial amputation). This type is done to remove the leg from below the knee.  · Through-knee amputation. This type is done to remove the leg at the knee joint.  Tell a health care provider about:  · Any allergies you have.  · All medicines you are taking, including vitamins, herbs, eye drops, creams, and over-the-counter medicines.  · Any problems you or family members have had with anesthetic medicines.  · Any blood disorders you have.  · Any surgeries you have had.  · Any medical conditions you have.  · Whether you are pregnant or may be pregnant.  What are the risks?  Generally, this is a safe procedure. However, problems may occur, including:  · Infection.  · Bleeding.  · Allergic reactions to medicines.  · Stiffening of the hip and knee joint (hip and knee contracture).  · Blood clot in a deep vein (deep vein thrombosis, or DVT), usually in the leg.  · A blood clot in your lung (pulmonary embolism).  What happens before the procedure?  Staying hydrated  Follow instructions from your health care provider about hydration, which may include:  · Up to 2 hours before the procedure - you may continue to drink clear liquids, such as water, clear fruit juice, black coffee, and plain tea.  Eating and drinking restrictions  Follow instructions from your health care provider about eating and drinking, which may include:  · 8 hours before the procedure - stop eating heavy meals or foods such as meat, fried foods, or fatty foods.  · 6 hours before the procedure - stop eating light meals or foods, such as toast or cereal.  · 6 hours before the procedure - stop drinking milk or drinks that contain milk.  · 2 hours before the procedure - stop drinking clear liquids.  Medicines  · Ask your health care provider about:  ? Changing or stopping  your regular medicines. This is especially important if you are taking diabetes medicines or blood thinners.  ? Taking over-the-counter medicines, vitamins, herbs, and supplements.  ? Taking medicines such as aspirin and ibuprofen. These medicines can thin your blood. Do not take these medicines unless your health care provider tells you to take them.  · You may be given antibiotic medicine to help prevent an infection. If so, take the antibiotic as told by your health care provider.  General instructions  · Ask your health care provider how your surgical site will be marked or identified.  · Plan to have someone take you home from the hospital or clinic.  · Plan to have a responsible adult care for you for at least 24 hours after you leave the hospital or clinic. This is important.  What happens during the procedure?  · To lower your risk of infection:  ? Your health care team will wash or sanitize their hands.  ? Hair may be removed from the surgical area.  ? Your skin will be washed with soap.  · An IV will be inserted into one of your veins.  · You will be given one or more of the following:  ? A medicine to help you relax (sedative).  ? A medicine to numb the area (local anesthetic).  ? A medicine to make you fall asleep (general anesthetic).  ? A medicine that is injected into your spine to numb the area below and slightly above the injection site (spinal anesthetic).  ? A medicine that is injected into an area of your body to numb everything below the injection site (regional anesthetic).  · Damaged or diseased tissue and bone will be removed.  · Uneven areas of bone will be smoothed.  · Blood vessels and nerves will be closed off.  · Muscles will be cut and reshaped so that an artificial leg (prosthesis) can be attached to the stump.  · The incision will be closed with stitches (sutures).  · A bandage (dressing) will be placed over the incision.  The procedure may vary among health care providers and  [facility].  What happens after the procedure?  · Your blood pressure, heart rate, breathing rate, and blood oxygen level will be monitored until the medicines you were given have worn off.  · You will be given medicine for pain. The medicine may include:  ? A sedative.  ? A spinal anesthetic.  ? A regional anesthetic.  · You may start physical therapy within 24 hours after your surgery.  Summary  · Leg amputation is a procedure to remove the leg from above, below, or through the knee.  · Follow instructions from your health care provider about eating or drinking restrictions before the procedure.  · Before the procedure, ask your health care provider how your surgical site will be marked or identified.  This information is not intended to replace advice given to you by your health care provider. Make sure you discuss any questions you have with your health care provider.  Document Released: 03/28/2018 Document Revised: 04/24/2019 Document Reviewed: 03/28/2018  [employer] Patient Education © 2020 [employer]

## 2020-08-12 NOTE — WOUND TEAM
[facility]  Inpatient Services  Wound and Skin Care Evaluation    Admission Date: 7/27/2020     Last order of IP CONSULT TO WOUND CARE was found on 8/2/2020 from Hospital Encounter on 7/24/2020     HPI, PMH, SH: Reviewed    Unit where seen by Wound Team: [id number]     WOUND CONSULT/FOLLOW UP RELATED TO: NPWT dressing change    Self Report / Pain Level:  Premedicated with PCA, liquid 4% lidocaine.      OBJECTIVE:  Pt lying in bed. NPWT dressing in place.    WOUND TYPE, LOCATION, CHARACTERISTICS (Pressure Injuries: location, stage, POA or date identified)             Wound 06/30/20 Full Thickness Wound Leg Left open surgical full thickness (Active)   Wound Image      08/10/20 1158   Site Assessment Yellow;Slough;Pink    Periwound Assessment Maceration;Red    Margins Defined edges    Closure Secondary intention    Drainage Amount Small    Drainage Description Serosanguineous    Treatments Cleansed;Site care    Wound Cleansing Normal Saline Irrigation    Periwound Protectant Skin Protectant Wipes to Periwound    Dressing Cleansing/Solutions 1/4 Strength Dakin's Solution    Dressing Options Moist Roll Gauze;Mepilex    Dressing Changed New    Dressing Status Clean;Dry;Intact    Dressing Change/Treatment Frequency Monday, Wednesday, Friday, and As Needed    NEXT Dressing Change/Treatment Date 08/12/20    NEXT Weekly Photo (Inpatient Only) 08/13/20    Non-staged Wound Description Full thickness    Wound Length (cm) 3.5 cm 08/10/20 1158   Wound Width (cm) 4.9 cm 08/10/20 1158   Wound Depth (cm) 4.1 cm 08/10/20 1158   Wound Surface Area (cm^2) 17.15 cm^2 08/10/20 1158   Wound Volume (cm^3) 70.32 cm^3 08/10/20 1158   Wound Healing % -79 08/10/20 1158   Wound Bed Slough (%) 70 % 08/10/20 1158   Tunneling (cm) 9 cm 08/10/20 1158   Tunneling Clock Position of Wound 1 08/10/20 1158   Shape irregular    Wound Odor None    Exposed Structures None                Vascular:    PETRONA:   No results found.    Lab Values:    Lab  Results   Component Value Date/Time    WBC 5.5 08/11/2020 05:25 AM    RBC 3.02 (L) 08/11/2020 05:25 AM    HEMOGLOBIN 9.5 (L) 08/11/2020 05:25 AM    HEMATOCRIT 29.8 (L) 08/11/2020 05:25 AM    HBA1C 7.3 (H) 07/27/2020 04:57 PM          Culture:   Obtained 7/27:  Culture Result Abnormal   Peptostreptococcus asaccharolyticus   Light growth     Culture Result Abnormal   Prevotella oralis   Light growth     Culture Result Abnormal   Propionibacterium species   Light growth          INTERVENTIONS BY WOUND TEAM: Instilled 12 ml of fluid into foam. Slowly removed foam and instilled lidocaine into wound bed. Allowed lidocaine to dwell for 15 minutes. Gently removed foam from medial and lateral wound bed. No retained foam noted.  Cleansed wound and periwound w/ NS and gauze. Periwound appeared to be macerated and red especially in between wounds. Aquacel AG applied over suture line. Roll gauze moisten w/ dakins applied both medial and lateral wounds. Applied a sacral mepilex dressing to secure dressing. Updated Carlee wound care RN at [facility] since pt is being discharged there later tonight. Per Carlee they will reapply wound vac.Updated Dr Fisher via [place] text that NPWT dressing was removed but [facility] has been updated and will reapply wound vac.     Interdisciplinary consultation: Patient, Bedside RN,     EVALUATION:  Removed Veraflo NPWT dressing to give pt a vac break to allow periwound to improve prior reapplication of vac change. Dakins wet to dry applied for chemical debridement of wound bed.     Goals: Steady decrease in wound area and depth weekly.    NURSING PLAN OF CARE ORDERS (X):    Dressing changes: See Dressing Care orders: X  Skin care: See Skin Care orders:    Rectal tube care: See Rectal Tube Care orders:   Other orders:     WOUND TEAM PLAN OF CARE    Dressing changes by wound team:          Follow up 1-2 times weekly:               Follow up 3 times weekly:                NPWT change 3 times weekly:   X  Follow up  as needed:       Other (explain):     Anticipated discharge plans:  LTACH:        SNF/Rehab:  X                Home Care:           Outpatient Wound Center:            Self Care:

## 2020-08-12 NOTE — PROGRESS NOTES
Report received from day shift RN, assumed Care.   Patient is AOx4, responds appropriately.      Pain controlled at this time.  Patient is tolerating diabetic diet, denies nausea/vomiting. + flatus. Wound vac in place to right AKA.    Plan of care discussed, all questions answered.    Educated on use of call light and importance of calling before getting out of bed. Pt verbalizes understanding.    Call light and belongings within reach, treaded slipper socks on, bed in lowest locked position.  All needs met at this time.

## 2020-08-12 NOTE — CARE PLAN
Problem: Safety  Goal: Will remain free from injury  Outcome: PROGRESSING AS EXPECTED  Goal: Will remain free from falls  Outcome: PROGRESSING AS EXPECTED     Problem: Skin Integrity  Goal: Risk for impaired skin integrity will decrease  Outcome: PROGRESSING AS EXPECTED   On waffle overlay, q2h turns in place, barrier cream in use, condom cath in use, extremities elevated using pillows

## 2020-08-12 NOTE — THERAPY
"PT treatment attempted. Pt refused PT session this afternoon stating \"I don't know why I need to do that.\" Pt reports mobility to EOB hurts too much and he doesn't see the point. Educated pt on the benefits of sitting upright and need to tolerate sitting EOB in order to prepare for WC transfers and ultimately DC home as this is pt's goal. Pt continued to refuse PT session. Encouraged pt to sit more upright in bed or transfer to cardiac chair with nursing staff. RN present during PT attempt and helping to reinforce need to mobilize.     Radha Sauer, PT, DPT Phone: [phone]  "

## 2020-08-13 NOTE — PROGRESS NOTES
Dr. Neumann notified that patient is still using Fentanyl PCA. Orders received to dc PCA. Also updated MD that patient had a fever of 101.3F. Order received to recheck temperature in one hour. No order received for medication at this time. Will continue to monitor patient.     1745: Temperature rechecked. WNL. Fentanyl PCA discontinued.

## 2020-08-13 NOTE — PROGRESS NOTES
Pt discharged to [facility]. CORINA jones remains in place for IV abx. Diabetic diet. Activity as tolerated. Pt and family understand verbal and written discharge instructions. [facility] picked patient up. Prescriptions given. Pt verbalized understanding. Pt agrees to return to hospital for worsening symptoms.

## 2020-08-31 LAB
FUNGUS SPEC CULT: NORMAL
SIGNIFICANT IND 70042: NORMAL
SITE SITE: NORMAL
SOURCE SOURCE: NORMAL

## 2020-09-03 PROCEDURE — 99358 PROLONG SERVICE W/O CONTACT: CPT | Performed by: PHYSICAL MEDICINE & REHABILITATION

## 2020-09-04 ENCOUNTER — HOSPITAL ENCOUNTER (INPATIENT)
Facility: REHABILITATION | Age: 75
LOS: 19 days | DRG: 560 | End: 2020-09-23
Attending: PHYSICAL MEDICINE & REHABILITATION | Admitting: PHYSICAL MEDICINE & REHABILITATION
Payer: MEDICARE

## 2020-09-04 PROBLEM — G93.40 ENCEPHALOPATHY ACUTE: Status: ACTIVE | Noted: 2020-09-04

## 2020-09-04 LAB
COVID ORDER STATUS COVID19: NORMAL
GLUCOSE BLD-MCNC: 129 MG/DL (ref 65–99)
GLUCOSE BLD-MCNC: 137 MG/DL (ref 65–99)
SARS-COV-2 RNA RESP QL NAA+PROBE: NOTDETECTED
SPECIMEN SOURCE: NORMAL

## 2020-09-04 PROCEDURE — 94760 N-INVAS EAR/PLS OXIMETRY 1: CPT

## 2020-09-04 PROCEDURE — 82962 GLUCOSE BLOOD TEST: CPT

## 2020-09-04 PROCEDURE — 770010 HCHG ROOM/CARE - REHAB SEMI PRIVAT*

## 2020-09-04 PROCEDURE — U0003 INFECTIOUS AGENT DETECTION BY NUCLEIC ACID (DNA OR RNA); SEVERE ACUTE RESPIRATORY SYNDROME CORONAVIRUS 2 (SARS-COV-2) (CORONAVIRUS DISEASE [COVID-19]), AMPLIFIED PROBE TECHNIQUE, MAKING USE OF HIGH THROUGHPUT TECHNOLOGIES AS DESCRIBED BY CMS-2020-01-R: HCPCS

## 2020-09-04 PROCEDURE — A9270 NON-COVERED ITEM OR SERVICE: HCPCS | Performed by: PHYSICAL MEDICINE & REHABILITATION

## 2020-09-04 PROCEDURE — 700102 HCHG RX REV CODE 250 W/ 637 OVERRIDE(OP): Performed by: PHYSICAL MEDICINE & REHABILITATION

## 2020-09-04 PROCEDURE — 700105 HCHG RX REV CODE 258: Performed by: PHYSICAL MEDICINE & REHABILITATION

## 2020-09-04 PROCEDURE — 99223 1ST HOSP IP/OBS HIGH 75: CPT | Mod: AI | Performed by: PHYSICAL MEDICINE & REHABILITATION

## 2020-09-04 PROCEDURE — 700111 HCHG RX REV CODE 636 W/ 250 OVERRIDE (IP): Performed by: PHYSICAL MEDICINE & REHABILITATION

## 2020-09-04 RX ORDER — BISACODYL 10 MG
10 SUPPOSITORY, RECTAL RECTAL
Status: DISCONTINUED | OUTPATIENT
Start: 2020-09-04 | End: 2020-09-23 | Stop reason: HOSPADM

## 2020-09-04 RX ORDER — ECHINACEA PURPUREA EXTRACT 125 MG
2 TABLET ORAL PRN
Status: DISCONTINUED | OUTPATIENT
Start: 2020-09-04 | End: 2020-09-23 | Stop reason: HOSPADM

## 2020-09-04 RX ORDER — GABAPENTIN 100 MG/1
100 CAPSULE ORAL 3 TIMES DAILY
Status: DISCONTINUED | OUTPATIENT
Start: 2020-09-04 | End: 2020-09-05

## 2020-09-04 RX ORDER — CARVEDILOL 3.12 MG/1
6.25 TABLET ORAL 2 TIMES DAILY WITH MEALS
Status: DISCONTINUED | OUTPATIENT
Start: 2020-09-05 | End: 2020-09-23 | Stop reason: HOSPADM

## 2020-09-04 RX ORDER — TRAZODONE HYDROCHLORIDE 50 MG/1
50 TABLET ORAL
Status: DISCONTINUED | OUTPATIENT
Start: 2020-09-04 | End: 2020-09-16

## 2020-09-04 RX ORDER — ALUMINA, MAGNESIA, AND SIMETHICONE 2400; 2400; 240 MG/30ML; MG/30ML; MG/30ML
20 SUSPENSION ORAL
Status: DISCONTINUED | OUTPATIENT
Start: 2020-09-04 | End: 2020-09-23 | Stop reason: HOSPADM

## 2020-09-04 RX ORDER — ATORVASTATIN CALCIUM 40 MG/1
40 TABLET, FILM COATED ORAL EVERY EVENING
Status: DISCONTINUED | OUTPATIENT
Start: 2020-09-04 | End: 2020-09-23 | Stop reason: HOSPADM

## 2020-09-04 RX ORDER — POLYETHYLENE GLYCOL 3350 17 G/17G
1 POWDER, FOR SOLUTION ORAL
Status: DISCONTINUED | OUTPATIENT
Start: 2020-09-04 | End: 2020-09-23 | Stop reason: HOSPADM

## 2020-09-04 RX ORDER — ONDANSETRON 4 MG/1
4 TABLET, ORALLY DISINTEGRATING ORAL 4 TIMES DAILY PRN
Status: DISCONTINUED | OUTPATIENT
Start: 2020-09-04 | End: 2020-09-23 | Stop reason: HOSPADM

## 2020-09-04 RX ORDER — ONDANSETRON 2 MG/ML
4 INJECTION INTRAMUSCULAR; INTRAVENOUS 4 TIMES DAILY PRN
Status: DISCONTINUED | OUTPATIENT
Start: 2020-09-04 | End: 2020-09-23 | Stop reason: HOSPADM

## 2020-09-04 RX ORDER — AMOXICILLIN 250 MG
2 CAPSULE ORAL 2 TIMES DAILY
Status: DISCONTINUED | OUTPATIENT
Start: 2020-09-04 | End: 2020-09-23 | Stop reason: HOSPADM

## 2020-09-04 RX ORDER — DEXTROSE MONOHYDRATE 25 G/50ML
50 INJECTION, SOLUTION INTRAVENOUS
Status: DISCONTINUED | OUTPATIENT
Start: 2020-09-04 | End: 2020-09-05

## 2020-09-04 RX ORDER — POLYVINYL ALCOHOL 14 MG/ML
1 SOLUTION/ DROPS OPHTHALMIC PRN
Status: DISCONTINUED | OUTPATIENT
Start: 2020-09-04 | End: 2020-09-04

## 2020-09-04 RX ORDER — MIDAZOLAM HYDROCHLORIDE 5 MG/ML
5 INJECTION INTRAMUSCULAR; INTRAVENOUS PRN
Status: DISCONTINUED | OUTPATIENT
Start: 2020-09-04 | End: 2020-09-23 | Stop reason: HOSPADM

## 2020-09-04 RX ORDER — HYDRALAZINE HYDROCHLORIDE 25 MG/1
25 TABLET, FILM COATED ORAL EVERY 8 HOURS PRN
Status: DISCONTINUED | OUTPATIENT
Start: 2020-09-04 | End: 2020-09-23 | Stop reason: HOSPADM

## 2020-09-04 RX ORDER — LISINOPRIL 5 MG/1
10 TABLET ORAL DAILY
Status: DISCONTINUED | OUTPATIENT
Start: 2020-09-05 | End: 2020-09-13

## 2020-09-04 RX ORDER — OXYCODONE HYDROCHLORIDE 5 MG/1
2.5 TABLET ORAL
Status: DISCONTINUED | OUTPATIENT
Start: 2020-09-04 | End: 2020-09-23 | Stop reason: HOSPADM

## 2020-09-04 RX ORDER — TRAMADOL HYDROCHLORIDE 50 MG/1
50 TABLET ORAL EVERY 4 HOURS PRN
Status: DISCONTINUED | OUTPATIENT
Start: 2020-09-04 | End: 2020-09-23 | Stop reason: HOSPADM

## 2020-09-04 RX ORDER — OXYCODONE HYDROCHLORIDE 5 MG/1
5 TABLET ORAL
Status: DISCONTINUED | OUTPATIENT
Start: 2020-09-04 | End: 2020-09-23 | Stop reason: HOSPADM

## 2020-09-04 RX ORDER — ACETAMINOPHEN 325 MG/1
650 TABLET ORAL EVERY 4 HOURS PRN
Status: DISCONTINUED | OUTPATIENT
Start: 2020-09-04 | End: 2020-09-23 | Stop reason: HOSPADM

## 2020-09-04 RX ORDER — OMEPRAZOLE 20 MG/1
20 CAPSULE, DELAYED RELEASE ORAL DAILY
Status: DISCONTINUED | OUTPATIENT
Start: 2020-09-05 | End: 2020-09-23 | Stop reason: HOSPADM

## 2020-09-04 RX ADMIN — VANCOMYCIN HYDROCHLORIDE 750 MG: 5 INJECTION, POWDER, LYOPHILIZED, FOR SOLUTION INTRAVENOUS at 21:48

## 2020-09-04 RX ADMIN — GABAPENTIN 100 MG: 100 CAPSULE ORAL at 21:42

## 2020-09-04 RX ADMIN — OXYCODONE HYDROCHLORIDE 5 MG: 5 TABLET ORAL at 21:41

## 2020-09-04 RX ADMIN — OXYCODONE HYDROCHLORIDE 5 MG: 5 TABLET ORAL at 16:36

## 2020-09-04 RX ADMIN — ATORVASTATIN CALCIUM 40 MG: 40 TABLET, FILM COATED ORAL at 21:42

## 2020-09-04 RX ADMIN — DOCUSATE SODIUM 50 MG AND SENNOSIDES 8.6 MG 2 TABLET: 8.6; 5 TABLET, FILM COATED ORAL at 21:41

## 2020-09-04 RX ADMIN — AMPICILLIN SODIUM AND SULBACTAM SODIUM 3 G: 2; 1 INJECTION, POWDER, FOR SOLUTION INTRAMUSCULAR; INTRAVENOUS at 17:39

## 2020-09-04 ASSESSMENT — LIFESTYLE VARIABLES
ALCOHOL_USE: NO
EVER_SMOKED: YES
TOTAL SCORE: 0
EVER HAD A DRINK FIRST THING IN THE MORNING TO STEADY YOUR NERVES TO GET RID OF A HANGOVER: NO
HAVE PEOPLE ANNOYED YOU BY CRITICIZING YOUR DRINKING: NO
AVERAGE NUMBER OF DAYS PER WEEK YOU HAVE A DRINK CONTAINING ALCOHOL: 0
TOTAL SCORE: 0
CONSUMPTION TOTAL: NEGATIVE
EVER FELT BAD OR GUILTY ABOUT YOUR DRINKING: NO
ON A TYPICAL DAY WHEN YOU DRINK ALCOHOL HOW MANY DRINKS DO YOU HAVE: 0
TOTAL SCORE: 0
HAVE YOU EVER FELT YOU SHOULD CUT DOWN ON YOUR DRINKING: NO
HOW MANY TIMES IN THE PAST YEAR HAVE YOU HAD 5 OR MORE DRINKS IN A DAY: 0

## 2020-09-04 ASSESSMENT — COPD QUESTIONNAIRES
DO YOU EVER COUGH UP ANY MUCUS OR PHLEGM?: YES, A FEW DAYS A WEEK OR MONTH
DURING THE PAST 4 WEEKS HOW MUCH DID YOU FEEL SHORT OF BREATH: NONE/LITTLE OF THE TIME
COPD SCREENING SCORE: 5
HAVE YOU SMOKED AT LEAST 100 CIGARETTES IN YOUR ENTIRE LIFE: YES

## 2020-09-04 ASSESSMENT — FIBROSIS 4 INDEX: FIB4 SCORE: 2.33

## 2020-09-04 ASSESSMENT — PATIENT HEALTH QUESTIONNAIRE - PHQ9
SUM OF ALL RESPONSES TO PHQ9 QUESTIONS 1 AND 2: 0
2. FEELING DOWN, DEPRESSED, IRRITABLE, OR HOPELESS: NOT AT ALL
1. LITTLE INTEREST OR PLEASURE IN DOING THINGS: NOT AT ALL

## 2020-09-04 ASSESSMENT — PAIN DESCRIPTION - PAIN TYPE: TYPE: SURGICAL PAIN;PHANTOM PAIN

## 2020-09-04 NOTE — FLOWSHEET NOTE
Conscious Sedation Respiratory Update       O2 (LPM): 0 (09/04/20 1540)       Events/Summary/Plan: Resp. protocol done.  No apparent indication for RT at this time. (09/04/20 1540)

## 2020-09-05 PROBLEM — D64.9 ANEMIA: Status: ACTIVE | Noted: 2020-09-05

## 2020-09-05 PROBLEM — E55.9 VITAMIN D DEFICIENCY: Status: ACTIVE | Noted: 2020-09-05

## 2020-09-05 LAB
25(OH)D3 SERPL-MCNC: 10 NG/ML (ref 30–100)
ALBUMIN SERPL BCP-MCNC: 2.6 G/DL (ref 3.2–4.9)
ALBUMIN/GLOB SERPL: 0.8 G/DL
ALP SERPL-CCNC: 157 U/L (ref 30–99)
ALT SERPL-CCNC: 17 U/L (ref 2–50)
ANION GAP SERPL CALC-SCNC: 8 MMOL/L (ref 7–16)
ANISOCYTOSIS BLD QL SMEAR: ABNORMAL
AST SERPL-CCNC: 18 U/L (ref 12–45)
BASOPHILS # BLD AUTO: 0.9 % (ref 0–1.8)
BASOPHILS # BLD: 0.06 K/UL (ref 0–0.12)
BILIRUB SERPL-MCNC: 0.2 MG/DL (ref 0.1–1.5)
BUN SERPL-MCNC: 17 MG/DL (ref 8–22)
CALCIUM SERPL-MCNC: 8.8 MG/DL (ref 8.5–10.5)
CHLORIDE SERPL-SCNC: 108 MMOL/L (ref 96–112)
CO2 SERPL-SCNC: 27 MMOL/L (ref 20–33)
COMMENT 1642: NORMAL
CREAT SERPL-MCNC: 1.04 MG/DL (ref 0.5–1.4)
EOSINOPHIL # BLD AUTO: 0.28 K/UL (ref 0–0.51)
EOSINOPHIL NFR BLD: 4 % (ref 0–6.9)
ERYTHROCYTE [DISTWIDTH] IN BLOOD BY AUTOMATED COUNT: 55.1 FL (ref 35.9–50)
EST. AVERAGE GLUCOSE BLD GHB EST-MCNC: 100 MG/DL
GLOBULIN SER CALC-MCNC: 3.2 G/DL (ref 1.9–3.5)
GLUCOSE BLD-MCNC: 105 MG/DL (ref 65–99)
GLUCOSE BLD-MCNC: 121 MG/DL (ref 65–99)
GLUCOSE BLD-MCNC: 131 MG/DL (ref 65–99)
GLUCOSE BLD-MCNC: 170 MG/DL (ref 65–99)
GLUCOSE SERPL-MCNC: 127 MG/DL (ref 65–99)
HBA1C MFR BLD: 5.1 % (ref 0–5.6)
HCT VFR BLD AUTO: 29.3 % (ref 42–52)
HGB BLD-MCNC: 8.7 G/DL (ref 14–18)
HYPOCHROMIA BLD QL SMEAR: ABNORMAL
IMM GRANULOCYTES # BLD AUTO: 0.01 K/UL (ref 0–0.11)
IMM GRANULOCYTES NFR BLD AUTO: 0.1 % (ref 0–0.9)
LYMPHOCYTES # BLD AUTO: 2.33 K/UL (ref 1–4.8)
LYMPHOCYTES NFR BLD: 33.7 % (ref 22–41)
MACROCYTES BLD QL SMEAR: ABNORMAL
MCH RBC QN AUTO: 30.6 PG (ref 27–33)
MCHC RBC AUTO-ENTMCNC: 29.7 G/DL (ref 33.7–35.3)
MCV RBC AUTO: 103.2 FL (ref 81.4–97.8)
MONOCYTES # BLD AUTO: 0.44 K/UL (ref 0–0.85)
MONOCYTES NFR BLD AUTO: 6.4 % (ref 0–13.4)
MORPHOLOGY BLD-IMP: NORMAL
NEUTROPHILS # BLD AUTO: 3.8 K/UL (ref 1.82–7.42)
NEUTROPHILS NFR BLD: 54.9 % (ref 44–72)
NRBC # BLD AUTO: 0 K/UL
NRBC BLD-RTO: 0 /100 WBC
PLATELET # BLD AUTO: 270 K/UL (ref 164–446)
PMV BLD AUTO: 10.7 FL (ref 9–12.9)
POIKILOCYTOSIS BLD QL SMEAR: NORMAL
POLYCHROMASIA BLD QL SMEAR: NORMAL
POTASSIUM SERPL-SCNC: 4 MMOL/L (ref 3.6–5.5)
PROT SERPL-MCNC: 5.8 G/DL (ref 6–8.2)
RBC # BLD AUTO: 2.84 M/UL (ref 4.7–6.1)
RBC BLD AUTO: PRESENT
SODIUM SERPL-SCNC: 143 MMOL/L (ref 135–145)
TSH SERPL DL<=0.005 MIU/L-ACNC: 4.65 UIU/ML (ref 0.38–5.33)
WBC # BLD AUTO: 6.9 K/UL (ref 4.8–10.8)

## 2020-09-05 PROCEDURE — 97162 PT EVAL MOD COMPLEX 30 MIN: CPT

## 2020-09-05 PROCEDURE — 99222 1ST HOSP IP/OBS MODERATE 55: CPT | Performed by: HOSPITALIST

## 2020-09-05 PROCEDURE — 84443 ASSAY THYROID STIM HORMONE: CPT

## 2020-09-05 PROCEDURE — A9270 NON-COVERED ITEM OR SERVICE: HCPCS | Performed by: PHYSICAL MEDICINE & REHABILITATION

## 2020-09-05 PROCEDURE — 700102 HCHG RX REV CODE 250 W/ 637 OVERRIDE(OP): Performed by: HOSPITALIST

## 2020-09-05 PROCEDURE — 97530 THERAPEUTIC ACTIVITIES: CPT

## 2020-09-05 PROCEDURE — 83036 HEMOGLOBIN GLYCOSYLATED A1C: CPT

## 2020-09-05 PROCEDURE — 700105 HCHG RX REV CODE 258: Performed by: PHYSICAL MEDICINE & REHABILITATION

## 2020-09-05 PROCEDURE — 82306 VITAMIN D 25 HYDROXY: CPT

## 2020-09-05 PROCEDURE — 99232 SBSQ HOSP IP/OBS MODERATE 35: CPT | Performed by: PHYSICAL MEDICINE & REHABILITATION

## 2020-09-05 PROCEDURE — 97535 SELF CARE MNGMENT TRAINING: CPT

## 2020-09-05 PROCEDURE — 97606 NEG PRS WND THER DME>50 SQCM: CPT

## 2020-09-05 PROCEDURE — 770010 HCHG ROOM/CARE - REHAB SEMI PRIVAT*

## 2020-09-05 PROCEDURE — 97167 OT EVAL HIGH COMPLEX 60 MIN: CPT

## 2020-09-05 PROCEDURE — 80053 COMPREHEN METABOLIC PANEL: CPT

## 2020-09-05 PROCEDURE — 700111 HCHG RX REV CODE 636 W/ 250 OVERRIDE (IP): Performed by: PHYSICAL MEDICINE & REHABILITATION

## 2020-09-05 PROCEDURE — 36415 COLL VENOUS BLD VENIPUNCTURE: CPT

## 2020-09-05 PROCEDURE — 92523 SPEECH SOUND LANG COMPREHEN: CPT

## 2020-09-05 PROCEDURE — 700102 HCHG RX REV CODE 250 W/ 637 OVERRIDE(OP): Performed by: PHYSICAL MEDICINE & REHABILITATION

## 2020-09-05 PROCEDURE — 82962 GLUCOSE BLOOD TEST: CPT

## 2020-09-05 PROCEDURE — 85025 COMPLETE CBC W/AUTO DIFF WBC: CPT

## 2020-09-05 RX ORDER — MICONAZOLE NITRATE 20 MG/G
CREAM TOPICAL EVERY 6 HOURS PRN
Status: DISCONTINUED | OUTPATIENT
Start: 2020-09-05 | End: 2020-09-23 | Stop reason: HOSPADM

## 2020-09-05 RX ORDER — VITAMIN B COMPLEX
4000 TABLET ORAL DAILY
Status: DISCONTINUED | OUTPATIENT
Start: 2020-09-05 | End: 2020-09-23 | Stop reason: HOSPADM

## 2020-09-05 RX ORDER — TRAZODONE HYDROCHLORIDE 50 MG/1
50 TABLET ORAL
Status: DISCONTINUED | OUTPATIENT
Start: 2020-09-05 | End: 2020-09-05

## 2020-09-05 RX ORDER — DEXTROSE MONOHYDRATE 25 G/50ML
50 INJECTION, SOLUTION INTRAVENOUS
Status: DISCONTINUED | OUTPATIENT
Start: 2020-09-05 | End: 2020-09-23 | Stop reason: HOSPADM

## 2020-09-05 RX ORDER — GABAPENTIN 300 MG/1
300 CAPSULE ORAL 3 TIMES DAILY
Status: DISCONTINUED | OUTPATIENT
Start: 2020-09-05 | End: 2020-09-08

## 2020-09-05 RX ORDER — ACETAMINOPHEN 500 MG
1000 TABLET ORAL 3 TIMES DAILY
Status: DISCONTINUED | OUTPATIENT
Start: 2020-09-05 | End: 2020-09-23 | Stop reason: HOSPADM

## 2020-09-05 RX ADMIN — AMPICILLIN SODIUM AND SULBACTAM SODIUM 3 G: 2; 1 INJECTION, POWDER, FOR SOLUTION INTRAMUSCULAR; INTRAVENOUS at 12:04

## 2020-09-05 RX ADMIN — OXYCODONE HYDROCHLORIDE 5 MG: 5 TABLET ORAL at 17:25

## 2020-09-05 RX ADMIN — GABAPENTIN 100 MG: 100 CAPSULE ORAL at 09:14

## 2020-09-05 RX ADMIN — AMPICILLIN SODIUM AND SULBACTAM SODIUM 3 G: 2; 1 INJECTION, POWDER, FOR SOLUTION INTRAMUSCULAR; INTRAVENOUS at 06:09

## 2020-09-05 RX ADMIN — OXYCODONE HYDROCHLORIDE 5 MG: 5 TABLET ORAL at 14:20

## 2020-09-05 RX ADMIN — ACETAMINOPHEN 1000 MG: 325 TABLET, FILM COATED ORAL at 14:20

## 2020-09-05 RX ADMIN — VANCOMYCIN HYDROCHLORIDE 750 MG: 5 INJECTION, POWDER, LYOPHILIZED, FOR SOLUTION INTRAVENOUS at 22:52

## 2020-09-05 RX ADMIN — OXYCODONE HYDROCHLORIDE 5 MG: 5 TABLET ORAL at 09:14

## 2020-09-05 RX ADMIN — OXYCODONE HYDROCHLORIDE 5 MG: 5 TABLET ORAL at 06:09

## 2020-09-05 RX ADMIN — AMPICILLIN SODIUM AND SULBACTAM SODIUM 3 G: 2; 1 INJECTION, POWDER, FOR SOLUTION INTRAMUSCULAR; INTRAVENOUS at 17:25

## 2020-09-05 RX ADMIN — MICONAZOLE NITRATE: 20 CREAM TOPICAL at 12:03

## 2020-09-05 RX ADMIN — ATORVASTATIN CALCIUM 40 MG: 40 TABLET, FILM COATED ORAL at 22:39

## 2020-09-05 RX ADMIN — CARVEDILOL 6.25 MG: 3.12 TABLET, FILM COATED ORAL at 17:25

## 2020-09-05 RX ADMIN — ACETAMINOPHEN 1000 MG: 325 TABLET, FILM COATED ORAL at 12:04

## 2020-09-05 RX ADMIN — AMPICILLIN SODIUM AND SULBACTAM SODIUM 3 G: 2; 1 INJECTION, POWDER, FOR SOLUTION INTRAMUSCULAR; INTRAVENOUS at 00:16

## 2020-09-05 RX ADMIN — ENOXAPARIN SODIUM 40 MG: 40 INJECTION SUBCUTANEOUS at 08:17

## 2020-09-05 RX ADMIN — GABAPENTIN 300 MG: 300 CAPSULE ORAL at 14:20

## 2020-09-05 RX ADMIN — GABAPENTIN 300 MG: 300 CAPSULE ORAL at 22:38

## 2020-09-05 RX ADMIN — ACETAMINOPHEN 1000 MG: 325 TABLET, FILM COATED ORAL at 22:38

## 2020-09-05 RX ADMIN — LISINOPRIL 10 MG: 5 TABLET ORAL at 08:17

## 2020-09-05 RX ADMIN — Medication 4000 UNITS: at 12:04

## 2020-09-05 RX ADMIN — OMEPRAZOLE 20 MG: 20 CAPSULE, DELAYED RELEASE ORAL at 08:17

## 2020-09-05 RX ADMIN — CARVEDILOL 6.25 MG: 3.12 TABLET, FILM COATED ORAL at 08:17

## 2020-09-05 RX ADMIN — INSULIN HUMAN 2 UNITS: 100 INJECTION, SOLUTION PARENTERAL at 22:46

## 2020-09-05 RX ADMIN — VANCOMYCIN HYDROCHLORIDE 750 MG: 5 INJECTION, POWDER, LYOPHILIZED, FOR SOLUTION INTRAVENOUS at 08:17

## 2020-09-05 ASSESSMENT — PATIENT HEALTH QUESTIONNAIRE - PHQ9
1. LITTLE INTEREST OR PLEASURE IN DOING THINGS: NOT AT ALL
SUM OF ALL RESPONSES TO PHQ9 QUESTIONS 1 AND 2: 0
2. FEELING DOWN, DEPRESSED, IRRITABLE, OR HOPELESS: NOT AT ALL

## 2020-09-05 ASSESSMENT — PAIN DESCRIPTION - PAIN TYPE: TYPE: PHANTOM PAIN;CHRONIC PAIN

## 2020-09-05 ASSESSMENT — BRIEF INTERVIEW FOR MENTAL STATUS (BIMS)
WHAT MONTH IS IT: ACCURATE WITHIN 5 DAYS
ASKED TO RECALL SOCK: YES, NO CUE REQUIRED
WHAT DAY OF THE WEEK IS IT: CORRECT
ASKED TO RECALL BED: YES, NO CUE REQUIRED
INITIAL REPETITION OF BED BLUE SOCK - FIRST ATTEMPT: 3
ASKED TO RECALL BLUE: YES, NO CUE REQUIRED
BIMS SUMMARY SCORE: 15
WHAT YEAR IS IT: CORRECT

## 2020-09-05 ASSESSMENT — MONTREAL COGNITIVE ASSESSMENT (MOCA)
10. [FLUENCY] NAME WORDS STARTING WITH DESIGNATED LETTER: 0
8. SERIAL SUBTRACTION OF 7S: 3
WHAT IS THE TOTAL SCORE (OUT OF 30): 24
12. MEMORY INDEX SCORE: 3
ORIENTATION SUBSCORE: 3
9. REPEAT EACH SENTENCE: 2
VISUOSPATIAL/EXECUTIVE SUBSCORE: 5
LEVEL OF SEVERITY: MILD COGNITIVE IMPAIRMENT
WHAT LEVEL OF EDUCATION WAS ATTAINED: LESS THAN HIGH SCHOOL EDUCATION
11. FOR EACH PAIR OF WORDS, WHAT CATEGORY DO THEY BELONG TO (OUT OF 2): 1
7. [VIGILENCE] TAP WHEN HEARING DESIGNATED LETTER: 1
4. NAME EACH OF THE THREE ANIMALS SHOWN: 3
6. READ LIST OF DIGITS [FORWARD/BACKWARD]: 2

## 2020-09-05 ASSESSMENT — ENCOUNTER SYMPTOMS
VOMITING: 0
ABDOMINAL PAIN: 0
EYES NEGATIVE: 1
NAUSEA: 0
PALPITATIONS: 0
COUGH: 0
SHORTNESS OF BREATH: 0
POLYDIPSIA: 0
FEVER: 0
BRUISES/BLEEDS EASILY: 0
CHILLS: 0

## 2020-09-05 ASSESSMENT — GAIT ASSESSMENTS
GAIT LEVEL OF ASSIST: MINIMAL ASSIST
DISTANCE (FEET): 5
ASSISTIVE DEVICE: FRONT WHEEL WALKER

## 2020-09-05 ASSESSMENT — ACTIVITIES OF DAILY LIVING (ADL)
TOILETING_LEVEL_OF_ASSIST_DESCRIPTION: ASSIST FOR HYGIENE;ASSIST TO PULL PANTS UP;ASSIST TO PULL PANTS DOWN;ASSIST FOR STANDING BALANCE;GRAB BAR;INCREASED TIME;INITIAL PREPARATION FOR TASK;SET-UP OF EQUIPMENT;SUPERVISION FOR SAFETY;VERBAL CUEING
TOILET_TRANSFER_DESCRIPTION: ADAPTIVE EQUIPMENT;GRAB BAR;INCREASED TIME;INITIAL PREPARATION FOR TASK;REQUIRES LIFT;SET-UP OF EQUIPMENT;SUPERVISION FOR SAFETY;VERBAL CUEING
TOILETING: INDEPENDENT

## 2020-09-05 NOTE — ASSESSMENT & PLAN NOTE
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

## 2020-09-05 NOTE — H&P
REHABILITATION HISTORY AND PHYSICAL/POST ADMISSION PHYSICAL EVALUATION    Date of Admission: 9/4/2020  Jimenez Bains  [id number]/02    [facility] Code / Diagnosis to Support: 0002.1 - Brain Dysfunction: Non-Traumatic and 0005.3 - Amputation: Unilateral Lower Limb Above the Knee (AK)  Etiologic Diagnosis: Encephalopathy acute; left AKA    CC: wounds, decreased mobility and ADLs, left AKA    HPI:  Patient is a 75 y.o. male with a PMH of DVT, PVD with previous fem-pop bypass, DM, HLD, basal cell carcinoma of the face, and HLD who presented initially on 6/30/20 with severe left leg pain. Patient reportedly initially had severe left leg pain around the knee and below with numbness. Patient was found to have severe PVD and evidence of arterial occlusion of the left leg. He was take to the OR for left AKA with Dr. Fisher on 6/30/20. Patient was discharged home on 7/3/20. Patient's wound healing was complicated with necrosis and infection and underwent revision of his AKA on 7/27/20 and washout and revision on 7/29/20 with Dr. Fisher.  Wound cultures grew MRSA and enterococcus. ID was consulted and recommended Vancomycin and Unasyn for 6 weeks Patient underwent another revision on 7/31/20 and 8/2/20 with wound vac placement. Per most recent ID note should continue Daptomycin and Unasyn until 9/6/20. Patient was transferred to [facility] for long term antibiotics, wound healing, and therapy. Patient has been tolerating antibiotics and his stay has been unremarkable per notes. He has been switched to Vancomycin and Unasyn. Patient was evaluated by PT and was modA for hopping. He was evaluated by OT and was mod-maxA for ADLs.     Patient tolerated transfer to [facility]. He reports he has pain in his left leg including phantom pain. Patient presents with confusion about hospital course and most recent notes from therapy report needs frequent cueing and has difficulty with direction. When asked about when he saw the surgeon last he reports yesterday  "and that he replaced the wound vac and his other wound vac on the right (points to carpio). Explained the goal of the carpio and he reports he thought it was another vac tube. Patient appears to have some encephalopathy. He does have insight into the wound healing saying \"If it has not healed by now it probably is going to take a very long time don't you think.\" He cannot list medications but he does know he is on antibiotics and points to IV pole. He reports he has a motorized scooter at home. Otherwise not clear if ROS is reliable. Denies SOB. Denies NVD.     REVIEW OF SYSTEMS:     Unclear how reliable due to mental status    PMH:  Past Medical History:   Diagnosis Date   • Cancer (HCC)     skin cancer   • Hypertension    • Pain 01-30-14    right lower leg, 8/10   • Pain 3/14/14    8/10 right groin, feet   • Personal history of venous thrombosis and embolism    • PVD (peripheral vascular disease) (HCC)    • Unspecified hemorrhagic conditions     takes coumadin       PSH:  Past Surgical History:   Procedure Laterality Date   • KNEE AMPUTATION ABOVE Left 8/2/2020    Procedure: I&D AKA WOUND;  Surgeon: Usman Fisher M.D.;  Location: [facility];  Service: Vascular   • IRRIGATION & DEBRIDEMENT GENERAL Left 7/31/2020    Procedure: IRRIGATION AND DEBRIDEMENT, WOUND- WASH OUT OF AKA, WOUND VAC;  Surgeon: Usman Fisher M.D.;  Location: [facility];  Service: Vascular   • IRRIGATION & DEBRIDEMENT GENERAL Left 7/29/2020    Procedure: IRRIGATION AND DEBRIDEMENT, WOUND- WASHOUT OF ABOVE KNEE AMPUTATION, WOUND VAC CHANGE;  Surgeon: Usman Fisher M.D.;  Location: [facility];  Service: Vascular   • KNEE AMPUTATION ABOVE Left 7/27/2020    Procedure: AMPUTATION, ABOVE KNEE-FOR REVISION;  Surgeon: Usman Fisher M.D.;  Location: [facility];  Service: Vascular   • KNEE AMPUTATION ABOVE Left 6/30/2020    Procedure: AMPUTATION, ABOVE KNEE;  Surgeon: Usman SINGH" MICHELA Fisher;  Location: [facility];  Service: Vascular   • ANGIOGRAM  7/30/2014    Performed by Cyn Phan M.D. at [facility]   • FEMORAL POPLITEAL BYPASS  7/30/2014    Performed by Cyn Phan M.D. at [facility]   • REIMPLANTION REVASCULARIZATION  7/30/2014    Performed by Cyn Phan M.D. at [facility]   • GROIN EXPLORATION  3/17/2014    Performed by Cyn Phan M.D. at [facility]   • FEMORAL FEMORAL BYPASS  2/2/2014    Performed by Cyn Phan M.D. at [facility]   • TOE AMPUTATION  12/4/2013    Performed by Cyn Phan M.D. at [facility]   • FEMORAL FEMORAL BYPASS  12/4/2013    Performed by Cyn Phan M.D. at [facility]   • AORTOGRAM WITH RUNOFF  11/14/2013    Performed by Cyn Phan M.D. at [facility]   • ILIAC ANGIOPLASTY WITH STENT  11/14/2013    Performed by Cyn Phan M.D. at [facility]   • FEMORAL POPLITEAL BYPASS  10/18/2011    Performed by CYN PHAN at [facility]       FAMILY HISTORY:  No family history on file.   Not reliable historian     MEDICATIONS:  Current Facility-Administered Medications   Medication Dose   • Respiratory Therapy Consult     • Pharmacy Consult Request ...Pain Management Review 1 Each  1 Each   • tramadol (ULTRAM) 50 MG tablet 50 mg  50 mg   • hydrALAZINE (APRESOLINE) tablet 25 mg  25 mg   • [START ON 9/5/2020] enoxaparin (LOVENOX) inj 40 mg  40 mg   • acetaminophen (TYLENOL) tablet 650 mg  650 mg   • senna-docusate (PERICOLACE or SENOKOT S) 8.6-50 MG per tablet 2 Tab  2 Tab    And   • polyethylene glycol/lytes (MIRALAX) PACKET 1 Packet  1 Packet    And   • magnesium hydroxide (MILK OF MAGNESIA) suspension 30 mL  30 mL    And   • bisacodyl (DULCOLAX) suppository 10 mg  10 mg   • benzocaine-menthol (CEPACOL) lozenge 1 Lozenge  1 Lozenge   • mag hydrox-al hydrox-simeth (MAALOX PLUS ES or MYLANTA DS)  "suspension 20 mL  20 mL   • ondansetron (ZOFRAN ODT) dispertab 4 mg  4 mg    Or   • ondansetron (ZOFRAN) syringe/vial injection 4 mg  4 mg   • traZODone (DESYREL) tablet 50 mg  50 mg   • sodium chloride (OCEAN) 0.65 % nasal spray 2 Spray  2 Spray   • midazolam (VERSED) 5 mg/mL (1 mL vial)  5 mg   • atorvastatin (LIPITOR) tablet 40 mg  40 mg   • insulin regular (HumuLIN R,NovoLIN R) injection  1-6 Units    And   • glucose 4 g chewable tablet 16 g  16 g    And   • dextrose 50% (D50W) injection 50 mL  50 mL   • gabapentin (NEURONTIN) capsule 100 mg  100 mg   • [START ON 9/5/2020] lisinopril (PRINIVIL) tablet 10 mg  10 mg   • ampicillin/sulbactam (UNASYN) 3 g in  mL IVPB  3 g   • vancomycin (VANCOCIN) 750 mg in  mL IVPB  750 mg   • oxyCODONE immediate-release (ROXICODONE) tablet 2.5 mg  2.5 mg   • oxyCODONE immediate-release (ROXICODONE) tablet 5 mg  5 mg   • MD Alert...Vancomycin per Pharmacy       Facility-Administered Medications Ordered in Other Encounters   Medication Dose   • fentaNYL (SUBLIMAZE) injection     • propofol     • ceFAZolin (ANCEF) injection         ALLERGIES:  No known drug allergy    PSYCHOSOCIAL HISTORY:  Living Site:  Home  Living With:  spouse  Caregiver's availability:  Not Applicable  Number of stairs:  Reportedly ramped  Substance use history:  Unknown due to mental status    LEVEL OF FUNCTION PRIOR TO DISABILTY:  Wheelchair and motorized scooter after AKA, required assistance with some ADLs    LEVEL OF FUNCTION PRIOR TO ADMISSION to [facility]:  Mod-maxA for ADLs  modA for hopping with FWW    CURRENT LEVEL OF FUNCTION:   Same as level of function prior to admission to [facility]    PHYSICAL EXAM:     VITAL SIGNS:   height is 1.676 m (5' 6\") and weight is 61 kg (134 lb 7.7 oz). His oral temperature is 36.6 °C (97.8 °F). His blood pressure is 126/62 and his pulse is 82. His respiration is 18 and oxygen saturation is 91%.     GENERAL: No " apparent distress  HEENT: EOMI and PERRL; large right face lesion  CARDIAC: Regular rate and rhythm, normal S1, S2   LUNGS: Clear to auscultation   ABDOMINAL: bowel sounds present and soft    EXTREMITIES: no contractures, spasticity, or edema. Left AKA with wound vac  NEURO:  Mental status: follows commands; gets questions about hospital stay wrong, incorrectly thinks carpio is wound vac  Speech: fluent, no aphasia or dysarthria  CRANIAL NERVES: grossly intact  Motor:  4/5 BUE  4/5 RLE, moving L hip against gravity      RADIOLOGY:  US Arterial 6/30/20   CONCLUSIONS   Left.    Left fem-pop graft showed no flow as well as all native  arteries did not    show any blood flow.   Left EIA showed monophasic flow with velocity 106 cm/sec.       LABS:        Pending admission labs          PRIMARY REHAB DIAGNOSIS:    This patient is a 75 y.o. male admitted for acute inpatient rehabilitation with Encephalopathy acute and left AKA.    IMPAIRMENTS:   Cognitive  ADLs/IADLs  Mobility    SECONDARY DIAGNOSIS/MEDICAL CO-MORBIDITIES AFFECTING FUNCTION:  Anemia  HTN  Left AKA      RELEVANT CHANGES SINCE PREADMISSION EVALUATION:    Status unchanged    The patient's rehabilitation potential is Good  The patient's medical prognosis is fair    PLAN:   Discussion and Recommendations:   1. The patient requires an acute inpatient rehabilitation program with a coordinated program of care at an intensity and frequency not available at a lower level of care. This recommendation is substantiated by the patient's medical physicians who recommend that the patient's intervention and assessment of medical issues needs to be done at an acute level of care for patient's safety and maximum outcome.   2. A coordinated program of care will be supplied by an interdisciplinary team of physical therapy, occupational therapy, rehab physician, rehab nursing, and, if needed, speech therapy and rehab psychology. Rehab team presents a patient-specific  rehabilitation and education program concentrating on prevention of future problems related to accessibility, mobility, skin, bowel, bladder, sexuality, and psychosocial and medical/surgical problems.   3. Need for Rehabilitation Physician: The rehab physician will be evaluating the patient on a multi-weekly basis to help coordinate the program of care. The rehab physician communicates between medical physicians, therapists, and nurses to maximize the patient's potential outcome. Specific areas in which the rehab physician will be providing daily assessment include the following:   A. Assessing the patient's heart rate and blood pressure response (vitals monitoring) to activity and making adjustments in medications or conservative measures as needed.   B. The rehab physician will be assessing the frequency at which the program can be increased to allow the patient to reach optimal functional outcome.   C. The rehab physician will also provide assessments in daily skin care, especially in light of patient's impairments in mobility.   D. The rehab physician will provide special expertise in understanding how to work with functional impairment and recommend appropriate interventions, compensatory techniques, and education that will facilitate the patient's outcome.   4. Rehab R.N.   The rehab RN will be working with patient to carry over in room mobility and activities of daily living when the patient is not in 3 hours of skilled therapy. Rehab nursing will be working in conjunction with rehab physician to address all the medical issues above and continue to assess laboratory work and discuss abnormalities with the treating physicians, assess vitals, and response to activity, and discuss and report abnormalities with the rehab physician. Rehab RN will also continue daily skin care, supervise bladder/bowel program, instruct in medication administration, and ensure patient safety.   5. Rehab Therapy: Therapies to treat  at intensity and frequency of (may change after completion of evaluation by all therapeutic disciplines):       PT:  Physical therapy to address mobility, transfer, gait training and evaluation for adaptive equipment needs 1 hour/day at least 5 days/week for the duration of the ELOS (see below)       OT:  Occupational therapy to address ADLs, self-care, home management training, functional mobility/transfers and assistive device evaluation, and community re-integration 1  hour/day at least 5 days/week for the duration of the ELOS (see below).        ST/Dysphagia:  Speech therapy to address speech, language, and cognitive deficits as well as swallowing difficulties with retraining/dysphagia management and community re-integration with comprehension, expression, cognitive training 1  hour/day at least 5 days/week for the duration of the ELOS (see below).     Medical management / Rehabilitation Issues/ Adverse Potential as part of rehabilitation plan     REHABILITATION ISSUES/ADVERSE POTENTIAL:  1. Acute encephalopathy - Concern for encephalopathy with ongoing multiple medical comorbidities and confusion about hospital course as well as not understanding carpio catheter. Alternatively could be medicated for transfer but therapy reporting difficulty with cuing and following commands. Patient demonstrates functional deficits in cognition, behavior, strength, balance, coordination, and ADL's. The patient requires therapy to correct these deficits prior to discharge. Patient is admitted to [facility] for comprehensive rehabilitation therapy, including physical, occupational and speech therapy.     Rehabilitation nursing monitors bowel and bladder control, educates on medication administration, co-morbidities and monitors patient safety.    2.  Neurostimulants: None at this time but continue to assess daily for need to initiate should status change.    3.  DVT prophylaxis:  Patient is on Lovenox for  anticoagulation upon transfer. Encourage OOB. Monitor daily for signs and symptoms of DVT including but not limited to swelling and pain to prevent the development of DVT that may interfere with therapies.    4.  GI prophylaxis:  On prilosec to prevent gastritis/dyspepsia which may interfere with therapies.    5.  Pain: No issues with pain currently / Controlled with APAP/Oxycodone, Gabapentin 100 mg TID    6.  Nutrition/Dysphagia: Dietician monitors nutrient intake, recommend supplements prn and provide nutrition education to pt/family to promote optimal nutrition for wound healing/recovery.     7.  Bladder/bowel:  Start bowel and bladder program as described below, to prevent constipation, urinary retention (which may lead to UTI), and urinary incontinence (which will impact upon pt's functional independence).   - Carpio, removal then Post void bladder scans, I&O cath for PVRs >400  - up to commode after meal     8.  Skin/dermal ulcer prophylaxis: Monitor for new skin conditions with q.2 h. turns as required to prevent the development of skin breakdown.     9.  Cognition/Behavior:  Psychologist Dr. Hinojosa provides adjustment counseling to illness and psychosocial barriers that may be potential barriers to rehabilitation.     10. Respiratory therapy: RT performs O2 management prn, breathing retraining, pulmonary hygiene and bronchospasm management prn to optimize participation in therapies.     MEDICAL CO-MORBIDITIES/ADVERSE POTENTIAL AFFECTING FUNCTION:  Acute encephalopathy - Concern for encephalopathy with ongoing multiple medical comorbidities and confusion about hospital course as well as not understanding carpio catheter. Alternatively could be medicated for transfer but therapy reporting difficulty with cuing and following commands.   -PT and OT for mobility and ADLs  -SLP for cognitive evaluation    Left BKA - Patient with severe PVD with L AKA on 6/30/20 with multiple revisions due to necrosis and  infection. Cultures grew MRSA and Enterococcus. Per ID continue antibiotics until 9/6/20. Transferred to [facility] from 8/12/20 to 9/4/20.   -Vancomycin and Unasyn until 9/6/20.   -Unclear when last follow-up with Dr. Fisher, will discuss early next week.   -Continue wound vac. Consult wound care.     Multiple wounds - Patient has rash to abdomen, redness on sacrum and left buttocks on transfer. Consult wound care.     Right face basal cell carcinoma - Unclear what follow-up. Will check with Oncology    Anemia - Check AM CBC    HTN - Patient on Lisinopril 10 mg and Coreg 6.25 mg BID    Pain control - Primarily on admission with left phantom limb pain. Patient on Gabapentin 100 mg TID and Oxycodone. Can consider increase in Gabapentin for neuropathic phantom limb pain.     DM with hyperglycemia - Patient on SSI on transfer. Consult Hospitalist    COVID - Patient with check x2 at [facility]. Patient without symptoms. Per policy no check within 7 days. Patient without symptoms. Check PCR, on precautions    GI Ppx - Patient on Prilosec     DVT Ppx - Patient on Lovenox on transfer.     I personally performed a complete drug regimen review and no potential clinically significant medication issues were identified.     Pt was seen today for 75 min, and entire time spent in face-to-face contact was >50% in counseling and coordination of care as detailed in A/P above.        GOALS/EXPECTED LEVEL OF FUNCTION BASED ON CURRENT MEDICAL AND FUNCTIONAL STATUS (may change based on patient's medical status and rate of impairment recovery):  Transfers:   Modified Independent  Mobility/Gait:   Modified Independent  ADL's:   Minimal Assistance  Cognition:  supervs    DISPOSITION: Discharge to pre-morbid independent living setting with the supportive care of patient's family.    ELOS: 10-17 days

## 2020-09-05 NOTE — PROGRESS NOTES
Pharmacy Kinetics 75 y.o. male on vancomycin.       Currently on Vancomycin 750 mg iv q12hr    Indication for Treatment: Dehiscence of amputation stump.    Pertinent history per medical record: Admitted on 9/4/2020 for dehiscence of amputation stump. History of MRSA.     Other antibiotics: Unasyn 3gm q 6 hours    Allergies: No known drug allergy     List concerns for renal function (possible concerns include abnormal LFTs, BUN/SCr ratio > 20:1, CHF, obesity, malnutrition/low albumin, hypermetabolic state (SIRS), pressors/hypotension, nephrotoxic drugs, etc.): labs pending    Pertinent cultures to date: pending cultures     No results for input(s): WBC, NEUTSPOLYS, BANDSSTABS in the last 72 hours.  No results for input(s): BUN, CREATININE, ALBUMIN in the last 72 hours.  No results for input(s): VANCOTROUGH, VANCOPEAK, VANCORANDOM in the last 72 hours.No intake or output data in the 24 hours ending 09/04/20 1708   Pulse 74   Resp 16   SpO2 93%  No data recorded.      A/P   1. Vancomycin dose: 750mg q 12  hours   2. Next vancomycin level: 9/7 at 0300  3. Goal trough: 12-16 mcg/ml  4. Comments: Patient admitted from [facility] on Vanco 750mg q 12 hrs.     Abigail  [facility]

## 2020-09-05 NOTE — PROGRESS NOTES
-----------Non-Face-to-Face------------  Prolonged non-face-to-face time was spent on 9/4/20 from 1710 to 1747 by this reviewer.  This time included medical chart review, medication review, and decision making for the appropriateness of transfer to inpatient rehabilitation.  Please see PM&R Chart Review Consult below by this provider for detailed summation of the medical chart and the reasoning for current recommendations.  Probable transfer to [facility] in AM. In addition to the above, time was spent coordinating care with case management as well as transitional care coordination team in the acceptance and transfer of the patient to the inpatient rehabilitation facility setting.    Patient is a 75 y.o. male with a PMH of DVT, PVD, DM, HLD, basal cell carcinoma of the face, and HLD who presented initially on 6/30/20 with severe left leg pain. Patient reportedly initially had severe left leg pain around the knee and below with numbness. Patient was found to have severe PVD and evidence of arterial occlusion of the left leg. He was take to the OR for left AKA with Dr. Fisher on 6/30/20. Patient was discharged home on 7/3/20. Patient's wound healing was complicated with necrosis and infection and underwent revision of his AKA on 7/27/20 and washout and revision on 7/29/20 with Dr. Fisher.  Wound cultures grew MRSA and enterococcus. ID was consulted and recommended Vancomycin and Unasyn for 6 weeks Patient underwent another revision on 7/31/20 and 8/2/20 with wound vac placement. Per most recent ID note should continue Daptomycin and Unasyn until 9/6/20. Patient was transferred to [facility] for long term antibiotics, wound healing, and therapy. Patient has been tolerating antibiotics and his stay has been unremarkable per notes. He has been switched to Vancomycin and Unasyn. Patient was evaluated by PT and was modA for hopping. He was evaluated by OT and was mod-maxA for ADLs.

## 2020-09-05 NOTE — PROGRESS NOTES
2 RN skin check done with Torrie. Face photo and skin photos documented in media. Appropriate LDAs opened.   Pt with Alonso score of 16, RN wound protocol ordered on 9/4, orders current. Prevention measures in place including, waffle overlay, incontinent protocol, turn every 2 hour while in bed. Wound consult placed.     - Rashes over chest, back and upper arms   - Redness on sacrum and L buttock wound  - wound on left groin area  - right face cheek tumor

## 2020-09-05 NOTE — CONSULTS
HOSPITAL MEDICINE CONSULTATION    Requesting Physician:  Dr. Silverman    Reason for Consult:  Hypertension, Diabetes    History of Present Illness:  The patient is a 75-year-old  male with past medical history significant for hypertension, diabetes, peripheral vascular disease status post left lower extremity femoral-popliteal bypass, and basal cell carcinoma.  The patient has had a protracted hospital course and his present illness began in June 2020 when he was admitted to [facility] for critical limb ischemia.  He underwent above knee amputation by Dr. Fisher.  His post-operative complications included poor wound healing, necrosis, and infection.  In July, the patient underwent AKA revision followed by irrigation and debridement x 3 with ultimate wound vac placement.  Wound cultures were positive for Methicillin Resistant Staphylococcus Aureus and Enterococcus.  Infectious Diseases was consulted and the patient was placed on 6 weeks of intravenous antimicrobial therapy in the form of Vancomycin and Unasyn.  He was then referred to long-term acute care on 8/12/20.  Due to his ongoing functional debility, the patient was transferred to [facility] on 9/4/20.  Hospital Medicine consultation is requested to assist in the management of this patient's HTN and DM.    Review of Systems:  Review of Systems   Constitutional: Negative for chills and fever.   HENT: Negative.    Eyes: Negative.    Respiratory: Negative for cough and shortness of breath.    Cardiovascular: Negative for chest pain and palpitations.   Gastrointestinal: Negative for abdominal pain, nausea and vomiting.   Musculoskeletal:        Wound pain    Skin: Negative for itching and rash.   Endo/Heme/Allergies: Negative for polydipsia. Does not bruise/bleed easily.   All other systems reviewed and are negative.      Allergies:  Allergies   Allergen Reactions   • No Known Drug Allergy         Medications:    Current Facility-Administered Medications:   •  miconazole 2%-zinc oxide (Hilary) topical cream, , Topical, Q6HRS PRN, Winifred Silverman M.D.  •  vitamin D (cholecalciferol) tablet 4,000 Units, 4,000 Units, Oral, DAILY, Usman Wei D.O.  •  insulin regular (HumuLIN R,NovoLIN R) injection, 2-12 Units, Subcutaneous, 4X/DAY ACHS **AND** POC Blood Glucose, , , Q AC AND BEDTIME(S) **AND** NOTIFY MD and PharmD, , , Once **AND** glucose 4 g chewable tablet 16 g, 16 g, Oral, Q15 MIN PRN **AND** dextrose 50% (D50W) injection 50 mL, 50 mL, Intravenous, Q15 MIN PRN, Camille Santana M.D.  •  acetaminophen (TYLENOL) tablet 1,000 mg, 1,000 mg, Oral, TID, Usman Wei D.O.  •  gabapentin (NEURONTIN) capsule 300 mg, 300 mg, Oral, TID, Usman Wei D.O.  •  Respiratory Therapy Consult, , Nebulization, Continuous RT, Winifred Silverman M.D.  •  Pharmacy Consult Request ...Pain Management Review 1 Each, 1 Each, Other, PHARMACY TO DOSE, Winifred Silverman M.D.  •  tramadol (ULTRAM) 50 MG tablet 50 mg, 50 mg, Oral, Q4HRS PRN, Winifred Silverman M.D.  •  hydrALAZINE (APRESOLINE) tablet 25 mg, 25 mg, Oral, Q8HRS PRN, Winifred Silverman M.D.  •  enoxaparin (LOVENOX) inj 40 mg, 40 mg, Subcutaneous, DAILY, Winifred Silverman M.D., 40 mg at 09/05/20 0817  •  acetaminophen (TYLENOL) tablet 650 mg, 650 mg, Oral, Q4HRS PRN, Winifred Silverman M.D.  •  senna-docusate (PERICOLACE or SENOKOT S) 8.6-50 MG per tablet 2 Tab, 2 Tab, Oral, BID, 2 Tab at 09/04/20 2141 **AND** polyethylene glycol/lytes (MIRALAX) PACKET 1 Packet, 1 Packet, Oral, QDAY PRN **AND** magnesium hydroxide (MILK OF MAGNESIA) suspension 30 mL, 30 mL, Oral, QDAY PRN **AND** bisacodyl (DULCOLAX) suppository 10 mg, 10 mg, Rectal, QDAY PRN, Winifred Silverman M.D.  •  benzocaine-menthol (CEPACOL) lozenge 1 Lozenge, 1 Lozenge, Mouth/Throat, Q2HRS PRN, Winifred Silverman M.D.  •  mag schilling-al  hydrox-simeth (MAALOX PLUS ES or MYLANTA DS) suspension 20 mL, 20 mL, Oral, Q2HRS PRN, Winifred Silverman M.D.  •  ondansetron (ZOFRAN ODT) dispertab 4 mg, 4 mg, Oral, 4X/DAY PRN **OR** ondansetron (ZOFRAN) syringe/vial injection 4 mg, 4 mg, Intramuscular, 4X/DAY PRN, Winifred Silverman M.D.  •  traZODone (DESYREL) tablet 50 mg, 50 mg, Oral, QHS PRN, Winifred Silverman M.D.  •  sodium chloride (OCEAN) 0.65 % nasal spray 2 Spray, 2 Spray, Nasal, PRN, Winifred Silverman M.D.  •  midazolam (VERSED) 5 mg/mL (1 mL vial), 5 mg, Nasal, PRN, Winifred Silverman M.D.  •  atorvastatin (LIPITOR) tablet 40 mg, 40 mg, Oral, Q EVENING, Winifred Silverman M.D., 40 mg at 09/04/20 2142  •  lisinopril (PRINIVIL) tablet 10 mg, 10 mg, Oral, DAILY, Winifred Silverman M.D., 10 mg at 09/05/20 0817  •  ampicillin/sulbactam (UNASYN) 3 g in  mL IVPB, 3 g, Intravenous, Q6HRS, Winifred Silverman M.D., Stopped at 09/05/20 0639  •  vancomycin (VANCOCIN) 750 mg in  mL IVPB, 750 mg, Intravenous, Q12HR, Winifred Silverman M.D., Stopped at 09/05/20 0900  •  oxyCODONE immediate-release (ROXICODONE) tablet 2.5 mg, 2.5 mg, Oral, Q3HRS PRN, Winifred Silverman M.D.  •  oxyCODONE immediate-release (ROXICODONE) tablet 5 mg, 5 mg, Oral, Q3HRS PRN, Winifred Silverman M.D., 5 mg at 09/05/20 0914  •  MD Alert...Vancomycin per Pharmacy, , Other, PHARMACY TO DOSE, Winifred Silverman M.D.  •  omeprazole (PRILOSEC) capsule 20 mg, 20 mg, Oral, DAILY, Winifred Silverman M.D., 20 mg at 09/05/20 0817  •  carvedilol (COREG) tablet 6.25 mg, 6.25 mg, Oral, BID WITH MEALS, Winifred Silverman M.D., 6.25 mg at 09/05/20 0817    Facility-Administered Medications Ordered in Other Encounters:   •  fentaNYL (SUBLIMAZE) injection, , , PRN, Xu Causey M.D., 50 mcg at 06/30/20 1902  •  propofol, , , PRN, Xu Causey M.D., 140 mg at 06/30/20 1837  •  ceFAZolin (ANCEF) injection,  , , Xu OBRIEN M.D., 2 g at 06/30/20 1837    Past Medical/Surgical History:  Past Medical History:   Diagnosis Date   • Cancer (HCC)     skin cancer   • Hypertension    • Pain 01-30-14    right lower leg, 8/10   • Pain 3/14/14    8/10 right groin, feet   • Personal history of venous thrombosis and embolism    • PVD (peripheral vascular disease) (HCC)    • Unspecified hemorrhagic conditions     takes coumadin     Past Surgical History:   Procedure Laterality Date   • KNEE AMPUTATION ABOVE Left 8/2/2020    Procedure: I&D AKA WOUND;  Surgeon: Usman Fisher M.D.;  Location: [facility];  Service: Vascular   • IRRIGATION & DEBRIDEMENT GENERAL Left 7/31/2020    Procedure: IRRIGATION AND DEBRIDEMENT, WOUND- WASH OUT OF AKA, WOUND VAC;  Surgeon: Usman Fisher M.D.;  Location: [facility];  Service: Vascular   • IRRIGATION & DEBRIDEMENT GENERAL Left 7/29/2020    Procedure: IRRIGATION AND DEBRIDEMENT, WOUND- WASHOUT OF ABOVE KNEE AMPUTATION, WOUND VAC CHANGE;  Surgeon: Usman Fisher M.D.;  Location: [facility];  Service: Vascular   • KNEE AMPUTATION ABOVE Left 7/27/2020    Procedure: AMPUTATION, ABOVE KNEE-FOR REVISION;  Surgeon: Usman Fisher M.D.;  Location: [facility];  Service: Vascular   • KNEE AMPUTATION ABOVE Left 6/30/2020    Procedure: AMPUTATION, ABOVE KNEE;  Surgeon: Usman Fisher M.D.;  Location: [facility];  Service: Vascular   • ANGIOGRAM  7/30/2014    Performed by Aquiles Phan M.D. at [facility]   • FEMORAL POPLITEAL BYPASS  7/30/2014    Performed by Aquiles Phan M.D. at [facility]   • REIMPLANTION REVASCULARIZATION  7/30/2014    Performed by Aquiles Phan M.D. at [facility]   • GROIN EXPLORATION  3/17/2014    Performed by Aquiles Phan M.D. at [facility]   • FEMORAL FEMORAL BYPASS  2/2/2014    Performed by Aquiles Phan M.D. at [facility]  ORS   • TOE AMPUTATION  2013    Performed by Cyn Phan M.D. at [facility]   • FEMORAL FEMORAL BYPASS  2013    Performed by Cyn Phan M.D. at [facility]   • AORTOGRAM WITH RUNOFF  2013    Performed by Cyn Phan M.D. at [facility]   • ILIAC ANGIOPLASTY WITH STENT  2013    Performed by Cyn Phan M.D. at [facility]   • FEMORAL POPLITEAL BYPASS  10/18/2011    Performed by CYN PHAN at [facility]       Social History:  Social History     Socioeconomic History   • Marital status:      Spouse name: Not on file   • Number of children: Not on file   • Years of education: Not on file   • Highest education level: Not on file   Occupational History   • Not on file   Social Needs   • Financial resource strain: Not on file   • Food insecurity     Worry: Never true     Inability: Never true   • Transportation needs     Medical: No     Non-medical: No   Tobacco Use   • Smoking status: Former Smoker     Packs/day: 2.00     Years: 50.00     Pack years: 100.00     Types: Cigarettes     Quit date: 2020     Years since quittin.5   • Smokeless tobacco: Never Used   • Tobacco comment: patient refused   Substance and Sexual Activity   • Alcohol use: No     Frequency: Never     Drinks per session: 1 or 2     Binge frequency: Never     Comment: patient quit in    • Drug use: No   • Sexual activity: Not on file   Lifestyle   • Physical activity     Days per week: Not on file     Minutes per session: Not on file   • Stress: Not on file   Relationships   • Social connections     Talks on phone: Not on file     Gets together: Not on file     Attends Faith service: Not on file     Active member of club or organization: Not on file     Attends meetings of clubs or organizations: Not on file     Relationship status: Not on file   • Intimate partner violence     Fear of current or ex partner: Not on file      Emotionally abused: Not on file     Physically abused: Not on file     Forced sexual activity: Not on file   Other Topics Concern   • Not on file   Social History Narrative   • Not on file       Family History  Patient uncertain about his family medical history.    Physical Examination:   Vitals:    09/04/20 1915 09/04/20 2018 09/05/20 0700 09/05/20 1001   BP: 126/62  124/52 117/55   Pulse: 82  60 63   Resp: 18  17    Temp: 36.6 °C (97.8 °F)      TempSrc: Oral      SpO2:  91% 97% 97%   Weight:       Height:           Physical Exam   Constitutional: He is oriented to person, place, and time. No distress.   HENT:   Head: Normocephalic and atraumatic.   Right Ear: External ear normal.   Left Ear: External ear normal.   Eyes: Conjunctivae and EOM are normal. Right eye exhibits no discharge. Left eye exhibits no discharge.   Neck: Normal range of motion. Neck supple. No tracheal deviation present.   Cardiovascular: Normal rate and regular rhythm.   Pulmonary/Chest: No stridor. No respiratory distress. He has no wheezes.   Decreased BS    Abdominal: Soft. Bowel sounds are normal. He exhibits no distension. There is no abdominal tenderness.   Musculoskeletal:      Comments: L AKA w/ wound vac   Neurological: He is alert and oriented to person, place, and time.   Skin: Skin is warm and dry. He is not diaphoretic.   Right cheek +large pearly lesion   Vitals reviewed.      Laboratory Data:  Recent Labs     09/05/20  0540   WBC 6.9   RBC 2.84*   HEMOGLOBIN 8.7*   HEMATOCRIT 29.3*   .2*   MCH 30.6   MCHC 29.7*   RDW 55.1*   PLATELETCT 270   MPV 10.7     Recent Labs     09/05/20  0540   SODIUM 143   POTASSIUM 4.0   CHLORIDE 108   CO2 27   GLUCOSE 127*   BUN 17   CREATININE 1.04   CALCIUM 8.8       Imaging:  No orders to display       Impressions/Recommendations:  Diabetes (HCC)  HbA1c 5.1  Adjust SSI  Observe serum glucose trends     Basal cell carcinoma of face  Pt reports right cheek lesion has been ongoing x 1  yr  Needs Dermatology F/U    HTN (hypertension)  On Coreg and Lisinopril  Observe blood pressure trends     Anemia  Has macrocytic indices  Check Vit B12 and Folate levels w/ next draw  TSH normal  Follow H/H    Vitamin D deficiency  Vit D level 10  On high dose supplementation per Primary Team    AKA stump complication (HCC)  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 Fisher 2/2 critical limb ischemia  Post-op complications included necrosis and infection  S/P AKA revision in 7/2020  S/P AKA I+D x 3  Wound vac per Wound Care  Wound Cx +MRSA and Enterococcus  On Unasyn and Vanco through 9/6/20 per ID  Pain control per Primary Team    PVD (peripheral vascular disease) with claudication (HCC)  On Lipitor  Consider ASA    Full Code    Thank you for the opportunity to assist in this patient's care.  We will continue to follow along with you.

## 2020-09-05 NOTE — CARE PLAN
Problem: Safety  Goal: Will remain free from injury  Outcome: PROGRESSING AS EXPECTED  Note: Pt uses call light  appropriately. Waits for assistance and does not attempt self transfer this shift. Able to verbalize needs.      Problem: Pain Management  Goal: Pain level will decrease to patient's comfort goal  Outcome: PROGRESSING AS EXPECTED  Note: Roxicodone PRN given at HS for c/o left AKA (phantom)pain with moderate relief. Pt sleeps good. Will continue to monitor.

## 2020-09-05 NOTE — PROGRESS NOTES
"Rehab Progress Note     Encounter Date: 9/5/2020    CC: Left AKA    Interval Events (Subjective)    He reports significant tenderness over the left surgical site, area of the wound VAC, very protective of the area, described as 8/10, sharp achy sensation, constant, worse with motion and having his leg down.  No other associated symptoms    He does admit to phantom limb sensation on the left side.  He denies any pain associated with this    He reports having poor sleep last night.    ROS:  Gen: No fatigue, confusion, significant weight loss  Eyes: no blurry vision, double vision or pain in eyes  ENT: no changes in hearing, runny nose, nose bleeds, sinus pain  CV: No CP, palpitations  Lungs: no shortness of breath, changes in secretions, changes in cough, pain with coughing  Abd: No abd pain, nausea, vomiting, pain with eating  : no blood in urine, suprapubic pain  Ext: No swelling in legs, asymmetric atrophy  Neuro: no changes in strength or sensation  Skin: no new ulcers/skin breakdown appreciated by patient  Mood: No changes in mood today, increase in depression or anxiety  Heme: no bruising, or bleeding  Rest of 14 point review of systems is negative    Objective:  Vitals: /52   Pulse 60   Temp 36.6 °C (97.8 °F) (Oral)   Resp 17   Ht 1.676 m (5' 6\")   Wt 61 kg (134 lb 7.7 oz)   SpO2 97%   Gen: NAD, Body mass index is 21.71 kg/m².  HEENT:  NC/AT, PERRLA, moist mucous membranes basal cell carcinoma on the right cheek  Cardio: RRR, no mumurs  Pulm: CTAB, with normal respiratory effort  Abd: Soft NTND, active bowel sounds  Ext: Left AKA, wound VAC in place      Recent Results (from the past 72 hour(s))   Routine (COVID/SARS COV-2 In-House PCR up to 24 hours)    Collection Time: 09/04/20  4:00 PM    Specimen: Nasopharyngeal; Respirate   Result Value Ref Range    COVID Order Status Received    SARS-CoV-2, PCR (In-House)    Collection Time: 09/04/20  4:00 PM   Result Value Ref Range    SARS-CoV-2 Source NP " Swab     SARS-CoV-2 by PCR NotDetected    ACCU-CHEK GLUCOSE    Collection Time: 09/04/20  5:38 PM   Result Value Ref Range    Glucose - Accu-Ck 137 (H) 65 - 99 mg/dL   ACCU-CHEK GLUCOSE    Collection Time: 09/04/20  9:44 PM   Result Value Ref Range    Glucose - Accu-Ck 129 (H) 65 - 99 mg/dL   CBC with Differential    Collection Time: 09/05/20  5:40 AM   Result Value Ref Range    WBC 6.9 4.8 - 10.8 K/uL    RBC 2.84 (L) 4.70 - 6.10 M/uL    Hemoglobin 8.7 (L) 14.0 - 18.0 g/dL    Hematocrit 29.3 (L) 42.0 - 52.0 %    .2 (H) 81.4 - 97.8 fL    MCH 30.6 27.0 - 33.0 pg    MCHC 29.7 (L) 33.7 - 35.3 g/dL    RDW 55.1 (H) 35.9 - 50.0 fL    Platelet Count 270 164 - 446 K/uL    MPV 10.7 9.0 - 12.9 fL    Neutrophils-Polys 54.90 44.00 - 72.00 %    Lymphocytes 33.70 22.00 - 41.00 %    Monocytes 6.40 0.00 - 13.40 %    Eosinophils 4.00 0.00 - 6.90 %    Basophils 0.90 0.00 - 1.80 %    Immature Granulocytes 0.10 0.00 - 0.90 %    Nucleated RBC 0.00 /100 WBC    Neutrophils (Absolute) 3.80 1.82 - 7.42 K/uL    Lymphs (Absolute) 2.33 1.00 - 4.80 K/uL    Monos (Absolute) 0.44 0.00 - 0.85 K/uL    Eos (Absolute) 0.28 0.00 - 0.51 K/uL    Baso (Absolute) 0.06 0.00 - 0.12 K/uL    Immature Granulocytes (abs) 0.01 0.00 - 0.11 K/uL    NRBC (Absolute) 0.00 K/uL    Hypochromia 1+     Anisocytosis 1+     Macrocytosis 1+    Comp Metabolic Panel (CMP)    Collection Time: 09/05/20  5:40 AM   Result Value Ref Range    Sodium 143 135 - 145 mmol/L    Potassium 4.0 3.6 - 5.5 mmol/L    Chloride 108 96 - 112 mmol/L    Co2 27 20 - 33 mmol/L    Anion Gap 8.0 7.0 - 16.0    Glucose 127 (H) 65 - 99 mg/dL    Bun 17 8 - 22 mg/dL    Creatinine 1.04 0.50 - 1.40 mg/dL    Calcium 8.8 8.5 - 10.5 mg/dL    AST(SGOT) 18 12 - 45 U/L    ALT(SGPT) 17 2 - 50 U/L    Alkaline Phosphatase 157 (H) 30 - 99 U/L    Total Bilirubin 0.2 0.1 - 1.5 mg/dL    Albumin 2.6 (L) 3.2 - 4.9 g/dL    Total Protein 5.8 (L) 6.0 - 8.2 g/dL    Globulin 3.2 1.9 - 3.5 g/dL    A-G Ratio 0.8 g/dL    HEMOGLOBIN A1C    Collection Time: 09/05/20  5:40 AM   Result Value Ref Range    Glycohemoglobin 5.1 0.0 - 5.6 %    Est Avg Glucose 100 mg/dL   TSH with Reflex to FT4    Collection Time: 09/05/20  5:40 AM   Result Value Ref Range    TSH 4.650 0.380 - 5.330 uIU/mL   Vitamin D, 25-hydroxy (blood)    Collection Time: 09/05/20  5:40 AM   Result Value Ref Range    25-Hydroxy   Vitamin D 25 10 (L) 30 - 100 ng/mL   ESTIMATED GFR    Collection Time: 09/05/20  5:40 AM   Result Value Ref Range    GFR If African American >60 >60 mL/min/1.73 m 2    GFR If Non African American >60 >60 mL/min/1.73 m 2   PERIPHERAL SMEAR REVIEW    Collection Time: 09/05/20  5:40 AM   Result Value Ref Range    Peripheral Smear Review see below    MORPHOLOGY    Collection Time: 09/05/20  5:40 AM   Result Value Ref Range    RBC Morphology Present     Polychromia 1+     Poikilocytosis 1+    DIFFERENTIAL COMMENT    Collection Time: 09/05/20  5:40 AM   Result Value Ref Range    Comments-Diff see below    ACCU-CHEK GLUCOSE    Collection Time: 09/05/20  8:13 AM   Result Value Ref Range    Glucose - Accu-Ck 121 (H) 65 - 99 mg/dL       Current Facility-Administered Medications   Medication Frequency   • miconazole 2%-zinc oxide (Hilary) topical cream Q6HRS PRN   • Respiratory Therapy Consult Continuous RT   • Pharmacy Consult Request ...Pain Management Review 1 Each PHARMACY TO DOSE   • tramadol (ULTRAM) 50 MG tablet 50 mg Q4HRS PRN   • hydrALAZINE (APRESOLINE) tablet 25 mg Q8HRS PRN   • enoxaparin (LOVENOX) inj 40 mg DAILY   • acetaminophen (TYLENOL) tablet 650 mg Q4HRS PRN   • senna-docusate (PERICOLACE or SENOKOT S) 8.6-50 MG per tablet 2 Tab BID    And   • polyethylene glycol/lytes (MIRALAX) PACKET 1 Packet QDAY PRN    And   • magnesium hydroxide (MILK OF MAGNESIA) suspension 30 mL QDAY PRN    And   • bisacodyl (DULCOLAX) suppository 10 mg QDAY PRN   • benzocaine-menthol (CEPACOL) lozenge 1 Lozenge Q2HRS PRN   • mag hydrox-al hydrox-simeth (MAALOX PLUS  ES or MYLANTA DS) suspension 20 mL Q2HRS PRN   • ondansetron (ZOFRAN ODT) dispertab 4 mg 4X/DAY PRN    Or   • ondansetron (ZOFRAN) syringe/vial injection 4 mg 4X/DAY PRN   • traZODone (DESYREL) tablet 50 mg QHS PRN   • sodium chloride (OCEAN) 0.65 % nasal spray 2 Spray PRN   • midazolam (VERSED) 5 mg/mL (1 mL vial) PRN   • atorvastatin (LIPITOR) tablet 40 mg Q EVENING   • insulin regular (HumuLIN R,NovoLIN R) injection 4X/DAY ACHS    And   • glucose 4 g chewable tablet 16 g Q15 MIN PRN    And   • dextrose 50% (D50W) injection 50 mL Q15 MIN PRN   • gabapentin (NEURONTIN) capsule 100 mg TID   • lisinopril (PRINIVIL) tablet 10 mg DAILY   • ampicillin/sulbactam (UNASYN) 3 g in  mL IVPB Q6HRS   • vancomycin (VANCOCIN) 750 mg in  mL IVPB Q12HR   • oxyCODONE immediate-release (ROXICODONE) tablet 2.5 mg Q3HRS PRN   • oxyCODONE immediate-release (ROXICODONE) tablet 5 mg Q3HRS PRN   • MD Alert...Vancomycin per Pharmacy PHARMACY TO DOSE   • omeprazole (PRILOSEC) capsule 20 mg DAILY   • carvedilol (COREG) tablet 6.25 mg BID WITH MEALS     Facility-Administered Medications Ordered in Other Encounters   Medication Frequency   • fentaNYL (SUBLIMAZE) injection PRN   • propofol PRN   • ceFAZolin (ANCEF) injection PRN       Orders Placed This Encounter   Procedures   • Diet Order Cardiac (Thin), Diabetic     Standing Status:   Standing     Number of Occurrences:   1     Order Specific Question:   Diet:     Answer:   Cardiac [6]     Comments:   Thin     Order Specific Question:   Diet:     Answer:   Diabetic [3]       Assessment:  Active Hospital Problems    Diagnosis   • *Encephalopathy acute   • AKA stump complication (HCC)   • Arterial insufficiency (HCC)   • PVD (peripheral vascular disease) with claudication (HCC)   • Diabetes (HCC)   • HLD (hyperlipidemia)   • Basal cell carcinoma of face   • Peripheral arterial occlusive disease (HCC)       Medical Decision Making and Plan:    Acute encephalopathy:  -Limit opioid  use as possible  -Continue comprehensive acute inpatient rehabilitation    Left AKA: Peripheral vascular disease  Culture grew MRSA and enterococcus, per ID continue antibiotics until 9/6  -Vancomycin and Unasyn until 9/6  -Continue wound VAC    Left phantom limb pain: Sensation of leg being present but not causing him pain  -Increase gabapentin to 300 mg 3 times a day  -Tylenol 1000 mg 3 times daily    postoperative pain:  - Tylenol 1000 mg 3 times daily, if this does not significantly improve the pain will consider scheduling opioids, goal of limiting opioid use given and encephalopathy as above    COVID rule out: As per protocol check COVID screen upon admission  - Isolate with droplet eye protection.  -Negative on 9/4    Insomnia: Poor sleep last night  -Schedule trazodone 50 mg nightly    Vitamin D deficiency: Vitamin D level on admission of 10  -Cholecalciferol 4000 units daily    Total time:  28 minutes.  I spent greater than 50% of the time for patient care and coordination on this date, including unit/floor time, and face-to-face time with the patient as per assessment and plan above including left abdomen pain, increase gabapentin to 300 mg 3 times a day, postoperative pain, increase Tylenol to grams 3 times daily, insomnia increase trazodone to 50 mg scheduled nightly.    Usman Wei D.O.

## 2020-09-05 NOTE — PROGRESS NOTES
Patient admitted to facility at about 15:40 pm via wheelchair; accompanied by hospital transport.  Patient assisted to room and positioned in bed for comfort and safety; call light within reach.  Patient assisted with stowing belongings and oriented to room and facility.  Admission assessment performed and documented in computer. Patient received and reviewed education binder. Admission paperwork completed; signed copies placed in chart.  Will continue to monitor.

## 2020-09-05 NOTE — THERAPY
"Physical Therapy   Initial Evaluation     Patient Name: Jimenez Bains  Age:  75 y.o., Sex:  male  Medical Record #: [id number]  Today's Date: 9/5/2020     Subjective    Patient seated in w/c with hospital gown only threaded through one arm. Agreeable to PT evaluation with education on purpose. Prefers to be called \"[facility]\".    \"I don't understand why they sent me here when I want to just go home.\"     Objective       09/05/20 1001   Prior Living Situation   Prior Services Skilled Home Health Services;Intermittent Physical Support for ADL Per Family   Housing / Facility Mobile Home   Steps Into Home   (ramp)   Steps In Home 0   Rail Unable To Determine At This Time   Elevator No   Equipment Owned Front-Wheel Walker;Wheelchair   Lives with - Patient's Self Care Capacity Adult Children  (29 yo granddaughter and her , great-grandchilden)   Comments poor historian, will benefit from verification from family. Reports he has multiple wheelchairs (2 large and 1 small), FWW, grab bars \"everywhere\", ramp access to mobile home.    Prior Level of Functional Mobility   Bed Mobility Independent   Transfer Status Independent   Ambulation Required Assist   Distance Ambulation (Feet) 50   Assistive Devices Used Front-Wheel Walker   Wheelchair Required Assist   Stairs Unable To Determine At This Time   Comments poor historian   Prior Functioning: Everyday Activities   Self Care Needed some help   Indoor Mobility (Ambulation) Needed some help   Stairs Unknown   Functional Cognition Unknown   Prior Device Use Manual wheelchair   Vitals   Pulse 63   Patient BP Position Sitting   Blood Pressure  117/55   Pulse Oximetry 97 %   O2 (LPM) 2   O2 Delivery Device Nasal Cannula   Vitals Comments post-activity seated EOB: 68 bpm, 131/70 mmHg, 99% SpO2 on 2L via NC   Pain 0 - 10 Group   Location Leg   Location Orientation Left   Pain Rating Scale (NPRS) 1   Passive ROM Lower Body   Passive ROM Lower Body WDL   Comments RLE limited by pain " "and residual limb length   Active ROM Lower Body    Active ROM Lower Body  WDL   Strength Lower Body   Lower Body Strength  X   Comments RLE grossly 4/5; LLE testing deferred   Sensation Lower Body   Lower Extremity Sensation   X   Comments denies changes in sensation, reports LLE phantom limb sensation but no pain (\"feels like my leg is still there\"). LLE intact to light touch and tactile localization   Lower Body Muscle Tone   Lower Body Muscle Tone  WDL   Balance Assessment   Sitting Balance (Static) Fair -   Sitting Balance (Dynamic) Poor +   Standing Balance (Static) Trace +   Standing Balance (Dynamic) Trace +   Bed Mobility    Supine to Sit Contact Guard Assist   Sit to Supine Stand by Assist   Sit to Stand Moderate Assist   Scooting Stand by Assist   Rolling Supervised   Neurological Concerns   Neurological Concerns No   Coordination Lower Body    Coordination Lower Body  WDL   Roll Left and Right   Assistance Needed Supervision   Physical Assistance Level No physical assistance   CARE Score 4   Roll Left and Right Discharge Goal   Discharge Goal Independent   Sit to Lying   Assistance Needed Supervision   Physical Assistance Level No physical assistance   CARE Score 4   Sit to Lying Discharge Goal   Discharge Goal Independent   Lying to Sitting on Side of Bed   Assistance Needed Incidental touching   Physical Assistance Level No physical assistance   CARE Score 4   Lying to Sitting on Side of Bed Discharge Goal   Discharge Goal Independent   Sit to Stand   Assistance Needed Physical assistance   Physical Assistance Level 50%-74%   CARE Score 2   Sit to Stand Discharge Goal   Discharge Goal Independent   Chair/Bed-to-Chair Transfer   Assistance Needed Physical assistance   Physical Assistance Level 25%-49%   CARE Score 3   Chair/Bed-to-Chair Transfer Discharge Goal   Discharge Goal Independent   Car Transfer   Reason if not Attempted Safety concerns   CARE Score 88   Car Transfer Discharge Goal   Discharge " Goal Supervision or touching assistance   Walk 10 Feet   Assistance Needed Physical assistance   Physical Assistance Level Less than 25%   CARE Score 3   Walk 10 Feet Discharge Goal   Discharge Goal Supervision or touching assistance   Walk 50 Feet with Two Turns   Reason if not Attempted Safety concerns   CARE Score 88   Walk 50 Feet with Two Turns Discharge Goal   Discharge Goal Supervision or touching assistance   Walk 150 Feet   Reason if not Attempted Safety concerns   CARE Score 88   Walk 150 Feet Discharge Goal   Discharge Goal Partial/moderate assistance   Walking 10 Feet on Uneven Surfaces   Reason if not Attempted Safety concerns   CARE Score 88   Walking 10 Feet on Uneven Surfaces Discharge Goal   Discharge Goal Supervision or touching assistance   1 Step (Curb)   Reason if not Attempted Safety concerns   CARE Score 88   1 Step (Curb) Discharge Goal   Discharge Goal Supervision or touching assistance   4 Steps   Reason if not Attempted Activity not applicable   CARE Score 9   4 Steps Discharge Goal   Discharge Goal Not applicable   12 Steps   Reason if not Attempted Activity not applicable   CARE Score 9   12 Steps Discharge Goal   Discharge Goal Not applicable   Picking Up Object   Reason if not Attempted Safety concerns   CARE Score 88   Picking Up Object Discharge Goal   Discharge Goal Supervision or touching assistance   Wheel 50 Feet with Two Turns   Assistance Needed Physical assistance   Physical Assistance Level Less than 25%   CARE Score 3   Type of Wheelchair/Scooter Manual   Wheel 50 Feet with Two Turns Discharge Goal   Discharge Goal Independent   Wheel 150 Feet   Assistance Needed Physical assistance   Physical Assistance Level Total assistance   CARE Score 1   Type of Wheelchair/Scooter Manual   Wheel 150 Feet Discharge Goal   Discharge Goal Independent   Gait Functional Level of Assist    Gait Level Of Assist Minimal Assist   Assistive Device Front Wheel Walker   Distance (Feet) 5   # of  Times Distance was Traveled 1   Deviation   (hops on RLE, heavy BUE reliance on walker)   Wheelchair Functional Level of Assist   Wheelchair Assist Minimal Assist   Distance Wheelchair (Feet or Distance) 50   Wheelchair Description Assistance with steering;Extra time;Leg rest management;Limited by fatigue;Safety concerns;Supervision for safety;Verbal cueing  (uses BUEs to propel)   Stairs Functional Level of Assist   Level of Assist with Stairs Unable to Participate   Transfer Functional Level of Assist   Bed, Chair, Wheelchair Transfer Moderate Assist   Bed Chair Wheelchair Transfer Description Adaptive equipment;Increased time;Requires lift;Verbal cueing;Supervision for safety  (Mod A sit<>stand, Min A stand hop transfer with FWW)   Problem List    Problems Pain;Impaired Bed Mobility;Impaired Transfers;Impaired Ambulation;Functional Strength Deficit;Impaired Balance;Decreased Activity Tolerance;Safety Awareness Deficits / Cognition;Limited Knowledge of Post-Op Precautions   Precautions   Precautions Fall Risk;Non Weight Bearing Left Lower Extremity;Other (See Comments)   Comments wound vac L residual limb, sores on genital area   Current Discharge Plan   Current Discharge Plan Return to Prior Living Situation   Interdisciplinary Plan of Care Collaboration   IDT Collaboration with  Nursing;Occupational Therapist   Patient Position at End of Therapy In Bed;Call Light within Reach;Tray Table within Reach;Phone within Reach   Collaboration Comments CLOF, POC   Benefit   Therapy Benefit Patient Would Benefit from Inpatient Rehabilitation Physical Therapy to Maximize Functional [place] with ADLs, IADLs and Mobility.   Strengths & Barriers   Strengths Independent prior level of function;Motivated for self care and independence;Willingly participates in therapeutic activities   Barriers Confused;Decreased endurance;Fatigue;Generalized weakness;Home accessibility;Impaired activity tolerance;Impaired  balance;Pain;Limited mobility   PT Total Time Spent   PT Individual Total Time Spent (Mins) 60   PT Charge Group   PT Therapeutic Activities 1   PT Evaluation PT Evaluation Mod       Assessment  Patient is 75 y.o. male with a diagnosis of acute encephalopathy and L AKA. Patient initially presenting to hospital on 6/30/2020 with severe LLE pain; was found to have severe PVD and arterial occlusion and underwent L AKA. He discharged home on 7/3/2020 however wound healing was complicated with necrosis and infection. He underwent AKA revision on 7/27/2020 and washout/revision on 7/29/2020. Wound cultures grew MRSA and enterococcus. Patient underwent additional revision on 7/31/2020 and 8/2/2020 with wound vac placement. He was transferred to [facility] for long-term antibiotics, wound healing, and therapy. PMH includes: history of DVT, PVD with fem-pop bypass, DM, HLD, basal cell carcinoma of the face (with removal of part of L ear), HLD. Additional factors influencing patient status / progress (ie: cognitive factors, co-morbidities, social support, etc): Patient presents as poor historian and will benefit from verification of prior level and home set up. He required extensive education on purpose of inpatient rehab and why he is not able to return home at this time. Primary impairments include decreased strength which significantly impacts his ability to perform sit<>stand, impaired balance, pain, and decreased activity tolerance. It appears that he has poor medical compliance at home given history of L AKA complications. It is also unclear how much assistance/supervision he will be able to have at home. Patient will benefit from skilled physical therapy to address current impairments and maximize functional mobility and safety prior to discharge.      Plan  Recommend Physical Therapy  minutes per day 5-7 days per week for 10-17  days for the following treatments:  PT Group Therapy, PT Gait Training, PT Therapeutic  Exercises, PT Neuro Re-Ed/Balance, PT Therapeutic Activity and PT Evaluation.    Goals:  Long term and short term goals have been discussed with patient and they are in agreement.    Physical Therapy Problems     Problem: Mobility     Dates: Start: 09/05/20       Goal: STG-Within one week, patient will propel wheelchair household distances     Dates: Start: 09/05/20       Description: 1) Individualized goal:  50 ft with SBA.  2) Interventions:  PT Group Therapy, PT Gait Training, PT Therapeutic Exercises, PT Neuro Re-Ed/Balance, PT Therapeutic Activity, and PT Evaluation            Goal: STG-Within one week, patient will ambulate household distance     Dates: Start: 09/05/20       Description: 1) Individualized goal:  10 ft with FWW and SBA-CGA.  2) Interventions:  PT Group Therapy, PT Gait Training, PT Therapeutic Exercises, PT Neuro Re-Ed/Balance, PT Therapeutic Activity, and PT Evaluation                Problem: Mobility Transfers     Dates: Start: 09/05/20       Goal: STG-Within one week, patient will sit to stand     Dates: Start: 09/05/20       Description: 1) Individualized goal:  with FWW and SBA.  2) Interventions:  PT Group Therapy, PT Gait Training, PT Therapeutic Exercises, PT Neuro Re-Ed/Balance, PT Therapeutic Activity, and PT Evaluation          Goal: STG-Within one week, patient will transfer bed to chair     Dates: Start: 09/05/20       Description: 1) Individualized goal:  with FWW vs. Squat pivot and SBA.  2) Interventions:  PT Group Therapy, PT Gait Training, PT Therapeutic Exercises, PT Neuro Re-Ed/Balance, PT Therapeutic Activity, and PT Evaluation                Problem: PT-Long Term Goals     Dates: Start: 09/05/20       Goal: LTG-By discharge, patient will propel wheelchair     Dates: Start: 09/05/20       Description: 1) Individualized goal:  150 ft mod I.  2) Interventions:  PT Group Therapy, PT Gait Training, PT Therapeutic Exercises, PT Neuro Re-Ed/Balance, PT Therapeutic Activity, and  PT Evaluation          Goal: LTG-By discharge, patient will ambulate     Dates: Start: 09/05/20       Description: 1) Individualized goal:  50 ft with FWW and supervision.  2) Interventions:  PT Group Therapy, PT Gait Training, PT Therapeutic Exercises, PT Neuro Re-Ed/Balance, PT Therapeutic Activity, and PT Evaluation          Goal: LTG-By discharge, patient will transfer one surface to another     Dates: Start: 09/05/20       Description: 1) Individualized goal:  with FWW vs. Squat pivot mod I.  2) Interventions:  PT Group Therapy, PT Gait Training, PT Therapeutic Exercises, PT Neuro Re-Ed/Balance, PT Therapeutic Activity, and PT Evaluation          Goal: LTG-By discharge, patient will perform home exercise program     Dates: Start: 09/05/20       Description: 1) Individualized goal:  with handout mod I.  2) Interventions:  PT Group Therapy, PT Gait Training, PT Therapeutic Exercises, PT Neuro Re-Ed/Balance, PT Therapeutic Activity, and PT Evaluation          Goal: LTG-By discharge, patient will     Dates: Start: 09/05/20       Description: 1) Individualized goal:  perform bed mobility including rolling to prone mod I.  2) Interventions:  PT Group Therapy, PT Gait Training, PT Therapeutic Exercises, PT Neuro Re-Ed/Balance, PT Therapeutic Activity, and PT Evaluation

## 2020-09-05 NOTE — ASSESSMENT & PLAN NOTE
Hba1c: 7.3 (7/27) --> 5.1 (9/5)  BS had been ok with no coverage needed   Off accuchecks  Note: needs out patient follow up / monitoring

## 2020-09-05 NOTE — CARE PLAN
Problem: Infection  Goal: Will remain free from infection  Outcome: PROGRESSING SLOWER THAN EXPECTED  Note: Started on IV vancomycin this am at Left Midline. Flushed and patent. Half an hour later, patient said his IV is leaking. Noted midline IV was pulled out. Tip intact. Asked patient what happened and he said he does not know. New PIV started on right FA. Will continue to monitor.       Problem: Skin Integrity  Goal: Risk for impaired skin integrity will decrease  Outcome: PROGRESSING SLOWER THAN EXPECTED  Note: Patient had bowels incontinence. Staff to clean up. Sacrum area and groin appears red and excoriated. Cleanse with soap and water. Applied janette cream to area. Wound consult placed yesterday. Will continue to monitor.

## 2020-09-05 NOTE — ASSESSMENT & PLAN NOTE
H&H improved: Hb 11.0 (9/21)  TSH normal  B12: 198  Folate: 2.9  On B12 & folate supplements  Monitor

## 2020-09-05 NOTE — THERAPY
Occupational Therapy   Initial Evaluation     Patient Name: Jimenez Bains  Age:  75 y.o., Sex:  male  Medical Record #: [id number]  Today's Date: 9/5/2020     Subjective    Pt reported being in [place] and uncertain why he's here.     Objective       09/05/20 0701   Prior Living Situation   Housing / Facility Mobile Home   Steps Into Home   (Pt reported ramp)   Steps In Home 0   Rail Unable To Determine At This Time   Elevator No   Bathroom Set up Walk In Shower;Grab Bars;Shower Chair   Equipment Owned Front-Wheel Walker;Wheelchair;Grab Bar(s) In Tub / Shower;Tub / Shower Seat   Comments Unreliable historian   Prior Level of ADL Function   Self Feeding Independent   Grooming / Hygiene Independent   Bathing Requires Assist   Dressing Independent   Toileting Independent   Comments Unreliable historian   Prior Level of IADL Function   Laundry Requires Assist   Kitchen Mobility Requires Assist   Home Management Dependent   Shopping Requires Assist   Prior Level Of Mobility Supervision Without Device in Community;Independent With Device in Home   Driving / Transportation Relatives / Others Provide Transportation   Occupation (Pre-Hospital Vocational) Retired Due To Age   Comments Unreliable historian   Prior Functioning: Everyday Activities   Self Care Needed some help   Indoor Mobility (Ambulation) Needed some help   Stairs Unknown   Functional Cognition Unknown   Prior Device Use Walker;Manual wheelchair   Vitals   O2 (LPM) 2   O2 Delivery Device Nasal Cannula   Pain   Intervention Distraction;Repositioned;Nurse Notified   Non Verbal Scale  Grimacing   Pain 0 - 10 Group   Location Incision;Penis;Groin   Location Orientation Left  (LLE amput/incision, penis/catheter, open sore L side groin)   Comfort Goal Perform Activity   Therapist Pain Assessment 6;Post Activity Pain Same as Prior to Activity;Nurse Notified   Cognition    Level of Consciousness Alert   ABS (Agitated Behavior Scale)   Agitated Behavior Scale  Performed No   Vision Screen   Vision Not tested   Passive ROM Upper Body   Passive ROM Upper Body WDL   Active ROM Upper Body   Active ROM Upper Body  WDL   Dominant Hand Right   Strength Upper Body   Upper Body Strength  WDL   Sensation Upper Body   Upper Extremity Sensation  WDL   Upper Body Muscle Tone   Upper Body Muscle Tone  WDL   Bed Mobility    Supine to Sit Minimal Assist   Sit to Supine Contact Guard Assist   Sit to Stand Maximal Assist   Eating   Assistance Needed Independent   Physical Assistance Level No physical assistance   CARE Score 6   Eating Discharge Goal   Discharge Goal Independent   Oral Hygiene   Assistance Needed Set-up / clean-up;Supervision;Verbal cues   Physical Assistance Level No physical assistance   CARE Score 4   Oral Hygiene Discharge Goal   Discharge Goal Independent   Shower/Bathe Self   Assistance Needed Physical assistance;Verbal cues;Supervision;Set-up / clean-up;Adaptive equipment   Physical Assistance Level 50%-74%   CARE Score 2   Shower/Bathe Self Discharge Goal   Discharge Goal Independent   Upper Body Dressing   Assistance Needed Physical assistance   Physical Assistance Level Less than 25%   CARE Score 3   Upper Body Dressing Discharge Goal   Discharge Goal Independent   Lower Body Dressing   Assistance Needed Physical assistance;Verbal cues;Supervision;Set-up / clean-up;Adaptive equipment   Physical Assistance Level Total assistance   CARE Score 1   Lower Body Dressing Discharge Goal   Discharge Goal Independent   Putting On/Taking Off Footwear   Assistance Needed Physical assistance;Verbal cues;Supervision;Set-up / clean-up   Physical Assistance Level Total assistance   CARE Score 1   Putting On/Taking Off Footwear Discharge Goal   Discharge Goal Independent   Toileting Hygiene   Assistance Needed Physical assistance;Verbal cues;Supervision;Set-up / clean-up;Adaptive equipment   Physical Assistance Level Total assistance   CARE Score 1   Toileting Hygiene Discharge  Goal   Discharge Goal Independent   Toilet Transfer   Assistance Needed Physical assistance;Verbal cues;Supervision;Set-up / clean-up   Physical Assistance Level 50%-74%   CARE Score 2   Toilet Transfer Discharge Goal   Discharge Goal Independent   Hearing, Speech, and Vision   Expression of Ideas and Wants Without difficulty   Understanding Verbal and Non-Verbal Content Usually understands   Functional Level of Assist   Eating Modified Independent   Eating Description Insert dentures;Increased time   Grooming Supervision;Seated   Grooming Description Seated in wheelchair at sink;Increased time;Set-up of equipment;Supervision for safety;Verbal cueing   Bathing Maximal Assist   Bathing Description Grab bar;Hand held shower;Adaptive equipment;Assit with back;Assit wtih lower extremities;Assit with perineal;Increased time;Initial preparation for task;Set-up of equipment;Set up for wound protection;Supervision for safety;Verbal cueing   Upper Body Dressing Minimal Assist   Upper Body Dressing Description Set-up of equipment;Supervision for safety;Verbal cueing   Lower Body Dressing Total Assist   Lower Body Dressing Description Grab bar;Assist with threading into pant leg;Increased time;Initial preparation for task;Set-up of equipment;Supervision for safety;Verbal cueing  (See notes)   Toileting Total Assist   Toileting Description Assist for hygiene;Assist to pull pants up;Assist to pull pants down;Assist for standing balance;Grab bar;Increased time;Initial preparation for task;Set-up of equipment;Supervision for safety;Verbal cueing   Toilet Transfers Maximal Assist  (wc/drop arm BSC placed over commode)   Toilet Transfer Description Adaptive equipment;Grab bar;Increased time;Initial preparation for task;Requires lift;Set-up of equipment;Supervision for safety;Verbal cueing   Tub / Shower Transfers Total Assist   Tub Shower Transfer Description Drop arm chair;Grab bar;Increased time;Requires lift;Set-up of  equipment;Supervision for safety;Verbal cueing;Initial preparation for task   Problem List   Problem List Decreased Active Daily Living Skills;Decreased Homemaking Skills;Decreased Upper Extremity Strength Right;Decreased Upper Extremity Strength Left;Decreased Functional Mobility;Decreased Activity Tolerance;Safety Awareness Deficits / Cognition;Impaired Postural Control / Balance   Precautions   Precautions Fall Risk;Non Weight Bearing Left Lower Extremity;Other (See Comments)   Comments Wound vac LLE, Brothers/cath, Contact isolation MRSA, open sore L side of groin   Current Discharge Plan   Current Discharge Plan Return to Prior Living Situation   Benefit    Therapy Benefit Patient Would Benefit from Inpatient Rehab Occupational Therapy to Maximize [place] with ADLs, IADLs and Functional Mobility.   Interdisciplinary Plan of Care Collaboration   IDT Collaboration with  Nursing;Certified Nursing Assistant;Physical Therapist;Speech Therapist   Patient Position at End of Therapy Seated;Chair Alarm On;Self Releasing Lap Belt Applied;Call Light within Reach;Tray Table within Reach;Phone within Reach   Collaboration Comments CLOF, open sore L groin, meds, transition of care to nursing   Strengths & Barriers   Strengths Pleasant and cooperative;Willingly participates in therapeutic activities   Barriers Confused;Decreased endurance;Generalized weakness;Impaired balance;Limited mobility;Pain   OT Total Time Spent   OT Individual Total Time Spent (Mins) 60   OT Charge Group   OT Self Care / ADL 1   OT Evaluation OT Evaluation High       Assessment  Patient is a 75 y.o. male with primary diagnosis of Left AKA and Encephalopathy.  PMH of DVT, PVD with previous fem-pop bypass, DM, HLD, basal cell carcinoma of the face, and HLD who presented initially on 6/30/20 with severe left leg pain. Patient reportedly initially had severe left leg pain around the knee and below with numbness. Patient was found to have severe PVD and  evidence of arterial occlusion of the left leg. He was take to the OR for left AKA with Dr. Fisher on 6/30/20. Patient was discharged home on 7/3/20. Patient's wound healing was complicated with necrosis and infection and underwent revision of his AKA on 7/27/20 and washout and revision on 7/29/20 with Dr. Fisher.  Wound cultures grew MRSA and enterococcus. ID was consulted and recommended Vancomycin and Unasyn for 6 weeks Patient underwent another revision on 7/31/20 and 8/2/20 with wound vac placement.  Additional factors influencing patient status / progress (ie: cognitive factors, co-morbidities, social support, etc): Anemia, HTN, Left AKA.  Impairments include cognitive, ADL's/IADL's, mobility, balance and strength.  Secondary confusion pt unreliable historian - will have to confirm w/ family.  At time of occupational therapy evaluation pt presented below reported PLOF (Mod I BADL's w/ exception of shower task and shower transfer-Min to Mod assist).  Pt would benefit from OT tx w/ emphasis on pt education (AE/DME/NRG conservation/environmental and safety awareness), strengthening, endurance and balance to facilitate greater [place] w/ ADL's/IADL's, functional mobility and transfers.    Plan  Recommend Occupational Therapy 30-60 minutes per day 5-7 days per week for 3 weeks for the following treatments:  OT Self Care/ADL, OT Cognitive Skill Dev, OT Neuro Re-Ed/Balance, OT Therapeutic Activity, OT Evaluation and OT Therapeutic Exercise.    Goals:  Long term and short term goals have been discussed with patient and they are in agreement.    Occupational Therapy Goals     Problem: Dressing     Dates: Start: 09/05/20       Goal: STG-Within one week, patient will dress UB     Dates: Start: 09/05/20       Description: 1) Individualized Goal:  Sup to manage Upper Body Clothing  2) Interventions:  OT Self Care/ADL, OT Neuro Re-Ed/Balance, OT Therapeutic Activity, OT Evaluation, and OT Therapeutic Exercise             Goal: STG-Within one week, patient will dress LB     Dates: Start: 09/05/20       Description: 1) Individualized Goal:  Max assist for LB clothing management via AE/DME PRN  2) Interventions:  OT Self Care/ADL, OT Neuro Re-Ed/Balance, OT Therapeutic Activity, OT Evaluation, and OT Therapeutic Exercise                  Problem: Functional Transfers     Dates: Start: 09/05/20       Goal: STG-Within one week, patient will transfer to toilet     Dates: Start: 09/05/20       Description: 1) Individualized Goal: Mod assist for wc/commode transfer via DME PRN  2) Interventions:  OT Self Care/ADL, OT Neuro Re-Ed/Balance, OT Therapeutic Activity, OT Evaluation, and OT Therapeutic Exercise            Goal: STG-Within one week, patient will transfer to step in shower     Dates: Start: 09/05/20       Description: 1) Individualized Goal: Mod assist for wc/shower chair transfer via DME PRN  2) Interventions: OT Self Care/ADL, OT Neuro Re-Ed/Balance, OT Therapeutic Activity, OT Evaluation, and OT Therapeutic Exercise                  Problem: OT Long Term Goals     Dates: Start: 09/05/20       Goal: LTG-By discharge, patient will complete basic self care tasks     Dates: Start: 09/05/20       Description: 1) Individualized Goal:  Mod I for BADL's via AE/DME PRN  2) Interventions: OT Self Care/ADL, OT Neuro Re-Ed/Balance, OT Therapeutic Activity, OT Evaluation, and OT Therapeutic Exercise            Goal: LTG-By discharge, patient will perform bathroom transfers     Dates: Start: 09/05/20       Description: 1) Individualized Goal:  Mod I for toilet and shower transfer via DME  2) Interventions: OT Self Care/ADL, OT Neuro Re-Ed/Balance, OT Therapeutic Activity, OT Evaluation, and OT Therapeutic Exercise                  Problem: Toileting     Dates: Start: 09/05/20       Goal: STG-Within one week, patient will complete toileting tasks     Dates: Start: 09/05/20       Description: 1) Individualized Goal:  Max assist for toileting  task via AE/DME PRN  2) Interventions:  OT Self Care/ADL, OT Neuro Re-Ed/Balance, OT Therapeutic Activity, OT Evaluation, and OT Therapeutic Exercise

## 2020-09-06 PROBLEM — R74.8 ALKALINE PHOSPHATASE ELEVATION: Status: ACTIVE | Noted: 2020-09-06

## 2020-09-06 LAB
GLUCOSE BLD-MCNC: 102 MG/DL (ref 65–99)
GLUCOSE BLD-MCNC: 113 MG/DL (ref 65–99)
GLUCOSE BLD-MCNC: 125 MG/DL (ref 65–99)
GLUCOSE BLD-MCNC: 147 MG/DL (ref 65–99)

## 2020-09-06 PROCEDURE — A9270 NON-COVERED ITEM OR SERVICE: HCPCS | Performed by: PHYSICAL MEDICINE & REHABILITATION

## 2020-09-06 PROCEDURE — 700105 HCHG RX REV CODE 258: Performed by: PHYSICAL MEDICINE & REHABILITATION

## 2020-09-06 PROCEDURE — 82962 GLUCOSE BLOOD TEST: CPT | Mod: 91

## 2020-09-06 PROCEDURE — 700111 HCHG RX REV CODE 636 W/ 250 OVERRIDE (IP): Performed by: PHYSICAL MEDICINE & REHABILITATION

## 2020-09-06 PROCEDURE — 99233 SBSQ HOSP IP/OBS HIGH 50: CPT | Performed by: HOSPITALIST

## 2020-09-06 PROCEDURE — 700102 HCHG RX REV CODE 250 W/ 637 OVERRIDE(OP): Performed by: PHYSICAL MEDICINE & REHABILITATION

## 2020-09-06 PROCEDURE — 770010 HCHG ROOM/CARE - REHAB SEMI PRIVAT*

## 2020-09-06 PROCEDURE — 94760 N-INVAS EAR/PLS OXIMETRY 1: CPT

## 2020-09-06 RX ADMIN — GABAPENTIN 300 MG: 300 CAPSULE ORAL at 14:55

## 2020-09-06 RX ADMIN — CARVEDILOL 6.25 MG: 3.12 TABLET, FILM COATED ORAL at 17:18

## 2020-09-06 RX ADMIN — ATORVASTATIN CALCIUM 40 MG: 40 TABLET, FILM COATED ORAL at 21:34

## 2020-09-06 RX ADMIN — VANCOMYCIN HYDROCHLORIDE 750 MG: 5 INJECTION, POWDER, LYOPHILIZED, FOR SOLUTION INTRAVENOUS at 21:33

## 2020-09-06 RX ADMIN — ENOXAPARIN SODIUM 40 MG: 40 INJECTION SUBCUTANEOUS at 07:45

## 2020-09-06 RX ADMIN — GABAPENTIN 300 MG: 300 CAPSULE ORAL at 08:45

## 2020-09-06 RX ADMIN — VANCOMYCIN HYDROCHLORIDE 750 MG: 5 INJECTION, POWDER, LYOPHILIZED, FOR SOLUTION INTRAVENOUS at 08:46

## 2020-09-06 RX ADMIN — OMEPRAZOLE 20 MG: 20 CAPSULE, DELAYED RELEASE ORAL at 08:44

## 2020-09-06 RX ADMIN — DOCUSATE SODIUM 50 MG AND SENNOSIDES 8.6 MG 2 TABLET: 8.6; 5 TABLET, FILM COATED ORAL at 08:44

## 2020-09-06 RX ADMIN — AMPICILLIN SODIUM AND SULBACTAM SODIUM 3 G: 2; 1 INJECTION, POWDER, FOR SOLUTION INTRAMUSCULAR; INTRAVENOUS at 01:20

## 2020-09-06 RX ADMIN — CARVEDILOL 6.25 MG: 3.12 TABLET, FILM COATED ORAL at 08:44

## 2020-09-06 RX ADMIN — AMPICILLIN SODIUM AND SULBACTAM SODIUM 3 G: 2; 1 INJECTION, POWDER, FOR SOLUTION INTRAMUSCULAR; INTRAVENOUS at 11:15

## 2020-09-06 RX ADMIN — Medication 4000 UNITS: at 08:44

## 2020-09-06 RX ADMIN — ACETAMINOPHEN 1000 MG: 325 TABLET, FILM COATED ORAL at 21:34

## 2020-09-06 RX ADMIN — ACETAMINOPHEN 1000 MG: 325 TABLET, FILM COATED ORAL at 14:55

## 2020-09-06 RX ADMIN — AMPICILLIN SODIUM AND SULBACTAM SODIUM 3 G: 2; 1 INJECTION, POWDER, FOR SOLUTION INTRAMUSCULAR; INTRAVENOUS at 17:30

## 2020-09-06 RX ADMIN — GABAPENTIN 300 MG: 300 CAPSULE ORAL at 21:34

## 2020-09-06 RX ADMIN — ACETAMINOPHEN 1000 MG: 325 TABLET, FILM COATED ORAL at 08:44

## 2020-09-06 RX ADMIN — LISINOPRIL 10 MG: 5 TABLET ORAL at 08:45

## 2020-09-06 RX ADMIN — AMPICILLIN SODIUM AND SULBACTAM SODIUM 3 G: 2; 1 INJECTION, POWDER, FOR SOLUTION INTRAMUSCULAR; INTRAVENOUS at 05:49

## 2020-09-06 RX ADMIN — AMPICILLIN SODIUM AND SULBACTAM SODIUM 3 G: 2; 1 INJECTION, POWDER, FOR SOLUTION INTRAMUSCULAR; INTRAVENOUS at 23:53

## 2020-09-06 ASSESSMENT — ENCOUNTER SYMPTOMS
CHILLS: 0
POLYDIPSIA: 0
NAUSEA: 0
VOMITING: 0
FEVER: 0
PALPITATIONS: 0
COUGH: 0
EYES NEGATIVE: 1
SHORTNESS OF BREATH: 0
ABDOMINAL PAIN: 0
BRUISES/BLEEDS EASILY: 0

## 2020-09-06 ASSESSMENT — PATIENT HEALTH QUESTIONNAIRE - PHQ9
2. FEELING DOWN, DEPRESSED, IRRITABLE, OR HOPELESS: MORE THAN HALF THE DAYS
1. LITTLE INTEREST OR PLEASURE IN DOING THINGS: NOT AT ALL
SUM OF ALL RESPONSES TO PHQ9 QUESTIONS 1 AND 2: 2

## 2020-09-06 NOTE — PROGRESS NOTES
Hospital Medicine Daily Progress Note      Chief Complaint  Hypertension  Diabetes    Interval Problem Update  No overnight events reported to me.    Review of Systems  Review of Systems   Constitutional: Negative for chills and fever.   HENT: Negative.    Eyes: Negative.    Respiratory: Negative for cough and shortness of breath.    Cardiovascular: Negative for chest pain and palpitations.   Gastrointestinal: Negative for abdominal pain, nausea and vomiting.   Musculoskeletal:        Wound pain    Endo/Heme/Allergies: Negative for polydipsia. Does not bruise/bleed easily.        Physical Exam  Temp:  [36.6 °C (97.8 °F)-36.9 °C (98.5 °F)] 36.9 °C (98.5 °F)  Pulse:  [58-85] 85  Resp:  [18] 18  BP: (102-131)/(46-78) 131/78  SpO2:  [92 %-95 %] 95 %    Physical Exam  Vitals signs reviewed.   Constitutional:       Appearance: Normal appearance.      Comments: Chronically ill appearing   HENT:      Head: Normocephalic and atraumatic.      Right Ear: External ear normal.      Left Ear: External ear normal.      Nose: Nose normal.      Mouth/Throat:      Pharynx: Oropharynx is clear.   Eyes:      General:         Right eye: No discharge.         Left eye: No discharge.      Extraocular Movements: Extraocular movements intact.      Conjunctiva/sclera: Conjunctivae normal.   Neck:      Musculoskeletal: Normal range of motion and neck supple.   Cardiovascular:      Rate and Rhythm: Normal rate and regular rhythm.   Pulmonary:      Effort: No respiratory distress.      Breath sounds: No wheezing.      Comments: Decreased BS   Abdominal:      General: Bowel sounds are normal. There is no distension.      Palpations: Abdomen is soft.      Tenderness: There is no abdominal tenderness.   Musculoskeletal:      Comments: Left AKA w/ wound vac   Skin:     General: Skin is warm and dry.   Neurological:      Mental Status: He is alert.      Comments: awake         Fluids    Intake/Output Summary (Last 24 hours) at 9/6/2020 1506  Last  data filed at 9/6/2020 1200  Gross per 24 hour   Intake 0 ml   Output 1300 ml   Net -1300 ml       Laboratory  Recent Labs     09/05/20  0540   WBC 6.9   RBC 2.84*   HEMOGLOBIN 8.7*   HEMATOCRIT 29.3*   .2*   MCH 30.6   MCHC 29.7*   RDW 55.1*   PLATELETCT 270   MPV 10.7     Recent Labs     09/05/20  0540   SODIUM 143   POTASSIUM 4.0   CHLORIDE 108   CO2 27   GLUCOSE 127*   BUN 17   CREATININE 1.04   CALCIUM 8.8                   Assessment/Plan  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 Fisher 2/2 critical limb ischemia  Post-op complications included necrosis and infection  S/P AKA revision in 7/2020  S/P AKA I+D x 3  Wound vac per Wound Care  Wound Cx +MRSA and Enterococcus  On Unasyn and Vanco through 9/6/20 per ID  Pain control per Primary Team    PVD (peripheral vascular disease) with claudication (HCC)- (present on admission)  Assessment & Plan  On Lipitor  Consider ASA    Diabetes (HCC)- (present on admission)  Assessment & Plan  HbA1c 5.1  Observe serum glucose trends on SSI    Alkaline phosphatase elevation  Assessment & Plan  May be 2/2 AKA  Follow levels    Vitamin D deficiency  Assessment & Plan  Vit D level 10  On high dose supplementation per Primary Team    Anemia  Assessment & Plan  Has macrocytic indices  Check Vit B12 and Folate levels w/ next draw  TSH normal  Follow H/H    HTN (hypertension)- (present on admission)  Assessment & Plan  On Coreg and Lisinopril  Observe blood pressure trends     Basal cell carcinoma of face- (present on admission)  Assessment & Plan  Pt reports right cheek lesion has been ongoing x 1 yr  Needs Dermatology F/U     Full Code

## 2020-09-06 NOTE — DISCHARGE PLANNING
CASE MANAGEMENT INITIAL ASSESSMENT    Admit Date:  9/4/2020     I spoke with daughter and discussed role of case management / discharge planning / team conference. Daughter alert and able to provide information.    Patient is a  75 y.o. male  transferred from [facility] where he stayed from 05/12-09/04. Dr. Silverman is the admitting provider at [facility].       PCP: does not have one at this time  Surgeon: Dr. Fisher    Diagnosis: (L) AKA  S/P AKA (above knee amputation) unilateral, left (HCC)    Co-morbidities:   Patient Active Problem List    Diagnosis Date Noted   • Delirium 07/29/2020     Priority: High   • AKA stump complication ([facility]) 07/27/2020     Priority: High   • Arterial insufficiency ([facility]) 12/04/2013     Priority: High   • Limb ischemia 12/18/2012     Priority: High   • Artery occlusion 09/01/2011     Priority: High   • PVD (peripheral vascular disease) with claudication ([facility]) 09/01/2011     Priority: High   • Diabetes ([facility]) 07/27/2020     Priority: Medium   • DIABETES MELLITUS 09/01/2011     Priority: Medium   • HLD (hyperlipidemia) 07/01/2020     Priority: Low   • HTN (hypertension) 12/17/2012     Priority: Low   • Abdominal hernia 09/01/2011     Priority: Low   • Basal cell carcinoma of earlobe 09/01/2011     Priority: Low   • Basal cell carcinoma of face 09/01/2011     Priority: Low   • Anemia 09/05/2020   • Vitamin D deficiency 09/05/2020   • Encephalopathy acute 09/04/2020   • Long term current use of anticoagulant therapy 05/12/2015   • Chronic anticoagulation 10/21/2014   • Peripheral arterial occlusive disease (HCC) 07/29/2014   • Other specified disorders of arteries and arterioles (HCC) 03/17/2014   • Atherosclerosis of native artery of extremity (HCC) 02/02/2014   • Generalized atherosclerosis 11/14/2013   • Ischemia of extremity 12/19/2012   • Peripheral artery disease 12/17/2012     Prior Living Situation:  Housing / Facility: Mobile Home  Lives with - Patient's Self Care  Capacity: Adult Children, Child Less than 18 Years of Age(lives with grand daughter and family)    Prior Level of Function:  Home Management: Dependent  Shopping: Requires Assist  Prior Level Of Mobility: Supervision Without Device in Community, Independent With Device in Home  Driving / Transportation: Relatives / Others Provide Transportation    Support Systems:  Primary : Nona Clark Daughter [phone]     Previous Services Utilized:   Equipment Owned: Front-Wheel Walker, Wheelchair, Tub Transfer Bench, Tub / Shower Seat, Slide Board, Bed Side Commode, Sock Aid, Reacher  Prior Services: Skilled Home Health Services, Intermittent Physical Support for ADL Per Family    Other Information:  Occupation (Pre-Hospital Vocational): Retired Due To Age  Primary Payor Source: Medicare A, Medicare B  Primary Care Practitioner : none   Other MDs: Dr. Ramirez    Patient / Family Goal:  Patient / Family Goal: patient lives in a mobile home with his grand daughter and family. There is a ramp in place for patient and no steps within the home. Patients family has purchased him a front wheel walker, wheelchair, commode, sock kit and slide boartd. patient also has a tub bench per daughter. There are no hand rails within the bathroom. Karely Emmanuel will be the primary contact for patient. Patient does not have a primary care provider at this time. Prior to second admission patient was beening seen by [facility] and they were going to set up a Dr to come see him. Family would like to continue home health at discharge. Family goal is for patient to be strong enough to be able to get around. Son in law works and grand daughter just had  so they are unable to provide help at this time.      Additional Case Management Questions:  Have you ever received case management services for yourself or a family member? No    Do you feel you have and an understanding of what services   provide? Yes    Do you have any additional questions regarding case management?    Not at this time       CASE MANAGEMENT PLAN OF CARE   Individualized Goals:   1. Set up PCP  2. Set up home health again   3. To have patient strong enough to come home    Barriers:   1. Minimal physical support at this time  2. Functional deficits    Plan:  1. Continue to follow patient through hospitalization and provide discharge planning in collaboration with patient, family, physicians and ancillary services.     2. Utilize community resources to ensure a safe discharge.

## 2020-09-06 NOTE — FLOWSHEET NOTE
09/06/20 1018   Events/Summary/Plan   Events/Summary/Plan spot check done spo2 80% on room air, placed on 2liters nc spo2 increased to 95    Vital Signs   Pulse 85   Respiration 18   Pulse Oximetry 95 %   $ Pulse Oximetry (Spot Check) Yes   Respiratory Assessment   Level of Consciousness Alert   Oxygen   O2 (LPM) 2   O2 Delivery Device Nasal Cannula

## 2020-09-06 NOTE — WOUND TEAM
"[facility]  Inpatient Services  Initial Wound and Skin Care Evaluation    Admission Date: 9/4/2020     HPI, PMH, SH: Reviewed    Unit where seen by Wound Team: [facility]19/02     WOUND CONSULT RELATED TO:  VAC change and skin assessment    Subjective: reports wound \"hurts like heck\". States he doesn't know why they put diaper on him    Self Report / Pain Level:  10/10. Premedicated orally by RN       OBJECTIVE:  Waffle cushion overlay on bed    WOUND TYPE, LOCATION, CHARACTERISTICS (Pressure Injuries: location, stage, POA or date identified)     Negative Pressure Wound Therapy 06/30/20 (Active)   NPWT Pump Mode / Pressure Setting 125 mmHg;Continuous    Dressing Type Black Foam (Regular)    Number of Foam Pieces Used 3    Canister Changed No          Wound 09/04/20 Full Thickness Wound Thigh Left Left AKA wound (Active)   Wound Image   09/05/20   Site Assessment Tan;Dark edges;Grey;Red    Periwound Assessment Painful;Red    Margins Defined edges    Drainage Amount Small    Drainage Description Serosanguineous    Treatments Cleansed    Wound Cleansing Normal Saline Irrigation    Periwound Protectant Skin Protectant Wipes to Periwound;Drape    Dressing Options Wound Vac    Dressing Changed Changed    Dressing Change/Treatment Frequency Tuesday, Thursday, Saturday, and As Needed    NEXT Dressing Change/Treatment Date 09/07/20    NEXT Weekly Photo (Inpatient Only) 09/11/20    Non-staged Wound Description Full thickness    Wound Length (cm) 9 cm Measured 09/05/20   Wound Width (cm) 12 cm    Wound Depth (cm) 2.5 cm    Wound Surface Area (cm^2) 108 cm^2    Wound Volume (cm^3) 270 cm^3    Wound Bed Granulation (%) 20 %    Wound Bed Slough (%) 80 %    Wound Odor None    Exposed Structures None        Wound 09/04/20 Partial Thickness Wound Thigh Medial Left medial thigh (Active)   Wound Image   09/04/20   Site Assessment Pink;Red    Periwound Assessment Red    Margins Defined edges    Drainage Amount Small    Drainage " Description Serosanguineous    Treatments Cleansed    Wound Cleansing Normal Saline Irrigation    Periwound Protectant Antifungal Therapy, paste    Dressing Options Hydrofera Blue Ready;Silicone Adhesive Foam    Dressing Changed Changed    Dressing Status Clean;Dry;Intact    Dressing Change/Treatment Frequency Every 48 hrs, and As Needed    NEXT Dressing Change/Treatment Date 09/07/20    NEXT Weekly Photo (Inpatient Only) 09/11/20    Non-staged Wound Description Partial thickness    Wound Length (cm) 1 cm Measured 09/05/20   Wound Width (cm) 1.5 cm    Wound Surface Area (cm^2) 1.5 cm^2    Wound Odor None    Exposed Structures None      Moisture Associated Skin Damage 09/04/20 Buttock;Groin;Thigh (Active)   NEXT Weekly Photo (Inpatient Only) 09/11/20 09/05/20 1600   Drainage Amount Scant 09/05/20 1600   Periwound Assessment Maceration;Red 09/05/20 1600   IAD Cleansing Soap and Water 09/05/20 1600   Periwound Protectant Antifungal Therapy;Barrier Paste 09/05/20 1600   IAD Containment Device Indwelling Catheter 09/05/20 1600     Lab Values:    Lab Results   Component Value Date/Time    WBC 6.9 09/05/2020 05:40 AM    RBC 2.84 (L) 09/05/2020 05:40 AM    HEMOGLOBIN 8.7 (L) 09/05/2020 05:40 AM    HEMATOCRIT 29.3 (L) 09/05/2020 05:40 AM                  Lab Results   Component Value Date/Time    HBA1C 5.1 09/05/2020 05:40 AM           Culture:   N/A    INTERVENTIONS BY WOUND TEAM:  Removed previous VAC dressing. Using scissors and forceps removed loose slough (<20 sq cm). Cleaned wound with NS. Applied skin prep and drape to periwound skin. Applied one black foam to wound bed, second track foam to top of thigh, and third for TRAC pad. Resumed VAC at 125 mmHg continuous. Assess groin, buttock and back. Applied MARLENE with miconazole to groin and buttock. Cleaned medial thigh 2 small wounds with NS. Covered with small piece of Hydrofera blue foam, and secured with small silicone adhesive foam    Interdisciplinary consultation:  RN, patient    EVALUATION: MILLY HERRERA periwound is erythemic and painful to touch. Patient reports he saw Dr. Fisher yesterday afternoon, and he put the VAC back on. Recommend using a Veraflo VAC cleanse choice, due to the amount of slough present in wound bed. Instructed RN to keep diaper off as much as possible due to moisture damage to groin and buttock    Goals: Steady decrease in wound area and depth weekly.    NURSING PLAN OF CARE ORDERS (X):  Dressing changes: See Dressing Care orders: X  Skin care: See Skin Care orders: X  Rectal tube care: See Rectal Tube Care orders:        Other orders:                           RSKIN:   CURRENTLY IN PLACE (X), APPLIED THIS VISIT (A), ORDERED (O):   Q shift Alonso:  X  Q shift pressure point assessments:  X  Pressure redistribution mattress   X                          Low Airloss                        Bariatric SHANNAN                     Bariatric foam                        Heel float boots                     Heel Silicone dressing                         Float Heels off Bed with Pillows               Barrier wipes         Barrier Cream         Barrier paste X         Sacral silicone dressing         Silicone O2 tubing         Anchorfast         Cannula fixation Device (Tender )          Gray Foam Ear protectors           Trach with Optifoam split foam                 Waffle cushion        Waffle Overlay    X     Rectal tube or BMS    Purwick/Condom Cath          Antifungal tx      Interdry          Reposition q 2 hours  O      Up to chair        Ambulate      PT/OT   X     Dietician        Diabetes Education      PO  X   TF     TPN     NPO   # days   Other        WOUND TEAM PLAN OF CARE (X):   Dressing changes by wound team:          Follow up 1-2 times weekly:               Follow up 3 times weekly:                NPWT change 3 times weekly:  X   Follow up as needed:       Other (explain):     Anticipated discharge plans (X):   LTACH:        SNF/Rehab:                   Home Care: X         Outpatient Wound Center:            Self Care:            Other:

## 2020-09-06 NOTE — CARE PLAN
Problem: Safety  Goal: Will remain free from injury  Outcome: PROGRESSING AS EXPECTED  Note: Has not been impulsive this shift  Goal: Will remain free from falls  Outcome: PROGRESSING AS EXPECTED     Problem: Infection  Goal: Will remain free from infection  Outcome: PROGRESSING AS EXPECTED  Note: Continues on iv antibiotics

## 2020-09-07 PROBLEM — E87.0 HYPERNATREMIA: Status: ACTIVE | Noted: 2020-09-07

## 2020-09-07 LAB
ALBUMIN SERPL BCP-MCNC: 2.5 G/DL (ref 3.2–4.9)
ALBUMIN/GLOB SERPL: 0.8 G/DL
ALP SERPL-CCNC: 145 U/L (ref 30–99)
ALT SERPL-CCNC: 13 U/L (ref 2–50)
ANION GAP SERPL CALC-SCNC: 11 MMOL/L (ref 7–16)
AST SERPL-CCNC: 15 U/L (ref 12–45)
BILIRUB SERPL-MCNC: 0.2 MG/DL (ref 0.1–1.5)
BUN SERPL-MCNC: 12 MG/DL (ref 8–22)
CALCIUM SERPL-MCNC: 8.5 MG/DL (ref 8.5–10.5)
CHLORIDE SERPL-SCNC: 110 MMOL/L (ref 96–112)
CO2 SERPL-SCNC: 26 MMOL/L (ref 20–33)
CREAT SERPL-MCNC: 0.99 MG/DL (ref 0.5–1.4)
ERYTHROCYTE [DISTWIDTH] IN BLOOD BY AUTOMATED COUNT: 55 FL (ref 35.9–50)
FOLATE SERPL-MCNC: 2.9 NG/ML
GLOBULIN SER CALC-MCNC: 3.2 G/DL (ref 1.9–3.5)
GLUCOSE BLD-MCNC: 107 MG/DL (ref 65–99)
GLUCOSE BLD-MCNC: 165 MG/DL (ref 65–99)
GLUCOSE BLD-MCNC: 200 MG/DL (ref 65–99)
GLUCOSE BLD-MCNC: 98 MG/DL (ref 65–99)
GLUCOSE SERPL-MCNC: 115 MG/DL (ref 65–99)
HCT VFR BLD AUTO: 29.3 % (ref 42–52)
HGB BLD-MCNC: 8.8 G/DL (ref 14–18)
MCH RBC QN AUTO: 30.7 PG (ref 27–33)
MCHC RBC AUTO-ENTMCNC: 30 G/DL (ref 33.7–35.3)
MCV RBC AUTO: 102.1 FL (ref 81.4–97.8)
PLATELET # BLD AUTO: 266 K/UL (ref 164–446)
PMV BLD AUTO: 10.4 FL (ref 9–12.9)
POTASSIUM SERPL-SCNC: 4 MMOL/L (ref 3.6–5.5)
PROT SERPL-MCNC: 5.7 G/DL (ref 6–8.2)
RBC # BLD AUTO: 2.87 M/UL (ref 4.7–6.1)
SODIUM SERPL-SCNC: 147 MMOL/L (ref 135–145)
VANCOMYCIN TROUGH SERPL-MCNC: 24 UG/ML (ref 10–20)
VIT B12 SERPL-MCNC: 198 PG/ML (ref 211–911)
WBC # BLD AUTO: 6.7 K/UL (ref 4.8–10.8)

## 2020-09-07 PROCEDURE — A9270 NON-COVERED ITEM OR SERVICE: HCPCS | Performed by: HOSPITALIST

## 2020-09-07 PROCEDURE — 80202 ASSAY OF VANCOMYCIN: CPT

## 2020-09-07 PROCEDURE — 700105 HCHG RX REV CODE 258

## 2020-09-07 PROCEDURE — 80053 COMPREHEN METABOLIC PANEL: CPT

## 2020-09-07 PROCEDURE — 700105 HCHG RX REV CODE 258: Performed by: PHYSICAL MEDICINE & REHABILITATION

## 2020-09-07 PROCEDURE — 97112 NEUROMUSCULAR REEDUCATION: CPT | Mod: CQ

## 2020-09-07 PROCEDURE — 700105 HCHG RX REV CODE 258: Performed by: HOSPITALIST

## 2020-09-07 PROCEDURE — 94760 N-INVAS EAR/PLS OXIMETRY 1: CPT

## 2020-09-07 PROCEDURE — 82962 GLUCOSE BLOOD TEST: CPT | Mod: 91

## 2020-09-07 PROCEDURE — 97530 THERAPEUTIC ACTIVITIES: CPT | Mod: CQ

## 2020-09-07 PROCEDURE — 97116 GAIT TRAINING THERAPY: CPT | Mod: CQ

## 2020-09-07 PROCEDURE — 82746 ASSAY OF FOLIC ACID SERUM: CPT

## 2020-09-07 PROCEDURE — 97535 SELF CARE MNGMENT TRAINING: CPT

## 2020-09-07 PROCEDURE — 85027 COMPLETE CBC AUTOMATED: CPT

## 2020-09-07 PROCEDURE — 700102 HCHG RX REV CODE 250 W/ 637 OVERRIDE(OP): Performed by: HOSPITALIST

## 2020-09-07 PROCEDURE — 36415 COLL VENOUS BLD VENIPUNCTURE: CPT

## 2020-09-07 PROCEDURE — 770010 HCHG ROOM/CARE - REHAB SEMI PRIVAT*

## 2020-09-07 PROCEDURE — 700111 HCHG RX REV CODE 636 W/ 250 OVERRIDE (IP): Performed by: PHYSICAL MEDICINE & REHABILITATION

## 2020-09-07 PROCEDURE — 700111 HCHG RX REV CODE 636 W/ 250 OVERRIDE (IP)

## 2020-09-07 PROCEDURE — 99232 SBSQ HOSP IP/OBS MODERATE 35: CPT | Performed by: HOSPITALIST

## 2020-09-07 PROCEDURE — 82607 VITAMIN B-12: CPT

## 2020-09-07 PROCEDURE — 97605 NEG PRS WND THER DME<=50SQCM: CPT

## 2020-09-07 PROCEDURE — 700102 HCHG RX REV CODE 250 W/ 637 OVERRIDE(OP): Performed by: PHYSICAL MEDICINE & REHABILITATION

## 2020-09-07 PROCEDURE — A9270 NON-COVERED ITEM OR SERVICE: HCPCS | Performed by: PHYSICAL MEDICINE & REHABILITATION

## 2020-09-07 RX ORDER — DEXTROSE MONOHYDRATE 50 MG/ML
INJECTION, SOLUTION INTRAVENOUS ONCE
Status: COMPLETED | OUTPATIENT
Start: 2020-09-07 | End: 2020-09-08

## 2020-09-07 RX ORDER — AMPICILLIN AND SULBACTAM 2; 1 G/1; G/1
INJECTION, POWDER, FOR SOLUTION INTRAMUSCULAR; INTRAVENOUS
Status: COMPLETED
Start: 2020-09-07 | End: 2020-09-07

## 2020-09-07 RX ORDER — FOLIC ACID 1 MG/1
1 TABLET ORAL DAILY
Status: DISCONTINUED | OUTPATIENT
Start: 2020-09-07 | End: 2020-09-23 | Stop reason: HOSPADM

## 2020-09-07 RX ORDER — SODIUM CHLORIDE 9 MG/ML
INJECTION, SOLUTION INTRAVENOUS
Status: COMPLETED
Start: 2020-09-07 | End: 2020-09-07

## 2020-09-07 RX ORDER — CHOLECALCIFEROL (VITAMIN D3) 125 MCG
1000 CAPSULE ORAL DAILY
Status: DISCONTINUED | OUTPATIENT
Start: 2020-09-07 | End: 2020-09-23 | Stop reason: HOSPADM

## 2020-09-07 RX ADMIN — INSULIN HUMAN 2 UNITS: 100 INJECTION, SOLUTION PARENTERAL at 11:37

## 2020-09-07 RX ADMIN — OMEPRAZOLE 20 MG: 20 CAPSULE, DELAYED RELEASE ORAL at 08:22

## 2020-09-07 RX ADMIN — GABAPENTIN 300 MG: 300 CAPSULE ORAL at 14:05

## 2020-09-07 RX ADMIN — ACETAMINOPHEN 1000 MG: 325 TABLET, FILM COATED ORAL at 08:22

## 2020-09-07 RX ADMIN — OXYCODONE HYDROCHLORIDE 5 MG: 5 TABLET ORAL at 15:09

## 2020-09-07 RX ADMIN — ACETAMINOPHEN 1000 MG: 325 TABLET, FILM COATED ORAL at 14:05

## 2020-09-07 RX ADMIN — AMPICILLIN SODIUM AND SULBACTAM SODIUM 3 G: 2; 1 INJECTION, POWDER, FOR SOLUTION INTRAMUSCULAR; INTRAVENOUS at 23:45

## 2020-09-07 RX ADMIN — VANCOMYCIN HYDROCHLORIDE 750 MG: 5 INJECTION, POWDER, LYOPHILIZED, FOR SOLUTION INTRAVENOUS at 09:51

## 2020-09-07 RX ADMIN — AMPICILLIN SODIUM AND SULBACTAM SODIUM 3 G: 2; 1 INJECTION, POWDER, FOR SOLUTION INTRAMUSCULAR; INTRAVENOUS at 17:22

## 2020-09-07 RX ADMIN — ENOXAPARIN SODIUM 40 MG: 40 INJECTION SUBCUTANEOUS at 08:29

## 2020-09-07 RX ADMIN — AMPICILLIN SODIUM AND SULBACTAM SODIUM 3 G: 2; 1 INJECTION, POWDER, FOR SOLUTION INTRAMUSCULAR; INTRAVENOUS at 05:44

## 2020-09-07 RX ADMIN — Medication 4000 UNITS: at 08:21

## 2020-09-07 RX ADMIN — SODIUM CHLORIDE 100 ML: 900 INJECTION INTRAVENOUS at 23:45

## 2020-09-07 RX ADMIN — CARVEDILOL 6.25 MG: 3.12 TABLET, FILM COATED ORAL at 17:22

## 2020-09-07 RX ADMIN — AMPICILLIN SODIUM AND SULBACTAM SODIUM 3 G: 2; 1 INJECTION, POWDER, FOR SOLUTION INTRAMUSCULAR; INTRAVENOUS at 11:39

## 2020-09-07 RX ADMIN — OXYCODONE HYDROCHLORIDE 5 MG: 5 TABLET ORAL at 08:22

## 2020-09-07 RX ADMIN — GABAPENTIN 300 MG: 300 CAPSULE ORAL at 08:22

## 2020-09-07 RX ADMIN — VANCOMYCIN HYDROCHLORIDE 750 MG: 5 INJECTION, POWDER, LYOPHILIZED, FOR SOLUTION INTRAVENOUS at 21:10

## 2020-09-07 RX ADMIN — INSULIN HUMAN 2 UNITS: 100 INJECTION, SOLUTION PARENTERAL at 21:04

## 2020-09-07 RX ADMIN — FOLIC ACID 1 MG: 1 TABLET ORAL at 14:05

## 2020-09-07 RX ADMIN — ATORVASTATIN CALCIUM 40 MG: 40 TABLET, FILM COATED ORAL at 20:59

## 2020-09-07 RX ADMIN — LISINOPRIL 10 MG: 5 TABLET ORAL at 08:22

## 2020-09-07 RX ADMIN — ACETAMINOPHEN 1000 MG: 325 TABLET, FILM COATED ORAL at 20:59

## 2020-09-07 RX ADMIN — OXYCODONE HYDROCHLORIDE 5 MG: 5 TABLET ORAL at 11:56

## 2020-09-07 RX ADMIN — CARVEDILOL 6.25 MG: 3.12 TABLET, FILM COATED ORAL at 08:22

## 2020-09-07 RX ADMIN — DEXTROSE MONOHYDRATE: 50 INJECTION, SOLUTION INTRAVENOUS at 12:42

## 2020-09-07 RX ADMIN — GABAPENTIN 300 MG: 300 CAPSULE ORAL at 20:59

## 2020-09-07 RX ADMIN — Medication 1000 MCG: at 14:05

## 2020-09-07 ASSESSMENT — GAIT ASSESSMENTS
ASSISTIVE DEVICE: PARALLEL BARS
DISTANCE (FEET): 6
GAIT LEVEL OF ASSIST: MINIMAL ASSIST

## 2020-09-07 ASSESSMENT — ACTIVITIES OF DAILY LIVING (ADL)
BED_CHAIR_WHEELCHAIR_TRANSFER_DESCRIPTION: ADAPTIVE EQUIPMENT;INITIAL PREPARATION FOR TASK;INCREASED TIME;SET-UP OF EQUIPMENT;SUPERVISION FOR SAFETY;VERBAL CUEING

## 2020-09-07 ASSESSMENT — ENCOUNTER SYMPTOMS
NAUSEA: 0
FEVER: 0
SHORTNESS OF BREATH: 0
EYES NEGATIVE: 1
VOMITING: 0
ABDOMINAL PAIN: 0
CHILLS: 0
COUGH: 0
POLYDIPSIA: 0
BRUISES/BLEEDS EASILY: 0
PALPITATIONS: 0

## 2020-09-07 ASSESSMENT — PAIN DESCRIPTION - PAIN TYPE: TYPE: ACUTE PAIN

## 2020-09-07 ASSESSMENT — FIBROSIS 4 INDEX: FIB4 SCORE: 1.188643277625490976

## 2020-09-07 NOTE — FLOWSHEET NOTE
09/07/20 0708   Events/Summary/Plan   Events/Summary/Plan pulse ox check, laying in bed   Vital Signs   Pulse 64   Respiration 16   Pulse Oximetry 98 %   $ Pulse Oximetry (Spot Check) Yes   Respiratory Assessment   Level of Consciousness Alert   Chest Exam   Work Of Breathing / Effort Shallow   Oxygen   O2 (LPM) 2  (placed on RA)   O2 Delivery Device Nasal Cannula

## 2020-09-07 NOTE — THERAPY
Speech Language Pathology  Daily Treatment     Patient Name: Jimenez Bains  Age:  75 y.o., Sex:  male  Medical Record #: [id number]  Today's Date: 9/7/2020     Precautions  Precautions: Fall Risk, Non Weight Bearing Left Lower Extremity, Other (See Comments)  Comments: wound vac L residual limb, sores on genital area    Subjective    Patient with contact isolation precautions, seen at bedside.  Patient was tearful throughout session, emotional state a distractor.  Emotional about missing family, having been moved to 5 different facilities over a year ( ? Historian) grieving loss of eyesight, and tearful with pain in left stump. However, pt. denied being depressed.  Patient also reported painful urination.  Nursing notified of patient's pain.      Objective       09/07/20 1031   Cognition   Moderate Attention Minimal (4)   Orientation  Minimal (4)   Functional Problem Solving Moderate (3)   Functional Level of Assist   Comprehension Independent   Comprehension Description Glasses   Expression Modified Independent   Expression Description Verbal cueing   Social Interaction Minimal Assist   Social Interaction Description Verbal cues   Problem Solving Moderate Assist   Problem Solving Description Bed/chair alarm;Increased time;Therapy schedule;Verbal cueing   Memory Moderate Assist   Memory Description Bed/chair alarm;Increased time;Supervision;Therapy schedule;Verbal cueing   SLP Total Time Spent   SLP Individual Total Time Spent (Mins) 60       Assessment    Patient reports that he needs Rx glasses which are at home to see.  He is unable to read his own writing in memory log, and and 16 font.   He is able to read larger font with extra time.  He reports that his vision is poor and was tearful over losing visual acuity. He also reports that he completed up to one month into 10th grade, but has completed GED.   He reports that he didn't take many medications prior to hospitalization, and that he managed his medications  and finances independently prior to hospitalization.   Patient was able to recall safety sequencing for bed to w/c transfers with min cues.  Patient was able to identify safety problems for given situations with 75% acc, but needed mod cues to anticipate safety problems for self.    Strengths: Able to follow instructions, Motivated for self care and independence, Pleasant and cooperative, Willingly participates in therapeutic activities  Barriers: Impaired functional cognition    Plan    Target recall of new training, alternating attention, functional problem solving.    Speech Therapy Problems     Problem: Memory STGs     Dates: Start: 09/05/20       Goal: STG-Within one week, patient will     Dates: Start: 09/05/20       Description: 1) Individualized goal:  recall daily events and safety strategies given min verbal cues 80% with use of memory book.  2) Interventions:  SLP Cognitive Skill Development and SLP Group Treatment                    Problem: Problem Solving STGs     Dates: Start: 09/05/20       Goal: STG-Within one week, patient will     Dates: Start: 09/05/20       Description: 1) Individualized goal:  answer basic problem solving questions with 80% accuracy given min verbal cues.    2) Interventions:  SLP Cognitive Skill Development and SLP Group Treatment              Goal: STG-Within one week, patient will     Dates: Start: 09/05/20       Description: 1) Individualized goal:  complete alternating attn tasks with 80% accuracy given min verbal cues.  2) Interventions:  SLP Cognitive Skill Development and SLP Group Treatment                    Problem: Speech/Swallowing LTGs     Dates: Start: 09/05/20       Goal: LTG-By discharge, patient will solve basic problems     Dates: Start: 09/05/20       Description: 1) Individualized goal:  Mert for safe discharge home.   2) Interventions:  SLP Aphasia Evaluation, SLP Cognitive Skill Development, and SLP Group Treatment

## 2020-09-07 NOTE — THERAPY
Occupational Therapy  Daily Treatment     Patient Name: Jimenez Bains  Age:  75 y.o., Sex:  male  Medical Record #: [id number]  Today's Date: 9/7/2020     Precautions  Precautions: (P) Fall Risk, Non Weight Bearing Left Lower Extremity, Other (See Comments)  Comments: (P) wound vac L residual limb, sores on genital area         Subjective    Pt received supine in bed, reported he was currently pooping. Oriented to place and situation     Objective       09/07/20 0831   Precautions   Precautions Fall Risk;Non Weight Bearing Left Lower Extremity;Other (See Comments)   Comments wound vac L residual limb, sores on genital area   Functional Level of Assist   Grooming Seated;Supervision   Grooming Description Seated in wheelchair at sink;Supervision for safety;Verbal cueing;Increased time  (setup to trim beard and complete oral care seated at sink)   Upper Body Dressing Minimal Assist   Upper Body Dressing Description Set-up of equipment;Verbal cueing;Supervision for safety   Lower Body Dressing Total Assist   Lower Body Dressing Description Supervision for safety;Verbal cueing;Increased time  (donned pants at bed level)   Toileting Total Assist   Toileting Description Assist for hygiene;Assist to pull pants up;Assist to pull pants down;Increased time  (pt incontinent of BM in bed, total A to manage clean-up)   Bed, Chair, Wheelchair Transfer Moderate Assist   Bed Chair Wheelchair Transfer Description Adaptive equipment;Requires lift;Increased time;Supervision for safety;Verbal cueing  (mod A sit to stand, min A stand step tx, poor LE clearance)   OT Total Time Spent   OT Individual Total Time Spent (Mins) 60   OT Charge Group   OT Self Care / ADL 4       Assessment    Pt tolerated session fair, limited by significant pain in LLE and significant weakness impacting sit to stands and LE clearance for transfers. Limited attention to personal hygiene but participated with encouragement. Demonstrated ability to weightshift  appropriately in chair with prompting, education provided on importance of consistent weightshift in both seated and standing positions for skin integrity. Requires increased time for all activities due to impaired activity tolerance and impaired attention.   Strengths: Pleasant and cooperative, Willingly participates in therapeutic activities  Barriers: Confused, Decreased endurance, Generalized weakness, Impaired balance, Limited mobility, Pain    Plan    Continue to progress gross strengthening and endurance and standing tolerance/balance toward increased safety and independence with ADLs, general attention to hygiene and self-care, weightshift when scooting    Occupational Therapy Goals     Problem: Dressing     Dates: Start: 09/05/20       Goal: STG-Within one week, patient will dress UB     Dates: Start: 09/05/20       Description: 1) Individualized Goal:  Sup to manage Upper Body Clothing  2) Interventions:  OT Self Care/ADL, OT Neuro Re-Ed/Balance, OT Therapeutic Activity, OT Evaluation, and OT Therapeutic Exercise            Goal: STG-Within one week, patient will dress LB     Dates: Start: 09/05/20       Description: 1) Individualized Goal:  Max assist for LB clothing management via AE/DME PRN  2) Interventions:  OT Self Care/ADL, OT Neuro Re-Ed/Balance, OT Therapeutic Activity, OT Evaluation, and OT Therapeutic Exercise                  Problem: Functional Transfers     Dates: Start: 09/05/20       Goal: STG-Within one week, patient will transfer to toilet     Dates: Start: 09/05/20       Description: 1) Individualized Goal: Mod assist for wc/commode transfer via DME PRN  2) Interventions:  OT Self Care/ADL, OT Neuro Re-Ed/Balance, OT Therapeutic Activity, OT Evaluation, and OT Therapeutic Exercise            Goal: STG-Within one week, patient will transfer to step in shower     Dates: Start: 09/05/20       Description: 1) Individualized Goal: Mod assist for wc/shower chair transfer via DME PRN  2)  Interventions: OT Self Care/ADL, OT Neuro Re-Ed/Balance, OT Therapeutic Activity, OT Evaluation, and OT Therapeutic Exercise                  Problem: OT Long Term Goals     Dates: Start: 09/05/20       Goal: LTG-By discharge, patient will complete basic self care tasks     Dates: Start: 09/05/20       Description: 1) Individualized Goal:  Mod I for BADL's via AE/DME PRN  2) Interventions: OT Self Care/ADL, OT Neuro Re-Ed/Balance, OT Therapeutic Activity, OT Evaluation, and OT Therapeutic Exercise            Goal: LTG-By discharge, patient will perform bathroom transfers     Dates: Start: 09/05/20       Description: 1) Individualized Goal:  Mod I for toilet and shower transfer via DME  2) Interventions: OT Self Care/ADL, OT Neuro Re-Ed/Balance, OT Therapeutic Activity, OT Evaluation, and OT Therapeutic Exercise                  Problem: Toileting     Dates: Start: 09/05/20       Goal: STG-Within one week, patient will complete toileting tasks     Dates: Start: 09/05/20       Description: 1) Individualized Goal:  Max assist for toileting task via AE/DME PRN  2) Interventions:  OT Self Care/ADL, OT Neuro Re-Ed/Balance, OT Therapeutic Activity, OT Evaluation, and OT Therapeutic Exercise

## 2020-09-07 NOTE — CARE PLAN
Problem: Communication  Goal: The ability to communicate needs accurately and effectively will improve  Outcome: PROGRESSING AS EXPECTED  Note: Able to communicate needs to nursing fairly well, sometimes needs to be prompted but answers questions appropriately     Problem: Safety  Goal: Will remain free from injury  Outcome: PROGRESSING AS EXPECTED  Note: Pt has not been impulsive  Goal: Will remain free from falls  Outcome: PROGRESSING AS EXPECTED     Problem: Infection  Goal: Will remain free from infection  Outcome: PROGRESSING AS EXPECTED  Note: Continuing IV antibiotics until 9/8     Problem: Venous Thromboembolism (VTW)/Deep Vein Thrombosis (DVT) Prevention:  Goal: Patient will participate in Venous Thrombosis (VTE)/Deep Vein Thrombosis (DVT)Prevention Measures  Outcome: PROGRESSING AS EXPECTED  Note: Continues on lovenox     Problem: Pain Management  Goal: Pain level will decrease to patient's comfort goal  Outcome: PROGRESSING AS EXPECTED  Note: Reports some neck pain which seems to be managed with scheduled tylenol     Problem: Respiratory:  Goal: Respiratory status will improve  Outcome: PROGRESSING SLOWER THAN EXPECTED  Note: Patient was 85-87% on room air at beginning of shift, placed on 2L nasal cannula for bed

## 2020-09-07 NOTE — THERAPY
Physical Therapy   Daily Treatment     Patient Name: Jimenez Bains  Age:  75 y.o., Sex:  male  Medical Record #: [id number]  Today's Date: 9/7/2020     Precautions  Precautions: Fall Risk, Non Weight Bearing Left Lower Extremity, Other (See Comments)  Comments: wound vac L residual limb, sores on genital area    Subjective    Pt was agreeable to PT     Spoke to daughter briefly as she was visiting the patient in his room prior to PT session, Rehana(daughter) confirms that she lives across the street, pt lives with grand daughter and grand son in law and they are only able to provide a limited amount of assistance, supervision and Nicanor if necessary(for ADL's) will need to discuss further as FT is scheduled     Objective       09/07/20 1401   Cognition    Level of Consciousness Alert   Gait Functional Level of Assist    Gait Level Of Assist Minimal Assist   Assistive Device Parallel Bars   Distance (Feet) 6   # of Times Distance was Traveled 10   Wheelchair Functional Level of Assist   Wheelchair Assist Minimal Assist   Distance Wheelchair (Feet or Distance) 120   Wheelchair Description Adaptive equipment;Extra time;Requires incidental assist;Supervision for safety;Safety concerns;Verbal cueing  (assist to manage IV pole/wound vac)   Stairs Functional Level of Assist   Level of Assist with Stairs Unable to Participate   # of Stairs Climbed 0   Stairs Description Safety concerns   Transfer Functional Level of Assist   Bed, Chair, Wheelchair Transfer Minimal Assist   Bed Chair Wheelchair Transfer Description Adaptive equipment;Initial preparation for task;Increased time;Set-up of equipment;Supervision for safety;Verbal cueing  (SPT with FWW)   Bed Mobility    Supine to Sit Moderate Assist   Sit to Stand Minimal Assist  (elevated bed height)   Neuro-Muscular Treatments   Neuro-Muscular Treatments Verbal Cuing;Tactile Cuing;Sequencing;Postural Facilitation   Interdisciplinary Plan of Care Collaboration   IDT Collaboration  with  Occupational Therapist;Family / Caregiver   Patient Position at End of Therapy Seated;Self Releasing Lap Belt Applied;Call Light within Reach;Tray Table within Reach   Collaboration Comments OT: CLOF, Family:spoke to daughter, assisted to locate gown to enter room   PT Total Time Spent   PT Individual Total Time Spent (Mins) 60   PT Charge Group   PT Gait Training 1   PT Neuromuscular Re-Education / Balance 1   PT Therapeutic Activities 2   Supervising Physical Therapist Staci Dean     Provided hybrid roho cushion to pt's wc for pressure relief    Assessment    Pt required an increased level of assistance this session to transition from supine to sit, he was having an increased amount of pain in the residual limb. Pt is motivated and pleasant and cooperative with tx session, good in session carry over with edu on semi-circular wc pattern.  Strengths: Independent prior level of function, Motivated for self care and independence, Willingly participates in therapeutic activities  Barriers: Confused, Decreased endurance, Fatigue, Generalized weakness, Home accessibility, Impaired activity tolerance, Impaired balance, Pain, Limited mobility    Plan    Transfer training, bed mobility, address pt's seating(roho is too large for wc) needs 18W x 16D. Gait in //,, amputee education and there ex as able, gait training and balance    Physical Therapy Problems     Problem: Mobility     Dates: Start: 09/05/20       Goal: STG-Within one week, patient will propel wheelchair household distances     Dates: Start: 09/05/20       Description: 1) Individualized goal:  50 ft with SBA.  2) Interventions:  PT Group Therapy, PT Gait Training, PT Therapeutic Exercises, PT Neuro Re-Ed/Balance, PT Therapeutic Activity, and PT Evaluation            Goal: STG-Within one week, patient will ambulate household distance     Dates: Start: 09/05/20       Description: 1) Individualized goal:  10 ft with FWW and SBA-CGA.  2) Interventions:  PT  Group Therapy, PT Gait Training, PT Therapeutic Exercises, PT Neuro Re-Ed/Balance, PT Therapeutic Activity, and PT Evaluation                Problem: Mobility Transfers     Dates: Start: 09/05/20       Goal: STG-Within one week, patient will sit to stand     Dates: Start: 09/05/20       Description: 1) Individualized goal:  with FWW and SBA.  2) Interventions:  PT Group Therapy, PT Gait Training, PT Therapeutic Exercises, PT Neuro Re-Ed/Balance, PT Therapeutic Activity, and PT Evaluation          Goal: STG-Within one week, patient will transfer bed to chair     Dates: Start: 09/05/20       Description: 1) Individualized goal:  with FWW vs. Squat pivot and SBA.  2) Interventions:  PT Group Therapy, PT Gait Training, PT Therapeutic Exercises, PT Neuro Re-Ed/Balance, PT Therapeutic Activity, and PT Evaluation                Problem: PT-Long Term Goals     Dates: Start: 09/05/20       Goal: LTG-By discharge, patient will propel wheelchair     Dates: Start: 09/05/20       Description: 1) Individualized goal:  150 ft mod I.  2) Interventions:  PT Group Therapy, PT Gait Training, PT Therapeutic Exercises, PT Neuro Re-Ed/Balance, PT Therapeutic Activity, and PT Evaluation          Goal: LTG-By discharge, patient will ambulate     Dates: Start: 09/05/20       Description: 1) Individualized goal:  50 ft with FWW and supervision.  2) Interventions:  PT Group Therapy, PT Gait Training, PT Therapeutic Exercises, PT Neuro Re-Ed/Balance, PT Therapeutic Activity, and PT Evaluation          Goal: LTG-By discharge, patient will transfer one surface to another     Dates: Start: 09/05/20       Description: 1) Individualized goal:  with FWW vs. Squat pivot mod I.  2) Interventions:  PT Group Therapy, PT Gait Training, PT Therapeutic Exercises, PT Neuro Re-Ed/Balance, PT Therapeutic Activity, and PT Evaluation          Goal: LTG-By discharge, patient will perform home exercise program     Dates: Start: 09/05/20       Description: 1)  Individualized goal:  with handout mod I.  2) Interventions:  PT Group Therapy, PT Gait Training, PT Therapeutic Exercises, PT Neuro Re-Ed/Balance, PT Therapeutic Activity, and PT Evaluation          Goal: LTG-By discharge, patient will     Dates: Start: 09/05/20       Description: 1) Individualized goal:  perform bed mobility including rolling to prone mod I.  2) Interventions:  PT Group Therapy, PT Gait Training, PT Therapeutic Exercises, PT Neuro Re-Ed/Balance, PT Therapeutic Activity, and PT Evaluation

## 2020-09-07 NOTE — ASSESSMENT & PLAN NOTE
Na: 147 (9/19) --> 142 (9/20)  ? Etiology  Bun/Cr wnl  On Cardiac diet   S/P previous D5W at 50 mL/hr x 500 mL (x 2 different occasions)  Monitor

## 2020-09-07 NOTE — FLOWSHEET NOTE
09/07/20 0822   Events/Summary/Plan   Events/Summary/Plan RA pulse ox check   Vital Signs   Pulse 84   Respiration 16   Pulse Oximetry 92 %   $ Pulse Oximetry (Spot Check) Yes   Respiratory Assessment   Level of Consciousness Alert   Chest Exam   Work Of Breathing / Effort Shallow   Oxygen   O2 Delivery Device None - Room Air

## 2020-09-07 NOTE — DIETARY
"Nutrition services: Day 3 of admit.  Jimenez Bains is a 75 y.o. male with admitting DX of (L) AKA, S/P AKA (above knee amputation) unilateral, left. AKA on 6/30/20.     Consult received for MST of 2 on nutrition screen due to report of unsure weight loss. No report of poor PO PTA.       Assessment:  Height: 167.6 cm (5' 6\")  Weight: 61 kg (134 lb 7.7 oz)  Body mass index is 21.71 kg/m².   Adjusted weight for left AKA: 68.5 kg  Adjusted body mass index is 24.3; BMI classification: normal  Diet/Intake: cardiac, diabetic/range of 25% to % with average of 63%    Evaluation:   1. Weights reviewed in Epic. Pt weighed 63 kg on 7/27/20. Weight loss noted of 2 kg (3.2%) x ~ 1 month. Weight loss is not significant.   2. Current recorded PO intake is adequate and should encourage wound healing.   3.  Skin: skin cancer tumor on right cheek, full thickness wound on left AKA stump with wound vac, partial thickness wound on left thigh, MASD on buttock, groin and thigh.   4. Labs: 9/7/20: sodium=147 (H), Bun=12, folic acid=2.9, vitamin K53=839 (L). 9/5/20: vitamin D=10, GgD5w=7.1. Glucose igml=911-725.  5. Meds: Unasyn, SSI, omeprazole, vanco, vit D.   6. GI: LBM on 9/7 (medium)  7. : beck UOP.      Malnutrition Risk: criteria not met.     Recommendations/Plan:   1. Encourage intake of >50%  2. Document intake of all meals as % taken in ADL's to provide interdisciplinary communication across all shifts.   3. Monitor weight.  4. Nutrition rep will continue to see patient for ongoing meal and snack preferences.     RD will follow weekly or PRN.       "

## 2020-09-07 NOTE — PROGRESS NOTES
Hospital Medicine Daily Progress Note      Chief Complaint  Hypertension  Diabetes    Interval Problem Update  Pt denies new complaints.  Labs reviewed.     Review of Systems  Review of Systems   Constitutional: Negative for chills and fever.   HENT: Negative.    Eyes: Negative.    Respiratory: Negative for cough and shortness of breath.    Cardiovascular: Negative for chest pain and palpitations.   Gastrointestinal: Negative for abdominal pain, nausea and vomiting.   Musculoskeletal:        Wound pain    Endo/Heme/Allergies: Negative for polydipsia. Does not bruise/bleed easily.        Physical Exam  Temp:  [36.7 °C (98 °F)-36.9 °C (98.4 °F)] 36.9 °C (98.4 °F)  Pulse:  [] 84  Resp:  [16-18] 16  BP: (115-130)/(50-64) 130/64  SpO2:  [87 %-99 %] 92 %    Physical Exam  Vitals signs reviewed.   Constitutional:       Appearance: Normal appearance.      Comments: Chronically ill appearing   HENT:      Head: Normocephalic and atraumatic.      Right Ear: External ear normal.      Left Ear: External ear normal.      Nose: Nose normal.      Mouth/Throat:      Pharynx: Oropharynx is clear.   Eyes:      General:         Right eye: No discharge.         Left eye: No discharge.      Extraocular Movements: Extraocular movements intact.      Conjunctiva/sclera: Conjunctivae normal.   Neck:      Musculoskeletal: Normal range of motion and neck supple.   Cardiovascular:      Rate and Rhythm: Normal rate and regular rhythm.   Pulmonary:      Effort: No respiratory distress.      Breath sounds: No wheezing.      Comments: Decreased BS   Abdominal:      General: Bowel sounds are normal. There is no distension.      Palpations: Abdomen is soft.      Tenderness: There is no abdominal tenderness.   Musculoskeletal:      Comments: Left AKA w/ wound vac   Skin:     General: Skin is warm and dry.      Comments: Large pearly lesion on right cheek   Neurological:      Mental Status: He is alert.      Comments: awake          Fluids    Intake/Output Summary (Last 24 hours) at 9/7/2020 1232  Last data filed at 9/7/2020 0822  Gross per 24 hour   Intake 60 ml   Output 1050 ml   Net -990 ml       Laboratory  Recent Labs     09/05/20  0540 09/07/20  0703   WBC 6.9 6.7   RBC 2.84* 2.87*   HEMOGLOBIN 8.7* 8.8*   HEMATOCRIT 29.3* 29.3*   .2* 102.1*   MCH 30.6 30.7   MCHC 29.7* 30.0*   RDW 55.1* 55.0*   PLATELETCT 270 266   MPV 10.7 10.4     Recent Labs     09/05/20  0540 09/07/20  0703   SODIUM 143 147*   POTASSIUM 4.0 4.0   CHLORIDE 108 110   CO2 27 26   GLUCOSE 127* 115*   BUN 17 12   CREATININE 1.04 0.99   CALCIUM 8.8 8.5                   Assessment/Plan  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 Fisher 2/2 critical limb ischemia  Post-op complications included necrosis and infection  S/P AKA revision in 7/2020  S/P AKA I+D x 3  Wound vac per Wound Care  Wound Cx +MRSA and Enterococcus  On Unasyn and Vanco through 9/6/20 per ID, through 9/8/20 per ordering physician  Pain control per Primary Team    PVD (peripheral vascular disease) with claudication (HCC)- (present on admission)  Assessment & Plan  On Lipitor  Consider ASA    Diabetes (HCC)- (present on admission)  Assessment & Plan  HbA1c 5.1  Observe serum glucose trends on SSI    Hypernatremia  Assessment & Plan  Will give D5W at 50 mL/hr x 500 mL  Closely follow electrolytes    Alkaline phosphatase elevation  Assessment & Plan  May be 2/2 AKA  Follow levels    Vitamin D deficiency  Assessment & Plan  Vit D level 10  On high dose supplementation per Primary Team    Anemia  Assessment & Plan  Has macrocytic indices  TSH normal  Vit B12 198 and Folate 2.9  Start supplementation for both  Follow H/H    HTN (hypertension)- (present on admission)  Assessment & Plan  On Coreg and Lisinopril  Observe blood pressure trends     Basal cell carcinoma of face- (present  on admission)  Assessment & Plan  Pt reports right cheek lesion has been ongoing x 1 yr  Needs Dermatology F/U     Full Code

## 2020-09-08 PROBLEM — E87.6 HYPOKALEMIA: Status: ACTIVE | Noted: 2020-09-08

## 2020-09-08 LAB
ANION GAP SERPL CALC-SCNC: 10 MMOL/L (ref 7–16)
BUN SERPL-MCNC: 12 MG/DL (ref 8–22)
CALCIUM SERPL-MCNC: 8.6 MG/DL (ref 8.5–10.5)
CHLORIDE SERPL-SCNC: 109 MMOL/L (ref 96–112)
CO2 SERPL-SCNC: 26 MMOL/L (ref 20–33)
CREAT SERPL-MCNC: 0.96 MG/DL (ref 0.5–1.4)
GLUCOSE BLD-MCNC: 105 MG/DL (ref 65–99)
GLUCOSE BLD-MCNC: 113 MG/DL (ref 65–99)
GLUCOSE BLD-MCNC: 123 MG/DL (ref 65–99)
GLUCOSE BLD-MCNC: 144 MG/DL (ref 65–99)
GLUCOSE SERPL-MCNC: 124 MG/DL (ref 65–99)
NT-PROBNP SERPL IA-MCNC: 5607 PG/ML (ref 0–125)
POTASSIUM SERPL-SCNC: 3.5 MMOL/L (ref 3.6–5.5)
SODIUM SERPL-SCNC: 145 MMOL/L (ref 135–145)

## 2020-09-08 PROCEDURE — A9270 NON-COVERED ITEM OR SERVICE: HCPCS | Performed by: PHYSICAL MEDICINE & REHABILITATION

## 2020-09-08 PROCEDURE — 97130 THER IVNTJ EA ADDL 15 MIN: CPT

## 2020-09-08 PROCEDURE — 700102 HCHG RX REV CODE 250 W/ 637 OVERRIDE(OP): Performed by: HOSPITALIST

## 2020-09-08 PROCEDURE — 99232 SBSQ HOSP IP/OBS MODERATE 35: CPT | Performed by: HOSPITALIST

## 2020-09-08 PROCEDURE — 97530 THERAPEUTIC ACTIVITIES: CPT

## 2020-09-08 PROCEDURE — 99232 SBSQ HOSP IP/OBS MODERATE 35: CPT | Performed by: PHYSICAL MEDICINE & REHABILITATION

## 2020-09-08 PROCEDURE — A9270 NON-COVERED ITEM OR SERVICE: HCPCS | Performed by: HOSPITALIST

## 2020-09-08 PROCEDURE — 80048 BASIC METABOLIC PNL TOTAL CA: CPT

## 2020-09-08 PROCEDURE — 94760 N-INVAS EAR/PLS OXIMETRY 1: CPT

## 2020-09-08 PROCEDURE — 700111 HCHG RX REV CODE 636 W/ 250 OVERRIDE (IP): Performed by: PHYSICAL MEDICINE & REHABILITATION

## 2020-09-08 PROCEDURE — 83880 ASSAY OF NATRIURETIC PEPTIDE: CPT

## 2020-09-08 PROCEDURE — 82962 GLUCOSE BLOOD TEST: CPT | Mod: 91

## 2020-09-08 PROCEDURE — 700102 HCHG RX REV CODE 250 W/ 637 OVERRIDE(OP): Performed by: PHYSICAL MEDICINE & REHABILITATION

## 2020-09-08 PROCEDURE — 97535 SELF CARE MNGMENT TRAINING: CPT

## 2020-09-08 PROCEDURE — 770010 HCHG ROOM/CARE - REHAB SEMI PRIVAT*

## 2020-09-08 PROCEDURE — 97116 GAIT TRAINING THERAPY: CPT

## 2020-09-08 PROCEDURE — 36415 COLL VENOUS BLD VENIPUNCTURE: CPT

## 2020-09-08 PROCEDURE — 97129 THER IVNTJ 1ST 15 MIN: CPT

## 2020-09-08 PROCEDURE — 700105 HCHG RX REV CODE 258: Performed by: PHYSICAL MEDICINE & REHABILITATION

## 2020-09-08 RX ORDER — GABAPENTIN 400 MG/1
400 CAPSULE ORAL 3 TIMES DAILY
Status: DISCONTINUED | OUTPATIENT
Start: 2020-09-08 | End: 2020-09-10

## 2020-09-08 RX ORDER — POTASSIUM CHLORIDE 20 MEQ/1
40 TABLET, EXTENDED RELEASE ORAL ONCE
Status: COMPLETED | OUTPATIENT
Start: 2020-09-08 | End: 2020-09-08

## 2020-09-08 RX ORDER — OXYCODONE HYDROCHLORIDE 10 MG/1
10 TABLET ORAL EVERY 4 HOURS PRN
Status: DISCONTINUED | OUTPATIENT
Start: 2020-09-08 | End: 2020-09-23 | Stop reason: HOSPADM

## 2020-09-08 RX ADMIN — ACETAMINOPHEN 1000 MG: 325 TABLET, FILM COATED ORAL at 21:27

## 2020-09-08 RX ADMIN — GABAPENTIN 300 MG: 300 CAPSULE ORAL at 08:25

## 2020-09-08 RX ADMIN — Medication 4000 UNITS: at 08:25

## 2020-09-08 RX ADMIN — ACETAMINOPHEN 1000 MG: 325 TABLET, FILM COATED ORAL at 14:40

## 2020-09-08 RX ADMIN — FOLIC ACID 1 MG: 1 TABLET ORAL at 08:25

## 2020-09-08 RX ADMIN — OMEPRAZOLE 20 MG: 20 CAPSULE, DELAYED RELEASE ORAL at 08:24

## 2020-09-08 RX ADMIN — GABAPENTIN 300 MG: 300 CAPSULE ORAL at 14:40

## 2020-09-08 RX ADMIN — VANCOMYCIN HYDROCHLORIDE 750 MG: 5 INJECTION, POWDER, LYOPHILIZED, FOR SOLUTION INTRAVENOUS at 08:30

## 2020-09-08 RX ADMIN — OXYCODONE HYDROCHLORIDE 5 MG: 5 TABLET ORAL at 12:22

## 2020-09-08 RX ADMIN — AMPICILLIN SODIUM AND SULBACTAM SODIUM 3 G: 2; 1 INJECTION, POWDER, FOR SOLUTION INTRAMUSCULAR; INTRAVENOUS at 11:25

## 2020-09-08 RX ADMIN — ENOXAPARIN SODIUM 40 MG: 40 INJECTION SUBCUTANEOUS at 08:24

## 2020-09-08 RX ADMIN — POTASSIUM CHLORIDE 40 MEQ: 1500 TABLET, EXTENDED RELEASE ORAL at 12:22

## 2020-09-08 RX ADMIN — GABAPENTIN 400 MG: 400 CAPSULE ORAL at 21:27

## 2020-09-08 RX ADMIN — OXYCODONE HYDROCHLORIDE 5 MG: 5 TABLET ORAL at 17:19

## 2020-09-08 RX ADMIN — ACETAMINOPHEN 1000 MG: 325 TABLET, FILM COATED ORAL at 08:25

## 2020-09-08 RX ADMIN — CARVEDILOL 6.25 MG: 3.12 TABLET, FILM COATED ORAL at 17:19

## 2020-09-08 RX ADMIN — AMPICILLIN SODIUM AND SULBACTAM SODIUM 3 G: 2; 1 INJECTION, POWDER, FOR SOLUTION INTRAMUSCULAR; INTRAVENOUS at 05:34

## 2020-09-08 RX ADMIN — Medication 1000 MCG: at 08:24

## 2020-09-08 RX ADMIN — OXYCODONE HYDROCHLORIDE 5 MG: 5 TABLET ORAL at 09:07

## 2020-09-08 RX ADMIN — ATORVASTATIN CALCIUM 40 MG: 40 TABLET, FILM COATED ORAL at 21:27

## 2020-09-08 RX ADMIN — OXYCODONE HYDROCHLORIDE 2.5 MG: 5 TABLET ORAL at 05:51

## 2020-09-08 RX ADMIN — CARVEDILOL 6.25 MG: 3.12 TABLET, FILM COATED ORAL at 08:25

## 2020-09-08 RX ADMIN — LISINOPRIL 10 MG: 5 TABLET ORAL at 08:25

## 2020-09-08 ASSESSMENT — GAIT ASSESSMENTS
DISTANCE (FEET): 5
ASSISTIVE DEVICE: FRONT WHEEL WALKER
GAIT LEVEL OF ASSIST: MINIMAL ASSIST

## 2020-09-08 ASSESSMENT — ENCOUNTER SYMPTOMS
FEVER: 0
DIARRHEA: 0
VOMITING: 0
SHORTNESS OF BREATH: 0
CHILLS: 0
ABDOMINAL PAIN: 0
NERVOUS/ANXIOUS: 0
NAUSEA: 0

## 2020-09-08 ASSESSMENT — ACTIVITIES OF DAILY LIVING (ADL)
BED_CHAIR_WHEELCHAIR_TRANSFER_DESCRIPTION: ADAPTIVE EQUIPMENT;INITIAL PREPARATION FOR TASK;SUPERVISION FOR SAFETY;VERBAL CUEING

## 2020-09-08 ASSESSMENT — PAIN DESCRIPTION - PAIN TYPE: TYPE: ACUTE PAIN;SURGICAL PAIN

## 2020-09-08 NOTE — CARE PLAN
Problem: Safety  Goal: Will remain free from injury  Outcome: PROGRESSING AS EXPECTED  Note: Reviewed fall and safety precautions with patient and reminded patient to call for assistance before getting out of bed. Patient verbalized understanding and has not attempted self transfer this shift.      Problem: Urinary Elimination:  Goal: Ability to reestablish a normal urinary elimination pattern will improve  Outcome: PROGRESSING SLOWER THAN EXPECTED  Note: Brothers in place draining yellow urine.

## 2020-09-08 NOTE — THERAPY
Occupational Therapy  Daily Treatment     Patient Name: Jimenez Bains  Age:  75 y.o., Sex:  male  Medical Record #: [id number]  Today's Date: 9/8/2020     Precautions  Precautions: (P) Fall Risk, Non Weight Bearing Left Lower Extremity  Comments: (P) wound vac L residual limb, sores on genital area         Subjective    Pt received supine in bed, agreeable to OT     Objective       09/08/20 0931   Precautions   Precautions Fall Risk;Non Weight Bearing Left Lower Extremity   Comments wound vac L residual limb, sores on genital area   Sleep/Wake Cycle   Sleep & Rest Awake   Functional Level of Assist   Upper Body Dressing Supervision   Upper Body Dressing Description Set-up of equipment   Lower Body Dressing Moderate Assist   Lower Body Dressing Description Verbal cueing;Supervision for safety;Increased time  (donned pants at bed level, bridge to pull-up)   Bed, Chair, Wheelchair Transfer Minimal Assist   Bed Chair Wheelchair Transfer Description Requires lift;Supervision for safety;Verbal cueing  (elevated bed, stand pivot with FWW)   Balance   Comments sit to stands in // bars x5, standing trials alternating unilateral support, initial tendency toward posterior LOB but improved with cues for weightshift over LAYNE   OT Total Time Spent   OT Individual Total Time Spent (Mins) 60   OT Charge Group   OT Self Care / ADL 2   OT Therapy Activity 2     Time spent adjusting w/c cushion and legrest for improved comfort and ability to shift weight in chair.     Assessment    Pt tolerated session well, reported increased comfort with w/c adjustments and demos ability to weightshift appropriately when prompted. Tendency toward posterior lean in standing but able to correct with cues and postural facilitation, good carryover within session and able to tolerate standing with unilateral support and functional reach toward increased safety during functional tasks. RLE fatigues quickly with standing, limited tolerance but good  awareness of weakness and able to request breaks appropriately. Improved foot clearance during stand pivot transfers this session relative to yesterday.     Strengths: Pleasant and cooperative, Willingly participates in therapeutic activities  Barriers: Confused, Decreased endurance, Generalized weakness, Impaired balance, Limited mobility, Pain    Plan    Continue to progress gross strengthening and endurance and standing tolerance/balance toward increased safety and independence with ADLs, general attention to hygiene and self-care, weightshift when scooting    Occupational Therapy Goals     Problem: Dressing     Dates: Start: 09/05/20       Goal: STG-Within one week, patient will dress UB     Dates: Start: 09/05/20       Description: 1) Individualized Goal:  Sup to manage Upper Body Clothing  2) Interventions:  OT Self Care/ADL, OT Neuro Re-Ed/Balance, OT Therapeutic Activity, OT Evaluation, and OT Therapeutic Exercise            Goal: STG-Within one week, patient will dress LB     Dates: Start: 09/05/20       Description: 1) Individualized Goal:  Max assist for LB clothing management via AE/DME PRN  2) Interventions:  OT Self Care/ADL, OT Neuro Re-Ed/Balance, OT Therapeutic Activity, OT Evaluation, and OT Therapeutic Exercise                  Problem: Functional Transfers     Dates: Start: 09/05/20       Goal: STG-Within one week, patient will transfer to toilet     Dates: Start: 09/05/20       Description: 1) Individualized Goal: Mod assist for wc/commode transfer via DME PRN  2) Interventions:  OT Self Care/ADL, OT Neuro Re-Ed/Balance, OT Therapeutic Activity, OT Evaluation, and OT Therapeutic Exercise            Goal: STG-Within one week, patient will transfer to step in shower     Dates: Start: 09/05/20       Description: 1) Individualized Goal: Mod assist for wc/shower chair transfer via DME PRN  2) Interventions: OT Self Care/ADL, OT Neuro Re-Ed/Balance, OT Therapeutic Activity, OT Evaluation, and OT  Therapeutic Exercise                  Problem: OT Long Term Goals     Dates: Start: 09/05/20       Goal: LTG-By discharge, patient will complete basic self care tasks     Dates: Start: 09/05/20       Description: 1) Individualized Goal:  Mod I for BADL's via AE/DME PRN  2) Interventions: OT Self Care/ADL, OT Neuro Re-Ed/Balance, OT Therapeutic Activity, OT Evaluation, and OT Therapeutic Exercise            Goal: LTG-By discharge, patient will perform bathroom transfers     Dates: Start: 09/05/20       Description: 1) Individualized Goal:  Mod I for toilet and shower transfer via DME  2) Interventions: OT Self Care/ADL, OT Neuro Re-Ed/Balance, OT Therapeutic Activity, OT Evaluation, and OT Therapeutic Exercise                  Problem: Toileting     Dates: Start: 09/05/20       Goal: STG-Within one week, patient will complete toileting tasks     Dates: Start: 09/05/20       Description: 1) Individualized Goal:  Max assist for toileting task via AE/DME PRN  2) Interventions:  OT Self Care/ADL, OT Neuro Re-Ed/Balance, OT Therapeutic Activity, OT Evaluation, and OT Therapeutic Exercise

## 2020-09-08 NOTE — THERAPY
Physical Therapy   Daily Treatment     Patient Name: Jimenez Bains  Age:  75 y.o., Sex:  male  Medical Record #: [id number]  Today's Date: 9/8/2020     Precautions  Precautions: (P) Fall Risk, Non Weight Bearing Left Lower Extremity  Comments: (P) wound vac L residual limb, sores on genital area    Subjective    Patient in bed and agreeable to therapy.     Objective       09/08/20 1301   Precautions   Precautions Fall Risk;Non Weight Bearing Left Lower Extremity   Comments wound vac L residual limb, sores on genital area   Gait Functional Level of Assist    Gait Level Of Assist Minimal Assist   Assistive Device Front Wheel Walker   Distance (Feet) 5   # of Times Distance was Traveled 1   Deviation   (hops on RLE, heavy BUE reliance on walker)   Wheelchair Functional Level of Assist   Wheelchair Assist Stand by Assist   Distance Wheelchair (Feet or Distance) 40   Wheelchair Description Extra time;Leg rest management;Limited by fatigue;Supervision for safety;Verbal cueing   Transfer Functional Level of Assist   Bed, Chair, Wheelchair Transfer Minimal Assist   Bed Chair Wheelchair Transfer Description Requires lift;Supervision for safety;Verbal cueing  ( elevated bed, stand pivot with FWW)   Bed Mobility    Supine to Sit Stand by Assist   Sit to Supine Stand by Assist   Sit to Stand Contact Guard Assist  (to Min A)   Scooting Stand by Assist   Rolling Supervised   Interdisciplinary Plan of Care Collaboration   IDT Collaboration with  Therapy Tech   Patient Position at End of Therapy In Bed;Call Light within Reach;Tray Table within Reach;Phone within Reach   Collaboration Comments re: Roho cushion cover   PT Total Time Spent   PT Individual Total Time Spent (Mins) 30   PT Charge Group   PT Gait Training 1   PT Therapeutic Activities 1     Stand step transfers with FWW from bed<>w/c to assess comfort/positioning with Roho cushion. Roho cushion cover not available so trialed with bed sheet covering cushion and returned  patient to bed immediately after assessment.    Assessment    Patient tolerated session well, improved activity and mobility tolerance with less reports of pain. Trialed Roho cushion with significantly improved patient positioning and comfort. Able to hop short distance back to bed with FWW.    Strengths: Independent prior level of function, Motivated for self care and independence, Willingly participates in therapeutic activities  Barriers: Confused, Decreased endurance, Fatigue, Generalized weakness, Home accessibility, Impaired activity tolerance, Impaired balance, Pain, Limited mobility    Plan    Transfer training, bed mobility, cushion cover for Roho. Gait with FWW, amputee education and there ex as able, gait training and balance    Physical Therapy Problems     Problem: Mobility     Dates: Start: 09/05/20       Goal: STG-Within one week, patient will propel wheelchair household distances     Dates: Start: 09/05/20       Description: 1) Individualized goal:  50 ft with SBA.  2) Interventions:  PT Group Therapy, PT Gait Training, PT Therapeutic Exercises, PT Neuro Re-Ed/Balance, PT Therapeutic Activity, and PT Evaluation            Goal: STG-Within one week, patient will ambulate household distance     Dates: Start: 09/05/20       Description: 1) Individualized goal:  10 ft with FWW and SBA-CGA.  2) Interventions:  PT Group Therapy, PT Gait Training, PT Therapeutic Exercises, PT Neuro Re-Ed/Balance, PT Therapeutic Activity, and PT Evaluation                Problem: Mobility Transfers     Dates: Start: 09/05/20       Goal: STG-Within one week, patient will sit to stand     Dates: Start: 09/05/20       Description: 1) Individualized goal:  with FWW and SBA.  2) Interventions:  PT Group Therapy, PT Gait Training, PT Therapeutic Exercises, PT Neuro Re-Ed/Balance, PT Therapeutic Activity, and PT Evaluation          Goal: STG-Within one week, patient will transfer bed to chair     Dates: Start: 09/05/20        Description: 1) Individualized goal:  with FWW vs. Squat pivot and SBA.  2) Interventions:  PT Group Therapy, PT Gait Training, PT Therapeutic Exercises, PT Neuro Re-Ed/Balance, PT Therapeutic Activity, and PT Evaluation                Problem: PT-Long Term Goals     Dates: Start: 09/05/20       Goal: LTG-By discharge, patient will propel wheelchair     Dates: Start: 09/05/20       Description: 1) Individualized goal:  150 ft mod I.  2) Interventions:  PT Group Therapy, PT Gait Training, PT Therapeutic Exercises, PT Neuro Re-Ed/Balance, PT Therapeutic Activity, and PT Evaluation          Goal: LTG-By discharge, patient will ambulate     Dates: Start: 09/05/20       Description: 1) Individualized goal:  50 ft with FWW and supervision.  2) Interventions:  PT Group Therapy, PT Gait Training, PT Therapeutic Exercises, PT Neuro Re-Ed/Balance, PT Therapeutic Activity, and PT Evaluation          Goal: LTG-By discharge, patient will transfer one surface to another     Dates: Start: 09/05/20       Description: 1) Individualized goal:  with FWW vs. Squat pivot mod I.  2) Interventions:  PT Group Therapy, PT Gait Training, PT Therapeutic Exercises, PT Neuro Re-Ed/Balance, PT Therapeutic Activity, and PT Evaluation          Goal: LTG-By discharge, patient will perform home exercise program     Dates: Start: 09/05/20       Description: 1) Individualized goal:  with handout mod I.  2) Interventions:  PT Group Therapy, PT Gait Training, PT Therapeutic Exercises, PT Neuro Re-Ed/Balance, PT Therapeutic Activity, and PT Evaluation          Goal: LTG-By discharge, patient will     Dates: Start: 09/05/20       Description: 1) Individualized goal:  perform bed mobility including rolling to prone mod I.  2) Interventions:  PT Group Therapy, PT Gait Training, PT Therapeutic Exercises, PT Neuro Re-Ed/Balance, PT Therapeutic Activity, and PT Evaluation

## 2020-09-08 NOTE — FLOWSHEET NOTE
09/08/20 1501   Events/Summary/Plan   Events/Summary/Plan SpO2 check   Vital Signs   Pulse 76   Respiration 16   Pulse Oximetry 90 %   $ Pulse Oximetry (Spot Check) Yes   Respiratory Assessment   Level of Consciousness Alert   Oxygen   O2 Delivery Device Room air w/o2 available

## 2020-09-08 NOTE — CARE PLAN
Problem: Communication  Goal: The ability to communicate needs accurately and effectively will improve  Outcome: PROGRESSING AS EXPECTED     Problem: Safety  Goal: Will remain free from injury  Outcome: PROGRESSING AS EXPECTED  Goal: Will remain free from falls  Outcome: PROGRESSING AS EXPECTED     Problem: Infection  Goal: Will remain free from infection  Outcome: PROGRESSING AS EXPECTED  Note: Continues on IV antibiotics through 9/8     Problem: Venous Thromboembolism (VTW)/Deep Vein Thrombosis (DVT) Prevention:  Goal: Patient will participate in Venous Thrombosis (VTE)/Deep Vein Thrombosis (DVT)Prevention Measures  Outcome: PROGRESSING AS EXPECTED     Problem: Pain Management  Goal: Pain level will decrease to patient's comfort goal  Outcome: PROGRESSING AS EXPECTED  Note: Pain in penis greatly decreased after changing carpio     Problem: Respiratory:  Goal: Respiratory status will improve  Outcome: PROGRESSING AS EXPECTED  Note: Continues to use 2L at night     Problem: Skin Integrity  Goal: Risk for impaired skin integrity will decrease  Outcome: PROGRESSING AS EXPECTED     Problem: Urinary Elimination:  Goal: Ability to reestablish a normal urinary elimination pattern will improve  Outcome: PROGRESSING AS EXPECTED  Note: Carpio maintained and functioning well

## 2020-09-08 NOTE — PROGRESS NOTES
Hospital Medicine Daily Progress Note      Chief Complaint:  Hypertension  Diabetes    Interval History:  No significant events or changes since last visit    Review of Systems  Review of Systems   Constitutional: Negative for chills and fever.   Respiratory: Negative for shortness of breath.    Cardiovascular: Negative for chest pain.   Gastrointestinal: Negative for abdominal pain, diarrhea, nausea and vomiting.   Psychiatric/Behavioral: The patient is not nervous/anxious.         Physical Exam  Temp:  [36.9 °C (98.4 °F)-37.1 °C (98.8 °F)] 36.9 °C (98.5 °F)  Pulse:  [64-73] 69  Resp:  [16-18] 18  BP: (124-132)/(63-66) 132/66  SpO2:  [90 %-91 %] 90 %    Physical Exam  Vitals signs and nursing note reviewed.   Constitutional:       Appearance: Normal appearance.   HENT:      Head: Atraumatic.   Eyes:      Conjunctiva/sclera: Conjunctivae normal.      Pupils: Pupils are equal, round, and reactive to light.   Neck:      Musculoskeletal: Normal range of motion and neck supple.   Cardiovascular:      Rate and Rhythm: Normal rate and regular rhythm.      Heart sounds: No murmur.   Pulmonary:      Effort: Pulmonary effort is normal.      Breath sounds: No stridor. No wheezing or rales.   Abdominal:      General: There is no distension.      Palpations: Abdomen is soft.      Tenderness: There is no abdominal tenderness.   Musculoskeletal:      Right lower leg: No edema.      Left lower leg: No edema.      Comments: Has left AKA   Skin:     General: Skin is warm and dry.      Findings: No rash.      Comments: Large pearly lesion on right cheek   Neurological:      Mental Status: He is alert and oriented to person, place, and time.   Psychiatric:         Mood and Affect: Mood normal.         Behavior: Behavior normal.         Fluids    Intake/Output Summary (Last 24 hours) at 9/8/2020 1141  Last data filed at 9/8/2020 0825  Gross per 24 hour   Intake 720 ml   Output 800 ml   Net -80 ml       Laboratory  Recent Labs      20  0703   WBC 6.7   RBC 2.87*   HEMOGLOBIN 8.8*   HEMATOCRIT 29.3*   .1*   MCH 30.7   MCHC 30.0*   RDW 55.0*   PLATELETCT 266   MPV 10.4     Recent Labs     20  0703 20  0703   SODIUM 147* 145   POTASSIUM 4.0 3.5*   CHLORIDE 110 109   CO2 26 26   GLUCOSE 115* 124*   BUN 12 12   CREATININE 0.99 0.96   CALCIUM 8.5 8.6                   Assessment/Plan  AKA stump complication (HCC)- (present on admission)  Assessment & Plan  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  S/P left AKA for critical limb ischemia (2020)  Post-op complications included necrosis and infection  S/P AKA revision in 2020  S/P AKA I&D x 3  Wound vac per Wound Care  Wound Cx +MRSA and Enterococcus  On Unasyn and Vanco (thru )    PVD (peripheral vascular disease) with claudication (HCC)- (present on admission)  Assessment & Plan  On Lipitor  Consider ASA    Diabetes (HCC)- (present on admission)  Assessment & Plan  Hba1c: 5.1  BS labile:  (hit 200 x 1)  Note: needs out patient follow up  Cont to monitor    Hypokalemia  Assessment & Plan  K+: 3.5 ()  Will give KCL 40 meq x 1 ()  Monitor    Hypernatremia  Assessment & Plan  Currently resolved  Na: 147 () --> 145 ()  S/P D5W at 50 mL/hr x 500 mL  Monitor    Vitamin D deficiency  Assessment & Plan  Vit D: 10  On supplements    Anemia  Assessment & Plan  H&H stable with Hb 8.8  TSH normal  B12: 198  Folate: 2.9  On B12 & folate supplements  Monitor    HTN (hypertension)- (present on admission)  Assessment & Plan  BP ok  On Core.25 mg bid  On Lisinopril: 10 mg daily  Monitor    Basal cell carcinoma of face- (present on admission)  Assessment & Plan  Pt reports right cheek lesion has been ongoing x 1 yr  Needs Dermatology F/U

## 2020-09-08 NOTE — THERAPY
"Physical Therapy   Daily Treatment     Patient Name: Jimenez Bains  Age:  75 y.o., Sex:  male  Medical Record #: [id number]  Today's Date: 9/8/2020     Precautions  Precautions: (P) Fall Risk, Non Weight Bearing Left Lower Extremity  Comments: (P) wound vac L residual limb, sores on genital area    Subjective    Patient in bed, reporting that he does not feel good however agreeable to therapy.     \"My leg keeps shaking and I can't control it.\"    Objective       09/08/20 0731   Precautions   Precautions Fall Risk;Non Weight Bearing Left Lower Extremity   Comments wound vac L residual limb, sores on genital area   Pain 0 - 10 Group   Location Leg   Location Orientation Left   Pain Rating Scale (NPRS) 9   Transfer Functional Level of Assist   Bed, Chair, Wheelchair Transfer Minimal Assist   Bed Chair Wheelchair Transfer Description Adaptive equipment;Initial preparation for task;Supervision for safety;Verbal cueing  (SPT with FWW)   Bed Mobility    Supine to Sit Stand by Assist   Sit to Supine Stand by Assist   Sit to Stand Minimal Assist  (elevated bed height)   Scooting Stand by Assist   Rolling Supervised   Interdisciplinary Plan of Care Collaboration   IDT Collaboration with  Certified Nursing Assistant   Patient Position at End of Therapy In Bed;Call Light within Reach;Tray Table within Reach;Phone within Reach   Collaboration Comments bowel incontinence   PT Total Time Spent   PT Individual Total Time Spent (Mins) 30   PT Charge Group   PT Therapeutic Activities 2     Education regarding sensory desensitization with graded performance. Patient tolerated well with rest breaks.    Repositioned patient in bed, educating patient on avoiding pillow underneath residual limb to maintain hip extension.    Assessment    Patient tolerated session fairly, motivated to participate and attempted to sit OOB for breakfast however patient unable to tolerate sitting position and returned to bed. Will attempt Roho cushion this " afternoon.    Strengths: Independent prior level of function, Motivated for self care and independence, Willingly participates in therapeutic activities  Barriers: Confused, Decreased endurance, Fatigue, Generalized weakness, Home accessibility, Impaired activity tolerance, Impaired balance, Pain, Limited mobility    Plan    Transfer training, bed mobility, address pt's seating(roho is too large for wc) needs 18W x 16D. Gait with FWW vs. Parallel bars, amputee education and there ex as able, gait training and balance    Physical Therapy Problems     Problem: Mobility     Dates: Start: 09/05/20       Goal: STG-Within one week, patient will propel wheelchair household distances     Dates: Start: 09/05/20       Description: 1) Individualized goal:  50 ft with SBA.  2) Interventions:  PT Group Therapy, PT Gait Training, PT Therapeutic Exercises, PT Neuro Re-Ed/Balance, PT Therapeutic Activity, and PT Evaluation            Goal: STG-Within one week, patient will ambulate household distance     Dates: Start: 09/05/20       Description: 1) Individualized goal:  10 ft with FWW and SBA-CGA.  2) Interventions:  PT Group Therapy, PT Gait Training, PT Therapeutic Exercises, PT Neuro Re-Ed/Balance, PT Therapeutic Activity, and PT Evaluation                Problem: Mobility Transfers     Dates: Start: 09/05/20       Goal: STG-Within one week, patient will sit to stand     Dates: Start: 09/05/20       Description: 1) Individualized goal:  with FWW and SBA.  2) Interventions:  PT Group Therapy, PT Gait Training, PT Therapeutic Exercises, PT Neuro Re-Ed/Balance, PT Therapeutic Activity, and PT Evaluation          Goal: STG-Within one week, patient will transfer bed to chair     Dates: Start: 09/05/20       Description: 1) Individualized goal:  with FWW vs. Squat pivot and SBA.  2) Interventions:  PT Group Therapy, PT Gait Training, PT Therapeutic Exercises, PT Neuro Re-Ed/Balance, PT Therapeutic Activity, and PT Evaluation                 Problem: PT-Long Term Goals     Dates: Start: 09/05/20       Goal: LTG-By discharge, patient will propel wheelchair     Dates: Start: 09/05/20       Description: 1) Individualized goal:  150 ft mod I.  2) Interventions:  PT Group Therapy, PT Gait Training, PT Therapeutic Exercises, PT Neuro Re-Ed/Balance, PT Therapeutic Activity, and PT Evaluation          Goal: LTG-By discharge, patient will ambulate     Dates: Start: 09/05/20       Description: 1) Individualized goal:  50 ft with FWW and supervision.  2) Interventions:  PT Group Therapy, PT Gait Training, PT Therapeutic Exercises, PT Neuro Re-Ed/Balance, PT Therapeutic Activity, and PT Evaluation          Goal: LTG-By discharge, patient will transfer one surface to another     Dates: Start: 09/05/20       Description: 1) Individualized goal:  with FWW vs. Squat pivot mod I.  2) Interventions:  PT Group Therapy, PT Gait Training, PT Therapeutic Exercises, PT Neuro Re-Ed/Balance, PT Therapeutic Activity, and PT Evaluation          Goal: LTG-By discharge, patient will perform home exercise program     Dates: Start: 09/05/20       Description: 1) Individualized goal:  with handout mod I.  2) Interventions:  PT Group Therapy, PT Gait Training, PT Therapeutic Exercises, PT Neuro Re-Ed/Balance, PT Therapeutic Activity, and PT Evaluation          Goal: LTG-By discharge, patient will     Dates: Start: 09/05/20       Description: 1) Individualized goal:  perform bed mobility including rolling to prone mod I.  2) Interventions:  PT Group Therapy, PT Gait Training, PT Therapeutic Exercises, PT Neuro Re-Ed/Balance, PT Therapeutic Activity, and PT Evaluation

## 2020-09-08 NOTE — PROGRESS NOTES
Pt reports they are still feeling an intense burning pain when urinating this AM. Meatus is reddened but urine is clear and yellow now

## 2020-09-08 NOTE — WOUND TEAM
[facility]  Inpatient Services   Wound and Skin Care Progress Note    HPI, PMH, SH: Reviewed    Unit where seen by Wound Team: [id number]     WOUND CONSULT RELATED TO:  Scheduled Negative Pressure Wound therapy (NPWT) dressing change      Self Report / Pain Level:  9/10 despite premed       OBJECTIVE:  Previous dressing intact    WOUND TYPE, LOCATION, CHARACTERISTICS (Pressure Injuries: location, stage, POA or date identified)              Negative Pressure Wound Therapy 06/30/20 (Active)   NPWT Pump Mode / Pressure Setting Continuous;125 mmHg 09/07/20 1530   Dressing Type Medium;Black Foam (Regular) 09/07/20 1530   Number of Foam Pieces Used 1 09/07/20 1530   Canister Changed No 09/07/20 1530   NEXT Dressing Change/Treatment Date 09/09/20 09/07/20 1530               Wound 09/04/20 Full Thickness Wound Thigh Left Left AKA wound (Active)   Wound Image   09/05/20 1600   Site Assessment Tan;Yellow;Red;Painful;Black 09/07/20 1530   Periwound Assessment Red;Painful 09/07/20 1530   Margins Attached edges;Defined edges 09/07/20 1530   Closure Secondary intention 09/07/20 1530   Drainage Amount Moderate 09/07/20 1530   Drainage Description Serosanguineous 09/07/20 1530   Treatments Cleansed;CSWD - Conservative Sharp Wound Debridement;Site care 09/07/20 1530   Wound Cleansing Normal Saline Irrigation 09/07/20 1530   Periwound Protectant Skin Protectant Wipes to Periwound;Drape 09/07/20 1530   Dressing Cleansing/Solutions Not Applicable 09/07/20 1530   Dressing Options Wound Vac 09/07/20 1530   Dressing Changed Changed 09/07/20 1530   Dressing Status Clean;Dry;Intact 09/07/20 1530   Dressing Change/Treatment Frequency Monday, Wednesday, Friday, and As Needed 09/07/20 1530   NEXT Dressing Change/Treatment Date 09/09/20 09/07/20 1530   NEXT Weekly Photo (Inpatient Only) 09/09/20 09/07/20 1530   Non-staged Wound Description Full thickness 09/07/20 1530   Wound Length (cm) 9 cm 09/05/20 1600   Wound Width (cm) 12 cm  09/05/20 1600   Wound Depth (cm) 2.5 cm 09/05/20 1600   Wound Surface Area (cm^2) 108 cm^2 09/05/20 1600   Wound Volume (cm^3) 270 cm^3 09/05/20 1600   Wound Bed Granulation (%) 40 % 09/07/20 1530   Wound Bed Epithelium (%) 0 % 09/07/20 1530   Wound Bed Slough (%) 60 % 09/07/20 1530   Wound Bed Eschar (%) 0 % 09/07/20 1530   Shape triangular 09/07/20 1530   Wound Odor None 09/07/20 1530   Exposed Structures None 09/07/20 1530         Lab Values:    Lab Results   Component Value Date/Time    WBC 6.7 09/07/2020 07:03 AM    RBC 2.87 (L) 09/07/2020 07:03 AM    HEMOGLOBIN 8.8 (L) 09/07/2020 07:03 AM    HEMATOCRIT 29.3 (L) 09/07/2020 07:03 AM                    Lab Results   Component Value Date/Time    HBA1C 5.1 09/05/2020 05:40 AM           Culture:   Obtained No. Does not look infected, Results show NA    INTERVENTIONS BY WOUND TEAM:  Saturated dressing with saline, dressing removal very painful. Irrigated wound with saline, gently wiped wound bed with gauze 4 x 4s. Cleaned periwound with same. Used forceps and scissors to debride less than 20 cm of necrotic nonviable tissue from wound bed. No bleeding occurred. No Sting and drape applied to periwound. 1 piece of black foam placed on wound, draped. Hole cut for trac pad, trac pad applied. Suction obtained at 125 mmHg continuous.     Interdisciplinary consultation: Patient, Bedside RN     EVALUATION: patient states that his wound is more painful.     Goals: Steady decrease in wound area and depth weekly.    NURSING PLAN OF CARE ORDERS (X):  Dressing changes: See Dressing Care orders: X  Skin care: See Skin Care orders: X  Rectal tube care: See Rectal Tube Care orders:        Other orders:                           WOUND TEAM PLAN OF CARE (X):   Dressing changes by wound team:          Follow up 1-2 times weekly:               Follow up 3 times weekly:                NPWT change 3 times weekly:   X  Follow up as needed:       Other (explain):     Anticipated discharge  plans (X):   LTACH:        SNF/Rehab:                  Home Care:   X        Outpatient Wound Center:            Self Care:            Other:

## 2020-09-08 NOTE — PROGRESS NOTES
Patient still complained of intense burn/pain when urinating, started this morning. Changed out carpio catheter and moderate amount of blood came out. Patient reports pain is gone. Carpio appears to be functioning well, no more blood observed coming out. Will continue to monitor.

## 2020-09-08 NOTE — THERAPY
"Speech Language Pathology  Daily Treatment     Patient Name: Jimenez Bains  Age:  75 y.o., Sex:  male  Medical Record #: [id number]  Today's Date: 9/8/2020     Precautions  Precautions: Fall Risk, Non Weight Bearing Left Lower Extremity  Comments: wound vac L residual limb, sores on genital area    Subjective    Patient was willing to participate. Arrived on time with assistance.      Objective       09/08/20 1032   Cognition   Moderate Attention Supervision (5)   Functional Math / Financial Management Minimal (4)   SLP Total Time Spent   SLP Individual Total Time Spent (Mins) 60   Treatment Charges   SLP Cognitive Skill Development First 15 Minutes 1   SLP Cognitive Skill Development Additional 15 Minutes 3       Assessment    Patient completed \"who has more $\" task with 81% accuracy. Extra time needed.   Initiated making change word problems, however d/t visual impairments, was not completed in entirety. Mod cues needed to complete math auditorily.    Strengths: Able to follow instructions, Motivated for self care and independence, Pleasant and cooperative, Willingly participates in therapeutic activities  Barriers: Impaired functional cognition    Plan    Continue to target functional problem solving    Speech Therapy Problems     Problem: Memory STGs     Dates: Start: 09/05/20       Goal: STG-Within one week, patient will     Dates: Start: 09/05/20       Description: 1) Individualized goal:  recall daily events and safety strategies given min verbal cues 80% with use of memory book.  2) Interventions:  SLP Cognitive Skill Development and SLP Group Treatment                    Problem: Problem Solving STGs     Dates: Start: 09/05/20       Goal: STG-Within one week, patient will     Dates: Start: 09/05/20       Description: 1) Individualized goal:  answer basic problem solving questions with 80% accuracy given min verbal cues.    2) Interventions:  SLP Cognitive Skill Development and SLP Group Treatment           "    Goal: STG-Within one week, patient will     Dates: Start: 09/05/20       Description: 1) Individualized goal:  complete alternating attn tasks with 80% accuracy given min verbal cues.  2) Interventions:  SLP Cognitive Skill Development and SLP Group Treatment                    Problem: Speech/Swallowing LTGs     Dates: Start: 09/05/20       Goal: LTG-By discharge, patient will solve basic problems     Dates: Start: 09/05/20       Description: 1) Individualized goal:  Mert for safe discharge home.   2) Interventions:  SLP Aphasia Evaluation, SLP Cognitive Skill Development, and SLP Group Treatment

## 2020-09-09 LAB
GLUCOSE BLD-MCNC: 105 MG/DL (ref 65–99)
GLUCOSE BLD-MCNC: 107 MG/DL (ref 65–99)
GLUCOSE BLD-MCNC: 133 MG/DL (ref 65–99)
GLUCOSE BLD-MCNC: 147 MG/DL (ref 65–99)

## 2020-09-09 PROCEDURE — A9270 NON-COVERED ITEM OR SERVICE: HCPCS | Performed by: HOSPITALIST

## 2020-09-09 PROCEDURE — 97116 GAIT TRAINING THERAPY: CPT

## 2020-09-09 PROCEDURE — 97535 SELF CARE MNGMENT TRAINING: CPT

## 2020-09-09 PROCEDURE — 97110 THERAPEUTIC EXERCISES: CPT

## 2020-09-09 PROCEDURE — 82962 GLUCOSE BLOOD TEST: CPT

## 2020-09-09 PROCEDURE — A9270 NON-COVERED ITEM OR SERVICE: HCPCS | Performed by: PHYSICAL MEDICINE & REHABILITATION

## 2020-09-09 PROCEDURE — 97530 THERAPEUTIC ACTIVITIES: CPT

## 2020-09-09 PROCEDURE — 97606 NEG PRS WND THER DME>50 SQCM: CPT

## 2020-09-09 PROCEDURE — 700102 HCHG RX REV CODE 250 W/ 637 OVERRIDE(OP): Performed by: HOSPITALIST

## 2020-09-09 PROCEDURE — 700102 HCHG RX REV CODE 250 W/ 637 OVERRIDE(OP): Performed by: PHYSICAL MEDICINE & REHABILITATION

## 2020-09-09 PROCEDURE — 700111 HCHG RX REV CODE 636 W/ 250 OVERRIDE (IP): Performed by: PHYSICAL MEDICINE & REHABILITATION

## 2020-09-09 PROCEDURE — 99232 SBSQ HOSP IP/OBS MODERATE 35: CPT | Performed by: HOSPITALIST

## 2020-09-09 PROCEDURE — 700101 HCHG RX REV CODE 250: Performed by: PHYSICAL MEDICINE & REHABILITATION

## 2020-09-09 PROCEDURE — 97130 THER IVNTJ EA ADDL 15 MIN: CPT

## 2020-09-09 PROCEDURE — 770010 HCHG ROOM/CARE - REHAB SEMI PRIVAT*

## 2020-09-09 PROCEDURE — 99232 SBSQ HOSP IP/OBS MODERATE 35: CPT | Performed by: PHYSICAL MEDICINE & REHABILITATION

## 2020-09-09 PROCEDURE — 97129 THER IVNTJ 1ST 15 MIN: CPT

## 2020-09-09 PROCEDURE — 94760 N-INVAS EAR/PLS OXIMETRY 1: CPT

## 2020-09-09 RX ORDER — LIDOCAINE HYDROCHLORIDE 20 MG/ML
1 JELLY TOPICAL PRN
Status: DISCONTINUED | OUTPATIENT
Start: 2020-09-09 | End: 2020-09-11 | Stop reason: CLARIF

## 2020-09-09 RX ADMIN — ACETAMINOPHEN 1000 MG: 325 TABLET, FILM COATED ORAL at 17:10

## 2020-09-09 RX ADMIN — LIDOCAINE HYDROCHLORIDE 1 APPLICATION: 20 JELLY TOPICAL at 08:28

## 2020-09-09 RX ADMIN — ATORVASTATIN CALCIUM 40 MG: 40 TABLET, FILM COATED ORAL at 21:05

## 2020-09-09 RX ADMIN — Medication 4000 UNITS: at 08:08

## 2020-09-09 RX ADMIN — DOCUSATE SODIUM 50 MG AND SENNOSIDES 8.6 MG 2 TABLET: 8.6; 5 TABLET, FILM COATED ORAL at 21:05

## 2020-09-09 RX ADMIN — ACETAMINOPHEN 1000 MG: 325 TABLET, FILM COATED ORAL at 08:08

## 2020-09-09 RX ADMIN — CARVEDILOL 6.25 MG: 3.12 TABLET, FILM COATED ORAL at 17:11

## 2020-09-09 RX ADMIN — OXYCODONE HYDROCHLORIDE 10 MG: 10 TABLET ORAL at 08:08

## 2020-09-09 RX ADMIN — OMEPRAZOLE 20 MG: 20 CAPSULE, DELAYED RELEASE ORAL at 08:08

## 2020-09-09 RX ADMIN — ENOXAPARIN SODIUM 40 MG: 40 INJECTION SUBCUTANEOUS at 08:07

## 2020-09-09 RX ADMIN — GABAPENTIN 400 MG: 400 CAPSULE ORAL at 21:04

## 2020-09-09 RX ADMIN — OXYCODONE HYDROCHLORIDE 10 MG: 10 TABLET ORAL at 21:05

## 2020-09-09 RX ADMIN — FOLIC ACID 1 MG: 1 TABLET ORAL at 08:08

## 2020-09-09 RX ADMIN — GABAPENTIN 400 MG: 400 CAPSULE ORAL at 08:08

## 2020-09-09 RX ADMIN — LISINOPRIL 10 MG: 5 TABLET ORAL at 08:08

## 2020-09-09 RX ADMIN — ACETAMINOPHEN 1000 MG: 325 TABLET, FILM COATED ORAL at 21:05

## 2020-09-09 RX ADMIN — Medication 1000 MCG: at 08:08

## 2020-09-09 RX ADMIN — GABAPENTIN 400 MG: 400 CAPSULE ORAL at 17:11

## 2020-09-09 RX ADMIN — DOCUSATE SODIUM 50 MG AND SENNOSIDES 8.6 MG 2 TABLET: 8.6; 5 TABLET, FILM COATED ORAL at 08:07

## 2020-09-09 RX ADMIN — CARVEDILOL 6.25 MG: 3.12 TABLET, FILM COATED ORAL at 08:08

## 2020-09-09 ASSESSMENT — ENCOUNTER SYMPTOMS
DIZZINESS: 0
VOMITING: 0
SHORTNESS OF BREATH: 0
HALLUCINATIONS: 0
HEADACHES: 0
FEVER: 0
NAUSEA: 0
BLURRED VISION: 0
PALPITATIONS: 0

## 2020-09-09 ASSESSMENT — GAIT ASSESSMENTS
GAIT LEVEL OF ASSIST: MINIMAL ASSIST
GAIT LEVEL OF ASSIST: MINIMAL ASSIST
ASSISTIVE DEVICE: FRONT WHEEL WALKER
DISTANCE (FEET): 25
ASSISTIVE DEVICE: FRONT WHEEL WALKER

## 2020-09-09 ASSESSMENT — ACTIVITIES OF DAILY LIVING (ADL)
BED_CHAIR_WHEELCHAIR_TRANSFER_DESCRIPTION: INCREASED TIME;VERBAL CUEING;SUPERVISION FOR SAFETY
BED_CHAIR_WHEELCHAIR_TRANSFER_DESCRIPTION: SUPERVISION FOR SAFETY;VERBAL CUEING
BED_CHAIR_WHEELCHAIR_TRANSFER_DESCRIPTION: INCREASED TIME;VERBAL CUEING;SUPERVISION FOR SAFETY

## 2020-09-09 ASSESSMENT — PAIN DESCRIPTION - PAIN TYPE
TYPE: ACUTE PAIN;SURGICAL PAIN;PHANTOM PAIN
TYPE: ACUTE PAIN;SURGICAL PAIN;PHANTOM PAIN

## 2020-09-09 NOTE — CARE PLAN
Problem: Safety  Goal: Will remain free from injury  Outcome: PROGRESSING AS EXPECTED  Note: Reviewed fall and safety precautions with patient and reminded patient to call for assistance before getting out of bed. Patient verbalized understanding and has not attempted self transfer this shift.      Problem: Urinary Elimination:  Goal: Ability to reestablish a normal urinary elimination pattern will improve  Note: Patient has carpio in place draining yellow urine. Did not c/o burning pain with carpio today. Did not recall carpio was replaced last night.

## 2020-09-09 NOTE — THERAPY
Recreational Therapy   Initial Evaluation     Patient Name: Jimenez Bains  Age:  75 y.o., Sex:  male  Medical Record #: [id number]  Today's Date: 9/9/2020     Subjective    Pt reporting on how he enjoyed wordfinds and crosswords durign his free time.     Objective       09/08/20 0901   Leisure History   Leisure Interests Card;Other (Comments)   Leisure Comments word finds and crosswords   Pre-Morbid Leisure Lifestyle Individual;Occasionally Active   Prior Living Arrangements   Lives with - Patient's Self Care Capacity Adult Children;Child Less than 18 Years of Age   Steps In Home 0   Ambulation Required Assist   Assistive Devices Used Front-Wheel Walker   Driving / Transportation Relatives / Others Provide Transportation   Functional Ability Status - Physical   Endurance Low   Right  Strong   Left  Strong   Right Arm Strong   Left Arm Strong   Right Leg Weak   Left Leg Weak  (AKA)   Upper Extremity Gross Motor Uses Both Arms / Hands   Lower Extremity Gross Motor Uses Right Leg  (L AKA)   Fine Motor Manipulates Small Objects   Functional Ability Status - Cognitive   Attention Span Remains on Task   Comprehension Follows Three Step Commands   Judgment Able to Make Independent Decisions   Functional Ability Status - Emotional    Affect Appropriate   Mood Appropriate   Behavior Appropriate   Leisure Competence Measure   Leisure Awareness Cueing Assist   Leisure Attitude Cueing Assist   Leisure Skills Minimal Assist   Cultural / Social Behaviors Independent   Interpersonal Skills Independent   Community Integration Skills Minimal Assist   Social Contact Independent   Community Participation Minimal Assist   Clinical Impression   Clinical Impression Impaired Fine Motor Leisure Functioning;Impaired Gross Motor Leisure Functioning;Impaired Endurance;Impaired Community Skills;Impaired Relaxation and Coping Skills;Impaired Leisure Skills   Current Discharge Plan   Current Discharge Plan Return to Prior Living  Situation   Benefit    Benefit Patient would benefit from inpatient Recreational Therapy interventions with a therapy tech under the direction of the Certified Therapeutic Recreation Therapist   Interdisciplinary Plan of Care Collaboration   Patient Position at End of Therapy In Bed;Tray Table within Reach;Call Light within Reach   Strengths & Barriers   Strengths Able to follow instructions;Alert and oriented;Motivated for self care and independence;Pleasant and cooperative;Willingly participates in therapeutic activities   Barriers Decreased endurance;Fatigue;Generalized weakness;Impaired activity tolerance;Impaired insight/denial of deficits  (L AKA)   Treatment Time   Total Time Spent (mins) 30   Procedural Tracking   Procedural Tracking Community Re-Integration;Leisure Skills Awareness       Assessment  Patient is 75 y.o. male with a diagnosis of Encephalopathy acute; left AKA.  Additional factors influencing patient status / progress (ie: cognitive factors, co-morbidities, social support, etc): PMH of DVT, PVD with previous fem-pop bypass, DM, HLD, basal cell carcinoma of the face, and HLD.        Plan  Recommend Recreational Therapy 30-60 minutes per day 2-3 days per week with X2 with the therapy tech under the direction of the Recreation Therapist and X1 with the Recreation Therapist for 2 weeks for the following treatments:  Community Re-Integration, Community Skills Development, Leisure Skills Awareness, Leisure Skills Development, Cognitive Skills Training and Gross Motor Functional Leisure Skills    Goals:  Long term and short term goals have been discussed with patient and they are in agreement.    Recreation Therapy Problems     Problem: Recreation Therapy     Dates: Start: 09/09/20       Goal: STG-Within one week, patient will     Dates: Start: 09/09/20       Description: Share on two leisure activities he would like to participate in session and following discharge and any perceived barriers to his  participation.           Goal: LTG-By discharge, patient will     Dates: Start: 09/09/20       Description: Participate with the Recreation Therapist X1 a week and X2 with the therapy tech under the supervision of the Recreation Therapist in a cognitive leisure activity.

## 2020-09-09 NOTE — PROGRESS NOTES
"Rehab Progress Note     Encounter Date: 9/8/2020    CC: left AKA, phantom limb pain, wound pain    Interval Events (Subjective)  Patient sitting up in room. He reports severe left leg pain. Patient cannot describe it but RN reports it sounded like phantom limb pain before. He also had severe pain with wound changes. Discussed increased dose for wound changes but concern about his cognitive status.  He continues to be somewhat confused. RN reports carpio issues but on questioning he does not remember the carpio replacement yesterday. Once prompted again he is able to say his penile pain is improved. Denies dysuria. Denies fever or chills.     Objective:  VITAL SIGNS: /62   Pulse 76   Temp 36.8 °C (98.2 °F) (Temporal)   Resp 16   Ht 1.676 m (5' 6\")   Wt 64 kg (141 lb)   SpO2 90%   BMI 22.76 kg/m²   Gen: NAD  Psych: Mood and affect appropriate  CV: RRR, no edema  Resp: CTAB, no upper airway sounds  Abd: NTND  Neuro: AOx3, some confusion about events, 5/5 BUE    Recent Results (from the past 72 hour(s))   ACCU-CHEK GLUCOSE    Collection Time: 09/05/20  5:24 PM   Result Value Ref Range    Glucose - Accu-Ck 105 (H) 65 - 99 mg/dL   ACCU-CHEK GLUCOSE    Collection Time: 09/05/20 10:38 PM   Result Value Ref Range    Glucose - Accu-Ck 170 (H) 65 - 99 mg/dL   ACCU-CHEK GLUCOSE    Collection Time: 09/06/20  7:44 AM   Result Value Ref Range    Glucose - Accu-Ck 102 (H) 65 - 99 mg/dL   ACCU-CHEK GLUCOSE    Collection Time: 09/06/20 11:14 AM   Result Value Ref Range    Glucose - Accu-Ck 125 (H) 65 - 99 mg/dL   ACCU-CHEK GLUCOSE    Collection Time: 09/06/20  5:16 PM   Result Value Ref Range    Glucose - Accu-Ck 147 (H) 65 - 99 mg/dL   ACCU-CHEK GLUCOSE    Collection Time: 09/06/20  9:28 PM   Result Value Ref Range    Glucose - Accu-Ck 113 (H) 65 - 99 mg/dL   VANCOMYCIN TROUGH LEVEL    Collection Time: 09/07/20  7:03 AM   Result Value Ref Range    Vancomycin Trough 24.0 (H) 10.0 - 20.0 ug/mL   Comp Metabolic Panel    " Collection Time: 09/07/20  7:03 AM   Result Value Ref Range    Sodium 147 (H) 135 - 145 mmol/L    Potassium 4.0 3.6 - 5.5 mmol/L    Chloride 110 96 - 112 mmol/L    Co2 26 20 - 33 mmol/L    Anion Gap 11.0 7.0 - 16.0    Glucose 115 (H) 65 - 99 mg/dL    Bun 12 8 - 22 mg/dL    Creatinine 0.99 0.50 - 1.40 mg/dL    Calcium 8.5 8.5 - 10.5 mg/dL    AST(SGOT) 15 12 - 45 U/L    ALT(SGPT) 13 2 - 50 U/L    Alkaline Phosphatase 145 (H) 30 - 99 U/L    Total Bilirubin 0.2 0.1 - 1.5 mg/dL    Albumin 2.5 (L) 3.2 - 4.9 g/dL    Total Protein 5.7 (L) 6.0 - 8.2 g/dL    Globulin 3.2 1.9 - 3.5 g/dL    A-G Ratio 0.8 g/dL   CBC WITHOUT DIFFERENTIAL    Collection Time: 09/07/20  7:03 AM   Result Value Ref Range    WBC 6.7 4.8 - 10.8 K/uL    RBC 2.87 (L) 4.70 - 6.10 M/uL    Hemoglobin 8.8 (L) 14.0 - 18.0 g/dL    Hematocrit 29.3 (L) 42.0 - 52.0 %    .1 (H) 81.4 - 97.8 fL    MCH 30.7 27.0 - 33.0 pg    MCHC 30.0 (L) 33.7 - 35.3 g/dL    RDW 55.0 (H) 35.9 - 50.0 fL    Platelet Count 266 164 - 446 K/uL    MPV 10.4 9.0 - 12.9 fL   VITAMIN B12    Collection Time: 09/07/20  7:03 AM   Result Value Ref Range    Vitamin B12 -True Cobalamin 198 (L) 211 - 911 pg/mL   FOLATE    Collection Time: 09/07/20  7:03 AM   Result Value Ref Range    Folate -Folic Acid 2.9 (A) >4.0 ng/mL   ESTIMATED GFR    Collection Time: 09/07/20  7:03 AM   Result Value Ref Range    GFR If African American >60 >60 mL/min/1.73 m 2    GFR If Non African American >60 >60 mL/min/1.73 m 2   ACCU-CHEK GLUCOSE    Collection Time: 09/07/20  7:19 AM   Result Value Ref Range    Glucose - Accu-Ck 107 (H) 65 - 99 mg/dL   ACCU-CHEK GLUCOSE    Collection Time: 09/07/20 11:37 AM   Result Value Ref Range    Glucose - Accu-Ck 200 (H) 65 - 99 mg/dL   ACCU-CHEK GLUCOSE    Collection Time: 09/07/20  5:21 PM   Result Value Ref Range    Glucose - Accu-Ck 98 65 - 99 mg/dL   ACCU-CHEK GLUCOSE    Collection Time: 09/07/20  8:58 PM   Result Value Ref Range    Glucose - Accu-Ck 165 (H) 65 - 99 mg/dL    Basic Metabolic Panel    Collection Time: 09/08/20  7:03 AM   Result Value Ref Range    Sodium 145 135 - 145 mmol/L    Potassium 3.5 (L) 3.6 - 5.5 mmol/L    Chloride 109 96 - 112 mmol/L    Co2 26 20 - 33 mmol/L    Glucose 124 (H) 65 - 99 mg/dL    Bun 12 8 - 22 mg/dL    Creatinine 0.96 0.50 - 1.40 mg/dL    Calcium 8.6 8.5 - 10.5 mg/dL    Anion Gap 10.0 7.0 - 16.0   proBrain Natriuretic Peptide, NT    Collection Time: 09/08/20  7:03 AM   Result Value Ref Range    NT-proBNP 5607 (H) 0 - 125 pg/mL   ESTIMATED GFR    Collection Time: 09/08/20  7:03 AM   Result Value Ref Range    GFR If African American >60 >60 mL/min/1.73 m 2    GFR If Non African American >60 >60 mL/min/1.73 m 2   ACCU-CHEK GLUCOSE    Collection Time: 09/08/20  7:18 AM   Result Value Ref Range    Glucose - Accu-Ck 123 (H) 65 - 99 mg/dL   ACCU-CHEK GLUCOSE    Collection Time: 09/08/20 11:25 AM   Result Value Ref Range    Glucose - Accu-Ck 113 (H) 65 - 99 mg/dL       Current Facility-Administered Medications   Medication Frequency   • gabapentin (NEURONTIN) capsule 400 mg TID   • oxyCODONE immediate release (ROXICODONE) tablet 10 mg Q4HRS PRN   • folic acid (FOLVITE) tablet 1 mg DAILY   • cyanocobalamin (VITAMIN B-12) tablet 1,000 mcg DAILY   • miconazole 2%-zinc oxide (Hilary) topical cream Q6HRS PRN   • vitamin D (cholecalciferol) tablet 4,000 Units DAILY   • insulin regular (HumuLIN R,NovoLIN R) injection 4X/DAY ACHS    And   • glucose 4 g chewable tablet 16 g Q15 MIN PRN    And   • dextrose 50% (D50W) injection 50 mL Q15 MIN PRN   • acetaminophen (TYLENOL) tablet 1,000 mg TID   • Respiratory Therapy Consult Continuous RT   • Pharmacy Consult Request ...Pain Management Review 1 Each PHARMACY TO DOSE   • tramadol (ULTRAM) 50 MG tablet 50 mg Q4HRS PRN   • hydrALAZINE (APRESOLINE) tablet 25 mg Q8HRS PRN   • enoxaparin (LOVENOX) inj 40 mg DAILY   • acetaminophen (TYLENOL) tablet 650 mg Q4HRS PRN   • senna-docusate (PERICOLACE or SENOKOT S) 8.6-50 MG per  tablet 2 Tab BID    And   • polyethylene glycol/lytes (MIRALAX) PACKET 1 Packet QDAY PRN    And   • magnesium hydroxide (MILK OF MAGNESIA) suspension 30 mL QDAY PRN    And   • bisacodyl (DULCOLAX) suppository 10 mg QDAY PRN   • benzocaine-menthol (CEPACOL) lozenge 1 Lozenge Q2HRS PRN   • mag hydrox-al hydrox-simeth (MAALOX PLUS ES or MYLANTA DS) suspension 20 mL Q2HRS PRN   • ondansetron (ZOFRAN ODT) dispertab 4 mg 4X/DAY PRN    Or   • ondansetron (ZOFRAN) syringe/vial injection 4 mg 4X/DAY PRN   • traZODone (DESYREL) tablet 50 mg QHS PRN   • sodium chloride (OCEAN) 0.65 % nasal spray 2 Spray PRN   • midazolam (VERSED) 5 mg/mL (1 mL vial) PRN   • atorvastatin (LIPITOR) tablet 40 mg Q EVENING   • lisinopril (PRINIVIL) tablet 10 mg DAILY   • oxyCODONE immediate-release (ROXICODONE) tablet 2.5 mg Q3HRS PRN   • oxyCODONE immediate-release (ROXICODONE) tablet 5 mg Q3HRS PRN   • MD Alert...Vancomycin per Pharmacy PHARMACY TO DOSE   • omeprazole (PRILOSEC) capsule 20 mg DAILY   • carvedilol (COREG) tablet 6.25 mg BID WITH MEALS     Facility-Administered Medications Ordered in Other Encounters   Medication Frequency   • fentaNYL (SUBLIMAZE) injection PRN   • propofol PRN   • ceFAZolin (ANCEF) injection PRN       Orders Placed This Encounter   Procedures   • Diet Order Cardiac (Thin), Diabetic     Standing Status:   Standing     Number of Occurrences:   1     Order Specific Question:   Diet:     Answer:   Cardiac [6]     Comments:   Thin     Order Specific Question:   Diet:     Answer:   Diabetic [3]       Assessment:  Active Hospital Problems    Diagnosis   • *Encephalopathy acute   • AKA stump complication (HCC)   • Arterial insufficiency (HCC)   • PVD (peripheral vascular disease) with claudication (HCC)   • Diabetes (HCC)   • HLD (hyperlipidemia)   • HTN (hypertension)   • Basal cell carcinoma of face   • Hypokalemia   • Hypernatremia   • Alkaline phosphatase elevation   • Anemia   • Vitamin D deficiency   • Peripheral  arterial occlusive disease (HCC)       Medical Decision Making and Plan:  Acute encephalopathy - Concern for encephalopathy with ongoing multiple medical comorbidities and confusion about hospital course as well as not understanding carpio catheter. Alternatively could be medicated for transfer but therapy reporting difficulty with cuing and following commands.   -PT and OT for mobility and ADLs  -SLP for cognitive evaluation     Left BKA - Patient with severe PVD with L AKA on 6/30/20 with multiple revisions due to necrosis and infection. Cultures grew MRSA and Enterococcus. Per ID continue antibiotics until 9/6/20. Transferred to hospitals from 8/12/20 to 9/4/20.   -Vancomycin and Unasyn until 9/6/20.   -Unclear when last follow-up with Dr. Fisher   -Continue wound vac. Consult wound care.      Multiple wounds - Patient has rash to abdomen, redness on sacrum and left buttocks on transfer. Consult wound care.      Right face basal cell carcinoma - Unclear what follow-up. Follow-up Oncology     Anemia - Check AM CBC - 8.8. Continue to monitor.      HTN - Patient on Lisinopril 10 mg and Coreg 6.25 mg BID  -Hospistalist consulted.      Pain control - Primarily on admission with left phantom limb pain. Patient on Gabapentin 100 mg TID and Oxycodone. Can consider increase in Gabapentin for neuropathic phantom limb pain.   -Increase Gabapentin to 400 mg TID. Oxycodone 10 mg for wound care changes. Concern for over sedation     DM with hyperglycemia - Patient on SSI on transfer. Consult Hospitalist     COVID - Patient with check x2 at hospitals. Patient without symptoms. Per policy no check within 7 days. Patient without symptoms. Check PCR, on precautions - negative, no precautions.      GI Ppx - Patient on Prilosec      DVT Ppx - Patient on Lovenox on transfer.      Total time:  26 minutes.  I spent greater than 50% of the time for patient care and coordination on this date, including unit/floor time, and face-to-face time with the  patient as per assessment and plan above. Discussion included pain control, wound vac change pain, oxycodone 10 mg for wound changes, and increase Gabapentin for phantom limb pain.     Winifred Silverman M.D.

## 2020-09-09 NOTE — CARE PLAN
Problem: Mobility Transfers  Goal: STG-Within one week, patient will transfer bed to chair  Description: 1) Individualized goal:  with FWW vs. Squat pivot and SBA.  2) Interventions:  PT Group Therapy, PT Gait Training, PT Therapeutic Exercises, PT Neuro Re-Ed/Balance, PT Therapeutic Activity, and PT Evaluation  Outcome: MET     Problem: Mobility  Goal: STG-Within one week, patient will propel wheelchair household distances  Description: 1) Individualized goal:  50 ft with SBA.  2) Interventions:  PT Group Therapy, PT Gait Training, PT Therapeutic Exercises, PT Neuro Re-Ed/Balance, PT Therapeutic Activity, and PT Evaluation    Outcome: NOT MET  Goal: STG-Within one week, patient will ambulate household distance  Description: 1) Individualized goal:  10 ft with FWW and SBA-CGA.  2) Interventions:  PT Group Therapy, PT Gait Training, PT Therapeutic Exercises, PT Neuro Re-Ed/Balance, PT Therapeutic Activity, and PT Evaluation  Outcome: NOT MET     Problem: Mobility Transfers  Goal: STG-Within one week, patient will sit to stand  Description: 1) Individualized goal:  with FWW and SBA.  2) Interventions:  PT Group Therapy, PT Gait Training, PT Therapeutic Exercises, PT Neuro Re-Ed/Balance, PT Therapeutic Activity, and PT Evaluation  Outcome: NOT MET

## 2020-09-09 NOTE — THERAPY
Occupational Therapy  Daily Treatment     Patient Name: Jimenez Bains  Age:  75 y.o., Sex:  male  Medical Record #: [id number]  Today's Date: 9/9/2020     Precautions  Precautions: (P) Fall Risk, Non Weight Bearing Left Lower Extremity  Comments: (P) wound vac L residual limb, sores on genital area         Subjective    Pt received seated in w/c, agreeable to OT session     Objective       09/09/20 0831   Precautions   Precautions Fall Risk;Non Weight Bearing Left Lower Extremity   Comments wound vac L residual limb, sores on genital area   Functional Level of Assist   Upper Body Dressing Minimal Assist   Upper Body Dressing Description Set-up of equipment;Supervision for safety;Verbal cueing  (min A for back, poor trunk control/strength for fwd lean)   Lower Body Dressing Maximal Assist   Lower Body Dressing Description Grab bar;Verbal cueing;Supervision for safety  (donned pants at w/c level, pushing off armrests for pants up)   Bed, Chair, Wheelchair Transfer Minimal Assist   Bed Chair Wheelchair Transfer Description Increased time;Verbal cueing;Supervision for safety  (stand pivot with FWW w/c > bed)   Sitting Upper Body Exercises   Sitting Upper Body Exercises Yes   Upper Extremity Bike Level 2 Resistance  (6 min, one rest break, fluidobike)   Bed Mobility    Sit to Supine Stand by Assist   OT Total Time Spent   OT Individual Total Time Spent (Mins) 60   OT Charge Group   OT Self Care / ADL 2   OT Therapy Activity 2       Assessment    Pt tolerated session fair, continues to be limited by high pain levels in L residual limb and in genital area wounds. Able to weightshift better with shoe in place for increased traction on foot pedal/floor. Requires significant amount of assist dressing at w/c level due to poor core strength/stability impacting ability to maintain balance when shifting weight anteriorly to reach LEs and to pull down shirt. Demos inconsistent abilities, at one point unable to complete sit to  stand with max A due to RLE weakness but was able to perform later in session with min A for stand pivot transfer.   Strengths: Pleasant and cooperative, Willingly participates in therapeutic activities  Barriers: Confused, Decreased endurance, Generalized weakness, Impaired balance, Limited mobility, Pain    Plan  Continue to progress gross strengthening and endurance and standing tolerance/balance toward increased safety and independence with ADLs, general attention to hygiene and self-care, weightshift when scooting    Occupational Therapy Goals     Problem: Dressing     Dates: Start: 09/05/20       Goal: STG-Within one week, patient will dress UB     Dates: Start: 09/05/20       Description: 1) Individualized Goal:  Sup to manage Upper Body Clothing  2) Interventions:  OT Self Care/ADL, OT Neuro Re-Ed/Balance, OT Therapeutic Activity, OT Evaluation, and OT Therapeutic Exercise            Goal: STG-Within one week, patient will dress LB     Dates: Start: 09/05/20       Description: 1) Individualized Goal:  Max assist for LB clothing management via AE/DME PRN  2) Interventions:  OT Self Care/ADL, OT Neuro Re-Ed/Balance, OT Therapeutic Activity, OT Evaluation, and OT Therapeutic Exercise                  Problem: Functional Transfers     Dates: Start: 09/05/20       Goal: STG-Within one week, patient will transfer to toilet     Dates: Start: 09/05/20       Description: 1) Individualized Goal: Mod assist for wc/commode transfer via DME PRN  2) Interventions:  OT Self Care/ADL, OT Neuro Re-Ed/Balance, OT Therapeutic Activity, OT Evaluation, and OT Therapeutic Exercise            Goal: STG-Within one week, patient will transfer to step in shower     Dates: Start: 09/05/20       Description: 1) Individualized Goal: Mod assist for wc/shower chair transfer via DME PRN  2) Interventions: OT Self Care/ADL, OT Neuro Re-Ed/Balance, OT Therapeutic Activity, OT Evaluation, and OT Therapeutic Exercise                  Problem:  OT Long Term Goals     Dates: Start: 09/05/20       Goal: LTG-By discharge, patient will complete basic self care tasks     Dates: Start: 09/05/20       Description: 1) Individualized Goal:  Mod I for BADL's via AE/DME PRN  2) Interventions: OT Self Care/ADL, OT Neuro Re-Ed/Balance, OT Therapeutic Activity, OT Evaluation, and OT Therapeutic Exercise            Goal: LTG-By discharge, patient will perform bathroom transfers     Dates: Start: 09/05/20       Description: 1) Individualized Goal:  Mod I for toilet and shower transfer via DME  2) Interventions: OT Self Care/ADL, OT Neuro Re-Ed/Balance, OT Therapeutic Activity, OT Evaluation, and OT Therapeutic Exercise                  Problem: Toileting     Dates: Start: 09/05/20       Goal: STG-Within one week, patient will complete toileting tasks     Dates: Start: 09/05/20       Description: 1) Individualized Goal:  Max assist for toileting task via AE/DME PRN  2) Interventions:  OT Self Care/ADL, OT Neuro Re-Ed/Balance, OT Therapeutic Activity, OT Evaluation, and OT Therapeutic Exercise

## 2020-09-09 NOTE — THERAPY
"Speech Language Pathology  Daily Treatment     Patient Name: Jimenez Bains  Age:  75 y.o., Sex:  male  Medical Record #: [id number]  Today's Date: 9/9/2020     Precautions  Precautions: Fall Risk, Non Weight Bearing Left Lower Extremity  Comments: wound vac L residual limb, sores on genital area    Subjective    Pt seen at bedside this session, somewhat labile when discussing family members and support locally in [place].      Objective       09/09/20 1404   Cognition   Moderate Attention Minimal (4)   Interdisciplinary Plan of Care Collaboration   Patient Position at End of Therapy In Bed;Call Light within Reach;Tray Table within Reach   SLP Total Time Spent   SLP Individual Total Time Spent (Mins) 30   Treatment Charges   SLP Cognitive Skill Development First 15 Minutes 1   SLP Cognitive Skill Development Additional 15 Minutes 1       Assessment    Alternating attention 73% IND, with MIN cues pt was able to achieve 88%. Education provided on the role of ST in rehab and goals, pt stated \"well there isn't too much left of my brain, but we can try.\"     Strengths: Able to follow instructions, Motivated for self care and independence, Pleasant and cooperative, Willingly participates in therapeutic activities  Barriers: Impaired functional cognition    Plan    Continue to target attention, memory    Speech Therapy Problems     Problem: Memory STGs     Dates: Start: 09/05/20       Goal: STG-Within one week, patient will     Dates: Start: 09/05/20       Description: 1) Individualized goal:  recall daily events and safety strategies given min verbal cues 80% with use of memory book.  2) Interventions:  SLP Cognitive Skill Development and SLP Group Treatment                    Problem: Problem Solving STGs     Dates: Start: 09/05/20       Goal: STG-Within one week, patient will     Dates: Start: 09/05/20       Description: 1) Individualized goal:  answer basic problem solving questions with 80% accuracy given min verbal cues. "    2) Interventions:  SLP Cognitive Skill Development and SLP Group Treatment              Goal: STG-Within one week, patient will     Dates: Start: 09/05/20       Description: 1) Individualized goal:  complete alternating attn tasks with 80% accuracy given min verbal cues.  2) Interventions:  SLP Cognitive Skill Development and SLP Group Treatment                    Problem: Speech/Swallowing LTGs     Dates: Start: 09/05/20       Goal: LTG-By discharge, patient will solve basic problems     Dates: Start: 09/05/20       Description: 1) Individualized goal:  Mert for safe discharge home.   2) Interventions:  SLP Aphasia Evaluation, SLP Cognitive Skill Development, and SLP Group Treatment

## 2020-09-09 NOTE — THERAPY
"Physical Therapy   Daily Treatment     Patient Name: Jimenez Bains  Age:  75 y.o., Sex:  male  Medical Record #: [id number]  Today's Date: 9/9/2020     Precautions  Precautions: (P) Fall Risk, Non Weight Bearing Left Lower Extremity  Comments: (P) wound vac L residual limb, sores on genital area    Subjective    Patient in bed and agreeable to therapy.    \"My pain is bearable so far today.\"     Objective       09/09/20 0731   Precautions   Precautions Fall Risk;Non Weight Bearing Left Lower Extremity   Comments wound vac L residual limb, sores on genital area   Gait Functional Level of Assist    Gait Level Of Assist Minimal Assist   Assistive Device Front Wheel Walker   Distance (Feet) 25   # of Times Distance was Traveled 1   Deviation   (hops on RLE, heavy BUE reliance on walker)   Wheelchair Functional Level of Assist   Wheelchair Assist Stand by Assist   Distance Wheelchair (Feet or Distance) 25   Wheelchair Description Extra time;Leg rest management;Limited by fatigue;Supervision for safety;Verbal cueing   Transfer Functional Level of Assist   Bed, Chair, Wheelchair Transfer Contact Guard Assist   Bed Chair Wheelchair Transfer Description Supervision for safety;Verbal cueing  (elevated bed, stand pivot with FWW)   Bed Mobility    Supine to Sit Stand by Assist   Sit to Stand Contact Guard Assist   Scooting Stand by Assist   Rolling Supervised   Interdisciplinary Plan of Care Collaboration   IDT Collaboration with  Therapy Tech   Patient Position at End of Therapy Seated;Self Releasing Lap Belt Applied;Call Light within Reach;Tray Table within Reach;Phone within Reach   Collaboration Comments re: Roho cushion   PT Total Time Spent   PT Individual Total Time Spent (Mins) 30   PT Charge Group   PT Gait Training 1   PT Therapeutic Activities 1       Assessment    Patient tolerated session well, significantly improved ambulation distance today. Good sitting tolerance with Roho cushion however continues to sit in " right rotation to offload L residual limb.     Strengths: Independent prior level of function, Motivated for self care and independence, Willingly participates in therapeutic activities  Barriers: Confused, Decreased endurance, Fatigue, Generalized weakness, Home accessibility, Impaired activity tolerance, Impaired balance, Pain, Limited mobility    Plan    Transfer training, bed mobility, cushion cover for Roho. Gait with FWW, amputee education and there ex as able, gait training and balance    Physical Therapy Problems     Problem: Mobility     Dates: Start: 09/05/20       Goal: STG-Within one week, patient will propel wheelchair household distances     Dates: Start: 09/05/20       Description: 1) Individualized goal:  50 ft with SBA.  2) Interventions:  PT Group Therapy, PT Gait Training, PT Therapeutic Exercises, PT Neuro Re-Ed/Balance, PT Therapeutic Activity, and PT Evaluation            Goal: STG-Within one week, patient will ambulate household distance     Dates: Start: 09/05/20       Description: 1) Individualized goal:  10 ft with FWW and SBA-CGA.  2) Interventions:  PT Group Therapy, PT Gait Training, PT Therapeutic Exercises, PT Neuro Re-Ed/Balance, PT Therapeutic Activity, and PT Evaluation                Problem: Mobility Transfers     Dates: Start: 09/05/20       Goal: STG-Within one week, patient will sit to stand     Dates: Start: 09/05/20       Description: 1) Individualized goal:  with FWW and SBA.  2) Interventions:  PT Group Therapy, PT Gait Training, PT Therapeutic Exercises, PT Neuro Re-Ed/Balance, PT Therapeutic Activity, and PT Evaluation          Goal: STG-Within one week, patient will transfer bed to chair     Dates: Start: 09/05/20       Description: 1) Individualized goal:  with FWW vs. Squat pivot and SBA.  2) Interventions:  PT Group Therapy, PT Gait Training, PT Therapeutic Exercises, PT Neuro Re-Ed/Balance, PT Therapeutic Activity, and PT Evaluation                Problem: PT-Long  Term Goals     Dates: Start: 09/05/20       Goal: LTG-By discharge, patient will propel wheelchair     Dates: Start: 09/05/20       Description: 1) Individualized goal:  150 ft mod I.  2) Interventions:  PT Group Therapy, PT Gait Training, PT Therapeutic Exercises, PT Neuro Re-Ed/Balance, PT Therapeutic Activity, and PT Evaluation          Goal: LTG-By discharge, patient will ambulate     Dates: Start: 09/05/20       Description: 1) Individualized goal:  50 ft with FWW and supervision.  2) Interventions:  PT Group Therapy, PT Gait Training, PT Therapeutic Exercises, PT Neuro Re-Ed/Balance, PT Therapeutic Activity, and PT Evaluation          Goal: LTG-By discharge, patient will transfer one surface to another     Dates: Start: 09/05/20       Description: 1) Individualized goal:  with FWW vs. Squat pivot mod I.  2) Interventions:  PT Group Therapy, PT Gait Training, PT Therapeutic Exercises, PT Neuro Re-Ed/Balance, PT Therapeutic Activity, and PT Evaluation          Goal: LTG-By discharge, patient will perform home exercise program     Dates: Start: 09/05/20       Description: 1) Individualized goal:  with handout mod I.  2) Interventions:  PT Group Therapy, PT Gait Training, PT Therapeutic Exercises, PT Neuro Re-Ed/Balance, PT Therapeutic Activity, and PT Evaluation          Goal: LTG-By discharge, patient will     Dates: Start: 09/05/20       Description: 1) Individualized goal:  perform bed mobility including rolling to prone mod I.  2) Interventions:  PT Group Therapy, PT Gait Training, PT Therapeutic Exercises, PT Neuro Re-Ed/Balance, PT Therapeutic Activity, and PT Evaluation

## 2020-09-09 NOTE — WOUND TEAM
[facility]  Inpatient Services   Wound and Skin Care Progress Note    HPI, PMH, SH: Reviewed    Unit where seen by Wound Team: [id number]     WOUND CONSULT RELATED TO:  Scheduled Negative Pressure Wound therapy (NPWT) dressing change      Self Report / Pain Level:  9/10; Po premedication by primary RN. Topical lidocaine to wound bed,     OBJECTIVE:  Wound vac dsg intact. Protective mepilex in place. Hydrofera blue dressing to ;eft medial thigh.    WOUND TYPE, LOCATION, CHARACTERISTICS (Pressure Injuries: location, stage, POA or date identified)    Negative Pressure Wound Therapy 06/30/20 (Active)   NPWT Pump Mode / Pressure Setting Continuous;125 mmHg    Dressing Type Medium;Black Foam (Regular)    Number of Foam Pieces Used 2    Canister Changed No    NEXT Dressing Change/Treatment Date 09/11/20          Wound 09/04/20 Full Thickness Wound Thigh Left Left AKA wound (Active)   Wound Image   09/05/20 1600   Site Assessment Red;Yellow;Painful    Periwound Assessment Red;Pink;Dry    Margins Defined edges    Closure Secondary intention    Drainage Amount Scant    Drainage Description Serosanguineous    Treatments Cleansed;Site care    Wound Cleansing Approved Wound Cleanser    Periwound Protectant Skin Protectant Wipes to Periwound;Drape    Dressing Cleansing/Solutions Not Applicable    Dressing Options Wound Vac;Honey Gel    Dressing Changed Changed    Dressing Status Intact    Dressing Change/Treatment Frequency Monday, Wednesday, Friday, and As Needed    NEXT Dressing Change/Treatment Date 09/11/20    NEXT Weekly Photo (Inpatient Only) 09/11/20    Non-staged Wound Description Full thickness    Wound Length (cm) 9 cm    Wound Width (cm) 12 cm    Wound Depth (cm) 2.5 cm    Wound Surface Area (cm^2) 108 cm^2    Wound Volume (cm^3) 270 cm^3    Wound Bed Granulation (%) 40 %    Wound Bed Epithelium (%) 0 %    Wound Bed Slough (%) 60 %    Wound Bed Eschar (%) 0 %    Shape triangular    Wound Odor None     Exposed Structures None            Wound 09/04/20 Partial Thickness Wound Thigh Medial Left medial thigh (Active)   Wound Image     09/04/20 1700   Site Assessment Pink;Red    Periwound Assessment Pink;White    Margins Defined edges    Closure Secondary intention    Drainage Amount Scant    Drainage Description Serous    Treatments Cleansed    Wound Cleansing Approved Wound Cleanser    Periwound Protectant Skin Protectant Wipes to Periwound    Dressing Cleansing/Solutions Not Applicable    Dressing Options Hydrofera Blue Ready;Transparent Film    Dressing Changed Changed    Dressing Status Intact    Dressing Change/Treatment Frequency Every 48 hrs, and As Needed    NEXT Dressing Change/Treatment Date 09/11/20    NEXT Weekly Photo (Inpatient Only) 09/11/20    Non-staged Wound Description Partial thickness    Wound Length (cm) 1 cm    Wound Width (cm) 1.5 cm    Wound Surface Area (cm^2) 1.5 cm^2    Tunneling (cm) 0 cm    Undermining (cm) 0 cm    Shape irregular    Wound Odor None    Exposed Structures None        Lab Values:    Lab Results   Component Value Date/Time    WBC 6.7 09/07/2020 07:03 AM    RBC 2.87 (L) 09/07/2020 07:03 AM    HEMOGLOBIN 8.8 (L) 09/07/2020 07:03 AM    HEMATOCRIT 29.3 (L) 09/07/2020 07:03 AM          Lab Results   Component Value Date/Time    HBA1C 5.1 09/05/2020 05:40 AM           Culture:   NA  INTERVENTIONS BY WOUND TEAM:  Saturated dressing with saline and topical lidocaine soln.  Dressing gently removed. Wound and periwound cleansed with cleanser and gauze. No sting barrier and drape to koko wound. Honey gel applied1 pc of half thickness foam using sterile tongue depressor. Foam applied to wound bed. 2nd pc of foam applied as button for tracpad. Drape and tracpad to foam. Suction resumed. No change in vac settings. Mepilex applied to stump and tracpad tubing secured to it.     L thigh dressing removed. Wound and periwound cleansed with cleanser and gauze. No sting barrier applied to  periwound. Hydrofera blue ready applied to wound bed. Secured with drape.    Interdisciplinary consultation: Patient, Dora MYERS     EVALUATION: Dressing removal still painful despite po premedication. Topical lidocaine did help minimally with pain. Honey gel initiated today to encourage autolytic debridement of slough. Hydrofera Blue applied to L thigh/groin for the hydrophilic polyurethane foam which contains ethylene oxide used as a bactericidal, fungicidal, and sporicidal disinfectant. Nursing to continue L thigh dressing changes per care order.    Goals: Steady decrease in wound area and depth weekly.    NURSING PLAN OF CARE ORDERS (X):  Dressing changes: See Dressing Care orders: X  Skin care: See Skin Care orders: X  Rectal tube care: See Rectal Tube Care orders:        Other orders:                           WOUND TEAM PLAN OF CARE (X):   Dressing changes by wound team:          Follow up 1-2 times weekly:               Follow up 3 times weekly:                NPWT change 3 times weekly:   X  Follow up as needed:       Other (explain):     Anticipated discharge plans (X):   LTACH:        SNF/Rehab:                  Home Care:   X        Outpatient Wound Center:            Self Care:            Other:

## 2020-09-09 NOTE — FLOWSHEET NOTE
09/09/20 0710   Events/Summary/Plan   Events/Summary/Plan ox check, he appears to be sleeping at this time   Vital Signs   Pulse (!) 58   Respiration 16   Pulse Oximetry 98 %   $ Pulse Oximetry (Spot Check) Yes   Respiratory Assessment   Level of Consciousness Lethargic   Chest Exam   Work Of Breathing / Effort Shallow   Oxygen   O2 (LPM) 2  (02 titrated to 1 liter)   O2 Delivery Device Silicone Nasal Cannula

## 2020-09-09 NOTE — THERAPY
Speech Language Pathology  Daily Treatment     Patient Name: Jimenez Bains  Age:  75 y.o., Sex:  male  Medical Record #: [id number]  Today's Date: 9/9/2020     Precautions  Precautions: Fall Risk, Non Weight Bearing Left Lower Extremity  Comments: wound vac L residual limb, sores on genital area    Subjective    Patient seen at bedside.  Pleasant and cooperative.        Objective       09/09/20 1301   Cognition   Functional Memory Activities Minimal (4)   Functional Math / Financial Management Minimal (4)   Functional Level of Assist   Comprehension Not Tested   Comprehension Description Glasses  (does not have his rx glasses here.)   Expression Modified Independent   Expression Description Verbal cueing   Social Interaction Modified Independent   Social Interaction Description Increased time   Problem Solving Minimal Assist   Problem Solving Description Increased time;Seat belt;Therapy schedule;Verbal cueing   Memory Moderate Assist   Memory Description Increased time;Seat belt;Therapy schedule;Verbal cueing   SLP Total Time Spent   SLP Individual Total Time Spent (Mins) 30   Treatment Charges   SLP Cognitive Skill Development First 15 Minutes 1   SLP Cognitive Skill Development Additional 15 Minutes 1       Assessment    Patient worked on attention in the context of functional financial word problems.  He was not able to read print when enlarged.   He reports having rx glasses but they are not here in hospital.  However, patient was given auditory information from word problem.  He recorded the numbers to be calculated in very large print.  He was able to perform simple word problems with 70% acc with errors in attention to detail and needed min cues or repetitions for recall of the information for details within the word problems after they were read to him.     Strengths: Able to follow instructions, Motivated for self care and independence, Pleasant and cooperative, Willingly participates in therapeutic  activities  Barriers: Impaired functional cognition    Plan    Target recall,  Basic problem solving, alternating attention.    Speech Therapy Problems     Problem: Memory STGs     Dates: Start: 09/05/20       Goal: STG-Within one week, patient will     Dates: Start: 09/05/20       Description: 1) Individualized goal:  recall daily events and safety strategies given min verbal cues 80% with use of memory book.  2) Interventions:  SLP Cognitive Skill Development and SLP Group Treatment                    Problem: Problem Solving STGs     Dates: Start: 09/05/20       Goal: STG-Within one week, patient will     Dates: Start: 09/05/20       Description: 1) Individualized goal:  answer basic problem solving questions with 80% accuracy given min verbal cues.    2) Interventions:  SLP Cognitive Skill Development and SLP Group Treatment              Goal: STG-Within one week, patient will     Dates: Start: 09/05/20       Description: 1) Individualized goal:  complete alternating attn tasks with 80% accuracy given min verbal cues.  2) Interventions:  SLP Cognitive Skill Development and SLP Group Treatment                    Problem: Speech/Swallowing LTGs     Dates: Start: 09/05/20       Goal: LTG-By discharge, patient will solve basic problems     Dates: Start: 09/05/20       Description: 1) Individualized goal:  Mert for safe discharge home.   2) Interventions:  SLP Aphasia Evaluation, SLP Cognitive Skill Development, and SLP Group Treatment

## 2020-09-09 NOTE — THERAPY
"Physical Therapy   Daily Treatment     Patient Name: Jimenez Bains  Age:  75 y.o., Sex:  male  Medical Record #: [id number]  Today's Date: 9/9/2020     Precautions  Precautions: (P) Fall Risk, Non Weight Bearing Left Lower Extremity  Comments: (P) wound vac L residual limb, sores on genital area    Subjective      Patient in bed and agreeable to therapy.     Objective       09/09/20 1431   Precautions   Precautions Fall Risk;Non Weight Bearing Left Lower Extremity   Comments wound vac L residual limb, sores on genital area   Gait Functional Level of Assist    Gait Level Of Assist Minimal Assist   Assistive Device Front Wheel Walker   Distance (Feet)   (10 ftx1, 5 ftx1)   Deviation   (hops on RLE, heavy BUE reliance on walker)   Wheelchair Functional Level of Assist   Wheelchair Assist Stand by Assist   Distance Wheelchair (Feet or Distance) 50   Wheelchair Description Extra time;Leg rest management;Limited by fatigue;Supervision for safety;Verbal cueing   Transfer Functional Level of Assist   Bed, Chair, Wheelchair Transfer Contact Guard Assist   Bed Chair Wheelchair Transfer Description Increased time;Verbal cueing;Supervision for safety  (stand hop with FWW vs. squat pivot with SBA-CGA)   Supine Lower Body Exercise   Other Exercises Prone positioning on therapy mat x20 minutes, performing glute sets 2x15. Education provided regarding benefit of regular \"tummy time\" post-amputation.   Bed Mobility    Supine to Sit Stand by Assist   Sit to Supine Stand by Assist   Sit to Stand Contact Guard Assist   Scooting Stand by Assist   Rolling Supervised   Interdisciplinary Plan of Care Collaboration   IDT Collaboration with  Nursing   Patient Position at End of Therapy In Bed;Call Light within Reach;Tray Table within Reach;Phone within Reach  (CNA present)   Collaboration Comments re: bleeding from growth of face   PT Total Time Spent   PT Individual Total Time Spent (Mins) 60   PT Charge Group   PT Gait Training 1   PT " Therapeutic Exercise 1   PT Therapeutic Activities 2     Seated balance performing LB dressing at EOB with intermittent UE support and SBA; Min A required for threading RLE through pant leg.    Assessment    Patient tolerated session well, requires extra time for performance of mobility due to pain. Added foam build-up to foot plate to improve positioning; material tends to slide however patient reports that it feels more comfortable. Will continue to monitor and adjust as needed.    Strengths: Independent prior level of function, Motivated for self care and independence, Willingly participates in therapeutic activities  Barriers: Confused, Decreased endurance, Fatigue, Generalized weakness, Home accessibility, Impaired activity tolerance, Impaired balance, Pain, Limited mobility    Plan    Transfer training, bed mobility. Gait with FWW, amputee education and there ex as able, gait training and balance    Physical Therapy Problems     Problem: Mobility     Dates: Start: 09/05/20       Goal: STG-Within one week, patient will propel wheelchair household distances     Dates: Start: 09/05/20       Description: 1) Individualized goal:  50 ft with SBA.  2) Interventions:  PT Group Therapy, PT Gait Training, PT Therapeutic Exercises, PT Neuro Re-Ed/Balance, PT Therapeutic Activity, and PT Evaluation            Goal: STG-Within one week, patient will ambulate household distance     Dates: Start: 09/05/20       Description: 1) Individualized goal:  10 ft with FWW and SBA-CGA.  2) Interventions:  PT Group Therapy, PT Gait Training, PT Therapeutic Exercises, PT Neuro Re-Ed/Balance, PT Therapeutic Activity, and PT Evaluation                Problem: Mobility Transfers     Dates: Start: 09/05/20       Goal: STG-Within one week, patient will sit to stand     Dates: Start: 09/05/20       Description: 1) Individualized goal:  with FWW and SBA.  2) Interventions:  PT Group Therapy, PT Gait Training, PT Therapeutic Exercises, PT Neuro  Re-Ed/Balance, PT Therapeutic Activity, and PT Evaluation          Goal: STG-Within one week, patient will transfer bed to chair     Dates: Start: 09/05/20       Description: 1) Individualized goal:  with FWW vs. Squat pivot and SBA.  2) Interventions:  PT Group Therapy, PT Gait Training, PT Therapeutic Exercises, PT Neuro Re-Ed/Balance, PT Therapeutic Activity, and PT Evaluation                Problem: PT-Long Term Goals     Dates: Start: 09/05/20       Goal: LTG-By discharge, patient will propel wheelchair     Dates: Start: 09/05/20       Description: 1) Individualized goal:  150 ft mod I.  2) Interventions:  PT Group Therapy, PT Gait Training, PT Therapeutic Exercises, PT Neuro Re-Ed/Balance, PT Therapeutic Activity, and PT Evaluation          Goal: LTG-By discharge, patient will ambulate     Dates: Start: 09/05/20       Description: 1) Individualized goal:  50 ft with FWW and supervision.  2) Interventions:  PT Group Therapy, PT Gait Training, PT Therapeutic Exercises, PT Neuro Re-Ed/Balance, PT Therapeutic Activity, and PT Evaluation          Goal: LTG-By discharge, patient will transfer one surface to another     Dates: Start: 09/05/20       Description: 1) Individualized goal:  with FWW vs. Squat pivot mod I.  2) Interventions:  PT Group Therapy, PT Gait Training, PT Therapeutic Exercises, PT Neuro Re-Ed/Balance, PT Therapeutic Activity, and PT Evaluation          Goal: LTG-By discharge, patient will perform home exercise program     Dates: Start: 09/05/20       Description: 1) Individualized goal:  with handout mod I.  2) Interventions:  PT Group Therapy, PT Gait Training, PT Therapeutic Exercises, PT Neuro Re-Ed/Balance, PT Therapeutic Activity, and PT Evaluation          Goal: LTG-By discharge, patient will     Dates: Start: 09/05/20       Description: 1) Individualized goal:  perform bed mobility including rolling to prone mod I.  2) Interventions:  PT Group Therapy, PT Gait Training, PT Therapeutic  Exercises, PT Neuro Re-Ed/Balance, PT Therapeutic Activity, and PT Evaluation

## 2020-09-09 NOTE — PROGRESS NOTES
"Rehab Progress Note     Encounter Date: 9/9/2020    CC: left AKA, phantom limb pain, wound pain    Interval Events (Subjective)  Patient sitting up in room. Patient reports ongoing pain but better controlled. He reports he was told wound nursing was coming today. Discussed to ask for higher dose of oxycodone for the change. Denies NVD. Denies SOB.     Objective:  VITAL SIGNS: /68   Pulse 65   Temp 36.8 °C (98.2 °F) (Oral)   Resp 18   Ht 1.676 m (5' 6\")   Wt 64 kg (141 lb)   SpO2 96%   BMI 22.76 kg/m²   Gen: NAD  Psych: Mood and affect appropriate  CV: RRR, no edema  Resp: CTAB, no upper airway sounds  Abd: NTND  Neuro: AOx3, some confusion about events, 5/5 BUE  Unchanged from 9/8/20    Recent Results (from the past 72 hour(s))   ACCU-CHEK GLUCOSE    Collection Time: 09/06/20  5:16 PM   Result Value Ref Range    Glucose - Accu-Ck 147 (H) 65 - 99 mg/dL   ACCU-CHEK GLUCOSE    Collection Time: 09/06/20  9:28 PM   Result Value Ref Range    Glucose - Accu-Ck 113 (H) 65 - 99 mg/dL   VANCOMYCIN TROUGH LEVEL    Collection Time: 09/07/20  7:03 AM   Result Value Ref Range    Vancomycin Trough 24.0 (H) 10.0 - 20.0 ug/mL   Comp Metabolic Panel    Collection Time: 09/07/20  7:03 AM   Result Value Ref Range    Sodium 147 (H) 135 - 145 mmol/L    Potassium 4.0 3.6 - 5.5 mmol/L    Chloride 110 96 - 112 mmol/L    Co2 26 20 - 33 mmol/L    Anion Gap 11.0 7.0 - 16.0    Glucose 115 (H) 65 - 99 mg/dL    Bun 12 8 - 22 mg/dL    Creatinine 0.99 0.50 - 1.40 mg/dL    Calcium 8.5 8.5 - 10.5 mg/dL    AST(SGOT) 15 12 - 45 U/L    ALT(SGPT) 13 2 - 50 U/L    Alkaline Phosphatase 145 (H) 30 - 99 U/L    Total Bilirubin 0.2 0.1 - 1.5 mg/dL    Albumin 2.5 (L) 3.2 - 4.9 g/dL    Total Protein 5.7 (L) 6.0 - 8.2 g/dL    Globulin 3.2 1.9 - 3.5 g/dL    A-G Ratio 0.8 g/dL   CBC WITHOUT DIFFERENTIAL    Collection Time: 09/07/20  7:03 AM   Result Value Ref Range    WBC 6.7 4.8 - 10.8 K/uL    RBC 2.87 (L) 4.70 - 6.10 M/uL    Hemoglobin 8.8 (L) 14.0 - " 18.0 g/dL    Hematocrit 29.3 (L) 42.0 - 52.0 %    .1 (H) 81.4 - 97.8 fL    MCH 30.7 27.0 - 33.0 pg    MCHC 30.0 (L) 33.7 - 35.3 g/dL    RDW 55.0 (H) 35.9 - 50.0 fL    Platelet Count 266 164 - 446 K/uL    MPV 10.4 9.0 - 12.9 fL   VITAMIN B12    Collection Time: 09/07/20  7:03 AM   Result Value Ref Range    Vitamin B12 -True Cobalamin 198 (L) 211 - 911 pg/mL   FOLATE    Collection Time: 09/07/20  7:03 AM   Result Value Ref Range    Folate -Folic Acid 2.9 (A) >4.0 ng/mL   ESTIMATED GFR    Collection Time: 09/07/20  7:03 AM   Result Value Ref Range    GFR If African American >60 >60 mL/min/1.73 m 2    GFR If Non African American >60 >60 mL/min/1.73 m 2   ACCU-CHEK GLUCOSE    Collection Time: 09/07/20  7:19 AM   Result Value Ref Range    Glucose - Accu-Ck 107 (H) 65 - 99 mg/dL   ACCU-CHEK GLUCOSE    Collection Time: 09/07/20 11:37 AM   Result Value Ref Range    Glucose - Accu-Ck 200 (H) 65 - 99 mg/dL   ACCU-CHEK GLUCOSE    Collection Time: 09/07/20  5:21 PM   Result Value Ref Range    Glucose - Accu-Ck 98 65 - 99 mg/dL   ACCU-CHEK GLUCOSE    Collection Time: 09/07/20  8:58 PM   Result Value Ref Range    Glucose - Accu-Ck 165 (H) 65 - 99 mg/dL   Basic Metabolic Panel    Collection Time: 09/08/20  7:03 AM   Result Value Ref Range    Sodium 145 135 - 145 mmol/L    Potassium 3.5 (L) 3.6 - 5.5 mmol/L    Chloride 109 96 - 112 mmol/L    Co2 26 20 - 33 mmol/L    Glucose 124 (H) 65 - 99 mg/dL    Bun 12 8 - 22 mg/dL    Creatinine 0.96 0.50 - 1.40 mg/dL    Calcium 8.6 8.5 - 10.5 mg/dL    Anion Gap 10.0 7.0 - 16.0   proBrain Natriuretic Peptide, NT    Collection Time: 09/08/20  7:03 AM   Result Value Ref Range    NT-proBNP 5607 (H) 0 - 125 pg/mL   ESTIMATED GFR    Collection Time: 09/08/20  7:03 AM   Result Value Ref Range    GFR If African American >60 >60 mL/min/1.73 m 2    GFR If Non African American >60 >60 mL/min/1.73 m 2   ACCU-CHEK GLUCOSE    Collection Time: 09/08/20  7:18 AM   Result Value Ref Range    Glucose -  Accu-Ck 123 (H) 65 - 99 mg/dL   ACCU-CHEK GLUCOSE    Collection Time: 09/08/20 11:25 AM   Result Value Ref Range    Glucose - Accu-Ck 113 (H) 65 - 99 mg/dL   ACCU-CHEK GLUCOSE    Collection Time: 09/08/20  5:18 PM   Result Value Ref Range    Glucose - Accu-Ck 105 (H) 65 - 99 mg/dL   ACCU-CHEK GLUCOSE    Collection Time: 09/08/20  9:29 PM   Result Value Ref Range    Glucose - Accu-Ck 144 (H) 65 - 99 mg/dL   ACCU-CHEK GLUCOSE    Collection Time: 09/09/20  7:18 AM   Result Value Ref Range    Glucose - Accu-Ck 107 (H) 65 - 99 mg/dL   ACCU-CHEK GLUCOSE    Collection Time: 09/09/20 11:35 AM   Result Value Ref Range    Glucose - Accu-Ck 147 (H) 65 - 99 mg/dL       Current Facility-Administered Medications   Medication Frequency   • lidocaine 2 % jelly 1 Application PRN   • gabapentin (NEURONTIN) capsule 400 mg TID   • oxyCODONE immediate release (ROXICODONE) tablet 10 mg Q4HRS PRN   • folic acid (FOLVITE) tablet 1 mg DAILY   • cyanocobalamin (VITAMIN B-12) tablet 1,000 mcg DAILY   • miconazole 2%-zinc oxide (Hilayr) topical cream Q6HRS PRN   • vitamin D (cholecalciferol) tablet 4,000 Units DAILY   • insulin regular (HumuLIN R,NovoLIN R) injection 4X/DAY ACHS    And   • glucose 4 g chewable tablet 16 g Q15 MIN PRN    And   • dextrose 50% (D50W) injection 50 mL Q15 MIN PRN   • acetaminophen (TYLENOL) tablet 1,000 mg TID   • Respiratory Therapy Consult Continuous RT   • Pharmacy Consult Request ...Pain Management Review 1 Each PHARMACY TO DOSE   • tramadol (ULTRAM) 50 MG tablet 50 mg Q4HRS PRN   • hydrALAZINE (APRESOLINE) tablet 25 mg Q8HRS PRN   • enoxaparin (LOVENOX) inj 40 mg DAILY   • acetaminophen (TYLENOL) tablet 650 mg Q4HRS PRN   • senna-docusate (PERICOLACE or SENOKOT S) 8.6-50 MG per tablet 2 Tab BID    And   • polyethylene glycol/lytes (MIRALAX) PACKET 1 Packet QDAY PRN    And   • magnesium hydroxide (MILK OF MAGNESIA) suspension 30 mL QDAY PRN    And   • bisacodyl (DULCOLAX) suppository 10 mg QDAY PRN   •  benzocaine-menthol (CEPACOL) lozenge 1 Lozenge Q2HRS PRN   • mag hydrox-al hydrox-simeth (MAALOX PLUS ES or MYLANTA DS) suspension 20 mL Q2HRS PRN   • ondansetron (ZOFRAN ODT) dispertab 4 mg 4X/DAY PRN    Or   • ondansetron (ZOFRAN) syringe/vial injection 4 mg 4X/DAY PRN   • traZODone (DESYREL) tablet 50 mg QHS PRN   • sodium chloride (OCEAN) 0.65 % nasal spray 2 Spray PRN   • midazolam (VERSED) 5 mg/mL (1 mL vial) PRN   • atorvastatin (LIPITOR) tablet 40 mg Q EVENING   • lisinopril (PRINIVIL) tablet 10 mg DAILY   • oxyCODONE immediate-release (ROXICODONE) tablet 2.5 mg Q3HRS PRN   • oxyCODONE immediate-release (ROXICODONE) tablet 5 mg Q3HRS PRN   • omeprazole (PRILOSEC) capsule 20 mg DAILY   • carvedilol (COREG) tablet 6.25 mg BID WITH MEALS     Facility-Administered Medications Ordered in Other Encounters   Medication Frequency   • fentaNYL (SUBLIMAZE) injection PRN   • propofol PRN   • ceFAZolin (ANCEF) injection PRN       Orders Placed This Encounter   Procedures   • Diet Order Cardiac (Thin), Diabetic     Standing Status:   Standing     Number of Occurrences:   1     Order Specific Question:   Diet:     Answer:   Cardiac [6]     Comments:   Thin     Order Specific Question:   Diet:     Answer:   Diabetic [3]       Assessment:  Active Hospital Problems    Diagnosis   • *Encephalopathy acute   • AKA stump complication (HCC)   • Arterial insufficiency (HCC)   • PVD (peripheral vascular disease) with claudication (HCC)   • Diabetes (HCC)   • HLD (hyperlipidemia)   • HTN (hypertension)   • Basal cell carcinoma of face   • Hypokalemia   • Hypernatremia   • Alkaline phosphatase elevation   • Anemia   • Vitamin D deficiency   • Peripheral arterial occlusive disease (HCC)       Medical Decision Making and Plan:  Acute encephalopathy - Concern for encephalopathy with ongoing multiple medical comorbidities and confusion about hospital course as well as not understanding carpio catheter. Alternatively could be medicated for  transfer but therapy reporting difficulty with cuing and following commands.   -PT and OT for mobility and ADLs  -SLP for cognitive evaluation     Left BKA - Patient with severe PVD with L AKA on 6/30/20 with multiple revisions due to necrosis and infection. Cultures grew MRSA and Enterococcus. Per ID continue antibiotics until 9/6/20. Transferred to [facility] from 8/12/20 to 9/4/20.   -Vancomycin and Unasyn until 9/6/20. Completed.   -Unclear when last follow-up with Dr. Fisher, will arrange follow-up.   -Continue wound vac. Consult wound care - added honey to regimen with slow improvement in size and depth     Multiple wounds - Patient has rash to abdomen, redness on sacrum and left buttocks on transfer. Consult wound care.      Right face basal cell carcinoma - Unclear what follow-up. Follow-up Oncology     Anemia - Check AM CBC - 8.8. Continue to monitor.      HTN - Patient on Lisinopril 10 mg and Coreg 6.25 mg BID  -Hospistalist consulted.      Pain control - Primarily on admission with left phantom limb pain. Patient on Gabapentin 100 mg TID and Oxycodone. Can consider increase in Gabapentin for neuropathic phantom limb pain.   -Increase Gabapentin to 400 mg TID. Oxycodone 10 mg for wound care changes. Concern for over sedation with higher opiate dosing, limit to wound vac change, lidocaine jelly for wound change.      DM with hyperglycemia - Patient on SSI on transfer. Consult Hospitalist     COVID - Patient with check x2 at [facility]. Patient without symptoms. Per policy no check within 7 days. Patient without symptoms. Check PCR, on precautions - negative, no precautions.      GI Ppx - Patient on Prilosec      DVT Ppx - Patient on Lovenox on transfer.      Total time:  26 minutes.  I spent greater than 50% of the time for patient care, counseling, and coordination on this date, including unit/floor time, and face-to-face time with the patient as per interval events and assessment and plan above. Topics discussed  included wound care, topical honey per wound RN, Oyxcodone 10 only for wound changes, and lidocaine jelly for wound change.     Winifred Silverman M.D.

## 2020-09-09 NOTE — PROGRESS NOTES
Hospital Medicine Daily Progress Note      Chief Complaint:  Hypertension  Diabetes    Interval History:  No significant events or changes since last visit    Review of Systems  Review of Systems   Constitutional: Negative for fever.   Eyes: Negative for blurred vision.   Respiratory: Negative for shortness of breath.    Cardiovascular: Negative for palpitations.   Gastrointestinal: Negative for nausea and vomiting.   Neurological: Negative for dizziness and headaches.   Psychiatric/Behavioral: Negative for hallucinations.        Physical Exam  Temp:  [36.8 °C (98.2 °F)-36.9 °C (98.5 °F)] 36.9 °C (98.5 °F)  Pulse:  [57-77] 58  Resp:  [16-18] 16  BP: (113-145)/(61-63) 127/61  SpO2:  [90 %-99 %] 98 %    Physical Exam  Vitals signs and nursing note reviewed.   Constitutional:       General: He is not in acute distress.  HENT:      Mouth/Throat:      Mouth: Mucous membranes are moist.      Pharynx: Oropharynx is clear.   Eyes:      General: No scleral icterus.  Neck:      Musculoskeletal: No neck rigidity.   Cardiovascular:      Rate and Rhythm: Normal rate and regular rhythm.      Heart sounds: No murmur.   Pulmonary:      Effort: Pulmonary effort is normal.      Breath sounds: Normal breath sounds. No stridor.   Abdominal:      General: There is no distension.      Palpations: Abdomen is soft.      Tenderness: There is no abdominal tenderness.   Musculoskeletal:      Right lower leg: No edema.      Left lower leg: No edema.      Comments: Has left AKA   Skin:     General: Skin is warm and dry.      Findings: No rash.      Comments: Large pearly lesion on right cheek   Neurological:      Mental Status: He is alert and oriented to person, place, and time.   Psychiatric:         Mood and Affect: Mood normal.         Behavior: Behavior normal.         Fluids    Intake/Output Summary (Last 24 hours) at 9/9/2020 0959  Last data filed at 9/9/2020 0600  Gross per 24 hour   Intake --   Output 750 ml   Net -750 ml        Laboratory  Recent Labs     20  0703   WBC 6.7   RBC 2.87*   HEMOGLOBIN 8.8*   HEMATOCRIT 29.3*   .1*   MCH 30.7   MCHC 30.0*   RDW 55.0*   PLATELETCT 266   MPV 10.4     Recent Labs     20  0703 20  0703   SODIUM 147* 145   POTASSIUM 4.0 3.5*   CHLORIDE 110 109   CO2 26 26   GLUCOSE 115* 124*   BUN 12 12   CREATININE 0.99 0.96   CALCIUM 8.5 8.6                   Assessment/Plan  AKA stump complication (HCC)- (present on admission)  Assessment & Plan  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  S/P left AKA for critical limb ischemia (2020)  Post-op complications included necrosis and infection  S/P AKA revision in 2020  S/P AKA I&D x 3  Wound vac per Wound Care  Wound Cx +MRSA and Enterococcus  S/P Unasyn ()  On Vanco --> will d/c ()    PVD (peripheral vascular disease) with claudication (HCC)- (present on admission)  Assessment & Plan  On Lipitor  Consider ASA    Diabetes (HCC)- (present on admission)  Assessment & Plan  Hba1c: 7.3 () --> 5.1 ()  BS labile:   Note: needs out patient follow up  Cont to monitor    Hypokalemia  Assessment & Plan  K+: 3.5 ()  S/P KCL 40 meq x 1 ()  Monitor    Hypernatremia  Assessment & Plan  Currently resolved  Na: 147 () --> 145 ()  S/P D5W at 50 mL/hr x 500 mL  Monitor    Vitamin D deficiency  Assessment & Plan  Vit D: 10  On supplements    Anemia  Assessment & Plan  H&H stable with Hb 8.8 ()  TSH normal  B12: 198  Folate: 2.9  On B12 & folate supplements  Monitor    HTN (hypertension)- (present on admission)  Assessment & Plan  BP ok  On Core.25 mg bid  On Lisinopril: 10 mg daily  Monitor    Basal cell carcinoma of face- (present on admission)  Assessment & Plan  Pt reports right cheek lesion has been ongoing for a while  Needs Dermatology follow up

## 2020-09-10 LAB — GLUCOSE BLD-MCNC: 104 MG/DL (ref 65–99)

## 2020-09-10 PROCEDURE — 97130 THER IVNTJ EA ADDL 15 MIN: CPT

## 2020-09-10 PROCEDURE — 700102 HCHG RX REV CODE 250 W/ 637 OVERRIDE(OP): Performed by: PHYSICAL MEDICINE & REHABILITATION

## 2020-09-10 PROCEDURE — 770010 HCHG ROOM/CARE - REHAB SEMI PRIVAT*

## 2020-09-10 PROCEDURE — 99233 SBSQ HOSP IP/OBS HIGH 50: CPT | Performed by: PHYSICAL MEDICINE & REHABILITATION

## 2020-09-10 PROCEDURE — 700111 HCHG RX REV CODE 636 W/ 250 OVERRIDE (IP): Performed by: PHYSICAL MEDICINE & REHABILITATION

## 2020-09-10 PROCEDURE — 700102 HCHG RX REV CODE 250 W/ 637 OVERRIDE(OP): Performed by: HOSPITALIST

## 2020-09-10 PROCEDURE — 97530 THERAPEUTIC ACTIVITIES: CPT | Mod: CQ

## 2020-09-10 PROCEDURE — 82962 GLUCOSE BLOOD TEST: CPT

## 2020-09-10 PROCEDURE — A9270 NON-COVERED ITEM OR SERVICE: HCPCS | Performed by: HOSPITALIST

## 2020-09-10 PROCEDURE — A9270 NON-COVERED ITEM OR SERVICE: HCPCS | Performed by: PHYSICAL MEDICINE & REHABILITATION

## 2020-09-10 PROCEDURE — 94760 N-INVAS EAR/PLS OXIMETRY 1: CPT

## 2020-09-10 PROCEDURE — 99232 SBSQ HOSP IP/OBS MODERATE 35: CPT | Performed by: HOSPITALIST

## 2020-09-10 PROCEDURE — 97535 SELF CARE MNGMENT TRAINING: CPT

## 2020-09-10 PROCEDURE — 97129 THER IVNTJ 1ST 15 MIN: CPT

## 2020-09-10 PROCEDURE — 97110 THERAPEUTIC EXERCISES: CPT | Mod: CQ

## 2020-09-10 RX ORDER — GABAPENTIN 300 MG/1
600 CAPSULE ORAL 3 TIMES DAILY
Status: DISCONTINUED | OUTPATIENT
Start: 2020-09-10 | End: 2020-09-15

## 2020-09-10 RX ORDER — OXYCODONE HYDROCHLORIDE 5 MG/1
5 TABLET ORAL DAILY
Status: DISCONTINUED | OUTPATIENT
Start: 2020-09-11 | End: 2020-09-23 | Stop reason: HOSPADM

## 2020-09-10 RX ADMIN — CARVEDILOL 6.25 MG: 3.12 TABLET, FILM COATED ORAL at 17:28

## 2020-09-10 RX ADMIN — GABAPENTIN 400 MG: 400 CAPSULE ORAL at 08:55

## 2020-09-10 RX ADMIN — TRAMADOL HYDROCHLORIDE 50 MG: 50 TABLET, COATED ORAL at 08:52

## 2020-09-10 RX ADMIN — TRAZODONE HYDROCHLORIDE 50 MG: 50 TABLET ORAL at 20:19

## 2020-09-10 RX ADMIN — DOCUSATE SODIUM 50 MG AND SENNOSIDES 8.6 MG 2 TABLET: 8.6; 5 TABLET, FILM COATED ORAL at 20:18

## 2020-09-10 RX ADMIN — LISINOPRIL 10 MG: 5 TABLET ORAL at 09:00

## 2020-09-10 RX ADMIN — FOLIC ACID 1 MG: 1 TABLET ORAL at 08:55

## 2020-09-10 RX ADMIN — ACETAMINOPHEN 1000 MG: 325 TABLET, FILM COATED ORAL at 08:55

## 2020-09-10 RX ADMIN — OXYCODONE HYDROCHLORIDE 5 MG: 5 TABLET ORAL at 20:19

## 2020-09-10 RX ADMIN — GABAPENTIN 400 MG: 400 CAPSULE ORAL at 14:11

## 2020-09-10 RX ADMIN — OMEPRAZOLE 20 MG: 20 CAPSULE, DELAYED RELEASE ORAL at 08:54

## 2020-09-10 RX ADMIN — MICONAZOLE NITRATE: 20 CREAM TOPICAL at 06:12

## 2020-09-10 RX ADMIN — CARVEDILOL 6.25 MG: 3.12 TABLET, FILM COATED ORAL at 07:39

## 2020-09-10 RX ADMIN — ACETAMINOPHEN 1000 MG: 325 TABLET, FILM COATED ORAL at 14:11

## 2020-09-10 RX ADMIN — ACETAMINOPHEN 1000 MG: 325 TABLET, FILM COATED ORAL at 20:19

## 2020-09-10 RX ADMIN — OXYCODONE HYDROCHLORIDE 5 MG: 5 TABLET ORAL at 07:40

## 2020-09-10 RX ADMIN — Medication 4000 UNITS: at 08:53

## 2020-09-10 RX ADMIN — Medication 1000 MCG: at 08:54

## 2020-09-10 RX ADMIN — ENOXAPARIN SODIUM 40 MG: 40 INJECTION SUBCUTANEOUS at 08:55

## 2020-09-10 RX ADMIN — ATORVASTATIN CALCIUM 40 MG: 40 TABLET, FILM COATED ORAL at 20:19

## 2020-09-10 RX ADMIN — GABAPENTIN 600 MG: 300 CAPSULE ORAL at 20:19

## 2020-09-10 RX ADMIN — POLYETHYLENE GLYCOL 3350 1 PACKET: 17 POWDER, FOR SOLUTION ORAL at 06:10

## 2020-09-10 ASSESSMENT — PATIENT HEALTH QUESTIONNAIRE - PHQ9
2. FEELING DOWN, DEPRESSED, IRRITABLE, OR HOPELESS: MORE THAN HALF THE DAYS
SUM OF ALL RESPONSES TO PHQ9 QUESTIONS 1 AND 2: 2
1. LITTLE INTEREST OR PLEASURE IN DOING THINGS: NOT AT ALL

## 2020-09-10 ASSESSMENT — ENCOUNTER SYMPTOMS
COUGH: 0
BLURRED VISION: 0
DIARRHEA: 0
DIZZINESS: 0
NERVOUS/ANXIOUS: 0
FEVER: 0

## 2020-09-10 ASSESSMENT — ACTIVITIES OF DAILY LIVING (ADL)
TOILET_TRANSFER_DESCRIPTION: GRAB BAR;INCREASED TIME;VERBAL CUEING;REQUIRES LIFT
BED_CHAIR_WHEELCHAIR_TRANSFER_DESCRIPTION: ADAPTIVE EQUIPMENT;INCREASED TIME;SET-UP OF EQUIPMENT;VERBAL CUEING
BED_CHAIR_WHEELCHAIR_TRANSFER_DESCRIPTION: INCREASED TIME;SUPERVISION FOR SAFETY;VERBAL CUEING

## 2020-09-10 ASSESSMENT — PAIN DESCRIPTION - PAIN TYPE
TYPE: ACUTE PAIN
TYPE: ACUTE PAIN

## 2020-09-10 NOTE — THERAPY
"Physical Therapy   Daily Treatment     Patient Name: Jimenez Bains  Age:  75 y.o., Sex:  male  Medical Record #: [id number]  Today's Date: 9/10/2020     Precautions  Precautions: Fall Risk, Non Weight Bearing Left Lower Extremity  Comments: wound vac L residual limb, sores on genital area, carpio    Subjective    Pt received resting in bed, agreeable to therapy     Objective       09/10/20 0831   Precautions   Precautions Fall Risk;Non Weight Bearing Left Lower Extremity   Comments wound vac L residual limb, sores on genital area, carpio   Vitals   Pulse 82   Patient BP Position Sitting   Blood Pressure  132/61   Room Air Oximetry 95   O2 (LPM) 0   O2 Delivery Device None - Room Air   Vitals Comments seated EOB x 3 minutes   Pain 0 - 10 Group   Location Leg   Location Orientation Left   Description Burning;Dull   Comfort Goal Comfort with Movement;Perform Activity;5   Therapist Pain Assessment Post Activity Pain Same as Prior to Activity;10;Nurse Notified   Wheelchair Functional Level of Assist   Wheelchair Assist Stand by Assist   Distance Wheelchair (Feet or Distance) 120   Wheelchair Description Extra time;Leg rest management;Supervision for safety;Verbal cueing  (vc for semi-circular propulsion technique)   Stairs Functional Level of Assist   Level of Assist with Stairs Unable to Participate   # of Stairs Climbed 0   Stairs Description Safety concerns   Transfer Functional Level of Assist   Bed, Chair, Wheelchair Transfer Minimal Assist   Bed Chair Wheelchair Transfer Description Adaptive equipment;Increased time;Set-up of equipment;Verbal cueing   Sitting Lower Body Exercises   Sitting Lower Body Exercises Yes   Tricep Press 3 sets of 15;Weight (See Comments for lbs)  (fwrd  #25 bwrd #15, 3 x 15 each)   Sit to Stand 1 set of 10;Other Resistance (See Comments)  (from 23\" bed height <> FWW)   Bed Mobility    Supine to Sit Minimal Assist   Sit to Stand Contact Guard Assist  (23-22\" bed height <> FWW) " "  Neuro-Muscular Treatments   Neuro-Muscular Treatments Verbal Cuing;Sequencing;Postural Facilitation   Interdisciplinary Plan of Care Collaboration   IDT Collaboration with  Nursing   Patient Position at End of Therapy In Bed;Call Light within Reach;Tray Table within Reach   Collaboration Comments medicated pt during therapy, reported pain to RN   PT Total Time Spent   PT Individual Total Time Spent (Mins) 60   PT Charge Group   PT Therapeutic Exercise 2   PT Therapeutic Activities 2   Supervising Physical Therapist Staci Dean     Unsupported sitting balance at EOB a 10 minutes     SPT with FWW and assist to manage carpio/wound vac Nicanor    Assessment    Pt cont to have limited tolerance to sitting with offloading/leaning to the R to decrease his pain while in seated. He is very cooperative and motivated toward therapy. Pt is able to complete STS from 22-23\" height and will benefit from STS from lower surfaces with cuing for proper from and control  Strengths: Independent prior level of function, Motivated for self care and independence, Willingly participates in therapeutic activities  Barriers: Confused, Decreased endurance, Fatigue, Generalized weakness, Home accessibility, Impaired activity tolerance, Impaired balance, Pain, Limited mobility    Plan    Transfer training, bed mobility. Gait with FWW, amputee education and there ex as able, gait training and balance    Physical Therapy Problems     Problem: Mobility     Dates: Start: 09/05/20       Goal: STG-Within one week, patient will propel wheelchair household distances     Dates: Start: 09/05/20       Description: 1) Individualized goal:  50 ft with SBA.  2) Interventions:  PT Group Therapy, PT Gait Training, PT Therapeutic Exercises, PT Neuro Re-Ed/Balance, PT Therapeutic Activity, and PT Evaluation            Goal: STG-Within one week, patient will ambulate household distance     Dates: Start: 09/05/20       Description: 1) Individualized goal:  10 ft with " FWW and SBA-CGA.  2) Interventions:  PT Group Therapy, PT Gait Training, PT Therapeutic Exercises, PT Neuro Re-Ed/Balance, PT Therapeutic Activity, and PT Evaluation                Problem: Mobility Transfers     Dates: Start: 09/05/20       Goal: STG-Within one week, patient will sit to stand     Dates: Start: 09/05/20       Description: 1) Individualized goal:  with FWW and SBA.  2) Interventions:  PT Group Therapy, PT Gait Training, PT Therapeutic Exercises, PT Neuro Re-Ed/Balance, PT Therapeutic Activity, and PT Evaluation                Problem: PT-Long Term Goals     Dates: Start: 09/05/20       Goal: LTG-By discharge, patient will propel wheelchair     Dates: Start: 09/05/20       Description: 1) Individualized goal:  150 ft mod I.  2) Interventions:  PT Group Therapy, PT Gait Training, PT Therapeutic Exercises, PT Neuro Re-Ed/Balance, PT Therapeutic Activity, and PT Evaluation          Goal: LTG-By discharge, patient will ambulate     Dates: Start: 09/05/20       Description: 1) Individualized goal:  50 ft with FWW and supervision.  2) Interventions:  PT Group Therapy, PT Gait Training, PT Therapeutic Exercises, PT Neuro Re-Ed/Balance, PT Therapeutic Activity, and PT Evaluation          Goal: LTG-By discharge, patient will transfer one surface to another     Dates: Start: 09/05/20       Description: 1) Individualized goal:  with FWW vs. Squat pivot mod I.  2) Interventions:  PT Group Therapy, PT Gait Training, PT Therapeutic Exercises, PT Neuro Re-Ed/Balance, PT Therapeutic Activity, and PT Evaluation          Goal: LTG-By discharge, patient will perform home exercise program     Dates: Start: 09/05/20       Description: 1) Individualized goal:  with handout mod I.  2) Interventions:  PT Group Therapy, PT Gait Training, PT Therapeutic Exercises, PT Neuro Re-Ed/Balance, PT Therapeutic Activity, and PT Evaluation          Goal: LTG-By discharge, patient will     Dates: Start: 09/05/20       Description: 1)  Individualized goal:  perform bed mobility including rolling to prone mod I.  2) Interventions:  PT Group Therapy, PT Gait Training, PT Therapeutic Exercises, PT Neuro Re-Ed/Balance, PT Therapeutic Activity, and PT Evaluation

## 2020-09-10 NOTE — FLOWSHEET NOTE
09/10/20 1226   Events/Summary/Plan   Events/Summary/Plan SpO2 check on room air   Vital Signs   Pulse 80   Respiration 16   Pulse Oximetry 94 %   $ Pulse Oximetry (Spot Check) Yes   Respiratory Assessment   Level of Consciousness Alert   Oxygen   O2 Delivery Device Room air w/o2 available

## 2020-09-10 NOTE — PROGRESS NOTES
"Rehab Progress Note     Encounter Date: 9/10/2020    CC: left AKA, phantom limb pain, wound pain    Interval Events (Subjective)  Patient sitting up in room. He reports she is doing OK. He reports pain this AM with therapy. Discussed scheduling pain medication first thing in AM as he is getting early OT. Otherwise denies NVD.     IDT Team Meeting 9/10/2020    IWinifred M.D., was present and led the interdisciplinary team conference on 9/10/2020.  I led the IDT conference and agree with the IDT conference documentation and plan of care as noted below.     RN:  Diet Regular   % Meal     Pain Oxycodone and Tramadol   Sleep    Bowel Continent   Bladder    In's & Out's    Wound care via wound nurse    PT:  Bed Mobility [place]   Transfers    Mobility 10 feet maxA; SBA with WC    Stairs    Need family training    OT: Pain limited  Eating    Grooming    Bathing    UB Dressing    LB Dressing maxA   Toileting    Shower & Transfer [place]   Limited by pain    SLP  Primarily memory and emotionally labile    Resp:   Trial without Oxygen in day time    CM:  Continues to work on disposition and DME needs.      DC/Disposition:  9/22/20    Objective:  VITAL SIGNS: /61   Pulse 80   Temp 37 °C (98.6 °F) (Oral)   Resp 16   Ht 1.676 m (5' 6\")   Wt 64 kg (141 lb)   SpO2 94%   BMI 22.76 kg/m²   Gen: NAD  Psych: Mood and affect appropriate  CV: RRR, no edema  Resp: CTAB, no upper airway sounds  Abd: NTND  Neuro: AOx3, following commands    Recent Results (from the past 72 hour(s))   ACCU-CHEK GLUCOSE    Collection Time: 09/07/20  5:21 PM   Result Value Ref Range    Glucose - Accu-Ck 98 65 - 99 mg/dL   ACCU-CHEK GLUCOSE    Collection Time: 09/07/20  8:58 PM   Result Value Ref Range    Glucose - Accu-Ck 165 (H) 65 - 99 mg/dL   Basic Metabolic Panel    Collection Time: 09/08/20  7:03 AM   Result Value Ref Range    Sodium 145 135 - 145 mmol/L    Potassium 3.5 (L) 3.6 - 5.5 mmol/L    Chloride 109 96 - 112 mmol/L    Co2 " 26 20 - 33 mmol/L    Glucose 124 (H) 65 - 99 mg/dL    Bun 12 8 - 22 mg/dL    Creatinine 0.96 0.50 - 1.40 mg/dL    Calcium 8.6 8.5 - 10.5 mg/dL    Anion Gap 10.0 7.0 - 16.0   proBrain Natriuretic Peptide, NT    Collection Time: 09/08/20  7:03 AM   Result Value Ref Range    NT-proBNP 5607 (H) 0 - 125 pg/mL   ESTIMATED GFR    Collection Time: 09/08/20  7:03 AM   Result Value Ref Range    GFR If African American >60 >60 mL/min/1.73 m 2    GFR If Non African American >60 >60 mL/min/1.73 m 2   ACCU-CHEK GLUCOSE    Collection Time: 09/08/20  7:18 AM   Result Value Ref Range    Glucose - Accu-Ck 123 (H) 65 - 99 mg/dL   ACCU-CHEK GLUCOSE    Collection Time: 09/08/20 11:25 AM   Result Value Ref Range    Glucose - Accu-Ck 113 (H) 65 - 99 mg/dL   ACCU-CHEK GLUCOSE    Collection Time: 09/08/20  5:18 PM   Result Value Ref Range    Glucose - Accu-Ck 105 (H) 65 - 99 mg/dL   ACCU-CHEK GLUCOSE    Collection Time: 09/08/20  9:29 PM   Result Value Ref Range    Glucose - Accu-Ck 144 (H) 65 - 99 mg/dL   ACCU-CHEK GLUCOSE    Collection Time: 09/09/20  7:18 AM   Result Value Ref Range    Glucose - Accu-Ck 107 (H) 65 - 99 mg/dL   ACCU-CHEK GLUCOSE    Collection Time: 09/09/20 11:35 AM   Result Value Ref Range    Glucose - Accu-Ck 147 (H) 65 - 99 mg/dL   ACCU-CHEK GLUCOSE    Collection Time: 09/09/20  5:06 PM   Result Value Ref Range    Glucose - Accu-Ck 105 (H) 65 - 99 mg/dL   ACCU-CHEK GLUCOSE    Collection Time: 09/09/20  8:59 PM   Result Value Ref Range    Glucose - Accu-Ck 133 (H) 65 - 99 mg/dL   ACCU-CHEK GLUCOSE    Collection Time: 09/10/20  7:40 AM   Result Value Ref Range    Glucose - Accu-Ck 104 (H) 65 - 99 mg/dL       Current Facility-Administered Medications   Medication Frequency   • lidocaine 2 % jelly 1 Application PRN   • gabapentin (NEURONTIN) capsule 400 mg TID   • oxyCODONE immediate release (ROXICODONE) tablet 10 mg Q4HRS PRN   • folic acid (FOLVITE) tablet 1 mg DAILY   • cyanocobalamin (VITAMIN B-12) tablet 1,000 mcg  DAILY   • miconazole 2%-zinc oxide (Hilary) topical cream Q6HRS PRN   • vitamin D (cholecalciferol) tablet 4,000 Units DAILY   • glucose 4 g chewable tablet 16 g Q15 MIN PRN    And   • dextrose 50% (D50W) injection 50 mL Q15 MIN PRN   • acetaminophen (TYLENOL) tablet 1,000 mg TID   • Respiratory Therapy Consult Continuous RT   • Pharmacy Consult Request ...Pain Management Review 1 Each PHARMACY TO DOSE   • tramadol (ULTRAM) 50 MG tablet 50 mg Q4HRS PRN   • hydrALAZINE (APRESOLINE) tablet 25 mg Q8HRS PRN   • enoxaparin (LOVENOX) inj 40 mg DAILY   • acetaminophen (TYLENOL) tablet 650 mg Q4HRS PRN   • senna-docusate (PERICOLACE or SENOKOT S) 8.6-50 MG per tablet 2 Tab BID    And   • polyethylene glycol/lytes (MIRALAX) PACKET 1 Packet QDAY PRN    And   • magnesium hydroxide (MILK OF MAGNESIA) suspension 30 mL QDAY PRN    And   • bisacodyl (DULCOLAX) suppository 10 mg QDAY PRN   • benzocaine-menthol (CEPACOL) lozenge 1 Lozenge Q2HRS PRN   • mag hydrox-al hydrox-simeth (MAALOX PLUS ES or MYLANTA DS) suspension 20 mL Q2HRS PRN   • ondansetron (ZOFRAN ODT) dispertab 4 mg 4X/DAY PRN    Or   • ondansetron (ZOFRAN) syringe/vial injection 4 mg 4X/DAY PRN   • traZODone (DESYREL) tablet 50 mg QHS PRN   • sodium chloride (OCEAN) 0.65 % nasal spray 2 Spray PRN   • midazolam (VERSED) 5 mg/mL (1 mL vial) PRN   • atorvastatin (LIPITOR) tablet 40 mg Q EVENING   • lisinopril (PRINIVIL) tablet 10 mg DAILY   • oxyCODONE immediate-release (ROXICODONE) tablet 2.5 mg Q3HRS PRN   • oxyCODONE immediate-release (ROXICODONE) tablet 5 mg Q3HRS PRN   • omeprazole (PRILOSEC) capsule 20 mg DAILY   • carvedilol (COREG) tablet 6.25 mg BID WITH MEALS     Facility-Administered Medications Ordered in Other Encounters   Medication Frequency   • fentaNYL (SUBLIMAZE) injection PRN   • propofol PRN   • ceFAZolin (ANCEF) injection PRN       Orders Placed This Encounter   Procedures   • Diet Order Cardiac (Thin), Diabetic     Standing Status:   Standing      Number of Occurrences:   1     Order Specific Question:   Diet:     Answer:   Cardiac [6]     Comments:   Thin     Order Specific Question:   Diet:     Answer:   Diabetic [3]       Assessment:  Active Hospital Problems    Diagnosis   • *Encephalopathy acute   • AKA stump complication (HCC)   • Arterial insufficiency (HCC)   • PVD (peripheral vascular disease) with claudication (HCC)   • Diabetes (HCC)   • HLD (hyperlipidemia)   • HTN (hypertension)   • Basal cell carcinoma of face   • Hypokalemia   • Hypernatremia   • Alkaline phosphatase elevation   • Anemia   • Vitamin D deficiency   • Peripheral arterial occlusive disease (HCC)       Medical Decision Making and Plan:  Acute encephalopathy - Concern for encephalopathy with ongoing multiple medical comorbidities and confusion about hospital course as well as not understanding carpio catheter. Alternatively could be medicated for transfer but therapy reporting difficulty with cuing and following commands.   -PT and OT for mobility and ADLs  -SLP for cognitive evaluation     Left BKA - Patient with severe PVD with L AKA on 6/30/20 with multiple revisions due to necrosis and infection. Cultures grew MRSA and Enterococcus. Per ID continue antibiotics until 9/6/20. [facility] from 8/12/20 to 9/4/20.   -Vancomycin and Unasyn until 9/6/20. Completed.   -Unclear when last follow-up with Dr. Fisher, will arrange follow-up.   -Continue wound vac. Consult wound care - added honey to regimen with slow improvement in size and depth     Multiple wounds - Patient has rash to abdomen, redness on sacrum and left buttocks on transfer. Consult wound care.      Right face basal cell carcinoma - Unclear what follow-up. Follow-up Oncology     Anemia - Check AM CBC - 8.8. Continue to monitor.      HTN - Patient on Lisinopril 10 mg and Coreg 6.25 mg BID  -Hospistalist consulted.      Pain control - Primarily on admission with left phantom limb pain. Patient on Gabapentin 100 mg  TID and Oxycodone. Can consider increase in Gabapentin for neuropathic phantom limb pain.   -Increase Gabapentin to 400 mg TID. Oxycodone 10 mg for wound care changes. Concern for over sedation with higher opiate dosing, limit to wound vac change, lidocaine jelly for wound change.   -Increase Gabapentin to 600 mg TID and schedule Oxycodone 5 mg first thing in AM     DM with hyperglycemia - Patient on SSI on transfer. Consult Hospitalist     COVID - Patient with check x2 at [facility]. Patient without symptoms. Per policy no check within 7 days. Patient without symptoms. Check PCR, on precautions - negative, no precautions.      GI Ppx - Patient on Prilosec      DVT Ppx - Patient on Lovenox on transfer.      Total time:  36 minutes.  I spent greater than 50% of the time for patient care, counseling, and coordination on this date, including unit/floor time, and face-to-face time with the patient as per interval events and assessment and plan above. Topics discussed included discharge planning, pain control, Oxycodone scheduled and gabapentin. Patient was discussed separately in IDT today; please see details above.    Winifred Silverman M.D.

## 2020-09-10 NOTE — CARE PLAN
Problem: Safety  Goal: Will remain free from injury  Outcome: PROGRESSING AS EXPECTED  Note: Patient is max assist.patient verbalized will not get up without assist. Call button within reach.      Problem: Bowel/Gastric:  Goal: Normal bowel function is maintained or improved  Outcome: PROGRESSING SLOWER THAN EXPECTED  Note: Patient last BM was 2 days ago. Patient encouraged to increase fluid intake. RN will offer PRN BM meds in am.

## 2020-09-10 NOTE — CARE PLAN
Problem: Problem Solving STGs  Goal: STG-Within one week, patient will  Description: 1) Individualized goal:  answer basic problem solving questions with 80% accuracy given min verbal cues.    2) Interventions:  SLP Cognitive Skill Development and SLP Group Treatment      Outcome: NOT MET     Problem: Memory STGs  Goal: STG-Within one week, patient will  Description: 1) Individualized goal:  recall daily events and safety strategies given min verbal cues 80% with use of memory book.  2) Interventions:  SLP Cognitive Skill Development and SLP Group Treatment      Outcome: NOT MET

## 2020-09-10 NOTE — PROGRESS NOTES
Received patient on bed.conscious coherent not in cp distress. Patient on O2 at 2 LPM. Patient was able to swallow pills without difficulty with crush pills and floated to apple sauce. Patient wound vac intact without leak and suctioning well. Safety and fall precaution reinforced. Patient complained of severe pain on the left leg and requested PRN Roxicodone.

## 2020-09-10 NOTE — PROGRESS NOTES
Hospital Medicine Daily Progress Note      Chief Complaint:  Hypertension  Diabetes    Interval History:  No significant events or changes since last visit    Review of Systems  Review of Systems   Constitutional: Negative for fever.   Eyes: Negative for blurred vision.   Respiratory: Negative for cough.    Cardiovascular: Negative for chest pain.   Gastrointestinal: Negative for diarrhea.   Musculoskeletal: Negative for joint pain.   Neurological: Negative for dizziness.   Psychiatric/Behavioral: The patient is not nervous/anxious.         Physical Exam  Temp:  [36.8 °C (98.2 °F)-37.1 °C (98.7 °F)] 37 °C (98.6 °F)  Pulse:  [62-82] 82  Resp:  [18] 18  BP: (122-150)/(58-74) 132/61  SpO2:  [93 %-96 %] 93 %    Physical Exam  Vitals signs and nursing note reviewed.   Constitutional:       Appearance: He is not diaphoretic.   HENT:      Mouth/Throat:      Pharynx: No oropharyngeal exudate or posterior oropharyngeal erythema.   Eyes:      Extraocular Movements: Extraocular movements intact.   Neck:      Vascular: No carotid bruit.   Cardiovascular:      Rate and Rhythm: Normal rate and regular rhythm.      Heart sounds: No murmur.   Pulmonary:      Effort: Pulmonary effort is normal.      Breath sounds: Normal breath sounds. No stridor.   Abdominal:      General: Bowel sounds are normal.      Palpations: Abdomen is soft.   Musculoskeletal:      Right lower leg: No edema.      Left lower leg: No edema.      Comments: Has left AKA   Skin:     General: Skin is warm and dry.      Findings: No rash.      Comments: Large pearly lesion on right cheek   Neurological:      Mental Status: He is alert and oriented to person, place, and time.   Psychiatric:         Mood and Affect: Mood normal.         Behavior: Behavior normal.         Fluids    Intake/Output Summary (Last 24 hours) at 9/10/2020 0975  Last data filed at 9/10/2020 0700  Gross per 24 hour   Intake 120 ml   Output 400 ml   Net -280 ml       Laboratory      Recent Labs      20  0703   SODIUM 145   POTASSIUM 3.5*   CHLORIDE 109   CO2 26   GLUCOSE 124*   BUN 12   CREATININE 0.96   CALCIUM 8.6                   Assessment/Plan  AKA stump complication (HCC)- (present on admission)  Assessment & Plan  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  S/P left AKA for critical limb ischemia (2020)  Post-op complications included necrosis and infection  S/P AKA revision in 2020  S/P AKA I&D x 3  Wound vac per Wound Care  Wound Cx +MRSA and Enterococcus  S/P Unasyn ()  S/P Vanco ()    PVD (peripheral vascular disease) with claudication (HCC)- (present on admission)  Assessment & Plan  On Lipitor  Consider ASA    Diabetes (HCC)- (present on admission)  Assessment & Plan  Hba1c: 7.3 () --> 5.1 ()  BS have been ok -- no coverage needed recently  Will d/c accuchecks  Note: needs out patient follow up  Cont to monitor    Hypokalemia  Assessment & Plan  K+: 3.5 ()  S/P KCL 40 meq x 1 ()  Monitor    Hypernatremia  Assessment & Plan  Currently resolved  Na: 147 () --> 145 ()  S/P D5W at 50 mL/hr x 500 mL  Monitor    Vitamin D deficiency  Assessment & Plan  Vit D: 10  On supplements    Anemia  Assessment & Plan  H&H stable with Hb 8.8 ()  TSH normal  B12: 198  Folate: 2.9  On B12 & folate supplements  Monitor    HTN (hypertension)- (present on admission)  Assessment & Plan  BP ok  On Core.25 mg bid  On Lisinopril: 10 mg daily  Monitor    Basal cell carcinoma of face- (present on admission)  Assessment & Plan  Pt reports right cheek lesion has been ongoing for a while  Needs Dermatology follow up  CM to establish pt with a PMD

## 2020-09-10 NOTE — THERAPY
Occupational Therapy  Daily Treatment     Patient Name: Jimenez Bains  Age:  75 y.o., Sex:  male  Medical Record #: [id number]  Today's Date: 9/10/2020     Precautions  Precautions: (P) Fall Risk, Non Weight Bearing Left Lower Extremity  Comments: (P) wound vac L residual limb, sores on genital area, carpio, MRSA isolation prec         Subjective    Pt received supine in bed, agreeable to OOB for ADLs     Objective       09/10/20 0701   Precautions   Precautions Fall Risk;Non Weight Bearing Left Lower Extremity   Comments wound vac L residual limb, sores on genital area, carpio, MRSA isolation prec   Functional Level of Assist   Grooming Supervision;Seated   Grooming Description Seated in wheelchair at sink  (setup to wash hands)   Upper Body Dressing Supervision   Upper Body Dressing Description Increased time   Lower Body Dressing Maximal Assist   Lower Body Dressing Description Supervision for safety;Verbal cueing;Increased time  (donned brief in supine, pants at w/c level)   Toileting Total Assist   Toileting Description Assist to pull pants up;Assist to pull pants down;Verbal cueing;Supervision for safety;Assist for hygiene  (on commode over toilet)   Bed, Chair, Wheelchair Transfer Minimal Assist   Bed Chair Wheelchair Transfer Description Increased time;Supervision for safety;Verbal cueing  (stand pivot FWW <> w/c)   Toilet Transfers Moderate Assist   Toilet Transfer Description Grab bar;Increased time;Verbal cueing;Requires lift  (GB > toilet, FWW toilet > w/c)   Supine Lower Body Exercise   Supine Lower Body Exercises Yes   Bridges 1 set of 10  (in bed)   Bed Mobility    Supine to Sit Minimal Assist   Sit to Supine Stand by Assist   Scooting Stand by Assist   OT Total Time Spent   OT Individual Total Time Spent (Mins) 60   OT Charge Group   OT Self Care / ADL 4       Assessment    Pt limited by high pain levels this session, poor tolerance for sitting position both on roho in w/c and on commode, leans to R to  offload L residual limb for pain control, difficulty weightshifting/altering sitting position for ADLs due both to pain and trunk weakness. Required max A to stand pivot w/c > commode over toilet using grab bar, min A stand pivot commode > w/c using FWW, requires assist for pants management during dressing/toileting due to heavy reliance on UE support in standing, requires assist for hygiene due to difficulty weightshifting w/o UE support on high toilet (foot does not reach ground). Will benefit from ongoing problem solving of toileting and shower setup to maximize safety and comfort, needs to improve sit to stand from lower surfaces to enable lowering toilet and w/c.   Strengths: Pleasant and cooperative, Willingly participates in therapeutic activities  Barriers: Confused, Decreased endurance, Generalized weakness, Impaired balance, Limited mobility, Pain    Plan    Continue to progress gross strengthening and endurance and standing tolerance/balance toward increased safety and independence with ADLs, general attention to hygiene and self-care, weightshift when scooting    Occupational Therapy Goals     Problem: Dressing     Dates: Start: 09/05/20       Goal: STG-Within one week, patient will dress UB     Dates: Start: 09/05/20       Description: 1) Individualized Goal:  Sup to manage Upper Body Clothing  2) Interventions:  OT Self Care/ADL, OT Neuro Re-Ed/Balance, OT Therapeutic Activity, OT Evaluation, and OT Therapeutic Exercise            Goal: STG-Within one week, patient will dress LB     Dates: Start: 09/05/20       Description: 1) Individualized Goal:  Max assist for LB clothing management via AE/DME PRN  2) Interventions:  OT Self Care/ADL, OT Neuro Re-Ed/Balance, OT Therapeutic Activity, OT Evaluation, and OT Therapeutic Exercise                  Problem: Functional Transfers     Dates: Start: 09/05/20       Goal: STG-Within one week, patient will transfer to toilet     Dates: Start: 09/05/20        Description: 1) Individualized Goal: Mod assist for wc/commode transfer via DME PRN  2) Interventions:  OT Self Care/ADL, OT Neuro Re-Ed/Balance, OT Therapeutic Activity, OT Evaluation, and OT Therapeutic Exercise            Goal: STG-Within one week, patient will transfer to step in shower     Dates: Start: 09/05/20       Description: 1) Individualized Goal: Mod assist for wc/shower chair transfer via DME PRN  2) Interventions: OT Self Care/ADL, OT Neuro Re-Ed/Balance, OT Therapeutic Activity, OT Evaluation, and OT Therapeutic Exercise                  Problem: OT Long Term Goals     Dates: Start: 09/05/20       Goal: LTG-By discharge, patient will complete basic self care tasks     Dates: Start: 09/05/20       Description: 1) Individualized Goal:  Mod I for BADL's via AE/DME PRN  2) Interventions: OT Self Care/ADL, OT Neuro Re-Ed/Balance, OT Therapeutic Activity, OT Evaluation, and OT Therapeutic Exercise            Goal: LTG-By discharge, patient will perform bathroom transfers     Dates: Start: 09/05/20       Description: 1) Individualized Goal:  Mod I for toilet and shower transfer via DME  2) Interventions: OT Self Care/ADL, OT Neuro Re-Ed/Balance, OT Therapeutic Activity, OT Evaluation, and OT Therapeutic Exercise                  Problem: Toileting     Dates: Start: 09/05/20       Goal: STG-Within one week, patient will complete toileting tasks     Dates: Start: 09/05/20       Description: 1) Individualized Goal:  Max assist for toileting task via AE/DME PRN  2) Interventions:  OT Self Care/ADL, OT Neuro Re-Ed/Balance, OT Therapeutic Activity, OT Evaluation, and OT Therapeutic Exercise

## 2020-09-10 NOTE — CARE PLAN
Problem: Safety  Goal: Will remain free from injury  Outcome: PROGRESSING AS EXPECTED   Pt uses call light consistently and appropriately. Waits for assistance does not attempt self transfer this shift. Able to verbalize needs.   Problem: Venous Thromboembolism (VTW)/Deep Vein Thrombosis (DVT) Prevention:  Goal: Patient will participate in Venous Thrombosis (VTE)/Deep Vein Thrombosis (DVT)Prevention Measures  Outcome: PROGRESSING AS EXPECTED   Patient on Lovenox therapy for DVT prophylaxis.   Problem: Pain Management  Goal: Pain level will decrease to patient's comfort goal  Outcome: PROGRESSING AS EXPECTED   Patient reports satisfactory pain control and decrease intensity after pharmacological pain management.

## 2020-09-10 NOTE — CARE PLAN
Problem: Toileting  Goal: STG-Within one week, patient will complete toileting tasks  Description: 1) Individualized Goal:  Max assist for toileting task via AE/DME PRN  2) Interventions:  OT Self Care/ADL, OT Neuro Re-Ed/Balance, OT Therapeutic Activity, OT Evaluation, and OT Therapeutic Exercise    Outcome: PROGRESSING AS EXPECTED     Problem: Functional Transfers  Goal: STG-Within one week, patient will transfer to step in shower  Description: 1) Individualized Goal: Mod assist for wc/shower chair transfer via DME PRN  2) Interventions: OT Self Care/ADL, OT Neuro Re-Ed/Balance, OT Therapeutic Activity, OT Evaluation, and OT Therapeutic Exercise    Outcome: PROGRESSING AS EXPECTED     Problem: Dressing  Goal: STG-Within one week, patient will dress UB  Description: 1) Individualized Goal:  Sup to manage Upper Body Clothing  2) Interventions:  OT Self Care/ADL, OT Neuro Re-Ed/Balance, OT Therapeutic Activity, OT Evaluation, and OT Therapeutic Exercise    Outcome: MET  Goal: STG-Within one week, patient will dress LB  Description: 1) Individualized Goal:  Max assist for LB clothing management via AE/DME PRN  2) Interventions:  OT Self Care/ADL, OT Neuro Re-Ed/Balance, OT Therapeutic Activity, OT Evaluation, and OT Therapeutic Exercise    Outcome: MET     Problem: Functional Transfers  Goal: STG-Within one week, patient will transfer to toilet  Description: 1) Individualized Goal: Mod assist for wc/commode transfer via DME PRN  2) Interventions:  OT Self Care/ADL, OT Neuro Re-Ed/Balance, OT Therapeutic Activity, OT Evaluation, and OT Therapeutic Exercise    Outcome: MET

## 2020-09-11 PROCEDURE — 700102 HCHG RX REV CODE 250 W/ 637 OVERRIDE(OP): Performed by: PHYSICAL MEDICINE & REHABILITATION

## 2020-09-11 PROCEDURE — 97130 THER IVNTJ EA ADDL 15 MIN: CPT

## 2020-09-11 PROCEDURE — 770010 HCHG ROOM/CARE - REHAB SEMI PRIVAT*

## 2020-09-11 PROCEDURE — 97530 THERAPEUTIC ACTIVITIES: CPT

## 2020-09-11 PROCEDURE — 99232 SBSQ HOSP IP/OBS MODERATE 35: CPT | Performed by: HOSPITALIST

## 2020-09-11 PROCEDURE — 99232 SBSQ HOSP IP/OBS MODERATE 35: CPT | Performed by: PHYSICAL MEDICINE & REHABILITATION

## 2020-09-11 PROCEDURE — 700111 HCHG RX REV CODE 636 W/ 250 OVERRIDE (IP): Performed by: PHYSICAL MEDICINE & REHABILITATION

## 2020-09-11 PROCEDURE — A9270 NON-COVERED ITEM OR SERVICE: HCPCS | Performed by: PHYSICAL MEDICINE & REHABILITATION

## 2020-09-11 PROCEDURE — 700102 HCHG RX REV CODE 250 W/ 637 OVERRIDE(OP): Performed by: HOSPITALIST

## 2020-09-11 PROCEDURE — 97129 THER IVNTJ 1ST 15 MIN: CPT

## 2020-09-11 PROCEDURE — 97535 SELF CARE MNGMENT TRAINING: CPT

## 2020-09-11 PROCEDURE — A9270 NON-COVERED ITEM OR SERVICE: HCPCS | Performed by: HOSPITALIST

## 2020-09-11 PROCEDURE — 97605 NEG PRS WND THER DME<=50SQCM: CPT

## 2020-09-11 RX ORDER — LIDOCAINE HYDROCHLORIDE 40 MG/ML
SOLUTION TOPICAL PRN
Status: DISCONTINUED | OUTPATIENT
Start: 2020-09-11 | End: 2020-09-23 | Stop reason: HOSPADM

## 2020-09-11 RX ADMIN — Medication 4000 UNITS: at 08:37

## 2020-09-11 RX ADMIN — FOLIC ACID 1 MG: 1 TABLET ORAL at 08:37

## 2020-09-11 RX ADMIN — CARVEDILOL 6.25 MG: 3.12 TABLET, FILM COATED ORAL at 08:37

## 2020-09-11 RX ADMIN — GABAPENTIN 600 MG: 300 CAPSULE ORAL at 20:29

## 2020-09-11 RX ADMIN — DOCUSATE SODIUM 50 MG AND SENNOSIDES 8.6 MG 2 TABLET: 8.6; 5 TABLET, FILM COATED ORAL at 20:30

## 2020-09-11 RX ADMIN — GABAPENTIN 600 MG: 300 CAPSULE ORAL at 14:51

## 2020-09-11 RX ADMIN — OXYCODONE HYDROCHLORIDE 5 MG: 5 TABLET ORAL at 06:15

## 2020-09-11 RX ADMIN — ENOXAPARIN SODIUM 40 MG: 40 INJECTION SUBCUTANEOUS at 08:38

## 2020-09-11 RX ADMIN — ACETAMINOPHEN 1000 MG: 325 TABLET, FILM COATED ORAL at 14:50

## 2020-09-11 RX ADMIN — ACETAMINOPHEN 1000 MG: 325 TABLET, FILM COATED ORAL at 08:36

## 2020-09-11 RX ADMIN — LISINOPRIL 10 MG: 5 TABLET ORAL at 08:37

## 2020-09-11 RX ADMIN — OMEPRAZOLE 20 MG: 20 CAPSULE, DELAYED RELEASE ORAL at 08:38

## 2020-09-11 RX ADMIN — MAGNESIUM HYDROXIDE 30 ML: 400 SUSPENSION ORAL at 20:29

## 2020-09-11 RX ADMIN — ATORVASTATIN CALCIUM 40 MG: 40 TABLET, FILM COATED ORAL at 20:30

## 2020-09-11 RX ADMIN — Medication 1000 MCG: at 08:36

## 2020-09-11 RX ADMIN — ACETAMINOPHEN 1000 MG: 325 TABLET, FILM COATED ORAL at 20:30

## 2020-09-11 RX ADMIN — CARVEDILOL 6.25 MG: 3.12 TABLET, FILM COATED ORAL at 18:22

## 2020-09-11 RX ADMIN — GABAPENTIN 600 MG: 300 CAPSULE ORAL at 08:37

## 2020-09-11 RX ADMIN — DOCUSATE SODIUM 50 MG AND SENNOSIDES 8.6 MG 2 TABLET: 8.6; 5 TABLET, FILM COATED ORAL at 08:36

## 2020-09-11 RX ADMIN — OXYCODONE HYDROCHLORIDE 10 MG: 10 TABLET ORAL at 08:47

## 2020-09-11 ASSESSMENT — ACTIVITIES OF DAILY LIVING (ADL)
BED_CHAIR_WHEELCHAIR_TRANSFER_DESCRIPTION: ADAPTIVE EQUIPMENT;INCREASED TIME;SET-UP OF EQUIPMENT;VERBAL CUEING
TOILET_TRANSFER_DESCRIPTION: GRAB BAR
TUB_SHOWER_TRANSFER_DESCRIPTION: GRAB BAR;SHOWER BENCH

## 2020-09-11 ASSESSMENT — ENCOUNTER SYMPTOMS
SHORTNESS OF BREATH: 0
FEVER: 0
NAUSEA: 0
DIARRHEA: 0
VOMITING: 0
NERVOUS/ANXIOUS: 0
CHILLS: 0
ABDOMINAL PAIN: 0

## 2020-09-11 ASSESSMENT — PAIN DESCRIPTION - PAIN TYPE: TYPE: ACUTE PAIN

## 2020-09-11 NOTE — THERAPY
"Speech Language Pathology  Daily Treatment     Patient Name: Jimenez Bains  Age:  75 y.o., Sex:  male  Medical Record #: [id number]  Today's Date: 9/10/2020     Precautions  Precautions: Fall Risk, Non Weight Bearing Left Lower Extremity  Comments: wound vac L residual limb, sores on genital area, carpio    Subjective    Patient up in wheel chair after PT, but having pain in wound area and unable to tolerate sitting.  Able to better participate once laying down. Pleasant and cooperative. Seen at bedside.      Objective       09/10/20 1031   Cognition   Moderate Attention Minimal (4)   Orientation  Minimal (4)   Functional Memory Activities Minimal (4)   Functional Problem Solving Moderate (3)   SLP Total Time Spent   SLP Individual Total Time Spent (Mins) 60   Treatment Charges   SLP Cognitive Skill Development First 15 Minutes 1   SLP Cognitive Skill Development Additional 15 Minutes 3       Assessment    Patient has difficulty seeing ( rx glasses are not present) well enough to read own writing on his memory log, and demonstrating low motivation to initiate using it.  Patient's vision is a barrier to functional use of memory log.   Continued education on RWAVS memory strategies.  Patient able to recall 3/5 strategies after 15 min.  Patient worked on recall of details from a pictured scene, with recall of 70% of details after 5 min delay.  Patient needed cues to attend to many of the pictured details.  He demonstrates low initiation of attention to information outside of his usual routines. Patient reports that he \" really didn't task any medications before\"  Educated patient on his current medication regime.  Needed mod to max cues to recall the verbal information provided.  He would benefit from very large print written support. He was not able to verbally recall w/c transfer training from PT.  Min to mod cues needed to verbalize safety planning and problem solving for given problems. Discussed with PT after " patient's session and they recommended recall of  sequencing related to hand placement and scooting forward in chair, locking brakes as they anticipate patient will be using wheel chair for some time.      Memory work sheets left in patient's room.    Strengths: Able to follow instructions, Motivated for self care and independence, Pleasant and cooperative, Willingly participates in therapeutic activities  Barriers: Impaired functional cognition    Plan    Target recall of safety sequencing, safety planning and problem solving, recall of health care information.    Speech Therapy Problems     Problem: Memory STGs     Dates: Start: 09/05/20       Goal: STG-Within one week, patient will     Dates: Start: 09/05/20       Description: 1) Individualized goal:  recall daily events and safety strategies given min verbal cues 80% with use of memory book.  2) Interventions:  SLP Cognitive Skill Development and SLP Group Treatment                    Problem: Problem Solving STGs     Dates: Start: 09/05/20       Goal: STG-Within one week, patient will     Dates: Start: 09/05/20       Description: 1) Individualized goal:  answer basic problem solving questions with 80% accuracy given min verbal cues.    2) Interventions:  SLP Cognitive Skill Development and SLP Group Treatment                    Problem: Speech/Swallowing LTGs     Dates: Start: 09/05/20       Goal: LTG-By discharge, patient will solve basic problems     Dates: Start: 09/05/20       Description: 1) Individualized goal:  Mert for safe discharge home.   2) Interventions:  SLP Aphasia Evaluation, SLP Cognitive Skill Development, and SLP Group Treatment

## 2020-09-11 NOTE — DISCHARGE PLANNING
"Spoke w/ daughter, Lien, via phone to review IDT recommendations & targeted DC date 9.22.2020. States \"I had to reschedule the training until Tuesday. Everyone that has a car is working tomorrow so I don't have a ride into town.\" Reviewed DME that has been obtained by family: has wheelchair x2 w/ cushion, BSC (potty chair), FWW, slide board, tub transfer bench, grabber, long handled shoe horn, sock aide & sponge. Had ramp entry, CO2 monitor & smoke alarm installed by [facility]. States \"the bathroom is too small to put in grab bars.\" Previous HH w/ [facility] (unsure of agency name, will verify). No PCP has been established. States \"we have a motorized scooter, but I don't think it works anymore.\" Daughter inquired about IV abx. Informed completed vancomycin 9.9.2020. Discussed wound VAC referral. Daughter \"disappointed that won't be done\" prior to discharge. Reviewed HH mgmt for wound VAC changes. Discussed referral for dermatology f/u. States \"he said he would never go back to one of those doctors, but you can try. That cancer on his face is getting bigger & should be looked at.\" Does not remember name of previous dermatology practice. Asking about adjustable twin bed for home w/ elevating HOB/FOB. Granddaughter found one for sale. Currently has twin bed at home. Will review w/ therapies for recommendation. Discussed Care Chest application. States \"we pretty much found everything we needed since he came out of the hospital that first time.\" Questions answered. Emotional support provided.  "

## 2020-09-11 NOTE — THERAPY
"Speech Language Pathology  Daily Treatment     Patient Name: Jimenez Bains  Age:  75 y.o., Sex:  male  Medical Record #: [id number]  Today's Date: 9/11/2020     Precautions  Precautions: Fall Risk, Non Weight Bearing Left Lower Extremity  Comments: wound vac L residual limb, sores on genital area, carpio    Subjective    Pt was willing to participate in this ST session      Objective       09/11/20 1331   SLP Total Time Spent   SLP Individual Total Time Spent (Mins) 30   Treatment Charges   SLP Cognitive Skill Development First 15 Minutes 1   SLP Cognitive Skill Development Additional 15 Minutes 1       Assessment    Pt required mod cues to verbally sequence transfers from the wheelchair to the bed, cues were required for pt to give step by step instructions vs vague descriptions such as \"I just stand up and turn around\".  Pt was able to independently recall 1/5 RWAVS memory strategies and required max cues to recall the remaining 4 strategies.      Strengths: Able to follow instructions, Motivated for self care and independence, Pleasant and cooperative, Willingly participates in therapeutic activities  Barriers: Impaired functional cognition    Plan    Continue targeting functional memory, transfer sequences     Speech Therapy Problems     Problem: Memory STGs     Dates: Start: 09/05/20       Goal: STG-Within one week, patient will     Dates: Start: 09/05/20       Description: 1) Individualized goal:  recall daily events and safety strategies given min verbal cues 80% with use of memory book.  2) Interventions:  SLP Cognitive Skill Development and SLP Group Treatment                    Problem: Problem Solving STGs     Dates: Start: 09/05/20       Goal: STG-Within one week, patient will     Dates: Start: 09/05/20       Description: 1) Individualized goal:  answer basic problem solving questions with 80% accuracy given min verbal cues.    2) Interventions:  SLP Cognitive Skill Development and SLP Group Treatment     "                Problem: Speech/Swallowing LTGs     Dates: Start: 09/05/20       Goal: LTG-By discharge, patient will solve basic problems     Dates: Start: 09/05/20       Description: 1) Individualized goal:  Mert for safe discharge home.   2) Interventions:  SLP Aphasia Evaluation, SLP Cognitive Skill Development, and SLP Group Treatment

## 2020-09-11 NOTE — PROGRESS NOTES
Hospital Medicine Daily Progress Note      Chief Complaint:  Hypertension  Diabetes    Interval History:  No significant events or changes since last visit    Review of Systems  Review of Systems   Constitutional: Negative for chills and fever.   Respiratory: Negative for shortness of breath.    Cardiovascular: Negative for chest pain.   Gastrointestinal: Negative for abdominal pain, diarrhea, nausea and vomiting.   Psychiatric/Behavioral: The patient is not nervous/anxious.         Physical Exam  Temp:  [36.6 °C (97.9 °F)-37.1 °C (98.8 °F)] 37.1 °C (98.8 °F)  Pulse:  [62-80] 70  Resp:  [16-18] 18  BP: (114-136)/(53-76) 135/76  SpO2:  [94 %-97 %] 97 %    Physical Exam  Vitals signs and nursing note reviewed.   Constitutional:       Appearance: Normal appearance.   HENT:      Head: Atraumatic.   Eyes:      Conjunctiva/sclera: Conjunctivae normal.      Pupils: Pupils are equal, round, and reactive to light.   Neck:      Musculoskeletal: Normal range of motion and neck supple.   Cardiovascular:      Rate and Rhythm: Normal rate and regular rhythm.   Pulmonary:      Effort: Pulmonary effort is normal.      Breath sounds: Normal breath sounds.   Abdominal:      General: Bowel sounds are normal.      Palpations: Abdomen is soft.   Musculoskeletal:      Right lower leg: No edema.      Left lower leg: No edema.      Comments: Has left AKA   Skin:     General: Skin is warm and dry.      Comments: Large lesion on right cheek   Neurological:      Mental Status: He is alert and oriented to person, place, and time.   Psychiatric:         Mood and Affect: Mood normal.         Behavior: Behavior normal.         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)  Assessment & Plan  Hx of fem-pop bypass  followed by arterial occlusion  S/P left AKA for critical limb ischemia (2020)  Post-op complications included necrosis and infection  S/P AKA revision in 2020  S/P AKA I&D x 3  Wound vac per Wound Care  Wound Cx +MRSA and Enterococcus  S/P Unasyn ()  S/P Vanco ()    PVD (peripheral vascular disease) with claudication (HCC)- (present on admission)  Assessment & Plan  On Lipitor  Consider ASA    Diabetes (HCC)- (present on admission)  Assessment & Plan  Hba1c: 7.3 () --> 5.1 ()  BS have been ok -- no coverage needed recently  Off accuchecks  Note: needs out patient follow up    Hypokalemia  Assessment & Plan  K+: 3.5 ()  S/P KCL 40 meq x 1 ()  Monitor    Hypernatremia  Assessment & Plan  Currently resolved  Na: 147 () --> 145 ()  S/P D5W at 50 mL/hr x 500 mL  Monitor    Vitamin D deficiency  Assessment & Plan  Vit D: 10  On supplements    Anemia  Assessment & Plan  H&H stable with Hb 8.8 ()  TSH normal  B12: 198  Folate: 2.9  On B12 & folate supplements  Monitor    HTN (hypertension)- (present on admission)  Assessment & Plan  BP ok  On Core.25 mg bid  On Lisinopril: 10 mg daily  Monitor    Basal cell carcinoma of face- (present on admission)  Assessment & Plan  Pt reports right cheek lesion has been ongoing for a while  Needs Dermatology follow up  CM to establish pt with a PMD

## 2020-09-11 NOTE — THERAPY
"Speech Language Pathology  Daily Treatment     Patient Name: Jimenez Bains  Age:  75 y.o., Sex:  male  Medical Record #: [id number]  Today's Date: 9/11/2020     Precautions  Precautions: Fall Risk, Non Weight Bearing Left Lower Extremity  Comments: wound vac L residual limb, sores on genital area, carpio    Subjective    SLP planned to practice transfer sequences, but upon arrival, Pt stated he was, \"too tired to get out of bed at the moment.\"  Pt pleasant and cooperative.      Objective       09/11/20 1501   Cognition   Functional Memory Activities Moderate (3)   Safety Awareness Minimal (4)   SLP Total Time Spent   SLP Individual Total Time Spent (Mins) 30   Treatment Charges   SLP Cognitive Skill Development First 15 Minutes 1   SLP Cognitive Skill Development Additional 15 Minutes 1       Assessment    Pt required mod-max verbal cues to recall daily events.  Pt recalled use of call light independently.  Pt requested information regarding television.  Pt shown how to change channels and volume.  Pt did not demonstrate ability to change volume, but demonstrated ability to change channels.  Pt oriented to month and year, not date.      Strengths: Able to follow instructions, Motivated for self care and independence, Pleasant and cooperative, Willingly participates in therapeutic activities  Barriers: Impaired functional cognition    Plan    Target memory book, problem solving, and transfer sequencing.     Speech Therapy Problems     Problem: Memory STGs     Dates: Start: 09/05/20       Goal: STG-Within one week, patient will     Dates: Start: 09/05/20       Description: 1) Individualized goal:  recall daily events and safety strategies given min verbal cues 80% with use of memory book.  2) Interventions:  SLP Cognitive Skill Development and SLP Group Treatment                    Problem: Problem Solving STGs     Dates: Start: 09/05/20       Goal: STG-Within one week, patient will     Dates: Start: 09/05/20       " Description: 1) Individualized goal:  answer basic problem solving questions with 80% accuracy given min verbal cues.    2) Interventions:  SLP Cognitive Skill Development and SLP Group Treatment                    Problem: Speech/Swallowing LTGs     Dates: Start: 09/05/20       Goal: LTG-By discharge, patient will solve basic problems     Dates: Start: 09/05/20       Description: 1) Individualized goal:  Mert for safe discharge home.   2) Interventions:  SLP Aphasia Evaluation, SLP Cognitive Skill Development, and SLP Group Treatment

## 2020-09-11 NOTE — THERAPY
Physical Therapy   Daily Treatment     Patient Name: Jimenez Bains  Age:  75 y.o., Sex:  male  Medical Record #: [id number]  Today's Date: 9/11/2020     Precautions  Precautions: (P) Fall Risk, Non Weight Bearing Left Lower Extremity  Comments: (P) wound vac L residual limb, sores on genital area, carpio    Subjective    Patient seated in w/c and visibly uncomfortable; agreeable and motivated for therapy however tearful due to pain/fatigue.     Objective       09/11/20 1401   Precautions   Precautions Fall Risk;Non Weight Bearing Left Lower Extremity   Comments wound vac L residual limb, sores on genital area, carpio   Vitals   Pulse 67   Patient BP Position Sitting   Blood Pressure  144/59   Pulse Oximetry 90 %   O2 Delivery Device None - Room Air   Transfer Functional Level of Assist   Bed, Chair, Wheelchair Transfer Minimal Assist   Bed Chair Wheelchair Transfer Description Adaptive equipment;Increased time;Set-up of equipment;Verbal cueing  (squat pivot transfer w/c>bed with bedrail)   Bed Mobility    Sit to Supine Stand by Assist   Scooting Stand by Assist   Rolling Supervised   Interdisciplinary Plan of Care Collaboration   IDT Collaboration with  Nursing   Patient Position at End of Therapy In Bed;Call Light within Reach;Tray Table within Reach;Phone within Reach   Collaboration Comments re: pain and general tremors/shaking observed during session   PT Total Time Spent   PT Individual Total Time Spent (Mins) 60   PT Charge Group   PT Therapeutic Activities 4     Education provided on self-pacing and energy conservation, as well as determining physical limits for pain management.    Performed deep breathing and guided imagery for patient relaxation and pain management. Set up relaxing environment with calm music to facilitate further relaxation.     Assessment    Patient tolerated session poorly, reports being up since lunch in w/c and in visible discomfort. Patient with BUE/trunk tremors/shaking; vitals stable  and notified nursing. Patient tearful during session due to pain as well as frustration, since he feels like he should be able to do more than what his body can handle. Emotional support and education provided, with patient's shaking reduced/resolved after relaxation strategies applied.    Strengths: Independent prior level of function, Motivated for self care and independence, Willingly participates in therapeutic activities  Barriers: Confused, Decreased endurance, Fatigue, Generalized weakness, Home accessibility, Impaired activity tolerance, Impaired balance, Pain, Limited mobility    Plan    Transfer training, bed mobility. Gait with FWW, amputee education and there ex as able, gait training and balance    Physical Therapy Problems     Problem: Mobility     Dates: Start: 09/05/20       Goal: STG-Within one week, patient will propel wheelchair household distances     Dates: Start: 09/05/20       Description: 1) Individualized goal:  50 ft with SBA.  2) Interventions:  PT Group Therapy, PT Gait Training, PT Therapeutic Exercises, PT Neuro Re-Ed/Balance, PT Therapeutic Activity, and PT Evaluation            Goal: STG-Within one week, patient will ambulate household distance     Dates: Start: 09/05/20       Description: 1) Individualized goal:  10 ft with FWW and SBA-CGA.  2) Interventions:  PT Group Therapy, PT Gait Training, PT Therapeutic Exercises, PT Neuro Re-Ed/Balance, PT Therapeutic Activity, and PT Evaluation                Problem: Mobility Transfers     Dates: Start: 09/05/20       Goal: STG-Within one week, patient will sit to stand     Dates: Start: 09/05/20       Description: 1) Individualized goal:  with FWW and SBA.  2) Interventions:  PT Group Therapy, PT Gait Training, PT Therapeutic Exercises, PT Neuro Re-Ed/Balance, PT Therapeutic Activity, and PT Evaluation                Problem: PT-Long Term Goals     Dates: Start: 09/05/20       Goal: LTG-By discharge, patient will propel wheelchair      Dates: Start: 09/05/20       Description: 1) Individualized goal:  150 ft mod I.  2) Interventions:  PT Group Therapy, PT Gait Training, PT Therapeutic Exercises, PT Neuro Re-Ed/Balance, PT Therapeutic Activity, and PT Evaluation          Goal: LTG-By discharge, patient will ambulate     Dates: Start: 09/05/20       Description: 1) Individualized goal:  50 ft with FWW and supervision.  2) Interventions:  PT Group Therapy, PT Gait Training, PT Therapeutic Exercises, PT Neuro Re-Ed/Balance, PT Therapeutic Activity, and PT Evaluation          Goal: LTG-By discharge, patient will transfer one surface to another     Dates: Start: 09/05/20       Description: 1) Individualized goal:  with FWW vs. Squat pivot mod I.  2) Interventions:  PT Group Therapy, PT Gait Training, PT Therapeutic Exercises, PT Neuro Re-Ed/Balance, PT Therapeutic Activity, and PT Evaluation          Goal: LTG-By discharge, patient will perform home exercise program     Dates: Start: 09/05/20       Description: 1) Individualized goal:  with handout mod I.  2) Interventions:  PT Group Therapy, PT Gait Training, PT Therapeutic Exercises, PT Neuro Re-Ed/Balance, PT Therapeutic Activity, and PT Evaluation          Goal: LTG-By discharge, patient will     Dates: Start: 09/05/20       Description: 1) Individualized goal:  perform bed mobility including rolling to prone mod I.  2) Interventions:  PT Group Therapy, PT Gait Training, PT Therapeutic Exercises, PT Neuro Re-Ed/Balance, PT Therapeutic Activity, and PT Evaluation

## 2020-09-11 NOTE — CARE PLAN
Problem: Safety  Goal: Will remain free from injury  Outcome: PROGRESSING AS EXPECTED     Problem: Bowel/Gastric:  Goal: Normal bowel function is maintained or improved  Note: Patient educated on fluid intake, diet, bowel medications and mobilization to maximize the potential for regular bowel movements while taking opiates or opiate derivatives for pain. Patient engages in learning and verbalizes understanding. Patient refused bowel medication - states he will take in the day time.       Problem: Bowel/Gastric:  Goal: Normal bowel function is maintained or improved  Note: Patient educated on fluid intake, diet, bowel medications and mobilization to maximize the potential for regular bowel movements while taking opiates or opiate derivatives for pain. Patient engages in learning and verbalizes understanding. Patient refused bowel medication - states he will take in the day time.

## 2020-09-11 NOTE — THERAPY
Occupational Therapy  Daily Treatment     Patient Name: Jimenez Bains  Age:  75 y.o., Sex:  male  Medical Record #: [id number]  Today's Date: 9/11/2020     Precautions  Precautions: (P) Fall Risk, Non Weight Bearing Left Lower Extremity  Comments: (P) wound vac L residual limb, sores on genital area, carpio         Subjective    Pt received supine in bed, agreeable to OT session     Objective       09/11/20 1001   Precautions   Precautions Fall Risk;Non Weight Bearing Left Lower Extremity   Comments wound vac L residual limb, sores on genital area, carpio   Functional Level of Assist   Toilet Transfers Minimal Assist   Toilet Transfer Description Grab bar  (stand pivot using FWW, grab bar for controlled lower only)   Tub / Shower Transfers Moderate Assist   Tub Shower Transfer Description Grab bar;Shower bench   OT Total Time Spent   OT Individual Total Time Spent (Mins) 60   OT Charge Group   OT Self Care / ADL 4     Trialed shower transfers;  -stand pivot w/c <> padded drop down seat using grab bars, required mod A due to altered UE support positioning on grab bars - wider than used to with FWW so difficulty with shifting RLE  -stand pivot w/c <> tub transfer bench in TLA, Mod A for stand pivot transfer, cues for hand placement and attention, mod A to manage scooting from edge of transfer bench and bring LE into tub. Pt noted to be altered cognitively, reported feeling fuzzy and lightheaded, improved symptoms with rest seated on bench, able to transfer back to w/c mod A squat pivot. Vitals taken when back in room and stable, RN notified     Assessment    Pt tolerated session fair with focus on bathroom mobility and problem solving DME needs. Pain greatly improved this session relative to yesterday, trialed transferring from w/c to standard toilet (commode removed) which pt was able to sit on more comfortably than commode and was able to weightshift adequately using side of toilet and grab bar to find comfortable  "position and to reach back for simulated BM hygiene. Trialed transfer to in-room shower, able to sit on padded seat comfortably, feels that he can tolerate sitting on bench for shower in the future. Trialed tub transfer bench to simulate home environment however pt with difficulty, noted to be altered cognitively and reported feeling \"fuzzy and lightheaded\", vitals stable, decline likely due to fatigue from activity. Txed back to chair and back to bed to rest between sessions.    Strengths: Pleasant and cooperative, Willingly participates in therapeutic activities  Barriers: Confused, Decreased endurance, Generalized weakness, Impaired balance, Limited mobility, Pain    Plan    Continue to progress gross strengthening and endurance and standing tolerance/balance toward increased safety and independence with ADLs, general attention to hygiene and self-care, weightshift when scooting    Occupational Therapy Goals     Problem: Functional Transfers     Dates: Start: 09/05/20       Goal: STG-Within one week, patient will transfer to step in shower     Dates: Start: 09/05/20       Description: 1) Individualized Goal: Mod assist for wc/shower chair transfer via DME PRN  2) Interventions: OT Self Care/ADL, OT Neuro Re-Ed/Balance, OT Therapeutic Activity, OT Evaluation, and OT Therapeutic Exercise                  Problem: OT Long Term Goals     Dates: Start: 09/05/20       Goal: LTG-By discharge, patient will complete basic self care tasks     Dates: Start: 09/05/20       Description: 1) Individualized Goal:  Mod I for BADL's via AE/DME PRN  2) Interventions: OT Self Care/ADL, OT Neuro Re-Ed/Balance, OT Therapeutic Activity, OT Evaluation, and OT Therapeutic Exercise            Goal: LTG-By discharge, patient will perform bathroom transfers     Dates: Start: 09/05/20       Description: 1) Individualized Goal:  Mod I for toilet and shower transfer via DME  2) Interventions: OT Self Care/ADL, OT Neuro Re-Ed/Balance, OT " Therapeutic Activity, OT Evaluation, and OT Therapeutic Exercise                  Problem: Toileting     Dates: Start: 09/05/20       Goal: STG-Within one week, patient will complete toileting tasks     Dates: Start: 09/05/20       Description: 1) Individualized Goal:  Max assist for toileting task via AE/DME PRN  2) Interventions:  OT Self Care/ADL, OT Neuro Re-Ed/Balance, OT Therapeutic Activity, OT Evaluation, and OT Therapeutic Exercise

## 2020-09-11 NOTE — WOUND TEAM
Updated Dr. Fisher via [place] Text on status of wound. Send him a photo. We are planning a time for Monday so he can come during wound vac dressing change and assess wound.

## 2020-09-11 NOTE — THERAPY
Missed Therapy     Patient Name: Jimenez Bains  Age:  75 y.o., Sex:  male  Medical Record #: [id number]  Today's Date: 9/11/2020    Discussed missed therapy with therapy tech and CNA.     May attempt to reschedule later this afternoon.

## 2020-09-11 NOTE — PROGRESS NOTES
"Rehab Progress Note     Encounter Date: 9/11/2020    CC: left AKA, phantom limb pain, wound pain    Interval Events (Subjective)  Patient sitting up in room. He reports significant pain with wound change. Discussed with wound nurse and continue honey treatment to wound and will use Lidocaine with change. Wound nurse reached out to surgeon and will evaluate wound together on Monday. No change in neuropathic pain. Will continue to monitor.     IDT Team Meeting 9/10/2020  DC/Disposition:  9/22/20    Objective:  VITAL SIGNS: BP (!) 170/62   Pulse 76 Comment: apical  Temp 37.1 °C (98.8 °F) (Oral)   Resp 18   Ht 1.676 m (5' 6\")   Wt 64 kg (141 lb)   SpO2 97%   BMI 22.76 kg/m²   Gen: NAD  Psych: Mood and affect appropriate  CV: RRR, no edema  Resp: CTAB, no upper airway sounds  Abd: NTND  Neuro: AOx3, following commands  Unchanged from 9/10/20  Skin: Left AKA with area of slough at 1-4 o clock, otherwise good pink tissue    Recent Results (from the past 72 hour(s))   ACCU-CHEK GLUCOSE    Collection Time: 09/08/20  5:18 PM   Result Value Ref Range    Glucose - Accu-Ck 105 (H) 65 - 99 mg/dL   ACCU-CHEK GLUCOSE    Collection Time: 09/08/20  9:29 PM   Result Value Ref Range    Glucose - Accu-Ck 144 (H) 65 - 99 mg/dL   ACCU-CHEK GLUCOSE    Collection Time: 09/09/20  7:18 AM   Result Value Ref Range    Glucose - Accu-Ck 107 (H) 65 - 99 mg/dL   ACCU-CHEK GLUCOSE    Collection Time: 09/09/20 11:35 AM   Result Value Ref Range    Glucose - Accu-Ck 147 (H) 65 - 99 mg/dL   ACCU-CHEK GLUCOSE    Collection Time: 09/09/20  5:06 PM   Result Value Ref Range    Glucose - Accu-Ck 105 (H) 65 - 99 mg/dL   ACCU-CHEK GLUCOSE    Collection Time: 09/09/20  8:59 PM   Result Value Ref Range    Glucose - Accu-Ck 133 (H) 65 - 99 mg/dL   ACCU-CHEK GLUCOSE    Collection Time: 09/10/20  7:40 AM   Result Value Ref Range    Glucose - Accu-Ck 104 (H) 65 - 99 mg/dL       Current Facility-Administered Medications   Medication Frequency   • lidocaine " (XYLOCAINE) 4 % topical solution PRN   • oxyCODONE immediate-release (ROXICODONE) tablet 5 mg DAILY   • gabapentin (NEURONTIN) capsule 600 mg TID   • oxyCODONE immediate release (ROXICODONE) tablet 10 mg Q4HRS PRN   • folic acid (FOLVITE) tablet 1 mg DAILY   • cyanocobalamin (VITAMIN B-12) tablet 1,000 mcg DAILY   • miconazole 2%-zinc oxide (Hilary) topical cream Q6HRS PRN   • vitamin D (cholecalciferol) tablet 4,000 Units DAILY   • glucose 4 g chewable tablet 16 g Q15 MIN PRN    And   • dextrose 50% (D50W) injection 50 mL Q15 MIN PRN   • acetaminophen (TYLENOL) tablet 1,000 mg TID   • Respiratory Therapy Consult Continuous RT   • Pharmacy Consult Request ...Pain Management Review 1 Each PHARMACY TO DOSE   • tramadol (ULTRAM) 50 MG tablet 50 mg Q4HRS PRN   • hydrALAZINE (APRESOLINE) tablet 25 mg Q8HRS PRN   • enoxaparin (LOVENOX) inj 40 mg DAILY   • acetaminophen (TYLENOL) tablet 650 mg Q4HRS PRN   • senna-docusate (PERICOLACE or SENOKOT S) 8.6-50 MG per tablet 2 Tab BID    And   • polyethylene glycol/lytes (MIRALAX) PACKET 1 Packet QDAY PRN    And   • magnesium hydroxide (MILK OF MAGNESIA) suspension 30 mL QDAY PRN    And   • bisacodyl (DULCOLAX) suppository 10 mg QDAY PRN   • benzocaine-menthol (CEPACOL) lozenge 1 Lozenge Q2HRS PRN   • mag hydrox-al hydrox-simeth (MAALOX PLUS ES or MYLANTA DS) suspension 20 mL Q2HRS PRN   • ondansetron (ZOFRAN ODT) dispertab 4 mg 4X/DAY PRN    Or   • ondansetron (ZOFRAN) syringe/vial injection 4 mg 4X/DAY PRN   • traZODone (DESYREL) tablet 50 mg QHS PRN   • sodium chloride (OCEAN) 0.65 % nasal spray 2 Spray PRN   • midazolam (VERSED) 5 mg/mL (1 mL vial) PRN   • atorvastatin (LIPITOR) tablet 40 mg Q EVENING   • lisinopril (PRINIVIL) tablet 10 mg DAILY   • oxyCODONE immediate-release (ROXICODONE) tablet 2.5 mg Q3HRS PRN   • oxyCODONE immediate-release (ROXICODONE) tablet 5 mg Q3HRS PRN   • omeprazole (PRILOSEC) capsule 20 mg DAILY   • carvedilol (COREG) tablet 6.25 mg BID WITH MEALS      Facility-Administered Medications Ordered in Other Encounters   Medication Frequency   • fentaNYL (SUBLIMAZE) injection PRN   • propofol PRN   • ceFAZolin (ANCEF) injection PRN       Orders Placed This Encounter   Procedures   • Diet Order Cardiac (Thin), Diabetic     Standing Status:   Standing     Number of Occurrences:   1     Order Specific Question:   Diet:     Answer:   Cardiac [6]     Comments:   Thin     Order Specific Question:   Diet:     Answer:   Diabetic [3]       Assessment:  Active Hospital Problems    Diagnosis   • *Encephalopathy acute   • AKA stump complication (HCC)   • Arterial insufficiency (HCC)   • PVD (peripheral vascular disease) with claudication (HCC)   • Diabetes (HCC)   • HLD (hyperlipidemia)   • HTN (hypertension)   • Basal cell carcinoma of face   • Hypokalemia   • Hypernatremia   • Alkaline phosphatase elevation   • Anemia   • Vitamin D deficiency   • Peripheral arterial occlusive disease (HCC)       Medical Decision Making and Plan:  Acute encephalopathy - Concern for encephalopathy with ongoing multiple medical comorbidities and confusion about hospital course as well as not understanding carpio catheter. Alternatively could be medicated for transfer but therapy reporting difficulty with cuing and following commands.   -PT and OT for mobility and ADLs  -SLP for cognitive evaluation     Left BKA - Patient with severe PVD with L AKA on 6/30/20 with multiple revisions due to necrosis and infection. Cultures grew MRSA and Enterococcus. Per ID continue antibiotics until 9/6/20. [facility] from 8/12/20 to 9/4/20.   -Vancomycin and Unasyn until 9/6/20. Completed.   -Dr. Fisher to evaluate on 9/14/20 with wound care.   -Continue wound vac. Consult wound care - added honey to regimen with slow improvement in size and depth. Lidocaine 4% PRN for dressing change      Multiple wounds - Patient has rash to abdomen, redness on sacrum and left buttocks on transfer. Consult wound  care.     Right face basal cell carcinoma - Unclear what follow-up. Follow-up Oncology     Anemia - Check AM CBC - 8.8. Continue to monitor.      HTN - Patient on Lisinopril 10 mg and Coreg 6.25 mg BID  -Hospistalist consulted.      Pain control - Primarily on admission with left phantom limb pain. Patient on Gabapentin 100 mg TID and Oxycodone. Can consider increase in Gabapentin for neuropathic phantom limb pain.   -Increase Gabapentin to 400 mg TID. Oxycodone 10 mg for wound care changes. Concern for over sedation with higher opiate dosing, limit to wound vac change, lidocaine jelly for wound change.   -Increase Gabapentin to 600 mg TID and schedule Oxycodone 5 mg first thing in AM     DM with hyperglycemia - Patient on SSI on transfer. Consult Hospitalist  -Discontinue SSI     COVID - Patient with check x2 at [facility]. Patient without symptoms. Per policy no check within 7 days. Patient without symptoms. Check PCR, on precautions - negative, no precautions.      GI Ppx - Patient on Prilosec      DVT Ppx - Patient on Lovenox on transfer.  Constipation, no BM since 9/7, PRN Miralax and MoM     Total time:  26 minutes.  I spent greater than 50% of the time for patient care, counseling, and coordination on this date, including unit/floor time, and face-to-face time with the patient as per interval events and assessment and plan above. Topics discussed included constipation, give Miralax and MoM, monitor for opiate use, lidocaine for wound changes, and continue medihoney.     Winifred Silverman M.D.

## 2020-09-11 NOTE — WOUND TEAM
[facility]  Inpatient Services   Wound and Skin Care Progress Note    HPI, PMH, SH: Reviewed    Unit where seen by Wound Team: [id number]     WOUND CONSULT RELATED TO:  Scheduled Negative Pressure Wound therapy (NPWT) dressing change      Self Report / Pain Level:  9/10 despite premed       OBJECTIVE:  Previous dressing intact    WOUND TYPE, LOCATION, CHARACTERISTICS (Pressure Injuries: location, stage, POA or date identified)   Skin Risk/Alonso   Sensory Perception: Slightly Limited  Moisture: Occasionally Moist  Activity: Walks Occasionally  Mobility: Slightly Limited  Nutrition: Adequate  Friction and Shear: Potential Problem  Total Score: 17  Skin Breakdown Risk: 13-17 Moderate Risk for Skin Breakdown    Sensory Interventions  Bed Types: Other (Comments)([facility])  Skin Preventative Measures: Silicone Oxygen Tubing in Use  Skin Preventative Dressing: Foam  Moisturizers/Barriers: Barrier Wipes  PT / OT Involved in Care: Physical Therapy Involved, Occupational Therapy Involved  Activity : Bed, Chair  Patient Turns / Repositioning: Patient Turns Self from Side to Side  Patient is Receiving Nutrition: Oral Intake Adequate        Friction Interventions: Draw Sheet / Pad Used for Repositioning                Negative Pressure Wound Therapy 06/30/20 (Active)   NPWT Pump Mode / Pressure Setting Continuous;125 mmHg    Dressing Type Medium;Black Foam (Regular)    Number of Foam Pieces Used 2    Canister Changed No    NEXT Dressing Change/Treatment Date 09/14/20                Wound 09/04/20 Full Thickness Wound Thigh Left Left AKA wound (Active)   Wound Image   09/11/20 0915   Site Assessment Red;Tan;Grey;Yellow;Painful;Fragile;Early/partial granulation;Dark edges 09/11/20 0915   Periwound Assessment Red;Painful 09/11/20 0915   Margins Defined edges 09/11/20 0915   Closure Secondary intention 09/11/20 0915   Drainage Amount Moderate 09/11/20 0915   Drainage Description Sanguineous 09/11/20 0915    Treatments Cleansed;Site care 09/11/20 0915   Wound Cleansing Approved Wound Cleanser 09/11/20 0915   Periwound Protectant Skin Protectant Wipes to Periwound;Drape 09/11/20 0915   Dressing Cleansing/Solutions Not Applicable 09/11/20 0915   Dressing Options Honey Gel;Wound Vac 09/11/20 0915   Dressing Changed Changed 09/11/20 0915   Dressing Status Clean;Dry;Intact 09/11/20 0915   Dressing Change/Treatment Frequency Monday, Wednesday, Friday, and As Needed 09/11/20 0915   NEXT Dressing Change/Treatment Date 09/14/20 09/11/20 0915   NEXT Weekly Photo (Inpatient Only) 09/18/20 09/11/20 0915   Non-staged Wound Description Full thickness 09/11/20 0915   Wound Length (cm) 7.1 cm 09/11/20 0915   Wound Width (cm) 9.7 cm 09/11/20 0915   Wound Depth (cm) 1.7 cm 09/11/20 0915   Wound Surface Area (cm^2) 68.87 cm^2 09/11/20 0915   Wound Volume (cm^3) 117.08 cm^3 09/11/20 0915   Wound Healing % 57 09/11/20 0915   Wound Bed Granulation (%) 50 % 09/11/20 0915   Wound Bed Epithelium (%) 0 % 09/11/20 0915   Wound Bed Slough (%) 50 % 09/11/20 0915   Wound Bed Eschar (%) 0 % 09/11/20 0915   Shape triangular 09/11/20 0915   Wound Odor None 09/11/20 0915   Exposed Structures None 09/11/20 0915                      Lab Values:    Lab Results   Component Value Date/Time    WBC 6.7 09/07/2020 07:03 AM    RBC 2.87 (L) 09/07/2020 07:03 AM    HEMOGLOBIN 8.8 (L) 09/07/2020 07:03 AM    HEMATOCRIT 29.3 (L) 09/07/2020 07:03 AM                    Lab Results   Component Value Date/Time    HBA1C 5.1 09/05/2020 05:40 AM           Culture:   Obtained No. Does not look infected, Results show NA    INTERVENTIONS BY WOUND TEAM:  Saturated dressing with saline, dressing removal very painful. Irrigated wound with saline, gently wiped wound bed with gauze 4 x 4s. Cleaned periwound with same. No Sting and drape applied to periwound. Honey gel applied to black foam where it will rest over the areas with slough. 1 piece of black foam placed on wound,  draped. Hole cut for trac pad, trac pad applied. Suction obtained at 125 mmHg continuous.     Interdisciplinary consultation: Patient, Bedside RN, Dr. Silverman    EVALUATION: patient states that his wound is more painful. Ordering topical lidocaine for future changes    Goals: Steady decrease in wound area and depth weekly.    NURSING PLAN OF CARE ORDERS (X):  Dressing changes: See Dressing Care orders: X  Skin care: See Skin Care orders: X  Rectal tube care: See Rectal Tube Care orders:        Other orders:                           WOUND TEAM PLAN OF CARE (X):   Dressing changes by wound team:          Follow up 1-2 times weekly:               Follow up 3 times weekly:                NPWT change 3 times weekly:   X  Follow up as needed:       Other (explain):     Anticipated discharge plans (X):   LTACH:        SNF/Rehab:                  Home Care:   X        Outpatient Wound Center:            Self Care:            Other:

## 2020-09-11 NOTE — PROGRESS NOTES
Denies need for pain medication. Sitting up in bed to eat dinner. Will pass care to NOC RN @ [facility]

## 2020-09-12 PROBLEM — R79.89 ELEVATED BRAIN NATRIURETIC PEPTIDE (BNP) LEVEL: Status: ACTIVE | Noted: 2020-09-12

## 2020-09-12 PROBLEM — E83.39 HYPOPHOSPHATEMIA: Status: ACTIVE | Noted: 2020-09-12

## 2020-09-12 LAB
ANION GAP SERPL CALC-SCNC: 8 MMOL/L (ref 7–16)
BUN SERPL-MCNC: 14 MG/DL (ref 8–22)
CALCIUM SERPL-MCNC: 8.7 MG/DL (ref 8.5–10.5)
CHLORIDE SERPL-SCNC: 111 MMOL/L (ref 96–112)
CO2 SERPL-SCNC: 28 MMOL/L (ref 20–33)
CREAT SERPL-MCNC: 1.07 MG/DL (ref 0.5–1.4)
ERYTHROCYTE [DISTWIDTH] IN BLOOD BY AUTOMATED COUNT: 56.7 FL (ref 35.9–50)
GLUCOSE SERPL-MCNC: 130 MG/DL (ref 65–99)
HCT VFR BLD AUTO: 31.2 % (ref 42–52)
HGB BLD-MCNC: 9.3 G/DL (ref 14–18)
MAGNESIUM SERPL-MCNC: 2.2 MG/DL (ref 1.5–2.5)
MCH RBC QN AUTO: 30.3 PG (ref 27–33)
MCHC RBC AUTO-ENTMCNC: 29.8 G/DL (ref 33.7–35.3)
MCV RBC AUTO: 101.6 FL (ref 81.4–97.8)
NT-PROBNP SERPL IA-MCNC: 5737 PG/ML (ref 0–125)
PHOSPHATE SERPL-MCNC: 2.1 MG/DL (ref 2.5–4.5)
PLATELET # BLD AUTO: 230 K/UL (ref 164–446)
PMV BLD AUTO: 10.9 FL (ref 9–12.9)
POTASSIUM SERPL-SCNC: 3.9 MMOL/L (ref 3.6–5.5)
RBC # BLD AUTO: 3.07 M/UL (ref 4.7–6.1)
SODIUM SERPL-SCNC: 147 MMOL/L (ref 135–145)
WBC # BLD AUTO: 8.3 K/UL (ref 4.8–10.8)

## 2020-09-12 PROCEDURE — A9270 NON-COVERED ITEM OR SERVICE: HCPCS | Performed by: HOSPITALIST

## 2020-09-12 PROCEDURE — 36415 COLL VENOUS BLD VENIPUNCTURE: CPT

## 2020-09-12 PROCEDURE — 84100 ASSAY OF PHOSPHORUS: CPT

## 2020-09-12 PROCEDURE — 80048 BASIC METABOLIC PNL TOTAL CA: CPT

## 2020-09-12 PROCEDURE — 85027 COMPLETE CBC AUTOMATED: CPT

## 2020-09-12 PROCEDURE — 770010 HCHG ROOM/CARE - REHAB SEMI PRIVAT*

## 2020-09-12 PROCEDURE — 83735 ASSAY OF MAGNESIUM: CPT

## 2020-09-12 PROCEDURE — 700102 HCHG RX REV CODE 250 W/ 637 OVERRIDE(OP): Performed by: HOSPITALIST

## 2020-09-12 PROCEDURE — 83880 ASSAY OF NATRIURETIC PEPTIDE: CPT

## 2020-09-12 PROCEDURE — A9270 NON-COVERED ITEM OR SERVICE: HCPCS | Performed by: PHYSICAL MEDICINE & REHABILITATION

## 2020-09-12 PROCEDURE — 99232 SBSQ HOSP IP/OBS MODERATE 35: CPT | Performed by: HOSPITALIST

## 2020-09-12 PROCEDURE — 700102 HCHG RX REV CODE 250 W/ 637 OVERRIDE(OP): Performed by: PHYSICAL MEDICINE & REHABILITATION

## 2020-09-12 PROCEDURE — 700111 HCHG RX REV CODE 636 W/ 250 OVERRIDE (IP): Performed by: PHYSICAL MEDICINE & REHABILITATION

## 2020-09-12 RX ADMIN — ACETAMINOPHEN 1000 MG: 325 TABLET, FILM COATED ORAL at 20:14

## 2020-09-12 RX ADMIN — ACETAMINOPHEN 1000 MG: 325 TABLET, FILM COATED ORAL at 17:49

## 2020-09-12 RX ADMIN — Medication 1000 MCG: at 08:30

## 2020-09-12 RX ADMIN — CARVEDILOL 6.25 MG: 3.12 TABLET, FILM COATED ORAL at 08:38

## 2020-09-12 RX ADMIN — GABAPENTIN 600 MG: 300 CAPSULE ORAL at 17:49

## 2020-09-12 RX ADMIN — GABAPENTIN 600 MG: 300 CAPSULE ORAL at 20:14

## 2020-09-12 RX ADMIN — OMEPRAZOLE 20 MG: 20 CAPSULE, DELAYED RELEASE ORAL at 08:30

## 2020-09-12 RX ADMIN — DIBASIC SODIUM PHOSPHATE, MONOBASIC POTASSIUM PHOSPHATE AND MONOBASIC SODIUM PHOSPHATE 250 MG: 852; 155; 130 TABLET ORAL at 12:10

## 2020-09-12 RX ADMIN — LISINOPRIL 10 MG: 5 TABLET ORAL at 08:39

## 2020-09-12 RX ADMIN — FOLIC ACID 1 MG: 1 TABLET ORAL at 08:31

## 2020-09-12 RX ADMIN — DIBASIC SODIUM PHOSPHATE, MONOBASIC POTASSIUM PHOSPHATE AND MONOBASIC SODIUM PHOSPHATE 250 MG: 852; 155; 130 TABLET ORAL at 20:13

## 2020-09-12 RX ADMIN — ATORVASTATIN CALCIUM 40 MG: 40 TABLET, FILM COATED ORAL at 20:13

## 2020-09-12 RX ADMIN — GABAPENTIN 600 MG: 300 CAPSULE ORAL at 08:31

## 2020-09-12 RX ADMIN — ENOXAPARIN SODIUM 40 MG: 40 INJECTION SUBCUTANEOUS at 08:28

## 2020-09-12 RX ADMIN — ACETAMINOPHEN 1000 MG: 325 TABLET, FILM COATED ORAL at 08:31

## 2020-09-12 RX ADMIN — DOCUSATE SODIUM 50 MG AND SENNOSIDES 8.6 MG 2 TABLET: 8.6; 5 TABLET, FILM COATED ORAL at 20:14

## 2020-09-12 RX ADMIN — CARVEDILOL 6.25 MG: 3.12 TABLET, FILM COATED ORAL at 17:50

## 2020-09-12 RX ADMIN — Medication 4000 UNITS: at 08:30

## 2020-09-12 RX ADMIN — OXYCODONE HYDROCHLORIDE 5 MG: 5 TABLET ORAL at 08:29

## 2020-09-12 ASSESSMENT — ENCOUNTER SYMPTOMS
NAUSEA: 0
PALPITATIONS: 0
VOMITING: 0
DIZZINESS: 0
HALLUCINATIONS: 0
BLURRED VISION: 0
HEADACHES: 0
FEVER: 0
SHORTNESS OF BREATH: 0

## 2020-09-12 ASSESSMENT — PAIN DESCRIPTION - PAIN TYPE: TYPE: ACUTE PAIN

## 2020-09-12 NOTE — PROGRESS NOTES
Hospital Medicine Daily Progress Note      Chief Complaint:  Hypertension  Diabetes    Interval History:  No significant events or changes since last visit    Review of Systems  Review of Systems   Constitutional: Negative for fever.   Eyes: Negative for blurred vision.   Respiratory: Negative for shortness of breath.    Cardiovascular: Negative for palpitations.   Gastrointestinal: Negative for nausea and vomiting.   Neurological: Negative for dizziness and headaches.   Psychiatric/Behavioral: Negative for hallucinations.        Physical Exam  Temp:  [36.7 °C (98.1 °F)-37.2 °C (99 °F)] 37.2 °C (99 °F)  Pulse:  [65-72] 68  Resp:  [14-18] 16  BP: (122-170)/(59-72) 170/66  SpO2:  [90 %-95 %] 90 %    Physical Exam  Vitals signs and nursing note reviewed.   Constitutional:       Appearance: He is not toxic-appearing.   HENT:      Mouth/Throat:      Mouth: Mucous membranes are moist.      Pharynx: Oropharynx is clear.   Eyes:      General: No scleral icterus.  Neck:      Musculoskeletal: No neck rigidity.   Cardiovascular:      Rate and Rhythm: Normal rate and regular rhythm.   Pulmonary:      Effort: Pulmonary effort is normal.      Breath sounds: No wheezing or rales.   Abdominal:      General: Bowel sounds are normal.      Palpations: Abdomen is soft.   Musculoskeletal:      Right lower leg: No edema.      Left lower leg: No edema.      Comments: Has left AKA   Skin:     General: Skin is warm and dry.      Comments: Large lesion on right cheek   Neurological:      Mental Status: He is alert and oriented to person, place, and time.   Psychiatric:         Mood and Affect: Mood normal.         Behavior: Behavior normal.         Fluids    Intake/Output Summary (Last 24 hours) at 9/12/2020 1039  Last data filed at 9/12/2020 0900  Gross per 24 hour   Intake 340 ml   Output 825 ml   Net -485 ml       Laboratory  Recent Labs     09/12/20  0706   WBC 8.3   RBC 3.07*   HEMOGLOBIN 9.3*   HEMATOCRIT 31.2*   .6*   MCH 30.3    MCHC 29.8*   RDW 56.7*   PLATELETCT 230   MPV 10.9     Recent Labs     20  0706   SODIUM 147*   POTASSIUM 3.9   CHLORIDE 111   CO2 28   GLUCOSE 130*   BUN 14   CREATININE 1.07   CALCIUM 8.7                   Assessment/Plan  AKA stump complication (HCC)- (present on admission)  Assessment & Plan  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  S/P left AKA for critical limb ischemia (2020)  Post-op complications included necrosis and infection  S/P AKA revision in 2020  S/P AKA I&D x 3  Wound vac per Wound Care  Wound Cx +MRSA and Enterococcus  S/P Vanco & Unasyn     PVD (peripheral vascular disease) with claudication (HCC)- (present on admission)  Assessment & Plan  On Lipitor  Consider ASA    Diabetes (HCC)- (present on admission)  Assessment & Plan  Hba1c: 7.3 () --> 5.1 ()  BS have been ok -- no coverage needed recently  Off accuchecks  Note: needs out patient follow up    Elevated brain natriuretic peptide (BNP) level  Assessment & Plan  BNP: 5607 () --> 5737 ()  Asymptomatic  O2 sats good on RA  No edema  No lung crackles  Probably chronic  Suggest f/u with PMD  Monitor    Hypophosphatemia  Assessment & Plan  PO4: 2.1 ()  Will start supplements ()  Monitor    Hypokalemia  Assessment & Plan  Currently resolved  K+: 3.5 () --> 3.9 ()  S/P KCL 40 meq x 1 ()  Monitor    Hypernatremia  Assessment & Plan  Na: 147 () --> 145 () --> 147 ()  S/P previous D5W at 50 mL/hr x 500 mL  ? Etiology  Bun/Cr wnl  On Cardiac diet -- limit 2g Na  Monitor    Vitamin D deficiency  Assessment & Plan  Vit D: 10  On supplements    Anemia  Assessment & Plan  H&H stable with Hb 8.8 () --> 9.3 ()  TSH normal  B12: 198  Folate: 2.9  On B12 & folate supplements  Monitor    HTN (hypertension)- (present on admission)  Assessment & Plan  BP mostly ok but occ rises up int the am and then self corrects  On Core.25 mg bid  On Lisinopril: 10 mg daily  Cont to monitor for  now    Basal cell carcinoma of face- (present on admission)  Assessment & Plan  Pt reports right cheek lesion has been ongoing for a while  Needs Dermatology follow up  CM to establish pt with a PMD

## 2020-09-12 NOTE — CARE PLAN
Problem: Safety  Goal: Will remain free from injury  Outcome: PROGRESSING AS EXPECTED  Goal: Will remain free from falls  Outcome: PROGRESSING AS EXPECTED  Note:   Patient uses call light consistently and appropriately this shift.  Waits for assistance when needed and does not attempt self transfer.  Able to verbalize needs.  Will continue to monitor.       Problem: Infection  Goal: Will remain free from infection  Outcome: PROGRESSING AS EXPECTED  Note:   Patient remains free from s/s infection; afebrile.  Will continue to monitor.

## 2020-09-12 NOTE — ASSESSMENT & PLAN NOTE
BNP: 5607 (9/8) --> 5737 (9/12) --> 4502 (9/16)  Asymptomatic  O2 sats good on RA  No edema  No lung crackles  Probably chronic  Suggest f/u with PMD for possible workup -- consider echo  Monitor

## 2020-09-13 PROCEDURE — 700102 HCHG RX REV CODE 250 W/ 637 OVERRIDE(OP): Performed by: HOSPITALIST

## 2020-09-13 PROCEDURE — A9270 NON-COVERED ITEM OR SERVICE: HCPCS | Performed by: PHYSICAL MEDICINE & REHABILITATION

## 2020-09-13 PROCEDURE — 99232 SBSQ HOSP IP/OBS MODERATE 35: CPT | Performed by: HOSPITALIST

## 2020-09-13 PROCEDURE — 770010 HCHG ROOM/CARE - REHAB SEMI PRIVAT*

## 2020-09-13 PROCEDURE — A9270 NON-COVERED ITEM OR SERVICE: HCPCS | Performed by: HOSPITALIST

## 2020-09-13 PROCEDURE — 700102 HCHG RX REV CODE 250 W/ 637 OVERRIDE(OP): Performed by: PHYSICAL MEDICINE & REHABILITATION

## 2020-09-13 PROCEDURE — 97130 THER IVNTJ EA ADDL 15 MIN: CPT

## 2020-09-13 PROCEDURE — 97129 THER IVNTJ 1ST 15 MIN: CPT

## 2020-09-13 PROCEDURE — 97530 THERAPEUTIC ACTIVITIES: CPT

## 2020-09-13 PROCEDURE — 97110 THERAPEUTIC EXERCISES: CPT

## 2020-09-13 PROCEDURE — 700111 HCHG RX REV CODE 636 W/ 250 OVERRIDE (IP): Performed by: PHYSICAL MEDICINE & REHABILITATION

## 2020-09-13 RX ORDER — LISINOPRIL 20 MG/1
20 TABLET ORAL DAILY
Status: DISCONTINUED | OUTPATIENT
Start: 2020-09-14 | End: 2020-09-23 | Stop reason: HOSPADM

## 2020-09-13 RX ORDER — LISINOPRIL 5 MG/1
10 TABLET ORAL ONCE
Status: COMPLETED | OUTPATIENT
Start: 2020-09-13 | End: 2020-09-13

## 2020-09-13 RX ADMIN — CARVEDILOL 6.25 MG: 3.12 TABLET, FILM COATED ORAL at 18:09

## 2020-09-13 RX ADMIN — Medication 4000 UNITS: at 10:48

## 2020-09-13 RX ADMIN — GABAPENTIN 600 MG: 300 CAPSULE ORAL at 20:15

## 2020-09-13 RX ADMIN — OMEPRAZOLE 20 MG: 20 CAPSULE, DELAYED RELEASE ORAL at 10:48

## 2020-09-13 RX ADMIN — DIBASIC SODIUM PHOSPHATE, MONOBASIC POTASSIUM PHOSPHATE AND MONOBASIC SODIUM PHOSPHATE 250 MG: 852; 155; 130 TABLET ORAL at 10:48

## 2020-09-13 RX ADMIN — ATORVASTATIN CALCIUM 40 MG: 40 TABLET, FILM COATED ORAL at 20:15

## 2020-09-13 RX ADMIN — Medication 1000 MCG: at 08:03

## 2020-09-13 RX ADMIN — GABAPENTIN 600 MG: 300 CAPSULE ORAL at 10:48

## 2020-09-13 RX ADMIN — ACETAMINOPHEN 1000 MG: 325 TABLET, FILM COATED ORAL at 16:05

## 2020-09-13 RX ADMIN — LISINOPRIL 10 MG: 5 TABLET ORAL at 10:49

## 2020-09-13 RX ADMIN — OXYCODONE HYDROCHLORIDE 5 MG: 5 TABLET ORAL at 08:03

## 2020-09-13 RX ADMIN — FOLIC ACID 1 MG: 1 TABLET ORAL at 08:04

## 2020-09-13 RX ADMIN — DOCUSATE SODIUM 50 MG AND SENNOSIDES 8.6 MG 2 TABLET: 8.6; 5 TABLET, FILM COATED ORAL at 20:15

## 2020-09-13 RX ADMIN — DIBASIC SODIUM PHOSPHATE, MONOBASIC POTASSIUM PHOSPHATE AND MONOBASIC SODIUM PHOSPHATE 250 MG: 852; 155; 130 TABLET ORAL at 20:15

## 2020-09-13 RX ADMIN — ENOXAPARIN SODIUM 40 MG: 40 INJECTION SUBCUTANEOUS at 08:03

## 2020-09-13 RX ADMIN — DOCUSATE SODIUM 50 MG AND SENNOSIDES 8.6 MG 2 TABLET: 8.6; 5 TABLET, FILM COATED ORAL at 10:48

## 2020-09-13 RX ADMIN — GABAPENTIN 600 MG: 300 CAPSULE ORAL at 16:05

## 2020-09-13 RX ADMIN — CARVEDILOL 6.25 MG: 3.12 TABLET, FILM COATED ORAL at 08:03

## 2020-09-13 RX ADMIN — ACETAMINOPHEN 1000 MG: 325 TABLET, FILM COATED ORAL at 08:04

## 2020-09-13 RX ADMIN — ACETAMINOPHEN 1000 MG: 325 TABLET, FILM COATED ORAL at 20:15

## 2020-09-13 ASSESSMENT — ENCOUNTER SYMPTOMS
NERVOUS/ANXIOUS: 0
DIZZINESS: 0
DIARRHEA: 0
FEVER: 0
BLURRED VISION: 0
COUGH: 0

## 2020-09-13 ASSESSMENT — FIBROSIS 4 INDEX: FIB4 SCORE: 1.37

## 2020-09-13 NOTE — PROGRESS NOTES
Hospital Medicine Daily Progress Note      Chief Complaint:  Hypertension  Diabetes    Interval History:  No significant events or changes since last visit    Review of Systems  Review of Systems   Constitutional: Negative for fever.   Eyes: Negative for blurred vision.   Respiratory: Negative for cough.    Cardiovascular: Negative for chest pain.   Gastrointestinal: Negative for diarrhea.   Musculoskeletal: Negative for joint pain.   Neurological: Negative for dizziness.   Psychiatric/Behavioral: The patient is not nervous/anxious.         Physical Exam  Temp:  [36.3 °C (97.3 °F)-37 °C (98.6 °F)] 36.3 °C (97.3 °F)  Pulse:  [62-76] 76  Resp:  [14-20] 20  BP: (105-160)/(51-72) 146/72  SpO2:  [91 %] 91 %    Physical Exam  Vitals signs and nursing note reviewed.   Constitutional:       Appearance: He is not diaphoretic.   HENT:      Mouth/Throat:      Pharynx: No oropharyngeal exudate or posterior oropharyngeal erythema.   Eyes:      Extraocular Movements: Extraocular movements intact.   Neck:      Vascular: No carotid bruit.   Cardiovascular:      Rate and Rhythm: Normal rate and regular rhythm.   Pulmonary:      Effort: Pulmonary effort is normal.      Breath sounds: No wheezing or rales.   Abdominal:      General: There is no distension.      Palpations: Abdomen is soft.      Tenderness: There is no abdominal tenderness.   Musculoskeletal:      Right lower leg: No edema.      Left lower leg: No edema.      Comments: Has left AKA   Skin:     General: Skin is warm and dry.      Comments: Large lesion on right cheek   Neurological:      Mental Status: He is alert and oriented to person, place, and time.   Psychiatric:         Mood and Affect: Mood normal.         Behavior: Behavior normal.         Fluids    Intake/Output Summary (Last 24 hours) at 9/13/2020 1033  Last data filed at 9/13/2020 1015  Gross per 24 hour   Intake 340 ml   Output 875 ml   Net -535 ml       Laboratory  Recent Labs     09/12/20  0706   WBC 8.3    RBC 3.07*   HEMOGLOBIN 9.3*   HEMATOCRIT 31.2*   .6*   MCH 30.3   MCHC 29.8*   RDW 56.7*   PLATELETCT 230   MPV 10.9     Recent Labs     09/12/20  0706   SODIUM 147*   POTASSIUM 3.9   CHLORIDE 111   CO2 28   GLUCOSE 130*   BUN 14   CREATININE 1.07   CALCIUM 8.7                   Assessment/Plan  AKA stump complication (HCC)- (present on admission)  Assessment & Plan  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  S/P left AKA for critical limb ischemia (6/2020)  Post-op complications included necrosis and infection  S/P AKA revision in 7/2020  S/P AKA I&D x 3  Wound vac per Wound Care  Wound Cx +MRSA and Enterococcus  S/P Vanco & Unasyn     PVD (peripheral vascular disease) with claudication (HCC)- (present on admission)  Assessment & Plan  On Lipitor  Consider ASA    Diabetes (HCC)- (present on admission)  Assessment & Plan  Hba1c: 7.3 (7/27) --> 5.1 (9/5)  BS have been ok -- no coverage needed recently  Off accuchecks  Note: needs out patient follow up    Elevated brain natriuretic peptide (BNP) level  Assessment & Plan  BNP: 5607 (9/8) --> 5737 (9/12)  Asymptomatic  O2 sats good on RA  No edema  No lung crackles  Probably chronic  Suggest f/u with PMD for possible workup  Monitor    Hypophosphatemia  Assessment & Plan  PO4: 2.1 (9/12)  On supplements (9/12)  Monitor    Hypokalemia  Assessment & Plan  Currently resolved  K+: 3.5 (9/8) --> 3.9 (9/12)  S/P KCL 40 meq x 1 (9/8)  Monitor    Hypernatremia  Assessment & Plan  Na: 147 (9/7) --> 145 (9/8) --> 147 (9/12)  S/P previous D5W at 50 mL/hr x 500 mL  ? Etiology  Bun/Cr wnl  On Cardiac diet -- limit 2g Na  Note: on Lisinopril and increasing dose -- may help  Monitor    Vitamin D deficiency  Assessment & Plan  Vit D: 10  On supplements    Anemia  Assessment & Plan  H&H stable with Hb 8.8 (9/7) --> 9.3 (9/12)  TSH normal  B12: 198  Folate: 2.9  On B12 & folate supplements  Monitor    HTN (hypertension)- (present on admission)  Assessment & Plan  BP  generally ok but occ rises up  On Core.25 mg bid  On Lisinopril: 10 mg daily --> will increase to 20 mg daily ()  Cont to monitor    Basal cell carcinoma of face- (present on admission)  Assessment & Plan  Pt reports right cheek lesion has been ongoing for a while  Needs Dermatology follow up  CM to establish pt with a PMD

## 2020-09-13 NOTE — THERAPY
Physical Therapy   Daily Treatment     Patient Name: Jimenez Bains  Age:  75 y.o., Sex:  male  Medical Record #: [id number]  Today's Date: 9/13/2020     Precautions  Precautions: (P) Fall Risk, Non Weight Bearing Left Lower Extremity  Comments: (P) wound vac L residual limb, sores on genital area, carpio; contact precautions?    Subjective    Patient reports sharp residual limb pain with glut squeezes, constant phantom limb pain; reports he is anxious to go home to supportive family     Objective       09/13/20 1331   Precautions   Precautions Fall Risk;Non Weight Bearing Left Lower Extremity   Comments wound vac L residual limb, sores on genital area, carpio; contact precautions?   Wheelchair Functional Level of Assist   Wheelchair Assist Stand by Assist   Distance Wheelchair (Feet or Distance) 150  (150ft x 2 )   Wheelchair Description Extra time;Supervision for safety;Assistance with steering   Transfer Functional Level of Assist   Bed, Chair, Wheelchair Transfer Contact Guard Assist   Bed Chair Wheelchair Transfer Description Adaptive equipment;Set-up of equipment;Verbal cueing;Supervision for safety;Increased time;Initial preparation for task  (squat pivot teachinque, downhill)   Sitting Lower Body Exercises   Other Exercises UE Motomed 3min forward, 3min backward   Bed Mobility    Sit to Supine Stand by Assist   Sit to Stand Contact Guard Assist   Interdisciplinary Plan of Care Collaboration   IDT Collaboration with  Physical Therapist;Speech Therapist   Collaboration Comments primary PT treatment planning, ST recommends education in consistent transfer sequencing   PT Total Time Spent   PT Individual Total Time Spent (Mins) 60   PT Charge Group   PT Therapeutic Exercise 2   PT Therapeutic Activities 2     Discussed therapy progress and remaining goals before appropriate for home discharge- patient motivated by meeting therapy goals    Discussion of supine there-ex with trial of SAQ on right leg, hamstring heel  digs on right, bilateral glut squeezes (discontinue due to sharp residual limb pain)    Assessment    Demonstrated 3 transfers (1 CGA, 2 SBA) with squat pivot technique and set-up for catheter and wound vac lines, required constant verbal cues for sequencing    Strengths: Independent prior level of function, Motivated for self care and independence, Willingly participates in therapeutic activities  Barriers: Confused, Decreased endurance, Fatigue, Generalized weakness, Home accessibility, Impaired activity tolerance, Impaired balance, Pain, Limited mobility    Plan    Reinforce transfer sequencing with decreased verbal cueing, desensitization work to residual limb; continue short distance ambulation at FWW level; w/c skills for long distances, uneven terrain, ramp for home entrance?; standing balance; on-going amputee education    Physical Therapy Problems     Problem: Mobility     Dates: Start: 09/05/20       Goal: STG-Within one week, patient will propel wheelchair household distances     Dates: Start: 09/05/20       Description: 1) Individualized goal:  50 ft with SBA.  2) Interventions:  PT Group Therapy, PT Gait Training, PT Therapeutic Exercises, PT Neuro Re-Ed/Balance, PT Therapeutic Activity, and PT Evaluation            Goal: STG-Within one week, patient will ambulate household distance     Dates: Start: 09/05/20       Description: 1) Individualized goal:  10 ft with FWW and SBA-CGA.  2) Interventions:  PT Group Therapy, PT Gait Training, PT Therapeutic Exercises, PT Neuro Re-Ed/Balance, PT Therapeutic Activity, and PT Evaluation                Problem: Mobility Transfers     Dates: Start: 09/05/20       Goal: STG-Within one week, patient will sit to stand     Dates: Start: 09/05/20       Description: 1) Individualized goal:  with FWW and SBA.  2) Interventions:  PT Group Therapy, PT Gait Training, PT Therapeutic Exercises, PT Neuro Re-Ed/Balance, PT Therapeutic Activity, and PT Evaluation                 Problem: PT-Long Term Goals     Dates: Start: 09/05/20       Goal: LTG-By discharge, patient will propel wheelchair     Dates: Start: 09/05/20       Description: 1) Individualized goal:  150 ft mod I.  2) Interventions:  PT Group Therapy, PT Gait Training, PT Therapeutic Exercises, PT Neuro Re-Ed/Balance, PT Therapeutic Activity, and PT Evaluation          Goal: LTG-By discharge, patient will ambulate     Dates: Start: 09/05/20       Description: 1) Individualized goal:  50 ft with FWW and supervision.  2) Interventions:  PT Group Therapy, PT Gait Training, PT Therapeutic Exercises, PT Neuro Re-Ed/Balance, PT Therapeutic Activity, and PT Evaluation          Goal: LTG-By discharge, patient will transfer one surface to another     Dates: Start: 09/05/20       Description: 1) Individualized goal:  with FWW vs. Squat pivot mod I.  2) Interventions:  PT Group Therapy, PT Gait Training, PT Therapeutic Exercises, PT Neuro Re-Ed/Balance, PT Therapeutic Activity, and PT Evaluation          Goal: LTG-By discharge, patient will perform home exercise program     Dates: Start: 09/05/20       Description: 1) Individualized goal:  with handout mod I.  2) Interventions:  PT Group Therapy, PT Gait Training, PT Therapeutic Exercises, PT Neuro Re-Ed/Balance, PT Therapeutic Activity, and PT Evaluation          Goal: LTG-By discharge, patient will     Dates: Start: 09/05/20       Description: 1) Individualized goal:  perform bed mobility including rolling to prone mod I.  2) Interventions:  PT Group Therapy, PT Gait Training, PT Therapeutic Exercises, PT Neuro Re-Ed/Balance, PT Therapeutic Activity, and PT Evaluation

## 2020-09-13 NOTE — THERAPY
Speech Language Pathology  Daily Treatment     Patient Name: Jimenez Bains  Age:  75 y.o., Sex:  male  Medical Record #: [id number]  Today's Date: 9/13/2020     Precautions  Precautions: Fall Risk, Non Weight Bearing Left Lower Extremity  Comments: wound vac L residual limb, sores on genital area, carpio    Subjective    Pt pleasant and cooperative during tx.      Objective       09/13/20 0931   Cognition   Functional Memory Activities Moderate (3)   Safety Awareness Minimal (4)   Interdisciplinary Plan of Care Collaboration   IDT Collaboration with  Physical Therapist   Collaboration Comments regarding transfer sequence,   SLP Total Time Spent   SLP Individual Total Time Spent (Mins) 60   Treatment Charges   SLP Cognitive Skill Development First 15 Minutes 1   SLP Cognitive Skill Development Additional 15 Minutes 3       Assessment    Pt recalled daily events given min verbal cues.  SLP recorded daily events into memory book.  Pt verbalized transfer sequence given supervision.   Pt reported that each person transfers him differently.  Recommend Pt ask physical therapist this afternoon regarding proper transfer sequence.  Pt reported difficulty operating TV.  SLP educated pt regarding on/off, volume, and channel buttons.  Pt demonstrated ability to utilize channel and volume buttons with min verbal cues.  Pt stated that he plans to move in with his grand-daughter.  Pt stated that his granddaughter could assist with medication management.     Strengths: Able to follow instructions, Motivated for self care and independence, Pleasant and cooperative, Willingly participates in therapeutic activities  Barriers: Impaired functional cognition    Plan    Determine if Pt asked physical therapy about proper transfer sequence.  Memory book, problem solving.     Speech Therapy Problems     Problem: Memory STGs     Dates: Start: 09/05/20       Goal: STG-Within one week, patient will     Dates: Start: 09/05/20       Description:  1) Individualized goal:  recall daily events and safety strategies given min verbal cues 80% with use of memory book.  2) Interventions:  SLP Cognitive Skill Development and SLP Group Treatment                    Problem: Problem Solving STGs     Dates: Start: 09/05/20       Goal: STG-Within one week, patient will     Dates: Start: 09/05/20       Description: 1) Individualized goal:  answer basic problem solving questions with 80% accuracy given min verbal cues.    2) Interventions:  SLP Cognitive Skill Development and SLP Group Treatment                    Problem: Speech/Swallowing LTGs     Dates: Start: 09/05/20       Goal: LTG-By discharge, patient will solve basic problems     Dates: Start: 09/05/20       Description: 1) Individualized goal:  Mert for safe discharge home.   2) Interventions:  SLP Aphasia Evaluation, SLP Cognitive Skill Development, and SLP Group Treatment

## 2020-09-14 PROCEDURE — 97605 NEG PRS WND THER DME<=50SQCM: CPT

## 2020-09-14 PROCEDURE — 97530 THERAPEUTIC ACTIVITIES: CPT

## 2020-09-14 PROCEDURE — 700102 HCHG RX REV CODE 250 W/ 637 OVERRIDE(OP): Performed by: PHYSICAL MEDICINE & REHABILITATION

## 2020-09-14 PROCEDURE — A9270 NON-COVERED ITEM OR SERVICE: HCPCS | Performed by: HOSPITALIST

## 2020-09-14 PROCEDURE — 700111 HCHG RX REV CODE 636 W/ 250 OVERRIDE (IP): Performed by: PHYSICAL MEDICINE & REHABILITATION

## 2020-09-14 PROCEDURE — 700102 HCHG RX REV CODE 250 W/ 637 OVERRIDE(OP): Performed by: HOSPITALIST

## 2020-09-14 PROCEDURE — A9270 NON-COVERED ITEM OR SERVICE: HCPCS | Performed by: PHYSICAL MEDICINE & REHABILITATION

## 2020-09-14 PROCEDURE — 770010 HCHG ROOM/CARE - REHAB SEMI PRIVAT*

## 2020-09-14 PROCEDURE — 99232 SBSQ HOSP IP/OBS MODERATE 35: CPT | Performed by: HOSPITALIST

## 2020-09-14 PROCEDURE — 97535 SELF CARE MNGMENT TRAINING: CPT

## 2020-09-14 PROCEDURE — 99232 SBSQ HOSP IP/OBS MODERATE 35: CPT | Performed by: PHYSICAL MEDICINE & REHABILITATION

## 2020-09-14 PROCEDURE — 97130 THER IVNTJ EA ADDL 15 MIN: CPT

## 2020-09-14 PROCEDURE — 97129 THER IVNTJ 1ST 15 MIN: CPT

## 2020-09-14 RX ADMIN — TRAZODONE HYDROCHLORIDE 50 MG: 50 TABLET ORAL at 20:18

## 2020-09-14 RX ADMIN — OMEPRAZOLE 20 MG: 20 CAPSULE, DELAYED RELEASE ORAL at 09:00

## 2020-09-14 RX ADMIN — ATORVASTATIN CALCIUM 40 MG: 40 TABLET, FILM COATED ORAL at 20:17

## 2020-09-14 RX ADMIN — Medication 1000 MCG: at 08:51

## 2020-09-14 RX ADMIN — ACETAMINOPHEN 1000 MG: 325 TABLET, FILM COATED ORAL at 20:17

## 2020-09-14 RX ADMIN — LISINOPRIL 20 MG: 20 TABLET ORAL at 08:51

## 2020-09-14 RX ADMIN — GABAPENTIN 600 MG: 300 CAPSULE ORAL at 15:24

## 2020-09-14 RX ADMIN — Medication 4000 UNITS: at 08:52

## 2020-09-14 RX ADMIN — ENOXAPARIN SODIUM 40 MG: 40 INJECTION SUBCUTANEOUS at 09:05

## 2020-09-14 RX ADMIN — CARVEDILOL 6.25 MG: 3.12 TABLET, FILM COATED ORAL at 08:53

## 2020-09-14 RX ADMIN — ACETAMINOPHEN 1000 MG: 325 TABLET, FILM COATED ORAL at 08:52

## 2020-09-14 RX ADMIN — CARVEDILOL 6.25 MG: 3.12 TABLET, FILM COATED ORAL at 17:53

## 2020-09-14 RX ADMIN — DIBASIC SODIUM PHOSPHATE, MONOBASIC POTASSIUM PHOSPHATE AND MONOBASIC SODIUM PHOSPHATE 250 MG: 852; 155; 130 TABLET ORAL at 20:17

## 2020-09-14 RX ADMIN — FOLIC ACID 1 MG: 1 TABLET ORAL at 08:52

## 2020-09-14 RX ADMIN — OXYCODONE HYDROCHLORIDE 5 MG: 5 TABLET ORAL at 08:53

## 2020-09-14 RX ADMIN — DOCUSATE SODIUM 50 MG AND SENNOSIDES 8.6 MG 2 TABLET: 8.6; 5 TABLET, FILM COATED ORAL at 20:18

## 2020-09-14 RX ADMIN — ACETAMINOPHEN 1000 MG: 325 TABLET, FILM COATED ORAL at 15:24

## 2020-09-14 RX ADMIN — GABAPENTIN 600 MG: 300 CAPSULE ORAL at 08:52

## 2020-09-14 RX ADMIN — DIBASIC SODIUM PHOSPHATE, MONOBASIC POTASSIUM PHOSPHATE AND MONOBASIC SODIUM PHOSPHATE 250 MG: 852; 155; 130 TABLET ORAL at 08:53

## 2020-09-14 RX ADMIN — GABAPENTIN 600 MG: 300 CAPSULE ORAL at 20:18

## 2020-09-14 RX ADMIN — OXYCODONE HYDROCHLORIDE 5 MG: 5 TABLET ORAL at 16:14

## 2020-09-14 ASSESSMENT — ACTIVITIES OF DAILY LIVING (ADL): TUB_SHOWER_TRANSFER_DESCRIPTION: GRAB BAR;INCREASED TIME;SUPERVISION FOR SAFETY;VERBAL CUEING

## 2020-09-14 ASSESSMENT — ENCOUNTER SYMPTOMS
CHILLS: 0
NERVOUS/ANXIOUS: 0
VOMITING: 0
SHORTNESS OF BREATH: 0
DIARRHEA: 0
NAUSEA: 0
FEVER: 0
ABDOMINAL PAIN: 0

## 2020-09-14 NOTE — WOUND TEAM
[facility]  Inpatient Services   Wound and Skin Care Progress Note    HPI, PMH, SH: Reviewed    Unit where seen by Wound Team: [id number]     WOUND CONSULT RELATED TO:  Scheduled Negative Pressure Wound therapy (NPWT) dressing change      Self Report / Pain Level:  Pre-medicated with oral pain medication.     OBJECTIVE:  Previous dressing intact    WOUND TYPE, LOCATION, CHARACTERISTICS (Pressure Injuries: location, stage, POA or date identified)    Negative Pressure Wound Therapy 06/30/20 (Active)   NPWT Pump Mode / Pressure Setting Continuous;125 mmHg 09/14/20 1600   Dressing Type Medium;Black Foam (Regular) 09/14/20 1600   Number of Foam Pieces Used 2 09/14/20 1600   Canister Changed No 09/14/20 1600   NEXT Dressing Change/Treatment Date 09/16/20 09/14/20 1600       Wound 09/04/20 Full Thickness Wound Thigh Left Left AKA wound (Active)   Wound Image    09/14/20 1600   Site Assessment Red;Tan;Grey 09/14/20 1600   Periwound Assessment Red;Painful 09/14/20 1600   Margins Defined edges;Attached edges 09/14/20 1600   Closure Secondary intention 09/14/20 1600   Drainage Amount Moderate 09/14/20 1600   Drainage Description Serosanguineous 09/14/20 1600   Treatments Cleansed;Irrigation;Site care 09/14/20 1600   Wound Cleansing Approved Wound Cleanser 09/14/20 1600   Periwound Protectant Benzoin;Drape 09/14/20 1600   Dressing Cleansing/Solutions Not Applicable 09/14/20 1600   Dressing Options Honey Gel;Wound Vac 09/14/20 1600   Dressing Changed Changed 09/14/20 1600   Dressing Status Clean;Dry;Intact 09/14/20 1600   Dressing Change/Treatment Frequency By Wound Team Only 09/14/20 1600   NEXT Dressing Change/Treatment Date 09/16/20 09/14/20 1600   NEXT Weekly Photo (Inpatient Only) 09/18/20 09/11/20 0915   Non-staged Wound Description Full thickness 09/14/20 1600   Wound Length (cm) 7.1 cm 09/11/20 0915   Wound Width (cm) 9.7 cm 09/11/20 0915   Wound Depth (cm) 1.7 cm 09/11/20 0915   Wound Surface Area (cm^2)  68.87 cm^2 09/11/20 0915   Wound Volume (cm^3) 117.08 cm^3 09/11/20 0915   Wound Healing % 57 09/11/20 0915   Wound Bed Granulation (%) 50 % 09/11/20 0915   Wound Bed Epithelium (%) 0 % 09/11/20 0915   Wound Bed Slough (%) 50 % 09/11/20 0915   Wound Bed Eschar (%) 0 % 09/11/20 0915   Shape [place] 09/14/20 1600   Wound Odor None 09/14/20 1600   Exposed Structures None 09/14/20 1600   WOUND NURSE ONLY - Time Spent with Patient (mins) 50 09/11/20 0915         Lab Values:    Lab Results   Component Value Date/Time    WBC 8.3 09/12/2020 07:06 AM    RBC 3.07 (L) 09/12/2020 07:06 AM    HEMOGLOBIN 9.3 (L) 09/12/2020 07:06 AM    HEMATOCRIT 31.2 (L) 09/12/2020 07:06 AM                    Lab Results   Component Value Date/Time    HBA1C 5.1 09/05/2020 05:40 AM           Culture:   Obtained No. Does not look infected, Results show NA    INTERVENTIONS BY WOUND TEAM:  Saturated dressing with saline, dressing removal very painful. Irrigated wound with saline, gently wiped wound bed with gauze 4 x 4s. Cleaned periwound with same. No Sting and drape applied to periwound. Honey gel applied to black foam where it will rest over the areas with slough. 1 piece of black foam placed on wound, draped. Hole cut for trac pad, trac pad applied. Suction obtained at 125 mmHg continuous.     Tip of penis has a small area of erythema and partial thickness, possibly from rubbing from indwelling catheter.  abx ointment recommended for lubrication and also antibacterial.              Interdisciplinary consultation: Patient, Bedside RN    EVALUATION: patient states that his wound is more painful. Ordering topical lidocaine for future changes    Goals: Steady decrease in wound area and depth weekly.    NURSING PLAN OF CARE ORDERS (X):  Dressing changes: See Dressing Care orders: X  Skin care: See Skin Care orders: X  Rectal tube care: See Rectal Tube Care orders:        Other orders:                           WOUND TEAM PLAN OF CARE (X):   Dressing  changes by wound team:          Follow up 1-2 times weekly:               Follow up 3 times weekly:                NPWT change 3 times weekly:   X  Follow up as needed:       Other (explain):     Anticipated discharge plans (X):   LTACH:        SNF/Rehab:                  Home Care:   X        Outpatient Wound Center:            Self Care:            Other:

## 2020-09-14 NOTE — PROGRESS NOTES
"Rehab Progress Note     Encounter Date: 9/14/2020      CC: left AKA, phantom limb pain, wound pain      Interval Events (Subjective)  -seen in bed supine  -reports his left leg wound is okay; \"acts up\" randomly per patient; wound vac in place, denies any new issues today  -leg pain is stable  -denies fever, chills, nausea, vomiting        Objective:  VITAL SIGNS: /67   Pulse 68   Temp 36.6 °C (97.9 °F) (Oral)   Resp 18   Ht 1.676 m (5' 6\")   Wt 64.4 kg (142 lb)   SpO2 96%   BMI 22.92 kg/m²   Gen: very pleasant  Psych: calm affect  CV: no edema  Resp: on room air, no upper airway sounds  Abd: NTND  Neuro: follows commands  Skin: Left AKA with wound vac in place; periwound outside of woundvac without notable erythema/drainage          Recent Results (from the past 72 hour(s))   Basic Metabolic Panel    Collection Time: 09/12/20  7:06 AM   Result Value Ref Range    Sodium 147 (H) 135 - 145 mmol/L    Potassium 3.9 3.6 - 5.5 mmol/L    Chloride 111 96 - 112 mmol/L    Co2 28 20 - 33 mmol/L    Glucose 130 (H) 65 - 99 mg/dL    Bun 14 8 - 22 mg/dL    Creatinine 1.07 0.50 - 1.40 mg/dL    Calcium 8.7 8.5 - 10.5 mg/dL    Anion Gap 8.0 7.0 - 16.0   MAGNESIUM    Collection Time: 09/12/20  7:06 AM   Result Value Ref Range    Magnesium 2.2 1.5 - 2.5 mg/dL   PHOSPHORUS    Collection Time: 09/12/20  7:06 AM   Result Value Ref Range    Phosphorus 2.1 (L) 2.5 - 4.5 mg/dL   CBC WITHOUT DIFFERENTIAL    Collection Time: 09/12/20  7:06 AM   Result Value Ref Range    WBC 8.3 4.8 - 10.8 K/uL    RBC 3.07 (L) 4.70 - 6.10 M/uL    Hemoglobin 9.3 (L) 14.0 - 18.0 g/dL    Hematocrit 31.2 (L) 42.0 - 52.0 %    .6 (H) 81.4 - 97.8 fL    MCH 30.3 27.0 - 33.0 pg    MCHC 29.8 (L) 33.7 - 35.3 g/dL    RDW 56.7 (H) 35.9 - 50.0 fL    Platelet Count 230 164 - 446 K/uL    MPV 10.9 9.0 - 12.9 fL   proBrain Natriuretic Peptide, NT    Collection Time: 09/12/20  7:06 AM   Result Value Ref Range    NT-proBNP 5737 (H) 0 - 125 pg/mL   ESTIMATED GFR "    Collection Time: 09/12/20  7:06 AM   Result Value Ref Range    GFR If African American >60 >60 mL/min/1.73 m 2    GFR If Non African American >60 >60 mL/min/1.73 m 2       Current Facility-Administered Medications   Medication Frequency   • lisinopril (PRINIVIL) tablet 20 mg DAILY   • phosphorus (K-Phos-Neutral) per tablet 250 mg BID   • lidocaine (XYLOCAINE) 4 % topical solution PRN   • oxyCODONE immediate-release (ROXICODONE) tablet 5 mg DAILY   • gabapentin (NEURONTIN) capsule 600 mg TID   • oxyCODONE immediate release (ROXICODONE) tablet 10 mg Q4HRS PRN   • folic acid (FOLVITE) tablet 1 mg DAILY   • cyanocobalamin (VITAMIN B-12) tablet 1,000 mcg DAILY   • miconazole 2%-zinc oxide (Hilary) topical cream Q6HRS PRN   • vitamin D (cholecalciferol) tablet 4,000 Units DAILY   • glucose 4 g chewable tablet 16 g Q15 MIN PRN    And   • dextrose 50% (D50W) injection 50 mL Q15 MIN PRN   • acetaminophen (TYLENOL) tablet 1,000 mg TID   • Respiratory Therapy Consult Continuous RT   • Pharmacy Consult Request ...Pain Management Review 1 Each PHARMACY TO DOSE   • tramadol (ULTRAM) 50 MG tablet 50 mg Q4HRS PRN   • hydrALAZINE (APRESOLINE) tablet 25 mg Q8HRS PRN   • enoxaparin (LOVENOX) inj 40 mg DAILY   • acetaminophen (TYLENOL) tablet 650 mg Q4HRS PRN   • senna-docusate (PERICOLACE or SENOKOT S) 8.6-50 MG per tablet 2 Tab BID    And   • polyethylene glycol/lytes (MIRALAX) PACKET 1 Packet QDAY PRN    And   • magnesium hydroxide (MILK OF MAGNESIA) suspension 30 mL QDAY PRN    And   • bisacodyl (DULCOLAX) suppository 10 mg QDAY PRN   • benzocaine-menthol (CEPACOL) lozenge 1 Lozenge Q2HRS PRN   • mag hydrox-al hydrox-simeth (MAALOX PLUS ES or MYLANTA DS) suspension 20 mL Q2HRS PRN   • ondansetron (ZOFRAN ODT) dispertab 4 mg 4X/DAY PRN    Or   • ondansetron (ZOFRAN) syringe/vial injection 4 mg 4X/DAY PRN   • traZODone (DESYREL) tablet 50 mg QHS PRN   • sodium chloride (OCEAN) 0.65 % nasal spray 2 Spray PRN   • midazolam (VERSED) 5  mg/mL (1 mL vial) PRN   • atorvastatin (LIPITOR) tablet 40 mg Q EVENING   • oxyCODONE immediate-release (ROXICODONE) tablet 2.5 mg Q3HRS PRN   • oxyCODONE immediate-release (ROXICODONE) tablet 5 mg Q3HRS PRN   • omeprazole (PRILOSEC) capsule 20 mg DAILY   • carvedilol (COREG) tablet 6.25 mg BID WITH MEALS     Facility-Administered Medications Ordered in Other Encounters   Medication Frequency   • fentaNYL (SUBLIMAZE) injection PRN   • propofol PRN   • ceFAZolin (ANCEF) injection PRN       Orders Placed This Encounter   Procedures   • Diet Order Cardiac (Thin), Diabetic, 2 Gram Sodium     Standing Status:   Standing     Number of Occurrences:   1     Order Specific Question:   Diet:     Answer:   Cardiac [6]     Comments:   Thin     Order Specific Question:   Diet:     Answer:   Diabetic [3]     Order Specific Question:   Diet:     Answer:   2 Gram Sodium [7]       Assessment:  Active Hospital Problems    Diagnosis   • *Encephalopathy acute   • AKA stump complication (HCC)   • Arterial insufficiency (HCC)   • PVD (peripheral vascular disease) with claudication (HCC)   • Diabetes (HCC)   • HLD (hyperlipidemia)   • HTN (hypertension)   • Basal cell carcinoma of face   • Hypokalemia   • Hypernatremia   • Alkaline phosphatase elevation   • Anemia   • Vitamin D deficiency   • Peripheral arterial occlusive disease (HCC)       Medical Decision Making and Plan:  Acute encephalopathy - Concern for encephalopathy with ongoing multiple medical comorbidities and confusion about hospital course as well as not understanding carpio catheter. Alternatively could be medicated for transfer but therapy reporting difficulty with cuing and following commands.   -PT and OT for mobility and ADLs  -SLP for cognitive evaluation     Left BKA - Patient with severe PVD with L AKA on 6/30/20 with multiple revisions due to necrosis and infection. Cultures grew MRSA and Enterococcus. Per ID continue antibiotics until 9/6/20. Transferred to [facility] from  8/12/20 to 9/4/20.   -Vancomycin and Unasyn until 9/6/20. Completed.   -Dr. Fisher to evaluate on 9/14/20 with wound care.   -Continue wound vac. Consult wound care - added honey to regimen with slow improvement in size and depth. Lidocaine 4% PRN for dressing change      Multiple wounds - Patient has rash to abdomen, redness on sacrum and left buttocks on transfer. Consult wound care.     Right face basal cell carcinoma - Unclear what follow-up. Follow-up Oncology       Anemia - Check AM CBC - 8.8. Continue to monitor.   -Anemia improving, 9.3 on 9/2       CV: HTN - Patient on Lisinopril 10 mg and Coreg 6.25 mg BID  -Hospistalist consulted  -BNP stable at 5700 on 9/12 <= 5600 on 9/8       Pain control - Primarily on admission with left phantom limb pain. Patient on Gabapentin 100 mg TID and Oxycodone. Can consider increase in Gabapentin for neuropathic phantom limb pain.   -Increase Gabapentin to 400 mg TID. Oxycodone 10 mg for wound care changes. Concern for over sedation with higher opiate dosing, limit to wound vac change, lidocaine jelly for wound change.   -Increase Gabapentin to 600 mg TID and schedule Oxycodone 5 mg first thing in AM     DM with hyperglycemia - Patient on SSI on transfer. Consult Hospitalist  -Discontinue SSI     COVID - Patient with check x2 at [facility]. Patient without symptoms. Per policy no check within 7 days. Patient without symptoms. Check PCR, on precautions - negative, no precautions.      GI Ppx - Patient on Prilosec      DVT Ppx - Patient on Lovenox on transfer.  Constipation, no BM since 9/7, PRN Miralax and MoM     Total time: 27 minutes.  I spent greater than 50% of the time for patient care, counseling, and coordination on this date, including unit/floor time, and face-to-face time with the patient as per interval events and assessment and plan above. Topics discussed included wound care and pain management.       Gwyn Valenzuela M.D.

## 2020-09-14 NOTE — THERAPY
Physical Therapy   Daily Treatment     Patient Name: Jimenez Bains  Age:  75 y.o., Sex:  male  Medical Record #: [id number]  Today's Date: 9/14/2020     Precautions  Precautions: (P) Fall Risk, Non Weight Bearing Left Lower Extremity  Comments: (P) wound vac L residual limb, sores on genital area, carpio; contact precautions (ok to leave room)    Subjective    Patient in bed and agreeable to therapy.     Objective       09/14/20 0831   Precautions   Precautions Fall Risk;Non Weight Bearing Left Lower Extremity   Comments wound vac L residual limb, sores on genital area, carpio; contact precautions (ok to leave room)   Wheelchair Functional Level of Assist   Wheelchair Assist Stand by Assist   Distance Wheelchair (Feet or Distance)   (200 ftx1, 100 ftx1, 50 ftx2)   Wheelchair Description Extra time;Supervision for safety;Verbal cueing  (cues for circular propulsion with BUEs)   Transfer Functional Level of Assist   Bed, Chair, Wheelchair Transfer Contact Guard Assist  (to SBA)   Bed Chair Wheelchair Transfer Description Adaptive equipment;Increased time;Initial preparation for task;Supervision for safety;Verbal cueing;Squat pivot transfer to wheelchair  (squat pivot with SBA-CGA, sit<>stand with CGA)   Bed Mobility    Supine to Sit Stand by Assist   Sit to Supine Stand by Assist   Sit to Stand Contact Guard Assist   Scooting Stand by Assist   Rolling Supervised   Interdisciplinary Plan of Care Collaboration   IDT Collaboration with  Certified Nursing Assistant   Patient Position at End of Therapy In Bed;Call Light within Reach;Tray Table within Reach;Phone within Reach;Other (Comments)  (CNA present)   Collaboration Comments re: bowel incontinence and changing sheets   PT Total Time Spent   PT Individual Total Time Spent (Mins) 60   PT Charge Group   PT Therapeutic Activities 4     UB dressing in unsupported and supported sitting with SBA; LB dressing in sitting and standing with Mod A and BUE support on grab bar in  bathroom. Patient with poor safety during standing, attempting to pivot on RLE to get dressed away from w/c.    W/c propulsion addressing endurance and efficient propulsion strategies. Patient with poor carryover of technique despite multimodal cues.    Assessment    Patient tolerated session fairly, focus of session on functional mobility/transfers and w/c propulsion; end of session limited by bowel incontinence. Patient reports that home is accessible via w/c which he owns already; will need Roho cushion due to residual limb and pressure sores on bottom/groin.    Strengths: Independent prior level of function, Motivated for self care and independence, Willingly participates in therapeutic activities  Barriers: Confused, Decreased endurance, Fatigue, Generalized weakness, Home accessibility, Impaired activity tolerance, Impaired balance, Pain, Limited mobility    Plan    Reinforce transfer sequencing with decreased verbal cueing, desensitization work to residual limb; continue short distance ambulation at FWW level; w/c skills for long distances, uneven terrain, ramp for home entrance; standing balance; on-going amputee education    Physical Therapy Problems     Problem: Mobility     Dates: Start: 09/05/20       Goal: STG-Within one week, patient will propel wheelchair household distances     Dates: Start: 09/05/20       Description: 1) Individualized goal:  50 ft with SBA.  2) Interventions:  PT Group Therapy, PT Gait Training, PT Therapeutic Exercises, PT Neuro Re-Ed/Balance, PT Therapeutic Activity, and PT Evaluation            Goal: STG-Within one week, patient will ambulate household distance     Dates: Start: 09/05/20       Description: 1) Individualized goal:  10 ft with FWW and SBA-CGA.  2) Interventions:  PT Group Therapy, PT Gait Training, PT Therapeutic Exercises, PT Neuro Re-Ed/Balance, PT Therapeutic Activity, and PT Evaluation                Problem: Mobility Transfers     Dates: Start: 09/05/20        Goal: STG-Within one week, patient will sit to stand     Dates: Start: 09/05/20       Description: 1) Individualized goal:  with FWW and SBA.  2) Interventions:  PT Group Therapy, PT Gait Training, PT Therapeutic Exercises, PT Neuro Re-Ed/Balance, PT Therapeutic Activity, and PT Evaluation                Problem: PT-Long Term Goals     Dates: Start: 09/05/20       Goal: LTG-By discharge, patient will propel wheelchair     Dates: Start: 09/05/20       Description: 1) Individualized goal:  150 ft mod I.  2) Interventions:  PT Group Therapy, PT Gait Training, PT Therapeutic Exercises, PT Neuro Re-Ed/Balance, PT Therapeutic Activity, and PT Evaluation          Goal: LTG-By discharge, patient will ambulate     Dates: Start: 09/05/20       Description: 1) Individualized goal:  50 ft with FWW and supervision.  2) Interventions:  PT Group Therapy, PT Gait Training, PT Therapeutic Exercises, PT Neuro Re-Ed/Balance, PT Therapeutic Activity, and PT Evaluation          Goal: LTG-By discharge, patient will transfer one surface to another     Dates: Start: 09/05/20       Description: 1) Individualized goal:  with FWW vs. Squat pivot mod I.  2) Interventions:  PT Group Therapy, PT Gait Training, PT Therapeutic Exercises, PT Neuro Re-Ed/Balance, PT Therapeutic Activity, and PT Evaluation          Goal: LTG-By discharge, patient will perform home exercise program     Dates: Start: 09/05/20       Description: 1) Individualized goal:  with handout mod I.  2) Interventions:  PT Group Therapy, PT Gait Training, PT Therapeutic Exercises, PT Neuro Re-Ed/Balance, PT Therapeutic Activity, and PT Evaluation          Goal: LTG-By discharge, patient will     Dates: Start: 09/05/20       Description: 1) Individualized goal:  perform bed mobility including rolling to prone mod I.  2) Interventions:  PT Group Therapy, PT Gait Training, PT Therapeutic Exercises, PT Neuro Re-Ed/Balance, PT Therapeutic Activity, and PT Evaluation

## 2020-09-14 NOTE — THERAPY
Occupational Therapy  Daily Treatment     Patient Name: Jimenez Bains  Age:  75 y.o., Sex:  male  Medical Record #: [id number]  Today's Date: 9/14/2020     Precautions  Precautions: (P) Fall Risk, Non Weight Bearing Left Lower Extremity  Comments: (P) wound vac L residual limb, sores on genital area, carpio; contact precautions (ok to leave room)         Subjective    Pt received supine in bed, agreeable to OT with focus on ADLs     Objective       09/14/20 1301   Precautions   Precautions Fall Risk;Non Weight Bearing Left Lower Extremity   Comments wound vac L residual limb, sores on genital area, carpio; contact precautions (ok to leave room)   Functional Level of Assist   Grooming Modified Independent;Seated   Grooming Description Seated in wheelchair at sink;Increased time   Bathing Minimal Assist   Bathing Description Grab bar;Hand held shower;Tub bench;Increased time;Supervision for safety;Verbal cueing  (min A for balance to stand and wash bottom, assist for R ft)   Upper Body Dressing Supervision   Upper Body Dressing Description Set-up of equipment   Lower Body Dressing Moderate Assist   Lower Body Dressing Description Grab bar;Increased time;Supervision for safety;Verbal cueing  (assist for R sock, assist for standing balance)   Tub / Shower Transfers Minimal Assist   Tub Shower Transfer Description Grab bar;Increased time;Supervision for safety;Verbal cueing  (squat pivot w/c <> shower bench via GB)   OT Total Time Spent   OT Individual Total Time Spent (Mins) 60   OT Charge Group   OT Self Care / ADL 4       Assessment    Pt tolerated session well with focus on progressing independence in ADLs; tolerated seated shower on pull-down shower bench, min A for standing to wash backside, improved independence with LB dressing, able to thread LEs into pants, min A for pulling pants up primarily for balance, used grab bar for support, did require assist for sock, will benefit from sock aid demo. Requires increased  time for all tasks for energy recovery and pain management, consistent cues for safety management  Strengths: Pleasant and cooperative, Willingly participates in therapeutic activities  Barriers: Confused, Decreased endurance, Generalized weakness, Impaired balance, Limited mobility, Pain    Plan     Continue to progress gross strengthening and endurance and standing tolerance/balance toward increased safety and independence with ADLs, general attention to hygiene and self-care, weightshift when scooting    Occupational Therapy Goals     Problem: Functional Transfers     Dates: Start: 09/05/20       Goal: STG-Within one week, patient will transfer to step in shower     Dates: Start: 09/05/20       Description: 1) Individualized Goal: Mod assist for wc/shower chair transfer via DME PRN  2) Interventions: OT Self Care/ADL, OT Neuro Re-Ed/Balance, OT Therapeutic Activity, OT Evaluation, and OT Therapeutic Exercise                  Problem: OT Long Term Goals     Dates: Start: 09/05/20       Goal: LTG-By discharge, patient will complete basic self care tasks     Dates: Start: 09/05/20       Description: 1) Individualized Goal:  Mod I for BADL's via AE/DME PRN  2) Interventions: OT Self Care/ADL, OT Neuro Re-Ed/Balance, OT Therapeutic Activity, OT Evaluation, and OT Therapeutic Exercise            Goal: LTG-By discharge, patient will perform bathroom transfers     Dates: Start: 09/05/20       Description: 1) Individualized Goal:  Mod I for toilet and shower transfer via DME  2) Interventions: OT Self Care/ADL, OT Neuro Re-Ed/Balance, OT Therapeutic Activity, OT Evaluation, and OT Therapeutic Exercise                  Problem: Toileting     Dates: Start: 09/05/20       Goal: STG-Within one week, patient will complete toileting tasks     Dates: Start: 09/05/20       Description: 1) Individualized Goal:  Max assist for toileting task via AE/DME PRN  2) Interventions:  OT Self Care/ADL, OT Neuro Re-Ed/Balance, OT Therapeutic  Activity, OT Evaluation, and OT Therapeutic Exercise

## 2020-09-14 NOTE — THERAPY
Speech Language Pathology  Daily Treatment     Patient Name: Jimenez Bains  Age:  75 y.o., Sex:  male  Medical Record #: [id number]  Today's Date: 9/14/2020     Precautions  Precautions: Fall Risk, Non Weight Bearing Left Lower Extremity  Comments: wound vac L residual limb, sores on genital area, carpio; contact precautions (ok to leave room)    Subjective    Pt pleasant and cooperative.  Pt demonstrated emotional lability during session.       Objective       09/14/20 1031   Cognition   Functional Memory Activities Moderate (3)   Safety Awareness Minimal (4)   SLP Total Time Spent   SLP Individual Total Time Spent (Mins) 60   Treatment Charges   SLP Cognitive Skill Development First 15 Minutes 1   SLP Cognitive Skill Development Additional 15 Minutes 3       Assessment    Pt recalled use of TV functions introduced yesterday.  Pt benefits from min verbal cues to accurately press the buttons.  Pt required mod cues to recall PT session earlier this day.  Pt recalled use of call light, and wheelchair brakes given min verbal cues.  Pt verbalized transfer sequences given min verbal cues.     Strengths: Able to follow instructions, Motivated for self care and independence, Pleasant and cooperative, Willingly participates in therapeutic activities  Barriers: Impaired functional cognition    Plan    Target problem solving, recall, and transfer sequences.      Speech Therapy Problems     Problem: Memory STGs     Dates: Start: 09/05/20       Goal: STG-Within one week, patient will     Dates: Start: 09/05/20       Description: 1) Individualized goal:  recall daily events and safety strategies given min verbal cues 80% with use of memory book.  2) Interventions:  SLP Cognitive Skill Development and SLP Group Treatment                    Problem: Problem Solving STGs     Dates: Start: 09/05/20       Goal: STG-Within one week, patient will     Dates: Start: 09/05/20       Description: 1) Individualized goal:  answer basic problem  solving questions with 80% accuracy given min verbal cues.    2) Interventions:  SLP Cognitive Skill Development and SLP Group Treatment                    Problem: Speech/Swallowing LTGs     Dates: Start: 09/05/20       Goal: LTG-By discharge, patient will solve basic problems     Dates: Start: 09/05/20       Description: 1) Individualized goal:  Mert for safe discharge home.   2) Interventions:  SLP Aphasia Evaluation, SLP Cognitive Skill Development, and SLP Group Treatment

## 2020-09-14 NOTE — PROGRESS NOTES
Hospital Medicine Daily Progress Note      Chief Complaint:  Hypertension  Diabetes    Interval History:  No significant events or changes since last visit    Review of Systems  Review of Systems   Constitutional: Negative for chills and fever.   Respiratory: Negative for shortness of breath.    Cardiovascular: Negative for chest pain.   Gastrointestinal: Negative for abdominal pain, diarrhea, nausea and vomiting.   Psychiatric/Behavioral: The patient is not nervous/anxious.         Physical Exam  Temp:  [36.4 °C (97.6 °F)-37.5 °C (99.5 °F)] 37.5 °C (99.5 °F)  Pulse:  [63-77] 70  Resp:  [16-18] 18  BP: (128-156)/(58-79) 156/79  SpO2:  [94 %] 94 %    Physical Exam  Vitals signs and nursing note reviewed.   Constitutional:       Appearance: Normal appearance.   HENT:      Head: Atraumatic.   Eyes:      Conjunctiva/sclera: Conjunctivae normal.      Pupils: Pupils are equal, round, and reactive to light.   Neck:      Musculoskeletal: Normal range of motion and neck supple.   Cardiovascular:      Rate and Rhythm: Normal rate and regular rhythm.      Heart sounds: No murmur.   Pulmonary:      Effort: Pulmonary effort is normal.      Breath sounds: No stridor. No wheezing or rales.   Abdominal:      General: There is no distension.      Palpations: Abdomen is soft.      Tenderness: There is no abdominal tenderness.   Musculoskeletal:      Right lower leg: No edema.      Left lower leg: No edema.      Comments: Has left AKA   Skin:     General: Skin is warm and dry.      Findings: No rash.      Comments: Large lesion on right cheek   Neurological:      Mental Status: He is alert and oriented to person, place, and time.   Psychiatric:         Mood and Affect: Mood normal.         Behavior: Behavior normal.         Fluids    Intake/Output Summary (Last 24 hours) at 9/14/2020 0943  Last data filed at 9/14/2020 0800  Gross per 24 hour   Intake 440 ml   Output 1050 ml   Net -610 ml       Laboratory  Recent Labs      09/12/20  0706   WBC 8.3   RBC 3.07*   HEMOGLOBIN 9.3*   HEMATOCRIT 31.2*   .6*   MCH 30.3   MCHC 29.8*   RDW 56.7*   PLATELETCT 230   MPV 10.9     Recent Labs     09/12/20  0706   SODIUM 147*   POTASSIUM 3.9   CHLORIDE 111   CO2 28   GLUCOSE 130*   BUN 14   CREATININE 1.07   CALCIUM 8.7                   Assessment/Plan  AKA stump complication ([facility])- (present on admission)  Assessment & Plan  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  S/P left AKA for critical limb ischemia (6/2020)  Post-op complications included necrosis and infection  S/P AKA revision in 7/2020  S/P AKA I&D x 3  Wound vac per Wound Care  Wound Cx +MRSA and Enterococcus  S/P Vanco & Unasyn     PVD (peripheral vascular disease) with claudication ([facility])- (present on admission)  Assessment & Plan  On Lipitor  Consider ASA    Diabetes ([facility])- (present on admission)  Assessment & Plan  Hba1c: 7.3 (7/27) --> 5.1 (9/5)  BS have been ok with no coverage needed   Off accuchecks  Note: needs out patient follow up    Elevated brain natriuretic peptide (BNP) level  Assessment & Plan  BNP: 5607 (9/8) --> 5737 (9/12)  Asymptomatic  O2 sats good on RA  No edema  No lung crackles  Probably chronic  Suggest f/u with PMD for possible workup  Monitor    Hypophosphatemia  Assessment & Plan  PO4: 2.1 (9/12)  On supplements (9/12)  Monitor    Hypokalemia  Assessment & Plan  Currently resolved  K+: 3.5 (9/8) --> 3.9 (9/12)  S/P KCL 40 meq x 1 (9/8)  Monitor    Hypernatremia  Assessment & Plan  Na: 147 (9/7) --> 145 (9/8) --> 147 (9/12)  S/P previous D5W at 50 mL/hr x 500 mL  ? Etiology  Bun/Cr wnl  On Cardiac diet -- limit 2g Na  Note: on Lisinopril and increased dose recently -- may help  Monitor    Vitamin D deficiency  Assessment & Plan  Vit D: 10  On supplements    Anemia  Assessment & Plan  H&H stable with Hb 8.8 (9/7) --> 9.3 (9/12)  TSH normal  B12: 198  Folate: 2.9  On B12 & folate supplements  Monitor    HTN (hypertension)- (present on  admission)  Assessment & Plan  BP generally ok but occ rises up  On Core.25 mg bid  On Lisinopril: 10 mg daily --> 20 mg daily ()  Cont to monitor    Basal cell carcinoma of face- (present on admission)  Assessment & Plan  Pt reports right cheek lesion has been ongoing for a while  Needs Dermatology follow up  CM to establish pt with a PMD

## 2020-09-15 LAB
ANION GAP SERPL CALC-SCNC: 11 MMOL/L (ref 7–16)
BUN SERPL-MCNC: 12 MG/DL (ref 8–22)
CALCIUM SERPL-MCNC: 8.8 MG/DL (ref 8.5–10.5)
CHLORIDE SERPL-SCNC: 109 MMOL/L (ref 96–112)
CO2 SERPL-SCNC: 26 MMOL/L (ref 20–33)
CREAT SERPL-MCNC: 0.99 MG/DL (ref 0.5–1.4)
GLUCOSE SERPL-MCNC: 116 MG/DL (ref 65–99)
MAGNESIUM SERPL-MCNC: 2 MG/DL (ref 1.5–2.5)
PHOSPHATE SERPL-MCNC: 2.5 MG/DL (ref 2.5–4.5)
POTASSIUM SERPL-SCNC: 3.6 MMOL/L (ref 3.6–5.5)
SODIUM SERPL-SCNC: 146 MMOL/L (ref 135–145)

## 2020-09-15 PROCEDURE — 84100 ASSAY OF PHOSPHORUS: CPT

## 2020-09-15 PROCEDURE — 36415 COLL VENOUS BLD VENIPUNCTURE: CPT

## 2020-09-15 PROCEDURE — 97129 THER IVNTJ 1ST 15 MIN: CPT

## 2020-09-15 PROCEDURE — A9270 NON-COVERED ITEM OR SERVICE: HCPCS | Performed by: HOSPITALIST

## 2020-09-15 PROCEDURE — 700102 HCHG RX REV CODE 250 W/ 637 OVERRIDE(OP): Performed by: PHYSICAL MEDICINE & REHABILITATION

## 2020-09-15 PROCEDURE — 97110 THERAPEUTIC EXERCISES: CPT

## 2020-09-15 PROCEDURE — 700102 HCHG RX REV CODE 250 W/ 637 OVERRIDE(OP): Performed by: HOSPITALIST

## 2020-09-15 PROCEDURE — 770010 HCHG ROOM/CARE - REHAB SEMI PRIVAT*

## 2020-09-15 PROCEDURE — 97130 THER IVNTJ EA ADDL 15 MIN: CPT

## 2020-09-15 PROCEDURE — 700111 HCHG RX REV CODE 636 W/ 250 OVERRIDE (IP): Performed by: PHYSICAL MEDICINE & REHABILITATION

## 2020-09-15 PROCEDURE — 83735 ASSAY OF MAGNESIUM: CPT

## 2020-09-15 PROCEDURE — 99232 SBSQ HOSP IP/OBS MODERATE 35: CPT | Performed by: HOSPITALIST

## 2020-09-15 PROCEDURE — 99232 SBSQ HOSP IP/OBS MODERATE 35: CPT | Performed by: PHYSICAL MEDICINE & REHABILITATION

## 2020-09-15 PROCEDURE — 80048 BASIC METABOLIC PNL TOTAL CA: CPT

## 2020-09-15 PROCEDURE — 97530 THERAPEUTIC ACTIVITIES: CPT

## 2020-09-15 PROCEDURE — 700101 HCHG RX REV CODE 250: Performed by: PHYSICAL MEDICINE & REHABILITATION

## 2020-09-15 PROCEDURE — 97116 GAIT TRAINING THERAPY: CPT

## 2020-09-15 PROCEDURE — 97535 SELF CARE MNGMENT TRAINING: CPT

## 2020-09-15 PROCEDURE — A9270 NON-COVERED ITEM OR SERVICE: HCPCS | Performed by: PHYSICAL MEDICINE & REHABILITATION

## 2020-09-15 RX ORDER — BACITRACIN ZINC AND POLYMYXIN B SULFATE 500; 1000 [USP'U]/G; [USP'U]/G
OINTMENT TOPICAL 2 TIMES DAILY
Status: DISCONTINUED | OUTPATIENT
Start: 2020-09-15 | End: 2020-09-23 | Stop reason: HOSPADM

## 2020-09-15 RX ORDER — GABAPENTIN 400 MG/1
800 CAPSULE ORAL 3 TIMES DAILY
Status: DISCONTINUED | OUTPATIENT
Start: 2020-09-15 | End: 2020-09-17

## 2020-09-15 RX ADMIN — ENOXAPARIN SODIUM 40 MG: 40 INJECTION SUBCUTANEOUS at 09:41

## 2020-09-15 RX ADMIN — ATORVASTATIN CALCIUM 40 MG: 40 TABLET, FILM COATED ORAL at 21:32

## 2020-09-15 RX ADMIN — DIBASIC SODIUM PHOSPHATE, MONOBASIC POTASSIUM PHOSPHATE AND MONOBASIC SODIUM PHOSPHATE 250 MG: 852; 155; 130 TABLET ORAL at 09:43

## 2020-09-15 RX ADMIN — OMEPRAZOLE 20 MG: 20 CAPSULE, DELAYED RELEASE ORAL at 09:41

## 2020-09-15 RX ADMIN — DOCUSATE SODIUM 50 MG AND SENNOSIDES 8.6 MG 2 TABLET: 8.6; 5 TABLET, FILM COATED ORAL at 21:32

## 2020-09-15 RX ADMIN — OXYCODONE HYDROCHLORIDE 5 MG: 5 TABLET ORAL at 06:06

## 2020-09-15 RX ADMIN — ACETAMINOPHEN 1000 MG: 325 TABLET, FILM COATED ORAL at 09:42

## 2020-09-15 RX ADMIN — GABAPENTIN 800 MG: 400 CAPSULE ORAL at 21:32

## 2020-09-15 RX ADMIN — LISINOPRIL 20 MG: 20 TABLET ORAL at 09:42

## 2020-09-15 RX ADMIN — CARVEDILOL 6.25 MG: 3.12 TABLET, FILM COATED ORAL at 17:53

## 2020-09-15 RX ADMIN — TRAZODONE HYDROCHLORIDE 50 MG: 50 TABLET ORAL at 21:34

## 2020-09-15 RX ADMIN — OXYCODONE HYDROCHLORIDE 10 MG: 10 TABLET ORAL at 21:32

## 2020-09-15 RX ADMIN — OXYCODONE HYDROCHLORIDE 5 MG: 5 TABLET ORAL at 13:14

## 2020-09-15 RX ADMIN — Medication 4000 UNITS: at 09:41

## 2020-09-15 RX ADMIN — CARVEDILOL 6.25 MG: 3.12 TABLET, FILM COATED ORAL at 09:42

## 2020-09-15 RX ADMIN — GABAPENTIN 600 MG: 300 CAPSULE ORAL at 09:41

## 2020-09-15 RX ADMIN — GABAPENTIN 600 MG: 300 CAPSULE ORAL at 14:58

## 2020-09-15 RX ADMIN — ACETAMINOPHEN 1000 MG: 325 TABLET, FILM COATED ORAL at 21:31

## 2020-09-15 RX ADMIN — Medication 1000 MCG: at 09:43

## 2020-09-15 RX ADMIN — DIBASIC SODIUM PHOSPHATE, MONOBASIC POTASSIUM PHOSPHATE AND MONOBASIC SODIUM PHOSPHATE 250 MG: 852; 155; 130 TABLET ORAL at 21:32

## 2020-09-15 RX ADMIN — BACITRACIN ZINC AND POLYMYXIN B SULFATE: at 21:35

## 2020-09-15 RX ADMIN — DIBASIC SODIUM PHOSPHATE, MONOBASIC POTASSIUM PHOSPHATE AND MONOBASIC SODIUM PHOSPHATE 250 MG: 852; 155; 130 TABLET ORAL at 14:58

## 2020-09-15 RX ADMIN — ACETAMINOPHEN 1000 MG: 325 TABLET, FILM COATED ORAL at 14:58

## 2020-09-15 RX ADMIN — FOLIC ACID 1 MG: 1 TABLET ORAL at 09:42

## 2020-09-15 ASSESSMENT — ENCOUNTER SYMPTOMS
EYES NEGATIVE: 1
FEVER: 0
ABDOMINAL PAIN: 0
CHILLS: 0
SHORTNESS OF BREATH: 0
POLYDIPSIA: 0
BRUISES/BLEEDS EASILY: 0
COUGH: 0
VOMITING: 0
PALPITATIONS: 0
NAUSEA: 0

## 2020-09-15 ASSESSMENT — GAIT ASSESSMENTS
DISTANCE (FEET): 15
GAIT LEVEL OF ASSIST: MINIMAL ASSIST
ASSISTIVE DEVICE: FRONT WHEEL WALKER

## 2020-09-15 ASSESSMENT — ACTIVITIES OF DAILY LIVING (ADL)
BED_CHAIR_WHEELCHAIR_TRANSFER_DESCRIPTION: ADAPTIVE EQUIPMENT;INCREASED TIME;INITIAL PREPARATION FOR TASK;SET-UP OF EQUIPMENT;SQUAT PIVOT TRANSFER TO WHEELCHAIR;SUPERVISION FOR SAFETY;VERBAL CUEING

## 2020-09-15 ASSESSMENT — PAIN DESCRIPTION - PAIN TYPE
TYPE: ACUTE PAIN
TYPE: ACUTE PAIN;SURGICAL PAIN

## 2020-09-15 NOTE — THERAPY
Physical Therapy   Daily Treatment     Patient Name: Jimenez Bains  Age:  75 y.o., Sex:  male  Medical Record #: [id number]  Today's Date: 9/15/2020     Precautions  Precautions: (P) Fall Risk, Non Weight Bearing Left Lower Extremity  Comments: (P) wound vac L residual limb, sores on genital area, carpio; contact precautions (ok to leave room)    Subjective    Patient in bed and agreeable to therapy, daughter present for session for family training.     Objective       09/15/20 1031   Precautions   Precautions Fall Risk;Non Weight Bearing Left Lower Extremity   Comments wound vac L residual limb, sores on genital area, carpio; contact precautions (ok to leave room)   Gait Functional Level of Assist    Gait Level Of Assist Minimal Assist   Assistive Device Front Wheel Walker   Distance (Feet) 15   # of Times Distance was Traveled 1   Deviation   (hops on RLE, heavy BUE reliance on walker)   Wheelchair Functional Level of Assist   Wheelchair Assist Stand by Assist   Distance Wheelchair (Feet or Distance) 100   Wheelchair Description Extra time;Supervision for safety;Verbal cueing  (cues for circular propulsion with BUEs)   Transfer Functional Level of Assist   Bed, Chair, Wheelchair Transfer Contact Guard Assist   Bed Chair Wheelchair Transfer Description Adaptive equipment;Increased time;Initial preparation for task;Set-up of equipment;Squat pivot transfer to wheelchair;Supervision for safety;Verbal cueing  (squat pivot with SBA-CGA, sit<>stand with CGA)   Bed Mobility    Supine to Sit Stand by Assist   Sit to Supine Stand by Assist   Sit to Stand Contact Guard Assist  (to Min A)   Scooting Stand by Assist   Rolling Supervised   Interdisciplinary Plan of Care Collaboration   IDT Collaboration with  Family / Caregiver;Certified Nursing Assistant   Patient Position at End of Therapy In Bed;Call Light within Reach;Tray Table within Reach;Phone within Reach;Family / Friend in Room   Collaboration Comments family training  completed with daughter; CNA notified of increased catheter insertion pain   PT Total Time Spent   PT Individual Total Time Spent (Mins) 60   PT Charge Group   PT Gait Training 1   PT Therapeutic Activities 3     Family training performed with daughterRehana, reviewed safety recommendations, equipment recommendations with specialty w/c cushion, bed mobility, stand pivot transfers, w/c propulsion. Patient and daughter demonstrating good safety with hands-on training for stand pivot transfer w/c>bed. Demonstrated sit<>stand and short-distance hopping with FWW.    Assessment    Patient tolerated session fairly, limited by pain. Patient and daughter verbalizing and demonstrating understanding of education.    Strengths: Independent prior level of function, Motivated for self care and independence, Willingly participates in therapeutic activities   Barriers: Confused, Decreased endurance, Fatigue, Generalized weakness, Home accessibility, Impaired activity tolerance, Impaired balance, Pain, Limited mobility    Plan    Reinforce transfer sequencing with decreased verbal cueing, desensitization work to residual limb; continue short distance ambulation at FWW level; w/c skills for endurance and efficient technique; standing balance; on-going amputee education    Physical Therapy Problems     Problem: Mobility     Dates: Start: 09/05/20       Goal: STG-Within one week, patient will propel wheelchair household distances     Dates: Start: 09/05/20       Description: 1) Individualized goal:  50 ft with SBA.  2) Interventions:  PT Group Therapy, PT Gait Training, PT Therapeutic Exercises, PT Neuro Re-Ed/Balance, PT Therapeutic Activity, and PT Evaluation            Goal: STG-Within one week, patient will ambulate household distance     Dates: Start: 09/05/20       Description: 1) Individualized goal:  10 ft with FWW and SBA-CGA.  2) Interventions:  PT Group Therapy, PT Gait Training, PT Therapeutic Exercises, PT Neuro  Re-Ed/Balance, PT Therapeutic Activity, and PT Evaluation                Problem: Mobility Transfers     Dates: Start: 09/05/20       Goal: STG-Within one week, patient will sit to stand     Dates: Start: 09/05/20       Description: 1) Individualized goal:  with FWW and SBA.  2) Interventions:  PT Group Therapy, PT Gait Training, PT Therapeutic Exercises, PT Neuro Re-Ed/Balance, PT Therapeutic Activity, and PT Evaluation                Problem: PT-Long Term Goals     Dates: Start: 09/05/20       Goal: LTG-By discharge, patient will propel wheelchair     Dates: Start: 09/05/20       Description: 1) Individualized goal:  150 ft mod I.  2) Interventions:  PT Group Therapy, PT Gait Training, PT Therapeutic Exercises, PT Neuro Re-Ed/Balance, PT Therapeutic Activity, and PT Evaluation          Goal: LTG-By discharge, patient will ambulate     Dates: Start: 09/05/20       Description: 1) Individualized goal:  50 ft with FWW and supervision.  2) Interventions:  PT Group Therapy, PT Gait Training, PT Therapeutic Exercises, PT Neuro Re-Ed/Balance, PT Therapeutic Activity, and PT Evaluation          Goal: LTG-By discharge, patient will transfer one surface to another     Dates: Start: 09/05/20       Description: 1) Individualized goal:  with FWW vs. Squat pivot mod I.  2) Interventions:  PT Group Therapy, PT Gait Training, PT Therapeutic Exercises, PT Neuro Re-Ed/Balance, PT Therapeutic Activity, and PT Evaluation          Goal: LTG-By discharge, patient will perform home exercise program     Dates: Start: 09/05/20       Description: 1) Individualized goal:  with handout mod I.  2) Interventions:  PT Group Therapy, PT Gait Training, PT Therapeutic Exercises, PT Neuro Re-Ed/Balance, PT Therapeutic Activity, and PT Evaluation          Goal: LTG-By discharge, patient will     Dates: Start: 09/05/20       Description: 1) Individualized goal:  perform bed mobility including rolling to prone mod I.  2) Interventions:  PT Group  Therapy, PT Gait Training, PT Therapeutic Exercises, PT Neuro Re-Ed/Balance, PT Therapeutic Activity, and PT Evaluation

## 2020-09-15 NOTE — THERAPY
Occupational Therapy  Daily Treatment     Patient Name: Jimenez Bains  Age:  75 y.o., Sex:  male  Medical Record #: [id number]  Today's Date: 9/15/2020     Precautions  Precautions: (P) Fall Risk, Non Weight Bearing Left Lower Extremity  Comments: (P) wound vac L residual limb, sores on genital area, carpio; contact precautions (ok to leave room)         Subjective    Pt and daughter present and agreeable to family training     Objective       09/15/20 0931   Precautions   Precautions Fall Risk;Non Weight Bearing Left Lower Extremity   Comments wound vac L residual limb, sores on genital area, carpio; contact precautions (ok to leave room)   Functional Level of Assist   Lower Body Dressing Supervision   Lower Body Dressing Description Set-up of equipment  (assist for catheter management, completed in supine)   Interdisciplinary Plan of Care Collaboration   IDT Collaboration with  Family / Caregiver   Patient Position at End of Therapy In Bed;Family / Friend in Room  (direct hand off to SLP)   Collaboration Comments family training completed with daughter   OT Total Time Spent   OT Individual Total Time Spent (Mins) 60   OT Charge Group   OT Self Care / ADL 2       Assessment    Pt tolerated session well, daughter present and receptive to education on strategies to manage ADLs at home. Discussed home bathroom setup, pt with TTB at home, w/c can fit into bathroom but limited to no room for turning, discussed potential need for handrails next to toilet, grab bar placement, will trial varying methods during therapy this week to determine safest setup, may benefit from home eval prior to d/c.   Strengths: Pleasant and cooperative, Willingly participates in therapeutic activities  Barriers: Confused, Decreased endurance, Generalized weakness, Impaired balance, Limited mobility, Pain    Plan    Continue to progress gross strengthening and endurance and standing tolerance/balance toward increased safety and independence with  ADLs, general attention to hygiene and self-care, weightshift when scooting    Occupational Therapy Goals     Problem: Functional Transfers     Dates: Start: 09/05/20       Goal: STG-Within one week, patient will transfer to step in shower     Dates: Start: 09/05/20       Description: 1) Individualized Goal: Mod assist for wc/shower chair transfer via DME PRN  2) Interventions: OT Self Care/ADL, OT Neuro Re-Ed/Balance, OT Therapeutic Activity, OT Evaluation, and OT Therapeutic Exercise                  Problem: OT Long Term Goals     Dates: Start: 09/05/20       Goal: LTG-By discharge, patient will complete basic self care tasks     Dates: Start: 09/05/20       Description: 1) Individualized Goal:  Mod I for BADL's via AE/DME PRN  2) Interventions: OT Self Care/ADL, OT Neuro Re-Ed/Balance, OT Therapeutic Activity, OT Evaluation, and OT Therapeutic Exercise            Goal: LTG-By discharge, patient will perform bathroom transfers     Dates: Start: 09/05/20       Description: 1) Individualized Goal:  Mod I for toilet and shower transfer via DME  2) Interventions: OT Self Care/ADL, OT Neuro Re-Ed/Balance, OT Therapeutic Activity, OT Evaluation, and OT Therapeutic Exercise                  Problem: Toileting     Dates: Start: 09/05/20       Goal: STG-Within one week, patient will complete toileting tasks     Dates: Start: 09/05/20       Description: 1) Individualized Goal:  Max assist for toileting task via AE/DME PRN  2) Interventions:  OT Self Care/ADL, OT Neuro Re-Ed/Balance, OT Therapeutic Activity, OT Evaluation, and OT Therapeutic Exercise

## 2020-09-15 NOTE — THERAPY
Occupational Therapy  Daily Treatment     Patient Name: Jimenez Bains  Age:  75 y.o., Sex:  male  Medical Record #: [id number]  Today's Date: 9/15/2020     Precautions  Precautions: Fall Risk, Non Weight Bearing Left Lower Extremity  Comments: wound vac L residual limb, sores on genital area, carpio; contact precautions (ok to leave room)         Subjective    Pt received supine in bed, reporting intense LLE pain, RN notified, pt agreeable to UE therex in bed     Objective       09/15/20 1301   Precautions   Precautions Fall Risk;Non Weight Bearing Left Lower Extremity   Comments wound vac L residual limb, sores on genital area, carpio; contact precautions (ok to leave room)   Supine Upper Body Exercises   Supine Upper Body Exercises Yes   Chest Fly 2 sets of 15  (5# dumbells)   Chest Press 2 sets of 15  (5# dumbells)   Front Arm Raise 2 sets of 15  (5# dumbells)   Bicep Curl 2 sets of 15  (5# dumbells)   Elbow Extension 2 sets of 15  (5# dumbells)   OT Total Time Spent   OT Individual Total Time Spent (Mins) 30   OT Charge Group   Charges Yes   OT Therapeutic Exercise  2       Assessment    Pt tolerated UE therex well in supine. Limited this session by severe pain impacting tolerance for OOB activity, RN and MD aware.   Strengths: Pleasant and cooperative, Willingly participates in therapeutic activities  Barriers: Confused, Decreased endurance, Generalized weakness, Impaired balance, Limited mobility, Pain    Plan    Continue to progress gross strengthening and endurance and standing tolerance/balance toward increased safety and independence with ADLs, general attention to hygiene and self-care, weightshift when scooting    Occupational Therapy Goals     Problem: Functional Transfers     Dates: Start: 09/05/20       Goal: STG-Within one week, patient will transfer to step in shower     Dates: Start: 09/05/20       Description: 1) Individualized Goal: Mod assist for wc/shower chair transfer via DME PRN  2)  Interventions: OT Self Care/ADL, OT Neuro Re-Ed/Balance, OT Therapeutic Activity, OT Evaluation, and OT Therapeutic Exercise                  Problem: OT Long Term Goals     Dates: Start: 09/05/20       Goal: LTG-By discharge, patient will complete basic self care tasks     Dates: Start: 09/05/20       Description: 1) Individualized Goal:  Mod I for BADL's via AE/DME PRN  2) Interventions: OT Self Care/ADL, OT Neuro Re-Ed/Balance, OT Therapeutic Activity, OT Evaluation, and OT Therapeutic Exercise            Goal: LTG-By discharge, patient will perform bathroom transfers     Dates: Start: 09/05/20       Description: 1) Individualized Goal:  Mod I for toilet and shower transfer via DME  2) Interventions: OT Self Care/ADL, OT Neuro Re-Ed/Balance, OT Therapeutic Activity, OT Evaluation, and OT Therapeutic Exercise                  Problem: Toileting     Dates: Start: 09/05/20       Goal: STG-Within one week, patient will complete toileting tasks     Dates: Start: 09/05/20       Description: 1) Individualized Goal:  Max assist for toileting task via AE/DME PRN  2) Interventions:  OT Self Care/ADL, OT Neuro Re-Ed/Balance, OT Therapeutic Activity, OT Evaluation, and OT Therapeutic Exercise

## 2020-09-15 NOTE — THERAPY
Speech Language Pathology  Daily Treatment     Patient Name: Jimenez Bains  Age:  75 y.o., Sex:  male  Medical Record #: [id number]  Today's Date: 9/15/2020     Precautions  Precautions: Fall Risk, Non Weight Bearing Left Lower Extremity  Comments: wound vac L residual limb, sores on genital area, carpio; contact precautions (ok to leave room)    Subjective    Patient was willing to participate. Seen at 1000 and 1400. Daughter present for am session     Objective       09/15/20 1002   Cognition   Moderate Attention Minimal (4)   Functional Memory Activities Moderate (3)   Functional Problem Solving Moderate (3)   SLP Total Time Spent   SLP Individual Total Time Spent (Mins) 60   Treatment Charges   SLP Cognitive Skill Development First 15 Minutes 1   SLP Cognitive Skill Development Additional 15 Minutes 3       Assessment    Initiated family training. Discussed cognitive linguistic deficits as well as recommendations for assistance with medication and financial management. Provided ongoing education regarding memory strategies and completed memory log with patient. Patient required mod cues to recall daily events.     Strengths: Able to follow instructions, Motivated for self care and independence, Pleasant and cooperative, Willingly participates in therapeutic activities  Barriers: Impaired functional cognition    Plan    Continue basic functional problem solving and attention. Memory log.     Speech Therapy Problems     Problem: Memory STGs     Dates: Start: 09/05/20       Goal: STG-Within one week, patient will     Dates: Start: 09/05/20       Description: 1) Individualized goal:  recall daily events and safety strategies given min verbal cues 80% with use of memory book.  2) Interventions:  SLP Cognitive Skill Development and SLP Group Treatment                    Problem: Problem Solving STGs     Dates: Start: 09/05/20       Goal: STG-Within one week, patient will     Dates: Start: 09/05/20       Description: 1)  Individualized goal:  answer basic problem solving questions with 80% accuracy given min verbal cues.    2) Interventions:  SLP Cognitive Skill Development and SLP Group Treatment                    Problem: Speech/Swallowing LTGs     Dates: Start: 09/05/20       Goal: LTG-By discharge, patient will solve basic problems     Dates: Start: 09/05/20       Description: 1) Individualized goal:  Mert for safe discharge home.   2) Interventions:  SLP Aphasia Evaluation, SLP Cognitive Skill Development, and SLP Group Treatment

## 2020-09-15 NOTE — PROGRESS NOTES
Hospital Medicine Daily Progress Note      Chief Complaint  Hypertension  Diabetes    Interval Problem Update  Pt c/o chronic pain from AKA.    Review of Systems  Review of Systems   Constitutional: Negative for chills and fever.   HENT: Negative.    Eyes: Negative.    Respiratory: Negative for cough and shortness of breath.    Cardiovascular: Negative for chest pain and palpitations.   Gastrointestinal: Negative for abdominal pain, nausea and vomiting.   Musculoskeletal:        Wound pain    Endo/Heme/Allergies: Negative for polydipsia. Does not bruise/bleed easily.        Physical Exam  Temp:  [37 °C (98.6 °F)-37.2 °C (98.9 °F)] 37 °C (98.6 °F)  Pulse:  [68-69] 69  Resp:  [17-18] 17  BP: (128-144)/(61-70) 137/69  SpO2:  [95 %] 95 %    Physical Exam  Vitals signs reviewed.   Constitutional:       Appearance: Normal appearance.      Comments: Chronically ill appearing   HENT:      Head: Normocephalic and atraumatic.      Right Ear: External ear normal.      Left Ear: External ear normal.      Nose: Nose normal.      Mouth/Throat:      Pharynx: Oropharynx is clear.   Eyes:      General:         Right eye: No discharge.         Left eye: No discharge.      Extraocular Movements: Extraocular movements intact.      Conjunctiva/sclera: Conjunctivae normal.   Neck:      Musculoskeletal: Normal range of motion and neck supple.   Cardiovascular:      Rate and Rhythm: Normal rate and regular rhythm.   Pulmonary:      Effort: No respiratory distress.      Breath sounds: No wheezing.      Comments: Decreased BS   Abdominal:      General: Bowel sounds are normal. There is no distension.      Palpations: Abdomen is soft.      Tenderness: There is no abdominal tenderness.   Musculoskeletal:      Comments: Left AKA w/ wound vac   Skin:     General: Skin is warm and dry.      Comments: Large pearly lesion on right cheek   Neurological:      Mental Status: He is alert.      Comments: awake         Fluids    Intake/Output Summary  (Last 24 hours) at 9/15/2020 1459  Last data filed at 9/15/2020 1259  Gross per 24 hour   Intake 360 ml   Output 800 ml   Net -440 ml       Laboratory      Recent Labs     09/15/20  0647   SODIUM 146*   POTASSIUM 3.6   CHLORIDE 109   CO2 26   GLUCOSE 116*   BUN 12   CREATININE 0.99   CALCIUM 8.8                   Assessment/Plan  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 Fisher 2/2 critical limb ischemia  Post-op complications included necrosis and infection  S/P AKA revision in 7/2020  S/P AKA I+D x 3  Wound vac per Wound Care  Wound Cx +MRSA and Enterococcus  Completed Unasyn and Vanco  Pain control per Primary Team    PVD (peripheral vascular disease) with claudication ([facility])- (present on admission)  Assessment & Plan  On Lipitor  Consider ASA    Diabetes ([facility])- (present on admission)  Assessment & Plan  HbA1c 5.1  Now off FSBS and SSI as not routinely needing insulin coverage    Elevated brain natriuretic peptide (BNP) level  Assessment & Plan  BNP in the 5000 range  Consider Echocardiogram    Hypophosphatemia  Assessment & Plan  Increase Neutra-Phos    Hypernatremia  Assessment & Plan  Encourage PO fluids  Closely follow electrolytes    Alkaline phosphatase elevation- (present on admission)  Assessment & Plan  May be 2/2 AKA  Follow levels    Vitamin D deficiency  Assessment & Plan  Vit D level 10  On high dose supplementation per Primary Team    Anemia  Assessment & Plan  Has macrocytic indices  TSH normal  Vit B12 198 and Folate 2.9  On supplementation for both  Follow H/H    HTN (hypertension)- (present on admission)  Assessment & Plan  On Coreg and Lisinopril  Observe blood pressure trends    Basal cell carcinoma of face- (present on admission)  Assessment & Plan  Pt reports right cheek lesion has been ongoing for at least 1 yr  Needs Dermatology F/U     Full Code

## 2020-09-15 NOTE — PROGRESS NOTES
"Rehab Progress Note     Encounter Date: 9/15/2020    CC: left AKA, phantom limb pain, wound pain    Interval Events (Subjective)  Patient sitting up in room. He reports he is doing well besides the decreased ADLs. He jokingly says he will not be independent for 10 months. Discussed about wound healing. Denies NVD. Denies SOB.  Discussed with daughter about pain control as well as wound.  Notified that wound vac was never turned on after change yesterday. Patient with significant drainage and pain on turning on wound vac. Discussed rewrapping and making sure wound vac was on. Pre-treat with Oxycodone.     IDT Team Meeting 9/10/2020  DC/Disposition:  9/22/20    Objective:  VITAL SIGNS: /69   Pulse 69   Temp 37 °C (98.6 °F) (Oral)   Resp 17   Ht 1.676 m (5' 6\")   Wt 64.4 kg (142 lb)   SpO2 95%   BMI 22.92 kg/m²   Gen: NAD  Psych: Mood and affect appropriate  CV: RRR, no edema  Resp: CTAB, no upper airway sounds  Abd: NTND  Neuro: AOx4, following commands    Recent Results (from the past 72 hour(s))   Basic Metabolic Panel    Collection Time: 09/15/20  6:47 AM   Result Value Ref Range    Sodium 146 (H) 135 - 145 mmol/L    Potassium 3.6 3.6 - 5.5 mmol/L    Chloride 109 96 - 112 mmol/L    Co2 26 20 - 33 mmol/L    Glucose 116 (H) 65 - 99 mg/dL    Bun 12 8 - 22 mg/dL    Creatinine 0.99 0.50 - 1.40 mg/dL    Calcium 8.8 8.5 - 10.5 mg/dL    Anion Gap 11.0 7.0 - 16.0   MAGNESIUM    Collection Time: 09/15/20  6:47 AM   Result Value Ref Range    Magnesium 2.0 1.5 - 2.5 mg/dL   PHOSPHORUS    Collection Time: 09/15/20  6:47 AM   Result Value Ref Range    Phosphorus 2.5 2.5 - 4.5 mg/dL   ESTIMATED GFR    Collection Time: 09/15/20  6:47 AM   Result Value Ref Range    GFR If African American >60 >60 mL/min/1.73 m 2    GFR If Non African American >60 >60 mL/min/1.73 m 2       Current Facility-Administered Medications   Medication Frequency   • phosphorus (K-Phos-Neutral) per tablet 250 mg TID   • bacitracin-polymyxin b " (POLYSPORIN) 500-09775 UNIT/GM ointment BID   • bacitracin-neomycin-polymyxin (Neosporin) 400-5-5000 ointment BID PRN   • lisinopril (PRINIVIL) tablet 20 mg DAILY   • lidocaine (XYLOCAINE) 4 % topical solution PRN   • oxyCODONE immediate-release (ROXICODONE) tablet 5 mg DAILY   • gabapentin (NEURONTIN) capsule 600 mg TID   • oxyCODONE immediate release (ROXICODONE) tablet 10 mg Q4HRS PRN   • folic acid (FOLVITE) tablet 1 mg DAILY   • cyanocobalamin (VITAMIN B-12) tablet 1,000 mcg DAILY   • miconazole 2%-zinc oxide (Hilary) topical cream Q6HRS PRN   • vitamin D (cholecalciferol) tablet 4,000 Units DAILY   • glucose 4 g chewable tablet 16 g Q15 MIN PRN    And   • dextrose 50% (D50W) injection 50 mL Q15 MIN PRN   • acetaminophen (TYLENOL) tablet 1,000 mg TID   • Respiratory Therapy Consult Continuous RT   • Pharmacy Consult Request ...Pain Management Review 1 Each PHARMACY TO DOSE   • tramadol (ULTRAM) 50 MG tablet 50 mg Q4HRS PRN   • hydrALAZINE (APRESOLINE) tablet 25 mg Q8HRS PRN   • enoxaparin (LOVENOX) inj 40 mg DAILY   • acetaminophen (TYLENOL) tablet 650 mg Q4HRS PRN   • senna-docusate (PERICOLACE or SENOKOT S) 8.6-50 MG per tablet 2 Tab BID    And   • polyethylene glycol/lytes (MIRALAX) PACKET 1 Packet QDAY PRN    And   • magnesium hydroxide (MILK OF MAGNESIA) suspension 30 mL QDAY PRN    And   • bisacodyl (DULCOLAX) suppository 10 mg QDAY PRN   • benzocaine-menthol (CEPACOL) lozenge 1 Lozenge Q2HRS PRN   • mag hydrox-al hydrox-simeth (MAALOX PLUS ES or MYLANTA DS) suspension 20 mL Q2HRS PRN   • ondansetron (ZOFRAN ODT) dispertab 4 mg 4X/DAY PRN    Or   • ondansetron (ZOFRAN) syringe/vial injection 4 mg 4X/DAY PRN   • traZODone (DESYREL) tablet 50 mg QHS PRN   • sodium chloride (OCEAN) 0.65 % nasal spray 2 Spray PRN   • midazolam (VERSED) 5 mg/mL (1 mL vial) PRN   • atorvastatin (LIPITOR) tablet 40 mg Q EVENING   • oxyCODONE immediate-release (ROXICODONE) tablet 2.5 mg Q3HRS PRN   • oxyCODONE immediate-release  (ROXICODONE) tablet 5 mg Q3HRS PRN   • omeprazole (PRILOSEC) capsule 20 mg DAILY   • carvedilol (COREG) tablet 6.25 mg BID WITH MEALS     Facility-Administered Medications Ordered in Other Encounters   Medication Frequency   • fentaNYL (SUBLIMAZE) injection PRN   • propofol PRN   • ceFAZolin (ANCEF) injection PRN       Orders Placed This Encounter   Procedures   • Diet Order Cardiac (Thin), Diabetic, 2 Gram Sodium     Standing Status:   Standing     Number of Occurrences:   1     Order Specific Question:   Diet:     Answer:   Cardiac [6]     Comments:   Thin     Order Specific Question:   Diet:     Answer:   Diabetic [3]     Order Specific Question:   Diet:     Answer:   2 Gram Sodium [7]       Assessment:  Active Hospital Problems    Diagnosis   • *Encephalopathy acute   • AKA stump complication (HCC)   • Arterial insufficiency (HCC)   • PVD (peripheral vascular disease) with claudication (HCC)   • Diabetes (HCC)   • HLD (hyperlipidemia)   • HTN (hypertension)   • Basal cell carcinoma of face   • Hypokalemia   • Hypernatremia   • Alkaline phosphatase elevation   • Anemia   • Vitamin D deficiency   • Peripheral arterial occlusive disease (HCC)       Medical Decision Making and Plan:  Acute encephalopathy - Concern for encephalopathy with ongoing multiple medical comorbidities and confusion about hospital course as well as not understanding carpio catheter. Alternatively could be medicated for transfer but therapy reporting difficulty with cuing and following commands.   -PT and OT for mobility and ADLs  -SLP for cognitive evaluation     Left BKA - Patient with severe PVD with L AKA on 6/30/20 with multiple revisions due to necrosis and infection. Cultures grew MRSA and Enterococcus. Per ID continue antibiotics until 9/6/20. Transferred to \[facility]\"" from 8/12/20 to 9/4/20.   -Vancomycin and Unasyn until 9/6/20. Completed.   -Redress on 9/15/20 as wound vac not turned on and significant pain and drainage.   -Continue wound  vac. Consult wound care - added honey to regimen with slow improvement in size and depth. Lidocaine 4% PRN for dressing change      Multiple wounds - Patient has rash to abdomen, redness on sacrum and left buttocks on transfer. Consult wound care.     Right face basal cell carcinoma - Unclear what follow-up. Follow-up Oncology     Anemia - Check AM CBC - 8.8. Continue to monitor.      HTN - Patient on Lisinopril 10 mg and Coreg 6.25 mg BID  -Hospistalist consulted.      Pain control - Primarily on admission with left phantom limb pain. Patient on Gabapentin 100 mg TID and Oxycodone. Can consider increase in Gabapentin for neuropathic phantom limb pain.   -Increase Gabapentin to 400 mg TID. Oxycodone 10 mg for wound care changes. Concern for over sedation with higher opiate dosing, limit to wound vac change, lidocaine jelly for wound change.   -Increase Gabapentin to 600 mg TID and schedule Oxycodone 5 mg first thing in AM     DM with hyperglycemia - Patient on SSI on transfer. Consult Hospitalist  -Discontinue SSI     COVID - Patient with check x2 at [facility]. Patient without symptoms. Per policy no check within 7 days. Patient without symptoms. Check PCR, on precautions - negative, no precautions.      GI Ppx - Patient on Prilosec      DVT Ppx - Patient on Lovenox on transfer.  Constipation, no BM since 9/7, PRN Miralax and MoM     Total time:  26 minutes.  I spent greater than 50% of the time for patient care, counseling, and coordination on this date, including unit/floor time, and face-to-face time with the patient as per interval events and assessment and plan above. Topics discussed included pain control, increase Gabapentin, discussed recovery with daughter and pain control. Wound vac issue will need redone.     Winifred Silverman M.D.

## 2020-09-16 LAB
ANION GAP SERPL CALC-SCNC: 9 MMOL/L (ref 7–16)
BUN SERPL-MCNC: 14 MG/DL (ref 8–22)
CALCIUM SERPL-MCNC: 8.6 MG/DL (ref 8.5–10.5)
CHLORIDE SERPL-SCNC: 109 MMOL/L (ref 96–112)
CO2 SERPL-SCNC: 27 MMOL/L (ref 20–33)
CREAT SERPL-MCNC: 1 MG/DL (ref 0.5–1.4)
ERYTHROCYTE [DISTWIDTH] IN BLOOD BY AUTOMATED COUNT: 57 FL (ref 35.9–50)
GLUCOSE SERPL-MCNC: 119 MG/DL (ref 65–99)
HCT VFR BLD AUTO: 30.4 % (ref 42–52)
HGB BLD-MCNC: 9.3 G/DL (ref 14–18)
MCH RBC QN AUTO: 30.4 PG (ref 27–33)
MCHC RBC AUTO-ENTMCNC: 30.6 G/DL (ref 33.7–35.3)
MCV RBC AUTO: 99.3 FL (ref 81.4–97.8)
NT-PROBNP SERPL IA-MCNC: 4502 PG/ML (ref 0–125)
PLATELET # BLD AUTO: 191 K/UL (ref 164–446)
PMV BLD AUTO: 10.2 FL (ref 9–12.9)
POTASSIUM SERPL-SCNC: 3.5 MMOL/L (ref 3.6–5.5)
RBC # BLD AUTO: 3.06 M/UL (ref 4.7–6.1)
SODIUM SERPL-SCNC: 145 MMOL/L (ref 135–145)
WBC # BLD AUTO: 7.8 K/UL (ref 4.8–10.8)

## 2020-09-16 PROCEDURE — 85027 COMPLETE CBC AUTOMATED: CPT

## 2020-09-16 PROCEDURE — 700111 HCHG RX REV CODE 636 W/ 250 OVERRIDE (IP): Performed by: PHYSICAL MEDICINE & REHABILITATION

## 2020-09-16 PROCEDURE — 80048 BASIC METABOLIC PNL TOTAL CA: CPT

## 2020-09-16 PROCEDURE — 97129 THER IVNTJ 1ST 15 MIN: CPT

## 2020-09-16 PROCEDURE — A9270 NON-COVERED ITEM OR SERVICE: HCPCS | Performed by: PHYSICAL MEDICINE & REHABILITATION

## 2020-09-16 PROCEDURE — 700102 HCHG RX REV CODE 250 W/ 637 OVERRIDE(OP): Performed by: HOSPITALIST

## 2020-09-16 PROCEDURE — 97530 THERAPEUTIC ACTIVITIES: CPT

## 2020-09-16 PROCEDURE — 99232 SBSQ HOSP IP/OBS MODERATE 35: CPT | Performed by: HOSPITALIST

## 2020-09-16 PROCEDURE — 97110 THERAPEUTIC EXERCISES: CPT

## 2020-09-16 PROCEDURE — 770010 HCHG ROOM/CARE - REHAB SEMI PRIVAT*

## 2020-09-16 PROCEDURE — 700102 HCHG RX REV CODE 250 W/ 637 OVERRIDE(OP): Performed by: PHYSICAL MEDICINE & REHABILITATION

## 2020-09-16 PROCEDURE — 97130 THER IVNTJ EA ADDL 15 MIN: CPT

## 2020-09-16 PROCEDURE — 83880 ASSAY OF NATRIURETIC PEPTIDE: CPT

## 2020-09-16 PROCEDURE — 99232 SBSQ HOSP IP/OBS MODERATE 35: CPT | Performed by: PHYSICAL MEDICINE & REHABILITATION

## 2020-09-16 PROCEDURE — A9270 NON-COVERED ITEM OR SERVICE: HCPCS | Performed by: HOSPITALIST

## 2020-09-16 PROCEDURE — 97535 SELF CARE MNGMENT TRAINING: CPT

## 2020-09-16 PROCEDURE — 36415 COLL VENOUS BLD VENIPUNCTURE: CPT

## 2020-09-16 RX ORDER — TRAZODONE HYDROCHLORIDE 50 MG/1
50 TABLET ORAL
Status: DISCONTINUED | OUTPATIENT
Start: 2020-09-16 | End: 2020-09-23 | Stop reason: HOSPADM

## 2020-09-16 RX ORDER — POTASSIUM CHLORIDE 20 MEQ/1
40 TABLET, EXTENDED RELEASE ORAL ONCE
Status: COMPLETED | OUTPATIENT
Start: 2020-09-16 | End: 2020-09-16

## 2020-09-16 RX ADMIN — BACITRACIN ZINC AND POLYMYXIN B SULFATE: at 08:57

## 2020-09-16 RX ADMIN — LISINOPRIL 20 MG: 20 TABLET ORAL at 08:58

## 2020-09-16 RX ADMIN — Medication 1000 MCG: at 08:58

## 2020-09-16 RX ADMIN — BACITRACIN ZINC AND POLYMYXIN B SULFATE: at 21:43

## 2020-09-16 RX ADMIN — OXYCODONE HYDROCHLORIDE 5 MG: 5 TABLET ORAL at 15:10

## 2020-09-16 RX ADMIN — ACETAMINOPHEN 1000 MG: 325 TABLET, FILM COATED ORAL at 15:11

## 2020-09-16 RX ADMIN — OMEPRAZOLE 20 MG: 20 CAPSULE, DELAYED RELEASE ORAL at 08:58

## 2020-09-16 RX ADMIN — ATORVASTATIN CALCIUM 40 MG: 40 TABLET, FILM COATED ORAL at 21:36

## 2020-09-16 RX ADMIN — OXYCODONE HYDROCHLORIDE 5 MG: 5 TABLET ORAL at 05:57

## 2020-09-16 RX ADMIN — ACETAMINOPHEN 1000 MG: 325 TABLET, FILM COATED ORAL at 08:58

## 2020-09-16 RX ADMIN — CARVEDILOL 6.25 MG: 3.12 TABLET, FILM COATED ORAL at 17:50

## 2020-09-16 RX ADMIN — FOLIC ACID 1 MG: 1 TABLET ORAL at 08:59

## 2020-09-16 RX ADMIN — DOCUSATE SODIUM 50 MG AND SENNOSIDES 8.6 MG 2 TABLET: 8.6; 5 TABLET, FILM COATED ORAL at 21:35

## 2020-09-16 RX ADMIN — GABAPENTIN 800 MG: 400 CAPSULE ORAL at 15:11

## 2020-09-16 RX ADMIN — TRAMADOL HYDROCHLORIDE 50 MG: 50 TABLET, COATED ORAL at 18:05

## 2020-09-16 RX ADMIN — POTASSIUM CHLORIDE 40 MEQ: 1500 TABLET, EXTENDED RELEASE ORAL at 13:21

## 2020-09-16 RX ADMIN — GABAPENTIN 800 MG: 400 CAPSULE ORAL at 08:58

## 2020-09-16 RX ADMIN — Medication 4000 UNITS: at 08:57

## 2020-09-16 RX ADMIN — ACETAMINOPHEN 1000 MG: 325 TABLET, FILM COATED ORAL at 21:35

## 2020-09-16 RX ADMIN — ENOXAPARIN SODIUM 40 MG: 40 INJECTION SUBCUTANEOUS at 08:59

## 2020-09-16 RX ADMIN — DIBASIC SODIUM PHOSPHATE, MONOBASIC POTASSIUM PHOSPHATE AND MONOBASIC SODIUM PHOSPHATE 250 MG: 852; 155; 130 TABLET ORAL at 15:11

## 2020-09-16 RX ADMIN — OXYCODONE HYDROCHLORIDE 10 MG: 10 TABLET ORAL at 21:35

## 2020-09-16 RX ADMIN — TRAZODONE HYDROCHLORIDE 50 MG: 50 TABLET ORAL at 21:35

## 2020-09-16 RX ADMIN — DIBASIC SODIUM PHOSPHATE, MONOBASIC POTASSIUM PHOSPHATE AND MONOBASIC SODIUM PHOSPHATE 250 MG: 852; 155; 130 TABLET ORAL at 08:58

## 2020-09-16 RX ADMIN — GABAPENTIN 800 MG: 400 CAPSULE ORAL at 21:36

## 2020-09-16 RX ADMIN — DOCUSATE SODIUM 50 MG AND SENNOSIDES 8.6 MG 2 TABLET: 8.6; 5 TABLET, FILM COATED ORAL at 08:58

## 2020-09-16 RX ADMIN — DIBASIC SODIUM PHOSPHATE, MONOBASIC POTASSIUM PHOSPHATE AND MONOBASIC SODIUM PHOSPHATE 250 MG: 852; 155; 130 TABLET ORAL at 21:36

## 2020-09-16 RX ADMIN — CARVEDILOL 6.25 MG: 3.12 TABLET, FILM COATED ORAL at 08:58

## 2020-09-16 ASSESSMENT — ENCOUNTER SYMPTOMS
CHILLS: 0
VOMITING: 0
POLYDIPSIA: 0
COUGH: 0
BRUISES/BLEEDS EASILY: 0
NAUSEA: 0
ABDOMINAL PAIN: 0
SHORTNESS OF BREATH: 0
PALPITATIONS: 0
EYES NEGATIVE: 1
FEVER: 0

## 2020-09-16 ASSESSMENT — PAIN DESCRIPTION - PAIN TYPE
TYPE: ACUTE PAIN;SURGICAL PAIN
TYPE: ACUTE PAIN

## 2020-09-16 NOTE — PROGRESS NOTES
"Rehab Progress Note     Encounter Date: 9/16/2020    CC: left AKA, phantom limb pain, wound pain    Interval Events (Subjective)  Patient sitting up in room. He reports he is doing OK today. SBP on the high side but otherwise he reports pain is controlled at rest. Patient with difficulty sleeping at night. He is requesting scheduled medication. Reviewed labs with him including stable anemia, mild hypokalemia.  Per hospitalist give K supplement once. Denies SOB. Denies NVD.     IDT Team Meeting 9/10/2020  DC/Disposition:  9/22/20    Objective:  VITAL SIGNS: /60   Pulse 63   Temp 36.9 °C (98.4 °F) (Oral)   Resp 18   Ht 1.676 m (5' 6\")   Wt 64.4 kg (142 lb)   SpO2 95%   BMI 22.92 kg/m²   Gen: NAD  Psych: Mood and affect appropriate  CV: RRR, no edema  Resp: CTAB, no upper airway sounds  Abd: NTND  Neuro: AOx4, following commands  Unchanged from 9/15/20    Recent Results (from the past 72 hour(s))   Basic Metabolic Panel    Collection Time: 09/15/20  6:47 AM   Result Value Ref Range    Sodium 146 (H) 135 - 145 mmol/L    Potassium 3.6 3.6 - 5.5 mmol/L    Chloride 109 96 - 112 mmol/L    Co2 26 20 - 33 mmol/L    Glucose 116 (H) 65 - 99 mg/dL    Bun 12 8 - 22 mg/dL    Creatinine 0.99 0.50 - 1.40 mg/dL    Calcium 8.8 8.5 - 10.5 mg/dL    Anion Gap 11.0 7.0 - 16.0   MAGNESIUM    Collection Time: 09/15/20  6:47 AM   Result Value Ref Range    Magnesium 2.0 1.5 - 2.5 mg/dL   PHOSPHORUS    Collection Time: 09/15/20  6:47 AM   Result Value Ref Range    Phosphorus 2.5 2.5 - 4.5 mg/dL   ESTIMATED GFR    Collection Time: 09/15/20  6:47 AM   Result Value Ref Range    GFR If African American >60 >60 mL/min/1.73 m 2    GFR If Non African American >60 >60 mL/min/1.73 m 2   CBC WITHOUT DIFFERENTIAL    Collection Time: 09/16/20  6:55 AM   Result Value Ref Range    WBC 7.8 4.8 - 10.8 K/uL    RBC 3.06 (L) 4.70 - 6.10 M/uL    Hemoglobin 9.3 (L) 14.0 - 18.0 g/dL    Hematocrit 30.4 (L) 42.0 - 52.0 %    MCV 99.3 (H) 81.4 - 97.8 fL    " MCH 30.4 27.0 - 33.0 pg    MCHC 30.6 (L) 33.7 - 35.3 g/dL    RDW 57.0 (H) 35.9 - 50.0 fL    Platelet Count 191 164 - 446 K/uL    MPV 10.2 9.0 - 12.9 fL   Basic Metabolic Panel    Collection Time: 09/16/20  6:55 AM   Result Value Ref Range    Sodium 145 135 - 145 mmol/L    Potassium 3.5 (L) 3.6 - 5.5 mmol/L    Chloride 109 96 - 112 mmol/L    Co2 27 20 - 33 mmol/L    Glucose 119 (H) 65 - 99 mg/dL    Bun 14 8 - 22 mg/dL    Creatinine 1.00 0.50 - 1.40 mg/dL    Calcium 8.6 8.5 - 10.5 mg/dL    Anion Gap 9.0 7.0 - 16.0   proBrain Natriuretic Peptide, NT    Collection Time: 09/16/20  6:55 AM   Result Value Ref Range    NT-proBNP 4502 (H) 0 - 125 pg/mL   ESTIMATED GFR    Collection Time: 09/16/20  6:55 AM   Result Value Ref Range    GFR If African American >60 >60 mL/min/1.73 m 2    GFR If Non African American >60 >60 mL/min/1.73 m 2       Current Facility-Administered Medications   Medication Frequency   • phosphorus (K-Phos-Neutral) per tablet 250 mg TID   • bacitracin-polymyxin b (POLYSPORIN) 500-21984 UNIT/GM ointment BID   • gabapentin (NEURONTIN) capsule 800 mg TID   • bacitracin-neomycin-polymyxin (Neosporin) 400-5-5000 ointment BID PRN   • lisinopril (PRINIVIL) tablet 20 mg DAILY   • lidocaine (XYLOCAINE) 4 % topical solution PRN   • oxyCODONE immediate-release (ROXICODONE) tablet 5 mg DAILY   • oxyCODONE immediate release (ROXICODONE) tablet 10 mg Q4HRS PRN   • folic acid (FOLVITE) tablet 1 mg DAILY   • cyanocobalamin (VITAMIN B-12) tablet 1,000 mcg DAILY   • miconazole 2%-zinc oxide (Hilary) topical cream Q6HRS PRN   • vitamin D (cholecalciferol) tablet 4,000 Units DAILY   • glucose 4 g chewable tablet 16 g Q15 MIN PRN    And   • dextrose 50% (D50W) injection 50 mL Q15 MIN PRN   • acetaminophen (TYLENOL) tablet 1,000 mg TID   • Respiratory Therapy Consult Continuous RT   • tramadol (ULTRAM) 50 MG tablet 50 mg Q4HRS PRN   • hydrALAZINE (APRESOLINE) tablet 25 mg Q8HRS PRN   • enoxaparin (LOVENOX) inj 40 mg DAILY   •  acetaminophen (TYLENOL) tablet 650 mg Q4HRS PRN   • senna-docusate (PERICOLACE or SENOKOT S) 8.6-50 MG per tablet 2 Tab BID    And   • polyethylene glycol/lytes (MIRALAX) PACKET 1 Packet QDAY PRN    And   • magnesium hydroxide (MILK OF MAGNESIA) suspension 30 mL QDAY PRN    And   • bisacodyl (DULCOLAX) suppository 10 mg QDAY PRN   • benzocaine-menthol (CEPACOL) lozenge 1 Lozenge Q2HRS PRN   • mag hydrox-al hydrox-simeth (MAALOX PLUS ES or MYLANTA DS) suspension 20 mL Q2HRS PRN   • ondansetron (ZOFRAN ODT) dispertab 4 mg 4X/DAY PRN    Or   • ondansetron (ZOFRAN) syringe/vial injection 4 mg 4X/DAY PRN   • traZODone (DESYREL) tablet 50 mg QHS PRN   • sodium chloride (OCEAN) 0.65 % nasal spray 2 Spray PRN   • midazolam (VERSED) 5 mg/mL (1 mL vial) PRN   • atorvastatin (LIPITOR) tablet 40 mg Q EVENING   • oxyCODONE immediate-release (ROXICODONE) tablet 2.5 mg Q3HRS PRN   • oxyCODONE immediate-release (ROXICODONE) tablet 5 mg Q3HRS PRN   • omeprazole (PRILOSEC) capsule 20 mg DAILY   • carvedilol (COREG) tablet 6.25 mg BID WITH MEALS     Facility-Administered Medications Ordered in Other Encounters   Medication Frequency   • fentaNYL (SUBLIMAZE) injection PRN   • propofol PRN   • ceFAZolin (ANCEF) injection PRN       Orders Placed This Encounter   Procedures   • Diet Order Cardiac (Thin), Diabetic, 2 Gram Sodium     Standing Status:   Standing     Number of Occurrences:   1     Order Specific Question:   Diet:     Answer:   Cardiac [6]     Comments:   Thin     Order Specific Question:   Diet:     Answer:   Diabetic [3]     Order Specific Question:   Diet:     Answer:   2 Gram Sodium [7]       Assessment:  Active Hospital Problems    Diagnosis   • *Encephalopathy acute   • AKA stump complication (HCC)   • Arterial insufficiency (HCC)   • PVD (peripheral vascular disease) with claudication (HCC)   • Diabetes (HCC)   • HLD (hyperlipidemia)   • HTN (hypertension)   • Basal cell carcinoma of face   • Hypokalemia   •  Hypernatremia   • Alkaline phosphatase elevation   • Anemia   • Vitamin D deficiency   • Peripheral arterial occlusive disease (HCC)       Medical Decision Making and Plan:  Acute encephalopathy - Concern for encephalopathy with ongoing multiple medical comorbidities and confusion about hospital course as well as not understanding carpio catheter. Alternatively could be medicated for transfer but therapy reporting difficulty with cuing and following commands.   -PT and OT for mobility and ADLs  -SLP for cognitive evaluation     Left BKA - Patient with severe PVD with L AKA on 6/30/20 with multiple revisions due to necrosis and infection. Cultures grew MRSA and Enterococcus. Per ID continue antibiotics until 9/6/20. Transferred to [facility] from 8/12/20 to 9/4/20.   -Vancomycin and Unasyn until 9/6/20. Completed.   -Redress on 9/15/20 as wound vac not turned on and significant pain and drainage.   -Continue wound vac. Consult wound care - added honey to regimen with slow improvement in size and depth. Lidocaine 4% PRN for dressing change      Multiple wounds - Patient has rash to abdomen, redness on sacrum and left buttocks on transfer. Consult wound care.     Right face basal cell carcinoma - Unclear what follow-up. Follow-up Oncology     Anemia - Check AM CBC - 8.8. Continue to monitor.      HTN - Patient on Lisinopril 10 mg and Coreg 6.25 mg BID  -Hospistalist consulted.      Pain control - Primarily on admission with left phantom limb pain. Patient on Gabapentin 100 mg TID and Oxycodone. Can consider increase in Gabapentin for neuropathic phantom limb pain.   -Increase Gabapentin to 400 mg TID. Oxycodone 10 mg for wound care changes. Concern for over sedation with higher opiate dosing, limit to wound vac change, lidocaine jelly for wound change.   -Increase Gabapentin to 800 mg TID and schedule Oxycodone 5 mg first thing in AM     DM with hyperglycemia - Patient on SSI on transfer. Consult Hospitalist  -Discontinue  SSI     COVID - Patient with check x2 at [facility]. Patient without symptoms. Per policy no check within 7 days. Patient without symptoms. Check PCR, on precautions - negative, no precautions.      Insomnia/Mood - Schedule Trazodone QHS    GI Ppx - Patient on Prilosec      DVT Ppx - Patient on Lovenox on transfer.  Constipation, no BM since 9/7, PRN Miralax and MoM     Total time:  26 minutes.  I spent greater than 50% of the time for patient care, counseling, and coordination on this date, including unit/floor time, and face-to-face time with the patient as per interval events and assessment and plan above. Topics discussed included reviewed stable anemia, improved pain control, insomnia and schedule Trazodone.     Winifred Silverman M.D.

## 2020-09-16 NOTE — THERAPY
Speech Language Pathology  Daily Treatment     Patient Name: Jimenez Bains  Age:  75 y.o., Sex:  male  Medical Record #: [id number]  Today's Date: 9/16/2020     Precautions  Precautions: Fall Risk, Non Weight Bearing Left Lower Extremity  Comments: wound vac L residual limb, sores on genital area, carpio; contact precautions (ok to leave room)    Subjective    Patient was asleep at time of ST. Was willing to participate.      Objective       09/16/20 1402   Cognition   Moderate Attention Moderate (3)   Orientation  Minimal (4)   Verbal Short Term Memory 5 Minutes   Functional Memory Activities Moderate (3)   SLP Total Time Spent   SLP Individual Total Time Spent (Mins) 60   Treatment Charges   SLP Cognitive Skill Development First 15 Minutes 1   SLP Cognitive Skill Development Additional 15 Minutes 3       Assessment    Increased fatigue and emotional lability during today's session. Required frequent encouragement and redirections. Min cues needed for orientation.   Attempted to complete memory log, however, Patient was not able to recall activities from therapies given max A.     Strengths: Able to follow instructions, Motivated for self care and independence, Pleasant and cooperative, Willingly participates in therapeutic activities  Barriers: Impaired functional cognition    Plan    Memory, attention, safe vs. Unsafe.     Speech Therapy Problems     Problem: Memory STGs     Dates: Start: 09/05/20       Goal: STG-Within one week, patient will     Dates: Start: 09/05/20       Description: 1) Individualized goal:  recall daily events and safety strategies given min verbal cues 80% with use of memory book.  2) Interventions:  SLP Cognitive Skill Development and SLP Group Treatment                    Problem: Problem Solving STGs     Dates: Start: 09/05/20       Goal: STG-Within one week, patient will     Dates: Start: 09/05/20       Description: 1) Individualized goal:  answer basic problem solving questions with 80%  accuracy given min verbal cues.    2) Interventions:  SLP Cognitive Skill Development and SLP Group Treatment                    Problem: Speech/Swallowing LTGs     Dates: Start: 09/05/20       Goal: LTG-By discharge, patient will solve basic problems     Dates: Start: 09/05/20       Description: 1) Individualized goal:  Mert for safe discharge home.   2) Interventions:  SLP Aphasia Evaluation, SLP Cognitive Skill Development, and SLP Group Treatment

## 2020-09-16 NOTE — CARE PLAN
Problem: Communication  Goal: The ability to communicate needs accurately and effectively will improve  Outcome: PROGRESSING AS EXPECTED  Note: Patient is alert and oriented x 4.      Problem: Safety  Goal: Will remain free from injury  Outcome: PROGRESSING AS EXPECTED  Note: Pt uses call light consistently and appropriately. Waits for assistance does not attempt self transfer this shift. Able to verbalize needs.      Problem: Skin Integrity  Goal: Risk for impaired skin integrity will decrease  Outcome: PROGRESSING AS EXPECTED  Note: Wound vac in place to left AKA.   Unit plugged in and running.    Noted rash and extremely dry skin to patients back, buttocks and arms.  Barrier cream applied to buttocks, lotion applied to arms and back.    Brothers catheter in place and draining via gravity.  Brothers care done with soap and water.  Bacitracin applied to tip ov penis reddened area.

## 2020-09-16 NOTE — WOUND TEAM
[facility]  Inpatient Services   Wound and Skin Care Progress Note    HPI, PMH, SH: Reviewed    Unit where seen by Wound Team: [id number]     WOUND CONSULT RELATED TO:  Scheduled Negative Pressure Wound therapy (NPWT) dressing change      Self Report / Pain Level:  Pre-medicated with oral pain medication.     OBJECTIVE:  Previous dressing intact    WOUND TYPE, LOCATION, CHARACTERISTICS (Pressure Injuries: location, stage, POA or date identified)     Negative Pressure Wound Therapy 06/30/20 (Active)   NPWT Pump Mode / Pressure Setting Continuous;125 mmHg 09/16/20 1600   Dressing Type Medium;Black Foam (Regular) 09/16/20 1600   Number of Foam Pieces Used 2 09/16/20 1600   Canister Changed No 09/16/20 1600   NEXT Dressing Change/Treatment Date 09/18/20 09/16/20 1600     Wound 09/04/20 Full Thickness Wound Thigh Left Left AKA wound (Active)   Wound Image    09/14/20 1600   Site Assessment Red;Yellow;Tan 09/16/20 1600   Periwound Assessment Blanchable erythema;Red 09/16/20 1600   Margins Defined edges;Attached edges 09/16/20 1600   Closure Secondary intention 09/16/20 1600   Drainage Amount Moderate 09/16/20 1600   Drainage Description Serosanguineous 09/16/20 1600   Treatments Cleansed;Irrigation;Site care 09/16/20 1600   Wound Cleansing Approved Wound Cleanser 09/16/20 1600   Periwound Protectant Benzoin;Drape 09/16/20 1600   Dressing Cleansing/Solutions Not Applicable 09/16/20 1600   Dressing Options Honey Gel;Wound Vac 09/16/20 1600   Dressing Changed Changed 09/16/20 1600   Dressing Status Clean;Dry;Intact 09/16/20 1600   Dressing Change/Treatment Frequency By Wound Team Only 09/16/20 1600   NEXT Dressing Change/Treatment Date 09/18/20 09/16/20 1600   NEXT Weekly Photo (Inpatient Only) 09/18/20 09/11/20 0915   Non-staged Wound Description Full thickness 09/16/20 1600   Wound Length (cm) 7.1 cm 09/11/20 0915   Wound Width (cm) 9.7 cm 09/11/20 0915   Wound Depth (cm) 1.7 cm 09/11/20 0915   Wound Surface  Area (cm^2) 68.87 cm^2 09/11/20 0915   Wound Volume (cm^3) 117.08 cm^3 09/11/20 0915   Wound Healing % 57 09/11/20 0915   Wound Bed Granulation (%) 50 % 09/11/20 0915   Wound Bed Epithelium (%) 0 % 09/11/20 0915   Wound Bed Slough (%) 50 % 09/11/20 0915   Wound Bed Eschar (%) 0 % 09/11/20 0915   Shape [place] 09/16/20 1600   Wound Odor None 09/16/20 1600   Exposed Structures None 09/16/20 1600   WOUND NURSE ONLY - Time Spent with Patient (mins) 45 09/16/20 1600     Lab Values:    Lab Results   Component Value Date/Time    WBC 7.8 09/16/2020 06:55 AM    RBC 3.06 (L) 09/16/2020 06:55 AM    HEMOGLOBIN 9.3 (L) 09/16/2020 06:55 AM    HEMATOCRIT 30.4 (L) 09/16/2020 06:55 AM                    Lab Results   Component Value Date/Time    HBA1C 5.1 09/05/2020 05:40 AM           Culture: N/A    INTERVENTIONS BY WOUND TEAM:  Saturated dressing with saline, dressing removal very painful. Irrigated wound with wound cleanser, gently wiped wound bed with gauze 4 x 4s. Cleaned periwound with same. Wound bed is moist and pink, with yellow slough along edge.  Proximal edge has a tan black area that honey gel was applied to help break-up.  Benzoin and drape applied to periwound. Honey gel applied slough with finger and 1 black placed on wound, 1 black foam to bolster wound, draped. Hole cut for trac pad, trac pad applied. Suction obtained at 125 mmHg continuous, no leaks noted.       Interdisciplinary consultation: Patient, Bedside RN    EVALUATION: patient states that his wound is more painful even with lidocaine, 4% ordered for removal.        Goals: Steady decrease in wound area and depth weekly.    NURSING PLAN OF CARE ORDERS (X):  Dressing changes: See Dressing Care orders: X  Skin care: See Skin Care orders: X  Rectal tube care: See Rectal Tube Care orders:        Other orders:                           WOUND TEAM PLAN OF CARE (X):   Dressing changes by wound team:          Follow up 1-2 times weekly:               Follow up 3  times weekly:                NPWT change 3 times weekly:   X  Follow up as needed:       Other (explain):     Anticipated discharge plans (X):   LTACH:        SNF/Rehab:                  Home Care:   X        Outpatient Wound Center:            Self Care:            Other:

## 2020-09-16 NOTE — THERAPY
"Physical Therapy   Daily Treatment     Patient Name: Jimenez Bains  Age:  75 y.o., Sex:  male  Medical Record #: [id number]  Today's Date: 9/16/2020     Precautions  Precautions: (P) Fall Risk, Non Weight Bearing Left Lower Extremity  Comments: (P) wound vac L residual limb, sores on genital area, carpio; contact precautions (ok to leave room)    Subjective    Patient in bed, in low spirits and in pain however agreeable to bed-level exercises.    \"My leg and my penis hurt.\"     Objective       09/16/20 0901   Precautions   Precautions Fall Risk;Non Weight Bearing Left Lower Extremity   Comments wound vac L residual limb, sores on genital area, carpio; contact precautions (ok to leave room)   Supine Lower Body Exercise   Other Exercises Supine RLE exercises performing ankle and leg movements to spell types of fruit, requiring frequent rest breaks due to muscle fatigue. Supine modified crunches 1x20, PNF chops 2x10 each direction for oblique and ab activation.   Interdisciplinary Plan of Care Collaboration   Patient Position at End of Therapy In Bed;Call Light within Reach;Tray Table within Reach;Phone within Reach   PT Total Time Spent   PT Individual Total Time Spent (Mins) 60   PT Charge Group   PT Therapeutic Exercise 2   PT Therapeutic Activities 2       Assessment    Patient tolerated session fairly, limited by significant pain and requesting bed-level exercises. Focus of session on leg and core exercises, as well as assessing tolerance to ice for pain management. Patient educated on safe parameters for ice application, with good initial tolerance to ice applied to anterior residual limb. Will benefit from reinforcement of education for safe use of ice.    Strengths: Independent prior level of function, Motivated for self care and independence, Willingly participates in therapeutic activities  Barriers: Confused, Decreased endurance, Fatigue, Generalized weakness, Home accessibility, Impaired activity tolerance, " Impaired balance, Pain, Limited mobility    Plan    Reinforce transfer sequencing with decreased verbal cueing stand pivot vs. Stand step with FWW, desensitization work to residual limb; continue short distance ambulation at FWW level; w/c skills for endurance and efficient technique; standing balance; on-going amputee education. Family training completed with daughter Rehana, educated on safety recommendations and mobility at w/c level at home, use of FWW and ambulation with home therapist.    Physical Therapy Problems     Problem: Mobility     Dates: Start: 09/05/20       Goal: STG-Within one week, patient will propel wheelchair household distances     Dates: Start: 09/05/20       Description: 1) Individualized goal:  50 ft with SBA.  2) Interventions:  PT Group Therapy, PT Gait Training, PT Therapeutic Exercises, PT Neuro Re-Ed/Balance, PT Therapeutic Activity, and PT Evaluation            Goal: STG-Within one week, patient will ambulate household distance     Dates: Start: 09/05/20       Description: 1) Individualized goal:  10 ft with FWW and SBA-CGA.  2) Interventions:  PT Group Therapy, PT Gait Training, PT Therapeutic Exercises, PT Neuro Re-Ed/Balance, PT Therapeutic Activity, and PT Evaluation                Problem: Mobility Transfers     Dates: Start: 09/05/20       Goal: STG-Within one week, patient will sit to stand     Dates: Start: 09/05/20       Description: 1) Individualized goal:  with FWW and SBA.  2) Interventions:  PT Group Therapy, PT Gait Training, PT Therapeutic Exercises, PT Neuro Re-Ed/Balance, PT Therapeutic Activity, and PT Evaluation                Problem: PT-Long Term Goals     Dates: Start: 09/05/20       Goal: LTG-By discharge, patient will propel wheelchair     Dates: Start: 09/05/20       Description: 1) Individualized goal:  150 ft mod I.  2) Interventions:  PT Group Therapy, PT Gait Training, PT Therapeutic Exercises, PT Neuro Re-Ed/Balance, PT Therapeutic Activity, and PT  Evaluation          Goal: LTG-By discharge, patient will ambulate     Dates: Start: 09/05/20       Description: 1) Individualized goal:  50 ft with FWW and supervision.  2) Interventions:  PT Group Therapy, PT Gait Training, PT Therapeutic Exercises, PT Neuro Re-Ed/Balance, PT Therapeutic Activity, and PT Evaluation          Goal: LTG-By discharge, patient will transfer one surface to another     Dates: Start: 09/05/20       Description: 1) Individualized goal:  with FWW vs. Squat pivot mod I.  2) Interventions:  PT Group Therapy, PT Gait Training, PT Therapeutic Exercises, PT Neuro Re-Ed/Balance, PT Therapeutic Activity, and PT Evaluation          Goal: LTG-By discharge, patient will perform home exercise program     Dates: Start: 09/05/20       Description: 1) Individualized goal:  with handout mod I.  2) Interventions:  PT Group Therapy, PT Gait Training, PT Therapeutic Exercises, PT Neuro Re-Ed/Balance, PT Therapeutic Activity, and PT Evaluation          Goal: LTG-By discharge, patient will     Dates: Start: 09/05/20       Description: 1) Individualized goal:  perform bed mobility including rolling to prone mod I.  2) Interventions:  PT Group Therapy, PT Gait Training, PT Therapeutic Exercises, PT Neuro Re-Ed/Balance, PT Therapeutic Activity, and PT Evaluation

## 2020-09-16 NOTE — THERAPY
Occupational Therapy  Daily Treatment     Patient Name: Jimenez Bains  Age:  75 y.o., Sex:  male  Medical Record #: [id number]  Today's Date: 9/16/2020     Precautions  Precautions: (P) Fall Risk, Non Weight Bearing Left Lower Extremity  Comments: (P) wound vac L residual limb, sores on genital area, carpio; contact precautions (ok to leave room)         Subjective    Pt received supine in bed, agreeable to OT session     Objective       09/16/20 0701   Precautions   Precautions Fall Risk;Non Weight Bearing Left Lower Extremity   Comments wound vac L residual limb, sores on genital area, carpio; contact precautions (ok to leave room)   Functional Level of Assist   Upper Body Dressing Supervision   Upper Body Dressing Description Set-up of equipment;Supervision for safety  (seated in w/c)   Lower Body Dressing Supervision   Lower Body Dressing Description Supervision for safety;Verbal cueing;Increased time  (completed in supine, managed catheter with demo)   OT Total Time Spent   OT Individual Total Time Spent (Mins) 60   OT Charge Group   OT Self Care / ADL 2   OT Therapy Activity 2     Pt setup in bathroom to simulate home environment, stood at sink x4 trials with min A and able to manage pants up/down x2    Assessment    Pt tolerated session fair, continues to be limited by high pain levels in residual limb, penis (at catheter insertion site), and in scrotal wounds. Able to manage standing at sink with min A with simulation of home environment as he may need to position at sink for pants management, RLE has tendency to slip on floor even with grippy socks, will benefit from continued practice toward increased safety.   Strengths: Pleasant and cooperative, Willingly participates in therapeutic activities  Barriers: Confused, Decreased endurance, Generalized weakness, Impaired balance, Limited mobility, Pain    Plan    Continue to progress gross strengthening and endurance and standing tolerance/balance toward  increased safety and independence with ADLs, general attention to hygiene and self-care, weightshift when scooting    Occupational Therapy Goals     Problem: Functional Transfers     Dates: Start: 09/05/20       Goal: STG-Within one week, patient will transfer to step in shower     Dates: Start: 09/05/20       Description: 1) Individualized Goal: Mod assist for wc/shower chair transfer via DME PRN  2) Interventions: OT Self Care/ADL, OT Neuro Re-Ed/Balance, OT Therapeutic Activity, OT Evaluation, and OT Therapeutic Exercise                  Problem: OT Long Term Goals     Dates: Start: 09/05/20       Goal: LTG-By discharge, patient will complete basic self care tasks     Dates: Start: 09/05/20       Description: 1) Individualized Goal:  Mod I for BADL's via AE/DME PRN  2) Interventions: OT Self Care/ADL, OT Neuro Re-Ed/Balance, OT Therapeutic Activity, OT Evaluation, and OT Therapeutic Exercise            Goal: LTG-By discharge, patient will perform bathroom transfers     Dates: Start: 09/05/20       Description: 1) Individualized Goal:  Mod I for toilet and shower transfer via DME  2) Interventions: OT Self Care/ADL, OT Neuro Re-Ed/Balance, OT Therapeutic Activity, OT Evaluation, and OT Therapeutic Exercise                  Problem: Toileting     Dates: Start: 09/05/20       Goal: STG-Within one week, patient will complete toileting tasks     Dates: Start: 09/05/20       Description: 1) Individualized Goal:  Max assist for toileting task via AE/DME PRN  2) Interventions:  OT Self Care/ADL, OT Neuro Re-Ed/Balance, OT Therapeutic Activity, OT Evaluation, and OT Therapeutic Exercise

## 2020-09-16 NOTE — CARE PLAN
Problem: Communication  Goal: The ability to communicate needs accurately and effectively will improve  Outcome: PROGRESSING AS EXPECTED  Note: Patient uses call light consistently and appropriately this shift.  Waits for assistance when needed and does not attempt self transfer.  Able to verbalize needs.  Will continue to monitor.      Problem: Psychosocial Needs:  Goal: Level of anxiety will decrease  Outcome: PROGRESSING AS EXPECTED  Note: Pt demonstrates appropriate mood and behavior this shift; willing to participate in all therapies and activities. On scheduled Zoloft. Will continue to monitor.

## 2020-09-16 NOTE — CARE PLAN
Problem: Mobility  Goal: STG-Within one week, patient will propel wheelchair household distances  Description: 1) Individualized goal:  50 ft with SBA.  2) Interventions:  PT Group Therapy, PT Gait Training, PT Therapeutic Exercises, PT Neuro Re-Ed/Balance, PT Therapeutic Activity, and PT Evaluation    Outcome: MET     Problem: Mobility  Goal: STG-Within one week, patient will ambulate household distance  Description: 1) Individualized goal:  10 ft with FWW and SBA-CGA.  2) Interventions:  PT Group Therapy, PT Gait Training, PT Therapeutic Exercises, PT Neuro Re-Ed/Balance, PT Therapeutic Activity, and PT Evaluation  Outcome: NOT MET  Note: Limited by pain     Problem: Mobility Transfers  Goal: STG-Within one week, patient will sit to stand  Description: 1) Individualized goal:  with FWW and SBA.  2) Interventions:  PT Group Therapy, PT Gait Training, PT Therapeutic Exercises, PT Neuro Re-Ed/Balance, PT Therapeutic Activity, and PT Evaluation  Outcome: NOT MET  Note: Limited by pain

## 2020-09-16 NOTE — PROGRESS NOTES
Hospital Medicine Daily Progress Note      Chief Complaint  Hypertension  Diabetes    Interval Problem Update  Pt denies chest pain, shortness of breath, or palpitations.  Labs reviewed.     Review of Systems  Review of Systems   Constitutional: Negative for chills and fever.   HENT: Negative.    Eyes: Negative.    Respiratory: Negative for cough and shortness of breath.    Cardiovascular: Negative for chest pain and palpitations.   Gastrointestinal: Negative for abdominal pain, nausea and vomiting.   Musculoskeletal:        Wound pain    Endo/Heme/Allergies: Negative for polydipsia. Does not bruise/bleed easily.        Physical Exam  Temp:  [36.9 °C (98.4 °F)-37 °C (98.6 °F)] 36.9 °C (98.4 °F)  Pulse:  [63-69] 63  Resp:  [18] 18  BP: (119-144)/(60-64) 144/60  SpO2:  [92 %-95 %] 95 %    Physical Exam  Vitals signs reviewed.   Constitutional:       Appearance: Normal appearance.      Comments: Chronically ill appearing   HENT:      Head: Normocephalic and atraumatic.      Right Ear: External ear normal.      Left Ear: External ear normal.      Nose: Nose normal.      Mouth/Throat:      Pharynx: Oropharynx is clear.   Eyes:      General:         Right eye: No discharge.         Left eye: No discharge.      Extraocular Movements: Extraocular movements intact.      Conjunctiva/sclera: Conjunctivae normal.   Neck:      Musculoskeletal: Normal range of motion and neck supple.   Cardiovascular:      Rate and Rhythm: Normal rate and regular rhythm.   Pulmonary:      Effort: No respiratory distress.      Breath sounds: No wheezing.      Comments: Decreased BS   Abdominal:      General: Bowel sounds are normal. There is no distension.      Palpations: Abdomen is soft.      Tenderness: There is no abdominal tenderness.   Musculoskeletal:      Comments: Left AKA w/ wound vac   Skin:     General: Skin is warm and dry.      Comments: Large pearly lesion on right cheek   Neurological:      Mental Status: He is alert.       Comments: awake         Fluids    Intake/Output Summary (Last 24 hours) at 9/16/2020 1212  Last data filed at 9/16/2020 0900  Gross per 24 hour   Intake 440 ml   Output 650 ml   Net -210 ml       Laboratory  Recent Labs     09/16/20  0655   WBC 7.8   RBC 3.06*   HEMOGLOBIN 9.3*   HEMATOCRIT 30.4*   MCV 99.3*   MCH 30.4   MCHC 30.6*   RDW 57.0*   PLATELETCT 191   MPV 10.2     Recent Labs     09/15/20  0647 09/16/20  0655   SODIUM 146* 145   POTASSIUM 3.6 3.5*   CHLORIDE 109 109   CO2 26 27   GLUCOSE 116* 119*   BUN 12 14   CREATININE 0.99 1.00   CALCIUM 8.8 8.6                   Assessment/Plan  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 Fisher 2/2 critical limb ischemia  Post-op complications included necrosis and infection  S/P AKA revision in 7/2020  S/P AKA I+D x 3  Wound vac per Wound Care  Wound Cx +MRSA and Enterococcus  Completed Unasyn and Vanco  Pain control per Primary Team    PVD (peripheral vascular disease) with claudication (HCC)- (present on admission)  Assessment & Plan  On Lipitor  Consider ASA    Diabetes (HCC)- (present on admission)  Assessment & Plan  HbA1c 5.1  Now off FSBS and SSI as not routinely needing insulin coverage    Elevated brain natriuretic peptide (BNP) level  Assessment & Plan  BNP in the 5000 range  Consider Echocardiogram    Hypophosphatemia  Assessment & Plan  Continue Neutra-Phos supplementation    Hypokalemia  Assessment & Plan  Replete w/ KCl 40 mEq PO x 1 today    Hypernatremia  Assessment & Plan  Encourage PO fluids  Closely follow electrolytes    Alkaline phosphatase elevation- (present on admission)  Assessment & Plan  May be 2/2 AKA  Follow levels    Vitamin D deficiency  Assessment & Plan  Vit D level 10  On high dose supplementation per Primary Team    Anemia  Assessment & Plan  Has macrocytic indices  TSH normal  Vit B12 198 and Folate 2.9  On  supplementation for both  Follow H/H    HTN (hypertension)- (present on admission)  Assessment & Plan  On Coreg and Lisinopril  Observe blood pressure trends    Basal cell carcinoma of face- (present on admission)  Assessment & Plan  Pt reports right cheek lesion has been ongoing for at least 1 yr  Needs Dermatology F/U     Full Code

## 2020-09-17 PROCEDURE — A9270 NON-COVERED ITEM OR SERVICE: HCPCS | Performed by: HOSPITALIST

## 2020-09-17 PROCEDURE — 700102 HCHG RX REV CODE 250 W/ 637 OVERRIDE(OP): Performed by: HOSPITALIST

## 2020-09-17 PROCEDURE — 97530 THERAPEUTIC ACTIVITIES: CPT

## 2020-09-17 PROCEDURE — 770010 HCHG ROOM/CARE - REHAB SEMI PRIVAT*

## 2020-09-17 PROCEDURE — 700102 HCHG RX REV CODE 250 W/ 637 OVERRIDE(OP): Performed by: PHYSICAL MEDICINE & REHABILITATION

## 2020-09-17 PROCEDURE — A9270 NON-COVERED ITEM OR SERVICE: HCPCS | Performed by: PHYSICAL MEDICINE & REHABILITATION

## 2020-09-17 PROCEDURE — 700111 HCHG RX REV CODE 636 W/ 250 OVERRIDE (IP): Performed by: PHYSICAL MEDICINE & REHABILITATION

## 2020-09-17 PROCEDURE — 97110 THERAPEUTIC EXERCISES: CPT

## 2020-09-17 PROCEDURE — 97129 THER IVNTJ 1ST 15 MIN: CPT

## 2020-09-17 PROCEDURE — 700101 HCHG RX REV CODE 250: Performed by: PHYSICAL MEDICINE & REHABILITATION

## 2020-09-17 PROCEDURE — 97130 THER IVNTJ EA ADDL 15 MIN: CPT

## 2020-09-17 PROCEDURE — 99232 SBSQ HOSP IP/OBS MODERATE 35: CPT | Performed by: HOSPITALIST

## 2020-09-17 PROCEDURE — 99233 SBSQ HOSP IP/OBS HIGH 50: CPT | Performed by: PHYSICAL MEDICINE & REHABILITATION

## 2020-09-17 RX ORDER — GABAPENTIN 300 MG/1
900 CAPSULE ORAL 3 TIMES DAILY
Status: DISCONTINUED | OUTPATIENT
Start: 2020-09-17 | End: 2020-09-23 | Stop reason: HOSPADM

## 2020-09-17 RX ADMIN — ENOXAPARIN SODIUM 40 MG: 40 INJECTION SUBCUTANEOUS at 09:24

## 2020-09-17 RX ADMIN — DIBASIC SODIUM PHOSPHATE, MONOBASIC POTASSIUM PHOSPHATE AND MONOBASIC SODIUM PHOSPHATE 250 MG: 852; 155; 130 TABLET ORAL at 15:07

## 2020-09-17 RX ADMIN — DIBASIC SODIUM PHOSPHATE, MONOBASIC POTASSIUM PHOSPHATE AND MONOBASIC SODIUM PHOSPHATE 250 MG: 852; 155; 130 TABLET ORAL at 21:23

## 2020-09-17 RX ADMIN — TRAZODONE HYDROCHLORIDE 50 MG: 50 TABLET ORAL at 21:23

## 2020-09-17 RX ADMIN — OXYCODONE HYDROCHLORIDE 10 MG: 10 TABLET ORAL at 21:23

## 2020-09-17 RX ADMIN — OMEPRAZOLE 20 MG: 20 CAPSULE, DELAYED RELEASE ORAL at 09:14

## 2020-09-17 RX ADMIN — Medication 1000 MCG: at 09:15

## 2020-09-17 RX ADMIN — LISINOPRIL 20 MG: 20 TABLET ORAL at 09:15

## 2020-09-17 RX ADMIN — GABAPENTIN 800 MG: 400 CAPSULE ORAL at 09:14

## 2020-09-17 RX ADMIN — OXYCODONE HYDROCHLORIDE 5 MG: 5 TABLET ORAL at 06:03

## 2020-09-17 RX ADMIN — ACETAMINOPHEN 1000 MG: 325 TABLET, FILM COATED ORAL at 21:23

## 2020-09-17 RX ADMIN — Medication 4000 UNITS: at 09:15

## 2020-09-17 RX ADMIN — GABAPENTIN 900 MG: 300 CAPSULE ORAL at 21:23

## 2020-09-17 RX ADMIN — FOLIC ACID 1 MG: 1 TABLET ORAL at 09:15

## 2020-09-17 RX ADMIN — ACETAMINOPHEN 1000 MG: 325 TABLET, FILM COATED ORAL at 09:14

## 2020-09-17 RX ADMIN — CARVEDILOL 6.25 MG: 3.12 TABLET, FILM COATED ORAL at 09:14

## 2020-09-17 RX ADMIN — OXYCODONE HYDROCHLORIDE 10 MG: 10 TABLET ORAL at 10:18

## 2020-09-17 RX ADMIN — OXYCODONE HYDROCHLORIDE 10 MG: 10 TABLET ORAL at 15:07

## 2020-09-17 RX ADMIN — ACETAMINOPHEN 1000 MG: 325 TABLET, FILM COATED ORAL at 15:06

## 2020-09-17 RX ADMIN — DOCUSATE SODIUM 50 MG AND SENNOSIDES 8.6 MG 2 TABLET: 8.6; 5 TABLET, FILM COATED ORAL at 09:14

## 2020-09-17 RX ADMIN — DOCUSATE SODIUM 50 MG AND SENNOSIDES 8.6 MG 2 TABLET: 8.6; 5 TABLET, FILM COATED ORAL at 21:23

## 2020-09-17 RX ADMIN — GABAPENTIN 900 MG: 300 CAPSULE ORAL at 15:07

## 2020-09-17 RX ADMIN — DIBASIC SODIUM PHOSPHATE, MONOBASIC POTASSIUM PHOSPHATE AND MONOBASIC SODIUM PHOSPHATE 250 MG: 852; 155; 130 TABLET ORAL at 09:14

## 2020-09-17 RX ADMIN — BACITRACIN ZINC AND POLYMYXIN B SULFATE: at 21:29

## 2020-09-17 RX ADMIN — BACITRACIN ZINC AND POLYMYXIN B SULFATE: at 09:24

## 2020-09-17 RX ADMIN — ATORVASTATIN CALCIUM 40 MG: 40 TABLET, FILM COATED ORAL at 21:23

## 2020-09-17 RX ADMIN — CARVEDILOL 6.25 MG: 3.12 TABLET, FILM COATED ORAL at 17:26

## 2020-09-17 ASSESSMENT — ENCOUNTER SYMPTOMS
COUGH: 0
SHORTNESS OF BREATH: 0
FEVER: 0
VOMITING: 0
POLYDIPSIA: 0
EYES NEGATIVE: 1
NAUSEA: 0
CHILLS: 0
ABDOMINAL PAIN: 0
PALPITATIONS: 0
BRUISES/BLEEDS EASILY: 0

## 2020-09-17 ASSESSMENT — ACTIVITIES OF DAILY LIVING (ADL)
BED_CHAIR_WHEELCHAIR_TRANSFER_DESCRIPTION: INCREASED TIME;SUPERVISION FOR SAFETY;VERBAL CUEING
TOILET_TRANSFER_DESCRIPTION: GRAB BAR;INCREASED TIME;SUPERVISION FOR SAFETY;VERBAL CUEING
TUB_SHOWER_TRANSFER_DESCRIPTION: GRAB BAR;SHOWER BENCH;SUPERVISION FOR SAFETY;VERBAL CUEING

## 2020-09-17 ASSESSMENT — PAIN DESCRIPTION - PAIN TYPE
TYPE: ACUTE PAIN
TYPE: ACUTE PAIN

## 2020-09-17 NOTE — CARE PLAN
Problem: Recreation Therapy  Goal: STG-Within one week, patient will  Description: Share on two leisure activities he would like to participate in session and following discharge and any perceived barriers to his participation.   Outcome: PROGRESSING AS EXPECTED  Goal: LTG-By discharge, patient will  Description: Participate with the Recreation Therapist X1 a week and X2 with the therapy tech under the supervision of the Recreation Therapist in a cognitive leisure activity.   Outcome: PROGRESSING AS EXPECTED

## 2020-09-17 NOTE — PROGRESS NOTES
Hospital Medicine Daily Progress Note      Chief Complaint  Hypertension  Diabetes    Interval Problem Update  Pt continues to c/o uncontrolled AKA pain.    Review of Systems  Review of Systems   Constitutional: Negative for chills and fever.   HENT: Negative.    Eyes: Negative.    Respiratory: Negative for cough and shortness of breath.    Cardiovascular: Negative for chest pain and palpitations.   Gastrointestinal: Negative for abdominal pain, nausea and vomiting.   Musculoskeletal:        Wound pain    Endo/Heme/Allergies: Negative for polydipsia. Does not bruise/bleed easily.        Physical Exam  Temp:  [36.9 °C (98.5 °F)-37.4 °C (99.3 °F)] 37.1 °C (98.8 °F)  Pulse:  [66-72] 72  Resp:  [18] 18  BP: (119-152)/(58-75) 152/75  SpO2:  [95 %-96 %] 95 %    Physical Exam  Vitals signs reviewed.   Constitutional:       Appearance: Normal appearance.      Comments: Chronically ill appearing   HENT:      Head: Normocephalic and atraumatic.      Right Ear: External ear normal.      Left Ear: External ear normal.      Nose: Nose normal.      Mouth/Throat:      Pharynx: Oropharynx is clear.   Eyes:      General:         Right eye: No discharge.         Left eye: No discharge.      Extraocular Movements: Extraocular movements intact.      Conjunctiva/sclera: Conjunctivae normal.   Neck:      Musculoskeletal: Normal range of motion and neck supple.   Cardiovascular:      Rate and Rhythm: Normal rate and regular rhythm.   Pulmonary:      Effort: No respiratory distress.      Breath sounds: No wheezing.      Comments: Decreased BS   Abdominal:      General: Bowel sounds are normal. There is no distension.      Palpations: Abdomen is soft.      Tenderness: There is no abdominal tenderness.   Musculoskeletal:      Comments: Left AKA w/ wound vac   Skin:     General: Skin is warm and dry.      Comments: Large pearly lesion on right cheek   Neurological:      Mental Status: He is alert.      Comments: awake          Fluids    Intake/Output Summary (Last 24 hours) at 9/17/2020 1101  Last data filed at 9/17/2020 0900  Gross per 24 hour   Intake 380 ml   Output 660 ml   Net -280 ml       Laboratory  Recent Labs     09/16/20  0655   WBC 7.8   RBC 3.06*   HEMOGLOBIN 9.3*   HEMATOCRIT 30.4*   MCV 99.3*   MCH 30.4   MCHC 30.6*   RDW 57.0*   PLATELETCT 191   MPV 10.2     Recent Labs     09/15/20  0647 09/16/20  0655   SODIUM 146* 145   POTASSIUM 3.6 3.5*   CHLORIDE 109 109   CO2 26 27   GLUCOSE 116* 119*   BUN 12 14   CREATININE 0.99 1.00   CALCIUM 8.8 8.6                   Assessment/Plan  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 Fisher 2/2 critical limb ischemia  Post-op complications included necrosis and infection  S/P AKA revision in 7/2020  S/P AKA I+D x 3  Wound vac per Wound Care  Wound Cx +MRSA and Enterococcus  Completed Unasyn and Vanco  Pain control per Primary Team    PVD (peripheral vascular disease) with claudication (HCC)- (present on admission)  Assessment & Plan  On Lipitor  Consider ASA    Diabetes (HCC)- (present on admission)  Assessment & Plan  HbA1c 5.1  Diet control    Elevated brain natriuretic peptide (BNP) level  Assessment & Plan  BNP in the 5000 range  Consider Echocardiogram    Hypophosphatemia  Assessment & Plan  Continue Neutra-Phos supplementation    Hypokalemia  Assessment & Plan  Repleted w/ KCl 40 mEq PO x 1 on 9/16/20    Hypernatremia  Assessment & Plan  Improving w/ PO fluids  Closely follow electrolytes    Alkaline phosphatase elevation- (present on admission)  Assessment & Plan  May be 2/2 AKA  Follow levels    Vitamin D deficiency  Assessment & Plan  Vit D level 10  On high dose supplementation per Primary Team    Anemia  Assessment & Plan  Has macrocytic indices  TSH normal  Vit B12 198 and Folate 2.9  On supplementation for both  Follow H/H    HTN (hypertension)-  (present on admission)  Assessment & Plan  On Coreg and Lisinopril  Observe blood pressure trends    Basal cell carcinoma of face- (present on admission)  Assessment & Plan  Pt reports right cheek lesion has been ongoing for at least 1 yr  Needs Dermatology F/U     Full Code

## 2020-09-17 NOTE — CARE PLAN
Problem: Skin Integrity  Goal: Risk for impaired skin integrity will decrease  Outcome: PROGRESSING AS EXPECTED  Note: Wound vac in place to left AKA.  Moisturizer applied to patient's arms, back and legs rash areas.

## 2020-09-17 NOTE — CARE PLAN
Problem: Venous Thromboembolism (VTW)/Deep Vein Thrombosis (DVT) Prevention:  Goal: Patient will participate in Venous Thrombosis (VTE)/Deep Vein Thrombosis (DVT)Prevention Measures  Outcome: PROGRESSING AS EXPECTED  Note: Pt is on Lovenox for DVT prophylaxis. No s/s of DVT or edema noted; will continue to monitor.     Problem: Respiratory:  Goal: Respiratory status will improve  Outcome: PROGRESSING AS EXPECTED  Note: Pt is on room air but breathes shallowly. Lung sounds diminished in lower lobes; denies SOB. Will continue to monitor.

## 2020-09-17 NOTE — CARE PLAN
Problem: Toileting  Goal: STG-Within one week, patient will complete toileting tasks  Description: 1) Individualized Goal:  Max assist for toileting task via AE/DME PRN  2) Interventions:  OT Self Care/ADL, OT Neuro Re-Ed/Balance, OT Therapeutic Activity, OT Evaluation, and OT Therapeutic Exercise    Outcome: MET     Problem: Functional Transfers  Goal: STG-Within one week, patient will transfer to step in shower  Description: 1) Individualized Goal: Mod assist for wc/shower chair transfer via DME PRN  2) Interventions: OT Self Care/ADL, OT Neuro Re-Ed/Balance, OT Therapeutic Activity, OT Evaluation, and OT Therapeutic Exercise    Outcome: MET

## 2020-09-17 NOTE — THERAPY
"Missed Therapy     Patient Name: Jimenez Bains  Age:  75 y.o., Sex:  male  Medical Record #: [id number]  Today's Date: 9/17/2020    Discussed missed therapy with    Pt reporting 10/10 pain \"my leg just hurts too much\"    Offered in room treatment, supine there ex, short distance amb, ice for pain management, pt declining all treatment. The Patient opens his his eyes for a few seconds a few times throughout the 10 minute conversation. Ultimately this writer spoke to RN regarding pain medication as pt has not been medicated.       09/17/20 1001   Pain 0 - 10 Group   Location Leg   Location Orientation Left   Description Radiating  (zapping)   Comfort Goal Perform Activity;Comfort with Movement;4;5   Therapist Pain Assessment 10;Nurse Notified;Prior to Activity   Interdisciplinary Plan of Care Collaboration   IDT Collaboration with  Nursing   Patient Position at End of Therapy In Bed;Call Light within Reach   Collaboration Comments pt report of 10/10 pain   Therapy Missed   Missed Therapy (Minutes) 60   Reason For Missed Therapy Medical - Other (Please Comment)  (10/10 pain)   PT Charge Group   Supervising Physical Therapist Rhonda Patel     "

## 2020-09-17 NOTE — PROGRESS NOTES
"Rehab Progress Note     Encounter Date: 9/17/2020    CC: left AKA, phantom limb pain, wound pain    Interval Events (Subjective)  Patient sitting up in room. He reportedly had 10/10 pain this morning, but does not do a good job of asking for pain medication. Discussed giving him 10 mg oxycodone. Will increase Gabapentin to 900 mg, he reports some improvement with increase to 800. Denies SOB. Reports SBP was elevated but may be pain related.  Discussed will have IDT later today to discuss discharge planning.     IDT Team Meeting 9/17/2020    IWinifred M.D., was present and led the interdisciplinary team conference on 9/17/2020.  I led the IDT conference and agree with the IDT conference documentation and plan of care as noted below.     RN:  Diet    % Meal     Pain L AKA pain   Sleep    Bowel Continent   Bladder Brothers   In's & Out's      PT:  Bed Mobility    Transfers CGA   Mobility 15 feet hopping [place]; SBA wheelchair   Stairs    Home health   Has WC - needs possible [facility]    OT:  Eating    Grooming    Bathing    UB Dressing setup   LB Dressing supervs   Toileting    Shower & Transfer [place]   Can be variable  Needs physical support    SLP:  modA for problem solving  [place] safety  Depends on mood and buy in      CM:  Continues to work on disposition and DME needs.      DC/Disposition:  9/22/20 -> questionable discharge?    Objective:  VITAL SIGNS: /75   Pulse 72   Temp 37.1 °C (98.8 °F) (Temporal)   Resp 18   Ht 1.676 m (5' 6\")   Wt 64.4 kg (142 lb)   SpO2 95%   BMI 22.92 kg/m²   Gen: NAD  Psych: Mood and affect appropriate  CV: RRR, no edema  Resp: CTAB, no upper airway sounds  Abd: NTND  Neuro: AOx3, following commands    Recent Results (from the past 72 hour(s))   Basic Metabolic Panel    Collection Time: 09/15/20  6:47 AM   Result Value Ref Range    Sodium 146 (H) 135 - 145 mmol/L    Potassium 3.6 3.6 - 5.5 mmol/L    Chloride 109 96 - 112 mmol/L    Co2 26 20 - 33 mmol/L    Glucose 116 " (H) 65 - 99 mg/dL    Bun 12 8 - 22 mg/dL    Creatinine 0.99 0.50 - 1.40 mg/dL    Calcium 8.8 8.5 - 10.5 mg/dL    Anion Gap 11.0 7.0 - 16.0   MAGNESIUM    Collection Time: 09/15/20  6:47 AM   Result Value Ref Range    Magnesium 2.0 1.5 - 2.5 mg/dL   PHOSPHORUS    Collection Time: 09/15/20  6:47 AM   Result Value Ref Range    Phosphorus 2.5 2.5 - 4.5 mg/dL   ESTIMATED GFR    Collection Time: 09/15/20  6:47 AM   Result Value Ref Range    GFR If African American >60 >60 mL/min/1.73 m 2    GFR If Non African American >60 >60 mL/min/1.73 m 2   CBC WITHOUT DIFFERENTIAL    Collection Time: 09/16/20  6:55 AM   Result Value Ref Range    WBC 7.8 4.8 - 10.8 K/uL    RBC 3.06 (L) 4.70 - 6.10 M/uL    Hemoglobin 9.3 (L) 14.0 - 18.0 g/dL    Hematocrit 30.4 (L) 42.0 - 52.0 %    MCV 99.3 (H) 81.4 - 97.8 fL    MCH 30.4 27.0 - 33.0 pg    MCHC 30.6 (L) 33.7 - 35.3 g/dL    RDW 57.0 (H) 35.9 - 50.0 fL    Platelet Count 191 164 - 446 K/uL    MPV 10.2 9.0 - 12.9 fL   Basic Metabolic Panel    Collection Time: 09/16/20  6:55 AM   Result Value Ref Range    Sodium 145 135 - 145 mmol/L    Potassium 3.5 (L) 3.6 - 5.5 mmol/L    Chloride 109 96 - 112 mmol/L    Co2 27 20 - 33 mmol/L    Glucose 119 (H) 65 - 99 mg/dL    Bun 14 8 - 22 mg/dL    Creatinine 1.00 0.50 - 1.40 mg/dL    Calcium 8.6 8.5 - 10.5 mg/dL    Anion Gap 9.0 7.0 - 16.0   proBrain Natriuretic Peptide, NT    Collection Time: 09/16/20  6:55 AM   Result Value Ref Range    NT-proBNP 4502 (H) 0 - 125 pg/mL   ESTIMATED GFR    Collection Time: 09/16/20  6:55 AM   Result Value Ref Range    GFR If African American >60 >60 mL/min/1.73 m 2    GFR If Non African American >60 >60 mL/min/1.73 m 2       Current Facility-Administered Medications   Medication Frequency   • traZODone (DESYREL) tablet 50 mg QHS   • phosphorus (K-Phos-Neutral) per tablet 250 mg TID   • bacitracin-polymyxin b (POLYSPORIN) 500-87154 UNIT/GM ointment BID   • gabapentin (NEURONTIN) capsule 800 mg TID   •  bacitracin-neomycin-polymyxin (Neosporin) 400-5-5000 ointment BID PRN   • lisinopril (PRINIVIL) tablet 20 mg DAILY   • lidocaine (XYLOCAINE) 4 % topical solution PRN   • oxyCODONE immediate-release (ROXICODONE) tablet 5 mg DAILY   • oxyCODONE immediate release (ROXICODONE) tablet 10 mg Q4HRS PRN   • folic acid (FOLVITE) tablet 1 mg DAILY   • cyanocobalamin (VITAMIN B-12) tablet 1,000 mcg DAILY   • miconazole 2%-zinc oxide (Hilary) topical cream Q6HRS PRN   • vitamin D (cholecalciferol) tablet 4,000 Units DAILY   • glucose 4 g chewable tablet 16 g Q15 MIN PRN    And   • dextrose 50% (D50W) injection 50 mL Q15 MIN PRN   • acetaminophen (TYLENOL) tablet 1,000 mg TID   • Respiratory Therapy Consult Continuous RT   • tramadol (ULTRAM) 50 MG tablet 50 mg Q4HRS PRN   • hydrALAZINE (APRESOLINE) tablet 25 mg Q8HRS PRN   • enoxaparin (LOVENOX) inj 40 mg DAILY   • acetaminophen (TYLENOL) tablet 650 mg Q4HRS PRN   • senna-docusate (PERICOLACE or SENOKOT S) 8.6-50 MG per tablet 2 Tab BID    And   • polyethylene glycol/lytes (MIRALAX) PACKET 1 Packet QDAY PRN    And   • magnesium hydroxide (MILK OF MAGNESIA) suspension 30 mL QDAY PRN    And   • bisacodyl (DULCOLAX) suppository 10 mg QDAY PRN   • benzocaine-menthol (CEPACOL) lozenge 1 Lozenge Q2HRS PRN   • mag hydrox-al hydrox-simeth (MAALOX PLUS ES or MYLANTA DS) suspension 20 mL Q2HRS PRN   • ondansetron (ZOFRAN ODT) dispertab 4 mg 4X/DAY PRN    Or   • ondansetron (ZOFRAN) syringe/vial injection 4 mg 4X/DAY PRN   • sodium chloride (OCEAN) 0.65 % nasal spray 2 Spray PRN   • midazolam (VERSED) 5 mg/mL (1 mL vial) PRN   • atorvastatin (LIPITOR) tablet 40 mg Q EVENING   • oxyCODONE immediate-release (ROXICODONE) tablet 2.5 mg Q3HRS PRN   • oxyCODONE immediate-release (ROXICODONE) tablet 5 mg Q3HRS PRN   • omeprazole (PRILOSEC) capsule 20 mg DAILY   • carvedilol (COREG) tablet 6.25 mg BID WITH MEALS     Facility-Administered Medications Ordered in Other Encounters   Medication  Frequency   • fentaNYL (SUBLIMAZE) injection PRN   • propofol PRN   • ceFAZolin (ANCEF) injection PRN       Orders Placed This Encounter   Procedures   • Diet Order Cardiac (Thin), Diabetic, 2 Gram Sodium     Standing Status:   Standing     Number of Occurrences:   1     Order Specific Question:   Diet:     Answer:   Cardiac [6]     Comments:   Thin     Order Specific Question:   Diet:     Answer:   Diabetic [3]     Order Specific Question:   Diet:     Answer:   2 Gram Sodium [7]       Assessment:  Active Hospital Problems    Diagnosis   • *Encephalopathy acute   • AKA stump complication (HCC)   • Arterial insufficiency (HCC)   • PVD (peripheral vascular disease) with claudication (HCC)   • Diabetes (HCC)   • HLD (hyperlipidemia)   • HTN (hypertension)   • Basal cell carcinoma of face   • Hypokalemia   • Hypernatremia   • Alkaline phosphatase elevation   • Anemia   • Vitamin D deficiency   • Peripheral arterial occlusive disease (HCC)       Medical Decision Making and Plan:  Acute encephalopathy - Concern for encephalopathy with ongoing multiple medical comorbidities and confusion about hospital course as well as not understanding carpio catheter. Alternatively could be medicated for transfer but therapy reporting difficulty with cuing and following commands.   -PT and OT for mobility and ADLs  -SLP for cognitive evaluation     Left BKA - Patient with severe PVD with L AKA on 6/30/20 with multiple revisions due to necrosis and infection. Cultures grew MRSA and Enterococcus. Per ID continue antibiotics until 9/6/20. Transferred to hospitals from 8/12/20 to 9/4/20.   -Vancomycin and Unasyn until 9/6/20. Completed.   -Redress on 9/15/20 as wound vac not turned on and significant pain and drainage.   -Continue wound vac. Consult wound care - added honey to regimen with slow improvement in size and depth. Lidocaine 4% PRN for dressing change      Multiple wounds - Patient has rash to abdomen, redness on sacrum and left buttocks  on transfer. Consult wound care.     Right face basal cell carcinoma - Unclear what follow-up. Follow-up Oncology     Anemia - Check AM CBC - 8.8. Continue to monitor.      HTN - Patient on Lisinopril 10 mg and Coreg 6.25 mg BID  -Hospistalist consulted.      Pain control - Primarily on admission with left phantom limb pain. Patient on Gabapentin 100 mg TID and Oxycodone. Can consider increase in Gabapentin for neuropathic phantom limb pain.   -Increase Gabapentin to 400 mg TID. Oxycodone 10 mg for wound care changes. Concern for over sedation with higher opiate dosing, limit to wound vac change, lidocaine jelly for wound change.   -Increase Gabapentin to 900 mg TID and schedule Oxycodone 5 mg first thing in AM. May need to schedule doses as patient unable to remember to ask at times     DM with hyperglycemia - Patient on SSI on transfer. Consult Hospitalist  -Discontinue SSI     COVID - Patient with check x2 at [facility]. Patient without symptoms. Per policy no check within 7 days. Patient without symptoms. Check PCR, on precautions - negative, no precautions.      Insomnia/Mood - Schedule Trazodone QHS    GI Ppx - Patient on Prilosec      DVT Ppx - Patient on Lovenox on transfer.  Constipation, no BM since 9/7, PRN Miralax and MoM     Total time:  36 minutes.  I spent greater than 50% of the time for patient care, counseling, and coordination on this date, including unit/floor time, and face-to-face time with the patient as per interval events and assessment and plan above. Topics discussed included discharge planning, pain control, increase Gabapentin, and consider scheduling more pain medications. Patient was discussed separately in IDT today; please see details above.    Winifred Silverman M.D.

## 2020-09-17 NOTE — THERAPY
Occupational Therapy  Daily Treatment     Patient Name: Jimenez Bains  Age:  75 y.o., Sex:  male  Medical Record #: [id number]  Today's Date: 9/17/2020     Precautions  Precautions: (P) Fall Risk, Non Weight Bearing Left Lower Extremity  Comments: (P) wound vac L residual limb, sores on genital area, carpio; contact precautions (ok to leave room)         Subjective    Pt received supine in bed, c/o pain in L residual limb but agreeable to OT session     Objective       09/17/20 1301   Precautions   Precautions Fall Risk;Non Weight Bearing Left Lower Extremity   Comments wound vac L residual limb, sores on genital area, carpio; contact precautions (ok to leave room)   Functional Level of Assist   Upper Body Dressing Supervision   Upper Body Dressing Description Set-up of equipment;Supervision for safety  (changed tshirt seated in chair)   Lower Body Dressing Supervision   Lower Body Dressing Description Verbal cueing;Increased time;Supervision for safety  (bed level)   Bed, Chair, Wheelchair Transfer Moderate Assist   Bed Chair Wheelchair Transfer Description Increased time;Supervision for safety;Verbal cueing  (squat pivot bed <> w/c)   Supine Upper Body Exercises   Chest Fly 2 sets of 15  (5# dumbells)   Chest Press 2 sets of 15  (5# dumbells)   Front Arm Raise 2 sets of 15  (5# dumbells)   Bicep Curl 2 sets of 15  (5# dumbells)   Elbow Extension 2 sets of 15  (5# dumbells)   OT Total Time Spent   OT Individual Total Time Spent (Mins) 60   OT Charge Group   OT Therapy Activity 3   OT Therapeutic Exercise  1     Transfer w/c <> therapy mat and supine <> sit on therapy mat x3, SBA for supine <> sit, fluctuated min to mod for squat pivot transfers, requires setup and cues for hand placement, transferred back to chair on 3rd attempt and landed on edge requiring mod A to come to safe seated position. Bridges 3x10 in supine for increased clearance with bed repositioning and lower body dressing.     Assessment    Pt  tolerated session fair, required increased assist with transfers this session, fluctuated between min and mod, requires consistent cues, poor safety awareness and planning for mobility. Continues to be limited by left leg pain but managed fairly during session.   Strengths: Pleasant and cooperative, Willingly participates in therapeutic activities  Barriers: Confused, Decreased endurance, Generalized weakness, Impaired balance, Limited mobility, Pain    Plan    Continue to progress gross strengthening and endurance and standing tolerance/balance toward increased safety and independence with ADLs, general attention to hygiene and self-care, weightshift when scooting    Occupational Therapy Goals     Problem: OT Long Term Goals     Dates: Start: 09/05/20       Goal: LTG-By discharge, patient will complete basic self care tasks     Dates: Start: 09/05/20       Description: 1) Individualized Goal:  Mod I for BADL's via AE/DME PRN  2) Interventions: OT Self Care/ADL, OT Neuro Re-Ed/Balance, OT Therapeutic Activity, OT Evaluation, and OT Therapeutic Exercise            Goal: LTG-By discharge, patient will perform bathroom transfers     Dates: Start: 09/05/20       Description: 1) Individualized Goal:  Mod I for toilet and shower transfer via DME  2) Interventions: OT Self Care/ADL, OT Neuro Re-Ed/Balance, OT Therapeutic Activity, OT Evaluation, and OT Therapeutic Exercise

## 2020-09-17 NOTE — THERAPY
Speech Language Pathology  Daily Treatment     Patient Name: Jimenez Bains  Age:  75 y.o., Sex:  male  Medical Record #: [id number]  Today's Date: 9/17/2020     Precautions  Precautions: Fall Risk, Non Weight Bearing Left Lower Extremity  Comments: wound vac L residual limb, sores on genital area, carpio; contact precautions (ok to leave room)    Subjective    Patient was willing to participate.      Objective       09/17/20 0832   Cognition   Functional Problem Solving Moderate (3)   Safety Awareness Minimal (4)   SLP Total Time Spent   SLP Individual Total Time Spent (Mins) 60   Treatment Charges   SLP Cognitive Skill Development First 15 Minutes 1   SLP Cognitive Skill Development Additional 15 Minutes 3       Assessment    Patient was able to ID problems in safe vs. unsafe photos. Required min-mod verbal cues to provide appropriate solutions to problems.     Strengths: Able to follow instructions, Motivated for self care and independence, Pleasant and cooperative, Willingly participates in therapeutic activities  Barriers: Impaired functional cognition    Plan    Continue functional problem solving tasks, sequencing.     Speech Therapy Problems     Problem: Memory STGs     Dates: Start: 09/05/20       Goal: STG-Within one week, patient will     Dates: Start: 09/05/20       Description: 1) Individualized goal:  recall daily events and safety strategies given min verbal cues 80% with use of memory book.  2) Interventions:  SLP Cognitive Skill Development and SLP Group Treatment                    Problem: Problem Solving STGs     Dates: Start: 09/05/20       Goal: STG-Within one week, patient will     Dates: Start: 09/05/20       Description: 1) Individualized goal:  answer basic problem solving questions with 80% accuracy given min verbal cues.    2) Interventions:  SLP Cognitive Skill Development and SLP Group Treatment                    Problem: Speech/Swallowing LTGs     Dates: Start: 09/05/20       Goal:  LTG-By discharge, patient will solve basic problems     Dates: Start: 09/05/20       Description: 1) Individualized goal:  Mert for safe discharge home.   2) Interventions:  SLP Aphasia Evaluation, SLP Cognitive Skill Development, and SLP Group Treatment

## 2020-09-18 PROCEDURE — 700102 HCHG RX REV CODE 250 W/ 637 OVERRIDE(OP): Performed by: PHYSICAL MEDICINE & REHABILITATION

## 2020-09-18 PROCEDURE — 700101 HCHG RX REV CODE 250

## 2020-09-18 PROCEDURE — 700102 HCHG RX REV CODE 250 W/ 637 OVERRIDE(OP): Performed by: HOSPITALIST

## 2020-09-18 PROCEDURE — 700101 HCHG RX REV CODE 250: Performed by: PHYSICAL MEDICINE & REHABILITATION

## 2020-09-18 PROCEDURE — 97110 THERAPEUTIC EXERCISES: CPT

## 2020-09-18 PROCEDURE — A9270 NON-COVERED ITEM OR SERVICE: HCPCS | Performed by: HOSPITALIST

## 2020-09-18 PROCEDURE — 770010 HCHG ROOM/CARE - REHAB SEMI PRIVAT*

## 2020-09-18 PROCEDURE — 97597 DBRDMT OPN WND 1ST 20 CM/<: CPT

## 2020-09-18 PROCEDURE — A9270 NON-COVERED ITEM OR SERVICE: HCPCS | Performed by: PHYSICAL MEDICINE & REHABILITATION

## 2020-09-18 PROCEDURE — 99232 SBSQ HOSP IP/OBS MODERATE 35: CPT | Performed by: HOSPITALIST

## 2020-09-18 PROCEDURE — 99232 SBSQ HOSP IP/OBS MODERATE 35: CPT | Performed by: PHYSICAL MEDICINE & REHABILITATION

## 2020-09-18 PROCEDURE — 97530 THERAPEUTIC ACTIVITIES: CPT

## 2020-09-18 PROCEDURE — 700111 HCHG RX REV CODE 636 W/ 250 OVERRIDE (IP): Performed by: PHYSICAL MEDICINE & REHABILITATION

## 2020-09-18 PROCEDURE — 97130 THER IVNTJ EA ADDL 15 MIN: CPT

## 2020-09-18 PROCEDURE — 97129 THER IVNTJ 1ST 15 MIN: CPT

## 2020-09-18 RX ORDER — DULOXETIN HYDROCHLORIDE 20 MG/1
20 CAPSULE, DELAYED RELEASE ORAL DAILY
Status: DISCONTINUED | OUTPATIENT
Start: 2020-09-19 | End: 2020-09-23 | Stop reason: HOSPADM

## 2020-09-18 RX ORDER — LIDOCAINE HYDROCHLORIDE 20 MG/ML
20 INJECTION, SOLUTION INFILTRATION; PERINEURAL
Status: DISCONTINUED | OUTPATIENT
Start: 2020-09-18 | End: 2020-09-23 | Stop reason: HOSPADM

## 2020-09-18 RX ORDER — LIDOCAINE HYDROCHLORIDE 40 MG/ML
SOLUTION TOPICAL
Status: DISCONTINUED | OUTPATIENT
Start: 2020-09-18 | End: 2020-09-23 | Stop reason: HOSPADM

## 2020-09-18 RX ORDER — LIDOCAINE HYDROCHLORIDE 20 MG/ML
JELLY TOPICAL
Status: COMPLETED
Start: 2020-09-18 | End: 2020-09-18

## 2020-09-18 RX ADMIN — LIDOCAINE HYDROCHLORIDE 20 MG/ML: 20 JELLY TOPICAL at 13:54

## 2020-09-18 RX ADMIN — GABAPENTIN 900 MG: 300 CAPSULE ORAL at 08:50

## 2020-09-18 RX ADMIN — Medication 1000 MCG: at 08:50

## 2020-09-18 RX ADMIN — ENOXAPARIN SODIUM 40 MG: 40 INJECTION SUBCUTANEOUS at 09:03

## 2020-09-18 RX ADMIN — GABAPENTIN 900 MG: 300 CAPSULE ORAL at 20:45

## 2020-09-18 RX ADMIN — OXYCODONE HYDROCHLORIDE 10 MG: 10 TABLET ORAL at 12:30

## 2020-09-18 RX ADMIN — OXYCODONE HYDROCHLORIDE 10 MG: 10 TABLET ORAL at 08:51

## 2020-09-18 RX ADMIN — FOLIC ACID 1 MG: 1 TABLET ORAL at 08:51

## 2020-09-18 RX ADMIN — DIBASIC SODIUM PHOSPHATE, MONOBASIC POTASSIUM PHOSPHATE AND MONOBASIC SODIUM PHOSPHATE 250 MG: 852; 155; 130 TABLET ORAL at 20:45

## 2020-09-18 RX ADMIN — LISINOPRIL 20 MG: 20 TABLET ORAL at 08:51

## 2020-09-18 RX ADMIN — DIBASIC SODIUM PHOSPHATE, MONOBASIC POTASSIUM PHOSPHATE AND MONOBASIC SODIUM PHOSPHATE 250 MG: 852; 155; 130 TABLET ORAL at 08:50

## 2020-09-18 RX ADMIN — TRAZODONE HYDROCHLORIDE 50 MG: 50 TABLET ORAL at 20:45

## 2020-09-18 RX ADMIN — OXYCODONE HYDROCHLORIDE 10 MG: 10 TABLET ORAL at 20:45

## 2020-09-18 RX ADMIN — BACITRACIN ZINC AND POLYMYXIN B SULFATE: at 09:03

## 2020-09-18 RX ADMIN — ACETAMINOPHEN 1000 MG: 325 TABLET, FILM COATED ORAL at 14:45

## 2020-09-18 RX ADMIN — ACETAMINOPHEN 1000 MG: 325 TABLET, FILM COATED ORAL at 08:50

## 2020-09-18 RX ADMIN — DOCUSATE SODIUM 50 MG AND SENNOSIDES 8.6 MG 2 TABLET: 8.6; 5 TABLET, FILM COATED ORAL at 20:46

## 2020-09-18 RX ADMIN — CARVEDILOL 6.25 MG: 3.12 TABLET, FILM COATED ORAL at 08:50

## 2020-09-18 RX ADMIN — DOCUSATE SODIUM 50 MG AND SENNOSIDES 8.6 MG 2 TABLET: 8.6; 5 TABLET, FILM COATED ORAL at 08:49

## 2020-09-18 RX ADMIN — Medication 4000 UNITS: at 08:51

## 2020-09-18 RX ADMIN — DIBASIC SODIUM PHOSPHATE, MONOBASIC POTASSIUM PHOSPHATE AND MONOBASIC SODIUM PHOSPHATE 250 MG: 852; 155; 130 TABLET ORAL at 14:45

## 2020-09-18 RX ADMIN — OXYCODONE HYDROCHLORIDE 5 MG: 5 TABLET ORAL at 05:52

## 2020-09-18 RX ADMIN — ACETAMINOPHEN 1000 MG: 325 TABLET, FILM COATED ORAL at 20:45

## 2020-09-18 RX ADMIN — CARVEDILOL 6.25 MG: 3.12 TABLET, FILM COATED ORAL at 17:04

## 2020-09-18 RX ADMIN — OMEPRAZOLE 20 MG: 20 CAPSULE, DELAYED RELEASE ORAL at 08:50

## 2020-09-18 RX ADMIN — GABAPENTIN 900 MG: 300 CAPSULE ORAL at 14:45

## 2020-09-18 RX ADMIN — ATORVASTATIN CALCIUM 40 MG: 40 TABLET, FILM COATED ORAL at 20:46

## 2020-09-18 ASSESSMENT — ENCOUNTER SYMPTOMS
VOMITING: 0
PALPITATIONS: 0
BRUISES/BLEEDS EASILY: 0
NAUSEA: 0
COUGH: 0
ABDOMINAL PAIN: 0
POLYDIPSIA: 0
EYES NEGATIVE: 1
CHILLS: 0
FEVER: 0
SHORTNESS OF BREATH: 0

## 2020-09-18 ASSESSMENT — GAIT ASSESSMENTS
ASSISTIVE DEVICE: FRONT WHEEL WALKER
GAIT LEVEL OF ASSIST: MINIMAL ASSIST
DISTANCE (FEET): 10

## 2020-09-18 ASSESSMENT — PAIN DESCRIPTION - PAIN TYPE
TYPE: ACUTE PAIN
TYPE: ACUTE PAIN

## 2020-09-18 NOTE — PROGRESS NOTES
"Rehab Progress Note     Encounter Date: 9/18/2020    CC: left AKA, phantom limb pain, wound pain    Interval Events (Subjective)  Patient sitting up in room. He reports pain is better than yesterday but still present. Patient reports his mood is worse as he is concerned the wound will never heal, discussed that Dr. Fisher plans on evaluating this afternoon. He reports he will talk with him at that time. Discussed about his discharge support and he reports he will talk to family. Denies NVD. Denies SOB.     IDT Team Meeting 9/17/2020  DC/Disposition:  9/22/20 -> questionable discharge?    Objective:  VITAL SIGNS: /66   Pulse 68   Temp 36.8 °C (98.2 °F) (Temporal)   Resp 17   Ht 1.676 m (5' 6\")   Wt 64.4 kg (142 lb)   SpO2 90%   BMI 22.92 kg/m²   Gen: NAD  Psych: Mood and affect appropriate  CV: RRR, no edema  Resp: CTAB, no upper airway sounds  Abd: NTND  Neuro: AOx3, following commands    Recent Results (from the past 72 hour(s))   CBC WITHOUT DIFFERENTIAL    Collection Time: 09/16/20  6:55 AM   Result Value Ref Range    WBC 7.8 4.8 - 10.8 K/uL    RBC 3.06 (L) 4.70 - 6.10 M/uL    Hemoglobin 9.3 (L) 14.0 - 18.0 g/dL    Hematocrit 30.4 (L) 42.0 - 52.0 %    MCV 99.3 (H) 81.4 - 97.8 fL    MCH 30.4 27.0 - 33.0 pg    MCHC 30.6 (L) 33.7 - 35.3 g/dL    RDW 57.0 (H) 35.9 - 50.0 fL    Platelet Count 191 164 - 446 K/uL    MPV 10.2 9.0 - 12.9 fL   Basic Metabolic Panel    Collection Time: 09/16/20  6:55 AM   Result Value Ref Range    Sodium 145 135 - 145 mmol/L    Potassium 3.5 (L) 3.6 - 5.5 mmol/L    Chloride 109 96 - 112 mmol/L    Co2 27 20 - 33 mmol/L    Glucose 119 (H) 65 - 99 mg/dL    Bun 14 8 - 22 mg/dL    Creatinine 1.00 0.50 - 1.40 mg/dL    Calcium 8.6 8.5 - 10.5 mg/dL    Anion Gap 9.0 7.0 - 16.0   proBrain Natriuretic Peptide, NT    Collection Time: 09/16/20  6:55 AM   Result Value Ref Range    NT-proBNP 4502 (H) 0 - 125 pg/mL   ESTIMATED GFR    Collection Time: 09/16/20  6:55 AM   Result Value Ref Range "    GFR If African American >60 >60 mL/min/1.73 m 2    GFR If Non African American >60 >60 mL/min/1.73 m 2       Current Facility-Administered Medications   Medication Frequency   • lidocaine (XYLOCAINE) 2 % injection 20 mL QDAY PRN    Or   • lidocaine (XYLOCAINE) 4 % topical solution QDAY PRN   • gabapentin (NEURONTIN) capsule 900 mg TID   • traZODone (DESYREL) tablet 50 mg QHS   • phosphorus (K-Phos-Neutral) per tablet 250 mg TID   • bacitracin-polymyxin b (POLYSPORIN) 500-14257 UNIT/GM ointment BID   • bacitracin-neomycin-polymyxin (Neosporin) 400-5-5000 ointment BID PRN   • lisinopril (PRINIVIL) tablet 20 mg DAILY   • lidocaine (XYLOCAINE) 4 % topical solution PRN   • oxyCODONE immediate-release (ROXICODONE) tablet 5 mg DAILY   • oxyCODONE immediate release (ROXICODONE) tablet 10 mg Q4HRS PRN   • folic acid (FOLVITE) tablet 1 mg DAILY   • cyanocobalamin (VITAMIN B-12) tablet 1,000 mcg DAILY   • miconazole 2%-zinc oxide (Hilary) topical cream Q6HRS PRN   • vitamin D (cholecalciferol) tablet 4,000 Units DAILY   • glucose 4 g chewable tablet 16 g Q15 MIN PRN    And   • dextrose 50% (D50W) injection 50 mL Q15 MIN PRN   • acetaminophen (TYLENOL) tablet 1,000 mg TID   • Respiratory Therapy Consult Continuous RT   • tramadol (ULTRAM) 50 MG tablet 50 mg Q4HRS PRN   • hydrALAZINE (APRESOLINE) tablet 25 mg Q8HRS PRN   • enoxaparin (LOVENOX) inj 40 mg DAILY   • acetaminophen (TYLENOL) tablet 650 mg Q4HRS PRN   • senna-docusate (PERICOLACE or SENOKOT S) 8.6-50 MG per tablet 2 Tab BID    And   • polyethylene glycol/lytes (MIRALAX) PACKET 1 Packet QDAY PRN    And   • magnesium hydroxide (MILK OF MAGNESIA) suspension 30 mL QDAY PRN    And   • bisacodyl (DULCOLAX) suppository 10 mg QDAY PRN   • benzocaine-menthol (CEPACOL) lozenge 1 Lozenge Q2HRS PRN   • mag hydrox-al hydrox-simeth (MAALOX PLUS ES or MYLANTA DS) suspension 20 mL Q2HRS PRN   • ondansetron (ZOFRAN ODT) dispertab 4 mg 4X/DAY PRN    Or   • ondansetron (ZOFRAN)  syringe/vial injection 4 mg 4X/DAY PRN   • sodium chloride (OCEAN) 0.65 % nasal spray 2 Spray PRN   • midazolam (VERSED) 5 mg/mL (1 mL vial) PRN   • atorvastatin (LIPITOR) tablet 40 mg Q EVENING   • oxyCODONE immediate-release (ROXICODONE) tablet 2.5 mg Q3HRS PRN   • oxyCODONE immediate-release (ROXICODONE) tablet 5 mg Q3HRS PRN   • omeprazole (PRILOSEC) capsule 20 mg DAILY   • carvedilol (COREG) tablet 6.25 mg BID WITH MEALS     Facility-Administered Medications Ordered in Other Encounters   Medication Frequency   • fentaNYL (SUBLIMAZE) injection PRN   • propofol PRN   • ceFAZolin (ANCEF) injection PRN       Orders Placed This Encounter   Procedures   • Diet Order Cardiac (Thin), Diabetic, 2 Gram Sodium     Standing Status:   Standing     Number of Occurrences:   1     Order Specific Question:   Diet:     Answer:   Cardiac [6]     Comments:   Thin     Order Specific Question:   Diet:     Answer:   Diabetic [3]     Order Specific Question:   Diet:     Answer:   2 Gram Sodium [7]       Assessment:  Active Hospital Problems    Diagnosis   • *Encephalopathy acute   • AKA stump complication (HCC)   • Arterial insufficiency (HCC)   • PVD (peripheral vascular disease) with claudication (HCC)   • Diabetes (HCC)   • HLD (hyperlipidemia)   • HTN (hypertension)   • Basal cell carcinoma of face   • Hypokalemia   • Hypernatremia   • Alkaline phosphatase elevation   • Anemia   • Vitamin D deficiency   • Peripheral arterial occlusive disease (HCC)       Medical Decision Making and Plan:  Acute encephalopathy - Concern for encephalopathy with ongoing multiple medical comorbidities and confusion about hospital course as well as not understanding carpio catheter. Alternatively could be medicated for transfer but therapy reporting difficulty with cuing and following commands.   -PT and OT for mobility and ADLs  -SLP for cognitive evaluation     Left BKA - Patient with severe PVD with L AKA on 6/30/20 with multiple revisions due to  necrosis and infection. Cultures grew MRSA and Enterococcus. Per ID continue antibiotics until 9/6/20. Transferred to [facility] from 8/12/20 to 9/4/20.   -Vancomycin and Unasyn until 9/6/20. Completed.   -Redress on 9/15/20 as wound vac not turned on and significant pain and drainage.   -Continue wound vac. Consult wound care - added honey to regimen with slow improvement in size and depth. Lidocaine 4% PRN for dressing change      Multiple wounds - Patient has rash to abdomen, redness on sacrum and left buttocks on transfer. Consult wound care.     Right face basal cell carcinoma - Unclear what follow-up. Follow-up Oncology     Anemia - Check AM CBC - 8.8. Continue to monitor.      HTN - Patient on Lisinopril 10 mg and Coreg 6.25 mg BID  -Hospistalist consulted.      Pain control - Primarily on admission with left phantom limb pain. Patient on Gabapentin 100 mg TID and Oxycodone. Can consider increase in Gabapentin for neuropathic phantom limb pain.   -Increase Gabapentin to 400 mg TID. Oxycodone 10 mg for wound care changes. Concern for over sedation with higher opiate dosing, limit to wound vac change, lidocaine jelly for wound change.   -Increase Gabapentin to 900 mg TID and schedule Oxycodone 5 mg first thing in AM. May need to schedule doses as patient unable to remember to ask at times.  -Start Cymbalta which may help with pain.       DM with hyperglycemia - Patient on SSI on transfer. Consult Hospitalist  -Discontinue SSI     COVID - Patient with check x2 at [facility]. Patient without symptoms. Per policy no check within 7 days. Patient without symptoms. Check PCR, on precautions - negative, no precautions.      Insomnia/Mood - Schedule Trazodone QHS    GI Ppx - Patient on Prilosec      DVT Ppx - Patient on Lovenox on transfer.  Constipation, no BM since 9/7, PRN Miralax and MoM     Dispo - Patient with daughter that cannot provide as much support as he needs. Ongoing discussion about what level of support he will have.      Total time:  26 minutes.  I spent greater than 50% of the time for patient care, counseling, and coordination on this date, including unit/floor time, and face-to-face time with the patient as per interval events and assessment and plan above. Topics discussed included discharge planning and support at home, ongoing pain, and start Cymbalta.     Winifred Silverman M.D.

## 2020-09-18 NOTE — THERAPY
Physical Therapy   Daily Treatment     Patient Name: Jimenez Bains  Age:  75 y.o., Sex:  male  Medical Record #: [id number]  Today's Date: 9/18/2020     Precautions  Precautions: (P) Fall Risk, Non Weight Bearing Left Lower Extremity  Comments: (P) wound vac L residual limb, sores on genital  area    Subjective    I have been in an out of that wheelchair so many times. I need to have surgery again next week.     Objective       09/18/20 1600   Precautions   Precautions Fall Risk;Non Weight Bearing Left Lower Extremity   Comments wound vac L residual limb, sores on genital  area   Pain   Intervention Medication (see MAR)   Non Verbal Scale  Calm;Grimacing   Pain 0 - 10 Group   Location Leg   Location Orientation Left   Pain Rating Scale (NPRS) 3   Description Constant;Aching;Sore   Comfort Goal Comfort with Movement   Therapist Pain Assessment During Activity   Cognition    Level of Consciousness Alert   ABS (Agitated Behavior Scale)   Agitated Behavior Scale Performed No   Sleep/Wake Cycle   Sleep & Rest Awake   Gait Functional Level of Assist    Gait Level Of Assist Minimal Assist   Assistive Device Front Wheel Walker   Distance (Feet) 10   # of Times Distance was Traveled 2   Deviation   (hop-to pattern, heavy reliance on FWW)   Stairs Functional Level of Assist   Level of Assist with Stairs Unable to Participate   # of Stairs Climbed 0   Transfer Functional Level of Assist   Bed, Chair, Wheelchair Transfer Moderate Assist   Bed Chair Wheelchair Transfer Description Increased time;Supervision for safety;Squat pivot transfer to wheelchair;Verbal cueing   Supine Lower Body Exercise   Other Exercises Supine R LE ankle pumps with resistance, quadsets, SLR, knee/hip flexion, hip abduction, glut sets: 15 reps x 2; L residual limb: hip flexion, hip abduction in supine and sidelying, hip extension, 15 x 2   Standing Lower Body Exercises   Standing Lower Body Exercises   (static standing with fww, 3 reps, 60sec-90 sec,  CGA)   Neuro-Muscular Treatments   Neuro-Muscular Treatments Verbal Cuing;Tactile Cuing;Postural Facilitation   Comments with HEP, transfers, pre-gait and gait training   Interdisciplinary Plan of Care Collaboration   IDT Collaboration with  Occupational Therapist;Therapy Tech   Patient Position at End of Therapy Seated;Chair Alarm On;Tray Table within Reach;Phone within Reach;Call Light within Reach   Collaboration Comments CLOF, tx plan   Strengths & Barriers   Strengths Independent prior level of function;Motivated for self care and independence;Able to follow instructions   Barriers Pain;Confused;Impaired balance;Impaired activity tolerance;Decreased endurance   PT Total Time Spent   PT Individual Total Time Spent (Mins) 60   PT Charge Group   PT Therapeutic Exercise 2   PT Therapeutic Activities 2     Education on supine exercises for AKA; focus in strengthening R LE and maintaining ROM L residual limb. Patient agreeable to OOB activities after exercises and decrease in pain. Education on need to increase fluid intake due to color of urine. Patient able to follow directions for safe transfer, standing and gait. Happy with progress and motivated to continue gait training next session.    Assessment    Pain limiting participation initially. Patient able to complete functional mobility with 1 person for cord management and initial lift. Tech in room to assist with goods and WC. Patient demonstrated good UE strength with gait with fww- no LOB and able to correct forward leaning posture.     Strengths: (P) Independent prior level of function, Motivated for self care and independence, Able to follow instructions  Barriers: (P) Pain, Confused, Impaired balance, Impaired activity tolerance, Decreased endurance    Plan    Reinforce transfer sequencing with decreased verbal cueing stand pivot vs. Stand step with FWW, desensitization work to residual limb; continue short distance ambulation at FWW level; w/c skills  for endurance and efficient technique; standing balance; on-going amputee education. Family training completed with daughter Rehana, educated on safety recommendations and mobility at w/c level at home, use of FWW and ambulation with home therapist.    Physical Therapy Problems     Problem: Mobility     Dates: Start: 09/05/20       Goal: STG-Within one week, patient will ambulate household distance     Dates: Start: 09/05/20       Description: 1) Individualized goal:  10 ft with FWW and SBA-CGA.  2) Interventions:  PT Group Therapy, PT Gait Training, PT Therapeutic Exercises, PT Neuro Re-Ed/Balance, PT Therapeutic Activity, and PT Evaluation    Note:     Goal Note filed on 09/16/20 1528 by Staci Dean, PT    Limited by pain                        Problem: Mobility Transfers     Dates: Start: 09/05/20       Goal: STG-Within one week, patient will sit to stand     Dates: Start: 09/05/20       Description: 1) Individualized goal:  with FWW and SBA.  2) Interventions:  PT Group Therapy, PT Gait Training, PT Therapeutic Exercises, PT Neuro Re-Ed/Balance, PT Therapeutic Activity, and PT Evaluation    Note:     Goal Note filed on 09/16/20 1528 by Staci Dean, PT    Limited by pain                        Problem: PT-Long Term Goals     Dates: Start: 09/05/20       Goal: LTG-By discharge, patient will propel wheelchair     Dates: Start: 09/05/20       Description: 1) Individualized goal:  150 ft mod I.  2) Interventions:  PT Group Therapy, PT Gait Training, PT Therapeutic Exercises, PT Neuro Re-Ed/Balance, PT Therapeutic Activity, and PT Evaluation          Goal: LTG-By discharge, patient will ambulate     Dates: Start: 09/05/20       Description: 1) Individualized goal:  50 ft with FWW and supervision.  2) Interventions:  PT Group Therapy, PT Gait Training, PT Therapeutic Exercises, PT Neuro Re-Ed/Balance, PT Therapeutic Activity, and PT Evaluation          Goal: LTG-By discharge, patient will transfer one surface to another      Dates: Start: 09/05/20       Description: 1) Individualized goal:  with FWW vs. Squat pivot mod I.  2) Interventions:  PT Group Therapy, PT Gait Training, PT Therapeutic Exercises, PT Neuro Re-Ed/Balance, PT Therapeutic Activity, and PT Evaluation          Goal: LTG-By discharge, patient will perform home exercise program     Dates: Start: 09/05/20       Description: 1) Individualized goal:  with handout mod I.  2) Interventions:  PT Group Therapy, PT Gait Training, PT Therapeutic Exercises, PT Neuro Re-Ed/Balance, PT Therapeutic Activity, and PT Evaluation          Goal: LTG-By discharge, patient will     Dates: Start: 09/05/20       Description: 1) Individualized goal:  perform bed mobility including rolling to prone mod I.  2) Interventions:  PT Group Therapy, PT Gait Training, PT Therapeutic Exercises, PT Neuro Re-Ed/Balance, PT Therapeutic Activity, and PT Evaluation

## 2020-09-18 NOTE — CARE PLAN
Problem: Communication  Goal: The ability to communicate needs accurately and effectively will improve  Outcome: PROGRESSING SLOWER THAN EXPECTED  Note: Pt does not notify staff when he needs a pain med.     Problem: Pain Management  Goal: Pain level will decrease to patient's comfort goal  Outcome: PROGRESSING SLOWER THAN EXPECTED  Note: Patient should try to notify when he is in pain.

## 2020-09-18 NOTE — WOUND TEAM
[facility]  Inpatient Services   Wound and Skin Care Progress Note    HPI, PMH, SH: Reviewed    Unit where seen by Wound Team: [id number]     WOUND CONSULT RELATED TO:  Scheduled Negative Pressure Wound therapy (NPWT) dressing change      Self Report / Pain Level:  Pre-medicated with oral pain medication.     OBJECTIVE:  Previous dressing intact    WOUND TYPE, LOCATION, CHARACTERISTICS (Pressure Injuries: location, stage, POA or date identified)      Negative Pressure Wound Therapy 06/30/20 (Active)   NPWT Pump Mode / Pressure Setting Continuous;125 mmHg 09/18/20 0800   Dressing Type Medium;Black Foam (Regular) 09/18/20 0800   Number of Foam Pieces Used 2 09/16/20 1600   Canister Changed Yes 09/18/20 0800   NEXT Dressing Change/Treatment Date 09/18/20 09/18/20 0800           Wound 09/04/20 Full Thickness Wound Thigh Left Left AKA wound (Active)   Wound Image    09/18/20 1348   Site Assessment Red;Yellow;Tan 09/18/20 1600   Periwound Assessment Blanchable erythema;Red 09/18/20 1600   Margins Defined edges;Attached edges 09/18/20 1600   Closure Secondary intention 09/18/20 1600   Drainage Amount Moderate 09/18/20 1600   Drainage Description Serosanguineous 09/18/20 1600   Treatments Site care;Cleansed 09/18/20 1600   Wound Cleansing Approved Wound Cleanser 09/18/20 1600   Periwound Protectant Benzoin;Drape 09/18/20 1600   Dressing Cleansing/Solutions Not Applicable 09/18/20 1600   Dressing Options Wound Vac 09/18/20 1600   Dressing Changed Changed 09/18/20 1600   Dressing Status Clean;Dry;Intact 09/18/20 1600   Dressing Change/Treatment Frequency By Wound Team Only 09/18/20 1600   NEXT Dressing Change/Treatment Date 09/21/20 09/18/20 1600   NEXT Weekly Photo (Inpatient Only) 09/25/20 09/18/20 1600   Non-staged Wound Description Full thickness 09/18/20 1600   Wound Length (cm) 7 cm 09/18/20 1600   Wound Width (cm) 10.5 cm 09/18/20 1600   Wound Depth (cm) 4 cm 09/18/20 1600   Wound Surface Area (cm^2) 73.5  cm^2 09/18/20 1600   Wound Volume (cm^3) 294 cm^3 09/18/20 1600   Wound Healing % -9 09/18/20 1600   Wound Bed Granulation (%) 50 % 09/11/20 0915   Wound Bed Epithelium (%) 0 % 09/11/20 0915   Wound Bed Slough (%) 50 % 09/11/20 0915   Wound Bed Eschar (%) 0 % 09/11/20 0915   Undermining (cm) 3.5 cm 09/18/20 1600   Undermining of Wound, 1st Location From 11 o'clock;To 2 o'clock 09/18/20 1600   Shape oval 09/18/20 1600   Wound Odor Foul 09/18/20 1600   Exposed Structures Bone 09/18/20 1600   WOUND NURSE ONLY - Time Spent with Patient (mins) 60 09/18/20 1600         Lab Values:    Lab Results   Component Value Date/Time    WBC 7.8 09/16/2020 06:55 AM    RBC 3.06 (L) 09/16/2020 06:55 AM    HEMOGLOBIN 9.3 (L) 09/16/2020 06:55 AM    HEMATOCRIT 30.4 (L) 09/16/2020 06:55 AM                    Lab Results   Component Value Date/Time    HBA1C 5.1 09/05/2020 05:40 AM           Culture: N/A    INTERVENTIONS BY WOUND TEAM:  Saturated dressing with saline, dressing removal very painful. Irrigated wound with wound cleanser, gently wiped wound bed with gauze 4 x 4s.  2% lidocaine gel to wound bedSharp debrided <20cm2 brown and yellow slough with scissors and forceps, Cleaned again.   Benzoin and drape applied to periwound. Drape to koko.  Black foam to wound bed, and a black foam button  Under TRAC pad.  Achieved 125 mmHg continuous NPWT  Interdisciplinary consultation: Patient, Bedside RN, Dr Fisher in to see wound and is planning to take pt back to OR early next week.      EVALUATION: wound deteriorating and pt to return to OR.         Goals: Steady decrease in wound area and depth weekly.    NURSING PLAN OF CARE ORDERS (X):  Dressing changes: See Dressing Care orders: X  Skin care: See Skin Care orders: X  Rectal tube care: See Rectal Tube Care orders:        Other orders:                           WOUND TEAM PLAN OF CARE (X):   Dressing changes by wound team:          Follow up 1-2 times weekly:               Follow up 3 times  weekly:                NPWT change 3 times weekly:   X  Follow up as needed:       Other (explain):     Anticipated discharge plans (X):   LTACH:        SNF/Rehab:                  Home Care:   X        Outpatient Wound Center:            Self Care:            Other:

## 2020-09-18 NOTE — PROGRESS NOTES
Hospital Medicine Daily Progress Note      Chief Complaint  Hypertension  Diabetes    Interval Problem Update  No overnight issues reported.    Review of Systems  Review of Systems   Constitutional: Negative for chills and fever.   HENT: Negative.    Eyes: Negative.    Respiratory: Negative for cough and shortness of breath.    Cardiovascular: Negative for chest pain and palpitations.   Gastrointestinal: Negative for abdominal pain, nausea and vomiting.   Musculoskeletal:        Wound pain    Endo/Heme/Allergies: Negative for polydipsia. Does not bruise/bleed easily.        Physical Exam  Temp:  [36.8 °C (98.2 °F)-37.4 °C (99.3 °F)] 36.8 °C (98.2 °F)  Pulse:  [61-70] 68  Resp:  [17-18] 17  BP: (117-141)/(56-70) 141/66  SpO2:  [90 %-94 %] 90 %    Physical Exam  Vitals signs reviewed.   Constitutional:       Appearance: Normal appearance.      Comments: Chronically ill appearing   HENT:      Head: Normocephalic and atraumatic.      Right Ear: External ear normal.      Left Ear: External ear normal.      Nose: Nose normal.      Mouth/Throat:      Pharynx: Oropharynx is clear.   Eyes:      General:         Right eye: No discharge.         Left eye: No discharge.      Extraocular Movements: Extraocular movements intact.      Conjunctiva/sclera: Conjunctivae normal.   Neck:      Musculoskeletal: Normal range of motion and neck supple.   Cardiovascular:      Rate and Rhythm: Normal rate and regular rhythm.   Pulmonary:      Effort: No respiratory distress.      Breath sounds: No wheezing.      Comments: Decreased BS   Abdominal:      General: Bowel sounds are normal. There is no distension.      Palpations: Abdomen is soft.      Tenderness: There is no abdominal tenderness.   Musculoskeletal:      Comments: Left AKA w/ wound vac   Skin:     General: Skin is warm and dry.      Comments: Large pearly lesion on right cheek   Neurological:      Mental Status: He is alert and oriented to person, place, and time.      Comments:  awake         Fluids    Intake/Output Summary (Last 24 hours) at 9/18/2020 0951  Last data filed at 9/18/2020 0845  Gross per 24 hour   Intake 480 ml   Output 2050 ml   Net -1570 ml       Laboratory  Recent Labs     09/16/20  0655   WBC 7.8   RBC 3.06*   HEMOGLOBIN 9.3*   HEMATOCRIT 30.4*   MCV 99.3*   MCH 30.4   MCHC 30.6*   RDW 57.0*   PLATELETCT 191   MPV 10.2     Recent Labs     09/16/20  0655   SODIUM 145   POTASSIUM 3.5*   CHLORIDE 109   CO2 27   GLUCOSE 119*   BUN 14   CREATININE 1.00   CALCIUM 8.6                   Assessment/Plan  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 Fisher 2/2 critical limb ischemia  Post-op complications included necrosis and infection  S/P AKA revision in 7/2020  S/P AKA I+D x 3  Wound vac per Wound Care  Wound Cx +MRSA and Enterococcus  Completed Unasyn and Vanco  Pain control per Primary Team    PVD (peripheral vascular disease) with claudication (HCC)- (present on admission)  Assessment & Plan  On Lipitor  Consider ASA    Diabetes (HCC)- (present on admission)  Assessment & Plan  HbA1c 5.1  Diet control    Elevated brain natriuretic peptide (BNP) level  Assessment & Plan  BNP in the 5000 range  Consider Echocardiogram    Hypophosphatemia  Assessment & Plan  Continue Neutra-Phos supplementation  Check F/U labs in am     Hypokalemia  Assessment & Plan  Repleted w/ KCl 40 mEq PO x 1 on 9/16/20  Check F/U labs in am     Hypernatremia  Assessment & Plan  Improving w/ PO fluids  Closely follow electrolytes  Check F/U labs in am     Alkaline phosphatase elevation- (present on admission)  Assessment & Plan  May be 2/2 AKA  Follow levels    Vitamin D deficiency  Assessment & Plan  Vit D level 10  On high dose supplementation per Primary Team    Anemia  Assessment & Plan  Has macrocytic indices  TSH normal  Vit B12 198 and Folate 2.9  On supplementation for both  Follow  H/H    HTN (hypertension)- (present on admission)  Assessment & Plan  On Coreg and Lisinopril  Observe blood pressure trends    Basal cell carcinoma of face- (present on admission)  Assessment & Plan  Pt reports right cheek lesion has been ongoing for at least 1 yr  Needs Dermatology F/U     Full Code

## 2020-09-18 NOTE — THERAPY
"Recreational Therapy  Daily Treatment     Patient Name: Jimenez Bains  AGE:  75 y.o., SEX:  male  Medical Record #: [id number]  Today's Date: 9/18/2020       Subjective    \"I can't see\" Pt sharing that his  L eye could not see the leisure activities he would like to participate in while in session.      Objective       09/16/20 0831   Treatment Time   Total Time Spent (mins) 0   Total Time Missed 30   Reasons for Time Missed Non-Medical-Patient  Refused   Procedural Tracking   Procedural Tracking Leisure Skills Development;Leisure Skills Awareness       Assessment    Pt refusing participation in leisure activities.     Strengths: Able to follow instructions, Alert and oriented, Motivated for self care and independence, Pleasant and cooperative, Willingly participates in therapeutic activities  Barriers: Decreased endurance, Fatigue, Generalized weakness, Impaired activity tolerance, Impaired insight/denial of deficits(L AKA)    Plan    Adaptive leisure.   "

## 2020-09-18 NOTE — DISCHARGE PLANNING
"Spoke w/ daughter, Lien, via phone to review IDT recommendations, concern for care support at home w/ physical assistance. Daughter lives \"across the street\" & able to provide intermittent assistance. Patient lives w/ granddaughter & family, currently not able to provide physical assistance 2ndry recent . Daughter states \"we want to be able for him to come home. I don't want him to have to go to a nursing home, but if he won't ask for help & just does things by himself, that won't work. He's independent & stubborn & doesn't ask for help when he needs it.\" Asking about caregiver assistance for \"a couple hours a day.\" Reviewed Medicare does not cover private caregivers.  provides intermittent service. Will provide brochures for private hire caregiver agencies. States \"we won't be able to do that.\" Will review w/ SW Monday regarding Aging & Disability service for any additional options. Daughter will discuss w/ family this weekend for support assistance. Questions answered. Emotional support provided.  "

## 2020-09-18 NOTE — THERAPY
"Occupational Therapy  Daily Treatment     Patient Name: Jimenez Bains  Age:  75 y.o., Sex:  male  Medical Record #: [id number]  Today's Date: 9/18/2020     Precautions  Precautions: (P) Fall Risk, Non Weight Bearing Left Lower Extremity  Comments: (P) wound vac L residual limb, sores on genital area, carpio; contact precautions (ok to leave room)         Subjective    Pt received supine in bed, agreeable to OT session, limited by pain impacting ability to participate for full session     Objective       09/18/20 0901   Precautions   Precautions Fall Risk;Non Weight Bearing Left Lower Extremity   Comments wound vac L residual limb, sores on genital area, carpio; contact precautions (ok to leave room)   Supine Upper Body Exercises   Chest Fly 2 sets of 15  (5# dumbells)   Chest Press 2 sets of 15  (5# dumbells)   Front Arm Raise 2 sets of 15  (5# dumbells)   Bicep Curl 2 sets of 15  (5# dumbells)   Elbow Extension 2 sets of 15  (5# dumbells)   Comments cues for each exercise, poor recall of exercises on 2nd round   Bed Mobility    Supine to Sit Minimal Assist   Scooting Contact Guard Assist   Therapy Missed   Missed Therapy (Minutes) 30   Reason For Missed Therapy Medical - Other (Please Comment)  (high pain impacting OOB tolerance)   OT Total Time Spent   OT Individual Total Time Spent (Mins) 30   OT Charge Group   OT Therapeutic Exercise  2     Session initiated 8:45, pt txed to edge of bed, completed sit to stand x1 with FWW with min A, however limited by significant pain, returned to supine and stated \"I can't do it no more\". RN present, pain meds administered, OT returned after 15 min, completed supine UE therex with hope that pain meds would kick in for increased tolerance. Pt moved from supine to edge of bed again with min A however unable to tolerate upright position, c/o 10/10 pain in LE, session terminated    Assessment    Pt tolerated session poorly, limited by significant pain, did attempt out of bed " activity but unable to tolerate so session terminated. Tolerated UE therex well, demos poor carryover of exercises from previous sessions and from set to set. Will attempt to make-up rest of session later when pain better controlled.   Strengths: Pleasant and cooperative, Willingly participates in therapeutic activities  Barriers: Confused, Decreased endurance, Generalized weakness, Impaired balance, Limited mobility, Pain    Plan    Continue to progress gross strengthening and endurance and standing tolerance/balance toward increased safety and independence with ADLs, general attention to hygiene and self-care, weightshift when scooting    Occupational Therapy Goals     Problem: OT Long Term Goals     Dates: Start: 09/05/20       Goal: LTG-By discharge, patient will complete basic self care tasks     Dates: Start: 09/05/20       Description: 1) Individualized Goal:  Mod I for BADL's via AE/DME PRN  2) Interventions: OT Self Care/ADL, OT Neuro Re-Ed/Balance, OT Therapeutic Activity, OT Evaluation, and OT Therapeutic Exercise            Goal: LTG-By discharge, patient will perform bathroom transfers     Dates: Start: 09/05/20       Description: 1) Individualized Goal:  Mod I for toilet and shower transfer via DME  2) Interventions: OT Self Care/ADL, OT Neuro Re-Ed/Balance, OT Therapeutic Activity, OT Evaluation, and OT Therapeutic Exercise

## 2020-09-18 NOTE — CARE PLAN
Problem: Pain Management  Goal: Pain level will decrease to patient's comfort goal  Outcome: PROGRESSING AS EXPECTED  Note: Patient complained of left AKA pain.   Medicates with Roxicodone 10 mg per prn order.      Problem: Skin Integrity  Goal: Risk for impaired skin integrity will decrease  Outcome: PROGRESSING AS EXPECTED  Note: Rash and dry skin noted to arms, legs and back.   Moisturizer applied.

## 2020-09-18 NOTE — THERAPY
Speech Language Pathology  Daily Treatment     Patient Name: Jimenez Bains  Age:  75 y.o., Sex:  male  Medical Record #: [id number]  Today's Date: 9/18/2020     Precautions  Precautions: Fall Risk, Non Weight Bearing Left Lower Extremity  Comments: wound vac L residual limb, sores on genital area, carpio; contact precautions (ok to leave room)    Subjective    Pt agreeable to tx at bedside.  Pt pleasant and cooperative.  Pt reported intermittent sharp pain in wound.       Objective       09/18/20 1031   Cognition   Moderate Attention Moderate (3)   Functional Memory Activities Moderate (3)   Safety Awareness Minimal (4)   SLP Total Time Spent   SLP Individual Total Time Spent (Mins) 60   Treatment Charges   SLP Cognitive Skill Development First 15 Minutes 1   SLP Cognitive Skill Development Additional 15 Minutes 3       Assessment    Pt recalled breakfast items.  Pt unable to recall yesterday's therapies or meals.  Pt oriented to date.  Pt verbalized transfer sequence given min verbal cues.  Pt and SLP discussed potential challenges/responsibilities at home, following discharge.  Pt required min-mod verbal cues to verbalize sequence for meal prep.      Strengths: Able to follow instructions, Motivated for self care and independence, Pleasant and cooperative, Willingly participates in therapeutic activities  Barriers: Impaired functional cognition    Plan    Continue to target recall, safety, and discharge planning.     Speech Therapy Problems     Problem: Memory STGs     Dates: Start: 09/05/20       Goal: STG-Within one week, patient will     Dates: Start: 09/05/20       Description: 1) Individualized goal:  recall daily events and safety strategies given min verbal cues 80% with use of memory book.  2) Interventions:  SLP Cognitive Skill Development and SLP Group Treatment                    Problem: Problem Solving STGs     Dates: Start: 09/05/20       Goal: STG-Within one week, patient will     Dates: Start:  09/05/20       Description: 1) Individualized goal:  answer basic problem solving questions with 80% accuracy given min verbal cues.    2) Interventions:  SLP Cognitive Skill Development and SLP Group Treatment                    Problem: Speech/Swallowing LTGs     Dates: Start: 09/05/20       Goal: LTG-By discharge, patient will solve basic problems     Dates: Start: 09/05/20       Description: 1) Individualized goal:  Mert for safe discharge home.   2) Interventions:  SLP Aphasia Evaluation, SLP Cognitive Skill Development, and SLP Group Treatment

## 2020-09-18 NOTE — THERAPY
"Occupational Therapy  Daily Treatment     Patient Name: Jimenez Bains  Age:  75 y.o., Sex:  male  Medical Record #: [id number]  Today's Date: 9/18/2020     Precautions  Precautions: (P) Fall Risk, Non Weight Bearing Left Lower Extremity  Comments: (P) wound vac L residual limb, sores on genital area, carpio; contact precautions (ok to leave room)         Subjective    Pt received supine in bed, agreeable to OT     Objective       09/18/20 1231   Precautions   Precautions Fall Risk;Non Weight Bearing Left Lower Extremity   Comments wound vac L residual limb, sores on genital area, carpio; contact precautions (ok to leave room)   OT Total Time Spent   OT Individual Total Time Spent (Mins) 15   OT Charge Group   OT Therapy Activity 1     Pt moved from supine to sit, stood x2 at edge of bed, difficulty coming to full stand on first trial and required laying back down on bed for safe recovery, sit to stand improved on 2nd trial with bed elevated, continues to require cues for hand placement, tendency toward posterior lean, pt reports feeling like he is \"falling back\" though is maintaining upright. Wound care RN arrived for pre-MD appt wound change so session terminated    Assessment    Pt tolerated session fair, improved pain control this session however continues with impaired safety, fluctuating stability during mobility and standing.   Strengths: Pleasant and cooperative, Willingly participates in therapeutic activities  Barriers: Confused, Decreased endurance, Generalized weakness, Impaired balance, Limited mobility, Pain    Plan    Continue to progress gross strengthening and endurance and standing tolerance/balance toward increased safety and independence with ADLs, general attention to hygiene and self-care, weightshift when scooting    Occupational Therapy Goals     Problem: OT Long Term Goals     Dates: Start: 09/05/20       Goal: LTG-By discharge, patient will complete basic self care tasks     Dates: Start: " 09/05/20       Description: 1) Individualized Goal:  Mod I for BADL's via AE/DME PRN  2) Interventions: OT Self Care/ADL, OT Neuro Re-Ed/Balance, OT Therapeutic Activity, OT Evaluation, and OT Therapeutic Exercise            Goal: LTG-By discharge, patient will perform bathroom transfers     Dates: Start: 09/05/20       Description: 1) Individualized Goal:  Mod I for toilet and shower transfer via DME  2) Interventions: OT Self Care/ADL, OT Neuro Re-Ed/Balance, OT Therapeutic Activity, OT Evaluation, and OT Therapeutic Exercise

## 2020-09-19 LAB
ALBUMIN SERPL BCP-MCNC: 2.8 G/DL (ref 3.2–4.9)
ALBUMIN/GLOB SERPL: 0.8 G/DL
ALP SERPL-CCNC: 274 U/L (ref 30–99)
ALT SERPL-CCNC: 19 U/L (ref 2–50)
ANION GAP SERPL CALC-SCNC: 11 MMOL/L (ref 7–16)
AST SERPL-CCNC: 28 U/L (ref 12–45)
BILIRUB SERPL-MCNC: 0.3 MG/DL (ref 0.1–1.5)
BUN SERPL-MCNC: 16 MG/DL (ref 8–22)
CALCIUM SERPL-MCNC: 8.7 MG/DL (ref 8.5–10.5)
CHLORIDE SERPL-SCNC: 109 MMOL/L (ref 96–112)
CO2 SERPL-SCNC: 27 MMOL/L (ref 20–33)
CREAT SERPL-MCNC: 1.1 MG/DL (ref 0.5–1.4)
GLOBULIN SER CALC-MCNC: 3.5 G/DL (ref 1.9–3.5)
GLUCOSE SERPL-MCNC: 115 MG/DL (ref 65–99)
PHOSPHATE SERPL-MCNC: 3.1 MG/DL (ref 2.5–4.5)
POTASSIUM SERPL-SCNC: 3.8 MMOL/L (ref 3.6–5.5)
PROT SERPL-MCNC: 6.3 G/DL (ref 6–8.2)
SODIUM SERPL-SCNC: 147 MMOL/L (ref 135–145)

## 2020-09-19 PROCEDURE — A9270 NON-COVERED ITEM OR SERVICE: HCPCS | Performed by: PHYSICAL MEDICINE & REHABILITATION

## 2020-09-19 PROCEDURE — 770010 HCHG ROOM/CARE - REHAB SEMI PRIVAT*

## 2020-09-19 PROCEDURE — 97130 THER IVNTJ EA ADDL 15 MIN: CPT

## 2020-09-19 PROCEDURE — 700102 HCHG RX REV CODE 250 W/ 637 OVERRIDE(OP): Performed by: HOSPITALIST

## 2020-09-19 PROCEDURE — 97129 THER IVNTJ 1ST 15 MIN: CPT

## 2020-09-19 PROCEDURE — 99232 SBSQ HOSP IP/OBS MODERATE 35: CPT | Performed by: HOSPITALIST

## 2020-09-19 PROCEDURE — 99231 SBSQ HOSP IP/OBS SF/LOW 25: CPT | Performed by: PHYSICAL MEDICINE & REHABILITATION

## 2020-09-19 PROCEDURE — 84100 ASSAY OF PHOSPHORUS: CPT

## 2020-09-19 PROCEDURE — 80053 COMPREHEN METABOLIC PANEL: CPT

## 2020-09-19 PROCEDURE — 36415 COLL VENOUS BLD VENIPUNCTURE: CPT

## 2020-09-19 PROCEDURE — A9270 NON-COVERED ITEM OR SERVICE: HCPCS | Performed by: HOSPITALIST

## 2020-09-19 PROCEDURE — 700105 HCHG RX REV CODE 258: Performed by: HOSPITALIST

## 2020-09-19 PROCEDURE — 700111 HCHG RX REV CODE 636 W/ 250 OVERRIDE (IP): Performed by: PHYSICAL MEDICINE & REHABILITATION

## 2020-09-19 PROCEDURE — 700102 HCHG RX REV CODE 250 W/ 637 OVERRIDE(OP): Performed by: PHYSICAL MEDICINE & REHABILITATION

## 2020-09-19 RX ORDER — DEXTROSE MONOHYDRATE 50 MG/ML
INJECTION, SOLUTION INTRAVENOUS ONCE
Status: COMPLETED | OUTPATIENT
Start: 2020-09-19 | End: 2020-09-19

## 2020-09-19 RX ADMIN — Medication 1000 MCG: at 07:50

## 2020-09-19 RX ADMIN — CARVEDILOL 6.25 MG: 3.12 TABLET, FILM COATED ORAL at 07:50

## 2020-09-19 RX ADMIN — ENOXAPARIN SODIUM 40 MG: 40 INJECTION SUBCUTANEOUS at 07:51

## 2020-09-19 RX ADMIN — DIBASIC SODIUM PHOSPHATE, MONOBASIC POTASSIUM PHOSPHATE AND MONOBASIC SODIUM PHOSPHATE 250 MG: 852; 155; 130 TABLET ORAL at 07:50

## 2020-09-19 RX ADMIN — DOCUSATE SODIUM 50 MG AND SENNOSIDES 8.6 MG 2 TABLET: 8.6; 5 TABLET, FILM COATED ORAL at 07:49

## 2020-09-19 RX ADMIN — Medication 4000 UNITS: at 07:49

## 2020-09-19 RX ADMIN — OXYCODONE HYDROCHLORIDE 10 MG: 10 TABLET ORAL at 11:50

## 2020-09-19 RX ADMIN — MICONAZOLE NITRATE: 20 CREAM TOPICAL at 14:26

## 2020-09-19 RX ADMIN — BACITRACIN ZINC AND POLYMYXIN B SULFATE: at 07:55

## 2020-09-19 RX ADMIN — OXYCODONE HYDROCHLORIDE 10 MG: 10 TABLET ORAL at 21:43

## 2020-09-19 RX ADMIN — GABAPENTIN 900 MG: 300 CAPSULE ORAL at 14:26

## 2020-09-19 RX ADMIN — DOCUSATE SODIUM 50 MG AND SENNOSIDES 8.6 MG 2 TABLET: 8.6; 5 TABLET, FILM COATED ORAL at 21:43

## 2020-09-19 RX ADMIN — ACETAMINOPHEN 1000 MG: 325 TABLET, FILM COATED ORAL at 07:49

## 2020-09-19 RX ADMIN — OXYCODONE HYDROCHLORIDE 10 MG: 10 TABLET ORAL at 07:50

## 2020-09-19 RX ADMIN — FOLIC ACID 1 MG: 1 TABLET ORAL at 07:50

## 2020-09-19 RX ADMIN — GABAPENTIN 900 MG: 300 CAPSULE ORAL at 21:43

## 2020-09-19 RX ADMIN — DIBASIC SODIUM PHOSPHATE, MONOBASIC POTASSIUM PHOSPHATE AND MONOBASIC SODIUM PHOSPHATE 250 MG: 852; 155; 130 TABLET ORAL at 21:43

## 2020-09-19 RX ADMIN — CARVEDILOL 6.25 MG: 3.12 TABLET, FILM COATED ORAL at 18:06

## 2020-09-19 RX ADMIN — DEXTROSE MONOHYDRATE 75 ML: 50 INJECTION, SOLUTION INTRAVENOUS at 16:57

## 2020-09-19 RX ADMIN — TRAZODONE HYDROCHLORIDE 50 MG: 50 TABLET ORAL at 21:43

## 2020-09-19 RX ADMIN — ACETAMINOPHEN 1000 MG: 325 TABLET, FILM COATED ORAL at 14:25

## 2020-09-19 RX ADMIN — LISINOPRIL 20 MG: 20 TABLET ORAL at 07:48

## 2020-09-19 RX ADMIN — ATORVASTATIN CALCIUM 40 MG: 40 TABLET, FILM COATED ORAL at 21:44

## 2020-09-19 RX ADMIN — ACETAMINOPHEN 1000 MG: 325 TABLET, FILM COATED ORAL at 21:44

## 2020-09-19 RX ADMIN — OXYCODONE HYDROCHLORIDE 5 MG: 5 TABLET ORAL at 05:44

## 2020-09-19 RX ADMIN — BACITRACIN ZINC AND POLYMYXIN B SULFATE: at 21:30

## 2020-09-19 RX ADMIN — DIBASIC SODIUM PHOSPHATE, MONOBASIC POTASSIUM PHOSPHATE AND MONOBASIC SODIUM PHOSPHATE 250 MG: 852; 155; 130 TABLET ORAL at 14:26

## 2020-09-19 RX ADMIN — DULOXETINE 20 MG: 20 CAPSULE, DELAYED RELEASE ORAL at 07:50

## 2020-09-19 RX ADMIN — OMEPRAZOLE 20 MG: 20 CAPSULE, DELAYED RELEASE ORAL at 07:49

## 2020-09-19 RX ADMIN — GABAPENTIN 900 MG: 300 CAPSULE ORAL at 07:48

## 2020-09-19 ASSESSMENT — ENCOUNTER SYMPTOMS
SHORTNESS OF BREATH: 0
EYES NEGATIVE: 1
NAUSEA: 0
COUGH: 0
CHILLS: 0
VOMITING: 0
PALPITATIONS: 0
ABDOMINAL PAIN: 0
FEVER: 0
POLYDIPSIA: 0
BRUISES/BLEEDS EASILY: 0

## 2020-09-19 ASSESSMENT — PAIN DESCRIPTION - PAIN TYPE: TYPE: ACUTE PAIN

## 2020-09-19 NOTE — CARE PLAN
Problem: Pain Management  Goal: Pain level will decrease to patient's comfort goal  Outcome: PROGRESSING AS EXPECTED  Assisted with max transfers to prevent falls/injury.     Problem: Pain Management  Goal: Pain level will decrease to patient's comfort goal  Outcome: PROGRESSING AS EXPECTED  Medicated for left stump pain as needed with moderate results, will continue to monitor.

## 2020-09-19 NOTE — CARE PLAN
Problem: Pain Management  Goal: Pain level will decrease to patient's comfort goal  Outcome: PROGRESSING AS EXPECTED  Note: Patient complained of left AKA pain.  Medicated with Roxicodone 10 mg per prn order.      Problem: Urinary Elimination:  Goal: Ability to reestablish a normal urinary elimination pattern will improve  Outcome: PROGRESSING AS EXPECTED  Note: Turned and repositioned for pressure relief.  Patient continues to have extremely dry skin and a rash to back, arms and legs.

## 2020-09-19 NOTE — PROGRESS NOTES
"Rehab Progress Note     Encounter Date: 9/19/2020    CC: left AKA, phantom limb pain, wound pain    Interval Events (Subjective)  -patient seen in bed  -reports 2/10 pain, interested in pain meds  -denies any new issues        Objective:  VITAL SIGNS: /70   Pulse 67   Temp 37.3 °C (99.1 °F) (Temporal)   Resp 16   Ht 1.676 m (5' 6\")   Wt 64.4 kg (142 lb)   SpO2 92%   BMI 22.92 kg/m²   Gen: calm  Psych: Mood and affect appropriate  CV: no edema  Resp: on room air, no upper airway sounds  Neuro: AOx3, following commands        Recent Results (from the past 72 hour(s))   PHOSPHORUS    Collection Time: 09/19/20  6:17 AM   Result Value Ref Range    Phosphorus 3.1 2.5 - 4.5 mg/dL   Comp Metabolic Panel    Collection Time: 09/19/20  6:17 AM   Result Value Ref Range    Sodium 147 (H) 135 - 145 mmol/L    Potassium 3.8 3.6 - 5.5 mmol/L    Chloride 109 96 - 112 mmol/L    Co2 27 20 - 33 mmol/L    Anion Gap 11.0 7.0 - 16.0    Glucose 115 (H) 65 - 99 mg/dL    Bun 16 8 - 22 mg/dL    Creatinine 1.10 0.50 - 1.40 mg/dL    Calcium 8.7 8.5 - 10.5 mg/dL    AST(SGOT) 28 12 - 45 U/L    ALT(SGPT) 19 2 - 50 U/L    Alkaline Phosphatase 274 (H) 30 - 99 U/L    Total Bilirubin 0.3 0.1 - 1.5 mg/dL    Albumin 2.8 (L) 3.2 - 4.9 g/dL    Total Protein 6.3 6.0 - 8.2 g/dL    Globulin 3.5 1.9 - 3.5 g/dL    A-G Ratio 0.8 g/dL   ESTIMATED GFR    Collection Time: 09/19/20  6:17 AM   Result Value Ref Range    GFR If African American >60 >60 mL/min/1.73 m 2    GFR If Non African American >60 >60 mL/min/1.73 m 2       Current Facility-Administered Medications   Medication Frequency   • lidocaine (XYLOCAINE) 2 % injection 20 mL QDAY PRN    Or   • lidocaine (XYLOCAINE) 4 % topical solution QDAY PRN   • DULoxetine (CYMBALTA) capsule 20 mg DAILY   • gabapentin (NEURONTIN) capsule 900 mg TID   • traZODone (DESYREL) tablet 50 mg QHS   • phosphorus (K-Phos-Neutral) per tablet 250 mg TID   • bacitracin-polymyxin b (POLYSPORIN) 500-91282 UNIT/GM ointment " BID   • bacitracin-neomycin-polymyxin (Neosporin) 400-5-5000 ointment BID PRN   • lisinopril (PRINIVIL) tablet 20 mg DAILY   • lidocaine (XYLOCAINE) 4 % topical solution PRN   • oxyCODONE immediate-release (ROXICODONE) tablet 5 mg DAILY   • oxyCODONE immediate release (ROXICODONE) tablet 10 mg Q4HRS PRN   • folic acid (FOLVITE) tablet 1 mg DAILY   • cyanocobalamin (VITAMIN B-12) tablet 1,000 mcg DAILY   • miconazole 2%-zinc oxide (Hilary) topical cream Q6HRS PRN   • vitamin D (cholecalciferol) tablet 4,000 Units DAILY   • glucose 4 g chewable tablet 16 g Q15 MIN PRN    And   • dextrose 50% (D50W) injection 50 mL Q15 MIN PRN   • acetaminophen (TYLENOL) tablet 1,000 mg TID   • Respiratory Therapy Consult Continuous RT   • tramadol (ULTRAM) 50 MG tablet 50 mg Q4HRS PRN   • hydrALAZINE (APRESOLINE) tablet 25 mg Q8HRS PRN   • enoxaparin (LOVENOX) inj 40 mg DAILY   • acetaminophen (TYLENOL) tablet 650 mg Q4HRS PRN   • senna-docusate (PERICOLACE or SENOKOT S) 8.6-50 MG per tablet 2 Tab BID    And   • polyethylene glycol/lytes (MIRALAX) PACKET 1 Packet QDAY PRN    And   • magnesium hydroxide (MILK OF MAGNESIA) suspension 30 mL QDAY PRN    And   • bisacodyl (DULCOLAX) suppository 10 mg QDAY PRN   • benzocaine-menthol (CEPACOL) lozenge 1 Lozenge Q2HRS PRN   • mag hydrox-al hydrox-simeth (MAALOX PLUS ES or MYLANTA DS) suspension 20 mL Q2HRS PRN   • ondansetron (ZOFRAN ODT) dispertab 4 mg 4X/DAY PRN    Or   • ondansetron (ZOFRAN) syringe/vial injection 4 mg 4X/DAY PRN   • sodium chloride (OCEAN) 0.65 % nasal spray 2 Spray PRN   • midazolam (VERSED) 5 mg/mL (1 mL vial) PRN   • atorvastatin (LIPITOR) tablet 40 mg Q EVENING   • oxyCODONE immediate-release (ROXICODONE) tablet 2.5 mg Q3HRS PRN   • oxyCODONE immediate-release (ROXICODONE) tablet 5 mg Q3HRS PRN   • omeprazole (PRILOSEC) capsule 20 mg DAILY   • carvedilol (COREG) tablet 6.25 mg BID WITH MEALS     Facility-Administered Medications Ordered in Other Encounters   Medication  Frequency   • fentaNYL (SUBLIMAZE) injection PRN   • propofol PRN   • ceFAZolin (ANCEF) injection PRN       Orders Placed This Encounter   Procedures   • Diet Order Cardiac (Thin), Diabetic, 2 Gram Sodium     Standing Status:   Standing     Number of Occurrences:   1     Order Specific Question:   Diet:     Answer:   Cardiac [6]     Comments:   Thin     Order Specific Question:   Diet:     Answer:   Diabetic [3]     Order Specific Question:   Diet:     Answer:   2 Gram Sodium [7]       Assessment:  Active Hospital Problems    Diagnosis   • *Encephalopathy acute   • AKA stump complication (HCC)   • Arterial insufficiency (HCC)   • PVD (peripheral vascular disease) with claudication (HCC)   • Diabetes (HCC)   • HLD (hyperlipidemia)   • HTN (hypertension)   • Basal cell carcinoma of face   • Hypokalemia   • Hypernatremia   • Alkaline phosphatase elevation   • Anemia   • Vitamin D deficiency   • Peripheral arterial occlusive disease (HCC)       Medical Decision Making and Plan:  Acute encephalopathy - Concern for encephalopathy with ongoing multiple medical comorbidities and confusion about hospital course as well as not understanding carpio catheter. Alternatively could be medicated for transfer but therapy reporting difficulty with cuing and following commands.   -PT and OT for mobility and ADLs  -SLP for cognitive evaluation     Left BKA - Patient with severe PVD with L AKA on 6/30/20 with multiple revisions due to necrosis and infection. Cultures grew MRSA and Enterococcus. Per ID continue antibiotics until 9/6/20. Transferred to [facility] from 8/12/20 to 9/4/20.   -Vancomycin and Unasyn until 9/6/20. Completed.   -Redress on 9/15/20 as wound vac not turned on and significant pain and drainage.   -Continue wound vac. Consult wound care - added honey to regimen with slow improvement in size and depth. Lidocaine 4% PRN for dressing change      Multiple wounds - Patient has rash to abdomen, redness on sacrum and left buttocks  on transfer. Consult wound care.     Right face basal cell carcinoma - Unclear what follow-up. Follow-up Oncology     Anemia - Check AM CBC - 8.8. Continue to monitor.      HTN - Patient on Lisinopril 10 mg and Coreg 6.25 mg BID  -Hospistalist consulted.      Pain control - Primarily on admission with left phantom limb pain. Patient on Gabapentin 100 mg TID and Oxycodone. Can consider increase in Gabapentin for neuropathic phantom limb pain.   -Increase Gabapentin to 400 mg TID. Oxycodone 10 mg for wound care changes. Concern for over sedation with higher opiate dosing, limit to wound vac change, lidocaine jelly for wound change.   -Increase Gabapentin to 900 mg TID and schedule Oxycodone 5 mg first thing in AM. May need to schedule doses as patient unable to remember to ask at times.  -Start Cymbalta which may help with pain.       DM with hyperglycemia - Patient on SSI on transfer. Consult Hospitalist  -Discontinue SSI     COVID - Patient with check x2 at [facility]. Patient without symptoms. Per policy no check within 7 days. Patient without symptoms. Check PCR, on precautions - negative, no precautions.      Insomnia/Mood - Schedule Trazodone QHS    GI Ppx - Patient on Prilosec      DVT Ppx - Patient on Lovenox on transfer.  Constipation, no BM since 9/7, PRN Miralax and MoM     Dispo - Patient with daughter that cannot provide as much support as he needs. Ongoing discussion about what level of support he will have.       Total time:  15 minutes.  I spent greater than 50% of the time for patient care, counseling, and coordination on this date, including pain management    Gwyn Valenzuela M.D.

## 2020-09-19 NOTE — PROGRESS NOTES
Hospital Medicine Daily Progress Note      Chief Complaint  Hypertension  Diabetes    Interval Problem Update  Continues to have uncontrolled AKA pain.  Labs reviewed.     Review of Systems  Review of Systems   Constitutional: Negative for chills and fever.   HENT: Negative.    Eyes: Negative.    Respiratory: Negative for cough and shortness of breath.    Cardiovascular: Negative for chest pain and palpitations.   Gastrointestinal: Negative for abdominal pain, nausea and vomiting.   Musculoskeletal:        Wound pain    Endo/Heme/Allergies: Negative for polydipsia. Does not bruise/bleed easily.        Physical Exam  Temp:  [37.3 °C (99.1 °F)] 37.3 °C (99.1 °F)  Pulse:  [60-69] 60  Resp:  [16-18] 18  BP: (113-137)/(55-70) 120/61  SpO2:  [90 %-92 %] 90 %    Physical Exam  Vitals signs reviewed.   Constitutional:       Appearance: Normal appearance.      Comments: Chronically ill appearing   HENT:      Head: Normocephalic and atraumatic.      Right Ear: External ear normal.      Left Ear: External ear normal.      Nose: Nose normal.      Mouth/Throat:      Pharynx: Oropharynx is clear.   Eyes:      General:         Right eye: No discharge.         Left eye: No discharge.      Extraocular Movements: Extraocular movements intact.      Conjunctiva/sclera: Conjunctivae normal.   Neck:      Musculoskeletal: Normal range of motion and neck supple.   Cardiovascular:      Rate and Rhythm: Normal rate and regular rhythm.   Pulmonary:      Effort: No respiratory distress.      Breath sounds: No wheezing.      Comments: Decreased BS   Abdominal:      General: Bowel sounds are normal. There is no distension.      Palpations: Abdomen is soft.      Tenderness: There is no abdominal tenderness.   Musculoskeletal:      Comments: Left AKA dressed   Skin:     General: Skin is warm and dry.      Comments: Large pearly lesion on right cheek   Neurological:      Mental Status: He is alert and oriented to person, place, and time.       Comments: awake         Fluids    Intake/Output Summary (Last 24 hours) at 9/19/2020 1502  Last data filed at 9/19/2020 1200  Gross per 24 hour   Intake 480 ml   Output 500 ml   Net -20 ml       Laboratory      Recent Labs     09/19/20  0617   SODIUM 147*   POTASSIUM 3.8   CHLORIDE 109   CO2 27   GLUCOSE 115*   BUN 16   CREATININE 1.10   CALCIUM 8.7                   Assessment/Plan  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 Fisher 2/2 critical limb ischemia  Post-op complications included necrosis and infection  S/P AKA revision in 7/2020  S/P AKA I+D x 3  Wound vac per Wound Care  Wound Cx +MRSA and Enterococcus  Completed Unasyn and Vanco  Pain control per Primary Team    PVD (peripheral vascular disease) with claudication (HCC)- (present on admission)  Assessment & Plan  On Lipitor  Consider ASA    Diabetes (HCC)- (present on admission)  Assessment & Plan  HbA1c 5.1  Diet control    Elevated brain natriuretic peptide (BNP) level  Assessment & Plan  BNP in the 5000 range  Consider Echocardiogram    Hypophosphatemia  Assessment & Plan  Phosphorus levels improving  Continue Neutra-Phos supplementation    Hypernatremia  Assessment & Plan  Na+ levels elevated again w/ worsening azotemia  Will give D5W  Closely follow electrolytes    Alkaline phosphatase elevation- (present on admission)  Assessment & Plan  May be 2/2 AKA  Follow levels to resolution    Vitamin D deficiency  Assessment & Plan  Vit D level 10  On high dose supplementation per Primary Team    Anemia  Assessment & Plan  Has macrocytic indices  TSH normal  Vit B12 198 and Folate 2.9  On supplementation for both  Follow H/H    HTN (hypertension)- (present on admission)  Assessment & Plan  On Coreg and Lisinopril  Observe blood pressure trends    Basal cell carcinoma of face- (present on admission)  Assessment & Plan  Pt reports right cheek  lesion has been ongoing for at least 1 yr  Needs Dermatology F/U     Full Code

## 2020-09-19 NOTE — THERAPY
Speech Language Pathology  Daily Treatment     Patient Name: Jimenez Bains  Age:  75 y.o., Sex:  male  Medical Record #: [id number]  Today's Date: 9/19/2020     Precautions  Precautions: Fall Risk, Non Weight Bearing Left Lower Extremity  Comments: wound vac L residual limb, sores on genital  area    Subjective    Pt pleasant and cooperative.  Pt in WC when ST arrived. Pt reported pain in leg and buttocks, and requested to transfer into bed.       Objective       09/19/20 0831   Written Language Expression   Functional Writing (Name, Address) Moderate (3)   Cognition   Orientation  Minimal (4)   Functional Memory Activities Moderate (3)   SLP Total Time Spent   SLP Individual Total Time Spent (Mins) 60   Treatment Charges   SLP Cognitive Skill Development First 15 Minutes 1   SLP Cognitive Skill Development Additional 15 Minutes 3       Assessment    Pt verbalized transfer sequence from WC to bed given mod verbal cues.  Pt oriented to date, self, and situation.  Pt was not oriented to name of hospital.  Pt demonstrated difficulty with written expression this day.  Pt unable to write the date to dictation.  Pt required mod cues to write his name, and the numbers 1-10.  Pt required to state names for each letter of the alphabet.  Pt completed A-M given mod-max verbal cues to attend to task. Pt unable to recall daily events from yesterday, and unable to independently recall what he had for breakfast prior to this session.      Strengths: Able to follow instructions, Motivated for self care and independence, Pleasant and cooperative, Willingly participates in therapeutic activities  Barriers: Impaired functional cognition    Plan    Target sequencing, writing, use of memory book.     Speech Therapy Problems     Problem: Memory STGs     Dates: Start: 09/05/20       Goal: STG-Within one week, patient will     Dates: Start: 09/05/20       Description: 1) Individualized goal:  recall daily events and safety strategies given  min verbal cues 80% with use of memory book.  2) Interventions:  SLP Cognitive Skill Development and SLP Group Treatment                    Problem: Problem Solving STGs     Dates: Start: 09/05/20       Goal: STG-Within one week, patient will     Dates: Start: 09/05/20       Description: 1) Individualized goal:  answer basic problem solving questions with 80% accuracy given min verbal cues.    2) Interventions:  SLP Cognitive Skill Development and SLP Group Treatment                    Problem: Speech/Swallowing LTGs     Dates: Start: 09/05/20       Goal: LTG-By discharge, patient will solve basic problems     Dates: Start: 09/05/20       Description: 1) Individualized goal:  Mert for safe discharge home.   2) Interventions:  SLP Aphasia Evaluation, SLP Cognitive Skill Development, and SLP Group Treatment

## 2020-09-20 LAB
ALBUMIN SERPL BCP-MCNC: 2.7 G/DL (ref 3.2–4.9)
ALBUMIN/GLOB SERPL: 0.8 G/DL
ALP SERPL-CCNC: 280 U/L (ref 30–99)
ALT SERPL-CCNC: 24 U/L (ref 2–50)
ANION GAP SERPL CALC-SCNC: 10 MMOL/L (ref 7–16)
APPEARANCE UR: CLEAR
AST SERPL-CCNC: 33 U/L (ref 12–45)
BACTERIA #/AREA URNS HPF: NEGATIVE /HPF
BILIRUB SERPL-MCNC: 0.3 MG/DL (ref 0.1–1.5)
BILIRUB UR QL STRIP.AUTO: NEGATIVE
BUN SERPL-MCNC: 16 MG/DL (ref 8–22)
CALCIUM SERPL-MCNC: 8.4 MG/DL (ref 8.5–10.5)
CHLORIDE SERPL-SCNC: 105 MMOL/L (ref 96–112)
CO2 SERPL-SCNC: 27 MMOL/L (ref 20–33)
COLOR UR: ABNORMAL
CREAT SERPL-MCNC: 1.11 MG/DL (ref 0.5–1.4)
EPI CELLS #/AREA URNS HPF: NEGATIVE /HPF
GLOBULIN SER CALC-MCNC: 3.3 G/DL (ref 1.9–3.5)
GLUCOSE SERPL-MCNC: 133 MG/DL (ref 65–99)
GLUCOSE UR STRIP.AUTO-MCNC: NEGATIVE MG/DL
HYALINE CASTS #/AREA URNS LPF: ABNORMAL /LPF
KETONES UR STRIP.AUTO-MCNC: ABNORMAL MG/DL
LEUKOCYTE ESTERASE UR QL STRIP.AUTO: NEGATIVE
MICRO URNS: ABNORMAL
NITRITE UR QL STRIP.AUTO: NEGATIVE
PH UR STRIP.AUTO: 5 [PH] (ref 5–8)
POTASSIUM SERPL-SCNC: 3.2 MMOL/L (ref 3.6–5.5)
PROT SERPL-MCNC: 6 G/DL (ref 6–8.2)
PROT UR QL STRIP: 30 MG/DL
RBC # URNS HPF: ABNORMAL /HPF
RBC UR QL AUTO: ABNORMAL
SODIUM SERPL-SCNC: 142 MMOL/L (ref 135–145)
SP GR UR STRIP.AUTO: 1.03
UROBILINOGEN UR STRIP.AUTO-MCNC: 0.2 MG/DL
WBC #/AREA URNS HPF: ABNORMAL /HPF

## 2020-09-20 PROCEDURE — 700102 HCHG RX REV CODE 250 W/ 637 OVERRIDE(OP): Performed by: PHYSICAL MEDICINE & REHABILITATION

## 2020-09-20 PROCEDURE — 770010 HCHG ROOM/CARE - REHAB SEMI PRIVAT*

## 2020-09-20 PROCEDURE — 81001 URINALYSIS AUTO W/SCOPE: CPT

## 2020-09-20 PROCEDURE — 97530 THERAPEUTIC ACTIVITIES: CPT

## 2020-09-20 PROCEDURE — 700102 HCHG RX REV CODE 250 W/ 637 OVERRIDE(OP): Performed by: HOSPITALIST

## 2020-09-20 PROCEDURE — A9270 NON-COVERED ITEM OR SERVICE: HCPCS | Performed by: PHYSICAL MEDICINE & REHABILITATION

## 2020-09-20 PROCEDURE — 97535 SELF CARE MNGMENT TRAINING: CPT

## 2020-09-20 PROCEDURE — 99232 SBSQ HOSP IP/OBS MODERATE 35: CPT | Performed by: HOSPITALIST

## 2020-09-20 PROCEDURE — 700111 HCHG RX REV CODE 636 W/ 250 OVERRIDE (IP): Performed by: PHYSICAL MEDICINE & REHABILITATION

## 2020-09-20 PROCEDURE — A9270 NON-COVERED ITEM OR SERVICE: HCPCS | Performed by: HOSPITALIST

## 2020-09-20 PROCEDURE — 36415 COLL VENOUS BLD VENIPUNCTURE: CPT

## 2020-09-20 PROCEDURE — 97110 THERAPEUTIC EXERCISES: CPT

## 2020-09-20 PROCEDURE — 99232 SBSQ HOSP IP/OBS MODERATE 35: CPT | Performed by: PHYSICAL MEDICINE & REHABILITATION

## 2020-09-20 PROCEDURE — 80053 COMPREHEN METABOLIC PANEL: CPT

## 2020-09-20 RX ORDER — POTASSIUM CHLORIDE 20 MEQ/1
40 TABLET, EXTENDED RELEASE ORAL 2 TIMES DAILY
Status: COMPLETED | OUTPATIENT
Start: 2020-09-20 | End: 2020-09-20

## 2020-09-20 RX ADMIN — Medication 4000 UNITS: at 07:57

## 2020-09-20 RX ADMIN — FOLIC ACID 1 MG: 1 TABLET ORAL at 07:57

## 2020-09-20 RX ADMIN — TRAZODONE HYDROCHLORIDE 50 MG: 50 TABLET ORAL at 21:08

## 2020-09-20 RX ADMIN — LISINOPRIL 20 MG: 20 TABLET ORAL at 07:58

## 2020-09-20 RX ADMIN — OXYCODONE HYDROCHLORIDE 10 MG: 10 TABLET ORAL at 10:10

## 2020-09-20 RX ADMIN — ACETAMINOPHEN 1000 MG: 325 TABLET, FILM COATED ORAL at 07:56

## 2020-09-20 RX ADMIN — Medication 1000 MCG: at 07:57

## 2020-09-20 RX ADMIN — BACITRACIN ZINC AND POLYMYXIN B SULFATE: at 08:10

## 2020-09-20 RX ADMIN — DIBASIC SODIUM PHOSPHATE, MONOBASIC POTASSIUM PHOSPHATE AND MONOBASIC SODIUM PHOSPHATE 250 MG: 852; 155; 130 TABLET ORAL at 07:57

## 2020-09-20 RX ADMIN — ACETAMINOPHEN 1000 MG: 325 TABLET, FILM COATED ORAL at 21:08

## 2020-09-20 RX ADMIN — OXYCODONE HYDROCHLORIDE 10 MG: 10 TABLET ORAL at 15:15

## 2020-09-20 RX ADMIN — POTASSIUM CHLORIDE 40 MEQ: 1500 TABLET, EXTENDED RELEASE ORAL at 12:34

## 2020-09-20 RX ADMIN — GABAPENTIN 900 MG: 300 CAPSULE ORAL at 14:43

## 2020-09-20 RX ADMIN — OXYCODONE HYDROCHLORIDE 5 MG: 5 TABLET ORAL at 06:07

## 2020-09-20 RX ADMIN — DOCUSATE SODIUM 50 MG AND SENNOSIDES 8.6 MG 2 TABLET: 8.6; 5 TABLET, FILM COATED ORAL at 07:56

## 2020-09-20 RX ADMIN — DULOXETINE 20 MG: 20 CAPSULE, DELAYED RELEASE ORAL at 07:58

## 2020-09-20 RX ADMIN — ATORVASTATIN CALCIUM 40 MG: 40 TABLET, FILM COATED ORAL at 21:08

## 2020-09-20 RX ADMIN — OMEPRAZOLE 20 MG: 20 CAPSULE, DELAYED RELEASE ORAL at 07:57

## 2020-09-20 RX ADMIN — GABAPENTIN 900 MG: 300 CAPSULE ORAL at 21:08

## 2020-09-20 RX ADMIN — ACETAMINOPHEN 1000 MG: 325 TABLET, FILM COATED ORAL at 14:43

## 2020-09-20 RX ADMIN — POTASSIUM CHLORIDE 40 MEQ: 1500 TABLET, EXTENDED RELEASE ORAL at 21:08

## 2020-09-20 RX ADMIN — GABAPENTIN 900 MG: 300 CAPSULE ORAL at 07:57

## 2020-09-20 RX ADMIN — CARVEDILOL 6.25 MG: 3.12 TABLET, FILM COATED ORAL at 17:15

## 2020-09-20 RX ADMIN — CARVEDILOL 6.25 MG: 3.12 TABLET, FILM COATED ORAL at 07:57

## 2020-09-20 RX ADMIN — ENOXAPARIN SODIUM 40 MG: 40 INJECTION SUBCUTANEOUS at 07:59

## 2020-09-20 RX ADMIN — DIBASIC SODIUM PHOSPHATE, MONOBASIC POTASSIUM PHOSPHATE AND MONOBASIC SODIUM PHOSPHATE 250 MG: 852; 155; 130 TABLET ORAL at 21:08

## 2020-09-20 RX ADMIN — BACITRACIN ZINC AND POLYMYXIN B SULFATE: at 21:00

## 2020-09-20 ASSESSMENT — ENCOUNTER SYMPTOMS
ABDOMINAL PAIN: 0
BRUISES/BLEEDS EASILY: 0
PALPITATIONS: 0
VOMITING: 0
CHILLS: 0
COUGH: 0
EYES NEGATIVE: 1
POLYDIPSIA: 0
SHORTNESS OF BREATH: 0
FEVER: 0
NAUSEA: 0

## 2020-09-20 ASSESSMENT — MONTREAL COGNITIVE ASSESSMENT (MOCA)
ORIENTATION SUBSCORE: 4
9. REPEAT EACH SENTENCE: 2
4. NAME EACH OF THE THREE ANIMALS SHOWN: 3
LEVEL OF SEVERITY: MILD COGNITIVE IMPAIRMENT
WHAT IS THE TOTAL SCORE (OUT OF 30): 19
10. [FLUENCY] NAME WORDS STARTING WITH DESIGNATED LETTER: 0
7. [VIGILENCE] TAP WHEN HEARING DESIGNATED LETTER: 0
11. FOR EACH PAIR OF WORDS, WHAT CATEGORY DO THEY BELONG TO (OUT OF 2): 1
8. SERIAL SUBTRACTION OF 7S: 2
12. MEMORY INDEX SCORE: 2
WHAT LEVEL OF EDUCATION WAS ATTAINED: LESS THAN HIGH SCHOOL EDUCATION
VISUOSPATIAL/EXECUTIVE SUBSCORE: 2
6. READ LIST OF DIGITS [FORWARD/BACKWARD]: 2

## 2020-09-20 ASSESSMENT — ACTIVITIES OF DAILY LIVING (ADL)
BED_CHAIR_WHEELCHAIR_TRANSFER_DESCRIPTION: INCREASED TIME;INITIAL PREPARATION FOR TASK;SET-UP OF EQUIPMENT;SQUAT PIVOT TRANSFER TO WHEELCHAIR;SUPERVISION FOR SAFETY;VERBAL CUEING

## 2020-09-20 ASSESSMENT — FIBROSIS 4 INDEX: FIB4 SCORE: 2.56

## 2020-09-20 ASSESSMENT — PAIN DESCRIPTION - PAIN TYPE: TYPE: ACUTE PAIN

## 2020-09-20 NOTE — THERAPY
"Physical Therapy   Daily Treatment     Patient Name: Jimenez Bains  Age:  75 y.o., Sex:  male  Medical Record #: [id number]  Today's Date: 9/20/2020     Precautions  Precautions: Fall Risk, Non Weight Bearing Left Lower Extremity  Comments: wound vac L residual limb, sores on genital  area    Subjective    Pt was supine in bed upon arrival and agreeable to treatment.  Pt stated \"I feel lousy.\"  Pt refused OOB treatment despite encouragement but was agreeable to education and LE exercise.     Objective       09/20/20 1501   Precautions   Precautions Fall Risk;Non Weight Bearing Left Lower Extremity   Pain 0 - 10 Group   Location Leg   Location Orientation Left   Therapist Pain Assessment Post Activity Pain Same as Prior to Activity;4   Supine Lower Body Exercise   Hip Abduction 1 set of 15;Right    Hip Adduction  1 set of 15;Right   Straight Leg Raises Front;1 set of 15;Right   Short Arc Quad 1 set of 15;Right    Heel Slide 1 set of 15;Bilateral   Gluteal Isometrics 1 set of 15;Bilateral   Quadriceps Isometrics 1 set of 15;Right   Other Exercises L hip flexion x 15 reps   Interdisciplinary Plan of Care Collaboration   IDT Collaboration with  Nursing   Patient Position at End of Therapy In Bed;Tray Table within Reach;Call Light within Reach;Phone within Reach   Collaboration Comments Collaboration about isolation precautions and pt with request for pain meds   PT Total Time Spent   PT Individual Total Time Spent (Mins) 60   PT Charge Group   PT Therapeutic Exercise 2   PT Therapeutic Activities 2     Discussion and demonstration of fall prevention and recovery; handout given.  Review of HEP.  Continued amputee education on residual limb positioning and prevention of secondary complications.  Discussion of D/C planning.    Assessment    Pt limited secondary to fatigue and overall LE weakness.  Pt verbalized all education.     Strengths: Independent prior level of function, Motivated for self care and independence, Able " to follow instructions  Barriers: Pain, Confused, Impaired balance, Impaired activity tolerance, Decreased endurance    Plan    Complete D/C IRF CL items in preparation for D/C 9/22/2020.      Physical Therapy Problems     Problem: Mobility     Dates: Start: 09/05/20       Goal: STG-Within one week, patient will ambulate household distance     Dates: Start: 09/05/20       Description: 1) Individualized goal:  10 ft with FWW and SBA-CGA.  2) Interventions:  PT Group Therapy, PT Gait Training, PT Therapeutic Exercises, PT Neuro Re-Ed/Balance, PT Therapeutic Activity, and PT Evaluation    Note:     Goal Note filed on 09/16/20 1528 by Staci Dean, PT    Limited by pain                        Problem: Mobility Transfers     Dates: Start: 09/05/20       Goal: STG-Within one week, patient will sit to stand     Dates: Start: 09/05/20       Description: 1) Individualized goal:  with FWW and SBA.  2) Interventions:  PT Group Therapy, PT Gait Training, PT Therapeutic Exercises, PT Neuro Re-Ed/Balance, PT Therapeutic Activity, and PT Evaluation    Note:     Goal Note filed on 09/16/20 1528 by Staci Dean, PT    Limited by pain                        Problem: PT-Long Term Goals     Dates: Start: 09/05/20       Goal: LTG-By discharge, patient will propel wheelchair     Dates: Start: 09/05/20       Description: 1) Individualized goal:  150 ft mod I.  2) Interventions:  PT Group Therapy, PT Gait Training, PT Therapeutic Exercises, PT Neuro Re-Ed/Balance, PT Therapeutic Activity, and PT Evaluation          Goal: LTG-By discharge, patient will ambulate     Dates: Start: 09/05/20       Description: 1) Individualized goal:  50 ft with FWW and supervision.  2) Interventions:  PT Group Therapy, PT Gait Training, PT Therapeutic Exercises, PT Neuro Re-Ed/Balance, PT Therapeutic Activity, and PT Evaluation          Goal: LTG-By discharge, patient will transfer one surface to another     Dates: Start: 09/05/20       Description: 1)  Individualized goal:  with FWW vs. Squat pivot mod I.  2) Interventions:  PT Group Therapy, PT Gait Training, PT Therapeutic Exercises, PT Neuro Re-Ed/Balance, PT Therapeutic Activity, and PT Evaluation          Goal: LTG-By discharge, patient will perform home exercise program     Dates: Start: 09/05/20       Description: 1) Individualized goal:  with handout mod I.  2) Interventions:  PT Group Therapy, PT Gait Training, PT Therapeutic Exercises, PT Neuro Re-Ed/Balance, PT Therapeutic Activity, and PT Evaluation          Goal: LTG-By discharge, patient will     Dates: Start: 09/05/20       Description: 1) Individualized goal:  perform bed mobility including rolling to prone mod I.  2) Interventions:  PT Group Therapy, PT Gait Training, PT Therapeutic Exercises, PT Neuro Re-Ed/Balance, PT Therapeutic Activity, and PT Evaluation

## 2020-09-20 NOTE — PROGRESS NOTES
Hospital Medicine Daily Progress Note      Chief Complaint  Hypertension  Diabetes    Interval Problem Update  No overnight problems.    Review of Systems  Review of Systems   Constitutional: Negative for chills and fever.   HENT: Negative.    Eyes: Negative.    Respiratory: Negative for cough and shortness of breath.    Cardiovascular: Negative for chest pain and palpitations.   Gastrointestinal: Negative for abdominal pain, nausea and vomiting.   Musculoskeletal:        Wound pain    Endo/Heme/Allergies: Negative for polydipsia. Does not bruise/bleed easily.        Physical Exam  Temp:  [36.7 °C (98.1 °F)-37 °C (98.6 °F)] 37 °C (98.6 °F)  Pulse:  [60-68] 68  Resp:  [14-18] 18  BP: (120-149)/(55-61) 149/55  SpO2:  [90 %-98 %] 98 %    Physical Exam  Vitals signs reviewed.   Constitutional:       Appearance: Normal appearance.      Comments: Chronically ill appearing   HENT:      Head: Normocephalic and atraumatic.      Right Ear: External ear normal.      Left Ear: External ear normal.      Nose: Nose normal.      Mouth/Throat:      Pharynx: Oropharynx is clear.   Eyes:      General:         Right eye: No discharge.         Left eye: No discharge.      Extraocular Movements: Extraocular movements intact.      Conjunctiva/sclera: Conjunctivae normal.   Neck:      Musculoskeletal: Normal range of motion and neck supple.   Cardiovascular:      Rate and Rhythm: Normal rate and regular rhythm.   Pulmonary:      Effort: No respiratory distress.      Breath sounds: No wheezing.      Comments: Decreased BS   Abdominal:      General: Bowel sounds are normal. There is no distension.      Palpations: Abdomen is soft.      Tenderness: There is no abdominal tenderness.   Musculoskeletal:      Comments: Left AKA dressed   Skin:     General: Skin is warm and dry.      Comments: Large pearly lesion on right cheek   Neurological:      Mental Status: He is alert and oriented to person, place, and time.      Comments: awake          Fluids    Intake/Output Summary (Last 24 hours) at 9/20/2020 1028  Last data filed at 9/20/2020 0900  Gross per 24 hour   Intake 420 ml   Output 1600 ml   Net -1180 ml       Laboratory      Recent Labs     09/19/20  0617 09/20/20  0532   SODIUM 147* 142   POTASSIUM 3.8 3.2*   CHLORIDE 109 105   CO2 27 27   GLUCOSE 115* 133*   BUN 16 16   CREATININE 1.10 1.11   CALCIUM 8.7 8.4*                   Assessment/Plan  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 Fisher 2/2 critical limb ischemia  Post-op complications included necrosis and infection  S/P AKA revision in 7/2020  S/P AKA I+D x 3  Wound vac per Wound Care  Wound Cx +MRSA and Enterococcus  Completed Unasyn and Vanco  Pain control per Primary Team    PVD (peripheral vascular disease) with claudication (HCC)- (present on admission)  Assessment & Plan  On Lipitor  Consider ASA    Diabetes (HCC)- (present on admission)  Assessment & Plan  HbA1c 5.1  Diet control    Elevated brain natriuretic peptide (BNP) level  Assessment & Plan  BNP in the 5000 range  Consider Echocardiogram    Hypophosphatemia  Assessment & Plan  Phosphorus levels improving  Decrease Neutra-Phos supplementation    Hypokalemia  Assessment & Plan  Replete K+  Will also check Mg++    Hypernatremia  Assessment & Plan  Na+ levels improved again w/ D5W    Alkaline phosphatase elevation- (present on admission)  Assessment & Plan  May be 2/2 AKA  Follow levels to resolution    Vitamin D deficiency  Assessment & Plan  Vit D level 10  On high dose supplementation per Primary Team    Anemia  Assessment & Plan  Has macrocytic indices  TSH normal  Vit B12 198 and Folate 2.9  On supplementation for both  Follow H/H    HTN (hypertension)- (present on admission)  Assessment & Plan  Blood pressure controlled on Coreg and Lisinopril    Basal cell carcinoma of face- (present on  admission)  Assessment & Plan  Pt reports right cheek lesion has been ongoing for at least 1 yr  Needs Dermatology F/U     Full Code

## 2020-09-20 NOTE — THERAPY
"Occupational Therapy  Daily Treatment     Patient Name: Jimenez Bains  Age:  75 y.o., Sex:  male  Medical Record #: [id number]  Today's Date: 9/20/2020     Precautions  Precautions: (P) Fall Risk, Non Weight Bearing Left Lower Extremity  Comments: (P) wound vac L residual limb, sores on genital  area         Subjective    \"He's just resting and watching what I'm doing now.\" pt joking about his LLE      Objective       09/20/20 0931   Precautions   Precautions Fall Risk;Non Weight Bearing Left Lower Extremity   Comments wound vac L residual limb, sores on genital  area   Pain   Intervention Nurse Notified;Repositioned   Pain 0 - 10 Group   Location Leg   Location Orientation Left   Pain Rating Scale (NPRS) 4   Description Constant;Aching;Sore   Comfort Goal Comfort with Movement;Sleep Comfortably   Therapist Pain Assessment Post Activity Pain Same as Prior to Activity;Nurse Notified   Non Verbal Descriptors   Non Verbal Scale  Grimacing;Tense Body Language   Cognition    Level of Consciousness Alert   Functional Level of Assist   Grooming Modified Independent   Grooming Description Seated in wheelchair at sink;Increased time  (brushed teeth/hair )   Bed, Chair, Wheelchair Transfer Maximal Assist   Bed Chair Wheelchair Transfer Description Increased time;Initial preparation for task;Set-up of equipment;Squat pivot transfer to wheelchair;Supervision for safety;Verbal cueing   Supine Upper Body Exercises   Bicep Curl   (6 lbs)   Sitting Upper Body Exercises   Internal Shoulder Rotation 2 sets of 10;Bilateral;Weight (See Comments for lbs)  (6 lbs)   Bicep Curls 2 sets of 10;Bilateral;Weight (See Comments for lbs)  (6 lb)   Bed Mobility    Supine to Sit Minimal Assist   Scooting Contact Guard Assist   Rolling Supervised   Skilled Intervention Facilitation;Verbal Cuing   Interdisciplinary Plan of Care Collaboration   Patient Position at End of Therapy Seated;Chair Alarm On;Call Light within Reach;Tray Table within " Reach;Phone within Reach   OT Total Time Spent   OT Individual Total Time Spent (Mins) 60   OT Charge Group   OT Self Care / ADL 2   OT Therapy Activity 1   OT Therapeutic Exercise  1       Assessment    Pt tolerated session fair. Pt in a lot of pain in groin and L stump throughout session. Pt's pants [facility] down at start of session- was able to bridge hips and don pants over hips in supine w/ supervision. Pt required increased assist with squat pivot xfer going to L side 2/2 increased pain and almost missing w/c when trying to sit down. Pt required min a for sitting balance at EOB 2/2 unable to focus 2/2 pain in L stump. Pt required mod a for repositioning in w/c to get bottom further back 2/2 pt having difficulty scooting back- stating that his pants keep getting caught on the cushion and it won't let him move. Attempted standing in bathroom using grab bar 2x- pt unable to stand with max a from therapist at this time. Pt required rest breaks throughout 2/2 fatigue. Nurse notified of pt's increased pain in LLE and pain meds administered, pt stating his pain was only at a 3-4/10 but s/s distress indicated higher pain levels. Attempted to have pt write in memory log at end of session, pt stating that he was unable to see what was written on the paper. Pt does not have any glasses here, pt had previously written in log on other days- unsure if these vision changes are new- discussed this with SLP.     Strengths: Pleasant and cooperative, Willingly participates in therapeutic activities  Barriers: Confused, Decreased endurance, Generalized weakness, Impaired balance, Limited mobility, Pain    Plan    Continue to progress gross strengthening and endurance and standing tolerance/balance toward increased safety and independence with ADLs, general attention to hygiene and self-care, weightshift when scooting    Occupational Therapy Goals     Problem: OT Long Term Goals     Dates: Start: 09/05/20       Goal: LTG-By  discharge, patient will complete basic self care tasks     Dates: Start: 09/05/20       Description: 1) Individualized Goal:  Mod I for BADL's via AE/DME PRN  2) Interventions: OT Self Care/ADL, OT Neuro Re-Ed/Balance, OT Therapeutic Activity, OT Evaluation, and OT Therapeutic Exercise            Goal: LTG-By discharge, patient will perform bathroom transfers     Dates: Start: 09/05/20       Description: 1) Individualized Goal:  Mod I for toilet and shower transfer via DME  2) Interventions: OT Self Care/ADL, OT Neuro Re-Ed/Balance, OT Therapeutic Activity, OT Evaluation, and OT Therapeutic Exercise

## 2020-09-20 NOTE — CARE PLAN
Problem: Pain Management  Goal: Pain level will decrease to patient's comfort goal  Outcome: PROGRESSING AS EXPECTED  Note: Patient complained of left AKA pain.   Medicated with Roxicodone 10 mg per prn order.      Problem: Urinary Elimination:  Goal: Ability to reestablish a normal urinary elimination pattern will improve  Outcome: PROGRESSING AS EXPECTED  Note: Brothers catheter draining to gravity.

## 2020-09-20 NOTE — THERAPY
Speech Language Pathology  Daily Treatment     Patient Name: Jimenez Bains  Age:  75 y.o., Sex:  male  Medical Record #: [id number]  Today's Date: 9/20/2020     Precautions  Precautions: Fall Risk, Non Weight Bearing Left Lower Extremity  Comments: wound vac L residual limb, sores on genital  area    Subjective    Pt pleasant and cooperative during tx.      Objective       09/20/20 1331   Cognition   Clock Drawing Disorganization;Impaired Hand Placement   MoCA ([place] Cognitive Assessment)   Visuospatial/Executive 2   Naming 3   Attention - Digits 2   Attention - Letters 0   Attention - Serial 7 Subtraction 2   Language - Repeat 2   Language - Fluency 0   Language - Abstraction 1   Delayed Recall 2   Orientation 4   Level of Education 1   Total 19   Level of Severity Mild cognitive impairment   Interdisciplinary Plan of Care Collaboration   IDT Collaboration with  Physician   Collaboration Comments regarding change in cognition   SLP Total Time Spent   SLP Individual Total Time Spent (Mins) 60       Assessment    Pt demonstrated decrease in handwriting last session.  Chart reviewed and MoCA was originally given during evaluation.  Per chart, clock drawing was WFL.  Pt was asked to draw a clock.  Pt was disorganized, placing numbers in reverse order around the clock.  Pt aware of mistake, and required 5 attempts to correct errors.  MoCA re-administered to determine change in cognition.  Pt scored 19/30 (decreased from 24/30).  Pt presented with decrease in visuospatial/executive function, attn, serial subtraction, and delayed recall.  Pt demonstrated 1pt improvement in orientation.  Pt unclear if he has had change in vision (poor historian).  Pt stated that he wore glasses over 10 years ago.  Pt unable to answer if his vision has changed since he first arrived to rehab.  MD informed of change.        Strengths: Able to follow instructions, Motivated for self care and independence, Pleasant and cooperative,  Willingly participates in therapeutic activities  Barriers: Impaired functional cognition    Plan    Target recall, orientation, safety    Speech Therapy Problems     Problem: Memory STGs     Dates: Start: 09/05/20       Goal: STG-Within one week, patient will     Dates: Start: 09/05/20       Description: 1) Individualized goal:  recall daily events and safety strategies given min verbal cues 80% with use of memory book.  2) Interventions:  SLP Cognitive Skill Development and SLP Group Treatment                    Problem: Problem Solving STGs     Dates: Start: 09/05/20       Goal: STG-Within one week, patient will     Dates: Start: 09/05/20       Description: 1) Individualized goal:  answer basic problem solving questions with 80% accuracy given min verbal cues.    2) Interventions:  SLP Cognitive Skill Development and SLP Group Treatment                    Problem: Speech/Swallowing LTGs     Dates: Start: 09/05/20       Goal: LTG-By discharge, patient will solve basic problems     Dates: Start: 09/05/20       Description: 1) Individualized goal:  Mert for safe discharge home.   2) Interventions:  SLP Aphasia Evaluation, SLP Cognitive Skill Development, and SLP Group Treatment

## 2020-09-20 NOTE — PROGRESS NOTES
"Rehab Progress Note     Encounter Date: 9/20/2020    CC: left AKA, phantom limb pain, wound pain    Interval Events (Subjective)  -patient seen in w/c  -denies new problems  -requesting another pain med; last time he got one was about 10 min, ok with waiting at this time    -Addendum, 2:45PM: SLP concerned for gradual decrease in cog function since admit; no acute mental status changes today; Vitals stable, last WBC 7.8 on 9/16; patient appears comfortable; Dr. Santana saw patient was sitting on feces this AM, +carpio; patient denies any new changes in vision, numbness, or strength; will check UA; CBC and K/Mg already ordered per hospitalist;         Objective:  VITAL SIGNS: /55   Pulse 68   Temp 37 °C (98.6 °F) (Temporal)   Resp 18   Ht 1.676 m (5' 6\")   Wt 63 kg (138 lb 14.2 oz)   SpO2 98%   BMI 22.42 kg/m²   Gen: calm  Psych: Mood and affect appropriate  CV: no edema  Resp: on room air, no upper airway sounds  Neuro: AOx3, following commands        Recent Results (from the past 72 hour(s))   PHOSPHORUS    Collection Time: 09/19/20  6:17 AM   Result Value Ref Range    Phosphorus 3.1 2.5 - 4.5 mg/dL   Comp Metabolic Panel    Collection Time: 09/19/20  6:17 AM   Result Value Ref Range    Sodium 147 (H) 135 - 145 mmol/L    Potassium 3.8 3.6 - 5.5 mmol/L    Chloride 109 96 - 112 mmol/L    Co2 27 20 - 33 mmol/L    Anion Gap 11.0 7.0 - 16.0    Glucose 115 (H) 65 - 99 mg/dL    Bun 16 8 - 22 mg/dL    Creatinine 1.10 0.50 - 1.40 mg/dL    Calcium 8.7 8.5 - 10.5 mg/dL    AST(SGOT) 28 12 - 45 U/L    ALT(SGPT) 19 2 - 50 U/L    Alkaline Phosphatase 274 (H) 30 - 99 U/L    Total Bilirubin 0.3 0.1 - 1.5 mg/dL    Albumin 2.8 (L) 3.2 - 4.9 g/dL    Total Protein 6.3 6.0 - 8.2 g/dL    Globulin 3.5 1.9 - 3.5 g/dL    A-G Ratio 0.8 g/dL   ESTIMATED GFR    Collection Time: 09/19/20  6:17 AM   Result Value Ref Range    GFR If African American >60 >60 mL/min/1.73 m 2    GFR If Non African American >60 >60 mL/min/1.73 m 2   Comp " Metabolic Panel    Collection Time: 09/20/20  5:32 AM   Result Value Ref Range    Sodium 142 135 - 145 mmol/L    Potassium 3.2 (L) 3.6 - 5.5 mmol/L    Chloride 105 96 - 112 mmol/L    Co2 27 20 - 33 mmol/L    Anion Gap 10.0 7.0 - 16.0    Glucose 133 (H) 65 - 99 mg/dL    Bun 16 8 - 22 mg/dL    Creatinine 1.11 0.50 - 1.40 mg/dL    Calcium 8.4 (L) 8.5 - 10.5 mg/dL    AST(SGOT) 33 12 - 45 U/L    ALT(SGPT) 24 2 - 50 U/L    Alkaline Phosphatase 280 (H) 30 - 99 U/L    Total Bilirubin 0.3 0.1 - 1.5 mg/dL    Albumin 2.7 (L) 3.2 - 4.9 g/dL    Total Protein 6.0 6.0 - 8.2 g/dL    Globulin 3.3 1.9 - 3.5 g/dL    A-G Ratio 0.8 g/dL   ESTIMATED GFR    Collection Time: 09/20/20  5:32 AM   Result Value Ref Range    GFR If African American >60 >60 mL/min/1.73 m 2    GFR If Non African American >60 >60 mL/min/1.73 m 2       Current Facility-Administered Medications   Medication Frequency   • potassium chloride SA (Kdur) tablet 40 mEq BID   • phosphorus (K-Phos-Neutral) per tablet 250 mg BID   • lidocaine (XYLOCAINE) 2 % injection 20 mL QDAY PRN    Or   • lidocaine (XYLOCAINE) 4 % topical solution QDAY PRN   • DULoxetine (CYMBALTA) capsule 20 mg DAILY   • gabapentin (NEURONTIN) capsule 900 mg TID   • traZODone (DESYREL) tablet 50 mg QHS   • bacitracin-polymyxin b (POLYSPORIN) 500-87193 UNIT/GM ointment BID   • bacitracin-neomycin-polymyxin (Neosporin) 400-5-5000 ointment BID PRN   • lisinopril (PRINIVIL) tablet 20 mg DAILY   • lidocaine (XYLOCAINE) 4 % topical solution PRN   • oxyCODONE immediate-release (ROXICODONE) tablet 5 mg DAILY   • oxyCODONE immediate release (ROXICODONE) tablet 10 mg Q4HRS PRN   • folic acid (FOLVITE) tablet 1 mg DAILY   • cyanocobalamin (VITAMIN B-12) tablet 1,000 mcg DAILY   • miconazole 2%-zinc oxide (Hilary) topical cream Q6HRS PRN   • vitamin D (cholecalciferol) tablet 4,000 Units DAILY   • glucose 4 g chewable tablet 16 g Q15 MIN PRN    And   • dextrose 50% (D50W) injection 50 mL Q15 MIN PRN   • acetaminophen  (TYLENOL) tablet 1,000 mg TID   • Respiratory Therapy Consult Continuous RT   • tramadol (ULTRAM) 50 MG tablet 50 mg Q4HRS PRN   • hydrALAZINE (APRESOLINE) tablet 25 mg Q8HRS PRN   • enoxaparin (LOVENOX) inj 40 mg DAILY   • acetaminophen (TYLENOL) tablet 650 mg Q4HRS PRN   • senna-docusate (PERICOLACE or SENOKOT S) 8.6-50 MG per tablet 2 Tab BID    And   • polyethylene glycol/lytes (MIRALAX) PACKET 1 Packet QDAY PRN    And   • magnesium hydroxide (MILK OF MAGNESIA) suspension 30 mL QDAY PRN    And   • bisacodyl (DULCOLAX) suppository 10 mg QDAY PRN   • benzocaine-menthol (CEPACOL) lozenge 1 Lozenge Q2HRS PRN   • mag hydrox-al hydrox-simeth (MAALOX PLUS ES or MYLANTA DS) suspension 20 mL Q2HRS PRN   • ondansetron (ZOFRAN ODT) dispertab 4 mg 4X/DAY PRN    Or   • ondansetron (ZOFRAN) syringe/vial injection 4 mg 4X/DAY PRN   • sodium chloride (OCEAN) 0.65 % nasal spray 2 Spray PRN   • midazolam (VERSED) 5 mg/mL (1 mL vial) PRN   • atorvastatin (LIPITOR) tablet 40 mg Q EVENING   • oxyCODONE immediate-release (ROXICODONE) tablet 2.5 mg Q3HRS PRN   • oxyCODONE immediate-release (ROXICODONE) tablet 5 mg Q3HRS PRN   • omeprazole (PRILOSEC) capsule 20 mg DAILY   • carvedilol (COREG) tablet 6.25 mg BID WITH MEALS     Facility-Administered Medications Ordered in Other Encounters   Medication Frequency   • fentaNYL (SUBLIMAZE) injection PRN   • propofol PRN   • ceFAZolin (ANCEF) injection PRN       Orders Placed This Encounter   Procedures   • Diet Order Cardiac (Thin), Diabetic, 2 Gram Sodium     Standing Status:   Standing     Number of Occurrences:   1     Order Specific Question:   Diet:     Answer:   Cardiac [6]     Comments:   Thin     Order Specific Question:   Diet:     Answer:   Diabetic [3]     Order Specific Question:   Diet:     Answer:   2 Gram Sodium [7]       Assessment:  Active Hospital Problems    Diagnosis   • *Encephalopathy acute   • AKA stump complication (HCC)   • Arterial insufficiency (HCC)   • PVD  (peripheral vascular disease) with claudication (HCC)   • Diabetes (HCC)   • HLD (hyperlipidemia)   • HTN (hypertension)   • Basal cell carcinoma of face   • Hypokalemia   • Hypernatremia   • Alkaline phosphatase elevation   • Anemia   • Vitamin D deficiency   • Peripheral arterial occlusive disease (HCC)       Medical Decision Making and Plan:  Acute encephalopathy - Concern for encephalopathy with ongoing multiple medical comorbidities and confusion about hospital course as well as not understanding carpio catheter. Alternatively could be medicated for transfer but therapy reporting difficulty with cuing and following commands.   -PT and OT for mobility and ADLs  -SLP for cognitive evaluation     Left BKA - Patient with severe PVD with L AKA on 6/30/20 with multiple revisions due to necrosis and infection. Cultures grew MRSA and Enterococcus. Per ID continue antibiotics until 9/6/20. Transferred to [facility]"" from 8/12/20 to 9/4/20.   -Vancomycin and Unasyn until 9/6/20. Completed.   -Redress on 9/15/20 as wound vac not turned on and significant pain and drainage.   -Continue wound vac. Consult wound care - added honey to regimen with slow improvement in size and depth. Lidocaine 4% PRN for dressing change      Multiple wounds - Patient has rash to abdomen, redness on sacrum and left buttocks on transfer. Consult wound care.     Right face basal cell carcinoma - Unclear what follow-up. Follow-up Oncology     Anemia - Check AM CBC - 8.8. Continue to monitor.      HTN - Patient on Lisinopril 10 mg and Coreg 6.25 mg BID  -Hospistalist consulted.      Pain control - Primarily on admission with left phantom limb pain. Patient on Gabapentin 100 mg TID and Oxycodone. Can consider increase in Gabapentin for neuropathic phantom limb pain.   -Increase Gabapentin to 400 mg TID. Oxycodone 10 mg for wound care changes. Concern for over sedation with higher opiate dosing, limit to wound vac change, lidocaine jelly for wound change.    -Increase Gabapentin to 900 mg TID and schedule Oxycodone 5 mg first thing in AM. May need to schedule doses as patient unable to remember to ask at times.  -Start Cymbalta which may help with pain.       DM with hyperglycemia - Patient on SSI on transfer. Consult Hospitalist  -Discontinue SSI     COVID - Patient with check x2 at [facility]. Patient without symptoms. Per policy no check within 7 days. Patient without symptoms. Check PCR, on precautions - negative, no precautions.      Insomnia/Mood - Schedule Trazodone QHS    GI Ppx - Patient on Prilosec      DVT Ppx - Patient on Lovenox on transfer.  Constipation, no BM since 9/7, PRN Miralax and MoM     Dispo - Patient with daughter that cannot provide as much support as he needs. Ongoing discussion about what level of support he will have.       Total time:  26 minutes.  I spent greater than 50% of the time for patient care, counseling, and coordination on this date, including holding off on additional pain meds at this time; discussed with SLP and hospitalist; discussed plan for additional work up     Gwyn Valenzuela M.D.

## 2020-09-21 ENCOUNTER — HOSPITAL ENCOUNTER (OUTPATIENT)
Facility: MEDICAL CENTER | Age: 75
DRG: 498 | End: 2020-09-21
Attending: SURGERY | Admitting: SURGERY
Payer: MEDICARE

## 2020-09-21 LAB
ERYTHROCYTE [DISTWIDTH] IN BLOOD BY AUTOMATED COUNT: 58.1 FL (ref 35.9–50)
HCT VFR BLD AUTO: 36.8 % (ref 42–52)
HGB BLD-MCNC: 11 G/DL (ref 14–18)
MAGNESIUM SERPL-MCNC: 2 MG/DL (ref 1.5–2.5)
MCH RBC QN AUTO: 30.6 PG (ref 27–33)
MCHC RBC AUTO-ENTMCNC: 29.9 G/DL (ref 33.7–35.3)
MCV RBC AUTO: 102.5 FL (ref 81.4–97.8)
MYCOBACTERIUM SPEC CULT: NORMAL
PLATELET # BLD AUTO: 269 K/UL (ref 164–446)
PMV BLD AUTO: 11.7 FL (ref 9–12.9)
POTASSIUM SERPL-SCNC: 4.6 MMOL/L (ref 3.6–5.5)
RBC # BLD AUTO: 3.59 M/UL (ref 4.7–6.1)
RHODAMINE-AURAMINE STN SPEC: NORMAL
SIGNIFICANT IND 70042: NORMAL
SITE SITE: NORMAL
SOURCE SOURCE: NORMAL
WBC # BLD AUTO: 8.8 K/UL (ref 4.8–10.8)

## 2020-09-21 PROCEDURE — 85027 COMPLETE CBC AUTOMATED: CPT

## 2020-09-21 PROCEDURE — 97129 THER IVNTJ 1ST 15 MIN: CPT

## 2020-09-21 PROCEDURE — 97535 SELF CARE MNGMENT TRAINING: CPT

## 2020-09-21 PROCEDURE — A9270 NON-COVERED ITEM OR SERVICE: HCPCS | Performed by: HOSPITALIST

## 2020-09-21 PROCEDURE — 84132 ASSAY OF SERUM POTASSIUM: CPT

## 2020-09-21 PROCEDURE — 99232 SBSQ HOSP IP/OBS MODERATE 35: CPT | Performed by: PHYSICAL MEDICINE & REHABILITATION

## 2020-09-21 PROCEDURE — 97530 THERAPEUTIC ACTIVITIES: CPT

## 2020-09-21 PROCEDURE — 700102 HCHG RX REV CODE 250 W/ 637 OVERRIDE(OP): Performed by: PHYSICAL MEDICINE & REHABILITATION

## 2020-09-21 PROCEDURE — 700102 HCHG RX REV CODE 250 W/ 637 OVERRIDE(OP): Performed by: HOSPITALIST

## 2020-09-21 PROCEDURE — 97530 THERAPEUTIC ACTIVITIES: CPT | Mod: CQ

## 2020-09-21 PROCEDURE — A9270 NON-COVERED ITEM OR SERVICE: HCPCS | Performed by: PHYSICAL MEDICINE & REHABILITATION

## 2020-09-21 PROCEDURE — 770010 HCHG ROOM/CARE - REHAB SEMI PRIVAT*

## 2020-09-21 PROCEDURE — 83735 ASSAY OF MAGNESIUM: CPT

## 2020-09-21 PROCEDURE — 97110 THERAPEUTIC EXERCISES: CPT | Mod: CQ

## 2020-09-21 PROCEDURE — 36415 COLL VENOUS BLD VENIPUNCTURE: CPT

## 2020-09-21 PROCEDURE — 99232 SBSQ HOSP IP/OBS MODERATE 35: CPT | Performed by: HOSPITALIST

## 2020-09-21 PROCEDURE — 700111 HCHG RX REV CODE 636 W/ 250 OVERRIDE (IP): Performed by: PHYSICAL MEDICINE & REHABILITATION

## 2020-09-21 PROCEDURE — 97130 THER IVNTJ EA ADDL 15 MIN: CPT

## 2020-09-21 RX ADMIN — DIBASIC SODIUM PHOSPHATE, MONOBASIC POTASSIUM PHOSPHATE AND MONOBASIC SODIUM PHOSPHATE 250 MG: 852; 155; 130 TABLET ORAL at 08:50

## 2020-09-21 RX ADMIN — CARVEDILOL 6.25 MG: 3.12 TABLET, FILM COATED ORAL at 17:11

## 2020-09-21 RX ADMIN — OXYCODONE HYDROCHLORIDE 10 MG: 10 TABLET ORAL at 11:00

## 2020-09-21 RX ADMIN — ENOXAPARIN SODIUM 40 MG: 40 INJECTION SUBCUTANEOUS at 08:51

## 2020-09-21 RX ADMIN — CARVEDILOL 6.25 MG: 3.12 TABLET, FILM COATED ORAL at 08:50

## 2020-09-21 RX ADMIN — LISINOPRIL 20 MG: 20 TABLET ORAL at 08:50

## 2020-09-21 RX ADMIN — GABAPENTIN 900 MG: 300 CAPSULE ORAL at 20:03

## 2020-09-21 RX ADMIN — TRAZODONE HYDROCHLORIDE 50 MG: 50 TABLET ORAL at 20:03

## 2020-09-21 RX ADMIN — DULOXETINE 20 MG: 20 CAPSULE, DELAYED RELEASE ORAL at 08:50

## 2020-09-21 RX ADMIN — BACITRACIN ZINC AND POLYMYXIN B SULFATE: at 20:02

## 2020-09-21 RX ADMIN — OMEPRAZOLE 20 MG: 20 CAPSULE, DELAYED RELEASE ORAL at 08:50

## 2020-09-21 RX ADMIN — ACETAMINOPHEN 1000 MG: 325 TABLET, FILM COATED ORAL at 20:03

## 2020-09-21 RX ADMIN — GABAPENTIN 900 MG: 300 CAPSULE ORAL at 14:22

## 2020-09-21 RX ADMIN — DIBASIC SODIUM PHOSPHATE, MONOBASIC POTASSIUM PHOSPHATE AND MONOBASIC SODIUM PHOSPHATE 250 MG: 852; 155; 130 TABLET ORAL at 20:03

## 2020-09-21 RX ADMIN — ACETAMINOPHEN 1000 MG: 325 TABLET, FILM COATED ORAL at 08:57

## 2020-09-21 RX ADMIN — FOLIC ACID 1 MG: 1 TABLET ORAL at 08:50

## 2020-09-21 RX ADMIN — ACETAMINOPHEN 1000 MG: 325 TABLET, FILM COATED ORAL at 14:22

## 2020-09-21 RX ADMIN — DOCUSATE SODIUM 50 MG AND SENNOSIDES 8.6 MG 2 TABLET: 8.6; 5 TABLET, FILM COATED ORAL at 08:49

## 2020-09-21 RX ADMIN — OXYCODONE HYDROCHLORIDE 5 MG: 5 TABLET ORAL at 05:31

## 2020-09-21 RX ADMIN — Medication 4000 UNITS: at 08:50

## 2020-09-21 RX ADMIN — Medication 1000 MCG: at 08:50

## 2020-09-21 RX ADMIN — ATORVASTATIN CALCIUM 40 MG: 40 TABLET, FILM COATED ORAL at 20:03

## 2020-09-21 RX ADMIN — BACITRACIN ZINC AND POLYMYXIN B SULFATE: at 08:55

## 2020-09-21 RX ADMIN — GABAPENTIN 900 MG: 300 CAPSULE ORAL at 08:50

## 2020-09-21 ASSESSMENT — ENCOUNTER SYMPTOMS
POLYDIPSIA: 0
COUGH: 0
ABDOMINAL PAIN: 0
CHILLS: 0
FEVER: 0
PALPITATIONS: 0
BRUISES/BLEEDS EASILY: 0
NAUSEA: 0
SHORTNESS OF BREATH: 0
EYES NEGATIVE: 1
VOMITING: 0

## 2020-09-21 ASSESSMENT — ACTIVITIES OF DAILY LIVING (ADL): BED_CHAIR_WHEELCHAIR_TRANSFER_DESCRIPTION: OTHER (COMMENT)

## 2020-09-21 ASSESSMENT — PAIN DESCRIPTION - PAIN TYPE: TYPE: ACUTE PAIN

## 2020-09-21 NOTE — DISCHARGE PLANNING
"Spoke w/ daughter, Lien, via phone. Patient has been scheduled for OP surgery w/ Dr. Fisher on 9.24.2020, check in at 6am at [facility]. Discharge date from rehab remains 9.22.2020. Family training completed. Family has made arrangements for \"someone to be at home w/ him all the time. We do not want him to go to a nursing home.\" Previous [facility], requests referral to them to restart care. Per daughter, \"[facility] is [facility] w/ a doctor to come to his home.\"   Updated DONNIE, Yulissa, regarding DC tomorrow. I will deliver wound VAC by 11am.  Questions answered. Emotional support provided.  "

## 2020-09-21 NOTE — WOUND TEAM
Patient going to OR for I and D of left AKA surgical wound so wound vac will not be changed today because it will be changed tomorrow.

## 2020-09-21 NOTE — THERAPY
Physical Therapy   Daily Treatment     Patient Name: Jimenez Bains  Age:  75 y.o., Sex:  male  Medical Record #: [id number]  Today's Date: 9/21/2020     Precautions  Precautions: Fall Risk, Non Weight Bearing Left Lower Extremity  Comments: wound vac L residual limb, sores on genital  area, carpio    Subjective    Pt resting in bed, willing to participate     Objective     09/21/20 1031   Pain   Non Verbal Scale  Grimacing;Moaning;Tense Body Language   Stairs Functional Level of Assist   Level of Assist with Stairs Unable to Participate   Transfer Functional Level of Assist   Bed, Chair, Wheelchair Transfer Unable to Participate   Bed Chair Wheelchair Transfer Description Other (comment)  (limited by pain)   Sitting Lower Body Exercises   Sitting Lower Body Exercises Yes   Other Exercises alt ches press OH press with #2lb weighted ball, 1 x 15, shoulder flexion 2 x 10 B both seated EOB   Bed Mobility    Supine to Sit Moderate Assist   Sit to Supine Stand by Assist   Sit to Stand Minimal Assist   Scooting Supervised   Rolling Supervised   Neuro-Muscular Treatments   Neuro-Muscular Treatments Weight Shift Right;Postural Changes   Comments unsupported dynamic sittin balance activity at EOB, pt completed various UE exercises with weighted ball, see flow sheet. no UE support, sitting tolerance 10minutes   Interdisciplinary Plan of Care Collaboration   IDT Collaboration with  Certified Nursing Assistant   Patient Position at End of Therapy In Bed;Call Light within Reach   Collaboration Comments assisted with bed positioning/pulliong pt up in bed   PT Total Time Spent   PT Individual Total Time Spent (Mins) 30   PT Charge Group   PT Therapeutic Exercise 1   PT Therapeutic Activities 1   Supervising Physical Therapist Román Flowers       Assessment    Pt with fair tolerance to treatment session, cont with limited sitting tolerance due to very high pain levels. In seated pt cont to require heavy weight shift to the R hip in  order to off load L residual limb, ultimately decreasing pain. Pt required increased level of assistance as well as increased time for all mobility this session. Pt declined transfer training to   Strengths: Independent prior level of function, Motivated for self care and independence, Able to follow instructions  Barriers: Pain, Confused, Impaired balance, Impaired activity tolerance, Decreased endurance    Plan    Reinforce transfer sequencing with decreased verbal cueing stand pivot vs. Stand step with FWW, desensitization work to residual limb; continue short distance ambulation at FWW level; w/c skills for endurance and efficient technique; standing balance; on-going amputee education. Family training completed with daughter Rehana, educated on safety recommendations and mobility at w/c level at home, use of FWW and ambulation with home therapist.    Physical Therapy Problems     Problem: Mobility     Dates: Start: 09/05/20       Goal: STG-Within one week, patient will ambulate household distance     Dates: Start: 09/05/20       Description: 1) Individualized goal:  10 ft with FWW and SBA-CGA.  2) Interventions:  PT Group Therapy, PT Gait Training, PT Therapeutic Exercises, PT Neuro Re-Ed/Balance, PT Therapeutic Activity, and PT Evaluation    Note:     Goal Note filed on 09/16/20 1528 by Staci Dean PT    Limited by pain                        Problem: Mobility Transfers     Dates: Start: 09/05/20       Goal: STG-Within one week, patient will sit to stand     Dates: Start: 09/05/20       Description: 1) Individualized goal:  with FWW and SBA.  2) Interventions:  PT Group Therapy, PT Gait Training, PT Therapeutic Exercises, PT Neuro Re-Ed/Balance, PT Therapeutic Activity, and PT Evaluation    Note:     Goal Note filed on 09/16/20 1528 by Staci Dean PT    Limited by pain                        Problem: PT-Long Term Goals     Dates: Start: 09/05/20       Goal: LTG-By discharge, patient will propel wheelchair      Dates: Start: 09/05/20       Description: 1) Individualized goal:  150 ft mod I.  2) Interventions:  PT Group Therapy, PT Gait Training, PT Therapeutic Exercises, PT Neuro Re-Ed/Balance, PT Therapeutic Activity, and PT Evaluation          Goal: LTG-By discharge, patient will ambulate     Dates: Start: 09/05/20       Description: 1) Individualized goal:  50 ft with FWW and supervision.  2) Interventions:  PT Group Therapy, PT Gait Training, PT Therapeutic Exercises, PT Neuro Re-Ed/Balance, PT Therapeutic Activity, and PT Evaluation          Goal: LTG-By discharge, patient will transfer one surface to another     Dates: Start: 09/05/20       Description: 1) Individualized goal:  with FWW vs. Squat pivot mod I.  2) Interventions:  PT Group Therapy, PT Gait Training, PT Therapeutic Exercises, PT Neuro Re-Ed/Balance, PT Therapeutic Activity, and PT Evaluation          Goal: LTG-By discharge, patient will perform home exercise program     Dates: Start: 09/05/20       Description: 1) Individualized goal:  with handout mod I.  2) Interventions:  PT Group Therapy, PT Gait Training, PT Therapeutic Exercises, PT Neuro Re-Ed/Balance, PT Therapeutic Activity, and PT Evaluation          Goal: LTG-By discharge, patient will     Dates: Start: 09/05/20       Description: 1) Individualized goal:  perform bed mobility including rolling to prone mod I.  2) Interventions:  PT Group Therapy, PT Gait Training, PT Therapeutic Exercises, PT Neuro Re-Ed/Balance, PT Therapeutic Activity, and PT Evaluation

## 2020-09-21 NOTE — THERAPY
Physical Therapy   Daily Treatment     Patient Name: Jimenez Bains  Age:  75 y.o., Sex:  male  Medical Record #: [id number]  Today's Date: 9/21/2020     Precautions  Precautions: Fall Risk, Non Weight Bearing Left Lower Extremity  Comments: wound vac L residual limb, sores on genital  area, carpio    Subjective    Pt resting in bed, he was agreeable to supine te     Objective     09/21/20 1531   Supine Lower Body Exercise   Supine Lower Body Exercises Yes   Hip Abduction Side Lying;Left;2 sets of 10  (R sidelying )   Hip Adduction  Right;2 sets of 10   Straight Leg Raises Front;2 sets of 10;Right   Heel Slide 2 sets of 10;Right   Gluteal Isometrics 1 set of 10;Bilateral   Quadriceps Isometrics 1 set of 10;Right   Interdisciplinary Plan of Care Collaboration   IDT Collaboration with  Occupational Therapist;Physician Assistant   Patient Position at End of Therapy In Bed;Call Light within Reach   Collaboration Comments CLOF   PT Total Time Spent   PT Individual Total Time Spent (Mins) 30   PT Charge Group   PT Therapeutic Exercise 1   PT Therapeutic Activities 1   Supervising Physical Therapist Román Flowers     Discussed pt CLOF/dc planning with him including need for hands on family training there act x 15 minutes    Assessment    Pt demonstrates fluctuating level of assistance CGA to mod/max A due to mansi levels of pain. Pt will benefit from pressure relieving cushion (roho) to maintain skin integrity in seated position. Continues to be primarily limited by pain with transitional movements. Will benefit from extensive hands on training to endsure safety upon dc.  Strengths: Independent prior level of function, Motivated for self care and independence, Able to follow instructions  Barriers: Pain, Confused, Impaired balance, Impaired activity tolerance, Decreased endurance    Plan    Reinforce transfer sequencing with decreased verbal cueing stand pivot vs. Stand step with FWW, desensitization work to residual limb;  continue short distance ambulation at FWW level; w/c skills for endurance and efficient technique; standing balance; on-going amputee education. Family training completed with daughter Rehana, educated on safety recommendations and mobility at w/c level at home, use of FWW and ambulation with home therapist. family training for hands on assist focusing on transfer sequencing/car transfer and amputee education    Physical Therapy Problems     Problem: Mobility     Dates: Start: 09/05/20       Goal: STG-Within one week, patient will ambulate household distance     Dates: Start: 09/05/20       Description: 1) Individualized goal:  10 ft with FWW and SBA-CGA.  2) Interventions:  PT Group Therapy, PT Gait Training, PT Therapeutic Exercises, PT Neuro Re-Ed/Balance, PT Therapeutic Activity, and PT Evaluation    Note:     Goal Note filed on 09/16/20 1528 by Staci Dean, PT    Limited by pain                        Problem: Mobility Transfers     Dates: Start: 09/05/20       Goal: STG-Within one week, patient will sit to stand     Dates: Start: 09/05/20       Description: 1) Individualized goal:  with FWW and SBA.  2) Interventions:  PT Group Therapy, PT Gait Training, PT Therapeutic Exercises, PT Neuro Re-Ed/Balance, PT Therapeutic Activity, and PT Evaluation    Note:     Goal Note filed on 09/16/20 1528 by Staci Dean PT    Limited by pain                        Problem: PT-Long Term Goals     Dates: Start: 09/05/20       Goal: LTG-By discharge, patient will propel wheelchair     Dates: Start: 09/05/20       Description: 1) Individualized goal:  150 ft mod I.  2) Interventions:  PT Group Therapy, PT Gait Training, PT Therapeutic Exercises, PT Neuro Re-Ed/Balance, PT Therapeutic Activity, and PT Evaluation          Goal: LTG-By discharge, patient will ambulate     Dates: Start: 09/05/20       Description: 1) Individualized goal:  50 ft with FWW and supervision.  2) Interventions:  PT Group Therapy, PT Gait Training, PT  Therapeutic Exercises, PT Neuro Re-Ed/Balance, PT Therapeutic Activity, and PT Evaluation          Goal: LTG-By discharge, patient will transfer one surface to another     Dates: Start: 09/05/20       Description: 1) Individualized goal:  with FWW vs. Squat pivot mod I.  2) Interventions:  PT Group Therapy, PT Gait Training, PT Therapeutic Exercises, PT Neuro Re-Ed/Balance, PT Therapeutic Activity, and PT Evaluation          Goal: LTG-By discharge, patient will perform home exercise program     Dates: Start: 09/05/20       Description: 1) Individualized goal:  with handout mod I.  2) Interventions:  PT Group Therapy, PT Gait Training, PT Therapeutic Exercises, PT Neuro Re-Ed/Balance, PT Therapeutic Activity, and PT Evaluation          Goal: LTG-By discharge, patient will     Dates: Start: 09/05/20       Description: 1) Individualized goal:  perform bed mobility including rolling to prone mod I.  2) Interventions:  PT Group Therapy, PT Gait Training, PT Therapeutic Exercises, PT Neuro Re-Ed/Balance, PT Therapeutic Activity, and PT Evaluation

## 2020-09-21 NOTE — CARE PLAN
Problem: Skin Integrity  Goal: Risk for impaired skin integrity will decrease  Outcome: PROGRESSING SLOWER THAN EXPECTED     Problem: Urinary Elimination:  Goal: Ability to reestablish a normal urinary elimination pattern will improve  Outcome: PROGRESSING SLOWER THAN EXPECTED     Problem: Respiratory:  Goal: Respiratory status will improve  Outcome: PROGRESSING AS EXPECTED

## 2020-09-21 NOTE — CARE PLAN
Problem: Safety  Goal: Will remain free from injury  Note: Pt remains free of accidental injury at this time. Able to verbalize needs and does not attempt self transfer. Bed rails x2 secured for safety. Call light and personal belongings within reach. Hourly rounds done by staff to ensure safety and check if needs are met. Will continue to assess and monitor for safety.       Problem: Bowel/Gastric:  Goal: Normal bowel function is maintained or improved  Flowsheets (Taken 9/20/2020 1340 by Gaye Bahena, C.N.A.)  Last BM: 09/20/20  Note: Continent of bowels. Refuses bowel meds. Denies s/sx of constipation. Prn bowel meds available if needed. No incontinence of bowels this shift. Bowel sounds present on all quadrants. No abdominal distention noted. Pt denies pain on palpation. Will monitor.

## 2020-09-21 NOTE — PROGRESS NOTES
"Rehab Progress Note   Chief Complaint: Left AKA    Interval Events (Subjective)  Patient seen in room. He is doing well. No new complaints. Labs reviewed. Stump examined. There was some confusion this AM about needing to go to acute for a washout tomorrow but he can DC home with outpatient follow up. Expected DC tomorrow.     Objective:  VITAL SIGNS: /68   Pulse 69   Temp 37.2 °C (98.9 °F) (Temporal)   Resp 18   Ht 1.676 m (5' 6\")   Wt 63 kg (138 lb 14.2 oz)   SpO2 91%   BMI 22.42 kg/m²     Physical Exam:  Vitals: /68   Pulse 69   Temp 37.2 °C (98.9 °F) (Temporal)   Resp 18   Ht 1.676 m (5' 6\")   Wt 63 kg (138 lb 14.2 oz)   SpO2 91%   Gen: NAD  Head:  NC/AT, Large growth on left face  Eyes/ Nose/ Mouth: PERRLA, moist mucous membranes  Cardio: RRR, no mumurs  Pulm: CTAB, with normal respiratory effort  Abd: Soft NTND, active bowel sounds,   Ext: No peripheral edema. No calf tenderness. No clubbing/cyanosis. Left AKA with VAC     Mental status: answers questions appropriately follows commands  Speech: fluent, no aphasia or dysarthria    Tone: no spasticity noted, no cogwheeling noted    Recent Results (from the past 72 hour(s))   PHOSPHORUS    Collection Time: 09/19/20  6:17 AM   Result Value Ref Range    Phosphorus 3.1 2.5 - 4.5 mg/dL   Comp Metabolic Panel    Collection Time: 09/19/20  6:17 AM   Result Value Ref Range    Sodium 147 (H) 135 - 145 mmol/L    Potassium 3.8 3.6 - 5.5 mmol/L    Chloride 109 96 - 112 mmol/L    Co2 27 20 - 33 mmol/L    Anion Gap 11.0 7.0 - 16.0    Glucose 115 (H) 65 - 99 mg/dL    Bun 16 8 - 22 mg/dL    Creatinine 1.10 0.50 - 1.40 mg/dL    Calcium 8.7 8.5 - 10.5 mg/dL    AST(SGOT) 28 12 - 45 U/L    ALT(SGPT) 19 2 - 50 U/L    Alkaline Phosphatase 274 (H) 30 - 99 U/L    Total Bilirubin 0.3 0.1 - 1.5 mg/dL    Albumin 2.8 (L) 3.2 - 4.9 g/dL    Total Protein 6.3 6.0 - 8.2 g/dL    Globulin 3.5 1.9 - 3.5 g/dL    A-G Ratio 0.8 g/dL   ESTIMATED GFR    Collection Time: " 09/19/20  6:17 AM   Result Value Ref Range    GFR If African American >60 >60 mL/min/1.73 m 2    GFR If Non African American >60 >60 mL/min/1.73 m 2   Comp Metabolic Panel    Collection Time: 09/20/20  5:32 AM   Result Value Ref Range    Sodium 142 135 - 145 mmol/L    Potassium 3.2 (L) 3.6 - 5.5 mmol/L    Chloride 105 96 - 112 mmol/L    Co2 27 20 - 33 mmol/L    Anion Gap 10.0 7.0 - 16.0    Glucose 133 (H) 65 - 99 mg/dL    Bun 16 8 - 22 mg/dL    Creatinine 1.11 0.50 - 1.40 mg/dL    Calcium 8.4 (L) 8.5 - 10.5 mg/dL    AST(SGOT) 33 12 - 45 U/L    ALT(SGPT) 24 2 - 50 U/L    Alkaline Phosphatase 280 (H) 30 - 99 U/L    Total Bilirubin 0.3 0.1 - 1.5 mg/dL    Albumin 2.7 (L) 3.2 - 4.9 g/dL    Total Protein 6.0 6.0 - 8.2 g/dL    Globulin 3.3 1.9 - 3.5 g/dL    A-G Ratio 0.8 g/dL   ESTIMATED GFR    Collection Time: 09/20/20  5:32 AM   Result Value Ref Range    GFR If African American >60 >60 mL/min/1.73 m 2    GFR If Non African American >60 >60 mL/min/1.73 m 2   URINALYSIS    Collection Time: 09/20/20  5:15 PM    Specimen: Urine, Brothers Cath   Result Value Ref Range    Color DK Yellow     Character Clear     Specific Gravity 1.033 <1.035    Ph 5.0 5.0 - 8.0    Glucose Negative Negative mg/dL    Ketones Trace (A) Negative mg/dL    Protein 30 (A) Negative mg/dL    Bilirubin Negative Negative    Urobilinogen, Urine 0.2 Negative    Nitrite Negative Negative    Leukocyte Esterase Negative Negative    Occult Blood Small (A) Negative    Micro Urine Req Microscopic    URINE MICROSCOPIC (W/UA)    Collection Time: 09/20/20  5:15 PM   Result Value Ref Range    WBC 0-2 (A) /hpf    RBC 2-5 (A) /hpf    Bacteria Negative None /hpf    Epithelial Cells Negative /hpf    Hyaline Cast 0-2 /lpf   POTASSIUM SERUM (K)    Collection Time: 09/21/20  5:55 AM   Result Value Ref Range    Potassium 4.6 3.6 - 5.5 mmol/L   CBC WITHOUT DIFFERENTIAL    Collection Time: 09/21/20  5:55 AM   Result Value Ref Range    WBC 8.8 4.8 - 10.8 K/uL    RBC 3.59 (L) 4.70  - 6.10 M/uL    Hemoglobin 11.0 (L) 14.0 - 18.0 g/dL    Hematocrit 36.8 (L) 42.0 - 52.0 %    .5 (H) 81.4 - 97.8 fL    MCH 30.6 27.0 - 33.0 pg    MCHC 29.9 (L) 33.7 - 35.3 g/dL    RDW 58.1 (H) 35.9 - 50.0 fL    Platelet Count 269 164 - 446 K/uL    MPV 11.7 9.0 - 12.9 fL   MAGNESIUM    Collection Time: 09/21/20  5:55 AM   Result Value Ref Range    Magnesium 2.0 1.5 - 2.5 mg/dL       Current Facility-Administered Medications   Medication Frequency   • phosphorus (K-Phos-Neutral) per tablet 250 mg BID   • lidocaine (XYLOCAINE) 2 % injection 20 mL QDAY PRN    Or   • lidocaine (XYLOCAINE) 4 % topical solution QDAY PRN   • DULoxetine (CYMBALTA) capsule 20 mg DAILY   • gabapentin (NEURONTIN) capsule 900 mg TID   • traZODone (DESYREL) tablet 50 mg QHS   • bacitracin-polymyxin b (POLYSPORIN) 500-76428 UNIT/GM ointment BID   • bacitracin-neomycin-polymyxin (Neosporin) 400-5-5000 ointment BID PRN   • lisinopril (PRINIVIL) tablet 20 mg DAILY   • lidocaine (XYLOCAINE) 4 % topical solution PRN   • oxyCODONE immediate-release (ROXICODONE) tablet 5 mg DAILY   • oxyCODONE immediate release (ROXICODONE) tablet 10 mg Q4HRS PRN   • folic acid (FOLVITE) tablet 1 mg DAILY   • cyanocobalamin (VITAMIN B-12) tablet 1,000 mcg DAILY   • miconazole 2%-zinc oxide (Hilary) topical cream Q6HRS PRN   • vitamin D (cholecalciferol) tablet 4,000 Units DAILY   • glucose 4 g chewable tablet 16 g Q15 MIN PRN    And   • dextrose 50% (D50W) injection 50 mL Q15 MIN PRN   • acetaminophen (TYLENOL) tablet 1,000 mg TID   • Respiratory Therapy Consult Continuous RT   • tramadol (ULTRAM) 50 MG tablet 50 mg Q4HRS PRN   • hydrALAZINE (APRESOLINE) tablet 25 mg Q8HRS PRN   • enoxaparin (LOVENOX) inj 40 mg DAILY   • acetaminophen (TYLENOL) tablet 650 mg Q4HRS PRN   • senna-docusate (PERICOLACE or SENOKOT S) 8.6-50 MG per tablet 2 Tab BID    And   • polyethylene glycol/lytes (MIRALAX) PACKET 1 Packet QDAY PRN    And   • magnesium hydroxide (MILK OF MAGNESIA)  suspension 30 mL QDAY PRN    And   • bisacodyl (DULCOLAX) suppository 10 mg QDAY PRN   • benzocaine-menthol (CEPACOL) lozenge 1 Lozenge Q2HRS PRN   • mag hydrox-al hydrox-simeth (MAALOX PLUS ES or MYLANTA DS) suspension 20 mL Q2HRS PRN   • ondansetron (ZOFRAN ODT) dispertab 4 mg 4X/DAY PRN    Or   • ondansetron (ZOFRAN) syringe/vial injection 4 mg 4X/DAY PRN   • sodium chloride (OCEAN) 0.65 % nasal spray 2 Spray PRN   • midazolam (VERSED) 5 mg/mL (1 mL vial) PRN   • atorvastatin (LIPITOR) tablet 40 mg Q EVENING   • oxyCODONE immediate-release (ROXICODONE) tablet 2.5 mg Q3HRS PRN   • oxyCODONE immediate-release (ROXICODONE) tablet 5 mg Q3HRS PRN   • omeprazole (PRILOSEC) capsule 20 mg DAILY   • carvedilol (COREG) tablet 6.25 mg BID WITH MEALS     Facility-Administered Medications Ordered in Other Encounters   Medication Frequency   • fentaNYL (SUBLIMAZE) injection PRN   • propofol PRN   • ceFAZolin (ANCEF) injection PRN       Orders Placed This Encounter   Procedures   • Diet Order Cardiac (Thin), Diabetic, 2 Gram Sodium     Standing Status:   Standing     Number of Occurrences:   1     Order Specific Question:   Diet:     Answer:   Cardiac [6]     Comments:   Thin     Order Specific Question:   Diet:     Answer:   Diabetic [3]     Order Specific Question:   Diet:     Answer:   2 Gram Sodium [7]       Assessment:  Active Hospital Problems    Diagnosis   • AKA stump complication (HCC)   • PVD (peripheral vascular disease) with claudication (HCC)   • Diabetes (HCC)   • HTN (hypertension)   • Basal cell carcinoma of face   • Hypophosphatemia   • Elevated brain natriuretic peptide (BNP) level   • Hypokalemia   • Hypernatremia   • Alkaline phosphatase elevation   • Anemia   • Vitamin D deficiency       Medical Decision Making and Plan:  Acute encephalopathy - Concern for encephalopathy with ongoing multiple medical comorbidities and confusion about hospital course as well as not understanding carpio catheter. Alternatively  could be medicated for transfer but therapy reporting difficulty with cuing and following commands.   -PT and OT for mobility and ADLs  -SLP for cognitive evaluation     Left BKA - Patient with severe PVD with L AKA on 6/30/20 with multiple revisions due to necrosis and infection. Cultures grew MRSA and Enterococcus. Per ID continue antibiotics until 9/6/20. Transferred to [facility] from 8/12/20 to 9/4/20.   -Vancomycin and Unasyn until 9/6/20. Completed.   -Redress on 9/15/20 as wound vac not turned on and significant pain and drainage.   -Continue wound vac. Consult wound care - added honey to regimen with slow improvement in size and depth. Lidocaine 4% PRN for dressing change      Multiple wounds - Patient has rash to abdomen, redness on sacrum and left buttocks on transfer. Consult wound care.      Right face basal cell carcinoma - Unclear what follow-up. Follow-up Oncology     Anemia - Check AM CBC - 8.8. Continue to monitor.      HTN - Patient on Lisinopril 10 mg and Coreg 6.25 mg BID  -Hospistalist consulted.      Pain control - Primarily on admission with left phantom limb pain. Patient on Gabapentin 100 mg TID and Oxycodone. Can consider increase in Gabapentin for neuropathic phantom limb pain.   -Increase Gabapentin to 400 mg TID. Oxycodone 10 mg for wound care changes. Concern for over sedation with higher opiate dosing, limit to wound vac change, lidocaine jelly for wound change.   -Increase Gabapentin to 900 mg TID and schedule Oxycodone 5 mg first thing in AM. May need to schedule doses as patient unable to remember to ask at times.  -Start Cymbalta which may help with pain.       DM with hyperglycemia - Patient on SSI on transfer. Consult Hospitalist  -Discontinue SSI     COVID - Patient with check x2 at [facility]. Patient without symptoms. Per policy no check within 7 days. Patient without symptoms. Check PCR, on precautions - negative, no precautions.      Insomnia/Mood - Schedule Trazodone QHS     GI Ppx -  Patient on Prilosec      DVT Ppx - Patient on Lovenox on transfer.  Constipation, no BM since 9/7, PRN Miralax and MoM     Dispo - plan to DC home. Family refusing SNF placement, states they will provide care    Total time:  28 minutes.  I spent greater than 50% of the time for patient care and coordination on this date, including unit/floor time, and face-to-face time with the patient as per assessment and plan above.    Pernell Ling, DO   Physical Medicine and Rehabilitation

## 2020-09-21 NOTE — THERAPY
"Occupational Therapy  Daily Treatment     Patient Name: Jimenez Bains  Age:  75 y.o., Sex:  male  Medical Record #: [id number]  Today's Date: 9/21/2020     Precautions  Precautions: (P) Fall Risk, Non Weight Bearing Left Lower Extremity  Comments: (P) wound vac L residual limb, sores on genital  area, carpio         Subjective    Pt received supine in bed, reported \"feeling lousy\" but agreeable to OT session     Objective       09/21/20 0701   Precautions   Precautions Fall Risk;Non Weight Bearing Left Lower Extremity   Comments wound vac L residual limb, sores on genital  area, carpio   Functional Level of Assist   Bathing Minimal Assist   Bathing Description Increased time  (UB sponge bath seated at sink)   Upper Body Dressing Minimal Assist   Upper Body Dressing Description Supervision for safety;Verbal cueing  (assist to pull down 2/2 poor weightshift due to high pain)   Lower Body Dressing Moderate Assist   Lower Body Dressing Description Verbal cueing;Supervision for safety;Increased time  (bed level, increased assist 2/2 pain)   Balance   Comments sit to stand x1 with FWW, took steps x10 with w/c follow and min A   Bed Mobility    Supine to Sit Minimal Assist   Sit to Supine Stand by Assist   Scooting Stand by Assist   OT Total Time Spent   OT Individual Total Time Spent (Mins) 60   OT Charge Group   OT Self Care / ADL 3   OT Therapy Activity 1       Assessment    Pt continues to be limited by high pain levels, requires increased time for all activity, required increased assist for ADLs this AM due to high pain both in supine for LB dressing and sitting for UB dressing (poor weightshift tolerance, tends to lean to R to offload L residual limb). Pt with significant difficulty cognitively managing UB dressing, attempted to don shirt inside out/backward, OT assist to correct, pt initiated donning but got confused and again ended up with shirt inside out and backward. Pt pleasant and cooperative however visibly in " pain throughout, participated to best of ability.   Strengths: Pleasant and cooperative, Willingly participates in therapeutic activities  Barriers: Confused, Decreased endurance, Generalized weakness, Impaired balance, Limited mobility, Pain    Plan    Continue to progress gross strengthening and endurance and standing tolerance/balance toward increased safety and independence with ADLs, general attention to hygiene and self-care, weightshift when scooting    Occupational Therapy Goals     Problem: OT Long Term Goals     Dates: Start: 09/05/20       Goal: LTG-By discharge, patient will complete basic self care tasks     Dates: Start: 09/05/20       Description: 1) Individualized Goal:  Mod I for BADL's via AE/DME PRN  2) Interventions: OT Self Care/ADL, OT Neuro Re-Ed/Balance, OT Therapeutic Activity, OT Evaluation, and OT Therapeutic Exercise            Goal: LTG-By discharge, patient will perform bathroom transfers     Dates: Start: 09/05/20       Description: 1) Individualized Goal:  Mod I for toilet and shower transfer via DME  2) Interventions: OT Self Care/ADL, OT Neuro Re-Ed/Balance, OT Therapeutic Activity, OT Evaluation, and OT Therapeutic Exercise

## 2020-09-21 NOTE — PROGRESS NOTES
"Hospital Medicine Daily Progress Note      Chief Complaint  Hypertension  Diabetes    Interval Problem Update  This morning pt reports, \"I feel good!\"    Review of Systems  Review of Systems   Constitutional: Negative for chills and fever.   HENT: Negative.    Eyes: Negative.    Respiratory: Negative for cough and shortness of breath.    Cardiovascular: Negative for chest pain and palpitations.   Gastrointestinal: Negative for abdominal pain, nausea and vomiting.   Musculoskeletal:        Wound pain    Endo/Heme/Allergies: Negative for polydipsia. Does not bruise/bleed easily.        Physical Exam  Temp:  [36.4 °C (97.5 °F)-37.2 °C (98.9 °F)] 37.2 °C (98.9 °F)  Pulse:  [66-69] 69  Resp:  [14-18] 18  BP: (143-150)/(66-73) 150/68  SpO2:  [91 %] 91 %    Physical Exam  Vitals signs reviewed.   Constitutional:       Appearance: Normal appearance.      Comments: Chronically ill appearing   HENT:      Head: Normocephalic and atraumatic.      Right Ear: External ear normal.      Left Ear: External ear normal.      Nose: Nose normal.      Mouth/Throat:      Pharynx: Oropharynx is clear.   Eyes:      General:         Right eye: No discharge.         Left eye: No discharge.      Extraocular Movements: Extraocular movements intact.      Conjunctiva/sclera: Conjunctivae normal.   Neck:      Musculoskeletal: Normal range of motion and neck supple.   Cardiovascular:      Rate and Rhythm: Normal rate and regular rhythm.   Pulmonary:      Effort: No respiratory distress.      Breath sounds: No wheezing.      Comments: Decreased BS   Abdominal:      General: Bowel sounds are normal. There is no distension.      Palpations: Abdomen is soft.      Tenderness: There is no abdominal tenderness.   Musculoskeletal:      Comments: Left AKA dressed   Skin:     General: Skin is warm and dry.      Comments: Large pearly lesion on right cheek   Neurological:      Mental Status: He is alert and oriented to person, place, and time.      Comments: " Awake and alert         Fluids    Intake/Output Summary (Last 24 hours) at 9/21/2020 1004  Last data filed at 9/21/2020 0929  Gross per 24 hour   Intake 330 ml   Output 1650 ml   Net -1320 ml       Laboratory  Recent Labs     09/21/20  0555   WBC 8.8   RBC 3.59*   HEMOGLOBIN 11.0*   HEMATOCRIT 36.8*   .5*   MCH 30.6   MCHC 29.9*   RDW 58.1*   PLATELETCT 269   MPV 11.7     Recent Labs     09/19/20  0617 09/20/20  0532 09/21/20  0555   SODIUM 147* 142  --    POTASSIUM 3.8 3.2* 4.6   CHLORIDE 109 105  --    CO2 27 27  --    GLUCOSE 115* 133*  --    BUN 16 16  --    CREATININE 1.10 1.11  --    CALCIUM 8.7 8.4*  --                    Assessment/Plan  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 Fisher 2/2 critical limb ischemia  Post-op complications included necrosis and infection  S/P AKA revision in 7/2020  S/P AKA I+D x 3  Wound vac per Wound Care  Wound Cx +MRSA and Enterococcus  Completed Unasyn and Vanco  Pain control per Primary Team    PVD (peripheral vascular disease) with claudication (HCC)- (present on admission)  Assessment & Plan  On Lipitor  Consider ASA    Diabetes (HCC)- (present on admission)  Assessment & Plan  HbA1c 5.1  Diet control    Elevated brain natriuretic peptide (BNP) level  Assessment & Plan  BNP in the 5000 range  Consider Echocardiogram    Hypophosphatemia  Assessment & Plan  Phosphorus levels improving  Decreased Neutra-Phos supplementation    Hypokalemia  Assessment & Plan  K+ repleted  Mg++ normal    Hypernatremia  Assessment & Plan  Na+ levels improved again w/ D5W    Alkaline phosphatase elevation- (present on admission)  Assessment & Plan  May be 2/2 AKA  Follow levels to resolution    Vitamin D deficiency  Assessment & Plan  Vit D level 10  On high dose supplementation per Primary Team    Anemia  Assessment & Plan  Has macrocytic indices  TSH normal  Vit B12 198  and Folate 2.9  On supplementation for both  Follow H/H    HTN (hypertension)- (present on admission)  Assessment & Plan  Blood pressure controlled on Coreg and Lisinopril    Basal cell carcinoma of face- (present on admission)  Assessment & Plan  Pt reports right cheek lesion has been ongoing for at least 1 yr  Needs Dermatology F/U     Full Code

## 2020-09-21 NOTE — THERAPY
Speech Language Pathology  Daily Treatment     Patient Name: Jimenez Bains  Age:  75 y.o., Sex:  male  Medical Record #: [id number]  Today's Date: 9/21/2020     Precautions  Precautions: Fall Risk, Non Weight Bearing Left Lower Extremity  Comments: wound vac L residual limb, sores on genital  area, carpio    Subjective    Pt pleasant and cooperative during tx.       Objective       09/21/20 0931   Cognition   Moderate Attention Moderate (3)   Verbal Short Term Memory 5 Minutes   Functional Memory Activities Moderate (3)   SLP Total Time Spent   SLP Individual Total Time Spent (Mins) 60   Treatment Charges   SLP Cognitive Skill Development First 15 Minutes 1   SLP Cognitive Skill Development Additional 15 Minutes 3       Assessment    Recall of daily events: Pt unable to recall meals from previous day.  Pt recalled name of OT, but unable to recall therapy activities.  Alternating attn (pt required to state names for each letter of the alphabet): 100% modA.      Strengths: Able to follow instructions, Motivated for self care and independence, Pleasant and cooperative, Willingly participates in therapeutic activities  Barriers: Impaired functional cognition    Plan    Target safety, problem solving, recall.     Speech Therapy Problems     Problem: Memory STGs     Dates: Start: 09/05/20       Goal: STG-Within one week, patient will     Dates: Start: 09/05/20       Description: 1) Individualized goal:  recall daily events and safety strategies given min verbal cues 80% with use of memory book.  2) Interventions:  SLP Cognitive Skill Development and SLP Group Treatment                    Problem: Problem Solving STGs     Dates: Start: 09/05/20       Goal: STG-Within one week, patient will     Dates: Start: 09/05/20       Description: 1) Individualized goal:  answer basic problem solving questions with 80% accuracy given min verbal cues.    2) Interventions:  SLP Cognitive Skill Development and SLP Group Treatment                     Problem: Speech/Swallowing LTGs     Dates: Start: 09/05/20       Goal: LTG-By discharge, patient will solve basic problems     Dates: Start: 09/05/20       Description: 1) Individualized goal:  Mert for safe discharge home.   2) Interventions:  SLP Aphasia Evaluation, SLP Cognitive Skill Development, and SLP Group Treatment

## 2020-09-22 LAB
COVID ORDER STATUS COVID19: NORMAL
SARS-COV-2 RNA RESP QL NAA+PROBE: NOTDETECTED
SPECIMEN SOURCE: NORMAL

## 2020-09-22 PROCEDURE — A9270 NON-COVERED ITEM OR SERVICE: HCPCS | Performed by: PHYSICAL MEDICINE & REHABILITATION

## 2020-09-22 PROCEDURE — 700111 HCHG RX REV CODE 636 W/ 250 OVERRIDE (IP): Performed by: PHYSICAL MEDICINE & REHABILITATION

## 2020-09-22 PROCEDURE — 700102 HCHG RX REV CODE 250 W/ 637 OVERRIDE(OP): Performed by: PHYSICAL MEDICINE & REHABILITATION

## 2020-09-22 PROCEDURE — A9270 NON-COVERED ITEM OR SERVICE: HCPCS | Performed by: HOSPITALIST

## 2020-09-22 PROCEDURE — 97605 NEG PRS WND THER DME<=50SQCM: CPT

## 2020-09-22 PROCEDURE — U0003 INFECTIOUS AGENT DETECTION BY NUCLEIC ACID (DNA OR RNA); SEVERE ACUTE RESPIRATORY SYNDROME CORONAVIRUS 2 (SARS-COV-2) (CORONAVIRUS DISEASE [COVID-19]), AMPLIFIED PROBE TECHNIQUE, MAKING USE OF HIGH THROUGHPUT TECHNOLOGIES AS DESCRIBED BY CMS-2020-01-R: HCPCS

## 2020-09-22 PROCEDURE — 700102 HCHG RX REV CODE 250 W/ 637 OVERRIDE(OP): Performed by: HOSPITALIST

## 2020-09-22 PROCEDURE — 700101 HCHG RX REV CODE 250: Performed by: PHYSICAL MEDICINE & REHABILITATION

## 2020-09-22 PROCEDURE — 99232 SBSQ HOSP IP/OBS MODERATE 35: CPT | Performed by: HOSPITALIST

## 2020-09-22 PROCEDURE — 99233 SBSQ HOSP IP/OBS HIGH 50: CPT | Performed by: PHYSICAL MEDICINE & REHABILITATION

## 2020-09-22 PROCEDURE — 770010 HCHG ROOM/CARE - REHAB SEMI PRIVAT*

## 2020-09-22 PROCEDURE — 97608 NEG PRS WND THER NDME>50SQCM: CPT

## 2020-09-22 RX ADMIN — FOLIC ACID 1 MG: 1 TABLET ORAL at 08:22

## 2020-09-22 RX ADMIN — LIDOCAINE HYDROCHLORIDE 20 ML: 20 INJECTION, SOLUTION INFILTRATION; PERINEURAL at 09:30

## 2020-09-22 RX ADMIN — ACETAMINOPHEN 1000 MG: 325 TABLET, FILM COATED ORAL at 21:14

## 2020-09-22 RX ADMIN — GABAPENTIN 900 MG: 300 CAPSULE ORAL at 21:15

## 2020-09-22 RX ADMIN — DIBASIC SODIUM PHOSPHATE, MONOBASIC POTASSIUM PHOSPHATE AND MONOBASIC SODIUM PHOSPHATE 250 MG: 852; 155; 130 TABLET ORAL at 08:22

## 2020-09-22 RX ADMIN — ACETAMINOPHEN 1000 MG: 325 TABLET, FILM COATED ORAL at 16:23

## 2020-09-22 RX ADMIN — LISINOPRIL 20 MG: 20 TABLET ORAL at 08:22

## 2020-09-22 RX ADMIN — TRAZODONE HYDROCHLORIDE 50 MG: 50 TABLET ORAL at 21:15

## 2020-09-22 RX ADMIN — ATORVASTATIN CALCIUM 40 MG: 40 TABLET, FILM COATED ORAL at 21:14

## 2020-09-22 RX ADMIN — DIBASIC SODIUM PHOSPHATE, MONOBASIC POTASSIUM PHOSPHATE AND MONOBASIC SODIUM PHOSPHATE 250 MG: 852; 155; 130 TABLET ORAL at 21:15

## 2020-09-22 RX ADMIN — CARVEDILOL 6.25 MG: 3.12 TABLET, FILM COATED ORAL at 08:21

## 2020-09-22 RX ADMIN — Medication 1000 MCG: at 08:21

## 2020-09-22 RX ADMIN — OMEPRAZOLE 20 MG: 20 CAPSULE, DELAYED RELEASE ORAL at 08:22

## 2020-09-22 RX ADMIN — GABAPENTIN 900 MG: 300 CAPSULE ORAL at 16:23

## 2020-09-22 RX ADMIN — DULOXETINE 20 MG: 20 CAPSULE, DELAYED RELEASE ORAL at 08:21

## 2020-09-22 RX ADMIN — GABAPENTIN 900 MG: 300 CAPSULE ORAL at 08:20

## 2020-09-22 RX ADMIN — OXYCODONE HYDROCHLORIDE 5 MG: 5 TABLET ORAL at 04:16

## 2020-09-22 RX ADMIN — ENOXAPARIN SODIUM 40 MG: 40 INJECTION SUBCUTANEOUS at 08:35

## 2020-09-22 RX ADMIN — OXYCODONE HYDROCHLORIDE 10 MG: 10 TABLET ORAL at 08:22

## 2020-09-22 RX ADMIN — BACITRACIN ZINC AND POLYMYXIN B SULFATE: at 21:16

## 2020-09-22 RX ADMIN — CARVEDILOL 6.25 MG: 3.12 TABLET, FILM COATED ORAL at 16:23

## 2020-09-22 RX ADMIN — ACETAMINOPHEN 1000 MG: 325 TABLET, FILM COATED ORAL at 08:22

## 2020-09-22 RX ADMIN — MICONAZOLE NITRATE: 20 CREAM TOPICAL at 04:13

## 2020-09-22 RX ADMIN — BACITRACIN ZINC AND POLYMYXIN B SULFATE: at 09:00

## 2020-09-22 RX ADMIN — Medication 4000 UNITS: at 08:21

## 2020-09-22 ASSESSMENT — ENCOUNTER SYMPTOMS
VOMITING: 0
NERVOUS/ANXIOUS: 0
FEVER: 0
CHILLS: 0
ABDOMINAL PAIN: 0
SHORTNESS OF BREATH: 0
NAUSEA: 0
DIARRHEA: 0

## 2020-09-22 NOTE — PROGRESS NOTES
"Rehab Progress Note   Chief Complaint: Left AKA    Interval Events (Subjective)  Patient not expected to DC today. He is not ready, still requiring a lot of help. His functional status fluctuates, so we need to plan for his lowest level. Patient has been scheduled for surgery on 9/24. Patient is feeling tired and has increased pain today. Patient was tempted to leave AMA during a car transfer today. After extended discussions with multiple providers and family members I do not have confidence in his DC environment. He will need SNF.     Objective:  VITAL SIGNS: /57   Pulse 65   Temp 37.2 °C (98.9 °F) (Temporal)   Resp 18   Ht 1.676 m (5' 6\")   Wt 63 kg (138 lb 14.2 oz)   SpO2 92%   BMI 22.42 kg/m²     Physical Exam:  Vitals: /57   Pulse 65   Temp 37.2 °C (98.9 °F) (Temporal)   Resp 18   Ht 1.676 m (5' 6\")   Wt 63 kg (138 lb 14.2 oz)   SpO2 92%   Gen: NAD  Head:  NC/AT, Large growth on left face  Eyes/ Nose/ Mouth: PERRLA, moist mucous membranes  Cardio: RRR, no mumurs  Pulm: CTAB, with normal respiratory effort  Abd: Soft NTND, active bowel sounds,   Ext: No peripheral edema. No calf tenderness. No clubbing/cyanosis. Left AKA with VAC   Mental status: answers questions appropriately follows commands  Speech: fluent, no aphasia or dysarthria  Tone: no spasticity noted, no cogwheeling noted    Recent Results (from the past 72 hour(s))   Comp Metabolic Panel    Collection Time: 09/20/20  5:32 AM   Result Value Ref Range    Sodium 142 135 - 145 mmol/L    Potassium 3.2 (L) 3.6 - 5.5 mmol/L    Chloride 105 96 - 112 mmol/L    Co2 27 20 - 33 mmol/L    Anion Gap 10.0 7.0 - 16.0    Glucose 133 (H) 65 - 99 mg/dL    Bun 16 8 - 22 mg/dL    Creatinine 1.11 0.50 - 1.40 mg/dL    Calcium 8.4 (L) 8.5 - 10.5 mg/dL    AST(SGOT) 33 12 - 45 U/L    ALT(SGPT) 24 2 - 50 U/L    Alkaline Phosphatase 280 (H) 30 - 99 U/L    Total Bilirubin 0.3 0.1 - 1.5 mg/dL    Albumin 2.7 (L) 3.2 - 4.9 g/dL    Total Protein 6.0 6.0 - " 8.2 g/dL    Globulin 3.3 1.9 - 3.5 g/dL    A-G Ratio 0.8 g/dL   ESTIMATED GFR    Collection Time: 09/20/20  5:32 AM   Result Value Ref Range    GFR If African American >60 >60 mL/min/1.73 m 2    GFR If Non African American >60 >60 mL/min/1.73 m 2   URINALYSIS    Collection Time: 09/20/20  5:15 PM    Specimen: Urine, Brothers Cath   Result Value Ref Range    Color DK Yellow     Character Clear     Specific Gravity 1.033 <1.035    Ph 5.0 5.0 - 8.0    Glucose Negative Negative mg/dL    Ketones Trace (A) Negative mg/dL    Protein 30 (A) Negative mg/dL    Bilirubin Negative Negative    Urobilinogen, Urine 0.2 Negative    Nitrite Negative Negative    Leukocyte Esterase Negative Negative    Occult Blood Small (A) Negative    Micro Urine Req Microscopic    URINE MICROSCOPIC (W/UA)    Collection Time: 09/20/20  5:15 PM   Result Value Ref Range    WBC 0-2 (A) /hpf    RBC 2-5 (A) /hpf    Bacteria Negative None /hpf    Epithelial Cells Negative /hpf    Hyaline Cast 0-2 /lpf   POTASSIUM SERUM (K)    Collection Time: 09/21/20  5:55 AM   Result Value Ref Range    Potassium 4.6 3.6 - 5.5 mmol/L   CBC WITHOUT DIFFERENTIAL    Collection Time: 09/21/20  5:55 AM   Result Value Ref Range    WBC 8.8 4.8 - 10.8 K/uL    RBC 3.59 (L) 4.70 - 6.10 M/uL    Hemoglobin 11.0 (L) 14.0 - 18.0 g/dL    Hematocrit 36.8 (L) 42.0 - 52.0 %    .5 (H) 81.4 - 97.8 fL    MCH 30.6 27.0 - 33.0 pg    MCHC 29.9 (L) 33.7 - 35.3 g/dL    RDW 58.1 (H) 35.9 - 50.0 fL    Platelet Count 269 164 - 446 K/uL    MPV 11.7 9.0 - 12.9 fL   MAGNESIUM    Collection Time: 09/21/20  5:55 AM   Result Value Ref Range    Magnesium 2.0 1.5 - 2.5 mg/dL       Current Facility-Administered Medications   Medication Frequency   • phosphorus (K-Phos-Neutral) per tablet 250 mg BID   • lidocaine (XYLOCAINE) 2 % injection 20 mL QDAY PRN    Or   • lidocaine (XYLOCAINE) 4 % topical solution QDAY PRN   • DULoxetine (CYMBALTA) capsule 20 mg DAILY   • gabapentin (NEURONTIN) capsule 900 mg TID    • traZODone (DESYREL) tablet 50 mg QHS   • bacitracin-polymyxin b (POLYSPORIN) 500-03165 UNIT/GM ointment BID   • bacitracin-neomycin-polymyxin (Neosporin) 400-5-5000 ointment BID PRN   • lisinopril (PRINIVIL) tablet 20 mg DAILY   • lidocaine (XYLOCAINE) 4 % topical solution PRN   • oxyCODONE immediate-release (ROXICODONE) tablet 5 mg DAILY   • oxyCODONE immediate release (ROXICODONE) tablet 10 mg Q4HRS PRN   • folic acid (FOLVITE) tablet 1 mg DAILY   • cyanocobalamin (VITAMIN B-12) tablet 1,000 mcg DAILY   • miconazole 2%-zinc oxide (Hilary) topical cream Q6HRS PRN   • vitamin D (cholecalciferol) tablet 4,000 Units DAILY   • glucose 4 g chewable tablet 16 g Q15 MIN PRN    And   • dextrose 50% (D50W) injection 50 mL Q15 MIN PRN   • acetaminophen (TYLENOL) tablet 1,000 mg TID   • Respiratory Therapy Consult Continuous RT   • tramadol (ULTRAM) 50 MG tablet 50 mg Q4HRS PRN   • hydrALAZINE (APRESOLINE) tablet 25 mg Q8HRS PRN   • enoxaparin (LOVENOX) inj 40 mg DAILY   • acetaminophen (TYLENOL) tablet 650 mg Q4HRS PRN   • senna-docusate (PERICOLACE or SENOKOT S) 8.6-50 MG per tablet 2 Tab BID    And   • polyethylene glycol/lytes (MIRALAX) PACKET 1 Packet QDAY PRN    And   • magnesium hydroxide (MILK OF MAGNESIA) suspension 30 mL QDAY PRN    And   • bisacodyl (DULCOLAX) suppository 10 mg QDAY PRN   • benzocaine-menthol (CEPACOL) lozenge 1 Lozenge Q2HRS PRN   • mag hydrox-al hydrox-simeth (MAALOX PLUS ES or MYLANTA DS) suspension 20 mL Q2HRS PRN   • ondansetron (ZOFRAN ODT) dispertab 4 mg 4X/DAY PRN    Or   • ondansetron (ZOFRAN) syringe/vial injection 4 mg 4X/DAY PRN   • sodium chloride (OCEAN) 0.65 % nasal spray 2 Spray PRN   • midazolam (VERSED) 5 mg/mL (1 mL vial) PRN   • atorvastatin (LIPITOR) tablet 40 mg Q EVENING   • oxyCODONE immediate-release (ROXICODONE) tablet 2.5 mg Q3HRS PRN   • oxyCODONE immediate-release (ROXICODONE) tablet 5 mg Q3HRS PRN   • omeprazole (PRILOSEC) capsule 20 mg DAILY   • carvedilol (COREG)  tablet 6.25 mg BID WITH MEALS     Facility-Administered Medications Ordered in Other Encounters   Medication Frequency   • fentaNYL (SUBLIMAZE) injection PRN   • propofol PRN   • ceFAZolin (ANCEF) injection PRN       Orders Placed This Encounter   Procedures   • Diet Order Cardiac (Thin), Diabetic, 2 Gram Sodium     Standing Status:   Standing     Number of Occurrences:   1     Order Specific Question:   Diet:     Answer:   Cardiac [6]     Comments:   Thin     Order Specific Question:   Diet:     Answer:   Diabetic [3]     Order Specific Question:   Diet:     Answer:   2 Gram Sodium [7]       Assessment:  Active Hospital Problems    Diagnosis   • AKA stump complication (HCC)   • PVD (peripheral vascular disease) with claudication (HCC)   • Diabetes (HCC)   • HTN (hypertension)   • Basal cell carcinoma of face   • Hypophosphatemia   • Elevated brain natriuretic peptide (BNP) level   • Hypokalemia   • Hypernatremia   • Alkaline phosphatase elevation   • Anemia   • Vitamin D deficiency       Medical Decision Making and Plan:  Acute encephalopathy - Concern for encephalopathy with ongoing multiple medical comorbidities and confusion about hospital course as well as not understanding carpio catheter. Alternatively could be medicated for transfer but therapy reporting difficulty with cuing and following commands.   -PT and OT for mobility and ADLs  -SLP for cognitive evaluation     Left BKA - Patient with severe PVD with L AKA on 6/30/20 with multiple revisions due to necrosis and infection. Cultures grew MRSA and Enterococcus. Per ID continue antibiotics until 9/6/20. Transferred to [facility] from 8/12/20 to 9/4/20.   -Vancomycin and Unasyn until 9/6/20. Completed.   -Redress on 9/15/20 as wound vac not turned on and significant pain and drainage.   -Continue wound vac. Consult wound care - added honey to regimen with slow improvement in size and depth. Lidocaine 4% PRN for dressing change      Multiple wounds - Patient has  rash to abdomen, redness on sacrum and left buttocks on transfer. Consult wound care.      Right face basal cell carcinoma - Unclear what follow-up. Follow-up Oncology     Anemia - Check AM CBC - 8.8. Continue to monitor.      HTN - Patient on Lisinopril 10 mg and Coreg 6.25 mg BID  -Hospistalist consulted.      Pain control - Primarily on admission with left phantom limb pain. Patient on Gabapentin 100 mg TID and Oxycodone. Can consider increase in Gabapentin for neuropathic phantom limb pain.   -Increase Gabapentin to 400 mg TID. Oxycodone 10 mg for wound care changes. Concern for over sedation with higher opiate dosing, limit to wound vac change, lidocaine jelly for wound change.   -Increase Gabapentin to 900 mg TID and schedule Oxycodone 5 mg first thing in AM. May need to schedule doses as patient unable to remember to ask at times.  -Start Cymbalta which may help with pain.       DM with hyperglycemia - Patient on SSI on transfer. Consult Hospitalist  -Discontinue SSI     COVID - Patient with check x2 at [facility]. Patient without symptoms. Per policy no check within 7 days. Patient without symptoms. Check PCR, on precautions - negative, no precautions.      Insomnia/Mood - Schedule Trazodone QHS     GI Ppx - Patient on Prilosec      DVT Ppx - Patient on Lovenox on transfer.  Constipation, no BM since , PRN Miralax and MoM     Dispo - inadequate DC plan. Patient has only one caregiver and requires  care and she cannot be there all the time. Other care giver is post-partum  and has weight lifting restriction. Best plan would be to DC to SNF after washout on . Alternatively he could be admitted to acute. DC plan still in flux.     Total time:  48 minutes.  I spent greater than 50% of the time for patient care and coordination on this date, including unit/floor time, and face-to-face time with the patient as per assessment and plan above.    Pernell Ling, DO   Physical Medicine and Rehabilitation

## 2020-09-22 NOTE — CARE PLAN
Problem: Pain Management  Goal: Pain level will decrease to patient's comfort goal  Outcome: PROGRESSING AS EXPECTED  Note: Per patient Roxicodone PO has been helping with pain. Patient verbalized will inform RN for pain not relieved by RN intervention..      Problem: Urinary Elimination:  Goal: Ability to reestablish a normal urinary elimination pattern will improve  Outcome: PROGRESSING SLOWER THAN EXPECTED  Note: Patient kept clean and dry. Patient turned every 2 hour. Applied Hilary cream to the the sacrum area.

## 2020-09-22 NOTE — DISCHARGE PLANNING
Case Management Discharge Instructions        Discharge Location: Home with Home Health     Agency Name / Phone: [facility]  [phone]   Home Health: Registered Nurse, Home Health Aide, Occupational Therapist, Physical Therapist,   [facility] following home health & will see Mr. Bains at home for Primary Care follow up & management. Verified with [facility], [phone], spoke w/ Amy.    Comments: [facility] will call to set up initial appointment. Home health referring for Primary Care Provider.    Follow-up Information:    Usman Fisher M.D.  [phone]  Vascular Surgery    [address]    [place] [id number]   Thursday 9.24.2020, surgical procedure scheduled at [facility] with check in at 6am.     [facility] Primary Care Provider: established by [employer] [facility]

## 2020-09-22 NOTE — WOUND TEAM
[facility]  Inpatient Services   Wound and Skin Care Progress Note    HPI, PMH, SH: Reviewed    Unit where seen by Wound Team: [id number]     WOUND CONSULT RELATED TO:  Scheduled Negative Pressure Wound therapy (NPWT) dressing change      Self Report / Pain Level:  9/10 despite premed   With PO pain med and 2% topical lidocaine infused into vac foam and let dwell for 20 minutes before dressing removal.    OBJECTIVE:  Previous dressing intact    WOUND TYPE, LOCATION, CHARACTERISTICS (Pressure Injuries: location, stage, POA or date identified)      Skin Risk/Alonso   Sensory Perception: No Impairment  Moisture: Occasionally Moist  Activity: Chairfast  Mobility: Slightly Limited  Nutrition: Adequate  Friction and Shear: Problem  Total Score: 16  Skin Breakdown Risk: 13-17 Moderate Risk for Skin Breakdown    Sensory Interventions  Bed Types: Pressure Redistribution Mattress (Atmosair)  Skin Preventative Measures: Pillows in Use for Support / Positioning  Skin Preventative Dressing: Foam  Moisturizers/Barriers: Barrier Cream  PT / OT Involved in Care: Physical Therapy Involved, Occupational Therapy Involved  Activity : Bed  Patient Turns / Repositioning: Patient Turns Self from Side to Side  Patient is Receiving Nutrition: Oral Intake Adequate     Vitamin Therapy in Use: Yes  Friction Interventions: Draw Sheet / Pad Used for Repositioning                Negative Pressure Wound Therapy 06/30/20 (Active)   NPWT Pump Mode / Pressure Setting Continuous;125 mmHg 09/22/20 1014   Dressing Type Medium;Black Foam (Regular) 09/22/20 1014   Number of Foam Pieces Used 1 09/22/20 1000   Canister Changed Yes 09/22/20 1014   NEXT Dressing Change/Treatment Date 09/24/20 09/22/20 1000   WOUND NURSE ONLY - Time Spent with Patient (mins) 60 09/22/20 1000               Wound 09/04/20 Full Thickness Wound Thigh Left Left AKA wound (Active)   Wound Image    09/18/20 1348   Site Assessment MARYJANE 09/22/20 1014   Periwound Assessment  MARYJANE 09/22/20 1014   Margins MARYJANE 09/22/20 1014   Closure Secondary intention 09/22/20 1000   Drainage Amount Moderate 09/22/20 1014   Drainage Description Serosanguineous 09/22/20 1014   Treatments Cleansed;Site care 09/22/20 1000   Wound Cleansing Approved Wound Cleanser 09/22/20 1000   Periwound Protectant Skin Protectant Wipes to Periwound 09/22/20 1000   Dressing Cleansing/Solutions Not Applicable 09/22/20 1000   Dressing Options Wound Vac 09/22/20 1014   Dressing Changed Changed 09/22/20 1000   Dressing Status Clean;Dry;Intact 09/22/20 1014   Dressing Change/Treatment Frequency By Wound Team Only 09/22/20 1014   NEXT Dressing Change/Treatment Date 09/24/20 09/22/20 1000   NEXT Weekly Photo (Inpatient Only) 09/24/20 09/22/20 1000   Non-staged Wound Description Full thickness 09/22/20 1000   Wound Length (cm) 7 cm 09/18/20 1600   Wound Width (cm) 10.5 cm 09/18/20 1600   Wound Depth (cm) 4 cm 09/18/20 1600   Wound Surface Area (cm^2) 73.5 cm^2 09/18/20 1600   Wound Volume (cm^3) 294 cm^3 09/18/20 1600   Wound Healing % -9 09/18/20 1600   Wound Bed Granulation (%) 40 % 09/22/20 1000   Wound Bed Epithelium (%) 0 % 09/11/20 0915   Wound Bed Slough (%) 60 % 09/22/20 1000   Wound Bed Eschar (%) 0 % 09/11/20 0915   Undermining (cm) 3.5 cm 09/18/20 1600   Undermining of Wound, 1st Location From 11 o'clock;To 2 o'clock 09/18/20 1600   Shape irregular oval    Wound Odor None    Exposed Structures None        Lab Values:    Lab Results   Component Value Date/Time    WBC 8.8 09/21/2020 05:55 AM    RBC 3.59 (L) 09/21/2020 05:55 AM    HEMOGLOBIN 11.0 (L) 09/21/2020 05:55 AM    HEMATOCRIT 36.8 (L) 09/21/2020 05:55 AM                    Lab Results   Component Value Date/Time    HBA1C 5.1 09/05/2020 05:40 AM           Culture:   Obtained No. Does not look infected, Results show NA    INTERVENTIONS BY WOUND TEAM:  Saturated dressing with lidocaine, let dwell for 20 minutes. Saturated dressing with saline, dressing removal very  painful. Irrigated wound with saline, gently wiped wound bed with gauze 4 x 4s. Cleaned periwound with same. No Sting and applied to periwound. 1 piece of black foam placed on wound, draped. Hole cut for trac pad, trac pad applied. Suction obtained at 125 mmHg continuous.     Interdisciplinary consultation: Patient, Bedside RN, Dr. Ling    EVALUATION: deep red, hypergranular, friable tissue in center, the rest is slough. Pending surgery? Was scheduled for Thursday but per MD may not happen    Goals: Steady decrease in wound area and depth weekly.    NURSING PLAN OF CARE ORDERS (X):  Dressing changes: See Dressing Care orders: X  Skin care: See Skin Care orders: X  Rectal tube care: See Rectal Tube Care orders:        Other orders:                           WOUND TEAM PLAN OF CARE (X):   Dressing changes by wound team:          Follow up 1-2 times weekly:               Follow up 3 times weekly:                NPWT change 3 times weekly:   X  Follow up as needed:       Other (explain):     Anticipated discharge plans (X):   LTACH:        SNF/Rehab:                  Home Care:   X        Outpatient Wound Center:            Self Care:            Other:

## 2020-09-22 NOTE — PROGRESS NOTES
Received patient on bed. Patient conscious coherent. Patient verbalized no severe pain. Patient was able to swallow pills crush with apple sauce. Patient verbalized no concerns about discharged because his daughter and niece will take care of him. Patient wound vac intact and has no visible leak. Patient still have severe moisture damage in sacrum. Patient kept clean and dry and will be turned every 2 to 3 hours. Patient was encouraged to verbalized any concern about discharged with RN but patient verbalized no concerns for now. Safety and fall precaution reinforced.per am nurse report patient droplet precaution was already discontinued

## 2020-09-23 ENCOUNTER — APPOINTMENT (OUTPATIENT)
Dept: RADIOLOGY | Facility: MEDICAL CENTER | Age: 75
DRG: 498 | End: 2020-09-23
Attending: INTERNAL MEDICINE
Payer: MEDICARE

## 2020-09-23 ENCOUNTER — APPOINTMENT (OUTPATIENT)
Dept: RADIOLOGY | Facility: REHABILITATION | Age: 75
DRG: 560 | End: 2020-09-23
Attending: PHYSICAL MEDICINE & REHABILITATION
Payer: MEDICARE

## 2020-09-23 ENCOUNTER — HOSPITAL ENCOUNTER (INPATIENT)
Facility: MEDICAL CENTER | Age: 75
LOS: 6 days | DRG: 498 | End: 2020-09-29
Attending: INTERNAL MEDICINE | Admitting: HOSPITALIST
Payer: MEDICARE

## 2020-09-23 VITALS
BODY MASS INDEX: 22.32 KG/M2 | SYSTOLIC BLOOD PRESSURE: 128 MMHG | WEIGHT: 138.89 LBS | TEMPERATURE: 98.3 F | OXYGEN SATURATION: 92 % | HEIGHT: 66 IN | HEART RATE: 68 BPM | DIASTOLIC BLOOD PRESSURE: 68 MMHG | RESPIRATION RATE: 18 BRPM

## 2020-09-23 DIAGNOSIS — E55.9 VITAMIN D DEFICIENCY: ICD-10-CM

## 2020-09-23 DIAGNOSIS — E78.5 HYPERLIPIDEMIA, UNSPECIFIED HYPERLIPIDEMIA TYPE: ICD-10-CM

## 2020-09-23 DIAGNOSIS — I73.9 PVD (PERIPHERAL VASCULAR DISEASE) WITH CLAUDICATION (HCC): Chronic | ICD-10-CM

## 2020-09-23 DIAGNOSIS — C44.212 BASAL CELL CARCINOMA (BCC) OF RIGHT EARLOBE: Chronic | ICD-10-CM

## 2020-09-23 DIAGNOSIS — E11.9 TYPE 2 DIABETES MELLITUS WITHOUT COMPLICATION, UNSPECIFIED WHETHER LONG TERM INSULIN USE (HCC): ICD-10-CM

## 2020-09-23 DIAGNOSIS — D51.9 ANEMIA DUE TO VITAMIN B12 DEFICIENCY, UNSPECIFIED B12 DEFICIENCY TYPE: ICD-10-CM

## 2020-09-23 DIAGNOSIS — L98.9 SKIN LESION OF FACE: ICD-10-CM

## 2020-09-23 DIAGNOSIS — T87.9 AKA STUMP COMPLICATION (HCC): ICD-10-CM

## 2020-09-23 PROCEDURE — 71045 X-RAY EXAM CHEST 1 VIEW: CPT

## 2020-09-23 PROCEDURE — 700102 HCHG RX REV CODE 250 W/ 637 OVERRIDE(OP): Performed by: HOSPITALIST

## 2020-09-23 PROCEDURE — 99239 HOSP IP/OBS DSCHRG MGMT >30: CPT | Performed by: PHYSICAL MEDICINE & REHABILITATION

## 2020-09-23 PROCEDURE — 99223 1ST HOSP IP/OBS HIGH 75: CPT | Mod: AI | Performed by: HOSPITALIST

## 2020-09-23 PROCEDURE — 97130 THER IVNTJ EA ADDL 15 MIN: CPT

## 2020-09-23 PROCEDURE — 97129 THER IVNTJ 1ST 15 MIN: CPT

## 2020-09-23 PROCEDURE — 700101 HCHG RX REV CODE 250: Performed by: HOSPITALIST

## 2020-09-23 PROCEDURE — A9270 NON-COVERED ITEM OR SERVICE: HCPCS | Performed by: HOSPITALIST

## 2020-09-23 PROCEDURE — 700111 HCHG RX REV CODE 636 W/ 250 OVERRIDE (IP): Performed by: PHYSICAL MEDICINE & REHABILITATION

## 2020-09-23 PROCEDURE — 770006 HCHG ROOM/CARE - MED/SURG/GYN SEMI*

## 2020-09-23 PROCEDURE — 700102 HCHG RX REV CODE 250 W/ 637 OVERRIDE(OP): Performed by: PHYSICAL MEDICINE & REHABILITATION

## 2020-09-23 PROCEDURE — A9270 NON-COVERED ITEM OR SERVICE: HCPCS | Performed by: PHYSICAL MEDICINE & REHABILITATION

## 2020-09-23 PROCEDURE — 97110 THERAPEUTIC EXERCISES: CPT

## 2020-09-23 PROCEDURE — 97530 THERAPEUTIC ACTIVITIES: CPT

## 2020-09-23 PROCEDURE — 97116 GAIT TRAINING THERAPY: CPT

## 2020-09-23 PROCEDURE — 99231 SBSQ HOSP IP/OBS SF/LOW 25: CPT | Performed by: HOSPITALIST

## 2020-09-23 RX ORDER — ACETAMINOPHEN 500 MG
1000 TABLET ORAL 3 TIMES DAILY
Status: DISCONTINUED | OUTPATIENT
Start: 2020-09-23 | End: 2020-09-24

## 2020-09-23 RX ORDER — GABAPENTIN 300 MG/1
900 CAPSULE ORAL 3 TIMES DAILY
Status: DISCONTINUED | OUTPATIENT
Start: 2020-09-23 | End: 2020-09-26

## 2020-09-23 RX ORDER — ATORVASTATIN CALCIUM 40 MG/1
40 TABLET, FILM COATED ORAL EVERY EVENING
Status: DISCONTINUED | OUTPATIENT
Start: 2020-09-23 | End: 2020-09-29 | Stop reason: HOSPADM

## 2020-09-23 RX ORDER — LISINOPRIL 20 MG/1
20 TABLET ORAL DAILY
Status: DISCONTINUED | OUTPATIENT
Start: 2020-09-24 | End: 2020-09-26

## 2020-09-23 RX ORDER — DULOXETIN HYDROCHLORIDE 20 MG/1
20 CAPSULE, DELAYED RELEASE ORAL DAILY
Status: DISCONTINUED | OUTPATIENT
Start: 2020-09-24 | End: 2020-09-29 | Stop reason: HOSPADM

## 2020-09-23 RX ORDER — ENALAPRILAT 1.25 MG/ML
1.25 INJECTION INTRAVENOUS EVERY 6 HOURS PRN
Status: DISCONTINUED | OUTPATIENT
Start: 2020-09-23 | End: 2020-09-29 | Stop reason: HOSPADM

## 2020-09-23 RX ORDER — FOLIC ACID 1 MG/1
1 TABLET ORAL DAILY
Status: DISCONTINUED | OUTPATIENT
Start: 2020-09-24 | End: 2020-09-29 | Stop reason: HOSPADM

## 2020-09-23 RX ORDER — VITAMIN B COMPLEX
4000 TABLET ORAL DAILY
Status: DISCONTINUED | OUTPATIENT
Start: 2020-09-24 | End: 2020-09-29 | Stop reason: HOSPADM

## 2020-09-23 RX ORDER — OXYCODONE HYDROCHLORIDE 10 MG/1
10 TABLET ORAL
Status: DISCONTINUED | OUTPATIENT
Start: 2020-09-23 | End: 2020-09-24

## 2020-09-23 RX ORDER — CARVEDILOL 6.25 MG/1
6.25 TABLET ORAL 2 TIMES DAILY WITH MEALS
Status: DISCONTINUED | OUTPATIENT
Start: 2020-09-23 | End: 2020-09-26

## 2020-09-23 RX ORDER — TRAZODONE HYDROCHLORIDE 50 MG/1
50 TABLET ORAL
Status: DISCONTINUED | OUTPATIENT
Start: 2020-09-23 | End: 2020-09-24

## 2020-09-23 RX ORDER — OXYCODONE HCL 5 MG/5 ML
5 SOLUTION, ORAL ORAL
Status: DISCONTINUED | OUTPATIENT
Start: 2020-09-23 | End: 2020-09-26

## 2020-09-23 RX ORDER — ACETAMINOPHEN 325 MG/1
650 TABLET ORAL EVERY 6 HOURS PRN
Status: CANCELLED | OUTPATIENT
Start: 2020-09-23

## 2020-09-23 RX ORDER — AMOXICILLIN 250 MG
2 CAPSULE ORAL 2 TIMES DAILY
Status: DISCONTINUED | OUTPATIENT
Start: 2020-09-23 | End: 2020-09-29 | Stop reason: HOSPADM

## 2020-09-23 RX ORDER — ONDANSETRON 2 MG/ML
4 INJECTION INTRAMUSCULAR; INTRAVENOUS EVERY 4 HOURS PRN
Status: DISCONTINUED | OUTPATIENT
Start: 2020-09-23 | End: 2020-09-24

## 2020-09-23 RX ORDER — ONDANSETRON 4 MG/1
4 TABLET, ORALLY DISINTEGRATING ORAL EVERY 4 HOURS PRN
Status: DISCONTINUED | OUTPATIENT
Start: 2020-09-23 | End: 2020-09-29 | Stop reason: HOSPADM

## 2020-09-23 RX ORDER — BACITRACIN ZINC AND POLYMYXIN B SULFATE 500; 1000 [USP'U]/G; [USP'U]/G
OINTMENT TOPICAL 2 TIMES DAILY
Status: DISCONTINUED | OUTPATIENT
Start: 2020-09-23 | End: 2020-09-29 | Stop reason: HOSPADM

## 2020-09-23 RX ORDER — POLYETHYLENE GLYCOL 3350 17 G/17G
1 POWDER, FOR SOLUTION ORAL
Status: DISCONTINUED | OUTPATIENT
Start: 2020-09-23 | End: 2020-09-29 | Stop reason: HOSPADM

## 2020-09-23 RX ORDER — MORPHINE SULFATE 4 MG/ML
4 INJECTION, SOLUTION INTRAMUSCULAR; INTRAVENOUS
Status: DISCONTINUED | OUTPATIENT
Start: 2020-09-23 | End: 2020-09-24

## 2020-09-23 RX ORDER — OXYCODONE HYDROCHLORIDE 5 MG/1
5 TABLET ORAL
Status: DISCONTINUED | OUTPATIENT
Start: 2020-09-23 | End: 2020-09-23

## 2020-09-23 RX ORDER — ACETAMINOPHEN 325 MG/1
650 TABLET ORAL EVERY 6 HOURS PRN
Status: DISCONTINUED | OUTPATIENT
Start: 2020-09-23 | End: 2020-09-23 | Stop reason: HOSPADM

## 2020-09-23 RX ORDER — CHOLECALCIFEROL (VITAMIN D3) 125 MCG
1000 CAPSULE ORAL DAILY
Status: DISCONTINUED | OUTPATIENT
Start: 2020-09-24 | End: 2020-09-29 | Stop reason: HOSPADM

## 2020-09-23 RX ORDER — BISACODYL 10 MG
10 SUPPOSITORY, RECTAL RECTAL
Status: DISCONTINUED | OUTPATIENT
Start: 2020-09-23 | End: 2020-09-29 | Stop reason: HOSPADM

## 2020-09-23 RX ORDER — OMEPRAZOLE 20 MG/1
20 CAPSULE, DELAYED RELEASE ORAL DAILY
Status: DISCONTINUED | OUTPATIENT
Start: 2020-09-24 | End: 2020-09-27

## 2020-09-23 RX ADMIN — ACETAMINOPHEN 1000 MG: 500 TABLET ORAL at 17:47

## 2020-09-23 RX ADMIN — LISINOPRIL 20 MG: 20 TABLET ORAL at 08:31

## 2020-09-23 RX ADMIN — Medication 1000 MCG: at 08:31

## 2020-09-23 RX ADMIN — Medication 1 EACH: at 17:47

## 2020-09-23 RX ADMIN — ENOXAPARIN SODIUM 40 MG: 40 INJECTION SUBCUTANEOUS at 08:32

## 2020-09-23 RX ADMIN — CARVEDILOL 6.25 MG: 3.12 TABLET, FILM COATED ORAL at 08:31

## 2020-09-23 RX ADMIN — ATORVASTATIN CALCIUM 40 MG: 40 TABLET, FILM COATED ORAL at 17:47

## 2020-09-23 RX ADMIN — TRAMADOL HYDROCHLORIDE 50 MG: 50 TABLET, COATED ORAL at 10:37

## 2020-09-23 RX ADMIN — DULOXETINE 20 MG: 20 CAPSULE, DELAYED RELEASE ORAL at 08:31

## 2020-09-23 RX ADMIN — DIBASIC SODIUM PHOSPHATE, MONOBASIC POTASSIUM PHOSPHATE AND MONOBASIC SODIUM PHOSPHATE 250 MG: 852; 155; 130 TABLET ORAL at 08:31

## 2020-09-23 RX ADMIN — OMEPRAZOLE 20 MG: 20 CAPSULE, DELAYED RELEASE ORAL at 08:31

## 2020-09-23 RX ADMIN — FOLIC ACID 1 MG: 1 TABLET ORAL at 08:31

## 2020-09-23 RX ADMIN — GABAPENTIN 900 MG: 300 CAPSULE ORAL at 17:47

## 2020-09-23 RX ADMIN — GABAPENTIN 900 MG: 300 CAPSULE ORAL at 08:31

## 2020-09-23 RX ADMIN — DOCUSATE SODIUM 50 MG AND SENNOSIDES 8.6 MG 2 TABLET: 8.6; 5 TABLET, FILM COATED ORAL at 08:31

## 2020-09-23 RX ADMIN — OXYCODONE HYDROCHLORIDE 5 MG: 5 TABLET ORAL at 05:22

## 2020-09-23 RX ADMIN — CARVEDILOL 6.25 MG: 6.25 TABLET, FILM COATED ORAL at 17:46

## 2020-09-23 RX ADMIN — OXYCODONE HYDROCHLORIDE 5 MG: 5 SOLUTION ORAL at 20:18

## 2020-09-23 RX ADMIN — ASPIRIN 81 MG: 81 TABLET, COATED ORAL at 17:46

## 2020-09-23 RX ADMIN — Medication 4000 UNITS: at 08:31

## 2020-09-23 RX ADMIN — ACETAMINOPHEN 1000 MG: 325 TABLET, FILM COATED ORAL at 08:31

## 2020-09-23 RX ADMIN — BACITRACIN ZINC AND POLYMYXIN B SULFATE: at 08:42

## 2020-09-23 RX ADMIN — OXYCODONE HYDROCHLORIDE 10 MG: 10 TABLET ORAL at 08:32

## 2020-09-23 ASSESSMENT — LIFESTYLE VARIABLES
ALCOHOL_USE: NO
CONSUMPTION TOTAL: NEGATIVE
TOTAL SCORE: 0
TOTAL SCORE: 0
HAVE PEOPLE ANNOYED YOU BY CRITICIZING YOUR DRINKING: NO
ON A TYPICAL DAY WHEN YOU DRINK ALCOHOL HOW MANY DRINKS DO YOU HAVE: 0
HAVE YOU EVER FELT YOU SHOULD CUT DOWN ON YOUR DRINKING: NO
TOTAL SCORE: 0
EVER HAD A DRINK FIRST THING IN THE MORNING TO STEADY YOUR NERVES TO GET RID OF A HANGOVER: NO
DOES PATIENT WANT TO STOP DRINKING: NO
AVERAGE NUMBER OF DAYS PER WEEK YOU HAVE A DRINK CONTAINING ALCOHOL: 0
EVER FELT BAD OR GUILTY ABOUT YOUR DRINKING: NO
HOW MANY TIMES IN THE PAST YEAR HAVE YOU HAD 5 OR MORE DRINKS IN A DAY: 0

## 2020-09-23 ASSESSMENT — COGNITIVE AND FUNCTIONAL STATUS - GENERAL
MOBILITY SCORE: 10
HELP NEEDED FOR BATHING: A LITTLE
TURNING FROM BACK TO SIDE WHILE IN FLAT BAD: A LITTLE
MOVING TO AND FROM BED TO CHAIR: A LOT
CLIMB 3 TO 5 STEPS WITH RAILING: TOTAL
EATING MEALS: A LITTLE
MOVING FROM LYING ON BACK TO SITTING ON SIDE OF FLAT BED: A LOT
WALKING IN HOSPITAL ROOM: TOTAL
STANDING UP FROM CHAIR USING ARMS: TOTAL
TOILETING: A LITTLE
PERSONAL GROOMING: A LITTLE
DRESSING REGULAR UPPER BODY CLOTHING: A LITTLE
DAILY ACTIVITIY SCORE: 18
SUGGESTED CMS G CODE MODIFIER DAILY ACTIVITY: CK
SUGGESTED CMS G CODE MODIFIER MOBILITY: CL
DRESSING REGULAR LOWER BODY CLOTHING: A LITTLE

## 2020-09-23 ASSESSMENT — ENCOUNTER SYMPTOMS
PALPITATIONS: 0
ABDOMINAL PAIN: 0
NERVOUS/ANXIOUS: 0
PALPITATIONS: 0
SHORTNESS OF BREATH: 0
DIARRHEA: 0
HEADACHES: 0
MYALGIAS: 1
DIZZINESS: 0
BACK PAIN: 0
NAUSEA: 0
FEVER: 0
SORE THROAT: 0
VOMITING: 0
BLURRED VISION: 0
HEADACHES: 0
SPEECH CHANGE: 0
FEVER: 0
EYE DISCHARGE: 0
COUGH: 0
VOMITING: 0
SHORTNESS OF BREATH: 0
NAUSEA: 0
HALLUCINATIONS: 0
DIZZINESS: 0
SENSORY CHANGE: 0

## 2020-09-23 ASSESSMENT — ACTIVITIES OF DAILY LIVING (ADL)
TOILETING_LEVEL_OF_ASSIST: REQUIRES PHYSICAL ASSIST WITH TOILETING
SHOWER_TRANSFER_LEVEL_OF_ASSIST: REQUIRES PHYSICAL ASSIST WITH SHOWER TRANSFER
TOILET_TRANSFER_LEVEL_OF_ASSIST: REQUIRES PHYSICAL ASSIST WITH TOILET TRANSFER
BED_CHAIR_WHEELCHAIR_TRANSFER_DESCRIPTION: ADAPTIVE EQUIPMENT;INCREASED TIME;SQUAT PIVOT TRANSFER TO WHEELCHAIR;SUPERVISION FOR SAFETY;VERBAL CUEING

## 2020-09-23 ASSESSMENT — PAIN DESCRIPTION - PAIN TYPE: TYPE: ACUTE PAIN

## 2020-09-23 ASSESSMENT — GAIT ASSESSMENTS
GAIT LEVEL OF ASSIST: STAND BY ASSIST
DISTANCE (FEET): 10
ASSISTIVE DEVICE: PARALLEL BARS

## 2020-09-23 ASSESSMENT — FIBROSIS 4 INDEX: FIB4 SCORE: 1.9

## 2020-09-23 NOTE — H&P
Hospital Medicine History & Physical Note    Date of Service  9/23/2020    Primary Care Physician  Pcp Pt States None    Consultants  Surgery    Code Status  Full Code (reviewed 9/23/2020)    Chief Complaint  Sent from Rehab for left AKA stump revision    History of Presenting Illness  75 y.o. 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  Initial AKA was 6/30/2020 by Dr Fisher but had revision on 7/29/2020 then complicated by MRSA and enterococcus which was treated with unasyn and vancomycin per ID recommendations.  Currently, he has some pain in his stump.  He has a wound vacuum to the stump currently.  He has been on antibiotics up until recently.      Review of Systems  Review of Systems   Constitutional: Negative for fever and malaise/fatigue.   HENT: Negative for sore throat.    Eyes: Negative for discharge.   Respiratory: Negative for cough and shortness of breath.    Cardiovascular: Negative for chest pain, palpitations and leg swelling.   Gastrointestinal: Negative for abdominal pain, diarrhea, nausea and vomiting.   Genitourinary: Negative for frequency and hematuria.   Musculoskeletal: Positive for myalgias (left leg stump region). Negative for back pain and joint pain.   Skin: Negative for rash.   Neurological: Negative for dizziness, sensory change, speech change and headaches.   Psychiatric/Behavioral: The patient is not nervous/anxious.        Past Medical History   has a past medical history of Cancer ([facility]), Hypertension, Pain (01-30-14), Pain (3/14/14), Personal history of venous thrombosis and embolism, PVD (peripheral vascular disease) ([facility]), and Unspecified hemorrhagic conditions.    Surgical History   has a past surgical history that includes femoral popliteal bypass (10/18/2011); aortogram with runoff (11/14/2013); iliac angioplasty with stent (11/14/2013); toe amputation (12/4/2013);  femoral femoral bypass (2013); femoral femoral bypass (2014); groin exploration (3/17/2014); angiogram (2014); femoral popliteal bypass (2014); reimplantion revascularization (2014); knee amputation above (Left, 2020); knee amputation above (Left, 2020); irrigation & debridement general (Left, 2020); irrigation & debridement general (Left, 2020); and knee amputation above (Left, 2020).     Family History  States his parents are still alive and live in [place]     Social History   reports that he quit smoking about 6 months ago. His smoking use included cigarettes. He has a 100.00 pack-year smoking history. He has never used smokeless tobacco. He reports that he does not drink alcohol or use drugs. .  Has two children.    Allergies  Allergies   Allergen Reactions   • No Known Drug Allergy        Medications  Prior to Admission Medications   Prescriptions Last Dose Informant Patient Reported? Taking?   NS SOLN 100 mL with ampicillin/sulbactam 3 (2-1) g SOLR 3 g   No No   Sig: 3 g by Intravenous route every 6 hours.   NS SOLN 50 mL with DAPTOmycin 350 MG SOLR 1,000 mg   No No   Si,000 mg by Intravenous route every 24 hours.   aspirin 81 MG EC tablet   No No   Sig: Take 1 Tab by mouth every day.   atorvastatin (LIPITOR) 40 MG Tab   No No   Sig: Take 1 Tab by mouth every evening.   gabapentin (NEURONTIN) 100 MG Cap   No No   Sig: Take 1 Cap by mouth 3 times a day.   lisinopril (PRINIVIL) 5 MG Tab   No No   Sig: Take 3 Tabs by mouth every day.      Facility-Administered Medications: None       Physical Exam       Physical Exam  Vitals signs reviewed.   Constitutional:       Appearance: Normal appearance. He is not diaphoretic.   HENT:      Head: Normocephalic and atraumatic.      Nose: Nose normal.      Mouth/Throat:      Mouth: Mucous membranes are moist.      Pharynx: No oropharyngeal exudate.   Eyes:      General: No scleral icterus.        Right eye: No  discharge.         Left eye: No discharge.      Extraocular Movements: Extraocular movements intact.      Conjunctiva/sclera: Conjunctivae normal.      Pupils: Pupils are equal, round, and reactive to light.   Neck:      Musculoskeletal: No muscular tenderness.      Vascular: No carotid bruit.   Cardiovascular:      Rate and Rhythm: Normal rate and regular rhythm.      Pulses:           Radial pulses are 1+ on the right side and 1+ on the left side.        Dorsalis pedis pulses are 1+ on the right side. Left dorsalis pedis pulse not accessible.      Heart sounds: No murmur.   Pulmonary:      Effort: Pulmonary effort is normal. No respiratory distress.      Breath sounds: Normal breath sounds. No wheezing or rales.   Abdominal:      General: Bowel sounds are normal. There is no distension.      Palpations: Abdomen is soft.   Musculoskeletal:         General: No swelling or tenderness.      Right lower leg: No edema.   Lymphadenopathy:      Cervical: No cervical adenopathy.   Skin:     Coloration: Skin is pale. Skin is not jaundiced.      Comments: Large right cheek mass ~3cm in radius with erosion to left nare.   Neurological:      General: No focal deficit present.      Mental Status: He is alert and oriented to person, place, and time. Mental status is at baseline.      Cranial Nerves: No cranial nerve deficit.   Psychiatric:         Mood and Affect: Mood normal.         Behavior: Behavior normal.         Laboratory:  Recent Labs     09/21/20  0555   WBC 8.8   RBC 3.59*   HEMOGLOBIN 11.0*   HEMATOCRIT 36.8*   .5*   MCH 30.6   MCHC 29.9*   RDW 58.1*   PLATELETCT 269   MPV 11.7     Recent Labs     09/21/20  0555   POTASSIUM 4.6     No results for input(s): ALTSGPT, ASTSGOT, ALKPHOSPHAT, TBILIRUBIN, DBILIRUBIN, GAMMAGT, AMYLASE, LIPASE, ALB, PREALBUMIN, GLUCOSE in the last 72 hours.      No results for input(s): NTPROBNP in the last 72 hours.      No results for input(s): TROPONINT in the last 72  "hours.    Imaging:  No orders to display         Assessment/Plan:  I anticipate this patient will require at least two midnights for appropriate medical management, necessitating inpatient admission.    * AKA stump complication (HCC)- (present on admission)  Assessment & Plan  Sent from Rehab today for 9/24 surgical revision. NPO after midnight  Difficulty in healing.  Vit C, Zinc    Anemia- (present on admission)  Assessment & Plan  Likely that of chronic disease  Monitor CBC    PVD (peripheral vascular disease) with claudication (HCC)- (present on admission)  Assessment & Plan  Control BP and lipids.  Statin, bp meds    Type 2 diabetes mellitus (HCC)- (present on admission)  Assessment & Plan  Monitor accuchecks and cover with SSI  Last A1c 5.1 in past 3 weeks    HLD (hyperlipidemia)- (present on admission)  Assessment & Plan  Atorvastatin 40mg nightly    HTN (hypertension)- (present on admission)  Assessment & Plan  Monitor vitals.  Lisinopril 20mg daily, coreg 6.25mg BID    Skin lesion of face- (present on admission)  Assessment & Plan  \"Been there a few years\"  Educated him on the importance of this being removed by a dermatologist/plastic surgeon.    "

## 2020-09-23 NOTE — ASSESSMENT & PLAN NOTE
"\"Been there a few years\"  Educated him on the importance of this being removed by a dermatologist/plastic surgeon.  "

## 2020-09-23 NOTE — PROGRESS NOTES
Pt was sent for direct admit via hospital transport for upcoming surgery on 9/24. Pt stable upon . Report given to LOUIS Mora.

## 2020-09-23 NOTE — PROGRESS NOTES
Hospital Medicine Daily Progress Note      Chief Complaint:  Hypertension  Diabetes    Interval History:  No significant events or changes since last visit    Review of Systems  Review of Systems   Constitutional: Negative for fever.   Eyes: Negative for blurred vision.   Respiratory: Negative for shortness of breath.    Cardiovascular: Negative for palpitations.   Gastrointestinal: Negative for nausea and vomiting.   Neurological: Negative for dizziness and headaches.   Psychiatric/Behavioral: Negative for hallucinations.        Physical Exam  Temp:  [36.8 °C (98.2 °F)-37.1 °C (98.8 °F)] 36.8 °C (98.2 °F)  Pulse:  [65-70] 65  Resp:  [17-20] 17  BP: (108-136)/(53-60) 136/55    Physical Exam  Vitals signs and nursing note reviewed.   Constitutional:       General: He is not in acute distress.  HENT:      Mouth/Throat:      Mouth: Mucous membranes are moist.      Pharynx: Oropharynx is clear.   Eyes:      General: No scleral icterus.  Neck:      Musculoskeletal: No neck rigidity.   Cardiovascular:      Rate and Rhythm: Normal rate and regular rhythm.      Heart sounds: No murmur.   Pulmonary:      Effort: Pulmonary effort is normal.      Breath sounds: Normal breath sounds. No stridor.   Abdominal:      General: There is no distension.      Palpations: Abdomen is soft.      Tenderness: There is no abdominal tenderness.   Musculoskeletal:      Right lower leg: No edema.      Left lower leg: No edema.      Comments: Has left AKA   Skin:     General: Skin is warm and dry.      Findings: No rash.      Comments: Large lesion on right cheek   Neurological:      Mental Status: He is alert and oriented to person, place, and time.   Psychiatric:         Mood and Affect: Mood normal.         Behavior: Behavior normal.         Fluids    Intake/Output Summary (Last 24 hours) at 9/23/2020 0954  Last data filed at 9/23/2020 0500  Gross per 24 hour   Intake 680 ml   Output 1350 ml   Net -670 ml       Laboratory  Recent Labs      20  0555   WBC 8.8   RBC 3.59*   HEMOGLOBIN 11.0*   HEMATOCRIT 36.8*   .5*   MCH 30.6   MCHC 29.9*   RDW 58.1*   PLATELETCT 269   MPV 11.7     Recent Labs     20  0555   POTASSIUM 4.6                   Assessment/Plan  AKA stump complication (HCC)- (present on admission)  Assessment & Plan  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  S/P left AKA for critical limb ischemia (2020)  Post-op complications included necrosis and infection  S/P AKA revision in 2020  S/P AKA I&D x 3  Wound vac per Wound Care  Wound Cx +MRSA and Enterococcus  S/P Vanco & Unasyn     PVD (peripheral vascular disease) with claudication (HCC)- (present on admission)  Assessment & Plan  On Lipitor  Consider ASA    Diabetes (HCC)- (present on admission)  Assessment & Plan  Hba1c: 7.3 () --> 5.1 ()  BS had been ok with no coverage needed   Off accuchecks  Note: needs out patient follow up / monitoring    Elevated brain natriuretic peptide (BNP) level  Assessment & Plan  BNP: 5607 () --> 5737 () --> 4502 ()  Asymptomatic  O2 sats good on RA  No edema  No lung crackles  Probably chronic  Suggest f/u with PMD for possible workup -- consider echo  Monitor    Hypophosphatemia  Assessment & Plan  Currently resolved  PO4: 3.1 ()  On Supplements    Hypernatremia  Assessment & Plan  Na: 147 () --> 142 ()  ? Etiology  Bun/Cr wnl  On Cardiac diet   S/P previous D5W at 50 mL/hr x 500 mL (x 2 different occasions)  Monitor    Vitamin D deficiency  Assessment & Plan  Vit D: 10  On supplements    Anemia  Assessment & Plan  H&H improved: Hb 11.0 ()  TSH normal  B12: 198  Folate: 2.9  On B12 & folate supplements  Monitor    HTN (hypertension)- (present on admission)  Assessment & Plan  BP ok  On Core.25 mg bid  On Lisinopril: 20 mg daily   Cont to monitor    Skin lesion of face- (present on admission)  Assessment & Plan  Pt reports right cheek lesion has been ongoing for at least 1 yr  Needs  Dermatology F/U

## 2020-09-23 NOTE — DISCHARGE SUMMARY
Discharge Summary    CHIEF COMPLAINT ON ADMISSION  No chief complaint on file.      Reason for Admission  (L) AKA     CODE STATUS  Full Code    HPI & HOSPITAL COURSE  This is a 75 y.o. male here with left AKA, with a PMH of DVT, PVD with previous fem-pop bypass, DM, HLD, basal cell carcinoma of the face, and HLD. See rehab H&P for more details, in summary he had arterial occlusion of left leg and had AKA with Dr. Fisher on 6/30/20, with washout and revision on 7/29, complicated by MRSA and enterococcus, treated with vanc and unasyn, with another revision on 7/31 and 8/2 with wound vac placement. Patient was transferred to [facility] for long term antibiotics, wound healing, and therapy. He was admitted to [facility] rehab on 9/4 for accelerated rehab and wound care. His wound continues to not heal and he has reached a plateau on his therapy gains. Dr. Fisher is going to do another revision tomorrow morning to cut back some bone and hopefully get this closed.    Therefore, he is discharged in fair and stable condition to the acute hospital.    The patient met 2-midnight criteria for an inpatient stay at the time of discharge.      FOLLOW UP ITEMS POST DISCHARGE  none    DISCHARGE DIAGNOSES  Active Problems:    PVD (peripheral vascular disease) with claudication (HCC) (Chronic) POA: Yes    AKA stump complication (HCC) POA: Yes    Diabetes (HCC) POA: Yes    Anemia POA: Unknown    Vitamin D deficiency POA: Unknown    Alkaline phosphatase elevation POA: Yes    Hypernatremia POA: Unknown    Hypokalemia POA: Unknown    Hypophosphatemia POA: Unknown    Elevated brain natriuretic peptide (BNP) level POA: Unknown    Skin lesion of face POA: Yes    HTN (hypertension) POA: Yes  Resolved Problems:    * No resolved hospital problems. *      FOLLOW UP  No future appointments.  Usman Fisher M.D.  [address]  [address]  [facility] [id number] [phone]    On 9/24/2020 Thursday, surgical debridement scheduled with check in at  6am.      MEDICATIONS ON DISCHARGE     Medication List      ASK your doctor about these medications      Instructions   aspirin 81 MG EC tablet   Take 1 Tab by mouth every day.  Dose: 81 mg     atorvastatin 40 MG Tabs  Commonly known as: LIPITOR   Take 1 Tab by mouth every evening.  Dose: 40 mg     gabapentin 100 MG Caps  Commonly known as: NEURONTIN   Take 1 Cap by mouth 3 times a day.  Dose: 100 mg     lisinopril 5 MG Tabs  Commonly known as: PRINIVIL   Take 3 Tabs by mouth every day.  Dose: 15 mg     NS SOLN 100 mL with ampicillin/sulbactam 3 (2-1) g SOLR 3 g   Doctor's comments: The last day is 9/6/20  3 g by Intravenous route every 6 hours.  Dose: 3 g     NS SOLN 50 mL with DAPTOmycin 350 MG SOLR 1,000 mg   Doctor's comments: Last day is 9/6/20  Check cpk,cmp and cbc weekly  1,000 mg by Intravenous route every 24 hours.  Dose: 8 mg/kg            Allergies  Allergies   Allergen Reactions   • No Known Drug Allergy        DIET  Orders Placed This Encounter   Procedures   • Diet Order Cardiac (Thin), Diabetic, 2 Gram Sodium     Standing Status:   Standing     Number of Occurrences:   1     Order Specific Question:   Diet:     Answer:   Cardiac [6]     Comments:   Thin     Order Specific Question:   Diet:     Answer:   Diabetic [3]     Order Specific Question:   Diet:     Answer:   2 Gram Sodium [7]       ACTIVITY  NWB LLE   Bed rest until cleared by surgery     LINES, DRAINS, AND WOUNDS  This is an automated list. Peripheral IVs will be removed prior to discharge.     Urethral Catheter 16 Fr. (Active)   Site Assessment Clean;Skin intact 09/22/20 2100   Collection Container Standard drainage bag 09/22/20 2100   Urinary Catheter Care Tamper Evident Seal in Place;Drainage Tube Extended;Drainage Bag Not Overfilled;Drainage Tubing Properly Secured;Cath Care Done with Soap & Water 09/22/20 2100   Securement Method Securing device (Describe) 09/22/20 2100   Output (mL) 400 mL 09/22/20 1219      Moisture Associated Skin  Damage 09/04/20 Buttock;Groin;Thigh (Active)   NEXT Weekly Photo (Inpatient Only) 09/19/20 09/16/20 1315   Drainage Amount None 09/22/20 2100   Periwound Assessment Red 09/23/20 0750   IAD Cleansing Soap and Water 09/22/20 2100   Periwound Protectant Barrier Paste 09/16/20 1315   IAD Containment Device Indwelling Catheter 09/23/20 0750       Wound 09/04/20 Other (comment) Face Right Cheek skin cancer tumor (Active)   Wound Image   09/04/20 1700   Site Assessment Black;Pink;Red 09/23/20 0750   Periwound Assessment Red 09/23/20 0750   Margins Undefined edges 09/23/20 0750   Closure None 09/23/20 0750   Drainage Amount None 09/23/20 0750   Wound Cleansing Not Applicable 09/23/20 0750   Periwound Protectant Not Applicable 09/23/20 0750   Dressing Cleansing/Solutions Not Applicable 09/23/20 0750   Dressing Options Open to Air 09/23/20 0750   Dressing Status Open to Air 09/23/20 0750   NEXT Weekly Photo (Inpatient Only) 09/19/20 09/16/20 1315       Wound 09/04/20 Full Thickness Wound Thigh Left Left AKA wound (Active)   Wound Image    09/18/20 1348   Site Assessment MARYJANE 09/23/20 0750   Periwound Assessment MARYJANE 09/23/20 0750   Margins MARYJANE 09/23/20 0750   Closure Secondary intention 09/22/20 1000   Drainage Amount Moderate 09/23/20 0750   Drainage Description Serosanguineous 09/23/20 0750   Treatments Cleansed;Site care 09/22/20 1000   Wound Cleansing Approved Wound Cleanser 09/22/20 1000   Periwound Protectant Skin Protectant Wipes to Periwound 09/22/20 1000   Dressing Cleansing/Solutions Not Applicable 09/22/20 1000   Dressing Options Wound Vac 09/23/20 0750   Dressing Changed Changed 09/22/20 1000   Dressing Status Clean;Dry;Intact 09/23/20 0750   Dressing Change/Treatment Frequency By Wound Team Only 09/23/20 0750   NEXT Dressing Change/Treatment Date 09/24/20 09/22/20 1000   NEXT Weekly Photo (Inpatient Only) 09/24/20 09/22/20 1000   Non-staged Wound Description Full thickness 09/22/20 1000   Wound Length (cm) 7 cm  09/18/20 1600   Wound Width (cm) 10.5 cm 09/18/20 1600   Wound Depth (cm) 4 cm 09/18/20 1600   Wound Surface Area (cm^2) 73.5 cm^2 09/18/20 1600   Wound Volume (cm^3) 294 cm^3 09/18/20 1600   Wound Healing % -9 09/18/20 1600   Wound Bed Granulation (%) 40 % 09/22/20 1000   Wound Bed Epithelium (%) 0 % 09/11/20 0915   Wound Bed Slough (%) 60 % 09/22/20 1000   Wound Bed Eschar (%) 0 % 09/11/20 0915   Undermining (cm) 3.5 cm 09/18/20 1600   Undermining of Wound, 1st Location From 11 o'clock;To 2 o'clock 09/18/20 1600   Shape oval 09/18/20 1600   Wound Odor Foul 09/18/20 1600   Exposed Structures Bone 09/18/20 1600   WOUND NURSE ONLY - Time Spent with Patient (mins) 60 09/18/20 1600       Wound 09/04/20 Partial Thickness Wound Thigh Medial Left medial thigh (Active)   Wound Image   09/04/20 1700   Site Assessment Pink;Red 09/23/20 0750   Periwound Assessment Clean;Dry;Intact 09/23/20 0750   Margins Defined edges 09/23/20 0750   Closure Secondary intention 09/23/20 0750   Drainage Amount None 09/23/20 0750   Drainage Description Serous 09/23/20 0750   Treatments Cleansed 09/09/20 0930   Wound Cleansing Not Applicable 09/23/20 0750   Periwound Protectant Barrier Paste 09/23/20 0750   Dressing Cleansing/Solutions Not Applicable 09/16/20 1315   Dressing Options Open to Air 09/21/20 2000   Dressing Changed Observed 09/20/20 1000   Dressing Status Open to Air 09/20/20 1000   Dressing Change/Treatment Frequency Every 48 hrs, and As Needed 09/14/20 1015   NEXT Dressing Change/Treatment Date 09/11/20 09/10/20 2010   NEXT Weekly Photo (Inpatient Only) 09/19/20 09/16/20 1315   Non-staged Wound Description Partial thickness 09/09/20 0930   Wound Length (cm) 1 cm 09/05/20 1600   Wound Width (cm) 1.5 cm 09/05/20 1600   Wound Surface Area (cm^2) 1.5 cm^2 09/05/20 1600   Tunneling (cm) 0 cm 09/09/20 0930   Undermining (cm) 0 cm 09/09/20 0930   Shape irregular 09/09/20 0930   Wound Odor None 09/09/20 0930   Exposed Structures None  09/09/20 0930              Urethral Catheter 16 Fr. (Active)   Site Assessment Clean;Skin intact 09/22/20 2100   Collection Container Standard drainage bag 09/22/20 2100   Urinary Catheter Care Tamper Evident Seal in Place;Drainage Tube Extended;Drainage Bag Not Overfilled;Drainage Tubing Properly Secured;Cath Care Done with Soap & Water 09/22/20 2100   Securement Method Securing device (Describe) 09/22/20 2100   Output (mL) 400 mL 09/22/20 1219        MENTAL STATUS ON TRANSFER  Level of Consciousness: Alert  Orientation : Oriented x 4  Speech: Speech Clear    CONSULTATIONS  Hospitalist     PROCEDURES  Wound care     LABORATORY  Lab Results   Component Value Date    SODIUM 142 09/20/2020    POTASSIUM 4.6 09/21/2020    CHLORIDE 105 09/20/2020    CO2 27 09/20/2020    GLUCOSE 133 (H) 09/20/2020    BUN 16 09/20/2020    CREATININE 1.11 09/20/2020        Lab Results   Component Value Date    WBC 8.8 09/21/2020    HEMOGLOBIN 11.0 (L) 09/21/2020    HEMATOCRIT 36.8 (L) 09/21/2020    PLATELETCT 269 09/21/2020        Total time of the discharge process exceeds 35 minutes.    Pernell Ling, DO   Physical Medicine and Rehabilitation   9/23/2020

## 2020-09-23 NOTE — THERAPY
Occupational Therapy  Daily Treatment     Patient Name: Jimenez Bains  Age:  75 y.o., Sex:  male  Medical Record #: [id number]  Today's Date: 9/23/2020     Precautions  Precautions: (P) Fall Risk, Non Weight Bearing Left Lower Extremity  Comments: (P) wound vac L residual limb, sores on genital  area, acrpio         Subjective    Pt received supine in bed, reported continued pain but agreeable to OOB for therapy     Objective       09/23/20 1231   Precautions   Precautions Fall Risk;Non Weight Bearing Left Lower Extremity   Comments wound vac L residual limb, sores on genital  area, carpio   Balance   Comments sit to stand x1 in // bars, cues to bring torso over LAYNE, cues for anterior weightshift to come into standing   OT Total Time Spent   OT Individual Total Time Spent (Mins) 30   OT Charge Group   OT Therapy Activity 2     Mod A supine to sit, mod A squat pivot to w/c on L side    Assessment    Pt tolerated session fair, able to tolerate out of bed, required increased assist for bed mobility and transfer due to intial pain, pain did calm down once in w/c. Required cues for positioning for sit to stand and for posture in standing, heavy reliance on UEs.   Strengths: Pleasant and cooperative, Willingly participates in therapeutic activities  Barriers: Confused, Decreased endurance, Generalized weakness, Impaired balance, Limited mobility, Pain    Plan    Continue to progress gross strengthening and endurance and standing tolerance/balance toward increased safety and independence with ADLs, general attention to hygiene and self-care, weightshift when scooting    Occupational Therapy Goals     Problem: OT Long Term Goals     Dates: Start: 09/05/20       Goal: LTG-By discharge, patient will complete basic self care tasks     Dates: Start: 09/05/20       Description: 1) Individualized Goal:  Mod I for BADL's via AE/DME PRN  2) Interventions: OT Self Care/ADL, OT Neuro Re-Ed/Balance, OT Therapeutic Activity, OT  Evaluation, and OT Therapeutic Exercise            Goal: LTG-By discharge, patient will perform bathroom transfers     Dates: Start: 09/05/20       Description: 1) Individualized Goal:  Mod I for toilet and shower transfer via DME  2) Interventions: OT Self Care/ADL, OT Neuro Re-Ed/Balance, OT Therapeutic Activity, OT Evaluation, and OT Therapeutic Exercise

## 2020-09-23 NOTE — PROGRESS NOTES
2 Rn skin check done with LOUIS Lr.      Large growth the the R cheek/nose.  Large portion of L ear missing  Scattered abrasions noted to the L shoulder.  Skin tears to bilateral forearms.  Redness/excoriation noted to the groin, scrotum, sacrum and bilateral buttocks,  Redness noted to the tip of the penis  Rash noted to the back.  L AKA with home wound vac.  Skin overall fragile and flaky.    Devices:  PIV to the ANA  Wound vac to the L AKA  Brothers catheter with stat lock to the R inner thigh    Interventions:  Waffle overlay  Pillows in use for support and positioning  Q 2 hour turns  Frequent linen changes for episodes of incontinence.  Barrier wipes  Barrier cream    Pictures taken and uploaded to Epic.  Wound consult placed for sacrum.

## 2020-09-23 NOTE — THERAPY
"Recreational Therapy  Daily Treatment     Patient Name: Jimenez Bains  AGE:  75 y.o., SEX:  male  Medical Record #: [id number]  Today's Date: 9/23/2020       Subjective    Pt reporting he had been irritable and \"yelled\" at staff. Pt was pleasant and cooperative during this session.      Objective       09/23/20 1431   Functional Ability Status - Cognitive   Attention Span Remains on Task   Comprehension Follows Three Step Commands   Judgment Able to Make Independent Decisions   Functional Ability Status - Emotional    Affect Appropriate   Mood Appropriate   Behavior Appropriate   Skilled Intervention    Skilled Intervention Relaxation / Coping Skills;Cognitive Leisure   Skilled Intervention Comments Waka a new card game.    Interdisciplinary Plan of Care Collaboration   IDT Collaboration with  Certified Nursing Assistant;Other (See Comments)  (Transport)   Patient Position at End of Therapy In Bed   Collaboration Comments Transfer of care to [facility] and Transport.    Strengths & Barriers   Strengths Able to follow instructions;Motivated for self care and independence;Pleasant and cooperative;Willingly participates in therapeutic activities   Barriers Agitation;Emotional lability;Fatigue;Generalized weakness;Impaired activity tolerance   Treatment Time   Total Time Spent (mins) 15   Total Time Missed 15   Reasons for Time Missed Medical-Patient With Nursing  (Transfering to [facility] for care. )   Procedural Tracking   Procedural Tracking Leisure Skills Development;Cognitive Skills Training       Assessment    Pt was able to learn a new card game. Pt chose to use his L UE and not his R UE during the activity.Pt was provided oversized cards as he shared he was having trouble seeing the activity in the prior session together.     Strengths: (P) Able to follow instructions, Motivated for self care and independence, Pleasant and cooperative, Willingly participates in therapeutic activities  Barriers: (P) Agitation, " Emotional lability, Fatigue, Generalized weakness, Impaired activity tolerance    Plan    Further leisure activities he could participate in following discharge.

## 2020-09-23 NOTE — PROGRESS NOTES
"Rehab Progress Note   Chief Complaint: Left AKA    Interval Events (Subjective)  Working on SNF placement and arranging surgical debridement. Patient will need SNF placement until his wound vac is cleared and his grand daughter is healed from her  and can help. He is disappointed he cannot go home but family understands and is appreciative.     Objective:  VITAL SIGNS: /55   Pulse 65   Temp 36.8 °C (98.2 °F) (Temporal)   Resp 17   Ht 1.676 m (5' 6\")   Wt 63 kg (138 lb 14.2 oz)   SpO2 92%   BMI 22.42 kg/m²     Physical Exam:  Vitals: /55   Pulse 65   Temp 36.8 °C (98.2 °F) (Temporal)   Resp 17   Ht 1.676 m (5' 6\")   Wt 63 kg (138 lb 14.2 oz)   SpO2 92%   Gen: NAD  Head:  NC/AT, Large growth on left face  Eyes/ Nose/ Mouth: PERRLA, moist mucous membranes  Cardio: RRR, no mumurs  Pulm: CTAB, with normal respiratory effort  Abd: Soft NTND, active bowel sounds,   Ext: No peripheral edema. No calf tenderness. No clubbing/cyanosis. Left AKA with VAC   Mental status: answers questions appropriately follows commands  Speech: fluent, no aphasia or dysarthria  Tone: no spasticity noted, no cogwheeling noted    Recent Results (from the past 72 hour(s))   URINALYSIS    Collection Time: 20  5:15 PM    Specimen: Urine, Brothers Cath   Result Value Ref Range    Color DK Yellow     Character Clear     Specific Gravity 1.033 <1.035    Ph 5.0 5.0 - 8.0    Glucose Negative Negative mg/dL    Ketones Trace (A) Negative mg/dL    Protein 30 (A) Negative mg/dL    Bilirubin Negative Negative    Urobilinogen, Urine 0.2 Negative    Nitrite Negative Negative    Leukocyte Esterase Negative Negative    Occult Blood Small (A) Negative    Micro Urine Req Microscopic    URINE MICROSCOPIC (W/UA)    Collection Time: 20  5:15 PM   Result Value Ref Range    WBC 0-2 (A) /hpf    RBC 2-5 (A) /hpf    Bacteria Negative None /hpf    Epithelial Cells Negative /hpf    Hyaline Cast 0-2 /lpf   POTASSIUM SERUM (K)    " Collection Time: 09/21/20  5:55 AM   Result Value Ref Range    Potassium 4.6 3.6 - 5.5 mmol/L   CBC WITHOUT DIFFERENTIAL    Collection Time: 09/21/20  5:55 AM   Result Value Ref Range    WBC 8.8 4.8 - 10.8 K/uL    RBC 3.59 (L) 4.70 - 6.10 M/uL    Hemoglobin 11.0 (L) 14.0 - 18.0 g/dL    Hematocrit 36.8 (L) 42.0 - 52.0 %    .5 (H) 81.4 - 97.8 fL    MCH 30.6 27.0 - 33.0 pg    MCHC 29.9 (L) 33.7 - 35.3 g/dL    RDW 58.1 (H) 35.9 - 50.0 fL    Platelet Count 269 164 - 446 K/uL    MPV 11.7 9.0 - 12.9 fL   MAGNESIUM    Collection Time: 09/21/20  5:55 AM   Result Value Ref Range    Magnesium 2.0 1.5 - 2.5 mg/dL   COVID/SARS CoV-2 PCR    Collection Time: 09/22/20  5:00 PM    Specimen: Nasopharyngeal; Respirate   Result Value Ref Range    COVID Order Status Received    SARS-CoV-2, PCR (In-House)    Collection Time: 09/22/20  5:00 PM   Result Value Ref Range    SARS-CoV-2 Source NP Swab     SARS-CoV-2 by PCR NotDetected        Current Facility-Administered Medications   Medication Frequency   • phosphorus (K-Phos-Neutral) per tablet 250 mg BID   • lidocaine (XYLOCAINE) 2 % injection 20 mL QDAY PRN    Or   • lidocaine (XYLOCAINE) 4 % topical solution QDAY PRN   • DULoxetine (CYMBALTA) capsule 20 mg DAILY   • gabapentin (NEURONTIN) capsule 900 mg TID   • traZODone (DESYREL) tablet 50 mg QHS   • bacitracin-polymyxin b (POLYSPORIN) 500-42584 UNIT/GM ointment BID   • bacitracin-neomycin-polymyxin (Neosporin) 400-5-5000 ointment BID PRN   • lisinopril (PRINIVIL) tablet 20 mg DAILY   • lidocaine (XYLOCAINE) 4 % topical solution PRN   • oxyCODONE immediate-release (ROXICODONE) tablet 5 mg DAILY   • oxyCODONE immediate release (ROXICODONE) tablet 10 mg Q4HRS PRN   • folic acid (FOLVITE) tablet 1 mg DAILY   • cyanocobalamin (VITAMIN B-12) tablet 1,000 mcg DAILY   • miconazole 2%-zinc oxide (Hilary) topical cream Q6HRS PRN   • vitamin D (cholecalciferol) tablet 4,000 Units DAILY   • glucose 4 g chewable tablet 16 g Q15 MIN PRN    And    • dextrose 50% (D50W) injection 50 mL Q15 MIN PRN   • acetaminophen (TYLENOL) tablet 1,000 mg TID   • Respiratory Therapy Consult Continuous RT   • tramadol (ULTRAM) 50 MG tablet 50 mg Q4HRS PRN   • hydrALAZINE (APRESOLINE) tablet 25 mg Q8HRS PRN   • enoxaparin (LOVENOX) inj 40 mg DAILY   • acetaminophen (TYLENOL) tablet 650 mg Q4HRS PRN   • senna-docusate (PERICOLACE or SENOKOT S) 8.6-50 MG per tablet 2 Tab BID    And   • polyethylene glycol/lytes (MIRALAX) PACKET 1 Packet QDAY PRN    And   • magnesium hydroxide (MILK OF MAGNESIA) suspension 30 mL QDAY PRN    And   • bisacodyl (DULCOLAX) suppository 10 mg QDAY PRN   • benzocaine-menthol (CEPACOL) lozenge 1 Lozenge Q2HRS PRN   • mag hydrox-al hydrox-simeth (MAALOX PLUS ES or MYLANTA DS) suspension 20 mL Q2HRS PRN   • ondansetron (ZOFRAN ODT) dispertab 4 mg 4X/DAY PRN    Or   • ondansetron (ZOFRAN) syringe/vial injection 4 mg 4X/DAY PRN   • sodium chloride (OCEAN) 0.65 % nasal spray 2 Spray PRN   • midazolam (VERSED) 5 mg/mL (1 mL vial) PRN   • atorvastatin (LIPITOR) tablet 40 mg Q EVENING   • oxyCODONE immediate-release (ROXICODONE) tablet 2.5 mg Q3HRS PRN   • oxyCODONE immediate-release (ROXICODONE) tablet 5 mg Q3HRS PRN   • omeprazole (PRILOSEC) capsule 20 mg DAILY   • carvedilol (COREG) tablet 6.25 mg BID WITH MEALS       Orders Placed This Encounter   Procedures   • Diet Order Cardiac (Thin), Diabetic, 2 Gram Sodium     Standing Status:   Standing     Number of Occurrences:   1     Order Specific Question:   Diet:     Answer:   Cardiac [6]     Comments:   Thin     Order Specific Question:   Diet:     Answer:   Diabetic [3]     Order Specific Question:   Diet:     Answer:   2 Gram Sodium [7]       Assessment:  Active Hospital Problems    Diagnosis   • AKA stump complication (HCC)   • PVD (peripheral vascular disease) with claudication (HCC)   • Diabetes (HCC)   • HTN (hypertension)   • Basal cell carcinoma of face   • Hypophosphatemia   • Elevated brain  natriuretic peptide (BNP) level   • Hypokalemia   • Hypernatremia   • Alkaline phosphatase elevation   • Anemia   • Vitamin D deficiency       Medical Decision Making and Plan:  Acute encephalopathy - Concern for encephalopathy with ongoing multiple medical comorbidities and confusion about hospital course as well as not understanding carpio catheter. Alternatively could be medicated for transfer but therapy reporting difficulty with cuing and following commands.   -PT and OT for mobility and ADLs  -SLP for cognitive evaluation     Left BKA - Patient with severe PVD with L AKA on 6/30/20 with multiple revisions due to necrosis and infection. Cultures grew MRSA and Enterococcus. Per ID continue antibiotics until 9/6/20. Transferred to [facility] from 8/12/20 to 9/4/20.   -Vancomycin and Unasyn until 9/6/20. Completed.   -Redress on 9/15/20 as wound vac not turned on and significant pain and drainage.   -Continue wound vac. Consult wound care - added honey to regimen with slow improvement in size and depth. Lidocaine 4% PRN for dressing change      Multiple wounds - Patient has rash to abdomen, redness on sacrum and left buttocks on transfer. Consult wound care.      Right face basal cell carcinoma - Unclear what follow-up. Follow-up Oncology     Anemia - Check AM CBC - 8.8. Continue to monitor.      HTN - Patient on Lisinopril 10 mg and Coreg 6.25 mg BID  -Hospistalist consulted.      Pain control - Primarily on admission with left phantom limb pain. Patient on Gabapentin 100 mg TID and Oxycodone. Can consider increase in Gabapentin for neuropathic phantom limb pain.   -Increase Gabapentin to 400 mg TID. Oxycodone 10 mg for wound care changes. Concern for over sedation with higher opiate dosing, limit to wound vac change, lidocaine jelly for wound change.   -Increase Gabapentin to 900 mg TID and schedule Oxycodone 5 mg first thing in AM. May need to schedule doses as patient unable to remember to ask at times.  -Start  Cymbalta which may help with pain.       DM with hyperglycemia - Patient on SSI on transfer. Consult Hospitalist  -Discontinue SSI     COVID - Patient with check x2 at [facility]. Patient without symptoms. Per policy no check within 7 days. Patient without symptoms. Check PCR, on precautions - negative, no precautions.      Insomnia/Mood - Schedule Trazodone QHS     GI Ppx - Patient on Prilosec      DVT Ppx - Patient on Lovenox on transfer.  Constipation, no BM since , PRN Miralax and MoM     Dispo - inadequate DC plan. Patient has only one caregiver and requires  care and she cannot be there all the time. Other care giver is post-partum  and has weight lifting restriction. Best plan would be to DC to SNF after washout on . Alternatively he could be admitted to acute. DC plan still in flux.      Total time:  28 minutes.  I spent greater than 50% of the time for patient care and coordination on this date, including unit/floor time, and face-to-face time with the patient as per assessment and plan above.    Pernell Ling, DO   Physical Medicine and Rehabilitation

## 2020-09-23 NOTE — DISCHARGE PLANNING
Met w/ patient's daughter, Rehana, in lobby. Updated regarding discharge to [facility] for surgical procedure tomorrow w/ Dr. Fisher. Provided competency form & financial POA form from internet to daughter, along w/ mobile [employer] list. Provided W[employer] application form & reviewed W[employer] / Aging & Disability. Questions answered. Emotional support provided.

## 2020-09-23 NOTE — THERAPY
"Physical Therapy   Daily Treatment     Patient Name: Jimenez Bains  Age:  75 y.o., Sex:  male  Medical Record #: [id number]  Today's Date: 9/23/2020     Precautions  Precautions: (P) Fall Risk, Non Weight Bearing Left Lower Extremity  Comments: (P) wound vac L residual limb, sores on genital  area, carpio    Subjective    Patient seated in w/c and agreeable to therapy.     Objective       09/23/20 1301   Precautions   Precautions Fall Risk;Non Weight Bearing Left Lower Extremity   Comments wound vac L residual limb, sores on genital  area, carpio   Gait Functional Level of Assist    Gait Level Of Assist Stand by Assist   Assistive Device Parallel Bars   Distance (Feet) 10   # of Times Distance was Traveled 1   Deviation   (can use FWW with Min A)   Wheelchair Functional Level of Assist   Wheelchair Assist Supervised   Distance Wheelchair (Feet or Distance) 150   Wheelchair Description Extra time;Supervision for safety;Verbal cueing   Stairs Functional Level of Assist   Level of Assist with Stairs Unable to Participate   Transfer Functional Level of Assist   Bed, Chair, Wheelchair Transfer Stand by Assist  (can be Min A pending pain/fatigue)   Bed Chair Wheelchair Transfer Description Adaptive equipment;Increased time;Squat pivot transfer to wheelchair;Supervision for safety;Verbal cueing   Sitting Lower Body Exercises   Comments seated core strengthening with pink theraband: resisted trunk rotation 2x15 each direction, overhead anterior pull down 2x15, overhead resisted 'Y\" x5   Bed Mobility    Sit to Supine Stand by Assist   Sit to Stand Stand by Assist  (to CGA)   Scooting Supervised   Rolling Supervised   Interdisciplinary Plan of Care Collaboration   Patient Position at End of Therapy In Bed;Call Light within Reach;Tray Table within Reach;Phone within Reach   PT Total Time Spent   PT Individual Total Time Spent (Mins) 60   PT Charge Group   PT Gait Training 1   PT Therapeutic Exercise 2   PT Therapeutic Activities " 1     Application of ice pack to L residual limb for pain management.    Assessment    Patient tolerated session well with modification of activities to allow for ice application. Requires cues for proper sequencing and safety with transfers.    Strengths: Independent prior level of function, Motivated for self care and independence, Able to follow instructions  Barriers: Pain, Confused, Impaired balance, Impaired activity tolerance, Decreased endurance    Plan    Anticipated d/c today for surgery tomorrow.    Physical Therapy Problems     Problem: Mobility     Dates: Start: 09/05/20       Goal: STG-Within one week, patient will ambulate household distance     Dates: Start: 09/05/20       Description: 1) Individualized goal:  10 ft with FWW and SBA-CGA.  2) Interventions:  PT Group Therapy, PT Gait Training, PT Therapeutic Exercises, PT Neuro Re-Ed/Balance, PT Therapeutic Activity, and PT Evaluation    Note:     Goal Note filed on 09/16/20 1528 by Staci Dean, PT    Limited by pain                        Problem: Mobility Transfers     Dates: Start: 09/05/20       Goal: STG-Within one week, patient will sit to stand     Dates: Start: 09/05/20       Description: 1) Individualized goal:  with FWW and SBA.  2) Interventions:  PT Group Therapy, PT Gait Training, PT Therapeutic Exercises, PT Neuro Re-Ed/Balance, PT Therapeutic Activity, and PT Evaluation    Note:     Goal Note filed on 09/16/20 1528 by Staci Dean PT    Limited by pain                        Problem: PT-Long Term Goals     Dates: Start: 09/05/20       Goal: LTG-By discharge, patient will propel wheelchair     Dates: Start: 09/05/20       Description: 1) Individualized goal:  150 ft mod I.  2) Interventions:  PT Group Therapy, PT Gait Training, PT Therapeutic Exercises, PT Neuro Re-Ed/Balance, PT Therapeutic Activity, and PT Evaluation          Goal: LTG-By discharge, patient will ambulate     Dates: Start: 09/05/20       Description: 1) Individualized  goal:  50 ft with FWW and supervision.  2) Interventions:  PT Group Therapy, PT Gait Training, PT Therapeutic Exercises, PT Neuro Re-Ed/Balance, PT Therapeutic Activity, and PT Evaluation          Goal: LTG-By discharge, patient will transfer one surface to another     Dates: Start: 09/05/20       Description: 1) Individualized goal:  with FWW vs. Squat pivot mod I.  2) Interventions:  PT Group Therapy, PT Gait Training, PT Therapeutic Exercises, PT Neuro Re-Ed/Balance, PT Therapeutic Activity, and PT Evaluation          Goal: LTG-By discharge, patient will perform home exercise program     Dates: Start: 09/05/20       Description: 1) Individualized goal:  with handout mod I.  2) Interventions:  PT Group Therapy, PT Gait Training, PT Therapeutic Exercises, PT Neuro Re-Ed/Balance, PT Therapeutic Activity, and PT Evaluation          Goal: LTG-By discharge, patient will     Dates: Start: 09/05/20       Description: 1) Individualized goal:  perform bed mobility including rolling to prone mod I.  2) Interventions:  PT Group Therapy, PT Gait Training, PT Therapeutic Exercises, PT Neuro Re-Ed/Balance, PT Therapeutic Activity, and PT Evaluation

## 2020-09-23 NOTE — THERAPY
Speech Language Pathology  Daily Treatment     Patient Name: Jimenez Bains  Age:  75 y.o., Sex:  male  Medical Record #: [id number]  Today's Date: 9/23/2020     Precautions  Precautions: Fall Risk, Non Weight Bearing Left Lower Extremity  Comments: wound vac L residual limb, sores on genital  area, carpio    Subjective    Pt in bed, appearing somnolent at outset of session. Reuqired direct cues to open eyes, attend to therapist. Refused getting up OOB due to pain in L LE. Pt grimacing, groaning in response to pain off and on through session. Declined any intervention except attepmt to reposition to shift weight to R side.     Objective       09/23/20 0902   Precautions   Precautions Fall Risk;Non Weight Bearing Left Lower Extremity   Comments wound vac L residual limb, sores on genital  area, carpio   Cognition   Moderate Attention Moderate (3)   Orientation  Moderate (3)   Verbal Short Term Memory 5 Minutes   Functional Memory Activities Moderate (3)   Simple Reasoning / Problem Solving Moderate (3)   Functional Problem Solving Moderate (3)   Safety Awareness Moderate (3)   Insight into Deficits Moderate (3)   Initiation Moderate (3)   Sleep/Wake Cycle   Sleep & Rest Awake   Functional Level of Assist   Comprehension Supervision   Comprehension Description Verbal cues   Expression Supervision   Expression Description Verbal cueing   Social Interaction Minimal Assist   Social Interaction Description Increased time;Low stimulation environment;Schedule;Verbal cues   Problem Solving Maximal Assist   Problem Solving Description Verbal cueing;Increased time;Supervision;Therapy schedule   Memory Moderate Assist   Memory Description Verbal cueing;Therapy schedule;Supervision;Bed/chair alarm;Increased time   Strengths & Barriers   Strengths Able to follow instructions;Supportive family   Barriers Impaired functional cognition;Impaired activity tolerance;Pain   SLP Total Time Spent   SLP Individual Total Time Spent (Mins) 60  "  Treatment Charges   SLP Cognitive Skill Development First 15 Minutes 1   SLP Cognitive Skill Development Additional 15 Minutes 4       Assessment    Pt required MIN cues to demonstrate orientation to LOS, reason for hospitalization. Pt unable to verbalize the differences between PT, OT or SLP therapies, describe any targeted activities in prior session, and when prompted to use visual cues such as clock, communication board on wall or memory book, pt declined, stating \"I can't see. I'm practically blind.\"    MOD cues required for patient to verbalize normal daily routine PLOF. Poor awareness of current Dc plans or safety needs . Pt stated \"I can take a shower by myself.\" MIN cues required for pt to complete basic self care skills of oral care, grooming face and combing hair in bed. Low endurance and pain are significant barriers to progress and particiapation.     Strengths: Able to follow instructions, Supportive family  Barriers: Impaired functional cognition, Impaired activity tolerance, Pain    Plan    Continieu targeting insight, safety awarenss and problems solving.    Speech Therapy Problems     Problem: Memory STGs     Dates: Start: 09/05/20       Goal: STG-Within one week, patient will     Dates: Start: 09/05/20       Description: 1) Individualized goal:  recall daily events and safety strategies given min verbal cues 80% with use of memory book.  2) Interventions:  SLP Cognitive Skill Development and SLP Group Treatment                    Problem: Problem Solving STGs     Dates: Start: 09/05/20       Goal: STG-Within one week, patient will     Dates: Start: 09/05/20       Description: 1) Individualized goal:  answer basic problem solving questions with 80% accuracy given min verbal cues.    2) Interventions:  SLP Cognitive Skill Development and SLP Group Treatment                    Problem: Speech/Swallowing LTGs     Dates: Start: 09/05/20       Goal: LTG-By discharge, patient will solve basic problems  "    Dates: Start: 09/05/20       Description: 1) Individualized goal:  Mert for safe discharge home.   2) Interventions:  SLP Aphasia Evaluation, SLP Cognitive Skill Development, and SLP Group Treatment

## 2020-09-23 NOTE — DISCHARGE PLANNING
"Spoke w/ daughter, Rehana, via phone to discuss DC planning. Daughter \"upset & frustrated\" after today's interaction. States \"he was able to do things better last week. I can't take care of him. I want to, but I can't. My family is not going to help out. I don't see a choice but nursing home right now.\" Daughter asked about hospice care. Reviewed intermittent support w/ HH & hospice, not extended care assist. Discussed end of life wishes. States \"he says he is never going back to the hospital again.\" Offered Palliative or Hospice eval. Verbalizes agreement & understanding of referrals. States \"I want someone to figure out what he wants. He gets so stubborn about things & won't talk to me. I had to do all this w/ mom without help also.\" Will review w/ Dr. Ling. Asking for assistance w/ financial power of . Reviewed hospital unable to assist w/ documentation or notary, but private hire, mobile notaries are available to come to hospital. Will provide list of mobile notaries. Discussed SNF choice form. Requests referral to [facility] initially. Choice form completed. Discussed [employer] form for possible intermittent home assist programs. Will provide application form. Questions answered. Emotional support provided.  "

## 2020-09-23 NOTE — CARE PLAN
Problem: Pain Management  Goal: Pain level will decrease to patient's comfort goal  Description: Patient Verbalized Scheduled and PRN Roxicodone has been helping with pain. Patient verbalized will inform RN for pain not relieved by PRN meds  Reactivated     Problem: Respiratory:  Goal: Respiratory status will improve  Reactivated

## 2020-09-23 NOTE — DISCHARGE PLANNING
Per Dat at [facility] [place], referral declined as they are not taking admissions at this time.  CM notified.

## 2020-09-23 NOTE — PROGRESS NOTES
Received patient on bed. Patient conscious coherent. Patient verbalized no severe pain. Patient was able to swallow pills crush with apple sauce. Patient verbalized no concerns about discharged because his daughter and niece will take care of him. Patient wound vac intact and has no visible leak. Patient still have severe moisture damage in sacrum. Patient kept clean and dry and will be turned every 2 to 3 hours. Safety and fall precaution reinforced.per am nurse report patient droplet precaution was already discontinued. Patient on droplet precaution

## 2020-09-23 NOTE — PROGRESS NOTES
Hospital Medicine Daily Progress Note      Chief Complaint:  Hypertension  Diabetes    Interval History:  No significant events or changes since last visit    Review of Systems  Review of Systems   Constitutional: Negative for chills and fever.   Respiratory: Negative for shortness of breath.    Cardiovascular: Negative for chest pain.   Gastrointestinal: Negative for abdominal pain, diarrhea, nausea and vomiting.   Psychiatric/Behavioral: The patient is not nervous/anxious.         Physical Exam  Temp:  [37.1 °C (98.8 °F)-37.2 °C (98.9 °F)] 37.1 °C (98.8 °F)  Pulse:  [65-70] 70  Resp:  [18-20] 18  BP: (120-133)/(57-67) 120/60    Physical Exam  Vitals signs and nursing note reviewed.   Constitutional:       Appearance: Normal appearance.   HENT:      Head: Atraumatic.   Eyes:      Conjunctiva/sclera: Conjunctivae normal.      Pupils: Pupils are equal, round, and reactive to light.   Neck:      Musculoskeletal: Normal range of motion and neck supple.   Cardiovascular:      Rate and Rhythm: Normal rate and regular rhythm.      Heart sounds: No murmur.   Pulmonary:      Effort: Pulmonary effort is normal.      Breath sounds: No stridor. No wheezing or rales.   Abdominal:      General: There is no distension.      Palpations: Abdomen is soft.      Tenderness: There is no abdominal tenderness.   Musculoskeletal:      Right lower leg: No edema.      Left lower leg: No edema.      Comments: Has left AKA   Skin:     General: Skin is warm and dry.      Findings: No rash.      Comments: Large lesion on right cheek   Neurological:      Mental Status: He is alert and oriented to person, place, and time.   Psychiatric:         Mood and Affect: Mood normal.         Behavior: Behavior normal.         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)  Assessment & Plan  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  S/P left AKA for critical limb ischemia (2020)  Post-op complications included necrosis and infection  S/P AKA revision in 2020  S/P AKA I&D x 3  Wound vac per Wound Care  Wound Cx +MRSA and Enterococcus  S/P Vanco & Unasyn     PVD (peripheral vascular disease) with claudication (HCC)- (present on admission)  Assessment & Plan  On Lipitor  Consider ASA    Diabetes (HCC)- (present on admission)  Assessment & Plan  Hba1c: 7.3 () --> 5.1 ()  BS had been ok with no coverage needed   Off accuchecks  Note: needs out patient follow up / monitoring    Elevated brain natriuretic peptide (BNP) level  Assessment & Plan  BNP: 5607 () --> 5737 () --> 4502 ()  Asymptomatic  O2 sats good on RA  No edema  No lung crackles  Probably chronic  Suggest f/u with PMD for possible workup -- consider echo  Monitor    Hypophosphatemia  Assessment & Plan  Currently resolved  PO4: 3.1 ()  On Supplements    Hypernatremia  Assessment & Plan  Na: 147 () --> 142 ()  ? Etiology  Bun/Cr wnl  On Cardiac diet(limit 2g Na)  S/P previous D5W at 50 mL/hr x 500 mL (x 2 different occasions)  Monitor    Vitamin D deficiency  Assessment & Plan  Vit D: 10  On supplements    Anemia  Assessment & Plan  H&H improved: Hb 11.0 ()  TSH normal  B12: 198  Folate: 2.9  On B12 & folate supplements  Monitor    HTN (hypertension)- (present on admission)  Assessment & Plan  BP ok  On Core.25 mg bid  On Lisinopril: 20 mg daily   Cont to monitor    Basal cell  carcinoma of face- (present on admission)  Assessment & Plan  Pt reports right cheek lesion has been ongoing for at least 1 yr  Needs Dermatology F/U

## 2020-09-23 NOTE — DISCHARGE INSTRUCTIONS
Fall Prevention in the Home, Adult  Falls can cause injuries. They can happen to people of all ages. There are many things you can do to make your home safe and to help prevent falls. Ask for help when making these changes, if needed.  What actions can I take to prevent falls?  General Instructions  · Use good lighting in all rooms. Replace any light bulbs that burn out.  · Turn on the lights when you go into a dark area. Use night-lights.  · Keep items that you use often in easy-to-reach places. Lower the shelves around your home if necessary.  · Set up your furniture so you have a clear path. Avoid moving your furniture around.  · Do not have throw rugs and other things on the floor that can make you trip.  · Avoid walking on wet floors.  · If any of your floors are uneven, fix them.  · Add color or contrast paint or tape to clearly chris and help you see:  ? Any grab bars or handrails.  ? First and last steps of stairways.  ? Where the edge of each step is.  · If you use a stepladder:  ? Make sure that it is fully opened. Do not climb a closed stepladder.  ? Make sure that both sides of the stepladder are locked into place.  ? Ask someone to hold the stepladder for you while you use it.  · If there are any pets around you, be aware of where they are.  What can I do in the bathroom?         · Keep the floor dry. Clean up any water that spills onto the floor as soon as it happens.  · Remove soap buildup in the tub or shower regularly.  · Use non-skid mats or decals on the floor of the tub or shower.  · Attach bath mats securely with double-sided, non-slip rug tape.  · If you need to sit down in the shower, use a plastic, non-slip stool.  · Install grab bars by the toilet and in the tub and shower. Do not use towel bars as grab bars.  What can I do in the bedroom?  · Make sure that you have a light by your bed that is easy to reach.  · Do not use any sheets or blankets that are too big for your bed. They should not  hang down onto the floor.  · Have a firm chair that has side arms. You can use this for support while you get dressed.  What can I do in the kitchen?  · Clean up any spills right away.  · If you need to reach something above you, use a strong step stool that has a grab bar.  · Keep electrical cords out of the way.  · Do not use floor polish or wax that makes floors slippery. If you must use wax, use non-skid floor wax.  What can I do with my stairs?  · Do not leave any items on the stairs.  · Make sure that you have a light switch at the top of the stairs and the bottom of the stairs. If you do not have them, ask someone to add them for you.  · Make sure that there are handrails on both sides of the stairs, and use them. Fix handrails that are broken or loose. Make sure that handrails are as long as the stairways.  · Install non-slip stair treads on all stairs in your home.  · Avoid having throw rugs at the top or bottom of the stairs. If you do have throw rugs, attach them to the floor with carpet tape.  · Choose a carpet that does not hide the edge of the steps on the stairway.  · Check any carpeting to make sure that it is firmly attached to the stairs. Fix any carpet that is loose or worn.  What can I do on the outside of my home?  · Use bright outdoor lighting.  · Regularly fix the edges of walkways and driveways and fix any cracks.  · Remove anything that might make you trip as you walk through a door, such as a raised step or threshold.  · Trim any bushes or trees on the path to your home.  · Regularly check to see if handrails are loose or broken. Make sure that both sides of any steps have handrails.  · Install guardrails along the edges of any raised decks and porches.  · Clear walking paths of anything that might make someone trip, such as tools or rocks.  · Have any leaves, snow, or ice cleared regularly.  · Use sand or salt on walking paths during winter.  · Clean up any spills in your garage right  away. This includes grease or oil spills.  What other actions can I take?  · Wear shoes that:  ? Have a low heel. Do not wear high heels.  ? Have rubber bottoms.  ? Are comfortable and fit you well.  ? Are closed at the toe. Do not wear open-toe sandals.  · Use tools that help you move around (mobility aids) if they are needed. These include:  ? Canes.  ? Walkers.  ? Scooters.  ? Crutches.  · Review your medicines with your doctor. Some medicines can make you feel dizzy. This can increase your chance of falling.  Ask your doctor what other things you can do to help prevent falls.  Where to find more information  · Centers for Disease Control and Prevention, [place]: https://cdc.gov  · National [place] on Aging: https://ru6dlgv.paige.nih.gov  Contact a doctor if:  · You are afraid of falling at home.  · You feel weak, drowsy, or dizzy at home.  · You fall at home.  Summary  · There are many simple things that you can do to make your home safe and to help prevent falls.  · Ways to make your home safe include removing tripping hazards and installing grab bars in the bathroom.  · Ask for help when making these changes in your home.  This information is not intended to replace advice given to you by your health care provider. Make sure you discuss any questions you have with your health care provider.  Document Released: 10/14/2010 Document Revised: 04/09/2020 Document Reviewed: 08/02/2018  Elsevier Patient Education © 2020 Elsevier Inc.    Depression / Suicide Risk    As you are discharged from this [facility] facility, it is important to learn how to keep safe from harming yourself.    Recognize the warning signs:  · Abrupt changes in personality, positive or negative- including increase in energy   · Giving away possessions  · Change in eating patterns- significant weight changes-  positive or negative  · Change in sleeping patterns- unable to sleep or sleeping all the time   · Unwillingness or inability to  communicate  · Depression  · Unusual sadness, discouragement and loneliness  · Talk of wanting to die  · Neglect of personal appearance   · Rebelliousness- reckless behavior  · Withdrawal from people/activities they love  · Confusion- inability to concentrate     If you or a loved one observes any of these behaviors or has concerns about self-harm, here's what you can do:  · Talk about it- your feelings and reasons for harming yourself  · Remove any means that you might use to hurt yourself (examples: pills, rope, extension cords, firearm)  · Get professional help from the community (Mental Health, Substance Abuse, psychological counseling)  · Do not be alone:Call your Safe Contact- someone whom you trust who will be there for you.  · Call your local CRISIS HOTLINE 926-0532 or 374-140-6385  · Call your local Children's Mobile Crisis Response Team [place] (926) 682-6800 or www.Readyforce  · Call the toll free National Suicide Prevention Hotlines   · National Suicide Prevention Lifeline 437-053-OEVV (0120)  National Hope Line Network 800-SUICIDE (261-8435)  Helping Someone Who Is Suicidal  Suicide is the act of ending (taking) one's own life. Someone who is thinking about suicide needs immediate help. Even if you do not know what to say or do to help, you can start by letting the person know that you care. Listen to him or her. Then talk about how to get help. Help is available through suicide hotlines, therapy, and other treatments.  What are signs that someone is suicidal?  Common signs include:  Signs of depression, such as:  Tearfulness or sadness.  Irritability or rage.  Problems with eating or sleeping.  Feeling guilty or worthless.  Loss of interest in things that a person used to enjoy.  Feelings of hopelessness or helplessness.  Recurrent thoughts of death or suicide.  Changes in social behaviors and relationships, including:  Isolating oneself.  Withdrawing from friends and family.  Giving away  "possessions.  Saying goodbye.  Acting aggressively.  Sleeping more or less than usual.  Having trouble managing school or work.  Talking about feeling hopeless or being a burden.  Engaging in risky behaviors, such as drinking more alcohol or using more drugs.  What are the risk factors for suicide?  Risk factors for suicide include:  Having a friend or family member who has  by suicide.  A history of attempted suicide.  Depression or other mental health problems.  Being exposed to graphic stories of suicide in the media.  Alcohol or drug abuse, especially when combined with a mental illness.  A serious physical problem, such as chronic pain.  A stressful life event, now or in the past, such as:  Divorce or social rejection.  Childhood abuse or neglect.  Sudden life changes, such as financial crisis or going to longterm.  What should I do if someone is suicidal?  If you think someone may be suicidal:  Ask him or her directly: \"Are you thinking about suicide or hurting yourself?\" Asking that question does not make someone more likely to make a suicide attempt.  Avoid giving advice or arguing with the person about the value of his or her life.  If a person confides in you that he or she is considering suicide:  Take the person seriously. Never ignore comments about suicide.  Listen to the person's thoughts and concerns with compassion.  Let the person know that you will stay with him or her.  Offer to help the person get to a doctor or mental health professional.  Remove all weapons and medicines from the person's living area.  Do not promise to keep his or her thoughts of suicide a secret.  Call a suicide crisis helpline, such as the National Suicide Prevention Lifeline at 1-458.756.5468 or text TALK to 292876.  Get help right away if:  You believe that a person is in immediate danger of hurting himself or herself, or may have thoughts of taking his or her own life. You can:  Call a crisis center or a local suicide " prevention center. These are often located at hospitals, clinics, community service organizations, social service providers, or health departments.  Call a suicide crisis helpline, such as the National Suicide Prevention Lifeline at 1-623.311.9717, or text TALK to 074959. This is open 24 hours a day.  Take the person to the nearest emergency department.  Call your local emergency services (911 in the [place]  The On license of [facility] and [facility] services helpline (211 in the [place]  Summary  Suicide is the act of ending (taking) one's own life.  Suicide can be prevented by knowing the signs and taking action.  If you know someone who is showing risk factors for suicide, ask if he or she is thinking about hurting himself or herself. Take all concerns about suicide seriously, and get support from experts in mental illness or suicide.  If you believe that a person is in immediate danger of hurting himself or herself, or may have thoughts of taking his or her own life, get help right away.  This information is not intended to replace advice given to you by your health care provider. Make sure you discuss any questions you have with your health care provider.  Document Released: 06/23/2004 Document Revised: 02/20/2020 Document Reviewed: 10/19/2018  [employer] Patient Education © 2020 [employer]    Warning Signs of Opioid Misuse    Opioids are powerful substances that relieve pain. Opioids include illegal drugs, such as heroin, as well as prescription pain medicines, such as codeine, morphine, hydrocodone, oxycodone, and fentanyl.  Opioid misuse happens when opioids are used in a way that is different from how they were prescribed. Opioid misuse can lead to addiction (opioid use disorder) or taking too much at one time (opioid overdose).  It is important to recognize signs of opioid misuse to help your loved one get the right treatment. In the case of an overdose, emergency treatment can save your loved one's life if given  "soon enough.  How can opioid misuse affect my loved one?  Signs of opioid misuse  · Taking another person's prescription opioid.  · Getting and taking an opioid without a legal prescription.  · Taking more of an opioid than prescribed.  · Taking an opioid in a different way than it was prescribed, such as snorting, crushing, or injecting it.  · Taking an opioid to feel \"high,\" relaxed, or energized.  · Taking other medicines or drugs with an opioid.  Signs of opioid use disorder  · Having an uncontrollable need for the opioid.  · Taking the opioid despite the harmful effects.  · Needing more of the opioid to get the same effect (tolerance).  · Finding illegal ways to get a prescription opioid. These may include:  ? Finding new health care providers to write a prescription (doctor shopping).  ? Taking another person's medicine.  ? Stealing.  ? Buying the medicine illegally.  · Feeling like the opioid use is out of control.  · Feeling sick when not taking the opioid (withdrawal).  · Having mental, social, emotional, and behavioral warning signs of opioid abuse. These include:  ? Depression, anxiety, restlessness, or irritability.  ? Trouble sleeping and daytime fatigue.  ? Itchy, flushed skin.  ? Changes in:  § Appetite.  § Appearance.  § Mood and behavior.  § Relationships and social groups.  ? Trouble with:  § School or work.  § Finances.  § Law enforcement.  ? Use of other medicines or drugs.  Where to find more information  Centers for Disease Control and Prevention: www.cdc.gov  U.S. Department of Health and Human Services: hhs.gov/opioids  National [place] on Drug Abuse: www.drugabuse.gov  Contact a health care provider if:  · Your loved one is misusing an opioid medicine.  · Your loved one has signs of opioid use disorder.  · Your loved one needs help or treatment for opioid withdrawal or opioid use disorder.  · Your loved one is taking a prescription opioid and is becoming too dependent on them.  Get help " right away for signs of an opioid overdose:  · Very slow and irregular breathing.  · Being unaware of surroundings, unable to wake up, and unable to talk or move.  · Slowed heartbeats.  · Extreme drowsiness.  · Seizures.  · Very small pupils.  · Needle marks on the skin, recent or old.  · Pale, cool skin.  · Blue lips or fingernails.  · Frothy or bubbly mucus coming out of the mouth.  Opioid overdose is an emergency. Do not wait to see if the symptoms will go away. Get medical help right away. Call your local emergency services (911 in the [place]   Summary  · Opioid misuse can lead to opioid use disorder and opioid overdose.  · Your loved one may be at risk if he or she takes a prescription opioid in a way that is not prescribed by a health care provider.  · It is important to recognize signs of opioid misuse, opioid use disorder, and opioid overdose to get the right treatment.  · Opioid overdose is an emergency. Do not wait to see if the symptoms will go away. Get medical help right away.  This information is not intended to replace advice given to you by your health care provider. Make sure you discuss any questions you have with your health care provider.  Document Released: 04/03/2019 Document Revised: 04/07/2020 Document Reviewed: 04/03/2019  [employer] Patient Education © 2020 [employer]      Wound Infection  A wound infection happens when tiny organisms (microorganisms) start to grow in a wound. A wound infection is most often caused by bacteria. Infection can cause the wound to break open or worsen. Wound infection needs treatment. If a wound infection is left untreated, complications can occur. Untreated wound infections may lead to an infection in the bloodstream (septicemia) or a bone infection (osteomyelitis).  What are the causes?  This condition is most often caused by bacteria growing in a wound. Other microorganisms, like yeast and fungi, can also cause wound infections.  What increases the  risk?  The following factors may make you more likely to develop this condition:  · Having a weak body defense system (immune system).  · Having diabetes.  · Taking steroid medicines for a long time (chronic use).  · Smoking.  · Being an older person.  · Being overweight.  · Taking chemotherapy medicines.  What are the signs or symptoms?  Symptoms of this condition include:  · Having more redness, swelling, or pain at the wound site.  · Having more blood or fluid at the wound site.  · A bad smell coming from a wound or bandage (dressing).  · Having a fever.  · Feeling tired or fatigued.  · Having warmth at or around the wound.  · Having pus at the wound site.  How is this diagnosed?  This condition is diagnosed with a medical history and physical exam. You may also have a wound culture or blood tests or both.  How is this treated?  This condition is usually treated with an antibiotic medicine.  · The infection should improve 24-48 hours after you start antibiotics.  · After 24-48 hours, redness around the wound should stop spreading, and the wound should be less painful.  Follow these instructions at home:  Medicines  · Take or apply over-the-counter and prescription medicines only as told by your health care provider.  · If you were prescribed an antibiotic medicine, take or apply it as told by your health care provider. Do not stop using the antibiotic even if you start to feel better.  Wound care    · Clean the wound each day, or as told by your health care provider.  ? Wash the wound with mild soap and water.  ? Rinse the wound with water to remove all soap.  ? Pat the wound dry with a clean towel. Do not rub it.  · Follow instructions from your health care provider about how to take care of your wound. Make sure you:  ? Wash your hands with soap and water before and after you change your dressing. If soap and water are not available, use hand .  ? Change your dressing as told by your health care  provider.  ? Leave stitches (sutures), skin glue, or adhesive strips in place if your wound has been closed. These skin closures may need to stay in place for 2 weeks or longer. If adhesive strip edges start to loosen and curl up, you may trim the loose edges. Do not remove adhesive strips completely unless your health care provider tells you to do that. Some wounds are left open to heal on their own.  · Check your wound every day for signs of infection. Watch for:  ? More redness, swelling, or pain.  ? More fluid or blood.  ? Warmth.  ? Pus or a bad smell.  General instructions  · Keep the dressing dry until your health care provider says it can be removed.  · Do not take baths, swim, or use a hot tub until your health care provider approves. Ask your health care provider if you may take showers. You may only be allowed to take sponge baths.  · Raise (elevate) the injured area above the level of your heart while you are sitting or lying down.  · Do not scratch or pick at the wound.  · Keep all follow-up visits as told by your health care provider. This is important.  Contact a health care provider if:  · Your pain is not controlled with medicine.  · You have more redness, swelling, or pain around your wound.  · You have more fluid or blood coming from your wound.  · Your wound feels warm to the touch.  · You have pus coming from your wound.  · You continue to notice a bad smell coming from your wound or your dressing.  · Your wound that was closed breaks open.  Get help right away if:  · You have a red streak going away from your wound.  · You have a fever.  Summary  · A wound infection happens when tiny organisms (microorganisms) start to grow in a wound.  · This condition is usually treated with an antibiotic medicine.  · Follow instructions from your health care provider about how to take care of your wound.  · Contact a health care provider if your wound infection does not begin to improve in 24-48 hours, or  your symptoms worsen.  · Keep all follow-up visits as told by your health care provider. This is important.  This information is not intended to replace advice given to you by your health care provider. Make sure you discuss any questions you have with your health care provider.  Document Released: 09/15/2004 Document Revised: 07/30/2019 Document Reviewed: 07/30/2019  [employer] Patient Education © 2020 [employer]    Leg Amputation, Care After  This sheet gives you information about how to care for yourself after your procedure. Your health care provider may also give you more specific instructions. If you have problems or questions, contact your health care provider.  What can I expect after the procedure?  After the procedure, it is common to have:  · A little blood or fluid coming from your incision.  · Pain from your incision.  · Pain that feels like it is coming from the leg that has been removed (phantom pain). This can last for a year or longer.  · Skin breakdown on your stump (residual limb).  · Feelings of depression, anxiety, and fear.  Follow these instructions at home:  Medicines  · Take over-the-counter and prescription medicines only as told by your health care provider.  · If you were prescribed an antibiotic medicine, take it as told by your health care provider. Do not stop taking the antibiotic even if you start to feel better.  Bathing  · Do not take baths, swim, use a hot tub, or get your residual limb wet until your health care provider approves. You may only be allowed to take sponge baths.  · Ask your health care provider when you may start taking showers. After taking a shower, make sure to rinse and dry your residual limb carefully.  Incision care    · Check your residual limb, especially your incision area, every day. Check for:  ? More redness, swelling, or pain.  ? More fluid or blood.  ? Warmth.  ? Pus or a bad smell.  ? Blisters.  ? Scrapes.  · Follow instructions from your health care  provider about how to take care of your incision. Make sure you:  ? Wash your hands with soap and water before you change your bandage (dressing). If soap and water are not available, use hand .  ? Change your dressing as told by your health care provider.  ? Leave stitches (sutures), skin glue, or adhesive strips in place. These skin closures may need to stay in place for 2 weeks or longer. If adhesive strip edges start to loosen and curl up, you may trim the loose edges. Do not remove adhesive strips completely unless your health care provider tells you to do that.  Activity  · Return to your normal activities as told by your health care provider. Ask your health care provider what activities are safe for you.  · Do physical therapy exercises as told by your health care provider.  · If you have been fitted with an artificial leg (prosthesis) or have been given crutches, use them as told by your health care provider.  Eating and drinking  · Eat a healthy diet that includes whole grains, fruits and vegetables, low-fat dairy products, and lean proteins.  · Drink enough fluid to keep your urine pale yellow.  Driving  · Work with an occupational therapist to learn new strategies for safe driving with an amputation.  · Do not drive or use heavy equipment while taking prescription pain medicine.  General instructions  · To prevent or treat constipation while you are taking prescription pain medicine, your health care provider may recommend that you:  ? Drink enough fluid to keep your urine pale yellow.  ? Take over-the-counter or prescription medicines.  ? Eat foods that are high in fiber, such as fresh fruits and vegetables, whole grains, and beans.  ? Limit foods that are high in fat and processed sugars, such as fried and sweet foods.  · Do not use oils, lotion, cream, or rubbing alcohol on the remaining part of your leg.  · Wear compression stockings as told by your health care provider.  · If you have  trouble coping with your amputation, contact your health care provider. Some feelings of depression, anxiety, or fear are normal after an amputation, but if you struggle with these feelings or if they get overwhelming, your provider may be able to recommend a therapist or support group to help you.  · Do not use any products that contain nicotine or tobacco, such as cigarettes and e-cigarettes. These can delay bone healing. If you need help quitting, ask your health care provider.  · Keep all follow-up visits as told by your health care provider. This is important.  Contact a health care provider if:  · You have a fever.  · You have more tenderness in your residual limb.  · You have a rash or itchy skin.  · You have a cough or chills and you feel achy and weak.  · You have trouble coping with your amputation.  · You have blisters or scrapes on your residual limb.  Get help right away if:  · You have severe pain in your residual limb.  · You have more redness, swelling, or pain around your incision.  · You have more fluid or blood coming from your incision.  · Your incision feels warm to the touch, tender, and painful.  · You have pus or a bad smell coming from your incision.  · You feel light-headed and have shortness of breath.  · You have blood-soaked bandages.  · You cough up blood.  · You have chest pain or pain when taking a deep breath or coughing. If you have these symptoms, do not drive yourself to the hospital. Call emergency services right away.  If you ever feel like you may hurt yourself or others, or have thoughts about taking your own life, get help right away. You can go to your nearest emergency department or call:  · Your local emergency services (911 in the [place]  · A suicide crisis helpline, such as the National Suicide Prevention Lifeline at 1-260.765.5796. This is open 24 hours a day.  Summary  · After a leg amputation, you may have pain that feels like it is coming from the leg that was  removed (phantom pain). This can last for a year or longer.  · Follow instructions from your health care provider about how to take care of your incision.  · Check your residual limb, especially your incision area, every day. More redness, swelling, or pain may be a sign of infection.  · Contact your health care provider if you have trouble coping with your amputation.  This information is not intended to replace advice given to you by your health care provider. Make sure you discuss any questions you have with your health care provider.  Document Released: 03/28/2018 Document Revised: 03/28/2018 Document Reviewed: 03/28/2018  [employer] Patient Education © 2020 [employer]      Negative Pressure Wound Therapy Home Guide  Negative pressure wound therapy (NPWT) uses a sponge or foam-like material (dressing) placed on or inside the wound. The wound is then covered and sealed with a cover dressing that sticks to your skin (is adhesive). This keeps air out. A tube is attached to the cover dressing, and this tube connects to a small pump. The pump sucks fluid and germs from the wound. NPWT helps to increase blood flow to the wound and heal it from the inside.  What are the risks?  NPWT is usually safe to use. However, problems can occur, including:  · Skin irritation from the dressing adhesive.  · Bleeding.  · Infection.  · Dehydration. Wounds with large amounts of drainage can cause excessive fluid loss.  · Pain.  Supplies needed:  · A disposable garbage bag.  · Soap and water, or hand .  · Wound cleanser or salt-water solution (saline).  · New sponge and cover dressing.  · Protective clothing.  · Gauze pad.  · Vinyl gloves.  · Tape.  · Skin protectant. This may be a wipe, film, or spray.  · Clean or germ-free (sterile) scissors.  · Eye protection.  How to change your dressing  Prepare to change your dressing    1. If told by your health care provider, take pain medicine 30 minutes before changing the  dressing.  2. Wash your hands with soap and water. Dry your hands with a clean towel. If soap and water are not available, use hand .  3. Set up a clean station for wound care.  4. Open the dressing package so that the sponge dressing remains on the inside of the package.  5. Wear gloves, protective clothing, and eye protection.  Remove old dressing    1. Turn off the pump and disconnect the tubing from the dressing.  2. Carefully remove the adhesive cover dressing in the direction of your hair growth.  3. Remove the sponge dressing that is inside the wound. If the sponge sticks, use a wound cleanser or saline solution to wet the sponge and help it come off more easily.  4. Throw the old sponge and cover dressing supplies into the garbage bag.  5. Remove your gloves by grabbing the cuff and turning the glove inside out. Place the gloves in the trash immediately.  6. Wash your hands with soap and water. Dry your hands with a clean towel. If soap and water are not available, use hand .  Clean your wound  · Wear gloves, protective clothing, and eye protection.  Follow your health care provider's instructions on how to clean your wound. You may be told to:  1. Clean the wound using a saline solution or a wound cleanser and a clean gauze pad.  2. Pat the wound dry with a gauze pad. Do not rub the wound.  3. Throw the gauze pad into the garbage bag.  4. Remove your gloves by grabbing the cuff and turning the glove inside out. Place the gloves in the trash immediately.  5. Wash your hands with soap and water. Dry your hands with a clean towel. If soap and water are not available, use hand .  Apply new dressing  · Wear gloves, protective clothing, and eye protection.  1. If told by your health care provider, apply a skin protectant to any skin that will be exposed to adhesive. Let the skin protectant dry.  2. Cut a piece of new sponge dressing and put it on or in the wound.  3. Using clean  scissors, cut a nickel-sized hole in the new cover dressing.  4. Apply the cover dressing.  5. Attach the suction tube over the hole in the cover dressing.  6. Take off your gloves. Put them in the plastic bag with the old dressing. Tie the bag shut and throw it away.  7. Wash your hands with soap and water. Dry your hands with a clean towel. If soap and water are not available, use hand .  8. Turn the pump back on. The sponge dressing should collapse. Do not change the settings on the machine without talking to a health care provider.  9. Replace the container in the pump that collects fluid if it is full. Replace the container per the 's instructions or at least once a week, even if it is not full.  General tips and recommendations  If the alarm sounds:  · Stay calm.  · Do not turn off the pump or do anything with the dressing.  · Reasons the alarm may go off:  ? The battery is low. Change the battery or plug the device into electrical power.  ? The dressing has a leak. Find the leak and put tape over the leak.  ? The fluid collection container is full. Change the fluid container.  · Call your health care provider right away if you cannot fix the problem.  · Explain to your health care provider what is happening. Follow his or her instructions.  General instructions  · Do not turn off the pump unless told to do so by your health care provider.  · Do not turn off the pump for more than 2 hours. If the pump is off for more than 2 hours, the dressing will need to be changed.  · If your health care provider says it is okay to shower:  ? Do not take the pump into the shower.  ? Make sure the wound dressing is protected and sealed. The wound dressing must stay dry.  · Check frequently that the machine indicates that therapy is on and that all clamps are open.  · Do not use over-the-counter medicated or antiseptic creams, sprays, liquids, or dressings unless your health care provider  approves.  Contact a health care provider if:  · You have new pain.  · You develop irritation, a rash, or itching around the wound or dressing.  · You see new black or yellow tissue in your wound.  · The dressing changes are painful or cause bleeding.  · The pump has been off for more than 2 hours, and you do not know how to change the dressing.  · The pump alarm goes off, and you do not know what to do.  Get help right away if:  · You have a lot of bleeding.  · The wound breaks open.  · You have severe pain.  · You have signs of infection, such as:  ? More redness, swelling, or pain.  ? More fluid or blood.  ? Warmth.  ? Pus or a bad smell.  ? Red streaks leading from the wound.  ? A fever.  · You see a sudden change in the color or texture of the drainage.  · You have signs of dehydration, such as:  ? Little or no tears, urine, or sweat.  ? Muscle cramps.  ? Very dry mouth.  ? Headache.  ? Dizziness.  Summary  · Negative pressure wound therapy (NPWT) is a device that helps your wound heal.  · Set up a clean station for wound care. Your health care provider will tell you what supplies to use.  · Follow your health care provider's instructions on how to clean your wound and how to change the dressing.  · Contact a health care provider if you have new pain, an irritation, or a rash, or if the alarm goes off and you do not know what to do.  · Get help right away if you have a lot of bleeding, your wound breaks open, or you have severe pain. Also, get help if you have signs of infection.  This information is not intended to replace advice given to you by your health care provider. Make sure you discuss any questions you have with your health care provider.  Document Released: 03/11/2013 Document Revised: 04/10/2020 Document Reviewed: 03/06/2020  [employer] Patient Education © 2020 [employer]      Indwelling Urinary Catheter Care, Adult  An indwelling urinary catheter is a thin tube that is put into your bladder. The  tube helps to drain pee (urine) out of your body. The tube goes in through your urethra. Your urethra is where pee comes out of your body. Your pee will come out through the catheter, then it will go into a bag (drainage bag).  Take good care of your catheter so it will work well.  How to wear your catheter and bag  Supplies needed  · Sticky tape (adhesive tape) or a leg strap.  · Alcohol wipe or soap and water (if you use tape).  · A clean towel (if you use tape).  · Large overnight bag.  · Smaller bag (leg bag).  Wearing your catheter  Attach your catheter to your leg with tape or a leg strap.  · Make sure the catheter is not pulled tight.  · If a leg strap gets wet, take it off and put on a dry strap.  · If you use tape to hold the bag on your le. Use an alcohol wipe or soap and water to wash your skin where the tape made it sticky before.  2. Use a clean towel to pat-dry that skin.  3. Use new tape to make the bag stay on your leg.  Wearing your bags  You should have been given a large overnight bag.  · You may wear the overnight bag in the day or night.  · Always have the overnight bag lower than your bladder.  Do not let the bag touch the floor.  · Before you go to sleep, put a clean plastic bag in a wastebasket. Then hang the overnight bag inside the wastebasket.  You should also have a smaller leg bag that fits under your clothes.  · Always wear the leg bag below your knee.  · Do not wear your leg bag at night.  How to care for your skin and catheter  Supplies needed  · A clean washcloth.  · Water and mild soap.  · A clean towel.  Caring for your skin and catheter         · Clean the skin around your catheter every day:  ? Wash your hands with soap and water.  ? Wet a clean washcloth in warm water and mild soap.  ? Clean the skin around your urethra.  ? If you are female:  ? Gently spread the folds of skin around your vagina (labia).  ? With the washcloth in your other hand, wipe the inner side of your  labia on each side. Wipe from front to back.  ? If you are male:  ? Pull back any skin that covers the end of your penis (foreskin).  ? With the washcloth in your other hand, wipe your penis in small circles. Start wiping at the tip of your penis, then move away from the catheter.  ? Move the foreskin back in place, if needed.  ? With your free hand, hold the catheter close to where it goes into your body.  ? Keep holding the catheter during cleaning so it does not get pulled out.  ? With the washcloth in your other hand, clean the catheter.  ? Only wipe downward on the catheter.  ? Do not wipe upward toward your body. Doing this may push germs into your urethra and cause infection.  ? Use a clean towel to pat-dry the catheter and the skin around it. Make sure to wipe off all soap.  ? Wash your hands with soap and water.  · Shower every day. Do not take baths.  · Do not use cream, ointment, or lotion on the area where the catheter goes into your body, unless your doctor tells you to.  · Do not use powders, sprays, or lotions on your genital area.  · Check your skin around the catheter every day for signs of infection. Check for:  ? Redness, swelling, or pain.  ? Fluid or blood.  ? Warmth.  ? Pus or a bad smell.  How to empty the bag  Supplies needed  · Rubbing alcohol.  · Gauze pad or cotton ball.  · Tape or a leg strap.  Emptying the bag  Pour the pee out of your bag when it is ?-½ full, or at least 2-3 times a day. Do this for your overnight bag and your leg bag.  1. Wash your hands with soap and water.  2. Separate (detach) the bag from your leg.  3. Hold the bag over the toilet or a clean pail. Keep the bag lower than your hips and bladder. This is so the pee (urine) does not go back into the tube.  4. Open the pour spout. It is at the bottom of the bag.  5. Empty the pee into the toilet or pail. Do not let the pour spout touch any surface.  6. Put rubbing alcohol on a gauze pad or cotton ball.  7. Use the gauze  pad or cotton ball to clean the pour spout.  8. Close the pour spout.  9. Attach the bag to your leg with tape or a leg strap.  10. Wash your hands with soap and water.  Follow instructions for cleaning the drainage bag:  · From the product maker.  · As told by your doctor.  How to change the bag  Supplies needed  · Alcohol wipes.  · A clean bag.  · Tape or a leg strap.  Changing the bag  Replace your bag when it starts to leak, smell bad, or look dirty.  1. Wash your hands with soap and water.  2. Separate the dirty bag from your leg.  3. Pinch the catheter with your fingers so that pee does not spill out.  4. Separate the catheter tube from the bag tube where these tubes connect (at the connection valve). Do not let the tubes touch any surface.  5. Clean the end of the catheter tube with an alcohol wipe. Use a different alcohol wipe to clean the end of the bag tube.  6. Connect the catheter tube to the tube of the clean bag.  7. Attach the clean bag to your leg with tape or a leg strap. Do not make the bag tight on your leg.  8. Wash your hands with soap and water.  General rules    · Never pull on your catheter. Never try to take it out. Doing that can hurt you.  · Always wash your hands before and after you touch your catheter or bag. Use a mild, fragrance-free soap. If you do not have soap and water, use hand .  · Always make sure there are no twists or bends (kinks) in the catheter tube.  · Always make sure there are no leaks in the catheter or bag.  · Drink enough fluid to keep your pee pale yellow.  · Do not take baths, swim, or use a hot tub.  · If you are female, wipe from front to back after you poop (have a bowel movement).  Contact a doctor if:  · Your pee is cloudy.  · Your pee smells worse than usual.  · Your catheter gets clogged.  · Your catheter leaks.  · Your bladder feels full.  Get help right away if:  · You have redness, swelling, or pain where the catheter goes into your body.  · You  have fluid, blood, pus, or a bad smell coming from the area where the catheter goes into your body.  · Your skin feels warm where the catheter goes into your body.  · You have a fever.  · You have pain in your:  ? Belly (abdomen).  ? Legs.  ? Lower back.  ? Bladder.  · You see blood in the catheter.  · Your pee is pink or red.  · You feel sick to your stomach (nauseous).  · You throw up (vomit).  · You have chills.  · Your pee is not draining into the bag.  · Your catheter gets pulled out.  Summary  · An indwelling urinary catheter is a thin tube that is placed into the bladder to help drain pee (urine) out of the body.  · The catheter is placed into the part of the body that drains pee from the bladder (urethra).  · Taking good care of your catheter will keep it working properly and help prevent problems.  · Always wash your hands before and after touching your catheter or bag.  · Never pull on your catheter or try to take it out.  This information is not intended to replace advice given to you by your health care provider. Make sure you discuss any questions you have with your health care provider.  Document Released: 04/14/2014 Document Revised: 04/10/2020 Document Reviewed: 08/03/2018  [employer] Patient Education © 2020 [employer]      Speech Therapy Discharge Instructions for Jimenez Bains    9/22/2020    Diet: Regular (7), Thin (0)  Cognition:  Assist with medication and financial management.   24/hr supervision for safety.     To increase your ability to pay attention and concentrate it is recommended you follow these strategies:     Monitor your fatigue: Monitoring our fatigue is probably one of the most important strategies we can use. Many people suffer from higher levels of fatigue than normal and must be aware that fatigue will have a major impact on attention capabilities. This means scheduling activities that require your attention at times when you feel at your best.  Make a schedule: Choose a  time that’s quiet and unhurried -- maybe at night before you go to bed -- and plan out the next day, down to the task. Use a reminder avery, timer, or alarm to help you stick to that schedule. Alternate things you want to do with ones you don’t to help your mind stay engaged.  Allow plenty of time to complete tasks.  Be realistic about time: Figure out a realistic time frame for your daily tasks -- and don’t forget to build in time for breaks if you think you’ll need them.  Quiet your mind by quieting your space: When it’s time to buckle down and get something done, take away the distractions. Use noise-canceling headphones to drown out sounds. Put your phone on silent. Work in a room with a door you can close. If you can do your job from home, set up the space in a way that helps you focus.  Control clutter: Another way to quiet your brain is to clear your space of things you don’t need. It can prevent distractions, and it can help you stay organized because you’ll have fewer things to tidy up.  Get some organizational helpers like under-the-bed containers or over-the-door holders. Ask a friend to help if it seems like you’re swimming in a sea of debris and you don’t know where to start.  It was nice working with you! Con[place], SLP    Occupational Therapy Discharge Instructions for Jimenez Bains    9/23/2020    Level of Assist Required for Eating: Able to Complete Eating without Assist  Level of Assist Required for Grooming: Able to Complete Grooming without Assist  Level of Assist Required for Dressing: Requires Physical Assist with Dressing  Level of Assist Required for Toileting: Requires Physical Assist with Toileting  Level of Assist Required for Toilet Transfer: Requires Physical Assist with Toilet Transfer  Level of Assist Required for Bathing: Requires Physical Assist with Bathing  Level of Assist Required for Shower Transfer: Requires Physical Assist with Shower Transfer  Level of Assist Required for Home  Mgmt: Requires Physical Assist with Home Management  Level of Assist Required for Meal Prep: Requires Physical Assist with Meal Preparation

## 2020-09-23 NOTE — THERAPY
Occupational Therapy  Daily Treatment     Patient Name: Jimenez Bains  Age:  75 y.o., Sex:  male  Medical Record #: [id number]  Today's Date: 9/23/2020     Precautions  Precautions: (P) Fall Risk, Non Weight Bearing Left Lower Extremity  Comments: (P) wound vac L residual limb, sores on genital  area, carpio         Subjective    Pt received supine in bed, reporting 9/10 pain, RN notified and pain meds administered, however pt continued to be in intense pain and unable to tolerate OOB activity, session cut short, will attempt to make-up later today     Objective       09/23/20 1031   Precautions   Precautions Fall Risk;Non Weight Bearing Left Lower Extremity   Comments wound vac L residual limb, sores on genital  area, carpio   Functional Level of Assist   Lower Body Dressing Moderate Assist   Lower Body Dressing Description Verbal cueing;Supervision for safety  (increased assist 2/2 pain, donned in supine)   Supine Upper Body Exercises   Chest Fly 2 sets of 15  (5# dumbells)   Chest Press 2 sets of 15  (5# dumbells)   Front Arm Raise 2 sets of 15  (5# dumbells)   Bicep Curl 2 sets of 15  (5# dumbells)   Elbow Extension 2 sets of 15  (5# dumbells)   Comments cues for each exercise, poor recall of exercises on 2nd round   Supine Lower Body Exercise   Bridges 2 sets of 10  (in bed)   Bed Mobility    Supine to Sit Moderate Assist   Therapy Missed   Missed Therapy (Minutes) 30   Reason For Missed Therapy Medical - Other (Please Comment)  (pt reporting 10/10 pain, unable to tolerate sitting up)   OT Total Time Spent   OT Individual Total Time Spent (Mins) 30       Assessment    Pt tolerated session poorly with pain continuing to be a barrier to productive participation. Pt required increased assist for lower body dressing and bed mobility, agreeable to attempting OOB to w/c however unable to tolerate sitting edge of bed for even short period of time due to high pain levels. Tolerated UE therex well however continues with  poor carryover of exercises. Will attempt to make-up missed minutes later today.  Strengths: Pleasant and cooperative, Willingly participates in therapeutic activities  Barriers: Confused, Decreased endurance, Generalized weakness, Impaired balance, Limited mobility, Pain    Plan    Continue to progress gross strengthening and endurance and standing tolerance/balance toward increased safety and independence with ADLs, general attention to hygiene and self-care, weightshift when scooting    Occupational Therapy Goals     Problem: OT Long Term Goals     Dates: Start: 09/05/20       Goal: LTG-By discharge, patient will complete basic self care tasks     Dates: Start: 09/05/20       Description: 1) Individualized Goal:  Mod I for BADL's via AE/DME PRN  2) Interventions: OT Self Care/ADL, OT Neuro Re-Ed/Balance, OT Therapeutic Activity, OT Evaluation, and OT Therapeutic Exercise            Goal: LTG-By discharge, patient will perform bathroom transfers     Dates: Start: 09/05/20       Description: 1) Individualized Goal:  Mod I for toilet and shower transfer via DME  2) Interventions: OT Self Care/ADL, OT Neuro Re-Ed/Balance, OT Therapeutic Activity, OT Evaluation, and OT Therapeutic Exercise

## 2020-09-23 NOTE — CARE PLAN
Problem: Safety  Goal: Will remain free from injury  Outcome: PROGRESSING AS EXPECTED  Note: Bed alarm in use     Problem: Skin Integrity  Goal: Risk for impaired skin integrity will decrease  Outcome: PROGRESSING AS EXPECTED  Note: Skin interventions in place

## 2020-09-23 NOTE — PROGRESS NOTES
[facility] OFFICER DIRECT ADMIT ACCEPTANCE NOTE:    -I spoke with the transferring provider, Dr. Ling of [facility].  -This is a 75 y.o. 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 Fisher tomorrow (9/24/20), who is being direct admitted for his surgery.  -notably, SNF being pursued, and IRF has already started that process.  -Please call Dr. Fisher to inform them of patient's arrival.  -Please call admitting hospitalist ON-CALL for full H&P and admission orders, on patient's arrival to the unit.

## 2020-09-23 NOTE — PROGRESS NOTES
"Assumed care of patient from [facility] RN, Anel.  Patient is alert and oriented times 4, states pain of 8/10, will medicate per MAR.  VSS /68   Pulse 68   Temp 36.8 °C (98.3 °F) (Temporal)   Resp 18   Ht 1.676 m (5' 6\")   Wt 63 kg (138 lb 14.2 oz)   SpO2 92%   BMI 22.42 kg/m²   On RA with saturations in the mid 90s.   in use.  Last BM 9/22, incontinent per report.  Brothers catheter in place, stat lock in use, draining to gravity.  L AKA with home wound vac, dressing intact, no leaks noted.  Growth to the R side of face and nose, ELIDA.  Redness noted to groin and sacral region.  Mepilex contraindicated due to incontinence.  Max assist, bed alarm in use.  Moderate fall risk.  Moderate risk for skin breakdown, waffle overlay in place, pillows in use for support and positioning.  Q 2 hour turns.  DA hospitalist and Dr Fisher notified of patient's arrival, awaiting further orders.  Patient oriented to the unit, POC discussed for the day.  Bed is locked and in the lowest position, call light is within reach.  All needs are met at this time, hourly rounding is in place.  "

## 2020-09-23 NOTE — ASSESSMENT & PLAN NOTE
Sent from Rehab today for 9/24 surgical revision.  Difficulty in healing.  Vit C  Original AKA 6/8 followed by prior revision and infection 7/29.  Start antibiotics post op.    Per Dr. Fisher, did not appear infected, needed bone removal to allow closure of wound.  No OR cultures necessary.  Plan to dc with short course of bactrim and augmentin.

## 2020-09-23 NOTE — ASSESSMENT & PLAN NOTE
Low B12 and folate levels, continue vitamin B12 oral daily and folate po daily.  Check iron levels in a.m.  Ascorbic acid added.  Monitor CBC

## 2020-09-24 ENCOUNTER — ANESTHESIA (OUTPATIENT)
Dept: SURGERY | Facility: MEDICAL CENTER | Age: 75
DRG: 498 | End: 2020-09-24
Payer: MEDICARE

## 2020-09-24 ENCOUNTER — ANESTHESIA EVENT (OUTPATIENT)
Dept: SURGERY | Facility: MEDICAL CENTER | Age: 75
DRG: 498 | End: 2020-09-24
Payer: MEDICARE

## 2020-09-24 PROBLEM — D51.9 ANEMIA DUE TO VITAMIN B12 DEFICIENCY: Status: ACTIVE | Noted: 2020-09-05

## 2020-09-24 LAB
ALBUMIN SERPL BCP-MCNC: 3 G/DL (ref 3.2–4.9)
ALBUMIN/GLOB SERPL: 0.8 G/DL
ALP SERPL-CCNC: 269 U/L (ref 30–99)
ALT SERPL-CCNC: 13 U/L (ref 2–50)
ANION GAP SERPL CALC-SCNC: 12 MMOL/L (ref 7–16)
AST SERPL-CCNC: 18 U/L (ref 12–45)
BASOPHILS # BLD AUTO: 0 % (ref 0–1.8)
BASOPHILS # BLD: 0 K/UL (ref 0–0.12)
BILIRUB SERPL-MCNC: 0.3 MG/DL (ref 0.1–1.5)
BUN SERPL-MCNC: 19 MG/DL (ref 8–22)
BURR CELLS BLD QL SMEAR: NORMAL
CALCIUM SERPL-MCNC: 9.2 MG/DL (ref 8.5–10.5)
CHLORIDE SERPL-SCNC: 105 MMOL/L (ref 96–112)
CO2 SERPL-SCNC: 25 MMOL/L (ref 20–33)
CREAT SERPL-MCNC: 1.08 MG/DL (ref 0.5–1.4)
EOSINOPHIL # BLD AUTO: 0.15 K/UL (ref 0–0.51)
EOSINOPHIL NFR BLD: 1.7 % (ref 0–6.9)
ERYTHROCYTE [DISTWIDTH] IN BLOOD BY AUTOMATED COUNT: 56.4 FL (ref 35.9–50)
GLOBULIN SER CALC-MCNC: 3.8 G/DL (ref 1.9–3.5)
GLUCOSE BLD-MCNC: 107 MG/DL (ref 65–99)
GLUCOSE BLD-MCNC: 94 MG/DL (ref 65–99)
GLUCOSE SERPL-MCNC: 122 MG/DL (ref 65–99)
HCT VFR BLD AUTO: 32.8 % (ref 42–52)
HGB BLD-MCNC: 9.7 G/DL (ref 14–18)
LYMPHOCYTES # BLD AUTO: 1.78 K/UL (ref 1–4.8)
LYMPHOCYTES NFR BLD: 20.7 % (ref 22–41)
MAGNESIUM SERPL-MCNC: 1.9 MG/DL (ref 1.5–2.5)
MANUAL DIFF BLD: NORMAL
MCH RBC QN AUTO: 29.9 PG (ref 27–33)
MCHC RBC AUTO-ENTMCNC: 29.6 G/DL (ref 33.7–35.3)
MCV RBC AUTO: 101.2 FL (ref 81.4–97.8)
MONOCYTES # BLD AUTO: 0.15 K/UL (ref 0–0.85)
MONOCYTES NFR BLD AUTO: 1.7 % (ref 0–13.4)
MORPHOLOGY BLD-IMP: NORMAL
NEUTROPHILS # BLD AUTO: 6.53 K/UL (ref 1.82–7.42)
NEUTROPHILS NFR BLD: 75.9 % (ref 44–72)
NRBC # BLD AUTO: 0 K/UL
NRBC BLD-RTO: 0 /100 WBC
PLATELET # BLD AUTO: 239 K/UL (ref 164–446)
PLATELET BLD QL SMEAR: NORMAL
PMV BLD AUTO: 11.6 FL (ref 9–12.9)
POIKILOCYTOSIS BLD QL SMEAR: NORMAL
POLYCHROMASIA BLD QL SMEAR: NORMAL
POTASSIUM SERPL-SCNC: 3.6 MMOL/L (ref 3.6–5.5)
PROT SERPL-MCNC: 6.8 G/DL (ref 6–8.2)
RBC # BLD AUTO: 3.24 M/UL (ref 4.7–6.1)
RBC BLD AUTO: PRESENT
SODIUM SERPL-SCNC: 142 MMOL/L (ref 135–145)
WBC # BLD AUTO: 8.6 K/UL (ref 4.8–10.8)

## 2020-09-24 PROCEDURE — 160009 HCHG ANES TIME/MIN: Performed by: SURGERY

## 2020-09-24 PROCEDURE — 160048 HCHG OR STATISTICAL LEVEL 1-5: Performed by: SURGERY

## 2020-09-24 PROCEDURE — 85027 COMPLETE CBC AUTOMATED: CPT

## 2020-09-24 PROCEDURE — 700105 HCHG RX REV CODE 258: Performed by: SURGERY

## 2020-09-24 PROCEDURE — 88305 TISSUE EXAM BY PATHOLOGIST: CPT

## 2020-09-24 PROCEDURE — 700105 HCHG RX REV CODE 258: Performed by: ANESTHESIOLOGY

## 2020-09-24 PROCEDURE — 82962 GLUCOSE BLOOD TEST: CPT

## 2020-09-24 PROCEDURE — 700111 HCHG RX REV CODE 636 W/ 250 OVERRIDE (IP): Performed by: SURGERY

## 2020-09-24 PROCEDURE — 83735 ASSAY OF MAGNESIUM: CPT

## 2020-09-24 PROCEDURE — 770006 HCHG ROOM/CARE - MED/SURG/GYN SEMI*

## 2020-09-24 PROCEDURE — A9270 NON-COVERED ITEM OR SERVICE: HCPCS | Performed by: SURGERY

## 2020-09-24 PROCEDURE — 0HB1XZZ EXCISION OF FACE SKIN, EXTERNAL APPROACH: ICD-10-PCS | Performed by: SURGERY

## 2020-09-24 PROCEDURE — 700101 HCHG RX REV CODE 250: Performed by: SURGERY

## 2020-09-24 PROCEDURE — 700111 HCHG RX REV CODE 636 W/ 250 OVERRIDE (IP): Performed by: ANESTHESIOLOGY

## 2020-09-24 PROCEDURE — 700102 HCHG RX REV CODE 250 W/ 637 OVERRIDE(OP): Performed by: SURGERY

## 2020-09-24 PROCEDURE — 160039 HCHG SURGERY MINUTES - EA ADDL 1 MIN LEVEL 3: Performed by: SURGERY

## 2020-09-24 PROCEDURE — 500881 HCHG PACK, EXTREMITY: Performed by: SURGERY

## 2020-09-24 PROCEDURE — 85007 BL SMEAR W/DIFF WBC COUNT: CPT

## 2020-09-24 PROCEDURE — 99233 SBSQ HOSP IP/OBS HIGH 50: CPT | Performed by: HOSPITALIST

## 2020-09-24 PROCEDURE — 0QBC0ZZ EXCISION OF LEFT LOWER FEMUR, OPEN APPROACH: ICD-10-PCS | Performed by: SURGERY

## 2020-09-24 PROCEDURE — 160002 HCHG RECOVERY MINUTES (STAT): Performed by: SURGERY

## 2020-09-24 PROCEDURE — 501838 HCHG SUTURE GENERAL: Performed by: SURGERY

## 2020-09-24 PROCEDURE — 160028 HCHG SURGERY MINUTES - 1ST 30 MINS LEVEL 3: Performed by: SURGERY

## 2020-09-24 PROCEDURE — 80053 COMPREHEN METABOLIC PANEL: CPT

## 2020-09-24 PROCEDURE — 160036 HCHG PACU - EA ADDL 30 MINS PHASE I: Performed by: SURGERY

## 2020-09-24 PROCEDURE — 700111 HCHG RX REV CODE 636 W/ 250 OVERRIDE (IP): Performed by: HOSPITALIST

## 2020-09-24 PROCEDURE — 700101 HCHG RX REV CODE 250: Performed by: ANESTHESIOLOGY

## 2020-09-24 PROCEDURE — 700102 HCHG RX REV CODE 250 W/ 637 OVERRIDE(OP): Performed by: ANESTHESIOLOGY

## 2020-09-24 PROCEDURE — 160035 HCHG PACU - 1ST 60 MINS PHASE I: Performed by: SURGERY

## 2020-09-24 PROCEDURE — 700101 HCHG RX REV CODE 250: Performed by: HOSPITALIST

## 2020-09-24 PROCEDURE — 700102 HCHG RX REV CODE 250 W/ 637 OVERRIDE(OP): Performed by: HOSPITALIST

## 2020-09-24 PROCEDURE — 36415 COLL VENOUS BLD VENIPUNCTURE: CPT

## 2020-09-24 PROCEDURE — A9270 NON-COVERED ITEM OR SERVICE: HCPCS | Performed by: ANESTHESIOLOGY

## 2020-09-24 PROCEDURE — 700105 HCHG RX REV CODE 258: Performed by: HOSPITALIST

## 2020-09-24 RX ORDER — OXYCODONE HYDROCHLORIDE 10 MG/1
10 TABLET ORAL
Status: DISCONTINUED | OUTPATIENT
Start: 2020-09-24 | End: 2020-09-26

## 2020-09-24 RX ORDER — DIPHENHYDRAMINE HYDROCHLORIDE 50 MG/ML
25 INJECTION INTRAMUSCULAR; INTRAVENOUS EVERY 6 HOURS PRN
Status: DISCONTINUED | OUTPATIENT
Start: 2020-09-24 | End: 2020-09-27

## 2020-09-24 RX ORDER — KETOROLAC TROMETHAMINE 30 MG/ML
15 INJECTION, SOLUTION INTRAMUSCULAR; INTRAVENOUS EVERY 6 HOURS
Status: DISCONTINUED | OUTPATIENT
Start: 2020-09-24 | End: 2020-09-26

## 2020-09-24 RX ORDER — CEFAZOLIN SODIUM 1 G/3ML
INJECTION, POWDER, FOR SOLUTION INTRAMUSCULAR; INTRAVENOUS PRN
Status: DISCONTINUED | OUTPATIENT
Start: 2020-09-24 | End: 2020-09-24 | Stop reason: SURG

## 2020-09-24 RX ORDER — OXYCODONE HCL 5 MG/5 ML
10 SOLUTION, ORAL ORAL
Status: COMPLETED | OUTPATIENT
Start: 2020-09-24 | End: 2020-09-24

## 2020-09-24 RX ORDER — DIPHENHYDRAMINE HCL 25 MG
25 TABLET ORAL EVERY 6 HOURS PRN
Status: DISCONTINUED | OUTPATIENT
Start: 2020-09-24 | End: 2020-09-27

## 2020-09-24 RX ORDER — OXYCODONE HYDROCHLORIDE 5 MG/1
5 TABLET ORAL
Status: DISCONTINUED | OUTPATIENT
Start: 2020-09-24 | End: 2020-09-26

## 2020-09-24 RX ORDER — DIPHENHYDRAMINE HYDROCHLORIDE 50 MG/ML
25 INJECTION INTRAMUSCULAR; INTRAVENOUS EVERY 6 HOURS PRN
Status: DISCONTINUED | OUTPATIENT
Start: 2020-09-24 | End: 2020-09-29 | Stop reason: HOSPADM

## 2020-09-24 RX ORDER — ACETAMINOPHEN 325 MG/1
650 TABLET ORAL EVERY 6 HOURS
Status: DISCONTINUED | OUTPATIENT
Start: 2020-09-24 | End: 2020-09-29 | Stop reason: HOSPADM

## 2020-09-24 RX ORDER — DEXTROSE MONOHYDRATE, SODIUM CHLORIDE, AND POTASSIUM CHLORIDE 50; 1.49; 4.5 G/1000ML; G/1000ML; G/1000ML
INJECTION, SOLUTION INTRAVENOUS CONTINUOUS
Status: DISCONTINUED | OUTPATIENT
Start: 2020-09-24 | End: 2020-09-25

## 2020-09-24 RX ORDER — TRAZODONE HYDROCHLORIDE 50 MG/1
50 TABLET ORAL NIGHTLY PRN
Status: DISCONTINUED | OUTPATIENT
Start: 2020-09-24 | End: 2020-09-26

## 2020-09-24 RX ORDER — DEXAMETHASONE SODIUM PHOSPHATE 4 MG/ML
4 INJECTION, SOLUTION INTRA-ARTICULAR; INTRALESIONAL; INTRAMUSCULAR; INTRAVENOUS; SOFT TISSUE
Status: DISCONTINUED | OUTPATIENT
Start: 2020-09-24 | End: 2020-09-27

## 2020-09-24 RX ORDER — ASCORBIC ACID 500 MG
500 TABLET ORAL DAILY
Status: DISCONTINUED | OUTPATIENT
Start: 2020-09-24 | End: 2020-09-29 | Stop reason: HOSPADM

## 2020-09-24 RX ORDER — DEXTROSE MONOHYDRATE 25 G/50ML
50 INJECTION, SOLUTION INTRAVENOUS
Status: DISCONTINUED | OUTPATIENT
Start: 2020-09-24 | End: 2020-09-24 | Stop reason: HOSPADM

## 2020-09-24 RX ORDER — CALCIUM CARBONATE 500 MG/1
500 TABLET, CHEWABLE ORAL
Status: DISCONTINUED | OUTPATIENT
Start: 2020-09-24 | End: 2020-09-29 | Stop reason: HOSPADM

## 2020-09-24 RX ORDER — SODIUM CHLORIDE, SODIUM LACTATE, POTASSIUM CHLORIDE, CALCIUM CHLORIDE 600; 310; 30; 20 MG/100ML; MG/100ML; MG/100ML; MG/100ML
INJECTION, SOLUTION INTRAVENOUS
Status: DISCONTINUED | OUTPATIENT
Start: 2020-09-24 | End: 2020-09-24 | Stop reason: SURG

## 2020-09-24 RX ORDER — LIDOCAINE HYDROCHLORIDE 20 MG/ML
INJECTION, SOLUTION INFILTRATION; PERINEURAL
Status: DISCONTINUED | OUTPATIENT
Start: 2020-09-24 | End: 2020-09-24 | Stop reason: HOSPADM

## 2020-09-24 RX ORDER — HALOPERIDOL 5 MG/ML
1 INJECTION INTRAMUSCULAR EVERY 6 HOURS PRN
Status: DISCONTINUED | OUTPATIENT
Start: 2020-09-24 | End: 2020-09-29 | Stop reason: HOSPADM

## 2020-09-24 RX ORDER — ONDANSETRON 2 MG/ML
4 INJECTION INTRAMUSCULAR; INTRAVENOUS EVERY 4 HOURS PRN
Status: DISCONTINUED | OUTPATIENT
Start: 2020-09-24 | End: 2020-09-29 | Stop reason: HOSPADM

## 2020-09-24 RX ORDER — LIDOCAINE HYDROCHLORIDE 20 MG/ML
INJECTION, SOLUTION EPIDURAL; INFILTRATION; INTRACAUDAL; PERINEURAL PRN
Status: DISCONTINUED | OUTPATIENT
Start: 2020-09-24 | End: 2020-09-24 | Stop reason: SURG

## 2020-09-24 RX ORDER — ONDANSETRON 2 MG/ML
INJECTION INTRAMUSCULAR; INTRAVENOUS PRN
Status: DISCONTINUED | OUTPATIENT
Start: 2020-09-24 | End: 2020-09-24 | Stop reason: SURG

## 2020-09-24 RX ORDER — SCOLOPAMINE TRANSDERMAL SYSTEM 1 MG/1
1 PATCH, EXTENDED RELEASE TRANSDERMAL
Status: DISCONTINUED | OUTPATIENT
Start: 2020-09-24 | End: 2020-09-29 | Stop reason: HOSPADM

## 2020-09-24 RX ORDER — DEXTROSE MONOHYDRATE 25 G/50ML
50 INJECTION, SOLUTION INTRAVENOUS
Status: DISCONTINUED | OUTPATIENT
Start: 2020-09-24 | End: 2020-09-29 | Stop reason: HOSPADM

## 2020-09-24 RX ORDER — OXYCODONE HCL 5 MG/5 ML
5 SOLUTION, ORAL ORAL
Status: COMPLETED | OUTPATIENT
Start: 2020-09-24 | End: 2020-09-24

## 2020-09-24 RX ORDER — LABETALOL HYDROCHLORIDE 5 MG/ML
5 INJECTION, SOLUTION INTRAVENOUS
Status: DISCONTINUED | OUTPATIENT
Start: 2020-09-24 | End: 2020-09-24 | Stop reason: HOSPADM

## 2020-09-24 RX ORDER — SODIUM CHLORIDE, SODIUM LACTATE, POTASSIUM CHLORIDE, CALCIUM CHLORIDE 600; 310; 30; 20 MG/100ML; MG/100ML; MG/100ML; MG/100ML
INJECTION, SOLUTION INTRAVENOUS CONTINUOUS
Status: DISCONTINUED | OUTPATIENT
Start: 2020-09-24 | End: 2020-09-24 | Stop reason: HOSPADM

## 2020-09-24 RX ADMIN — KETOROLAC TROMETHAMINE 15 MG: 30 INJECTION, SOLUTION INTRAMUSCULAR; INTRAVENOUS at 19:43

## 2020-09-24 RX ADMIN — TRAZODONE HYDROCHLORIDE 50 MG: 50 TABLET ORAL at 19:43

## 2020-09-24 RX ADMIN — FENTANYL CITRATE 50 MCG: 50 INJECTION, SOLUTION INTRAMUSCULAR; INTRAVENOUS at 16:32

## 2020-09-24 RX ADMIN — SODIUM CHLORIDE 3 G: 900 INJECTION INTRAVENOUS at 19:42

## 2020-09-24 RX ADMIN — LIDOCAINE HYDROCHLORIDE 60 MG: 20 INJECTION, SOLUTION EPIDURAL; INFILTRATION; INTRACAUDAL at 15:56

## 2020-09-24 RX ADMIN — ONDANSETRON 4 MG: 2 INJECTION INTRAMUSCULAR; INTRAVENOUS at 16:02

## 2020-09-24 RX ADMIN — OXYCODONE HYDROCHLORIDE 5 MG: 5 SOLUTION ORAL at 17:34

## 2020-09-24 RX ADMIN — POTASSIUM CHLORIDE, DEXTROSE MONOHYDRATE AND SODIUM CHLORIDE: 150; 5; 450 INJECTION, SOLUTION INTRAVENOUS at 19:42

## 2020-09-24 RX ADMIN — CEFAZOLIN 2 G: 330 INJECTION, POWDER, FOR SOLUTION INTRAMUSCULAR; INTRAVENOUS at 16:02

## 2020-09-24 RX ADMIN — SODIUM CHLORIDE 3 G: 900 INJECTION INTRAVENOUS at 23:19

## 2020-09-24 RX ADMIN — ACETAMINOPHEN 650 MG: 325 TABLET, FILM COATED ORAL at 19:42

## 2020-09-24 RX ADMIN — KETOROLAC TROMETHAMINE 15 MG: 30 INJECTION, SOLUTION INTRAMUSCULAR; INTRAVENOUS at 23:19

## 2020-09-24 RX ADMIN — FENTANYL CITRATE 100 MCG: 50 INJECTION, SOLUTION INTRAMUSCULAR; INTRAVENOUS at 15:56

## 2020-09-24 RX ADMIN — SODIUM CHLORIDE, POTASSIUM CHLORIDE, SODIUM LACTATE AND CALCIUM CHLORIDE: 600; 310; 30; 20 INJECTION, SOLUTION INTRAVENOUS at 15:31

## 2020-09-24 RX ADMIN — PROPOFOL 120 MG: 10 INJECTION, EMULSION INTRAVENOUS at 15:56

## 2020-09-24 RX ADMIN — EPHEDRINE SULFATE 10 MG: 50 INJECTION INTRAMUSCULAR; INTRAVENOUS; SUBCUTANEOUS at 16:05

## 2020-09-24 RX ADMIN — Medication 1 EACH: at 06:06

## 2020-09-24 RX ADMIN — VANCOMYCIN HYDROCHLORIDE 1500 MG: 500 INJECTION, POWDER, LYOPHILIZED, FOR SOLUTION INTRAVENOUS at 20:30

## 2020-09-24 RX ADMIN — FENTANYL CITRATE 50 MCG: 50 INJECTION, SOLUTION INTRAMUSCULAR; INTRAVENOUS at 16:36

## 2020-09-24 RX ADMIN — FENTANYL CITRATE 50 MCG: 50 INJECTION INTRAMUSCULAR; INTRAVENOUS at 17:35

## 2020-09-24 ASSESSMENT — ENCOUNTER SYMPTOMS
SPEECH CHANGE: 0
EYE DISCHARGE: 0
VOMITING: 0
SPUTUM PRODUCTION: 0
LOSS OF CONSCIOUSNESS: 0
PALPITATIONS: 0
BRUISES/BLEEDS EASILY: 0
SORE THROAT: 0
SHORTNESS OF BREATH: 0
HEMOPTYSIS: 0
NECK PAIN: 0
DIZZINESS: 0
BACK PAIN: 0
CONSTIPATION: 0
FOCAL WEAKNESS: 0
EYE PAIN: 0
DEPRESSION: 0
HEADACHES: 0
DIAPHORESIS: 0
CHILLS: 0
WHEEZING: 0
DIARRHEA: 0
SENSORY CHANGE: 0
ABDOMINAL PAIN: 0
WEAKNESS: 0
COUGH: 0
FEVER: 0
NAUSEA: 0
MYALGIAS: 0
CLAUDICATION: 0

## 2020-09-24 ASSESSMENT — LIFESTYLE VARIABLES: SUBSTANCE_ABUSE: 0

## 2020-09-24 ASSESSMENT — PAIN DESCRIPTION - PAIN TYPE
TYPE: SURGICAL PAIN
TYPE: ACUTE PAIN;SURGICAL PAIN
TYPE: ACUTE PAIN;SURGICAL PAIN
TYPE: ACUTE PAIN
TYPE: ACUTE PAIN;SURGICAL PAIN
TYPE: ACUTE PAIN

## 2020-09-24 NOTE — ANESTHESIA PROCEDURE NOTES
Airway    Date/Time: 9/24/2020 3:57 PM  Performed by: Abimael Figueroa M.D.  Authorized by: Abimael Figueroa M.D.     Location:  OR  Urgency:  Elective  Difficult Airway: No    Indications for Airway Management:  Anesthesia      Spontaneous Ventilation: absent    Sedation Level:  Deep  Preoxygenated: Yes    Mask Difficulty Assessment:  0 - not attempted  Final Airway Type:  Supraglottic airway  Final Supraglottic Airway:  Standard LMA    SGA Size:  4  Number of Attempts at Approach:  1

## 2020-09-24 NOTE — PROGRESS NOTES
Assumed care of pt.  AAOx4.  No c/o pain this morning.  Left AKA with wound vac in place.  Carcinoma noted to right cheek and nose, ELIDA, red.  Sacrum red, excoriated. Barrier cream in place.  Carpio in place draining clear, yellow urine. Pt admitted with carpio from rehab facility, per report orders to leave in place until after surgery. No active order, will address with MD.  Wound noted to right urethra, ELIDA. 1+ penial and scrotal edema.  Strict NPO for surgery toady.  LBM PTA, + flatus.  POC reviewed with pt.  Call light within reach, pt educated to call for assistance as needed.  Hourly rounding in place.

## 2020-09-24 NOTE — CARE PLAN
Problem: Mobility  Goal: STG-Within one week, patient will ambulate household distance  Description: 1) Individualized goal:  10 ft with FWW and SBA-CGA.  2) Interventions:  PT Group Therapy, PT Gait Training, PT Therapeutic Exercises, PT Neuro Re-Ed/Balance, PT Therapeutic Activity, and PT Evaluation  Outcome: PROGRESSING SLOWER THAN EXPECTED  Note: Fluctuating performance based on pain, fatigue, and cognition     Problem: Mobility Transfers  Goal: STG-Within one week, patient will sit to stand  Description: 1) Individualized goal:  with FWW and SBA.  2) Interventions:  PT Group Therapy, PT Gait Training, PT Therapeutic Exercises, PT Neuro Re-Ed/Balance, PT Therapeutic Activity, and PT Evaluation  Outcome: PROGRESSING SLOWER THAN EXPECTED  Note: Fluctuating performance based on pain, fatigue, and cognition

## 2020-09-24 NOTE — CARE PLAN
Problem: Communication  Goal: The ability to communicate needs accurately and effectively will improve  Outcome: PROGRESSING AS EXPECTED  POC reviewed with pt. All questions answered.     Problem: Pain Management  Goal: Pain level will decrease to patient's comfort goal  Outcome: PROGRESSING AS EXPECTED  Pain well controlled with current regimen. Pt educated on regimen and to call for intervention as needed.

## 2020-09-24 NOTE — PROGRESS NOTES
Vascular    OR today for left AKA debridement and vac    Usman Fisher MD  [facility] (General and Vascular Surgery)  Cell: [phone] (text or call is fine, if you don't reach me please try my office)  Office: [phone]  __________________________________________________________________  Patient:Jimenez Bains   MRN:[id number]   CSN:[id number]    9/24/2020    7:42 AM

## 2020-09-24 NOTE — PROGRESS NOTES
Hospital Medicine Daily Progress Note    Date of Service  9/24/2020    Chief Complaint  75 y.o. male admitted 9/23/2020 with infected left AKA.    Hospital Course    This 76 yo male with HTN, PAD w original left leg AKA on 6/3/20 by Dr. Fisher, revision on 7/29/20 with MRSA and enterococcus seen by ID, now with erythema, infected left leg AKA with current wound vac in place.  Going for OR revision 9/24, antibiotics to start after OR cultures.  Ordered insulin sliding scale and has carpio placed prior to transfer from rehab.  Carpio orders placed.    *        Consultants/Specialty  Dr. Fisher    Code Status  Full Code    Disposition  PT/OT ordered, likely return to [facility]    Review of Systems  Review of Systems   Constitutional: Negative for chills, diaphoresis, fever and malaise/fatigue.   HENT: Negative for congestion and sore throat.    Eyes: Negative for pain and discharge.   Respiratory: Negative for cough, hemoptysis, sputum production, shortness of breath and wheezing.    Cardiovascular: Negative for chest pain, palpitations, claudication and leg swelling.   Gastrointestinal: Negative for abdominal pain, constipation, diarrhea, melena, nausea and vomiting.   Genitourinary: Negative for dysuria, frequency and urgency.   Musculoskeletal: Positive for joint pain (left aka stump pain). Negative for back pain, myalgias and neck pain.   Skin: Negative for itching and rash.   Neurological: Negative for dizziness, sensory change, speech change, focal weakness, loss of consciousness, weakness and headaches.   Endo/Heme/Allergies: Does not bruise/bleed easily.   Psychiatric/Behavioral: Negative for depression, substance abuse and suicidal ideas.        Physical Exam  Temp:  [36.2 °C (97.2 °F)-37.3 °C (99.1 °F)] 36.2 °C (97.2 °F)  Pulse:  [63-78] 78  Resp:  [16-18] 18  BP: ()/(48-69) 154/69  SpO2:  [90 %-97 %] 95 %    Physical Exam  Constitutional:       General: He is not in acute distress.     Appearance:  He is ill-appearing. He is not diaphoretic.      Comments: Appears pale, malnourished, protuberant ribs   HENT:      Head: Normocephalic and atraumatic.      Comments: Large fungating right cheek hyperpigmented skin lesion     Mouth/Throat:      Pharynx: No oropharyngeal exudate.   Eyes:      General: No scleral icterus.        Right eye: No discharge.         Left eye: No discharge.      Conjunctiva/sclera: Conjunctivae normal.      Pupils: Pupils are equal, round, and reactive to light.   Neck:      Musculoskeletal: Normal range of motion and neck supple.      Thyroid: No thyromegaly.      Vascular: No JVD.      Trachea: No tracheal deviation.   Cardiovascular:      Rate and Rhythm: Normal rate and regular rhythm.      Heart sounds: Normal heart sounds. No murmur. No friction rub. No gallop.    Pulmonary:      Effort: Pulmonary effort is normal. No respiratory distress.      Breath sounds: Normal breath sounds. No wheezing or rales.   Chest:      Chest wall: No tenderness.   Abdominal:      General: Bowel sounds are normal. There is no distension.      Palpations: Abdomen is soft. There is no mass.      Tenderness: There is no abdominal tenderness. There is no guarding or rebound.   Genitourinary:     Comments: Brothers catheter in place  Musculoskeletal: Normal range of motion.         General: Tenderness (Left AKA with erythema seen surrounding wound vac) present.   Lymphadenopathy:      Cervical: No cervical adenopathy.   Skin:     General: Skin is warm and dry.      Findings: No erythema or rash.   Neurological:      Mental Status: He is alert and oriented to person, place, and time.      Cranial Nerves: No cranial nerve deficit.      Motor: No abnormal muscle tone.   Psychiatric:         Behavior: Behavior normal.         Thought Content: Thought content normal.         Judgment: Judgment normal.         Fluids    Intake/Output Summary (Last 24 hours) at 9/24/2020 1645  Last data filed at 9/24/2020 1604  Gross  "per 24 hour   Intake 150 ml   Output 820 ml   Net -670 ml       Laboratory  Recent Labs     09/24/20  0058   WBC 8.6   RBC 3.24*   HEMOGLOBIN 9.7*   HEMATOCRIT 32.8*   .2*   MCH 29.9   MCHC 29.6*   RDW 56.4*   PLATELETCT 239   MPV 11.6     Recent Labs     09/24/20  0058   SODIUM 142   POTASSIUM 3.6   CHLORIDE 105   CO2 25   GLUCOSE 122*   BUN 19   CREATININE 1.08   CALCIUM 9.2                   Imaging  No orders to display        Assessment/Plan  * AKA stump complication (HCC)- (present on admission)  Assessment & Plan  Sent from Rehab today for 9/24 surgical revision. NPO after midnight  Difficulty in healing.  Vit C, Zinc  Original AKA 6/8 followed by prior revision and infection 7/29.  Start antibiotics post op wound cultures obtained based on prior cultures.    Anemia due to vitamin B12 deficiency- (present on admission)  Assessment & Plan  Low B12 and folate levels, continue vitamin B12 oral daily and folate po daily.  Check iron levels in a.m.  Ascorbic acid added.  Monitor CBC    PVD (peripheral vascular disease) with claudication (HCC)- (present on admission)  Assessment & Plan  Control BP and lipids.  Statin, bp meds    Type 2 diabetes mellitus (HCC)- (present on admission)  Assessment & Plan  Monitor accuchecks and cover with SSI  Last A1c 5.1 in past 3 weeks    HLD (hyperlipidemia)- (present on admission)  Assessment & Plan  Atorvastatin 40mg nightly    HTN (hypertension)- (present on admission)  Assessment & Plan  Monitor vitals.  Lisinopril 20mg daily, coreg 6.25mg BID    Skin lesion of face- (present on admission)  Assessment & Plan  \"Been there a few years\"  Educated him on the importance of this being removed by a dermatologist/plastic surgeon.       VTE prophylaxis: lovenox      "

## 2020-09-24 NOTE — THERAPY
Physical Therapy Contact Note    PT consult received, awaiting AKA today; will follow up post op for accurate assessment of dc needs;     Alysa Marino, PT, DPT

## 2020-09-24 NOTE — ANESTHESIA PREPROCEDURE EVALUATION
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)   (+) Generalized atherosclerosis   (+) HTN (hypertension)   (+) Ischemia of extremity   (+) Limb ischemia   (+) Other specified disorders of arteries and arterioles (HCC)   (+) PVD (peripheral vascular disease) with claudication (HCC)   (+) Peripheral arterial occlusive disease (HCC)   (+) Peripheral artery disease      ENDO   (+) Type 2 diabetes mellitus (HCC)      Other   (+) AKA stump complication (HCC)   (+) Anemia   (+) HLD (hyperlipidemia)     Anes H&P:  PAST MEDICAL HISTORY:   75 y.o. male who presents for Procedure(s) (LRB):  AMPUTATION, BELOW KNEE-REVISION (Left).  He has current and past medical problems significant for:    Past Medical History:   Diagnosis Date   • Cancer (HCC)     skin cancer   • Hypertension    • Pain 14    right lower leg, 8/10   • Pain 3/14/14    8/10 right groin, feet   • Personal history of venous thrombosis and embolism    • PVD (peripheral vascular disease) ([facility])    • Unspecified hemorrhagic conditions     takes coumadin       SMOKING/ALCOHOL/RECREATIONAL DRUG USE:  Social History     Tobacco Use   • Smoking status: Former Smoker     Packs/day: 2.00     Years: 50.00     Pack years: 100.00     Types: Cigarettes     Quit date: 2020     Years since quittin.5   • Smokeless tobacco: Never Used   • Tobacco comment: patient refused   Substance Use Topics   • Alcohol use: No     Frequency: Never     Drinks per session: 1 or 2     Binge frequency: Never     Comment: patient quit in    • Drug use: No     Social History     Substance and Sexual Activity   Drug Use No       PAST SURGICAL HISTORY:  Past Surgical History:   Procedure Laterality Date   • KNEE AMPUTATION ABOVE Left 2020    Procedure: I&D AKA WOUND;  Surgeon: Usman Fisher M.D.;  Location: SURGERY [facility];  Service: Vascular   • IRRIGATION &  DEBRIDEMENT GENERAL Left 7/31/2020    Procedure: IRRIGATION AND DEBRIDEMENT, WOUND- WASH OUT OF AKA, WOUND VAC;  Surgeon: Usman Fisher M.D.;  Location: [facility];  Service: Vascular   • IRRIGATION & DEBRIDEMENT GENERAL Left 7/29/2020    Procedure: IRRIGATION AND DEBRIDEMENT, WOUND- WASHOUT OF ABOVE KNEE AMPUTATION, WOUND VAC CHANGE;  Surgeon: Usman Fisher M.D.;  Location: [facility];  Service: Vascular   • KNEE AMPUTATION ABOVE Left 7/27/2020    Procedure: AMPUTATION, ABOVE KNEE-FOR REVISION;  Surgeon: Usman Fisher M.D.;  Location: [facility];  Service: Vascular   • KNEE AMPUTATION ABOVE Left 6/30/2020    Procedure: AMPUTATION, ABOVE KNEE;  Surgeon: Usman Fisher M.D.;  Location: [facility];  Service: Vascular   • ANGIOGRAM  7/30/2014    Performed by Cyn Phan M.D. at [facility]   • FEMORAL POPLITEAL BYPASS  7/30/2014    Performed by Cyn Phan M.D. at [facility]   • REIMPLANTION REVASCULARIZATION  7/30/2014    Performed by Cyn Phan M.D. at [facility]   • GROIN EXPLORATION  3/17/2014    Performed by Cyn Phan M.D. at [facility]   • FEMORAL FEMORAL BYPASS  2/2/2014    Performed by Cyn Phan M.D. at [facility]   • TOE AMPUTATION  12/4/2013    Performed by Cyn Phan M.D. at [facility]   • FEMORAL FEMORAL BYPASS  12/4/2013    Performed by Cyn Phan M.D. at [facility]   • AORTOGRAM WITH RUNOFF  11/14/2013    Performed by Cyn Phan M.D. at [facility]   • ILIAC ANGIOPLASTY WITH STENT  11/14/2013    Performed by Cyn Phan M.D. at [facility]   • FEMORAL POPLITEAL BYPASS  10/18/2011    Performed by CYN PHAN at [facility]       ALLERGIES:   Allergies   Allergen Reactions   • No Known Drug Allergy        MEDICATIONS:  No current facility-administered medications on file  prior to encounter.      Current Outpatient Medications on File Prior to Encounter   Medication Sig Dispense Refill   • NS SOLN 100 mL with ampicillin/sulbactam 3 (2-1) g SOLR 3 g 3 g by Intravenous route every 6 hours. 25 Ampule 0   • atorvastatin (LIPITOR) 40 MG Tab Take 1 Tab by mouth every evening. 30 Tab 0   • gabapentin (NEURONTIN) 100 MG Cap Take 1 Cap by mouth 3 times a day. 90 Cap 0   • lisinopril (PRINIVIL) 5 MG Tab Take 3 Tabs by mouth every day. 30 Tab 0   • NS SOLN 50 mL with DAPTOmycin 350 MG SOLR 1,000 mg 1,000 mg by Intravenous route every 24 hours. 25 Ampule 0   • aspirin 81 MG EC tablet Take 1 Tab by mouth every day. 30 Tab 0       LABS:  Lab Results   Component Value Date/Time    HEMOGLOBIN 9.7 (L) 09/24/2020 0058    HEMATOCRIT 32.8 (L) 09/24/2020 0058    WBC 8.6 09/24/2020 0058     Lab Results   Component Value Date/Time    SODIUM 142 09/24/2020 0058    POTASSIUM 3.6 09/24/2020 0058    CHLORIDE 105 09/24/2020 0058    CO2 25 09/24/2020 0058    GLUCOSE 122 (H) 09/24/2020 0058    BUN 19 09/24/2020 0058    CALCIUM 9.2 09/24/2020 0058       SARS-CoV2 result: Negative      PREVIOUS ANESTHETICS: See EMR  __________________________________________      Physical Exam    Airway   Mallampati: II  TM distance: >3 FB  Neck ROM: full       Cardiovascular - normal exam  Rhythm: regular  Rate: normal  (-) murmur     Dental   Comments: edentulous      Facial Hair   Pulmonary - normal exam  Breath sounds clear to auscultation     Abdominal   Abdomen: soft     Neurological - normal exam                 Anesthesia Plan    ASA 3- EMERGENT   ASA physical status 3 criteria: diabetes - poorly controlled and hypertension - poorly controlledASA physical status emergent criteria: acutely contaminated wound or identified infection source    Plan - general       Airway plan will be LMA        Induction: intravenous    Postoperative Plan: Postoperative administration of opioids is intended.    Pertinent diagnostic labs and  testing reviewed    Informed Consent:    Anesthetic plan and risks discussed with patient.    Use of blood products discussed with: patient whom consented to blood products.

## 2020-09-24 NOTE — CARE PLAN
Problem: Communication  Goal: The ability to communicate needs accurately and effectively will improve  Outcome: PROGRESSING AS EXPECTED     Problem: Safety  Goal: Will remain free from injury  Outcome: PROGRESSING AS EXPECTED     Problem: Infection  Goal: Will remain free from infection  Outcome: PROGRESSING AS EXPECTED     Problem: Skin Integrity  Goal: Risk for impaired skin integrity will decrease  Outcome: PROGRESSING AS EXPECTED

## 2020-09-25 PROBLEM — L98.429 SACRAL ULCER, WITH UNSPECIFIED SEVERITY (HCC): Status: ACTIVE | Noted: 2020-09-25

## 2020-09-25 LAB
ANION GAP SERPL CALC-SCNC: 9 MMOL/L (ref 7–16)
APPEARANCE UR: ABNORMAL
BACTERIA #/AREA URNS HPF: ABNORMAL /HPF
BASOPHILS # BLD AUTO: 0.3 % (ref 0–1.8)
BASOPHILS # BLD: 0.03 K/UL (ref 0–0.12)
BILIRUB UR QL STRIP.AUTO: NEGATIVE
BUN SERPL-MCNC: <2 MG/DL (ref 8–22)
CALCIUM SERPL-MCNC: 8.6 MG/DL (ref 8.5–10.5)
CHLORIDE SERPL-SCNC: 102 MMOL/L (ref 96–112)
CO2 SERPL-SCNC: 25 MMOL/L (ref 20–33)
COLOR UR: YELLOW
CREAT SERPL-MCNC: 1.21 MG/DL (ref 0.5–1.4)
EOSINOPHIL # BLD AUTO: 0.06 K/UL (ref 0–0.51)
EOSINOPHIL NFR BLD: 0.7 % (ref 0–6.9)
EPI CELLS #/AREA URNS HPF: NEGATIVE /HPF
ERYTHROCYTE [DISTWIDTH] IN BLOOD BY AUTOMATED COUNT: 53.1 FL (ref 35.9–50)
FERRITIN SERPL-MCNC: 218 NG/ML (ref 22–322)
GLUCOSE BLD-MCNC: 118 MG/DL (ref 65–99)
GLUCOSE BLD-MCNC: 128 MG/DL (ref 65–99)
GLUCOSE BLD-MCNC: 141 MG/DL (ref 65–99)
GLUCOSE BLD-MCNC: 146 MG/DL (ref 65–99)
GLUCOSE SERPL-MCNC: 142 MG/DL (ref 65–99)
GLUCOSE UR STRIP.AUTO-MCNC: NEGATIVE MG/DL
HCT VFR BLD AUTO: 31.4 % (ref 42–52)
HGB BLD-MCNC: 9.6 G/DL (ref 14–18)
HYALINE CASTS #/AREA URNS LPF: ABNORMAL /LPF
IMM GRANULOCYTES # BLD AUTO: 0.04 K/UL (ref 0–0.11)
IMM GRANULOCYTES NFR BLD AUTO: 0.4 % (ref 0–0.9)
INR PPP: 1.26 (ref 0.87–1.13)
IRON SATN MFR SERPL: 11 % (ref 15–55)
IRON SERPL-MCNC: 14 UG/DL (ref 50–180)
KETONES UR STRIP.AUTO-MCNC: ABNORMAL MG/DL
LACTATE BLD-SCNC: 1.7 MMOL/L (ref 0.5–2)
LEUKOCYTE ESTERASE UR QL STRIP.AUTO: ABNORMAL
LYMPHOCYTES # BLD AUTO: 1.01 K/UL (ref 1–4.8)
LYMPHOCYTES NFR BLD: 11 % (ref 22–41)
MCH RBC QN AUTO: 30 PG (ref 27–33)
MCHC RBC AUTO-ENTMCNC: 30.6 G/DL (ref 33.7–35.3)
MCV RBC AUTO: 98.1 FL (ref 81.4–97.8)
MICRO URNS: ABNORMAL
MONOCYTES # BLD AUTO: 0.48 K/UL (ref 0–0.85)
MONOCYTES NFR BLD AUTO: 5.2 % (ref 0–13.4)
NEUTROPHILS # BLD AUTO: 7.59 K/UL (ref 1.82–7.42)
NEUTROPHILS NFR BLD: 82.4 % (ref 44–72)
NITRITE UR QL STRIP.AUTO: POSITIVE
NRBC # BLD AUTO: 0 K/UL
NRBC BLD-RTO: 0 /100 WBC
PATHOLOGY CONSULT NOTE: NORMAL
PH UR STRIP.AUTO: 5 [PH] (ref 5–8)
PLATELET # BLD AUTO: 296 K/UL (ref 164–446)
PMV BLD AUTO: 10.5 FL (ref 9–12.9)
POTASSIUM SERPL-SCNC: 3.9 MMOL/L (ref 3.6–5.5)
PROCALCITONIN SERPL-MCNC: 1.47 NG/ML
PROT UR QL STRIP: 30 MG/DL
PROTHROMBIN TIME: 16.2 SEC (ref 12–14.6)
RBC # BLD AUTO: 3.2 M/UL (ref 4.7–6.1)
RBC # URNS HPF: ABNORMAL /HPF
RBC UR QL AUTO: ABNORMAL
SODIUM SERPL-SCNC: 136 MMOL/L (ref 135–145)
SP GR UR STRIP.AUTO: 1.04
TIBC SERPL-MCNC: 132 UG/DL (ref 250–450)
UIBC SERPL-MCNC: 118 UG/DL (ref 110–370)
UROBILINOGEN UR STRIP.AUTO-MCNC: 0.2 MG/DL
VANCOMYCIN TROUGH SERPL-MCNC: 11.1 UG/ML (ref 10–20)
WBC # BLD AUTO: 9.2 K/UL (ref 4.8–10.8)
WBC #/AREA URNS HPF: ABNORMAL /HPF

## 2020-09-25 PROCEDURE — 97162 PT EVAL MOD COMPLEX 30 MIN: CPT

## 2020-09-25 PROCEDURE — 97166 OT EVAL MOD COMPLEX 45 MIN: CPT

## 2020-09-25 PROCEDURE — 80048 BASIC METABOLIC PNL TOTAL CA: CPT

## 2020-09-25 PROCEDURE — 83540 ASSAY OF IRON: CPT

## 2020-09-25 PROCEDURE — 700101 HCHG RX REV CODE 250: Performed by: SURGERY

## 2020-09-25 PROCEDURE — 83605 ASSAY OF LACTIC ACID: CPT

## 2020-09-25 PROCEDURE — 83550 IRON BINDING TEST: CPT

## 2020-09-25 PROCEDURE — 87086 URINE CULTURE/COLONY COUNT: CPT

## 2020-09-25 PROCEDURE — A9270 NON-COVERED ITEM OR SERVICE: HCPCS | Performed by: NURSE PRACTITIONER

## 2020-09-25 PROCEDURE — 80202 ASSAY OF VANCOMYCIN: CPT

## 2020-09-25 PROCEDURE — 99233 SBSQ HOSP IP/OBS HIGH 50: CPT | Performed by: HOSPITALIST

## 2020-09-25 PROCEDURE — 85610 PROTHROMBIN TIME: CPT

## 2020-09-25 PROCEDURE — 87077 CULTURE AEROBIC IDENTIFY: CPT

## 2020-09-25 PROCEDURE — 700102 HCHG RX REV CODE 250 W/ 637 OVERRIDE(OP): Performed by: NURSE PRACTITIONER

## 2020-09-25 PROCEDURE — 81001 URINALYSIS AUTO W/SCOPE: CPT

## 2020-09-25 PROCEDURE — 85025 COMPLETE CBC W/AUTO DIFF WBC: CPT

## 2020-09-25 PROCEDURE — 700111 HCHG RX REV CODE 636 W/ 250 OVERRIDE (IP): Performed by: HOSPITALIST

## 2020-09-25 PROCEDURE — 770006 HCHG ROOM/CARE - MED/SURG/GYN SEMI*

## 2020-09-25 PROCEDURE — A9270 NON-COVERED ITEM OR SERVICE: HCPCS | Performed by: HOSPITALIST

## 2020-09-25 PROCEDURE — A9270 NON-COVERED ITEM OR SERVICE: HCPCS | Performed by: SURGERY

## 2020-09-25 PROCEDURE — 700101 HCHG RX REV CODE 250: Performed by: HOSPITALIST

## 2020-09-25 PROCEDURE — 700102 HCHG RX REV CODE 250 W/ 637 OVERRIDE(OP): Performed by: SURGERY

## 2020-09-25 PROCEDURE — 700102 HCHG RX REV CODE 250 W/ 637 OVERRIDE(OP): Performed by: HOSPITALIST

## 2020-09-25 PROCEDURE — 84145 PROCALCITONIN (PCT): CPT

## 2020-09-25 PROCEDURE — 87186 SC STD MICRODIL/AGAR DIL: CPT

## 2020-09-25 PROCEDURE — 82962 GLUCOSE BLOOD TEST: CPT | Mod: 91

## 2020-09-25 PROCEDURE — 82728 ASSAY OF FERRITIN: CPT

## 2020-09-25 PROCEDURE — 700111 HCHG RX REV CODE 636 W/ 250 OVERRIDE (IP): Performed by: SURGERY

## 2020-09-25 PROCEDURE — 700105 HCHG RX REV CODE 258: Performed by: HOSPITALIST

## 2020-09-25 RX ORDER — LINEZOLID 600 MG/1
600 TABLET, FILM COATED ORAL EVERY 12 HOURS
Status: DISCONTINUED | OUTPATIENT
Start: 2020-09-25 | End: 2020-09-25

## 2020-09-25 RX ORDER — SODIUM CHLORIDE, SODIUM LACTATE, POTASSIUM CHLORIDE, AND CALCIUM CHLORIDE .6; .31; .03; .02 G/100ML; G/100ML; G/100ML; G/100ML
500 INJECTION, SOLUTION INTRAVENOUS
Status: DISCONTINUED | OUTPATIENT
Start: 2020-09-25 | End: 2020-09-29 | Stop reason: HOSPADM

## 2020-09-25 RX ORDER — SULFAMETHOXAZOLE AND TRIMETHOPRIM 800; 160 MG/1; MG/1
1 TABLET ORAL EVERY 12 HOURS
Status: DISCONTINUED | OUTPATIENT
Start: 2020-09-25 | End: 2020-09-25

## 2020-09-25 RX ORDER — MIDODRINE HYDROCHLORIDE 5 MG/1
5 TABLET ORAL
Status: DISPENSED | OUTPATIENT
Start: 2020-09-25 | End: 2020-09-26

## 2020-09-25 RX ORDER — SODIUM CHLORIDE, SODIUM LACTATE, POTASSIUM CHLORIDE, CALCIUM CHLORIDE 600; 310; 30; 20 MG/100ML; MG/100ML; MG/100ML; MG/100ML
INJECTION, SOLUTION INTRAVENOUS CONTINUOUS
Status: DISCONTINUED | OUTPATIENT
Start: 2020-09-25 | End: 2020-09-27

## 2020-09-25 RX ORDER — SODIUM CHLORIDE 9 MG/ML
INJECTION, SOLUTION INTRAVENOUS
Status: COMPLETED
Start: 2020-09-25 | End: 2020-09-25

## 2020-09-25 RX ORDER — SODIUM CHLORIDE, SODIUM LACTATE, POTASSIUM CHLORIDE, AND CALCIUM CHLORIDE .6; .31; .03; .02 G/100ML; G/100ML; G/100ML; G/100ML
30 INJECTION, SOLUTION INTRAVENOUS
Status: COMPLETED | OUTPATIENT
Start: 2020-09-25 | End: 2020-09-25

## 2020-09-25 RX ADMIN — ACETAMINOPHEN 650 MG: 325 TABLET, FILM COATED ORAL at 06:26

## 2020-09-25 RX ADMIN — OXYCODONE HYDROCHLORIDE AND ACETAMINOPHEN 500 MG: 500 TABLET ORAL at 06:26

## 2020-09-25 RX ADMIN — OXYCODONE HYDROCHLORIDE 5 MG: 5 SOLUTION ORAL at 16:03

## 2020-09-25 RX ADMIN — SULFAMETHOXAZOLE AND TRIMETHOPRIM 1 TABLET: 800; 160 TABLET ORAL at 11:53

## 2020-09-25 RX ADMIN — SULFAMETHOXAZOLE AND TRIMETHOPRIM 1 TABLET: 800; 160 TABLET ORAL at 17:00

## 2020-09-25 RX ADMIN — VANCOMYCIN HYDROCHLORIDE 1250 MG: 500 INJECTION, POWDER, LYOPHILIZED, FOR SOLUTION INTRAVENOUS at 22:58

## 2020-09-25 RX ADMIN — Medication 4000 UNITS: at 06:26

## 2020-09-25 RX ADMIN — SODIUM CHLORIDE 3 G: 900 INJECTION INTRAVENOUS at 11:54

## 2020-09-25 RX ADMIN — GABAPENTIN 900 MG: 300 CAPSULE ORAL at 11:53

## 2020-09-25 RX ADMIN — SODIUM CHLORIDE 3 G: 900 INJECTION INTRAVENOUS at 06:27

## 2020-09-25 RX ADMIN — GABAPENTIN 900 MG: 300 CAPSULE ORAL at 06:26

## 2020-09-25 RX ADMIN — KETOROLAC TROMETHAMINE 15 MG: 30 INJECTION, SOLUTION INTRAMUSCULAR; INTRAVENOUS at 11:54

## 2020-09-25 RX ADMIN — GABAPENTIN 900 MG: 300 CAPSULE ORAL at 17:00

## 2020-09-25 RX ADMIN — Medication 1 EACH: at 16:49

## 2020-09-25 RX ADMIN — OMEPRAZOLE 20 MG: 20 CAPSULE, DELAYED RELEASE ORAL at 06:26

## 2020-09-25 RX ADMIN — POTASSIUM CHLORIDE, DEXTROSE MONOHYDRATE AND SODIUM CHLORIDE: 150; 5; 450 INJECTION, SOLUTION INTRAVENOUS at 06:27

## 2020-09-25 RX ADMIN — KETOROLAC TROMETHAMINE 15 MG: 30 INJECTION, SOLUTION INTRAMUSCULAR; INTRAVENOUS at 06:29

## 2020-09-25 RX ADMIN — ACETAMINOPHEN 650 MG: 325 TABLET, FILM COATED ORAL at 11:53

## 2020-09-25 RX ADMIN — ASPIRIN 81 MG: 81 TABLET, COATED ORAL at 06:26

## 2020-09-25 RX ADMIN — SODIUM CHLORIDE, POTASSIUM CHLORIDE, SODIUM LACTATE AND CALCIUM CHLORIDE: 600; 310; 30; 20 INJECTION, SOLUTION INTRAVENOUS at 22:32

## 2020-09-25 RX ADMIN — SODIUM CHLORIDE 3 G: 900 INJECTION INTRAVENOUS at 17:00

## 2020-09-25 RX ADMIN — ENOXAPARIN SODIUM 40 MG: 40 INJECTION SUBCUTANEOUS at 06:27

## 2020-09-25 RX ADMIN — CARVEDILOL 6.25 MG: 6.25 TABLET, FILM COATED ORAL at 06:27

## 2020-09-25 RX ADMIN — ACETAMINOPHEN 650 MG: 325 TABLET, FILM COATED ORAL at 17:00

## 2020-09-25 RX ADMIN — FOLIC ACID 1 MG: 1 TABLET ORAL at 06:26

## 2020-09-25 RX ADMIN — ATORVASTATIN CALCIUM 40 MG: 40 TABLET, FILM COATED ORAL at 17:00

## 2020-09-25 RX ADMIN — CYANOCOBALAMIN TAB 500 MCG 1000 MCG: 500 TAB at 06:26

## 2020-09-25 RX ADMIN — LISINOPRIL 20 MG: 20 TABLET ORAL at 06:26

## 2020-09-25 RX ADMIN — DOCUSATE SODIUM 50 MG AND SENNOSIDES 8.6 MG 2 TABLET: 8.6; 5 TABLET, FILM COATED ORAL at 17:00

## 2020-09-25 RX ADMIN — SODIUM CHLORIDE, POTASSIUM CHLORIDE, SODIUM LACTATE AND CALCIUM CHLORIDE: 600; 310; 30; 20 INJECTION, SOLUTION INTRAVENOUS at 17:38

## 2020-09-25 RX ADMIN — DOCUSATE SODIUM 50 MG AND SENNOSIDES 8.6 MG 2 TABLET: 8.6; 5 TABLET, FILM COATED ORAL at 06:26

## 2020-09-25 RX ADMIN — DULOXETINE 20 MG: 20 CAPSULE, DELAYED RELEASE ORAL at 06:30

## 2020-09-25 RX ADMIN — MIDODRINE HYDROCHLORIDE 5 MG: 5 TABLET ORAL at 21:47

## 2020-09-25 RX ADMIN — KETOROLAC TROMETHAMINE 15 MG: 30 INJECTION, SOLUTION INTRAMUSCULAR; INTRAVENOUS at 17:00

## 2020-09-25 RX ADMIN — Medication 1 EACH: at 06:30

## 2020-09-25 RX ADMIN — SODIUM CHLORIDE, POTASSIUM CHLORIDE, SODIUM LACTATE AND CALCIUM CHLORIDE 1875 ML: 600; 310; 30; 20 INJECTION, SOLUTION INTRAVENOUS at 17:36

## 2020-09-25 ASSESSMENT — ENCOUNTER SYMPTOMS
BACK PAIN: 0
COUGH: 0
PALPITATIONS: 0
FOCAL WEAKNESS: 0
NECK PAIN: 0
HEADACHES: 0
MYALGIAS: 0
SPUTUM PRODUCTION: 0
LOSS OF CONSCIOUSNESS: 0
VOMITING: 0
DIAPHORESIS: 0
SENSORY CHANGE: 0
EYE DISCHARGE: 0
DEPRESSION: 0
BRUISES/BLEEDS EASILY: 0
DIARRHEA: 0
FEVER: 0
WHEEZING: 0
ABDOMINAL PAIN: 0
SHORTNESS OF BREATH: 0
HEMOPTYSIS: 0
NAUSEA: 0
CLAUDICATION: 0
SORE THROAT: 0
SPEECH CHANGE: 0
WEAKNESS: 0
DIZZINESS: 0
CONSTIPATION: 0
CHILLS: 0
EYE PAIN: 0

## 2020-09-25 ASSESSMENT — COGNITIVE AND FUNCTIONAL STATUS - GENERAL
DAILY ACTIVITIY SCORE: 14
DRESSING REGULAR UPPER BODY CLOTHING: A LOT
TURNING FROM BACK TO SIDE WHILE IN FLAT BAD: UNABLE
EATING MEALS: A LITTLE
MOBILITY SCORE: 7
TOILETING: A LOT
MOVING TO AND FROM BED TO CHAIR: UNABLE
CLIMB 3 TO 5 STEPS WITH RAILING: TOTAL
STANDING UP FROM CHAIR USING ARMS: A LOT
PERSONAL GROOMING: A LITTLE
SUGGESTED CMS G CODE MODIFIER DAILY ACTIVITY: CK
SUGGESTED CMS G CODE MODIFIER MOBILITY: CM
HELP NEEDED FOR BATHING: A LOT
WALKING IN HOSPITAL ROOM: TOTAL
DRESSING REGULAR LOWER BODY CLOTHING: A LOT
MOVING FROM LYING ON BACK TO SITTING ON SIDE OF FLAT BED: UNABLE

## 2020-09-25 ASSESSMENT — PAIN DESCRIPTION - PAIN TYPE
TYPE: ACUTE PAIN

## 2020-09-25 ASSESSMENT — ACTIVITIES OF DAILY LIVING (ADL): TOILETING: INDEPENDENT

## 2020-09-25 ASSESSMENT — LIFESTYLE VARIABLES: SUBSTANCE_ABUSE: 0

## 2020-09-25 ASSESSMENT — GAIT ASSESSMENTS: GAIT LEVEL OF ASSIST: UNABLE TO PARTICIPATE

## 2020-09-25 NOTE — PROGRESS NOTES
Hospital Medicine Daily Progress Note    Date of Service  9/25/2020    Chief Complaint  75 y.o. male admitted 9/23/2020 with infected left AKA.    Hospital Course    This 76 yo male with HTN, PAD w original left leg AKA on 6/3/20 by Dr. Fisher, revision on 7/29/20 with MRSA and enterococcus seen by ID, now with erythema, infected left leg AKA with current wound vac in place.  Going for OR revision 9/24, antibiotics to start after OR cultures.  Ordered insulin sliding scale and has carpio placed prior to transfer from rehab.  Carpio orders placed.  9/25:  Post op revision left AKA per Dr. Fisher, no infection noted, just not closing so he removed some femur to get the wound to close, wound vac in place.  Bedside RN documented multiple pressure ulcers on sacrum and penis tip, wound care consult, keep carpio in place to allow wounds to heal. Changed vanco IV to bactrim po, continue unasyn IV for now post op, but can change to augmentin at dc. Surgeon removed skin lesion on 9/24 path pending.      *        Consultants/Specialty  Dr. Fisher    Code Status  Full Code    Disposition  PT/OT ordered, likely return to [facility]  Rehab order placed 9/25.    Review of Systems  Review of Systems   Constitutional: Negative for chills, diaphoresis, fever and malaise/fatigue.   HENT: Negative for congestion and sore throat.    Eyes: Negative for pain and discharge.   Respiratory: Negative for cough, hemoptysis, sputum production, shortness of breath and wheezing.    Cardiovascular: Negative for chest pain, palpitations, claudication and leg swelling.   Gastrointestinal: Negative for abdominal pain, constipation, diarrhea, melena, nausea and vomiting.   Genitourinary: Negative for dysuria, frequency and urgency.   Musculoskeletal: Positive for joint pain (left aka stump pain). Negative for back pain, myalgias and neck pain.   Skin: Negative for itching and rash.   Neurological: Negative for dizziness, sensory change, speech  change, focal weakness, loss of consciousness, weakness and headaches.   Endo/Heme/Allergies: Does not bruise/bleed easily.   Psychiatric/Behavioral: Negative for depression, substance abuse and suicidal ideas.        Physical Exam  Temp:  [36.2 °C (97.2 °F)-38 °C (100.4 °F)] 38 °C (100.4 °F)  Pulse:  [62-79] 76  Resp:  [12-24] 17  BP: ()/(50-89) 96/50  SpO2:  [91 %-99 %] 91 %    Physical Exam  Constitutional:       General: He is not in acute distress.     Appearance: He is ill-appearing. He is not diaphoretic.      Comments: Appears pale, malnourished, protuberant ribs   HENT:      Head: Normocephalic and atraumatic.      Comments: Large fungating right cheek hyperpigmented skin lesion     Mouth/Throat:      Pharynx: No oropharyngeal exudate.   Eyes:      General: No scleral icterus.        Right eye: No discharge.         Left eye: No discharge.      Conjunctiva/sclera: Conjunctivae normal.      Pupils: Pupils are equal, round, and reactive to light.   Neck:      Musculoskeletal: Normal range of motion and neck supple.      Thyroid: No thyromegaly.      Vascular: No JVD.      Trachea: No tracheal deviation.   Cardiovascular:      Rate and Rhythm: Normal rate and regular rhythm.      Heart sounds: Normal heart sounds. No murmur. No friction rub. No gallop.    Pulmonary:      Effort: Pulmonary effort is normal. No respiratory distress.      Breath sounds: Normal breath sounds. No wheezing or rales.   Chest:      Chest wall: No tenderness.   Abdominal:      General: Bowel sounds are normal. There is no distension.      Palpations: Abdomen is soft. There is no mass.      Tenderness: There is no abdominal tenderness. There is no guarding or rebound.   Genitourinary:     Comments: Brothers catheter in place  Musculoskeletal: Normal range of motion.         General: Tenderness (Left AKA with erythema seen surrounding wound vac) present.   Lymphadenopathy:      Cervical: No cervical adenopathy.   Skin:     General:  Skin is warm and dry.      Findings: Erythema present. No rash.      Comments: Media tab reviewed photo of sacral pressure ulcer, no skin breakdown, but hyperpigmentation noted likely deep tissue involvement.    Penile tip with escoriations, blisters noted.   Neurological:      Mental Status: He is alert and oriented to person, place, and time.      Cranial Nerves: No cranial nerve deficit.      Motor: No abnormal muscle tone.   Psychiatric:         Behavior: Behavior normal.         Thought Content: Thought content normal.         Judgment: Judgment normal.         Fluids    Intake/Output Summary (Last 24 hours) at 9/25/2020 1402  Last data filed at 9/25/2020 0800  Gross per 24 hour   Intake 3169.9 ml   Output 1500 ml   Net 1669.9 ml       Laboratory  Recent Labs     09/24/20  0058 09/25/20  1003   WBC 8.6 9.2   RBC 3.24* 3.20*   HEMOGLOBIN 9.7* 9.6*   HEMATOCRIT 32.8* 31.4*   .2* 98.1*   MCH 29.9 30.0   MCHC 29.6* 30.6*   RDW 56.4* 53.1*   PLATELETCT 239 296   MPV 11.6 10.5     Recent Labs     09/24/20  0058 09/25/20  1003   SODIUM 142 136   POTASSIUM 3.6 3.9   CHLORIDE 105 102   CO2 25 25   GLUCOSE 122* 142*   BUN 19 <2*   CREATININE 1.08 1.21   CALCIUM 9.2 8.6                   Imaging  No orders to display        Assessment/Plan  * AKA stump complication (HCC)- (present on admission)  Assessment & Plan  Sent from Rehab today for 9/24 surgical revision.  Difficulty in healing.  Vit C  Original AKA 6/8 followed by prior revision and infection 7/29.  Start antibiotics post op.    Per Dr. Fisher, did not appear infected, needed bone removal to allow closure of wound.  No OR cultures necessary.  Plan to dc with short course of bactrim and augmentin.    Anemia due to vitamin B12 deficiency- (present on admission)  Assessment & Plan  Low B12 and folate levels, continue vitamin B12 oral daily and folate po daily.  Check iron levels in a.m.  Ascorbic acid added.  Monitor CBC    PVD (peripheral vascular disease)  "with claudication (HCC)- (present on admission)  Assessment & Plan  Control BP and lipids.  Statin, bp meds    Type 2 diabetes mellitus (HCC)- (present on admission)  Assessment & Plan  Monitor accuchecks and cover with SSI  Last A1c 5.1 in past 3 weeks    Sacral ulcer, with unspecified severity (HCC)  Assessment & Plan  Noted and present since admission from rehab.  Wound care consult.  Also with penile blisters, escorations:  Wound care consult, keep carpio in place to aid skin healing.    HLD (hyperlipidemia)- (present on admission)  Assessment & Plan  Atorvastatin 40mg nightly    HTN (hypertension)- (present on admission)  Assessment & Plan  Monitor vitals.  Lisinopril 20mg daily, coreg 6.25mg BID    Skin lesion of face- (present on admission)  Assessment & Plan  \"Been there a few years\"  Educated him on the importance of this being removed by a dermatologist/plastic surgeon.       VTE prophylaxis: lovenox      "

## 2020-09-25 NOTE — THERAPY
Physical Therapy   Initial Evaluation     Patient Name: Jimenez Bains  Age:  75 y.o., Sex:  male  Medical Record #: [id number]  Today's Date: 9/25/2020     Precautions: Fall Risk    Assessment  Patient is 75 y.o. 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 Pt unable to give a meaningful history of home environment or how he was progressing while at Rehab. Known from previous admission, and when PT inquired about having a ramp installed to access home pt was unaware if it had happened. Pt required Min - Mod A to complete mobility to EOB and stand with FWW. Pt tolerated standing x3 attempts, unable to progress to transfer due to fatigue. PT to follow while in house.     Plan    Recommend Physical Therapy 4 times per week until therapy goals are met for the following treatments:  Bed Mobility, Equipment, Gait Training, Neuro Re-Education / Balance, Self Care/Home Evaluation, Stair Training, Therapeutic Activities and Therapeutic Exercises    DC Equipment Recommendations: Unable to determine at this time  Discharge Recommendations:  Recommend post-acute placement for additional physical therapy services prior to discharge home. Appears pt will have ample support upon DC home, unclear if any family training was completed at Rehab as pt unable to provide much information. Will also need ramp access into home prior to DC.        09/25/20 0918   Precautions   Precautions Fall Risk   Comments L AKA revision with wound vac   Pain 0 - 10 Group   Therapist Pain Assessment During Activity;Nurse Notified  (pressure at residual limb, not rated)   Prior Living Situation   Housing / Facility Mobile Home   Steps Into Home 5   Steps In Home 0   Equipment Owned Quad Cane;Wheelchair;Tub / Shower Seat;Front-Wheel Walker   Lives with - Patient's Self Care Capacity Adult Children   Comments information obtained from prior admission and minimal provided by pt this admit. Reports he is unsure if a ramp  has been installed. Lists family members by name when asked who he lives with. Has been at SNF and Rehab following prior admission in June 2020. Admitted from Rehab for AKA revision   Prior Level of Functional Mobility   Bed Mobility Unable To Determine At This Time   Comments pt unable to give report of most recent function while at Rehab. Per chart review, pt required Mod - Min A for bed mobility and transfers (more depending on pain levels). Was utilizing WC for mobility and just started working in // bars   Cognition    Level of Consciousness Alert   Ability To Follow Commands 1 Step   Comments agreeable to work with PT, soft spoken and unable to provide much history   Active ROM Lower Body    Active ROM Lower Body  X   Comments L AKA    Strength Lower Body   Lower Body Strength  X   Comments L AKA, grossly assessed R LE at 4-/5   Balance Assessment   Sitting Balance (Static) Fair   Sitting Balance (Dynamic) Fair -   Standing Balance (Static) Poor -   Weight Shift Sitting Poor   Weight Shift Standing Absent   Comments w/ FWW   Gait Analysis   Gait Level Of Assist Unable to Participate   Bed Mobility    Supine to Sit Moderate Assist   Sit to Supine Minimal Assist   Scooting Moderate Assist   Rolling Minimum Assist to Lt.   Functional Mobility   Sit to Stand Moderate Assist   Bed, Chair, Wheelchair Transfer Unable to Participate   Comments performed sit <> stand x3 for pericare and RN to don meplex/barrier paste   Short Term Goals    Short Term Goal # 1 pt will perform supine <> sit with HOB flat, no railing and SPV in 6 visits   Short Term Goal # 2 pt will perform sit <> stand with FWW and SPV to improve mobility in 6 visits   Short Term Goal # 3 pt will perform functional transfers with LRAD and Min A to improve mobility in 6 visits   Short Term Goal # 4 pt will self propel WC X150 ft with SPV to access home/community needs in 6 visits   Problem List    Problems Pain;Impaired Bed Mobility;Impaired  Transfers;Impaired Ambulation;Functional Strength Deficit;Impaired Balance;Decreased Activity Tolerance;Safety Awareness Deficits / Cognition   Anticipated Discharge Equipment and Recommendations   DC Equipment Recommendations Unable to determine at this time   Discharge Recommendations Recommend post-acute placement for additional physical therapy services prior to discharge home

## 2020-09-25 NOTE — THERAPY
Occupational Therapy   Initial Evaluation     Patient Name: Jimenez Bains  Age:  75 y.o., Sex:  male  Medical Record #: [id number]  Today's Date: 9/25/2020     Precautions  Precautions: Fall Risk  Comments: L AKA revision with wound vac    Assessment  Patient is 75 y.o. male with a diagnosis of infected AKA stump2.  Additional factors influencing patient status / progress: limited knowledge of PLOF, living situation, generalized weakness.      Plan    Recommend Occupational Therapy 3 times per week until therapy goals are met for the following treatments:  Self Care/Activities of Daily Living, Therapeutic Activities and Therapeutic Exercises.    DC Equipment Recommendations: Unable to determine at this time  Discharge Recommendations: Recommend post-acute placement for additional occupational therapy services prior to discharge home     Subjective    Cooperative throughout. Motivated to participate. Fatigued quickly due to weakness. Will benefit from continued OT to focus on ADls, transfers, strengthening, endurance activity     Objective       09/25/20 0845   Total Time Spent   Total Time Spent (Mins) 42   Charge Group   OT Evaluation OT Evaluation Mod   Initial Contact Note    Initial Contact Note Order Received and Verified, Occupational Therapy Evaluation in Progress with Full Report to Follow.   Prior Living Situation   Prior Services None   Housing / Facility Mobile Home   Steps Into Home 5   Steps In Home 0   Bathroom Set up Walk In Shower   Equipment Owned Quad Cane;Front-Wheel Walker;Tub / Shower Seat;Wheelchair   Lives with - Patient's Self Care Capacity Adult Children   Comments information obtained from prior admit, stuart unable to give prior living situation, family support available   Prior Level of ADL Function   Self Feeding Independent   Grooming / Hygiene Independent   Bathing Independent   Dressing Independent   Toileting Independent   Prior Level of IADL Function   Medication Management  Independent   Laundry Independent   Kitchen Mobility Independent   Finances Independent   Home Management Independent   Shopping Independent   Prior Level Of Mobility Independent Without Device in Home   Driving / Transportation Driving Independent   Precautions   Precautions Fall Risk   Comments L AKA revision, with wound vac in place   Vitals   O2 Delivery Device None - Room Air   Pain 0 - 10 Group   Therapist Pain Assessment During Activity;Nurse Notified  (notes pressure in residual limb, did not rate as pain)   Cognition    Level of Consciousness Alert   Ability To Follow Commands 1 Step   Safety Awareness Impaired   Attention Impaired   Sequencing Impaired   Passive ROM Upper Body   Passive ROM Upper Body WDL   Active ROM Upper Body   Active ROM Upper Body  WDL   Dominant Hand Right   Strength Upper Body   Gross Strength Generalized Weakness, Equal Bilaterally.    Sensation Upper Body   Upper Extremity Sensation  WDL   Upper Body Muscle Tone   Upper Body Muscle Tone  WDL   Coordination Upper Body   Coordination WDL   Balance Assessment   Sitting Balance (Static) Fair -   Sitting Balance (Dynamic) Poor +   Standing Balance (Static) Fair -   Standing Balance (Dynamic) Poor +   Weight Shift Sitting Poor   Bed Mobility    Supine to Sit Moderate Assist   Sit to Supine Moderate Assist   Scooting Minimal Assist   Rolling Minimum Assist to Lt.   ADL Assessment   Eating Supervision   Grooming Supervision;Seated   Bathing   (NT)   Upper Body Dressing Minimal Assist   Lower Body Dressing Maximal Assist   Toileting Maximal Assist   How much help from another person does the patient currently need...   Putting on and taking off regular lower body clothing? 2   Bathing (including washing, rinsing, and drying)? 2   Toileting, which includes using a toilet, bedpan, or urinal? 2   Putting on and taking off regular upper body clothing? 2   Taking care of personal grooming such as brushing teeth? 3   Eating meals? 3   6 Clicks  Daily Activity Score 14   Functional Mobility   Sit to Stand Moderate Assist  (iproved to CG by third trial)   Visual Perception   Visual Perception  WDL   Activity Tolerance   Sitting Edge of Bed 10 mins   Patient / Family Goals   Patient / Family Goal #1 to get stronger   Short Term Goals   Short Term Goal # 1 set up for grooming tasks, seated EOB   Short Term Goal # 2 bed mobility at SBA level   Short Term Goal # 3 min assist for necessary fuctional transfers   Short Term Goal # 4 seated UB dressing at set up level   Education Group   Role of Occupational Therapist Patient Response Patient;Acceptance;Explanation;Demonstration;Reinforcement Needed   ADL Patient Response Patient;Acceptance;Explanation;Demonstration;Reinforcement Needed   Problem List   Problem List Decreased Active Daily Living Skills;Decreased Upper Extremity Strength Right;Decreased Upper Extremity Strength Left;Decreased Functional Mobility;Decreased Activity Tolerance   Anticipated Discharge Equipment and Recommendations   DC Equipment Recommendations Unable to determine at this time   Discharge Recommendations Recommend post-acute placement for additional occupational therapy services prior to discharge home   Interdisciplinary Plan of Care Collaboration   IDT Collaboration with  Certified Nursing Assistant;Nursing;Physical Therapist   Patient Position at End of Therapy In Bed;Bed Alarm On;Call Light within Reach;Tray Table within Reach;Phone within Reach   Collaboration Comments report given   Session Information   Date / Session Number  9/25,1 ( 1/3, 10/1)   Priority 3      09/25/20 0845   Total Time Spent   Total Time Spent (Mins) 42   Charge Group   OT Evaluation OT Evaluation Mod   Initial Contact Note    Initial Contact Note Order Received and Verified, Occupational Therapy Evaluation in Progress with Full Report to Follow.   Prior Living Situation   Prior Services None   Housing / Facility Mobile Home   Steps Into Home 5   Steps In Home  0   Bathroom Set up Walk In Shower   Equipment Owned Quad Cane;Front-Wheel Walker;Tub / Shower Seat;Wheelchair   Lives with - Patient's Self Care Capacity Adult Children   Comments information obtained from prior admit, patietn unable to give prior living situation, family support available   Prior Level of ADL Function   Self Feeding Independent   Grooming / Hygiene Independent   Bathing Independent   Dressing Independent   Toileting Independent   Prior Level of IADL Function   Medication Management Independent   Laundry Independent   Kitchen Mobility Independent   Finances Independent   Home Management Independent   Shopping Independent   Prior Level Of Mobility Independent Without Device in Home   Driving / Transportation Driving Independent   Precautions   Precautions Fall Risk   Comments L AKA revision, with wound vac in place   Vitals   O2 Delivery Device None - Room Air   Pain 0 - 10 Group   Therapist Pain Assessment During Activity;Nurse Notified  (notes pressure in residual limb, did not rate as pain)   Cognition    Level of Consciousness Alert   Ability To Follow Commands 1 Step   Safety Awareness Impaired   Attention Impaired   Sequencing Impaired   Passive ROM Upper Body   Passive ROM Upper Body WDL   Active ROM Upper Body   Active ROM Upper Body  WDL   Dominant Hand Right   Strength Upper Body   Gross Strength Generalized Weakness, Equal Bilaterally.    Sensation Upper Body   Upper Extremity Sensation  WDL   Upper Body Muscle Tone   Upper Body Muscle Tone  WDL   Coordination Upper Body   Coordination WDL   Balance Assessment   Sitting Balance (Static) Fair -   Sitting Balance (Dynamic) Poor +   Standing Balance (Static) Fair -   Standing Balance (Dynamic) Poor +   Weight Shift Sitting Poor   Bed Mobility    Supine to Sit Moderate Assist   Sit to Supine Moderate Assist   Scooting Minimal Assist   Rolling Minimum Assist to Lt.   ADL Assessment   Eating Supervision   Grooming Supervision;Seated   Bathing    (NT)   Upper Body Dressing Minimal Assist   Lower Body Dressing Maximal Assist   Toileting Maximal Assist   How much help from another person does the patient currently need...   Putting on and taking off regular lower body clothing? 2   Bathing (including washing, rinsing, and drying)? 2   Toileting, which includes using a toilet, bedpan, or urinal? 2   Putting on and taking off regular upper body clothing? 2   Taking care of personal grooming such as brushing teeth? 3   Eating meals? 3   6 Clicks Daily Activity Score 14   Functional Mobility   Sit to Stand Moderate Assist  (iproved to CG by third trial)   Visual Perception   Visual Perception  WDL   Activity Tolerance   Sitting Edge of Bed 10 mins   Patient / Family Goals   Patient / Family Goal #1 to get stronger   Short Term Goals   Short Term Goal # 1 set up for grooming tasks, seated EOB   Short Term Goal # 2 bed mobility at SBA level   Short Term Goal # 3 min assist for necessary fuctional transfers   Short Term Goal # 4 seated UB dressing at set up level   Education Group   Role of Occupational Therapist Patient Response Patient;Acceptance;Explanation;Demonstration;Reinforcement Needed   ADL Patient Response Patient;Acceptance;Explanation;Demonstration;Reinforcement Needed   Problem List   Problem List Decreased Active Daily Living Skills;Decreased Upper Extremity Strength Right;Decreased Upper Extremity Strength Left;Decreased Functional Mobility;Decreased Activity Tolerance   Anticipated Discharge Equipment and Recommendations   DC Equipment Recommendations Unable to determine at this time   Discharge Recommendations Recommend post-acute placement for additional occupational therapy services prior to discharge home   Interdisciplinary Plan of Care Collaboration   IDT Collaboration with  Certified Nursing Assistant;Nursing;Physical Therapist   Patient Position at End of Therapy In Bed;Bed Alarm On;Call Light within Reach;Tray Table within Reach;Phone  within Reach   Collaboration Comments report given   Session Information   Date / Session Number  9/25,1 ( 1/3, 10/1)   Priority 3

## 2020-09-25 NOTE — PROGRESS NOTES
Pt transferred back to unit from PACU.  AAOx4.  Stating no pain at this time.  Left AKA with wound vac in place.  Right cheek with dressing in place.  Regular diet ordered for pt.  Call light within reach, pt educated to call for assistance as needed.

## 2020-09-25 NOTE — PROGRESS NOTES
Report received from Diana MYERS  Assumed care of patient at 1910.    Pt is A&O x 4. Pt is forgetful about POC and daily events, but answers orientation questions correctly.   Pain reported at 4/10. Pt was medicated with scheduled medications.   Nausea denied   Tolerating Diet. FSBG taken per order.    Surgical incision to L AKA. Wound vac in place with CDI dressing.    + Urine output via indwelling catheter.   + BM PTA   + Flatus  MARYJANE mobility   Daughter, Lien, called for update on pt. Update was given as daughter is listed on contacts.   Sister, Doris, also called for update on pt. Doris is was not listed as contact for pt. Doris was educated on need for pt to verify contact prior to giving information to anyone. Pt was asleep at time, but was able to confirm contact later in shift. Doris stated that she would call in AM.  Bed in lowest position and locked.  Bed alarm in place and on   Pt resting comfortably now.  Review plan of care with patient  Call light within reach  Hourly rounds in place  All needs met at this time

## 2020-09-25 NOTE — PROGRESS NOTES
"Vascular    Events  9/25: CHARLES, pain controlled    Vitals  BP (!) 96/50 Comment: RN notified  Pulse 76   Temp 38 °C (100.4 °F) (Temporal)   Resp 17   Ht 1.676 m (5' 6\")   Wt 62.5 kg (137 lb 12.6 oz)   SpO2 91%   BMI 22.24 kg/m²     Exam  Right face bandage CDI  Left AKA vac CDI    Labs  No new labs today    A/P)  9/24: Debridement of right AKA stump    Doing well  Continue unasyn  Continue vac changes 3x per week  SNF anytime from my standpoint    Following along    Usman Fisher MD  [facility] (General and Vascular Surgery)  Cell: [phone] (text or call is fine, if you don't reach me please try my office)  Office: [phone]  __________________________________________________________________  Patient:Jimenez Bains   MRN:[id number]   CSN:[id number]     "

## 2020-09-25 NOTE — CARE PLAN
Problem: Communication  Goal: The ability to communicate needs accurately and effectively will improve  Outcome: PROGRESSING SLOWER THAN EXPECTED  Intervention: [place] patient and significant other/support system to call light to alert staff of needs  Note: Pt was educated on call light use and the need to alert staff to needs and prior to mobilization. Pt verbalizes understanding, but has yet to demonstrate call light use.      Problem: Knowledge Deficit  Goal: Knowledge of disease process/condition, treatment plan, diagnostic tests, and medications will improve  Outcome: PROGRESSING SLOWER THAN EXPECTED  Goal: Knowledge of the prescribed therapeutic regimen will improve  Outcome: PROGRESSING SLOWER THAN EXPECTED  Note: Pt was educated on POC, MAR, shift routine and nursing education R/T Dx. Pt verbalized understanding of all teaching, but frequently asks questions that are not consistent with understanding.

## 2020-09-25 NOTE — PROGRESS NOTES
"Pharmacy Kinetics 75 y.o. male on vancomycin day # 1 2020    Currently on Vancomycin 1250 mg iv q24hr ()  Provider specified end date: n/a    Indication for Treatment: SSTI    Pertinent history per medical record: Admitted on 2020 for revision of left AKA. Initial AKA performed  with revision being performed , complicated by MRSA and enterococcus infection. Pt currently with wound vacuum on stump. Extensive history of vancomycin here, last 20.    Other antibiotics: unasyn 3 g q6h    Allergies: No known drug allergy     List concerns for renal function: age, albumin 3.0    Pertinent cultures to date:   n/a    : left leg vascular graft: moderate MRSA growth, light E. faecalis growth      Recent Labs     20   WBC 8.6   NEUTSPOLYS 75.90*     Recent Labs     20   BUN 19   CREATININE 1.08   ALBUMIN 3.0*       Intake/Output Summary (Last 24 hours) at 2020 0149  Last data filed at 2020  Gross per 24 hour   Intake 990 ml   Output 1250 ml   Net -260 ml      /89   Pulse 69   Temp 36.7 °C (98 °F) (Temporal)   Resp 17   Ht 1.676 m (5' 6\")   Wt 62.5 kg (137 lb 12.6 oz)   SpO2 94%  Temp (24hrs), Av.6 °C (97.8 °F), Min:36.2 °C (97.2 °F), Max:37.2 °C (99 °F)      A/P   1. Vancomycin dose change: new start  2. Next vancomycin level: 20 @  (ordered)  3. Goal trough: 10-15 mcg/mL  4. Comments: vancomycin and Unasyn initiated for AKA infection based on prior cultures positive for MRSA and E. Faecalis. Crcl within range for pt's age, will proceed with protocol dose of 20 mg/kg q24h. Pharmacy will continue to follow.    Nicola Miller, PharmD  "

## 2020-09-25 NOTE — PROGRESS NOTES
2 RN check complete:      Sutures to R cheek, ELIDA, scabbing noted to R nose.   Large portion of L ear missing, no wound noted  Generalized rash/exscoriation and scabbing noted to the L shoulder, back and buttocks   Skin tears to bilateral forearms  Redness/excoriation/rash noted to the groin, scrotum, sacrum and bilateral buttocks  Wound and drainage noted to the tip of the penis  L AKA with wound vac, no indication of leaks  Skin overall fragile and flaky     Devices:  PIV to the ANA  Wound vac to the L AKA  Brothers catheter with stat lock to the R inner thigh     Interventions:  Waffle overlay  Pillows in use for support and positioning  Q 2 hour turns  Barrier wipes  Barrier cream  Mepilex applied to bilateral elbows, R heel and back       Pictures taken and uploaded to [facility], wound consult placed for sacrum and penis   Quality 226: Preventive Care And Screening: Tobacco Use: Screening And Cessation Intervention: Patient screened for tobacco use and is an ex/non-smoker Detail Level: Zone

## 2020-09-25 NOTE — PROGRESS NOTES
Applied trapeze to patients bed. If you have any questions or need assistance please call the traction team

## 2020-09-25 NOTE — PROGRESS NOTES
Walked in to find pt on floor, A&O, no complaints of pain or injury. Pt assisted back to bed, MD, pharmacy and daughter Lien notified. Bed alarm found to not be plugged back in after working with therapies. Wound vac in place, PIV replaced bed alarm on.

## 2020-09-25 NOTE — ANESTHESIA POSTPROCEDURE EVALUATION
Patient: Jimenez Bains    Procedure Summary     Date: 09/24/20 Room / Location: [facility] 09 / SURGERY [facility]    Anesthesia Start: 1547 Anesthesia Stop: 1717    Procedures:       AMPUTATION, BELOW KNEE-REVISION (Left Leg Upper)      EXCISION, LESION, FACE (Right Face) Diagnosis: (left AKA debridement )    Surgeon: Usman Fisher M.D. Responsible Provider: Abimael Figueroa M.D.    Anesthesia Type: general ASA Status: 3 - Emergent          Final Anesthesia Type: general  Last vitals  BP   Blood Pressure : 151/71    Temp   36.5 °C (97.7 °F)    Pulse   Pulse: 79   Resp   12    SpO2   91 %      Anesthesia Post Evaluation    Patient location during evaluation: PACU  Patient participation: complete - patient participated  Level of consciousness: sleepy but conscious    Airway patency: patent  Anesthetic complications: no  Cardiovascular status: hemodynamically stable  Respiratory status: acceptable  Hydration status: balanced    PONV: none           Nurse Pain Score: 1 (NPRS)

## 2020-09-25 NOTE — ANESTHESIA QCDR
2019 [facility] Clinical Data Registry (for Quality Improvement)     Postoperative nausea/vomiting risk protocol (Adult = 18 yrs and Pediatric 3-17 yrs)- (430 and 463)  General inhalation anesthetic (NOT TIVA) with PONV risk factors: Yes  Provision of anti-emetic therapy with at least 2 different classes of agents: Yes   Patient DID NOT receive anti-emetic therapy and reason is documented in Medical Record:  N/A    Multimodal Pain Management- (477)  Non-emergent surgery AND patient age >= 18: No  Use of Multimodal Pain Management, two or more drugs and/or interventions, NOT including systemic opioids:   Exception: Documented allergy to multiple classes of analgesics:     Smoking Abstinence (404)  Patient is current smoker (cigarette, pipe, e-cig, marijuanna): No  Elective Surgery:   Abstinence instructions provided prior to day of surgery:   Patient abstained from smoking on day of surgery:     Pre-Op Beta-Blocker in Isolated CABG (44)  Isolated CABG AND patient age >= 18: No  Beta-blocker admin within 24 hours of surgical incision:   Exception:of medical reason(s) for not administering beta blocker within 24 hours prior to surgical incision (e.g., not  indicated,other medical reason):     PACU assessment of acute postoperative pain prior to Anesthesia Care End- Applies to Patients Age = 18- (ABG7)  Initial PACU pain score is which of the following: < 7/10  Patient unable to report pain score: N/A    Post-anesthetic transfer of care checklist/protocol to PACU/ICU- (426 and 427)  Upon conclusion of case, patient transferred to which of the following locations: PACU/Non-ICU  Use of transfer checklist/protocol: Yes  Exclusion: Service Performed in Patient Hospital Room (and thus did not require transfer): N/A  Unplanned admission to ICU related to anesthesia service up through end of PACU care- (MD51)  Unplanned admission to ICU (not initially anticipated at anesthesia start time): No

## 2020-09-25 NOTE — ANESTHESIA TIME REPORT
Anesthesia Start and Stop Event Times     Date Time Event    9/24/2020 1544 Ready for Procedure     1547 Anesthesia Start     1717 Anesthesia Stop        Responsible Staff  09/24/20    Name Role Begin End    Abimael Figueroa M.D. Anesth 1547 1717        Preop Diagnosis (Free Text):  Pre-op Diagnosis     left AKA debridement         Preop Diagnosis (Codes):    Post op Diagnosis  Hx of AKA (above knee amputation), left (HCC)      Premium Reason  A. 3PM - 7AM    Comments:

## 2020-09-25 NOTE — ASSESSMENT & PLAN NOTE
Noted and present since admission from rehab.  Wound care consult.  Also with penile blisters, escorations:  Wound care consult, keep carpio in place to aid skin healing.

## 2020-09-25 NOTE — DISCHARGE PLANNING
[facility] Transitional Care Coordination     Referral from:  Dr. Laureano   Facesheet indicates: Medicare   Potential Rehab Diagnosis: JAVIER Gaona has completed his IRF program and anticipate home with HH versus skilled nursing when medically cleared.     D/C support: Family      No physiatry consult ordered per protocol.     Thank you for the referral.

## 2020-09-26 ENCOUNTER — APPOINTMENT (OUTPATIENT)
Dept: RADIOLOGY | Facility: MEDICAL CENTER | Age: 75
DRG: 498 | End: 2020-09-26
Attending: HOSPITALIST
Payer: MEDICARE

## 2020-09-26 PROBLEM — T83.511A URINARY TRACT INFECTION ASSOCIATED WITH INDWELLING URETHRAL CATHETER (HCC): Status: ACTIVE | Noted: 2020-09-26

## 2020-09-26 PROBLEM — A41.9 SEPSIS (HCC): Status: ACTIVE | Noted: 2020-09-26

## 2020-09-26 PROBLEM — G93.41 ENCEPHALOPATHY IN SEPSIS: Status: ACTIVE | Noted: 2020-09-26

## 2020-09-26 PROBLEM — N39.0 URINARY TRACT INFECTION ASSOCIATED WITH INDWELLING URETHRAL CATHETER (HCC): Status: ACTIVE | Noted: 2020-09-26

## 2020-09-26 LAB
ACTION RANGE TRIGGERED IACRT: NO
ANION GAP SERPL CALC-SCNC: 10 MMOL/L (ref 7–16)
ANION GAP SERPL CALC-SCNC: 12 MMOL/L (ref 7–16)
BASE EXCESS BLDA CALC-SCNC: -3 MMOL/L (ref -4–3)
BASOPHILS # BLD AUTO: 0.4 % (ref 0–1.8)
BASOPHILS # BLD AUTO: 0.5 % (ref 0–1.8)
BASOPHILS # BLD: 0.06 K/UL (ref 0–0.12)
BASOPHILS # BLD: 0.1 K/UL (ref 0–0.12)
BODY TEMPERATURE: ABNORMAL DEGREES
BUN SERPL-MCNC: 19 MG/DL (ref 8–22)
BUN SERPL-MCNC: 20 MG/DL (ref 8–22)
CALCIUM SERPL-MCNC: 8.1 MG/DL (ref 8.5–10.5)
CALCIUM SERPL-MCNC: 8.4 MG/DL (ref 8.5–10.5)
CHLORIDE SERPL-SCNC: 104 MMOL/L (ref 96–112)
CHLORIDE SERPL-SCNC: 106 MMOL/L (ref 96–112)
CO2 BLDA-SCNC: 23 MMOL/L (ref 20–33)
CO2 SERPL-SCNC: 21 MMOL/L (ref 20–33)
CO2 SERPL-SCNC: 24 MMOL/L (ref 20–33)
CREAT SERPL-MCNC: 1.48 MG/DL (ref 0.5–1.4)
CREAT SERPL-MCNC: 1.52 MG/DL (ref 0.5–1.4)
EOSINOPHIL # BLD AUTO: 0 K/UL (ref 0–0.51)
EOSINOPHIL # BLD AUTO: 0.03 K/UL (ref 0–0.51)
EOSINOPHIL NFR BLD: 0 % (ref 0–6.9)
EOSINOPHIL NFR BLD: 0.2 % (ref 0–6.9)
ERYTHROCYTE [DISTWIDTH] IN BLOOD BY AUTOMATED COUNT: 54.2 FL (ref 35.9–50)
ERYTHROCYTE [DISTWIDTH] IN BLOOD BY AUTOMATED COUNT: 56.6 FL (ref 35.9–50)
GLUCOSE BLD-MCNC: 107 MG/DL (ref 65–99)
GLUCOSE BLD-MCNC: 118 MG/DL (ref 65–99)
GLUCOSE BLD-MCNC: 128 MG/DL (ref 65–99)
GLUCOSE BLD-MCNC: 128 MG/DL (ref 65–99)
GLUCOSE BLD-MCNC: 133 MG/DL (ref 65–99)
GLUCOSE SERPL-MCNC: 132 MG/DL (ref 65–99)
GLUCOSE SERPL-MCNC: 152 MG/DL (ref 65–99)
HCO3 BLDA-SCNC: 22.1 MMOL/L (ref 17–25)
HCT VFR BLD AUTO: 25.4 % (ref 42–52)
HCT VFR BLD AUTO: 27.4 % (ref 42–52)
HGB BLD-MCNC: 7.6 G/DL (ref 14–18)
HGB BLD-MCNC: 8.4 G/DL (ref 14–18)
HOROWITZ INDEX BLDA+IHG-RTO: 241 MM[HG]
IMM GRANULOCYTES # BLD AUTO: 0.15 K/UL (ref 0–0.11)
IMM GRANULOCYTES # BLD AUTO: 0.18 K/UL (ref 0–0.11)
IMM GRANULOCYTES NFR BLD AUTO: 0.8 % (ref 0–0.9)
IMM GRANULOCYTES NFR BLD AUTO: 1.1 % (ref 0–0.9)
INST. QUALIFIED PATIENT IIQPT: YES
LACTATE BLD-SCNC: 1 MMOL/L (ref 0.5–2)
LACTATE BLD-SCNC: 1.4 MMOL/L (ref 0.5–2)
LYMPHOCYTES # BLD AUTO: 1.37 K/UL (ref 1–4.8)
LYMPHOCYTES # BLD AUTO: 1.38 K/UL (ref 1–4.8)
LYMPHOCYTES NFR BLD: 7.1 % (ref 22–41)
LYMPHOCYTES NFR BLD: 8.1 % (ref 22–41)
MCH RBC QN AUTO: 30.2 PG (ref 27–33)
MCH RBC QN AUTO: 30.3 PG (ref 27–33)
MCHC RBC AUTO-ENTMCNC: 29.9 G/DL (ref 33.7–35.3)
MCHC RBC AUTO-ENTMCNC: 30.7 G/DL (ref 33.7–35.3)
MCV RBC AUTO: 101.2 FL (ref 81.4–97.8)
MCV RBC AUTO: 98.6 FL (ref 81.4–97.8)
MONOCYTES # BLD AUTO: 0.81 K/UL (ref 0–0.85)
MONOCYTES # BLD AUTO: 0.88 K/UL (ref 0–0.85)
MONOCYTES NFR BLD AUTO: 4.5 % (ref 0–13.4)
MONOCYTES NFR BLD AUTO: 4.8 % (ref 0–13.4)
NEUTROPHILS # BLD AUTO: 14.56 K/UL (ref 1.82–7.42)
NEUTROPHILS # BLD AUTO: 16.88 K/UL (ref 1.82–7.42)
NEUTROPHILS NFR BLD: 85.6 % (ref 44–72)
NEUTROPHILS NFR BLD: 86.9 % (ref 44–72)
NRBC # BLD AUTO: 0 K/UL
NRBC # BLD AUTO: 0 K/UL
NRBC BLD-RTO: 0 /100 WBC
NRBC BLD-RTO: 0 /100 WBC
O2/TOTAL GAS SETTING VFR VENT: 34 %
PCO2 BLDA: 39.4 MMHG (ref 26–37)
PCO2 TEMP ADJ BLDA: 38.9 MMHG (ref 26–37)
PH BLDA: 7.36 [PH] (ref 7.4–7.5)
PH TEMP ADJ BLDA: 7.36 [PH] (ref 7.4–7.5)
PLATELET # BLD AUTO: 284 K/UL (ref 164–446)
PLATELET # BLD AUTO: 311 K/UL (ref 164–446)
PMV BLD AUTO: 10.2 FL (ref 9–12.9)
PMV BLD AUTO: 10.5 FL (ref 9–12.9)
PO2 BLDA: 82 MMHG (ref 64–87)
PO2 TEMP ADJ BLDA: 81 MMHG (ref 64–87)
POTASSIUM SERPL-SCNC: 3.5 MMOL/L (ref 3.6–5.5)
POTASSIUM SERPL-SCNC: 3.7 MMOL/L (ref 3.6–5.5)
RBC # BLD AUTO: 2.51 M/UL (ref 4.7–6.1)
RBC # BLD AUTO: 2.78 M/UL (ref 4.7–6.1)
SAO2 % BLDA: 96 % (ref 93–99)
SODIUM SERPL-SCNC: 138 MMOL/L (ref 135–145)
SODIUM SERPL-SCNC: 139 MMOL/L (ref 135–145)
SPECIMEN DRAWN FROM PATIENT: ABNORMAL
WBC # BLD AUTO: 17 K/UL (ref 4.8–10.8)
WBC # BLD AUTO: 19.4 K/UL (ref 4.8–10.8)

## 2020-09-26 PROCEDURE — 700111 HCHG RX REV CODE 636 W/ 250 OVERRIDE (IP): Performed by: HOSPITALIST

## 2020-09-26 PROCEDURE — 87040 BLOOD CULTURE FOR BACTERIA: CPT

## 2020-09-26 PROCEDURE — 770022 HCHG ROOM/CARE - ICU (200)

## 2020-09-26 PROCEDURE — 82962 GLUCOSE BLOOD TEST: CPT

## 2020-09-26 PROCEDURE — 302098 PASTE RING (FLAT): Performed by: HOSPITALIST

## 2020-09-26 PROCEDURE — 700111 HCHG RX REV CODE 636 W/ 250 OVERRIDE (IP): Performed by: SURGERY

## 2020-09-26 PROCEDURE — 70450 CT HEAD/BRAIN W/O DYE: CPT

## 2020-09-26 PROCEDURE — 36415 COLL VENOUS BLD VENIPUNCTURE: CPT

## 2020-09-26 PROCEDURE — 700105 HCHG RX REV CODE 258: Performed by: HOSPITALIST

## 2020-09-26 PROCEDURE — 99221 1ST HOSP IP/OBS SF/LOW 40: CPT | Performed by: INTERNAL MEDICINE

## 2020-09-26 PROCEDURE — 99233 SBSQ HOSP IP/OBS HIGH 50: CPT | Performed by: HOSPITALIST

## 2020-09-26 PROCEDURE — 80048 BASIC METABOLIC PNL TOTAL CA: CPT

## 2020-09-26 PROCEDURE — 82803 BLOOD GASES ANY COMBINATION: CPT

## 2020-09-26 PROCEDURE — 700105 HCHG RX REV CODE 258: Performed by: INTERNAL MEDICINE

## 2020-09-26 PROCEDURE — 700102 HCHG RX REV CODE 250 W/ 637 OVERRIDE(OP): Performed by: HOSPITALIST

## 2020-09-26 PROCEDURE — 83605 ASSAY OF LACTIC ACID: CPT

## 2020-09-26 PROCEDURE — A9270 NON-COVERED ITEM OR SERVICE: HCPCS | Performed by: HOSPITALIST

## 2020-09-26 PROCEDURE — 85025 COMPLETE CBC W/AUTO DIFF WBC: CPT

## 2020-09-26 PROCEDURE — 700102 HCHG RX REV CODE 250 W/ 637 OVERRIDE(OP): Performed by: SURGERY

## 2020-09-26 PROCEDURE — 700111 HCHG RX REV CODE 636 W/ 250 OVERRIDE (IP): Performed by: INTERNAL MEDICINE

## 2020-09-26 PROCEDURE — 700101 HCHG RX REV CODE 250: Performed by: HOSPITALIST

## 2020-09-26 PROCEDURE — A9270 NON-COVERED ITEM OR SERVICE: HCPCS | Performed by: SURGERY

## 2020-09-26 RX ORDER — NALOXONE HYDROCHLORIDE 0.4 MG/ML
0.4 INJECTION, SOLUTION INTRAMUSCULAR; INTRAVENOUS; SUBCUTANEOUS ONCE
Status: DISPENSED | OUTPATIENT
Start: 2020-09-26 | End: 2020-09-27

## 2020-09-26 RX ORDER — SODIUM CHLORIDE, SODIUM LACTATE, POTASSIUM CHLORIDE, CALCIUM CHLORIDE 600; 310; 30; 20 MG/100ML; MG/100ML; MG/100ML; MG/100ML
INJECTION, SOLUTION INTRAVENOUS
Status: ACTIVE
Start: 2020-09-26 | End: 2020-09-26

## 2020-09-26 RX ORDER — LINEZOLID 2 MG/ML
600 INJECTION, SOLUTION INTRAVENOUS EVERY 12 HOURS
Status: DISCONTINUED | OUTPATIENT
Start: 2020-09-26 | End: 2020-09-27

## 2020-09-26 RX ORDER — NYSTATIN 100000 [USP'U]/G
POWDER TOPICAL 3 TIMES DAILY
Status: DISCONTINUED | OUTPATIENT
Start: 2020-09-26 | End: 2020-09-29 | Stop reason: HOSPADM

## 2020-09-26 RX ORDER — PHENYLEPHRINE HCL IN 0.9% NACL 0.5 MG/5ML
SYRINGE (ML) INTRAVENOUS
Status: ACTIVE
Start: 2020-09-26 | End: 2020-09-26

## 2020-09-26 RX ORDER — SODIUM HYPOCHLORITE 1.25 MG/ML
SOLUTION TOPICAL
Status: DISCONTINUED | OUTPATIENT
Start: 2020-09-26 | End: 2020-09-29 | Stop reason: HOSPADM

## 2020-09-26 RX ORDER — NALOXONE HYDROCHLORIDE 0.4 MG/ML
INJECTION, SOLUTION INTRAMUSCULAR; INTRAVENOUS; SUBCUTANEOUS
Status: ACTIVE
Start: 2020-09-26 | End: 2020-09-26

## 2020-09-26 RX ORDER — LIDOCAINE HYDROCHLORIDE 40 MG/ML
SOLUTION TOPICAL
Status: COMPLETED | OUTPATIENT
Start: 2020-09-26 | End: 2020-09-28

## 2020-09-26 RX ORDER — SODIUM CHLORIDE, SODIUM LACTATE, POTASSIUM CHLORIDE, AND CALCIUM CHLORIDE .6; .31; .03; .02 G/100ML; G/100ML; G/100ML; G/100ML
1000 INJECTION, SOLUTION INTRAVENOUS ONCE
Status: COMPLETED | OUTPATIENT
Start: 2020-09-26 | End: 2020-09-27

## 2020-09-26 RX ORDER — NALOXONE HYDROCHLORIDE 0.4 MG/ML
0.4 INJECTION, SOLUTION INTRAMUSCULAR; INTRAVENOUS; SUBCUTANEOUS ONCE
Status: COMPLETED | OUTPATIENT
Start: 2020-09-26 | End: 2020-09-26

## 2020-09-26 RX ORDER — NALOXONE HYDROCHLORIDE 1 MG/ML
INJECTION INTRAMUSCULAR; INTRAVENOUS; SUBCUTANEOUS
Status: COMPLETED | OUTPATIENT
Start: 2020-09-26 | End: 2020-09-26

## 2020-09-26 RX ORDER — PHENYLEPHRINE HCL IN 0.9% NACL 0.5 MG/5ML
100 SYRINGE (ML) INTRAVENOUS
Status: DISPENSED | OUTPATIENT
Start: 2020-09-26 | End: 2020-09-26

## 2020-09-26 RX ORDER — NALOXONE HYDROCHLORIDE 0.4 MG/ML
1 INJECTION, SOLUTION INTRAMUSCULAR; INTRAVENOUS; SUBCUTANEOUS ONCE
Status: COMPLETED | OUTPATIENT
Start: 2020-09-26 | End: 2020-09-26

## 2020-09-26 RX ADMIN — ACETAMINOPHEN 650 MG: 325 TABLET, FILM COATED ORAL at 05:57

## 2020-09-26 RX ADMIN — OXYCODONE HYDROCHLORIDE AND ACETAMINOPHEN 500 MG: 500 TABLET ORAL at 05:58

## 2020-09-26 RX ADMIN — LINEZOLID 600 MG: 600 INJECTION, SOLUTION INTRAVENOUS at 17:08

## 2020-09-26 RX ADMIN — SODIUM CHLORIDE 3 G: 900 INJECTION INTRAVENOUS at 01:01

## 2020-09-26 RX ADMIN — MICONAZOLE NITRATE: 20 CREAM TOPICAL at 18:07

## 2020-09-26 RX ADMIN — PIPERACILLIN AND TAZOBACTAM 4.5 G: 4; .5 INJECTION, POWDER, LYOPHILIZED, FOR SOLUTION INTRAVENOUS; PARENTERAL at 14:08

## 2020-09-26 RX ADMIN — NALOXONE HYDROCHLORIDE 0.4 MG: 0.4 INJECTION, SOLUTION INTRAMUSCULAR; INTRAVENOUS; SUBCUTANEOUS at 08:38

## 2020-09-26 RX ADMIN — NYSTATIN: 100000 POWDER TOPICAL at 17:08

## 2020-09-26 RX ADMIN — ACETAMINOPHEN 650 MG: 325 TABLET, FILM COATED ORAL at 23:51

## 2020-09-26 RX ADMIN — GABAPENTIN 900 MG: 300 CAPSULE ORAL at 05:57

## 2020-09-26 RX ADMIN — ASPIRIN 81 MG: 81 TABLET, COATED ORAL at 05:58

## 2020-09-26 RX ADMIN — PIPERACILLIN AND TAZOBACTAM 4.5 G: 4; .5 INJECTION, POWDER, LYOPHILIZED, FOR SOLUTION INTRAVENOUS; PARENTERAL at 18:07

## 2020-09-26 RX ADMIN — NALOXONE HYDROCHLORIDE 1 MG: 0.4 INJECTION, SOLUTION INTRAMUSCULAR; INTRAVENOUS; SUBCUTANEOUS at 11:20

## 2020-09-26 RX ADMIN — KETOROLAC TROMETHAMINE 15 MG: 30 INJECTION, SOLUTION INTRAMUSCULAR; INTRAVENOUS at 01:01

## 2020-09-26 RX ADMIN — Medication 1 EACH: at 17:07

## 2020-09-26 RX ADMIN — ENOXAPARIN SODIUM 40 MG: 40 INJECTION SUBCUTANEOUS at 05:57

## 2020-09-26 RX ADMIN — FOLIC ACID 1 MG: 1 TABLET ORAL at 05:58

## 2020-09-26 RX ADMIN — SODIUM CHLORIDE, POTASSIUM CHLORIDE, SODIUM LACTATE AND CALCIUM CHLORIDE 1000 ML: 600; 310; 30; 20 INJECTION, SOLUTION INTRAVENOUS at 12:05

## 2020-09-26 RX ADMIN — KETOROLAC TROMETHAMINE 15 MG: 30 INJECTION, SOLUTION INTRAMUSCULAR; INTRAVENOUS at 05:58

## 2020-09-26 RX ADMIN — SODIUM CHLORIDE 3 G: 900 INJECTION INTRAVENOUS at 05:57

## 2020-09-26 RX ADMIN — LINEZOLID 600 MG: 600 INJECTION, SOLUTION INTRAVENOUS at 12:55

## 2020-09-26 RX ADMIN — SODIUM CHLORIDE, POTASSIUM CHLORIDE, SODIUM LACTATE AND CALCIUM CHLORIDE: 600; 310; 30; 20 INJECTION, SOLUTION INTRAVENOUS at 10:17

## 2020-09-26 RX ADMIN — DULOXETINE 20 MG: 20 CAPSULE, DELAYED RELEASE ORAL at 06:02

## 2020-09-26 RX ADMIN — SODIUM CHLORIDE, POTASSIUM CHLORIDE, SODIUM LACTATE AND CALCIUM CHLORIDE: 600; 310; 30; 20 INJECTION, SOLUTION INTRAVENOUS at 19:58

## 2020-09-26 RX ADMIN — OMEPRAZOLE 20 MG: 20 CAPSULE, DELAYED RELEASE ORAL at 05:58

## 2020-09-26 RX ADMIN — Medication 1 EACH: at 05:57

## 2020-09-26 RX ADMIN — Medication 4000 UNITS: at 05:57

## 2020-09-26 RX ADMIN — FENTANYL CITRATE 50 MCG: 50 INJECTION INTRAMUSCULAR; INTRAVENOUS at 16:36

## 2020-09-26 RX ADMIN — CYANOCOBALAMIN TAB 500 MCG 1000 MCG: 500 TAB at 05:57

## 2020-09-26 RX ADMIN — NALOXONE HYDROCHLORIDE 0.4 MG: 1 INJECTION PARENTERAL at 08:28

## 2020-09-26 RX ADMIN — CEFTRIAXONE SODIUM 1 G: 1 INJECTION, POWDER, FOR SOLUTION INTRAMUSCULAR; INTRAVENOUS at 09:07

## 2020-09-26 RX ADMIN — ACETAMINOPHEN 650 MG: 325 TABLET, FILM COATED ORAL at 17:07

## 2020-09-26 RX ADMIN — NALOXONE HYDROCHLORIDE 0.04 MG: 1 INJECTION PARENTERAL at 08:35

## 2020-09-26 RX ADMIN — ATORVASTATIN CALCIUM 40 MG: 40 TABLET, FILM COATED ORAL at 17:07

## 2020-09-26 ASSESSMENT — PAIN DESCRIPTION - PAIN TYPE
TYPE: SURGICAL PAIN

## 2020-09-26 ASSESSMENT — FIBROSIS 4 INDEX: FIB4 SCORE: 1.34

## 2020-09-26 NOTE — ASSESSMENT & PLAN NOTE
9/26:  Acute onset of altered mentation after sepsis protocol initiated on 9/25 for fever, hypotension.  9/26:  Patient had a fall yesterday with onset of confusion, but was still answering questions appropriately.  His blood pressure was low overnight despite fluid boluses, NOC hospitalist started midodrine.  This a.m. patient having difficulty opening eyes to verbal stimuli, mild awakening/movement all 4 extremities with sternal rubbing, increased after 2 narcan IV doses, , BP 90/50 in room during rapid response, stat head CT ordered.  Likely has UTI based on UA, UC and BCs pending, rise in Cr, stopped vancomycin IV and unasyn IV, started Rocephin IV to cover urinary pathogens.  dc'd all narcotics and BP meds. Spoke with daughter, next of kin, Dara who expressed her wishes for FULL CODE, intubation and all interventions available to keep her father alive.  ICU transfer, intensivist notified.  9/27: pt mentating more clearly, vitals have improved s/p change in Abx's and fluid bolus.  Did not require pressors.  Cont to avoid CNS active meds, mobilize, re-orient, treat sepsis

## 2020-09-26 NOTE — CARE PLAN
Problem: Communication  Goal: The ability to communicate needs accurately and effectively will improve  Outcome: PROGRESSING AS EXPECTED     Problem: Safety  Goal: Will remain free from injury  Outcome: PROGRESSING AS EXPECTED  Goal: Will remain free from falls  Outcome: PROGRESSING AS EXPECTED     Problem: Infection  Goal: Will remain free from infection  Outcome: PROGRESSING AS EXPECTED     Problem: Venous Thromboembolism (VTW)/Deep Vein Thrombosis (DVT) Prevention:  Goal: Patient will participate in Venous Thrombosis (VTE)/Deep Vein Thrombosis (DVT)Prevention Measures  Outcome: PROGRESSING AS EXPECTED     Problem: Bowel/Gastric:  Goal: Normal bowel function is maintained or improved  Outcome: PROGRESSING AS EXPECTED  Goal: Will not experience complications related to bowel motility  Outcome: PROGRESSING AS EXPECTED     Problem: Knowledge Deficit  Goal: Knowledge of disease process/condition, treatment plan, diagnostic tests, and medications will improve  Outcome: PROGRESSING AS EXPECTED  Goal: Knowledge of the prescribed therapeutic regimen will improve  Outcome: PROGRESSING AS EXPECTED     Problem: Discharge Barriers/Planning  Goal: Patient's continuum of care needs will be met  Outcome: PROGRESSING AS EXPECTED     Problem: Skin Integrity  Goal: Risk for impaired skin integrity will decrease  Outcome: PROGRESSING AS EXPECTED     Problem: Pain Management  Goal: Pain level will decrease to patient's comfort goal  Outcome: PROGRESSING AS EXPECTED     Problem: Respiratory:  Goal: Respiratory status will improve  Outcome: PROGRESSING AS EXPECTED     Problem: Fluid Volume:  Goal: Will maintain balanced intake and output  Outcome: PROGRESSING AS EXPECTED     Problem: Urinary Elimination:  Goal: Ability to reestablish a normal urinary elimination pattern will improve  Outcome: PROGRESSING AS EXPECTED     Problem: Mobility  Goal: Risk for activity intolerance will decrease  Outcome: PROGRESSING AS EXPECTED

## 2020-09-26 NOTE — PROGRESS NOTES
"Patient opens his eyes, answers questions and follows commands. He is disoriented to everything, including his name and his birthday. He says \"no\" when asked if hes in pain. He falls back to sleep if not stimulated.   "

## 2020-09-26 NOTE — PROGRESS NOTES
Received report from Brenda GSU RN. Patient brought to T635 with the RRT and Brenda. Family updated about transfer per Brenda. A bolus of LR infusing during transfer.  Patient awakens to voice and touch, but does not answer questions or follow commands. Full skin assessment complete.     Dr. Avalos to bedside to assess patient. Orders received for a one time dose of 1mg IVP of Narcan; orders repeated back to MD and implemented.

## 2020-09-26 NOTE — PROGRESS NOTES
Bedside report received. Assessment completed.  Pt is A&O x4. Requires reorienting at times.  Pt on 2L nasal canula  Medicating for pain PRN per MAR Pain 4/10  Denies nausea.   - numbness, - tingling.  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 +flatus,   Brothers in place with + output  Diabetic diet. Tolerates well.   MARYJANE mobility  Call light and belongings within reach. All needs met at this time. Fall Precautions and hourly rounding in place.

## 2020-09-26 NOTE — PROGRESS NOTES
This RN participated in hot rounds. Dr. Avalos notified that patient had 75ml of urine in carpio; U RN, Brenda, was unable to say how much urine patient had out total so it is unclear how much time 75ml urine output is over. No new orders received.

## 2020-09-26 NOTE — CODE DOCUMENTATION
Patient back to room from CT.  VSS at this time.  Pts bedding was changed and patient was able to move all extremities.  Patient is now very lethargic rest in bed.  Bed obtained on CIC

## 2020-09-26 NOTE — PROGRESS NOTES
"Patient is awake; he says that he doesn't know his name but when asked if he goes by \"Eagle\" he says yes. He knows that he is in the hospital but he doesn't know why; he also doesn't know the date. He denies pain.   "

## 2020-09-26 NOTE — PROGRESS NOTES
Dr. Avalos at bedside to assess patient. Updated him on patient's mental status (see previous progress note); also discussed patient's BP while getting fluid bolus (current BP: 115/32 MAP: 52) as well as urine output (20cc in 3 hours). Orders received to keep SBP greater than 100 and/or MAP greater than 60. Orders verified and implemented. Also notified Dr. Avalos that antibiotics have not been started yet as blood cultures are still being obtained. No new orders received.

## 2020-09-26 NOTE — PROGRESS NOTES
Notified MD of pt's continued lethargy and hypotension. Upon return, found pt to be unresponsive to stimuli. Charge nurse notified, rapid response called, MD notified.

## 2020-09-26 NOTE — PROGRESS NOTES
This RN received a text from Wound Care RN, Amena, who is planning on changing patient's wound vac today. Per Amena, she is concerned with patient needing pain medication during wound vac dressing change. Dr. Avalos notified; orders received for a one time dose of 50mcg IVP of fentanyl. Orders verified and implemented.    Dr. Avalos also notified that patient's urine output for the last 4 hours is 55ml. No new orders received.

## 2020-09-26 NOTE — PROGRESS NOTES
2 RN Skin Check    2 RN skin check complete with LOUIS Wallace.   Devices in place: Nasal Cannula.  Skin assessed under devices: yes.  Confirmed pressure ulcers found on: penis.  New potential pressure ulcers noted on: none Wound consult placed N/A.  The following interventions in place pillows, waffle cushion, q2 hour turns, barrier cream.    Old healing rash noted on patient's back and buttocks. Sacrum/buttocks is red and excoriated with open wounds-barrier cream used for skin care. Patient has a pressure ulcer and erosion to penis; his bilateral groins are excoriated. Patient has a wound vac to left leg that is working correctly. Patient has part of his left ear lobe missing. Patient has a surgical incision to right cheek, and a wound on his right nose.

## 2020-09-26 NOTE — ASSESSMENT & PLAN NOTE
This is Severe Sepsis Not present on admission  SIRS criteria identified on my evaluation include: Fever, with temperature greater than 101 deg F  Source of infection is UTI  Clinical indicators of end organ dysfunction include Systolic blood pressure (SBP) <90 mmHg or mean arterial pressure <65 mmHg  Sepsis protocol initiated  Fluid resuscitation ordered per protocol  IV antibiotics as appropriate for source of sepsis  Reassessment: I have reassessed the patient's hemodynamic status  End organ dysfunction include(s):  Encephalopathy (metabolic)   Hypotension and fever started 9/25.

## 2020-09-26 NOTE — PROGRESS NOTES
2 RN check complete:      Sutures to R cheek, ELIDA, scabbing noted to R nose.   Large portion of L ear missing, no wound noted  Generalized rash/exscoriation and scabbing noted to the L shoulder, back and buttocks   Skin tears to bilateral forearms  Redness/excoriation/rash noted to the groin, scrotum, sacrum and bilateral buttocks  Wound and drainage noted to the tip of the penis  L AKA with wound vac, no indication of leaks  Skin overall fragile and flaky     Devices:  PIV to the ANA  Wound vac to the L AKA  Brothers catheter with stat lock to the R inner thigh     Interventions:  Waffle overlay  Pillows in use for support and positioning  Q 2 hour turns  Barrier wipes  Barrier cream  Mepilex applied to bilateral elbows, R heel and back

## 2020-09-26 NOTE — PROGRESS NOTES
Pharmacy Kinetics Addendum:    75 y.o. male on vancomycin day # 1 9/25/2020    Currently on Vancomycin 1250 mg (20mg/kg) iv q24hr  Provider specified end date: TBD    Indication for Treatment: infected AKA    Recent Labs     09/24/20  0058 09/25/20  1003   BUN 19 <2*   CREATININE 1.08 1.21   ALBUMIN 3.0*  --      Recent Labs     09/25/20 2034   VANCOTROUGH 11.1       A/P   1. Vancomycin dose change: scheduled 1250 mg q24h   2. Next vancomycin level: not currently scheduled  3. Goal trough: 10-15 mcg/ml  4. Comments: Please continue to monitor renal function, urine output and vancomycin levels for dosing adjustments. Please also follow cultures and signs of clinical cure for de-escalation of therapy. Please schedule a trough as necessary if renal function changes.     Pallavi Muñoz, PharmD

## 2020-09-26 NOTE — PROGRESS NOTES
Pharmacy Kinetics Addendum:    75 y.o. male on vancomycin day # 1 (9/25/2020)    Currently on Vancomycin new start    Indication for Treatment: infected AKA    Pertinent cultures to date:   7/27/20 tissue - left leg vascular graft: MRSA, E.faecalis  7/27/20 tissue - left leg vascular graft: Peptostreptococcus, Prevotella, Propionibacterium sp.     Recent Labs     09/24/20  0058 09/25/20  1003   BUN 19 <2*   CREATININE 1.08 1.21   ALBUMIN 3.0*  --      No results for input(s): VANCOTROUGH, VANCOPEAK, VANCORANDOM in the last 72 hours.    A/P   1. Vancomycin dose change: Not indicated at this time  2. Next vancomycin level: 9/25/20 @ 2000 (ordered)  3. Goal trough: 10-15 mcg/mL  4. Comments: Patient w/ extensive vancomycin dosing history. Usually, patient able to tolerate 750 mg q12H; however, Scr elevated from baseline. Will obtain ~24 hour level based on patient's renal function.      Bia Harris, PharmD, BCPS

## 2020-09-26 NOTE — PROGRESS NOTES
Hospital Medicine Daily Progress Note    Date of Service  9/26/2020    Chief Complaint  75 y.o. male admitted 9/23/2020 with infected left AKA.    Hospital Course    This 76 yo male with HTN, PAD w original left leg AKA on 6/3/20 by Dr. Fisher, revision on 7/29/20 with MRSA and enterococcus seen by ID, now with erythema, infected left leg AKA with current wound vac in place.  Going for OR revision 9/24, antibiotics to start after OR cultures.  Ordered insulin sliding scale and has carpio placed prior to transfer from rehab.  Carpio orders placed.  9/25:  Post op revision left AKA per Dr. Fisher, no infection noted, just not closing so he removed some femur to get the wound to close, wound vac in place.  Bedside RN documented multiple pressure ulcers on sacrum and penis tip, wound care consult, keep carpio in place to allow wounds to heal. Changed vanco IV to bactrim po, continue unasyn IV for now post op, but can change to augmentin at dc. Surgeon removed skin lesion on 9/24 path pending.  9/26:  Patient had a fall yesterday with onset of confusion, but was still answering questions appropriately.  His blood pressure was low overnight despite fluid boluses, NOC hospitalist started midodrine.  This a.m. patient having difficulty opening eyes to verbal stimuli, mild awakening/movement all 4 extremities with sternal rubbing, increased after 2 narcan IV doses, , BP 90/50 in room during rapid response, stat head CT ordered.  Likely has UTI based on UA, UC and BCs pending, rise in Cr, stopped vancomycin IV and unasyn IV, started Rocephin IV to cover urinary pathogens.  dc'd all narcotics and BP meds. Spoke with daughter, next of kin, Dara who expressed her wishes for FULL CODE, intubation and all interventions available to keep her father alive.  ICU transfer, intensivist notified.      *        Consultants/Specialty  Dr. Fisher    Code Status  Full Code    Disposition   ICU transfer.   PT/OT ordered, likely return  to [facility] once medically stable.    Review of Systems  Review of Systems   Unable to perform ROS: Mental status change        Physical Exam  Temp:  [36.9 °C (98.5 °F)-38.4 °C (101.1 °F)] 36.9 °C (98.5 °F)  Pulse:  [64-82] 64  Resp:  [5-20] 5  BP: ()/(40-49) 93/42  SpO2:  [90 %-98 %] 95 %    Physical Exam  Constitutional:       General: He is not in acute distress.     Appearance: He is ill-appearing. He is not diaphoretic.      Comments: Appears pale, malnourished, protuberant ribs   HENT:      Head: Normocephalic and atraumatic.      Comments: Large fungating right cheek hyperpigmented skin lesion     Mouth/Throat:      Pharynx: No oropharyngeal exudate.   Eyes:      General: No scleral icterus.        Right eye: No discharge.         Left eye: No discharge.      Conjunctiva/sclera: Conjunctivae normal.      Pupils: Pupils are equal, round, and reactive to light.   Neck:      Musculoskeletal: Normal range of motion and neck supple.      Thyroid: No thyromegaly.      Vascular: No JVD.      Trachea: No tracheal deviation.   Cardiovascular:      Rate and Rhythm: Normal rate and regular rhythm.      Heart sounds: Normal heart sounds. No murmur. No friction rub. No gallop.    Pulmonary:      Effort: Pulmonary effort is normal. No respiratory distress.      Breath sounds: Normal breath sounds. No wheezing or rales.   Chest:      Chest wall: No tenderness.   Abdominal:      General: Bowel sounds are normal. There is no distension.      Palpations: Abdomen is soft. There is no mass.      Tenderness: There is no abdominal tenderness. There is no guarding or rebound.   Genitourinary:     Comments: Brothers catheter in place  Musculoskeletal: Normal range of motion.         General: Tenderness (Left AKA with erythema seen surrounding wound vac) present.   Lymphadenopathy:      Cervical: No cervical adenopathy.   Skin:     General: Skin is warm and dry.      Findings: Erythema present. No rash.      Comments:  Media tab reviewed photo of sacral pressure ulcer, no skin breakdown, but hyperpigmentation noted likely deep tissue involvement.    Penile tip with escoriations, blisters noted.   Neurological:      Mental Status: He is alert.      Cranial Nerves: No cranial nerve deficit.      Motor: No abnormal muscle tone.      Comments: Unable to respond to verbal, some sternal rub response after 2 narcan given IV.  No focal deficits noted.   Psychiatric:         Behavior: Behavior normal.         Thought Content: Thought content normal.         Judgment: Judgment normal.         Fluids    Intake/Output Summary (Last 24 hours) at 9/26/2020 0906  Last data filed at 9/26/2020 0600  Gross per 24 hour   Intake 2195 ml   Output 300 ml   Net 1895 ml       Laboratory  Recent Labs     09/24/20  0058 09/25/20  1003 09/26/20  0259   WBC 8.6 9.2 19.4*   RBC 3.24* 3.20* 2.78*   HEMOGLOBIN 9.7* 9.6* 8.4*   HEMATOCRIT 32.8* 31.4* 27.4*   .2* 98.1* 98.6*   MCH 29.9 30.0 30.2   MCHC 29.6* 30.6* 30.7*   RDW 56.4* 53.1* 54.2*   PLATELETCT 239 296 311   MPV 11.6 10.5 10.2     Recent Labs     09/24/20  0058 09/25/20  1003 09/26/20  0259   SODIUM 142 136 138   POTASSIUM 3.6 3.9 3.7   CHLORIDE 105 102 104   CO2 25 25 24   GLUCOSE 122* 142* 152*   BUN 19 <2* 19   CREATININE 1.08 1.21 1.48*   CALCIUM 9.2 8.6 8.4*     Recent Labs     09/25/20  1713   INR 1.26*               Imaging  CT-HEAD W/O    (Results Pending)        Assessment/Plan  * AKA stump complication (HCC)- (present on admission)  Assessment & Plan  Sent from Rehab today for 9/24 surgical revision.  Difficulty in healing.  Vit C  Original AKA 6/8 followed by prior revision and infection 7/29.  Start antibiotics post op.    Per Dr. Fisher, did not appear infected, needed bone removal to allow closure of wound.  No OR cultures necessary.  Plan to dc with short course of bactrim and augmentin.    Anemia due to vitamin B12 deficiency- (present on admission)  Assessment & Plan  Low B12  and folate levels, continue vitamin B12 oral daily and folate po daily.  Check iron levels in a.m.  Ascorbic acid added.  Monitor CBC    PVD (peripheral vascular disease) with claudication (HCC)- (present on admission)  Assessment & Plan  Control BP and lipids.  Statin, bp meds    Type 2 diabetes mellitus (HCC)- (present on admission)  Assessment & Plan  Monitor accuchecks and cover with SSI  Last A1c 5.1 in past 3 weeks    Encephalopathy in sepsis  Assessment & Plan  9/26:  Acute onset of altered mentation after sepsis protocol initiated on 9/25 for fever, hypotension.  9/26:  Patient had a fall yesterday with onset of confusion, but was still answering questions appropriately.  His blood pressure was low overnight despite fluid boluses, NOC hospitalist started midodrine.  This a.m. patient having difficulty opening eyes to verbal stimuli, mild awakening/movement all 4 extremities with sternal rubbing, increased after 2 narcan IV doses, , BP 90/50 in room during rapid response, stat head CT ordered.  Likely has UTI based on UA, UC and BCs pending, rise in Cr, stopped vancomycin IV and unasyn IV, started Rocephin IV to cover urinary pathogens.  dc'd all narcotics and BP meds. Spoke with daughter, next of kin, Dara who expressed her wishes for FULL CODE, intubation and all interventions available to keep her father alive.  ICU transfer, intensivist notified.    Urinary tract infection associated with indwelling urethral catheter (HCC)  Assessment & Plan  +UA, 9/26 stopped unasyn, vanco IV, started Rocpehin IV.  Pending UC, BCs x2.    Sepsis (HCC)  Assessment & Plan  This is Severe Sepsis Not present on admission  SIRS criteria identified on my evaluation include: Fever, with temperature greater than 101 deg F  Source of infection is UTI  Clinical indicators of end organ dysfunction include Systolic blood pressure (SBP) <90 mmHg or mean arterial pressure <65 mmHg  Sepsis protocol initiated  Fluid resuscitation  "ordered per protocol  IV antibiotics as appropriate for source of sepsis  Reassessment: I have reassessed the patient's hemodynamic status  End organ dysfunction include(s):  Encephalopathy (metabolic)   Hypotension and fever started 9/25.      Sacral ulcer, with unspecified severity (HCC)- (present on admission)  Assessment & Plan  Noted and present since admission from rehab.  Wound care consult.  Also with penile blisters, escorations:  Wound care consult, keep carpio in place to aid skin healing.    HLD (hyperlipidemia)- (present on admission)  Assessment & Plan  Atorvastatin 40mg nightly    HTN (hypertension)- (present on admission)  Assessment & Plan  Monitor vitals.  Lisinopril 20mg daily, coreg 6.25mg BID    Skin lesion of face- (present on admission)  Assessment & Plan  \"Been there a few years\"  Educated him on the importance of this being removed by a dermatologist/plastic surgeon.       VTE prophylaxis: 9/26 dc'd asa 81mg lovenox until head ct.      "

## 2020-09-26 NOTE — ASSESSMENT & PLAN NOTE
Pan sensitive Klebsiella on 9/25  Was on Amp/Sulbactam on admission to which the culture sensitivity is intermediate was bumped to Rocephin the day his pressures went down and then escalated to linezolid/Pip-Tazo.    Given all of that I feel he was likely inadquately treated with Amp/Sulbactam and we can de-escalate to Ceftriaxone now that we have sensitivities

## 2020-09-26 NOTE — PROGRESS NOTES
Vascular    Hypotensive overnight, moved to ICU, better now  No complaints  AFVSS  Vac intact  Right face sutures CDI and ELIDA    Continue vac and antibiotics  Sutures out on or around 10/1/20    Following along    Usman Fisher MD  [facility] (General and Vascular Surgery)  Cell: [phone] (text or call is fine, if you don't reach me please try my office)  Office: [phone]  __________________________________________________________________  Patient:Jimenez Raymundo Bains   MRN:[id number]   CSN:[id number]    9/26/2020    3:37 PM

## 2020-09-26 NOTE — PROGRESS NOTES
This RN called patient's daughter, Lien, and updated her on patient's condition. All questions and concerns addressed.

## 2020-09-26 NOTE — PROGRESS NOTES
This RN noted that blood cultures have not been collected in Epic and do not appear to be running down in the lab. This RN conferred with Brenda, who says that she watched blood cultures being obtained yesterday. This RN spoke with Joanne, in Microbiology, who says that she does not have the sample. Antibiotics held until blood cultures can be obtained.

## 2020-09-26 NOTE — WOUND TEAM
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 Tolerated with minimal pain.    OBJECTIVE:  Pt lying in on ICU low air loss, on waffle overlay. Vac in place to L AKA. Barrier cream to buttocks. Brothers in place.    WOUND TYPE, LOCATION, CHARACTERISTICS (Pressure Injuries: location, stage, POA or date identified)    Negative Pressure Wound Therapy 06/30/20 Leg (Active)   Vacuum Serial Number [id number] 09/26/20    NPWT Pump Mode / Pressure Setting Intermittent;125 mmHg 09/26/20   Dressing Type Medium;Black Foam (Veraflo)    Number of Foam Pieces Used 2    Canister Changed No    Output (mL) 0 mL    NEXT Dressing Change/Treatment Date 09/29/20    VAC VeraFlo Irrigant 1/4 Strength Dakins    VAC VeraFlo Soak Time (mins) 5    VAC VeraFlo Instill Volume (ml) 20    VAC VeraFlo - Therapy Time (hrs) 1.5    VAC VeraFlo Pressure (mm/Hg) Intermittent;125 mmHg      Wound 09/24/20 Full Thickness Wound Leg Left AKA Revision with Vac (Active)   Wound Image   09/26/20   Site Assessment Pink;Red;Tan;Yellow;Slough 09/26/20    Periwound Assessment Clean;Dry;Intact    Margins Attached edges;Defined edges    Closure Secondary intention    Drainage Amount Small    Drainage Description Serosanguineous    Treatments Cleansed;Site care    Wound Cleansing Approved Wound Cleanser    Periwound Protectant Benzoin;Drape;Mepitel;Paste Ring    Dressing Cleansing/Solutions 1/4 Strength Dakin's Solution    Dressing Options Wound Vac    Dressing Changed New    Dressing Status Clean;Dry;Intact    Dressing  Change/Treatment Frequency Tuesday, Thursday, Saturday, and As Needed    NEXT Dressing Change/Treatment Date 09/28/20    Non-staged Wound Description Full thickness    Wound Length (cm) 6 cm    Wound Width (cm) 10.5 cm    Wound Depth (cm) 5 cm    Wound Surface Area (cm^2) 63 cm^2    Wound Volume (cm^3) 315 cm^3    Shape Irregular    Wound Odor None    Pulses N/A    Exposed Structures Muscle;Adipose            Wound 09/25/20 Traumatic Penis Erosion (Active)   Wound Image   09/26/20   Site Assessment Pink;Red;Painful;Edema 09/26/20   Periwound Assessment Clean;Dry;Intact    Margins Attached edges;Defined edges    Closure Open to air    Drainage Amount Scant    Drainage Description Serosanguineous    Treatments Cleansed    Wound Cleansing Approved Wound Cleanser    Dressing Options Open to Air    Dressing Status Open to Air    Dressing Change/Treatment Frequency Every Shift, and As Needed    NEXT Dressing Change/Treatment Date 09/26/20    NEXT Weekly Photo (Inpatient Only) 10/02/20    Shape Irregular    Wound Odor None    Pulses N/A    Exposed Structures None      Moisture Associated Skin Damage 09/26/20 Buttock;Groin;Sacrum (Active)   Wound Image       NEXT Weekly Photo (Inpatient Only) 10/02/20 09/26/20    Drainage Amount None    Periwound Assessment Dry    IAD Cleansing Dimethecone Wipes    Periwound Protectant Antifungal Therapy    IAD Containment Device Interdry Cloth      Vascular:    PETRONA:   No results found.      Lab Values:    Lab Results   Component Value Date/Time    WBC 17.0 (H) 09/26/2020 09:47 AM    RBC 2.51 (L) 09/26/2020 09:47 AM    HEMOGLOBIN 7.6 (L) 09/26/2020 09:47 AM    HEMATOCRIT 25.4 (L) 09/26/2020 09:47 AM    HBA1C 5.1 09/05/2020 05:40 AM          Culture:   Obtained: none this visit,   Culture Results show:  No results found for this or any previous visit (from the past 720 hour(s)).      INTERVENTIONS BY WOUND TEAM:  Case discussed with Dr. Fisher with plan for Dakins and Veraflo. Originally  attempted to see pt this am, unable to r/t hypotensive and rapid with subsequent transfer to [facility]. RN in [facility] obtained pain medication this afternoon. Previous dressing removed, wound cleansed with wound cleanser and gauze. Lidocaine 4% drizzled on to wound bed. Yana wound prepped with benzoin and paste ring, small piece of Mepitel to sutures on lateral aspect. 2 pieces of black Veraflo foam to wound bed. Secured with drape. Vac therapy with Dakins 1/4 instillation initiated, instilling 20 ml into wound bed for 5 minutes every 1.5 hours. Seal obtained, test cycle successful.    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 Continue with Polysporin to aid with antibacterial support.    Sacrum, groin and back with rash - likely yeast r/t moisture. Nystatin ordered for groin, Inzo ordered for sacrum. Pt cleansed of small BM, clean chucks placed and returned to a supine position.    Interdisciplinary consultation: Patient, Dr. Fisher, Bedside RN ([facility], [facility], [facility]), Wound Team RN Marija     EVALUATION: Veraflo withDdakins will encourage granulation tissue while removing slough and bio burden from wound base.  Ideally pt would benefit from carpio removal r/t trauma to urethra - continue with bacitracin to area.  Nystatin powder to groin to decrease moisture. Continue to turn to offload sacrum and apply barrier paste r/t incontinence.     Goals: Steady decrease in wound area and depth weekly.    NURSING PLAN OF CARE ORDERS (X):    Dressing changes: See Dressing Care orders: X  Skin care: See Skin Care orders: X  Rectal tube care: See Rectal Tube Care orders:   Other orders:    RSKIN:   CURRENTLY IN PLACE (X), APPLIED THIS VISIT (A), ORDERED (O):    Q shift Alonso:    Q shift pressure point assessments:    Pressure redistribution mattress            Low Airloss    - PT ON ICU BED   Bariatric SHANNAN         Bariatric foam           Heel float  boots     Heel Silicone dressing        Float Heels off Bed with Pillows               Barrier wipes         Barrier Cream         Barrier paste      X    Sacral silicone dressing         Silicone O2 tubing         Anchorfast         Cannula fixation Device (Tender )          Gray Foam Ear protectors        Trach with Optifoam split foam                 Waffle cushion        Waffle Overlay       X  Rectal tube or BMS    Purwick/Condom Cath          Antifungal tx    O  Interdry        O  Reposition q 2 hours        Up to chair        Ambulate      PT/OT        Dietician        Diabetes Education      PO     TF     TPN     NPO   # days   Other        WOUND TEAM PLAN OF CARE    Dressing changes by wound team:          Follow up 1-2 times weekly:               Follow up 3 times weekly:                NPWT change 3 times weekly:   X  Follow up as needed:       Other (explain):     Anticipated discharge plans:  LTACH:        SNF/Rehab:                X  Home Care:           Outpatient Wound Center:            Self Care:

## 2020-09-26 NOTE — PROGRESS NOTES
Dr. Avalos notified of MAPs in the 40s and 50s, and SBP in the 80s and 90s. Orders received for a one time 1000ml LR bolus and then to start Levophed (MAP goal greater than 60 and SBP greater than 90) peripherally if MAP is still less than 60. Orders verified and implemented.

## 2020-09-27 LAB
ANION GAP SERPL CALC-SCNC: 10 MMOL/L (ref 7–16)
BACTERIA UR CULT: ABNORMAL
BACTERIA UR CULT: ABNORMAL
BASOPHILS # BLD AUTO: 0.4 % (ref 0–1.8)
BASOPHILS # BLD: 0.06 K/UL (ref 0–0.12)
BUN SERPL-MCNC: 19 MG/DL (ref 8–22)
CALCIUM SERPL-MCNC: 8 MG/DL (ref 8.5–10.5)
CHLORIDE SERPL-SCNC: 107 MMOL/L (ref 96–112)
CO2 SERPL-SCNC: 22 MMOL/L (ref 20–33)
CREAT SERPL-MCNC: 1.37 MG/DL (ref 0.5–1.4)
EOSINOPHIL # BLD AUTO: 0.05 K/UL (ref 0–0.51)
EOSINOPHIL NFR BLD: 0.3 % (ref 0–6.9)
ERYTHROCYTE [DISTWIDTH] IN BLOOD BY AUTOMATED COUNT: 54.3 FL (ref 35.9–50)
GLUCOSE BLD-MCNC: 111 MG/DL (ref 65–99)
GLUCOSE BLD-MCNC: 118 MG/DL (ref 65–99)
GLUCOSE BLD-MCNC: 121 MG/DL (ref 65–99)
GLUCOSE SERPL-MCNC: 103 MG/DL (ref 65–99)
HCT VFR BLD AUTO: 25.8 % (ref 42–52)
HGB BLD-MCNC: 7.8 G/DL (ref 14–18)
IMM GRANULOCYTES # BLD AUTO: 0.12 K/UL (ref 0–0.11)
IMM GRANULOCYTES NFR BLD AUTO: 0.8 % (ref 0–0.9)
LYMPHOCYTES # BLD AUTO: 1.4 K/UL (ref 1–4.8)
LYMPHOCYTES NFR BLD: 9.6 % (ref 22–41)
MCH RBC QN AUTO: 29.9 PG (ref 27–33)
MCHC RBC AUTO-ENTMCNC: 30.2 G/DL (ref 33.7–35.3)
MCV RBC AUTO: 98.9 FL (ref 81.4–97.8)
MONOCYTES # BLD AUTO: 0.5 K/UL (ref 0–0.85)
MONOCYTES NFR BLD AUTO: 3.4 % (ref 0–13.4)
NEUTROPHILS # BLD AUTO: 12.41 K/UL (ref 1.82–7.42)
NEUTROPHILS NFR BLD: 85.5 % (ref 44–72)
NRBC # BLD AUTO: 0 K/UL
NRBC BLD-RTO: 0 /100 WBC
PLATELET # BLD AUTO: 310 K/UL (ref 164–446)
PMV BLD AUTO: 10 FL (ref 9–12.9)
POTASSIUM SERPL-SCNC: 3.4 MMOL/L (ref 3.6–5.5)
RBC # BLD AUTO: 2.61 M/UL (ref 4.7–6.1)
SIGNIFICANT IND 70042: ABNORMAL
SITE SITE: ABNORMAL
SODIUM SERPL-SCNC: 139 MMOL/L (ref 135–145)
SOURCE SOURCE: ABNORMAL
WBC # BLD AUTO: 14.5 K/UL (ref 4.8–10.8)

## 2020-09-27 PROCEDURE — A9270 NON-COVERED ITEM OR SERVICE: HCPCS | Performed by: SURGERY

## 2020-09-27 PROCEDURE — 99231 SBSQ HOSP IP/OBS SF/LOW 25: CPT | Performed by: INTERNAL MEDICINE

## 2020-09-27 PROCEDURE — 700111 HCHG RX REV CODE 636 W/ 250 OVERRIDE (IP): Performed by: HOSPITALIST

## 2020-09-27 PROCEDURE — 700102 HCHG RX REV CODE 250 W/ 637 OVERRIDE(OP): Performed by: HOSPITALIST

## 2020-09-27 PROCEDURE — A9270 NON-COVERED ITEM OR SERVICE: HCPCS | Performed by: INTERNAL MEDICINE

## 2020-09-27 PROCEDURE — 700105 HCHG RX REV CODE 258: Performed by: HOSPITALIST

## 2020-09-27 PROCEDURE — 700105 HCHG RX REV CODE 258: Performed by: INTERNAL MEDICINE

## 2020-09-27 PROCEDURE — A9270 NON-COVERED ITEM OR SERVICE: HCPCS | Performed by: HOSPITALIST

## 2020-09-27 PROCEDURE — 700101 HCHG RX REV CODE 250: Performed by: HOSPITALIST

## 2020-09-27 PROCEDURE — 770006 HCHG ROOM/CARE - MED/SURG/GYN SEMI*

## 2020-09-27 PROCEDURE — 80048 BASIC METABOLIC PNL TOTAL CA: CPT

## 2020-09-27 PROCEDURE — 85025 COMPLETE CBC W/AUTO DIFF WBC: CPT

## 2020-09-27 PROCEDURE — 700111 HCHG RX REV CODE 636 W/ 250 OVERRIDE (IP): Performed by: INTERNAL MEDICINE

## 2020-09-27 PROCEDURE — 82962 GLUCOSE BLOOD TEST: CPT | Mod: 91

## 2020-09-27 PROCEDURE — 99233 SBSQ HOSP IP/OBS HIGH 50: CPT | Performed by: HOSPITALIST

## 2020-09-27 PROCEDURE — 700102 HCHG RX REV CODE 250 W/ 637 OVERRIDE(OP): Performed by: SURGERY

## 2020-09-27 PROCEDURE — 700102 HCHG RX REV CODE 250 W/ 637 OVERRIDE(OP): Performed by: INTERNAL MEDICINE

## 2020-09-27 RX ORDER — GABAPENTIN 300 MG/1
600 CAPSULE ORAL 3 TIMES DAILY
Status: DISCONTINUED | OUTPATIENT
Start: 2020-09-27 | End: 2020-09-29 | Stop reason: HOSPADM

## 2020-09-27 RX ORDER — POTASSIUM CHLORIDE 20 MEQ/1
60 TABLET, EXTENDED RELEASE ORAL ONCE
Status: COMPLETED | OUTPATIENT
Start: 2020-09-27 | End: 2020-09-27

## 2020-09-27 RX ORDER — FUROSEMIDE 10 MG/ML
20 INJECTION INTRAMUSCULAR; INTRAVENOUS EVERY 12 HOURS
Status: COMPLETED | OUTPATIENT
Start: 2020-09-27 | End: 2020-09-28

## 2020-09-27 RX ADMIN — CYANOCOBALAMIN TAB 500 MCG 1000 MCG: 500 TAB at 04:56

## 2020-09-27 RX ADMIN — PIPERACILLIN AND TAZOBACTAM 4.5 G: 4; .5 INJECTION, POWDER, LYOPHILIZED, FOR SOLUTION INTRAVENOUS; PARENTERAL at 10:07

## 2020-09-27 RX ADMIN — FUROSEMIDE 20 MG: 10 INJECTION, SOLUTION INTRAMUSCULAR; INTRAVENOUS at 08:33

## 2020-09-27 RX ADMIN — CEFTRIAXONE SODIUM 2 G: 2 INJECTION, POWDER, FOR SOLUTION INTRAMUSCULAR; INTRAVENOUS at 17:50

## 2020-09-27 RX ADMIN — POTASSIUM CHLORIDE 60 MEQ: 1500 TABLET, EXTENDED RELEASE ORAL at 08:33

## 2020-09-27 RX ADMIN — FUROSEMIDE 20 MG: 10 INJECTION, SOLUTION INTRAMUSCULAR; INTRAVENOUS at 17:45

## 2020-09-27 RX ADMIN — NYSTATIN: 100000 POWDER TOPICAL at 17:53

## 2020-09-27 RX ADMIN — ATORVASTATIN CALCIUM 40 MG: 40 TABLET, FILM COATED ORAL at 17:22

## 2020-09-27 RX ADMIN — GABAPENTIN 600 MG: 300 CAPSULE ORAL at 17:22

## 2020-09-27 RX ADMIN — ACETAMINOPHEN 650 MG: 325 TABLET, FILM COATED ORAL at 04:52

## 2020-09-27 RX ADMIN — Medication 1 EACH: at 05:08

## 2020-09-27 RX ADMIN — DULOXETINE 20 MG: 20 CAPSULE, DELAYED RELEASE ORAL at 04:56

## 2020-09-27 RX ADMIN — FOLIC ACID 1 MG: 1 TABLET ORAL at 04:58

## 2020-09-27 RX ADMIN — NYSTATIN: 100000 POWDER TOPICAL at 12:26

## 2020-09-27 RX ADMIN — MICONAZOLE NITRATE: 20 CREAM TOPICAL at 05:11

## 2020-09-27 RX ADMIN — GABAPENTIN 600 MG: 300 CAPSULE ORAL at 12:28

## 2020-09-27 RX ADMIN — PIPERACILLIN AND TAZOBACTAM 4.5 G: 4; .5 INJECTION, POWDER, LYOPHILIZED, FOR SOLUTION INTRAVENOUS; PARENTERAL at 01:13

## 2020-09-27 RX ADMIN — ACETAMINOPHEN 650 MG: 325 TABLET, FILM COATED ORAL at 17:22

## 2020-09-27 RX ADMIN — NYSTATIN: 100000 POWDER TOPICAL at 05:09

## 2020-09-27 RX ADMIN — Medication 4000 UNITS: at 05:03

## 2020-09-27 RX ADMIN — SODIUM CHLORIDE, POTASSIUM CHLORIDE, SODIUM LACTATE AND CALCIUM CHLORIDE: 600; 310; 30; 20 INJECTION, SOLUTION INTRAVENOUS at 03:15

## 2020-09-27 RX ADMIN — Medication 1 EACH: at 17:54

## 2020-09-27 RX ADMIN — OMEPRAZOLE 20 MG: 20 CAPSULE, DELAYED RELEASE ORAL at 04:57

## 2020-09-27 RX ADMIN — DOCUSATE SODIUM 50 MG AND SENNOSIDES 8.6 MG 2 TABLET: 8.6; 5 TABLET, FILM COATED ORAL at 04:59

## 2020-09-27 RX ADMIN — MICONAZOLE NITRATE: 20 CREAM TOPICAL at 17:53

## 2020-09-27 RX ADMIN — DOCUSATE SODIUM 50 MG AND SENNOSIDES 8.6 MG 2 TABLET: 8.6; 5 TABLET, FILM COATED ORAL at 17:22

## 2020-09-27 RX ADMIN — ACETAMINOPHEN 650 MG: 325 TABLET, FILM COATED ORAL at 12:28

## 2020-09-27 RX ADMIN — OXYCODONE HYDROCHLORIDE AND ACETAMINOPHEN 500 MG: 500 TABLET ORAL at 04:55

## 2020-09-27 RX ADMIN — LINEZOLID 600 MG: 600 INJECTION, SOLUTION INTRAVENOUS at 05:25

## 2020-09-27 ASSESSMENT — FIBROSIS 4 INDEX: FIB4 SCORE: 1.22

## 2020-09-27 ASSESSMENT — ENCOUNTER SYMPTOMS
SHORTNESS OF BREATH: 0
HEADACHES: 0
BACK PAIN: 0
SORE THROAT: 0
BLURRED VISION: 0
VOMITING: 0
FEVER: 0
DIARRHEA: 0
NAUSEA: 0
ABDOMINAL PAIN: 0
CHILLS: 0
LOSS OF CONSCIOUSNESS: 0
PALPITATIONS: 0
DOUBLE VISION: 0
COUGH: 0
DIZZINESS: 0

## 2020-09-27 ASSESSMENT — PAIN DESCRIPTION - PAIN TYPE
TYPE: SURGICAL PAIN

## 2020-09-27 NOTE — PROGRESS NOTES
ICU Progress Note    Pt seen and personally examined. I treated infection and electrolyte repletion and fluid overload    This patient was transferred yesterday by the hospitalist team from the [facility] to the ICU for lethargy, hypotension and ANUPAM that was worsening. When he arrived to the ICU I gave additional IV fluids and broadened his antibiotic coverage. The patient is much improved today- awake, alert, orientd, good vital signs, improving renal function, nl WBC, no fever. Urine output appear low. No critical care needs.     Recommend  - transfer back to [facility]  - gentle diuresis (I started Lasix 20 mg q 12 hours for two days)- replete lytes  - consider ID consult  - mobility  - discharge planning (he lives with granddaughter in a trailer)  - code status: patient has medical capacity and he asks to be DNR/DNI  - please re-consult critical care if needed

## 2020-09-27 NOTE — PROGRESS NOTES
Received bedside report assumed care of pt. Pt resting comfortably in bed. Bedside table, and call light within reach. Bed alarm on and in lowest position.  Pt denying pain at this time. VS stable. Updated whiteboard in room and discussed today's POC. Pt is A&Ox1(place), and follows.

## 2020-09-27 NOTE — PROGRESS NOTES
Monitor summary  0.12/.010/0.44  Hr 56-75 w rare PVC's  Sinus tiffanie/sinus rhythm    12 hour chart check complete

## 2020-09-27 NOTE — PROGRESS NOTES
Vascular    CHARLES  No complaints  AFVSS  Vac intact  Right face sutures CDI and ELIDA    Continue vac and antibiotics  Sutures out on or around 10/1/20    Following along    Usman Fisher MD  [facility] (General and Vascular Surgery)  Cell: [phone] (text or call is fine, if you don't reach me please try my office)  Office: [phone]  __________________________________________________________________  Patient:Jimenez Bains   MRN:[id number]   CSN:[id number]

## 2020-09-27 NOTE — CONSULTS
Critical Care Consultation    Date of consult: 9/26/2020    Referring Physician  Debbie Laureano M.D.    Reason for Consultation  Hypotension, poorly responsive    History of Presenting Illness  75 y.o. male who presented 9/23/2020 with multiple operations of left leg including most recently a revision of left AKA on 9/25. Had a bad day on carrillo 9/25 with a fall, hypotension, low urine output, worsening ANUPAM and depressed mental status. Transferred to ICU 9/26 with those problems ongoing. CT of head negative.    Code Status  Full Code per Hospitalist who talked with pt's daughter    Review of Systems  Review of Systems   Unable to perform ROS: Mental status change       Past Medical History   has a past medical history of Cancer ([facility]), Hypertension, Pain (01-30-14), Pain (3/14/14), Personal history of venous thrombosis and embolism, PVD (peripheral vascular disease) ([facility]), and Unspecified hemorrhagic conditions.    Surgical History   has a past surgical history that includes femoral popliteal bypass (10/18/2011); aortogram with runoff (11/14/2013); iliac angioplasty with stent (11/14/2013); toe amputation (12/4/2013); femoral femoral bypass (12/4/2013); femoral femoral bypass (2/2/2014); groin exploration (3/17/2014); angiogram (7/30/2014); femoral popliteal bypass (7/30/2014); reimplantion revascularization (7/30/2014); knee amputation above (Left, 6/30/2020); knee amputation above (Left, 7/27/2020); irrigation & debridement general (Left, 7/29/2020); irrigation & debridement general (Left, 7/31/2020); knee amputation above (Left, 8/2/2020); knee amputation below (Left, 9/24/2020); and lesion excision ortho (Right, 9/24/2020).    Family History  family history is not on file.    Social History   reports that he quit smoking about 6 months ago. His smoking use included cigarettes. He has a 100.00 pack-year smoking history. He has never used smokeless tobacco. He reports that he does not drink alcohol or use  drugs.    Medications  Home Medications     Reviewed by Luis Jeffers R.N. (Registered Nurse) on 09/24/20 at 1550  Med List Status: Complete   Medication Last Dose Status   aspirin 81 MG EC tablet  Active   atorvastatin (LIPITOR) 40 MG Tab  Active   gabapentin (NEURONTIN) 100 MG Cap  Active   lisinopril (PRINIVIL) 5 MG Tab  Active   NS SOLN 100 mL with ampicillin/sulbactam 3 (2-1) g SOLR 3 g  Active   NS SOLN 50 mL with DAPTOmycin 350 MG SOLR 1,000 mg  Active              Current Facility-Administered Medications   Medication Dose Route Frequency Provider Last Rate Last Dose   • LACTATED RINGERS IV SOLN            • naloxone (NARCAN) injection 0.4 mg  0.4 mg Intravenous Once Debbie Laureano M.D.       • NALOXONE HCL 0.4 MG/ML INJ SOLN            • PHENYLEPHRINE HCL-NACL 1-0.9 MG/10ML-% IV SOSY            • piperacillin-tazobactam (ZOSYN) 4.5 g in  mL IVPB  4.5 g Intravenous Q8HRS Asael Avalos M.D.   Stopped at 09/26/20 1421   • Linezolid (ZYVOX) premix 600 mg  600 mg Intravenous Q12HRS Asael Avalos M.D. 300 mL/hr at 09/26/20 1708 600 mg at 09/26/20 1708   • lidocaine (XYLOCAINE) 4 % topical solution   Topical QDAY PRN Debbie Laureano M.D.       • dakins 0.125% (1/4 strength) topical soln   Topical QDAY PRN Debbie Laureano M.D.       • nystatin (MYCOSTATIN) powder   Topical TID Debbie Laureano M.D.       • miconazole (MICOTIN) 2 % cream   Topical BID Debbie Laureano M.D.       • lactated ringers infusion (BOLUS)  500 mL Intravenous Once PRN Debbie Laureano M.D.       • lactated ringers infusion   Intravenous Continuous Debbie Laureano M.D. 125 mL/hr at 09/26/20 1017     • ascorbic acid tablet 500 mg  500 mg Oral DAILY Debbie Laureano M.D.   500 mg at 09/26/20 0558   • insulin regular (HumuLIN R,NovoLIN R) injection  1-6 Units Subcutaneous 4X/DAY ACHFIONA Laureano M.D.   Stopped at 09/24/20 2100    And   • glucose 4 g chewable tablet 16 g  16 g Oral Q15 MIN PRN Debbie Laureano M.D.        And   • dextrose 50%  (D50W) injection 50 mL  50 mL Intravenous Q15 MIN PRN Debbie Laureano M.D.       • ondansetron (ZOFRAN) syringe/vial injection 4 mg  4 mg Intravenous Q4HRS PRN Usman Fisher M.D.       • dexamethasone (DECADRON) injection 4 mg  4 mg Intravenous Once PRN Usman Fisher M.D.       • diphenhydrAMINE (BENADRYL) injection 25 mg  25 mg Intravenous Q6HRS PRN Usman Fisher M.D.       • haloperidol lactate (HALDOL) injection 1 mg  1 mg Intravenous Q6HRS PRN Usman Fisher M.D.       • scopolamine (TRANSDERM-SCOP) patch 1 Patch  1 Patch Transdermal Q72HRS PRN Usman Fisher M.D.       • acetaminophen (TYLENOL) tablet 650 mg  650 mg Oral Q6HRS Usman Fisher M.D.   650 mg at 09/26/20 1707   • calcium carbonate (TUMS) chewable tab 500 mg  500 mg Oral Q2HRS PRN Usman Fisher M.D.       • benzocaine-menthol (CEPACOL) lozenge 1 Lozenge  1 Lozenge Mouth/Throat Q4HRS PRN Usman Fisher M.D.       • diphenhydrAMINE (BENADRYL) tablet/capsule 25 mg  25 mg Oral Q6HRS PRN Usman Fisher M.D.        Or   • diphenhydrAMINE (BENADRYL) injection 25 mg  25 mg Intravenous Q6HRS PRN Usman Fisher M.D.       • atorvastatin (LIPITOR) tablet 40 mg  40 mg Oral Q EVENING RENAE BradenO.   40 mg at 09/26/20 1707   • bacitracin-polymyxin b (POLYSPORIN) 500-35943 UNIT/GM ointment   Topical BID RENAE BradenO.   1 Each at 09/26/20 1707   • cyanocobalamin (VITAMIN B-12) tablet 1,000 mcg  1,000 mcg Oral DAILY RENAE BradenONelda   1,000 mcg at 09/26/20 0557   • DULoxetine (CYMBALTA) capsule 20 mg  20 mg Oral DAILY RENAE BradenO.   20 mg at 09/26/20 0602   • folic acid (FOLVITE) tablet 1 mg  1 mg Oral DAILY RENAE BradenONelda   1 mg at 09/26/20 0558   • omeprazole (PRILOSEC) capsule 20 mg  20 mg Oral DAILY RENAE BradenONelda   20 mg at 09/26/20 0558   • vitamin D (cholecalciferol) tablet 4,000 Units  4,000 Units Oral DAILY RENAE BradenONelda   4,000 Units at 09/26/20 0557   •  senna-docusate (PERICOLACE or SENOKOT S) 8.6-50 MG per tablet 2 Tab  2 Tab Oral BID Jose Alfredo Martinez D.O.   Stopped at 09/26/20 0600    And   • polyethylene glycol/lytes (MIRALAX) PACKET 1 Packet  1 Packet Oral QDAY PRN Jose Alfredo Martinez D.O.        And   • magnesium hydroxide (MILK OF MAGNESIA) suspension 30 mL  30 mL Oral QDAY PRN Jose Alfredo Martinez D.O.        And   • bisacodyl (DULCOLAX) suppository 10 mg  10 mg Rectal QDAY PRN Jose Alfredo Martinez D.O.       • ondansetron (ZOFRAN ODT) dispertab 4 mg  4 mg Oral Q4HRS PRN Jose Alfredo Martinez D.O.       • enalaprilat (VASOTEC) injection 1.25 mg  1.25 mg Intravenous Q6HRS PRN Jose Alfredo Martinez D.O.           Allergies  Allergies   Allergen Reactions   • No Known Drug Allergy        Vital Signs last 24 hours  Temp:  [36.5 °C (97.7 °F)-37.8 °C (100.1 °F)] 36.5 °C (97.7 °F)  Pulse:  [56-78] 56  Resp:  [3-26] 20  BP: ()/(26-48) 114/43  SpO2:  [92 %-100 %] 96 %    Physical Exam  Physical Exam  Vitals reviewed: chronically ill appearing, moves with sternal rub, later talking and oriented X 2.   Constitutional:       Appearance: He is ill-appearing.   HENT:      Head: Normocephalic and atraumatic.      Nose: Nose normal.      Mouth/Throat:      Mouth: Mucous membranes are moist.   Cardiovascular:      Rate and Rhythm: Normal rate and regular rhythm.      Pulses: Normal pulses.      Heart sounds: Normal heart sounds.   Abdominal:      General: Abdomen is flat.      Palpations: Abdomen is soft.   Musculoskeletal: Normal range of motion.   Skin:     General: Skin is dry.      Capillary Refill: Capillary refill takes 2 to 3 seconds.   Neurological:      General: No focal deficit present.         Fluids    Intake/Output Summary (Last 24 hours) at 9/26/2020 1731  Last data filed at 9/26/2020 1600  Gross per 24 hour   Intake 3132.91 ml   Output 450 ml   Net 2682.91 ml       Laboratory  Recent Results (from the past 48 hour(s))   ACCU-CHEK GLUCOSE    Collection Time: 09/24/20  7:54 PM    Result Value Ref Range    Glucose - Accu-Ck 107 (H) 65 - 99 mg/dL   ACCU-CHEK GLUCOSE    Collection Time: 09/25/20  6:40 AM   Result Value Ref Range    Glucose - Accu-Ck 141 (H) 65 - 99 mg/dL   Histology Request    Collection Time: 09/25/20  7:25 AM   Result Value Ref Range    Pathology Request Sent to Histo    IRON/TOTAL IRON BIND    Collection Time: 09/25/20 10:03 AM   Result Value Ref Range    Iron 14 (L) 50 - 180 ug/dL    Total Iron Binding 132 (L) 250 - 450 ug/dL    Unsat Iron Binding 118 110 - 370 ug/dL    % Saturation 11 (L) 15 - 55 %   FERRITIN    Collection Time: 09/25/20 10:03 AM   Result Value Ref Range    Ferritin 218.0 22.0 - 322.0 ng/mL   CBC WITH DIFFERENTIAL    Collection Time: 09/25/20 10:03 AM   Result Value Ref Range    WBC 9.2 4.8 - 10.8 K/uL    RBC 3.20 (L) 4.70 - 6.10 M/uL    Hemoglobin 9.6 (L) 14.0 - 18.0 g/dL    Hematocrit 31.4 (L) 42.0 - 52.0 %    MCV 98.1 (H) 81.4 - 97.8 fL    MCH 30.0 27.0 - 33.0 pg    MCHC 30.6 (L) 33.7 - 35.3 g/dL    RDW 53.1 (H) 35.9 - 50.0 fL    Platelet Count 296 164 - 446 K/uL    MPV 10.5 9.0 - 12.9 fL    Neutrophils-Polys 82.40 (H) 44.00 - 72.00 %    Lymphocytes 11.00 (L) 22.00 - 41.00 %    Monocytes 5.20 0.00 - 13.40 %    Eosinophils 0.70 0.00 - 6.90 %    Basophils 0.30 0.00 - 1.80 %    Immature Granulocytes 0.40 0.00 - 0.90 %    Nucleated RBC 0.00 /100 WBC    Neutrophils (Absolute) 7.59 (H) 1.82 - 7.42 K/uL    Lymphs (Absolute) 1.01 1.00 - 4.80 K/uL    Monos (Absolute) 0.48 0.00 - 0.85 K/uL    Eos (Absolute) 0.06 0.00 - 0.51 K/uL    Baso (Absolute) 0.03 0.00 - 0.12 K/uL    Immature Granulocytes (abs) 0.04 0.00 - 0.11 K/uL    NRBC (Absolute) 0.00 K/uL   Basic Metabolic Panel    Collection Time: 09/25/20 10:03 AM   Result Value Ref Range    Sodium 136 135 - 145 mmol/L    Potassium 3.9 3.6 - 5.5 mmol/L    Chloride 102 96 - 112 mmol/L    Co2 25 20 - 33 mmol/L    Glucose 142 (H) 65 - 99 mg/dL    Bun <2 (L) 8 - 22 mg/dL    Creatinine 1.21 0.50 - 1.40 mg/dL    Calcium  8.6 8.5 - 10.5 mg/dL    Anion Gap 9.0 7.0 - 16.0   ESTIMATED GFR    Collection Time: 09/25/20 10:03 AM   Result Value Ref Range    GFR If African American >60 >60 mL/min/1.73 m 2    GFR If Non African American 58 (A) >60 mL/min/1.73 m 2   ACCU-CHEK GLUCOSE    Collection Time: 09/25/20 11:53 AM   Result Value Ref Range    Glucose - Accu-Ck 146 (H) 65 - 99 mg/dL   ACCU-CHEK GLUCOSE    Collection Time: 09/25/20  4:58 PM   Result Value Ref Range    Glucose - Accu-Ck 118 (H) 65 - 99 mg/dL   Lactic Acid Every four hours after STAT order    Collection Time: 09/25/20  5:13 PM   Result Value Ref Range    Lactic Acid 1.7 0.5 - 2.0 mmol/L   Prothrombin time (INR)    Collection Time: 09/25/20  5:13 PM   Result Value Ref Range    PT 16.2 (H) 12.0 - 14.6 sec    INR 1.26 (H) 0.87 - 1.13   Procalcitonin    Collection Time: 09/25/20  5:16 PM   Result Value Ref Range    Procalcitonin 1.47 (H) <0.25 ng/mL   Urinalysis    Collection Time: 09/25/20  6:35 PM    Specimen: Urine, Goldberg Cath   Result Value Ref Range    Color Yellow     Character Hazy (A)     Specific Gravity 1.041 <1.035    Ph 5.0 5.0 - 8.0    Glucose Negative Negative mg/dL    Ketones Trace (A) Negative mg/dL    Protein 30 (A) Negative mg/dL    Bilirubin Negative Negative    Urobilinogen, Urine 0.2 Negative    Nitrite Positive (A) Negative    Leukocyte Esterase Moderate (A) Negative    Occult Blood Large (A) Negative    Micro Urine Req Microscopic    Urine Culture    Collection Time: 09/25/20  6:35 PM    Specimen: Urine, Goldberg Cath   Result Value Ref Range    Significant Indicator POS (POS)     Source UR     Site URINE, GOLDBERG CATH     Culture Result - (A)     Culture Result (A)      Lactose fermenting Gram negative jeremy  ,000 cfu/mL     URINE MICROSCOPIC (W/UA)    Collection Time: 09/25/20  6:35 PM   Result Value Ref Range    WBC Packed (A) /hpf    -150 (A) /hpf    Bacteria Moderate (A) None /hpf    Epithelial Cells Negative /hpf    Hyaline Cast 0-2 /lpf    VANCOMYCIN TROUGH LEVEL    Collection Time: 09/25/20  8:34 PM   Result Value Ref Range    Vancomycin Trough 11.1 10.0 - 20.0 ug/mL   ACCU-CHEK GLUCOSE    Collection Time: 09/25/20  8:46 PM   Result Value Ref Range    Glucose - Accu-Ck 128 (H) 65 - 99 mg/dL   Lactic Acid Every four hours after STAT order    Collection Time: 09/26/20  2:59 AM   Result Value Ref Range    Lactic Acid 1.0 0.5 - 2.0 mmol/L   CBC WITH DIFFERENTIAL    Collection Time: 09/26/20  2:59 AM   Result Value Ref Range    WBC 19.4 (H) 4.8 - 10.8 K/uL    RBC 2.78 (L) 4.70 - 6.10 M/uL    Hemoglobin 8.4 (L) 14.0 - 18.0 g/dL    Hematocrit 27.4 (L) 42.0 - 52.0 %    MCV 98.6 (H) 81.4 - 97.8 fL    MCH 30.2 27.0 - 33.0 pg    MCHC 30.7 (L) 33.7 - 35.3 g/dL    RDW 54.2 (H) 35.9 - 50.0 fL    Platelet Count 311 164 - 446 K/uL    MPV 10.2 9.0 - 12.9 fL    Neutrophils-Polys 86.90 (H) 44.00 - 72.00 %    Lymphocytes 7.10 (L) 22.00 - 41.00 %    Monocytes 4.50 0.00 - 13.40 %    Eosinophils 0.20 0.00 - 6.90 %    Basophils 0.50 0.00 - 1.80 %    Immature Granulocytes 0.80 0.00 - 0.90 %    Nucleated RBC 0.00 /100 WBC    Neutrophils (Absolute) 16.88 (H) 1.82 - 7.42 K/uL    Lymphs (Absolute) 1.37 1.00 - 4.80 K/uL    Monos (Absolute) 0.88 (H) 0.00 - 0.85 K/uL    Eos (Absolute) 0.03 0.00 - 0.51 K/uL    Baso (Absolute) 0.10 0.00 - 0.12 K/uL    Immature Granulocytes (abs) 0.15 (H) 0.00 - 0.11 K/uL    NRBC (Absolute) 0.00 K/uL   Basic Metabolic Panel    Collection Time: 09/26/20  2:59 AM   Result Value Ref Range    Sodium 138 135 - 145 mmol/L    Potassium 3.7 3.6 - 5.5 mmol/L    Chloride 104 96 - 112 mmol/L    Co2 24 20 - 33 mmol/L    Glucose 152 (H) 65 - 99 mg/dL    Bun 19 8 - 22 mg/dL    Creatinine 1.48 (H) 0.50 - 1.40 mg/dL    Calcium 8.4 (L) 8.5 - 10.5 mg/dL    Anion Gap 10.0 7.0 - 16.0   ESTIMATED GFR    Collection Time: 09/26/20  2:59 AM   Result Value Ref Range    GFR If  56 (A) >60 mL/min/1.73 m 2    GFR If Non  46 (A) >60 mL/min/1.73 m  2   ACCU-CHEK GLUCOSE    Collection Time: 09/26/20  6:38 AM   Result Value Ref Range    Glucose - Accu-Ck 133 (H) 65 - 99 mg/dL   Lactic Acid Every four hours after STAT order    Collection Time: 09/26/20  8:17 AM   Result Value Ref Range    Lactic Acid 1.4 0.5 - 2.0 mmol/L   ACCU-CHEK GLUCOSE    Collection Time: 09/26/20  8:26 AM   Result Value Ref Range    Glucose - Accu-Ck 128 (H) 65 - 99 mg/dL   Basic Metabolic Panel    Collection Time: 09/26/20  9:47 AM   Result Value Ref Range    Sodium 139 135 - 145 mmol/L    Potassium 3.5 (L) 3.6 - 5.5 mmol/L    Chloride 106 96 - 112 mmol/L    Co2 21 20 - 33 mmol/L    Glucose 132 (H) 65 - 99 mg/dL    Bun 20 8 - 22 mg/dL    Creatinine 1.52 (H) 0.50 - 1.40 mg/dL    Calcium 8.1 (L) 8.5 - 10.5 mg/dL    Anion Gap 12.0 7.0 - 16.0   CBC WITH DIFFERENTIAL    Collection Time: 09/26/20  9:47 AM   Result Value Ref Range    WBC 17.0 (H) 4.8 - 10.8 K/uL    RBC 2.51 (L) 4.70 - 6.10 M/uL    Hemoglobin 7.6 (L) 14.0 - 18.0 g/dL    Hematocrit 25.4 (L) 42.0 - 52.0 %    .2 (H) 81.4 - 97.8 fL    MCH 30.3 27.0 - 33.0 pg    MCHC 29.9 (L) 33.7 - 35.3 g/dL    RDW 56.6 (H) 35.9 - 50.0 fL    Platelet Count 284 164 - 446 K/uL    MPV 10.5 9.0 - 12.9 fL    Neutrophils-Polys 85.60 (H) 44.00 - 72.00 %    Lymphocytes 8.10 (L) 22.00 - 41.00 %    Monocytes 4.80 0.00 - 13.40 %    Eosinophils 0.00 0.00 - 6.90 %    Basophils 0.40 0.00 - 1.80 %    Immature Granulocytes 1.10 (H) 0.00 - 0.90 %    Nucleated RBC 0.00 /100 WBC    Neutrophils (Absolute) 14.56 (H) 1.82 - 7.42 K/uL    Lymphs (Absolute) 1.38 1.00 - 4.80 K/uL    Monos (Absolute) 0.81 0.00 - 0.85 K/uL    Eos (Absolute) 0.00 0.00 - 0.51 K/uL    Baso (Absolute) 0.06 0.00 - 0.12 K/uL    Immature Granulocytes (abs) 0.18 (H) 0.00 - 0.11 K/uL    NRBC (Absolute) 0.00 K/uL   ESTIMATED GFR    Collection Time: 09/26/20  9:47 AM   Result Value Ref Range    GFR If  54 (A) >60 mL/min/1.73 m 2    GFR If Non African American 45 (A) >60 mL/min/1.73 m  2   ISTAT ARTERIAL BLOOD GAS    Collection Time: 09/26/20  9:59 AM   Result Value Ref Range    Ph 7.356 (L) 7.400 - 7.500    Pco2 39.4 (H) 26.0 - 37.0 mmHg    Po2 82 64 - 87 mmHg    Tco2 23 20 - 33 mmol/L    S02 96 93 - 99 %    Hco3 22.1 17.0 - 25.0 mmol/L    BE -3 -4 - 3 mmol/L    Body Temp 98.0 F degrees    O2 Therapy 34 %    iPF Ratio 241     Ph Temp La 7.360 (L) 7.400 - 7.500    Pco2 Temp Co 38.9 (H) 26.0 - 37.0 mmHg    Po2 Temp Cor 81 64 - 87 mmHg    Specimen Arterial     Action Range Triggered NO     Inst. Qualified Patient YES    ACCU-CHEK GLUCOSE    Collection Time: 09/26/20 11:32 AM   Result Value Ref Range    Glucose - Accu-Ck 128 (H) 65 - 99 mg/dL   ACCU-CHEK GLUCOSE    Collection Time: 09/26/20  5:11 PM   Result Value Ref Range    Glucose - Accu-Ck 118 (H) 65 - 99 mg/dL       Imaging  CT-HEAD W/O   Final Result      1.  No acute intracranial abnormality      2.  Cerebral volume loss and white matter change      3.  Carotid and vertebral artery atherosclerotic plaque          Assessment/Plan  * AKA stump complication ([facility])- (present on admission)  Assessment & Plan  Evaluated by surgery who recommends continuing wound vac and antibiotics    Anemia due to vitamin B12 deficiency- (present on admission)  Assessment & Plan  Follow serial H/H. Conservation transfusion strategy/ No evidence active bleeding    PVD (peripheral vascular disease) with claudication ([facility])- (present on admission)  Assessment & Plan  Severe    Type 2 diabetes mellitus ([facility])- (present on admission)  Assessment & Plan  Intensive sliding scale    Encephalopathy in sepsis  Assessment & Plan  Improving rapidly this afternoon. Oriented X 2 plus. Moves all four. Suspect was multi-factorial. I expanded antibiotic regimen and I may obtain ID consult in am    Urinary tract infection associated with indwelling urethral catheter ([facility])  Assessment & Plan  F/u cultures    Sepsis ([facility])  Assessment & Plan  Possible exacerbation of sepsis- placed on  broader antibiotics, cultures drawn in ICU before antibiotics changed. Clinically improving. Fluid resuscitated. Source of possible sepsis unclear. May consult ID in am      Discussed patient condition and risk of morbidity and/or mortality with Hospitalist, RN, RT, Therapies, Pharmacy and Charge nurse / hot rounds.    The patient remains critically ill.  Critical care time = 35 minutes in directly providing and coordinating critical care and extensive data review.  No time overlap and excludes procedures.

## 2020-09-27 NOTE — CARE PLAN
Problem: Safety  Goal: Will remain free from falls  Outcome: PROGRESSING AS EXPECTED  Intervention: Implement fall precautions  Note: Fall precautions in place, three side rails up, bed in lowest and locked position with bed alarm on. Encouraged the use of call light for assistance.      Problem: Infection  Goal: Will remain free from infection  Outcome: PROGRESSING AS EXPECTED  Intervention: Implement standard precautions and perform hand washing before and after patient contact  Note: Standard precautions in place, hand washing done prior to and before pt care.

## 2020-09-27 NOTE — PROGRESS NOTES
Hospital Medicine Daily Progress Note    Date of Service  9/27/2020    Chief Complaint  75 y.o. male admitted 9/23/2020 with AKA stump revision    Hospital Course    75 y.o. 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  Initial AKA was 6/30/2020 by Dr Fisher but had revision on 7/29/2020 then complicated by MRSA and enterococcus which was treated with unasyn and vancomycin per ID recommendations.    On 9/25 pt had some hypotension and was sent to ICU where he recieved 2 litre fluid bolus and had his Abx's broadened from Rocephin to  Linezolid/Pip-Tazo.  Pressures stabilized overnight       Interval Problem Update  Pt is in good spirits.  States he feels well.  Does note some pain at L AKA site but has no other complaints    Consultants/Specialty      Code Status  DNAR/DNI    Disposition  Lives with his grand daughter    Review of Systems  Review of Systems   Constitutional: Negative for chills and fever.   HENT: Negative for nosebleeds and sore throat.    Eyes: Negative for blurred vision and double vision.   Respiratory: Negative for cough and shortness of breath.    Cardiovascular: Negative for chest pain, palpitations and leg swelling.   Gastrointestinal: Negative for abdominal pain, diarrhea, nausea and vomiting.   Genitourinary: Negative for dysuria and urgency.   Musculoskeletal: Negative for back pain.   Skin: Negative for rash.   Neurological: Negative for dizziness, loss of consciousness and headaches.        Physical Exam  Temp:  [36.5 °C (97.7 °F)-37.1 °C (98.7 °F)] 36.9 °C (98.5 °F)  Pulse:  [52-75] 61  Resp:  [3-26] 18  BP: ()/(26-48) 119/46  SpO2:  [92 %-100 %] 96 %    Physical Exam  Vitals signs reviewed.   Constitutional:       General: He is not in acute distress.     Appearance: He is well-developed and underweight. He is not diaphoretic.   HENT:      Head: Normocephalic and  atraumatic.   Eyes:      Conjunctiva/sclera: Conjunctivae normal.   Neck:      Vascular: No JVD.   Cardiovascular:      Rate and Rhythm: Normal rate.      Heart sounds: No murmur. No gallop.    Pulmonary:      Effort: Pulmonary effort is normal. No respiratory distress.      Breath sounds: No stridor. No wheezing or rales.   Abdominal:      Palpations: Abdomen is soft.      Tenderness: There is no abdominal tenderness. There is no guarding or rebound.   Musculoskeletal:      Right lower leg: No edema.      Comments: L AKA noted, wound vac in place   Skin:     General: Skin is warm and dry.      Findings: No rash.   Neurological:      General: No focal deficit present.      Mental Status: He is alert and oriented to person, place, and time. Mental status is at baseline.   Psychiatric:         Mood and Affect: Mood normal.         Thought Content: Thought content normal.         Fluids    Intake/Output Summary (Last 24 hours) at 9/27/2020 0743  Last data filed at 9/27/2020 0600  Gross per 24 hour   Intake 1767.91 ml   Output 420 ml   Net 1347.91 ml       Laboratory  Recent Labs     09/26/20  0259 09/26/20  0947 09/27/20  0407   WBC 19.4* 17.0* 14.5*   RBC 2.78* 2.51* 2.61*   HEMOGLOBIN 8.4* 7.6* 7.8*   HEMATOCRIT 27.4* 25.4* 25.8*   MCV 98.6* 101.2* 98.9*   MCH 30.2 30.3 29.9   MCHC 30.7* 29.9* 30.2*   RDW 54.2* 56.6* 54.3*   PLATELETCT 311 284 310   MPV 10.2 10.5 10.0     Recent Labs     09/26/20  0259 09/26/20  0947 09/27/20  0407   SODIUM 138 139 139   POTASSIUM 3.7 3.5* 3.4*   CHLORIDE 104 106 107   CO2 24 21 22   GLUCOSE 152* 132* 103*   BUN 19 20 19   CREATININE 1.48* 1.52* 1.37   CALCIUM 8.4* 8.1* 8.0*     Recent Labs     09/25/20  1713   INR 1.26*               Imaging  CT-HEAD W/O   Final Result      1.  No acute intracranial abnormality      2.  Cerebral volume loss and white matter change      3.  Carotid and vertebral artery atherosclerotic plaque           Assessment/Plan  * AKA stump complication (HCC)-  (present on admission)  Assessment & Plan  Sent from Rehab today for 9/24 surgical revision.  Difficulty in healing.  Vit C  Original AKA 6/8 followed by prior revision and infection 7/29.  Start antibiotics post op.    Per Dr. Fisher, did not appear infected, needed bone removal to allow closure of wound.  No OR cultures necessary.  Plan to dc with short course of bactrim and augmentin.    Anemia due to vitamin B12 deficiency- (present on admission)  Assessment & Plan  Low B12 and folate levels, continue vitamin B12 oral daily and folate po daily.  Check iron levels in a.m.  Ascorbic acid added.  Monitor CBC    PVD (peripheral vascular disease) with claudication (HCC)- (present on admission)  Assessment & Plan  Control BP and lipids.  Statin, bp meds    Type 2 diabetes mellitus (HCC)- (present on admission)  Assessment & Plan  Monitor accuchecks and cover with SSI  Last A1c 5.1 in past 3 weeks    Encephalopathy in sepsis  Assessment & Plan  9/26:  Acute onset of altered mentation after sepsis protocol initiated on 9/25 for fever, hypotension.  9/26:  Patient had a fall yesterday with onset of confusion, but was still answering questions appropriately.  His blood pressure was low overnight despite fluid boluses, NOC hospitalist started midodrine.  This a.m. patient having difficulty opening eyes to verbal stimuli, mild awakening/movement all 4 extremities with sternal rubbing, increased after 2 narcan IV doses, , BP 90/50 in room during rapid response, stat head CT ordered.  Likely has UTI based on UA, UC and BCs pending, rise in Cr, stopped vancomycin IV and unasyn IV, started Rocephin IV to cover urinary pathogens.  dc'd all narcotics and BP meds. Spoke with daughter, next of kin, Dara who expressed her wishes for FULL CODE, intubation and all interventions available to keep her father alive.  ICU transfer, intensivist notified.  9/27: pt mentating more clearly, vitals have improved s/p change in Abx's and  "fluid bolus.  Did not require pressors.  Cont to avoid CNS active meds, mobilize, re-orient, treat sepsis    Urinary tract infection associated with indwelling urethral catheter (HCC)  Assessment & Plan  Griffiths sensitive Klebsiella on 9/25  Was on Amp/Sulbactam on admission to which the culture sensitivity is intermediate was bumped to Rocephin the day his pressures went down and then escalated to linezolid/Pip-Tazo.    Given all of that I feel he was likely inadquately treated with Amp/Sulbactam and we can de-escalate to Ceftriaxone now that we have sensitivities      Sepsis (HCC)  Assessment & Plan  This is Severe Sepsis Not present on admission  SIRS criteria identified on my evaluation include: Fever, with temperature greater than 101 deg F  Source of infection is UTI  Clinical indicators of end organ dysfunction include Systolic blood pressure (SBP) <90 mmHg or mean arterial pressure <65 mmHg  Sepsis protocol initiated  Fluid resuscitation ordered per protocol  IV antibiotics as appropriate for source of sepsis  Reassessment: I have reassessed the patient's hemodynamic status  End organ dysfunction include(s):  Encephalopathy (metabolic)   Hypotension and fever started 9/25.      Sacral ulcer, with unspecified severity (HCC)- (present on admission)  Assessment & Plan  Noted and present since admission from rehab.  Wound care consult.  Also with penile blisters, escorations:  Wound care consult, keep carpio in place to aid skin healing.    HLD (hyperlipidemia)- (present on admission)  Assessment & Plan  Atorvastatin 40mg nightly    HTN (hypertension)- (present on admission)  Assessment & Plan  Monitor vitals.  Lisinopril 20mg daily, coreg 6.25mg BID    Skin lesion of face- (present on admission)  Assessment & Plan  \"Been there a few years\"  Educated him on the importance of this being removed by a dermatologist/plastic surgeon.       VTE prophylaxis:       "

## 2020-09-27 NOTE — ASSESSMENT & PLAN NOTE
Possible exacerbation of sepsis- placed on broader antibiotics, cultures drawn in ICU before antibiotics changed. Clinically improving. Fluid resuscitated. Source of possible sepsis unclear. May consult ID in am

## 2020-09-27 NOTE — ASSESSMENT & PLAN NOTE
Improving rapidly this afternoon. Oriented X 2 plus. Moves all four. Suspect was multi-factorial. I expanded antibiotic regimen and I may obtain ID consult in am

## 2020-09-28 LAB
ANION GAP SERPL CALC-SCNC: 9 MMOL/L (ref 7–16)
BASOPHILS # BLD AUTO: 0.3 % (ref 0–1.8)
BASOPHILS # BLD: 0.03 K/UL (ref 0–0.12)
BUN SERPL-MCNC: 15 MG/DL (ref 8–22)
CALCIUM SERPL-MCNC: 8.5 MG/DL (ref 8.5–10.5)
CHLORIDE SERPL-SCNC: 106 MMOL/L (ref 96–112)
CO2 SERPL-SCNC: 25 MMOL/L (ref 20–33)
CREAT SERPL-MCNC: 1.28 MG/DL (ref 0.5–1.4)
EOSINOPHIL # BLD AUTO: 0.1 K/UL (ref 0–0.51)
EOSINOPHIL NFR BLD: 1.2 % (ref 0–6.9)
ERYTHROCYTE [DISTWIDTH] IN BLOOD BY AUTOMATED COUNT: 53.9 FL (ref 35.9–50)
GLUCOSE BLD-MCNC: 106 MG/DL (ref 65–99)
GLUCOSE BLD-MCNC: 121 MG/DL (ref 65–99)
GLUCOSE BLD-MCNC: 150 MG/DL (ref 65–99)
GLUCOSE SERPL-MCNC: 145 MG/DL (ref 65–99)
HCT VFR BLD AUTO: 30.8 % (ref 42–52)
HGB BLD-MCNC: 9.2 G/DL (ref 14–18)
IMM GRANULOCYTES # BLD AUTO: 0.05 K/UL (ref 0–0.11)
IMM GRANULOCYTES NFR BLD AUTO: 0.6 % (ref 0–0.9)
LYMPHOCYTES # BLD AUTO: 1.45 K/UL (ref 1–4.8)
LYMPHOCYTES NFR BLD: 16.8 % (ref 22–41)
MCH RBC QN AUTO: 29.5 PG (ref 27–33)
MCHC RBC AUTO-ENTMCNC: 29.9 G/DL (ref 33.7–35.3)
MCV RBC AUTO: 98.7 FL (ref 81.4–97.8)
MONOCYTES # BLD AUTO: 0.29 K/UL (ref 0–0.85)
MONOCYTES NFR BLD AUTO: 3.4 % (ref 0–13.4)
NEUTROPHILS # BLD AUTO: 6.72 K/UL (ref 1.82–7.42)
NEUTROPHILS NFR BLD: 77.7 % (ref 44–72)
NRBC # BLD AUTO: 0 K/UL
NRBC BLD-RTO: 0 /100 WBC
PLATELET # BLD AUTO: 381 K/UL (ref 164–446)
PMV BLD AUTO: 10.6 FL (ref 9–12.9)
POTASSIUM SERPL-SCNC: 3.4 MMOL/L (ref 3.6–5.5)
RBC # BLD AUTO: 3.12 M/UL (ref 4.7–6.1)
SODIUM SERPL-SCNC: 140 MMOL/L (ref 135–145)
WBC # BLD AUTO: 8.6 K/UL (ref 4.8–10.8)

## 2020-09-28 PROCEDURE — 700111 HCHG RX REV CODE 636 W/ 250 OVERRIDE (IP): Performed by: INTERNAL MEDICINE

## 2020-09-28 PROCEDURE — 82962 GLUCOSE BLOOD TEST: CPT | Mod: 91

## 2020-09-28 PROCEDURE — 700102 HCHG RX REV CODE 250 W/ 637 OVERRIDE(OP): Performed by: HOSPITALIST

## 2020-09-28 PROCEDURE — 700105 HCHG RX REV CODE 258: Performed by: INTERNAL MEDICINE

## 2020-09-28 PROCEDURE — 99232 SBSQ HOSP IP/OBS MODERATE 35: CPT | Performed by: INTERNAL MEDICINE

## 2020-09-28 PROCEDURE — A9270 NON-COVERED ITEM OR SERVICE: HCPCS | Performed by: HOSPITALIST

## 2020-09-28 PROCEDURE — 80048 BASIC METABOLIC PNL TOTAL CA: CPT

## 2020-09-28 PROCEDURE — 97605 NEG PRS WND THER DME<=50SQCM: CPT

## 2020-09-28 PROCEDURE — 302098 PASTE RING (FLAT): Performed by: INTERNAL MEDICINE

## 2020-09-28 PROCEDURE — 700102 HCHG RX REV CODE 250 W/ 637 OVERRIDE(OP): Performed by: SURGERY

## 2020-09-28 PROCEDURE — 85025 COMPLETE CBC W/AUTO DIFF WBC: CPT

## 2020-09-28 PROCEDURE — 700101 HCHG RX REV CODE 250: Performed by: HOSPITALIST

## 2020-09-28 PROCEDURE — 770001 HCHG ROOM/CARE - MED/SURG/GYN PRIV*

## 2020-09-28 PROCEDURE — A9270 NON-COVERED ITEM OR SERVICE: HCPCS | Performed by: SURGERY

## 2020-09-28 PROCEDURE — 36415 COLL VENOUS BLD VENIPUNCTURE: CPT

## 2020-09-28 RX ORDER — KETOROLAC TROMETHAMINE 30 MG/ML
15 INJECTION, SOLUTION INTRAMUSCULAR; INTRAVENOUS ONCE
Status: COMPLETED | OUTPATIENT
Start: 2020-09-28 | End: 2020-09-28

## 2020-09-28 RX ADMIN — ACETAMINOPHEN 650 MG: 325 TABLET, FILM COATED ORAL at 18:33

## 2020-09-28 RX ADMIN — FOLIC ACID 1 MG: 1 TABLET ORAL at 05:46

## 2020-09-28 RX ADMIN — FUROSEMIDE 20 MG: 10 INJECTION, SOLUTION INTRAMUSCULAR; INTRAVENOUS at 05:47

## 2020-09-28 RX ADMIN — CEFTRIAXONE SODIUM 2 G: 2 INJECTION, POWDER, FOR SOLUTION INTRAMUSCULAR; INTRAVENOUS at 21:10

## 2020-09-28 RX ADMIN — MICONAZOLE NITRATE: 20 CREAM TOPICAL at 18:33

## 2020-09-28 RX ADMIN — KETOROLAC TROMETHAMINE 15 MG: 30 INJECTION, SOLUTION INTRAMUSCULAR; INTRAVENOUS at 05:46

## 2020-09-28 RX ADMIN — GABAPENTIN 600 MG: 300 CAPSULE ORAL at 05:46

## 2020-09-28 RX ADMIN — ACETAMINOPHEN 650 MG: 325 TABLET, FILM COATED ORAL at 05:45

## 2020-09-28 RX ADMIN — DAKIN'S SOLUTION 0.125% (QUARTER STRENGTH) 0.25 ML: 0.12 SOLUTION at 09:13

## 2020-09-28 RX ADMIN — OXYCODONE HYDROCHLORIDE AND ACETAMINOPHEN 500 MG: 500 TABLET ORAL at 05:46

## 2020-09-28 RX ADMIN — FUROSEMIDE 20 MG: 10 INJECTION, SOLUTION INTRAMUSCULAR; INTRAVENOUS at 21:09

## 2020-09-28 RX ADMIN — ACETAMINOPHEN 650 MG: 325 TABLET, FILM COATED ORAL at 13:00

## 2020-09-28 RX ADMIN — NYSTATIN: 100000 POWDER TOPICAL at 13:00

## 2020-09-28 RX ADMIN — GABAPENTIN 600 MG: 300 CAPSULE ORAL at 12:59

## 2020-09-28 RX ADMIN — ACETAMINOPHEN 650 MG: 325 TABLET, FILM COATED ORAL at 01:10

## 2020-09-28 RX ADMIN — Medication 1 EACH: at 05:46

## 2020-09-28 RX ADMIN — Medication 4000 UNITS: at 05:45

## 2020-09-28 RX ADMIN — ATORVASTATIN CALCIUM 40 MG: 40 TABLET, FILM COATED ORAL at 18:33

## 2020-09-28 RX ADMIN — DAKIN'S SOLUTION 0.125% (QUARTER STRENGTH) 473 ML: 0.12 SOLUTION at 09:30

## 2020-09-28 RX ADMIN — CYANOCOBALAMIN TAB 500 MCG 1000 MCG: 500 TAB at 05:45

## 2020-09-28 RX ADMIN — NYSTATIN: 100000 POWDER TOPICAL at 18:33

## 2020-09-28 RX ADMIN — GABAPENTIN 600 MG: 300 CAPSULE ORAL at 18:33

## 2020-09-28 RX ADMIN — LIDOCAINE HYDROCHLORIDE 50 ML: 40 SOLUTION TOPICAL at 09:30

## 2020-09-28 RX ADMIN — Medication 1 EACH: at 18:33

## 2020-09-28 RX ADMIN — DULOXETINE 20 MG: 20 CAPSULE, DELAYED RELEASE ORAL at 05:46

## 2020-09-28 ASSESSMENT — ENCOUNTER SYMPTOMS
DIARRHEA: 0
BACK PAIN: 0
DIZZINESS: 0
ABDOMINAL PAIN: 0
PALPITATIONS: 0
LOSS OF CONSCIOUSNESS: 0
HEADACHES: 0
NAUSEA: 0
CHILLS: 0
COUGH: 0
VOMITING: 0
FEVER: 0
SHORTNESS OF BREATH: 0
SORE THROAT: 0
BLURRED VISION: 0
DOUBLE VISION: 0

## 2020-09-28 ASSESSMENT — COGNITIVE AND FUNCTIONAL STATUS - GENERAL
DRESSING REGULAR LOWER BODY CLOTHING: A LOT
DAILY ACTIVITIY SCORE: 15
MOVING TO AND FROM BED TO CHAIR: A LOT
TOILETING: A LOT
WALKING IN HOSPITAL ROOM: A LOT
TURNING FROM BACK TO SIDE WHILE IN FLAT BAD: A LOT
HELP NEEDED FOR BATHING: A LOT
SUGGESTED CMS G CODE MODIFIER MOBILITY: CL
SUGGESTED CMS G CODE MODIFIER DAILY ACTIVITY: CK
DRESSING REGULAR UPPER BODY CLOTHING: A LITTLE
CLIMB 3 TO 5 STEPS WITH RAILING: A LOT
MOBILITY SCORE: 12
STANDING UP FROM CHAIR USING ARMS: A LOT
MOVING FROM LYING ON BACK TO SITTING ON SIDE OF FLAT BED: A LOT
PERSONAL GROOMING: A LOT

## 2020-09-28 ASSESSMENT — PAIN DESCRIPTION - PAIN TYPE
TYPE: ACUTE PAIN
TYPE: ACUTE PAIN
TYPE: ACUTE PAIN;CHRONIC PAIN
TYPE: ACUTE PAIN

## 2020-09-28 NOTE — DISCHARGE PLANNING
Agency/Facility Name: [facility]  Outcome: Received voice message from Oleg.  Would like to know the type of wound vac patient has, regular or vera flow.  Will granddaughter take back once patient is done with SNF?  Hemoglobin is low, 7.6.  When will patient be ready for discharge?    Agency/Facility Name: [facility]  Spoke To: Oleg  Outcome: [facility] called [facility] stating patient has a regular wound vac, will go to granddaughter's when done with SNF and is not medically clear at this time.  Referral accepted.    Agency/Facility Name: [facility]  Outcome: Left a voice message for admissions regarding patient's referral.  Awaiting a call back.    Agency/Facility Name: [facility]  Outcome: Left a voice message for admissions regarding patient's referral.  Awaiting a call back.    Agency/Facility Name: [facility]  Outcome: Left a voice message for admissions regarding patient's referral.  Awaiting a call back.    Agency/Facility Name: [facility]  Spoke To: Princess  Outcome: Will look at referral and call [facility] back.

## 2020-09-28 NOTE — DISCHARGE PLANNING
Agency/Facility Name: [facility]  Outcome: Received a voice message from Princess.  Will do an onsite eval on Wednesday, 9/30/20.  Princess would like to know if patient did inpatient rehab in June.

## 2020-09-28 NOTE — DISCHARGE PLANNING
Agency/Facility Name: [facility]  Spoke To: Ivania  Outcome: Referral received.  Under review.    @1604    Agency/Facility Name: [facility]  Spoke To: Ivania  Outcome: Referral declined.  Dapto.

## 2020-09-28 NOTE — PROGRESS NOTES
1900- Report received from LOUIS Garcia. Patient resting in bed, no acute complaints at this time.    2000- Assessment complete.

## 2020-09-28 NOTE — WOUND TEAM
[facility]  Inpatient Services  Wound and Skin Care Progress Note    HPI, PMH, SH: Reviewed    Unit where seen by Wound Team: S631/01     WOUND CONSULT RELATED TO:  Scheduled Negative Pressure Wound Therapy (NPWT) dressing change     Self Report / Pain Level:  7/10 topical lidocaine instilled into dressing for 10 minutes before removal.       OBJECTIVE:  Previous dressing intact.     WOUND TYPE, LOCATION, CHARACTERISTICS (Pressure Injuries: location, stage, POA or date identified)    Negative Pressure Wound Therapy 06/30/20 Leg (Active)   Vacuum Serial Number [id number] 09/28/20 1034   NPWT Pump Mode / Pressure Setting Intermittent;125 mmHg 09/28/20 1034   Dressing Type Medium;Black Foam (Veraflo) 09/28/20 1034   Number of Foam Pieces Used 3 09/28/20 1000   Canister Changed No 09/28/20 1000   Output (mL) 0 mL 09/24/20 0745   NEXT Dressing Change/Treatment Date 09/30/20 09/28/20 1000   VAC VeraFlo Irrigant 1/4 Strength Dakins 09/28/20 1034   VAC VeraFlo Soak Time (mins) 5 09/28/20 1000   VAC VeraFlo Instill Volume (ml) 20 09/28/20 1000   VAC VeraFlo - Therapy Time (hrs) 1.5 09/28/20 1000   VAC VeraFlo Pressure (mm/Hg) Intermittent;125 mmHg 09/28/20 1034               Wound 09/24/20 Full Thickness Wound Leg Left AKA Revision with Vac (Active)   Wound Image   09/26/20 1700   Site Assessment Tan;Grey;Red;Black;Slough 09/28/20 1000   Periwound Assessment Clean;Dry;Intact;Pink 09/28/20 1034   Margins Defined edges 09/28/20 1034   Closure Secondary intention 09/28/20 1034   Drainage Amount Small 09/28/20 1034   Drainage Description Serosanguineous 09/28/20 1034   Treatments Chemical Debridement;Cleansed;Site care 09/28/20 1000   Wound Cleansing Approved Wound Cleanser 09/28/20 1000   Periwound Protectant Benzoin;Drape;Mepitel;Paste Ring 09/28/20 1034   Dressing Cleansing/Solutions 1/4 Strength Dakin's Solution 09/28/20 1034   Dressing Options Wound Vac 09/28/20 1034   Dressing Changed Changed 09/28/20 1000      Dressing Status Clean;Dry;Intact 09/28/20 1034   Dressing Change/Treatment Frequency Tuesday, Thursday, Saturday, and As Needed 09/28/20 1034   NEXT Dressing Change/Treatment Date 09/30/20 09/28/20 1000   NEXT Weekly Photo (Inpatient Only) 09/30/20 09/28/20 1000   Non-staged Wound Description Full thickness 09/28/20 1000   Wound Length (cm) 6 cm 09/26/20 1700   Wound Width (cm) 10.5 cm 09/26/20 1700   Wound Depth (cm) 5 cm 09/26/20 1700   Wound Surface Area (cm^2) 63 cm^2 09/26/20 1700   Wound Volume (cm^3) 315 cm^3 09/26/20 1700   Wound Bed Granulation (%) 10 % 09/28/20 1000   Wound Bed Epithelium (%) 0 % 09/28/20 1000   Wound Bed Slough (%) 50 % 09/28/20 1000   Wound Bed Eschar (%) 10 % 09/28/20 1000   Shape Irregular 09/26/20 1700   Wound Odor None 09/28/20 1000   Pulses N/A 09/26/20 1700   Exposed Structures Adipose;Muscle 09/28/20 1000     Lab Values:    Lab Results   Component Value Date/Time    WBC 14.5 (H) 09/27/2020 04:07 AM    RBC 2.61 (L) 09/27/2020 04:07 AM    HEMOGLOBIN 7.8 (L) 09/27/2020 04:07 AM    HEMATOCRIT 25.8 (L) 09/27/2020 04:07 AM                    Lab Results   Component Value Date/Time    HBA1C 5.1 09/05/2020 05:40 AM           Culture:   Obtained NO, Results show NA    INTERVENTIONS BY WOUND TEAM:  Instilled 4% lidocaine liquid into dressing and let that sit for 10 minutes before removing dressing. After 10 minutes of the lidocaine dwelling in the foam filled the rest of foam up with saline. Dripped lidocaine onto wound as foam was being removed. Wound with an all over greyish flat appearance. wound and periwound cleaned with wound cleanser and gauze 4 x 4s. Benzoin and paste rings applied to periwoun. 2 pieces of veraflo black foam placed into wound, then veraflo drape applied over that. Hole cut for button and trac pad, both applied in center of dressing. Dressing seal achieved at 125 mmHg continuous, settings the same at 20 mL 1/4 strength Dakin's for 5 minutes Q 1.5 hours.      Interdisciplinary consultation: Patient, Bedside RN     EVALUATION: 1/4 strength Dakin's solution is antimicrobial, chemically debrides of slough and devitalized tissue.    Goals: Steady decrease in wound area and depth weekly.    NURSING PLAN OF CARE ORDERS (X):  Dressing changes: See Dressing Care orders: X  Skin care: See Skin Care orders: X  Rectal tube care: See Rectal Tube Care orders:        Other orders:                             WOUND TEAM PLAN OF CARE (X):   Dressing changes by wound team:          Follow up 1-2 times weekly:               Follow up 3 times weekly:                NPWT change 3 times weekly:   X  Follow up as needed:       Other (explain):     Anticipated discharge plans (X):   LTACH:        SNF/Rehab:   X               Home Care:           Outpatient Wound Center:            Self Care:            Other:

## 2020-09-28 NOTE — PROGRESS NOTES
Bedside report received at change of shift. Patient was resting in bed, eyes closed, even unlabored breathing. No indication of distress or discomfort.      Pt is confused &Ox4 on RA and tolerating well. No indication of pain/discomfort at this time. Pt denies pain at this time.     Pt is receiving wound vac therapy, seen by wound team today.      Needs attended. No additional needs at this time.  Call light and personal belongings within reach. Bed in low locked position. Hourly rounding in place.

## 2020-09-28 NOTE — DISCHARGE PLANNING
Received Choice form at [phone]  Agency/Facility Name: [facility].  Referral sent per Choice form @ [phone]

## 2020-09-28 NOTE — DISCHARGE PLANNING
Care Transition Team Assessment    Per Md's Notes:  75 y.o. male with HTN, PAD w/ prior fem-pop bypass, DVT, facial basal cell cancer who presented 2020 with transfer from rehab for revision of his left AKA. Initial AKA was 20, had revision on 20 then complicated by MRSA and enterococcus which was treated with IV abx. Currently has a wound vacuum to the left stump.     Anticipated Discharge Disposition: To a Skilled Nursing Facility, this RN,  spoke with patient's daughter Lien via telephone. Daughter stated that family cannot care for patient at this time. He lives with his granddaughter who recently had a baby via  who cannot transfer patient, she is on Doctor's order's not to lift. Patient's daughter lives across the street and not in the same house. She is unable to care for patient as well. Daughter asked for patient to discharge to a SNF, she did not want [facility], she prefers [place] [facility] or any of the other's in the [facility].    Action: This RN,  faxed a choice document to Tanya CANCHOLA. Patient was [facility] service with [facility] prior to admission for RN, CNA, PT/OT services. He also had [facility] visiting patient at his home.     Barriers to Discharge: Medical clearance, accepting Skilled Nursing Facility.                       Plan: CM to follow for accepting SNF, and all discharge planning.         Information Source  Orientation : Oriented x 4  Information Given By: Relative, Other (Comments)(patient's daughter Lien)  Informant's Name: (Lien Samaniego)  Who is responsible for making decisions for patieThis RN,  faxed a choice document to Tanya CANCHOLA. nt? : Patient    Readmission Evaluation  Is this a readmission?: Yes - unplanned readmission  Why do you think you were readmitted?: (pain Left AKA)  Was an appointment arranged for you prior to discharge?: Yes, attended appointment  Were there new prescriptions  you were supposed to fill after you were discharged?: Yes, prescriptions filled  Did you understand your discharge instructions?: Yes  Did you have enough support after your last discharge?: Yes    Elopement Risk  Legal Hold: No  Ambulatory or Self Mobile in Wheelchair: Yes  Disoriented: No  Psychiatric Symptoms: None  History of Wandering: No  Elopement this Admit: No  Vocalizing Wanting to Leave: No  Displays Behaviors, Body Language Wanting to Leave: No-Not at Risk for Elopement  Elopement Risk: Not at Risk for Elopement    Interdisciplinary Discharge Planning  Lives with - Patient's Self Care Capacity: Adult Children  Patient or legal guardian wants to designate a caregiver: No  Support Systems: Family Member(s), Friends / Neighbors  Housing / Facility: Mobile Home  Prior Services: None  Patient Prefers to be Discharged to:: home  Durable Medical Equipment: Not Applicable    Discharge Preparedness  What is your plan after discharge?: Uncertain - pending medical team collaboration  What are your discharge supports?: Child  Prior Functional Level: Needs Assist with Activities of Daily Living, Needs Assist with Medication Management, Total Assist, Wheelchair Dependent  Difficulity with ADLs: Bathing, Dressing, Toileting, Walking  Difficulty with ADLs Comment: (recent Left AKA)  Difficulity with IADLs: Cooking, Driving, Laundry, Managing medication, Shopping    Functional Assesment  Prior Functional Level: Needs Assist with Activities of Daily Living, Needs Assist with Medication Management, Total Assist, Wheelchair Dependent    Finances  Financial Barriers to Discharge: No  Prescription Coverage: Yes    Vision / Hearing Impairment  Vision Impairment : Yes  Right Eye Vision: Impaired, Wears Glasses  Left Eye Vision: Impaired, Wears Glasses  Hearing Impairment : No  Hearing Impairment: Both Ears, Hearing Device Not Available         Advance Directive  Advance Directive?: None    Domestic Abuse  Have you ever been the  victim of abuse or violence?: No  Physical Abuse or Sexual Abuse: No  Verbal Abuse or Emotional Abuse: No  Possible Abuse/Neglect Reported to:: Not Applicable         Discharge Risks or Barriers  Discharge risks or barriers?: Transportation, Post-acute placement / services, Complex medical needs  Patient risk factors: Cognitive / sensory / physical deficit, Complex medical needs, Vulnerable adult    Anticipated Discharge Information  Discharge Address: (D)  Discharge Contact Phone Number: [phone]    Shruthi Snyder RN,

## 2020-09-28 NOTE — PROGRESS NOTES
Urine culture resulted positive for ESBL. MD notified, pt moved to private room (S621) and placed on contact isolation precautions.

## 2020-09-28 NOTE — PROGRESS NOTES
Hospital Medicine Daily Progress Note    Date of Service  9/28/2020    Chief Complaint  75 y.o. male admitted 9/23/2020 with AKA stump revision    Hospital Course    75 y.o. 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  Initial AKA was 6/30/2020 by Dr Fisher but had revision on 7/29/2020 then complicated by MRSA and enterococcus which was treated with unasyn and vancomycin per ID recommendations.    On 9/25 pt had some hypotension and was sent to ICU where he recieved 2 litre fluid bolus and had his Abx's broadened from Rocephin to  Linezolid/Pip-Tazo.  Pressures stabilized overnight       Interval Problem Update  Pt seen and examined no acute events overnight, has some pain at L AKA site but has no other complaints    Consultants/Specialty      Code Status  DNAR/DNI    Disposition  Lives with his grand daughter    Review of Systems  Review of Systems   Constitutional: Negative for chills and fever.   HENT: Negative for nosebleeds and sore throat.    Eyes: Negative for blurred vision and double vision.   Respiratory: Negative for cough and shortness of breath.    Cardiovascular: Negative for chest pain, palpitations and leg swelling.   Gastrointestinal: Negative for abdominal pain, diarrhea, nausea and vomiting.   Genitourinary: Negative for dysuria and urgency.   Musculoskeletal: Negative for back pain.   Skin: Negative for rash.   Neurological: Negative for dizziness, loss of consciousness and headaches.        Physical Exam  Temp:  [36.1 °C (97 °F)-37.2 °C (99 °F)] 36.3 °C (97.4 °F)  Pulse:  [62-72] 70  Resp:  [17-18] 18  BP: (113-146)/(43-70) 146/61  SpO2:  [92 %-96 %] 95 %    Physical Exam  Vitals signs reviewed.   Constitutional:       General: He is not in acute distress.     Appearance: He is well-developed and underweight. He is not diaphoretic.   HENT:      Head: Normocephalic and atraumatic.    Eyes:      Conjunctiva/sclera: Conjunctivae normal.   Neck:      Vascular: No JVD.   Cardiovascular:      Rate and Rhythm: Normal rate.      Heart sounds: No murmur. No gallop.    Pulmonary:      Effort: Pulmonary effort is normal. No respiratory distress.      Breath sounds: No stridor. No wheezing or rales.   Abdominal:      Palpations: Abdomen is soft.      Tenderness: There is no abdominal tenderness. There is no guarding or rebound.   Musculoskeletal:      Right lower leg: No edema.      Comments: L AKA noted, wound vac in place   Skin:     General: Skin is warm and dry.      Findings: No rash.   Neurological:      General: No focal deficit present.      Mental Status: He is alert and oriented to person, place, and time. Mental status is at baseline.   Psychiatric:         Mood and Affect: Mood normal.         Thought Content: Thought content normal.         Fluids    Intake/Output Summary (Last 24 hours) at 9/28/2020 1017  Last data filed at 9/27/2020 2000  Gross per 24 hour   Intake 797.5 ml   Output 750 ml   Net 47.5 ml       Laboratory  Recent Labs     09/26/20  0259 09/26/20  0947 09/27/20  0407   WBC 19.4* 17.0* 14.5*   RBC 2.78* 2.51* 2.61*   HEMOGLOBIN 8.4* 7.6* 7.8*   HEMATOCRIT 27.4* 25.4* 25.8*   MCV 98.6* 101.2* 98.9*   MCH 30.2 30.3 29.9   MCHC 30.7* 29.9* 30.2*   RDW 54.2* 56.6* 54.3*   PLATELETCT 311 284 310   MPV 10.2 10.5 10.0     Recent Labs     09/26/20  0259 09/26/20  0947 09/27/20  0407   SODIUM 138 139 139   POTASSIUM 3.7 3.5* 3.4*   CHLORIDE 104 106 107   CO2 24 21 22   GLUCOSE 152* 132* 103*   BUN 19 20 19   CREATININE 1.48* 1.52* 1.37   CALCIUM 8.4* 8.1* 8.0*     Recent Labs     09/25/20  1713   INR 1.26*               Imaging  CT-HEAD W/O   Final Result      1.  No acute intracranial abnormality      2.  Cerebral volume loss and white matter change      3.  Carotid and vertebral artery atherosclerotic plaque           Assessment/Plan  * AKA stump complication (HCC)- (present on  admission)  Assessment & Plan  Sent from Rehab today for 9/24 surgical revision.  Difficulty in healing.  Vit C  Original AKA 6/8 followed by prior revision and infection 7/29.  Start antibiotics post op.    Per Dr. Fisher, did not appear infected, needed bone removal to allow closure of wound.  No OR cultures necessary.  Plan to dc with short course of bactrim and augmentin.    Anemia due to vitamin B12 deficiency- (present on admission)  Assessment & Plan  Low B12 and folate levels, continue vitamin B12 oral daily and folate po daily.  Check iron levels in a.m.  Ascorbic acid added.  Monitor CBC    PVD (peripheral vascular disease) with claudication (HCC)- (present on admission)  Assessment & Plan  Control BP and lipids.  Statin, bp meds    Type 2 diabetes mellitus (HCC)- (present on admission)  Assessment & Plan  Monitor accuchecks and cover with SSI  Last A1c 5.1 in past 3 weeks    Encephalopathy in sepsis  Assessment & Plan  9/26:  Acute onset of altered mentation after sepsis protocol initiated on 9/25 for fever, hypotension.  9/26:  Patient had a fall yesterday with onset of confusion, but was still answering questions appropriately.  His blood pressure was low overnight despite fluid boluses, NOC hospitalist started midodrine.  This a.m. patient having difficulty opening eyes to verbal stimuli, mild awakening/movement all 4 extremities with sternal rubbing, increased after 2 narcan IV doses, , BP 90/50 in room during rapid response, stat head CT ordered.  Likely has UTI based on UA, UC and BCs pending, rise in Cr, stopped vancomycin IV and unasyn IV, started Rocephin IV to cover urinary pathogens.  dc'd all narcotics and BP meds. Spoke with daughter, next of kin, Dara who expressed her wishes for FULL CODE, intubation and all interventions available to keep her father alive.  ICU transfer, intensivist notified.  9/27: pt mentating more clearly, vitals have improved s/p change in Abx's and fluid  "bolus.  Did not require pressors.  Cont to avoid CNS active meds, mobilize, re-orient, treat sepsis    Urinary tract infection associated with indwelling urethral catheter (HCC)  Assessment & Plan  Griffiths sensitive Klebsiella on 9/25  Was on Amp/Sulbactam on admission to which the culture sensitivity is intermediate was bumped to Rocephin the day his pressures went down and then escalated to linezolid/Pip-Tazo.    Given all of that I feel he was likely inadquately treated with Amp/Sulbactam and we can de-escalate to Ceftriaxone now that we have sensitivities      Sepsis (HCC)  Assessment & Plan  This is Severe Sepsis Not present on admission  SIRS criteria identified on my evaluation include: Fever, with temperature greater than 101 deg F  Source of infection is UTI  Clinical indicators of end organ dysfunction include Systolic blood pressure (SBP) <90 mmHg or mean arterial pressure <65 mmHg  Sepsis protocol initiated  Fluid resuscitation ordered per protocol  IV antibiotics as appropriate for source of sepsis  Reassessment: I have reassessed the patient's hemodynamic status  End organ dysfunction include(s):  Encephalopathy (metabolic)   Hypotension and fever started 9/25.      Sacral ulcer, with unspecified severity (HCC)- (present on admission)  Assessment & Plan  Noted and present since admission from rehab.  Wound care consult.  Also with penile blisters, escorations:  Wound care consult, keep carpio in place to aid skin healing.    HLD (hyperlipidemia)- (present on admission)  Assessment & Plan  Atorvastatin 40mg nightly    HTN (hypertension)- (present on admission)  Assessment & Plan  Monitor vitals.  Lisinopril 20mg daily, coreg 6.25mg BID    Skin lesion of face- (present on admission)  Assessment & Plan  \"Been there a few years\"  Educated him on the importance of this being removed by a dermatologist/plastic surgeon.       VTE prophylaxis:       "

## 2020-09-28 NOTE — CARE PLAN
Problem: Communication  Goal: The ability to communicate needs accurately and effectively will improve  Outcome: PROGRESSING AS EXPECTED  Note: Pt was educated about use of call light to communicate needs.      Problem: Infection  Goal: Will remain free from infection  Outcome: PROGRESSING AS EXPECTED  Note: Pt wounds are being assessed qshift, appearance and color of wound documented q shift.

## 2020-09-29 VITALS
OXYGEN SATURATION: 90 % | BODY MASS INDEX: 28.34 KG/M2 | TEMPERATURE: 97.4 F | HEIGHT: 66 IN | HEART RATE: 79 BPM | DIASTOLIC BLOOD PRESSURE: 66 MMHG | WEIGHT: 176.37 LBS | RESPIRATION RATE: 20 BRPM | SYSTOLIC BLOOD PRESSURE: 138 MMHG

## 2020-09-29 PROBLEM — G93.41 ENCEPHALOPATHY IN SEPSIS: Status: RESOLVED | Noted: 2020-09-26 | Resolved: 2020-09-29

## 2020-09-29 PROBLEM — A41.9 SEPSIS (HCC): Status: RESOLVED | Noted: 2020-09-26 | Resolved: 2020-09-29

## 2020-09-29 LAB
ANION GAP SERPL CALC-SCNC: 10 MMOL/L (ref 7–16)
ANISOCYTOSIS BLD QL SMEAR: ABNORMAL
BASOPHILS # BLD AUTO: 0.4 % (ref 0–1.8)
BASOPHILS # BLD: 0.03 K/UL (ref 0–0.12)
BUN SERPL-MCNC: 12 MG/DL (ref 8–22)
CALCIUM SERPL-MCNC: 8.8 MG/DL (ref 8.5–10.5)
CHLORIDE SERPL-SCNC: 105 MMOL/L (ref 96–112)
CO2 SERPL-SCNC: 25 MMOL/L (ref 20–33)
COMMENT 1642: NORMAL
CREAT SERPL-MCNC: 0.93 MG/DL (ref 0.5–1.4)
EOSINOPHIL # BLD AUTO: 0.11 K/UL (ref 0–0.51)
EOSINOPHIL NFR BLD: 1.4 % (ref 0–6.9)
ERYTHROCYTE [DISTWIDTH] IN BLOOD BY AUTOMATED COUNT: 52.1 FL (ref 35.9–50)
GLUCOSE BLD-MCNC: 113 MG/DL (ref 65–99)
GLUCOSE BLD-MCNC: 125 MG/DL (ref 65–99)
GLUCOSE SERPL-MCNC: 147 MG/DL (ref 65–99)
HCT VFR BLD AUTO: 34.9 % (ref 42–52)
HGB BLD-MCNC: 10.4 G/DL (ref 14–18)
HYPOCHROMIA BLD QL SMEAR: ABNORMAL
IMM GRANULOCYTES # BLD AUTO: 0.04 K/UL (ref 0–0.11)
IMM GRANULOCYTES NFR BLD AUTO: 0.5 % (ref 0–0.9)
LYMPHOCYTES # BLD AUTO: 2.14 K/UL (ref 1–4.8)
LYMPHOCYTES NFR BLD: 26.3 % (ref 22–41)
MCH RBC QN AUTO: 28.7 PG (ref 27–33)
MCHC RBC AUTO-ENTMCNC: 29.8 G/DL (ref 33.7–35.3)
MCV RBC AUTO: 96.1 FL (ref 81.4–97.8)
MONOCYTES # BLD AUTO: 0.39 K/UL (ref 0–0.85)
MONOCYTES NFR BLD AUTO: 4.8 % (ref 0–13.4)
MORPHOLOGY BLD-IMP: NORMAL
NEUTROPHILS # BLD AUTO: 5.43 K/UL (ref 1.82–7.42)
NEUTROPHILS NFR BLD: 66.6 % (ref 44–72)
NRBC # BLD AUTO: 0 K/UL
NRBC BLD-RTO: 0 /100 WBC
OVALOCYTES BLD QL SMEAR: NORMAL
PLATELET # BLD AUTO: 359 K/UL (ref 164–446)
PLATELET BLD QL SMEAR: NORMAL
PMV BLD AUTO: 10.8 FL (ref 9–12.9)
POIKILOCYTOSIS BLD QL SMEAR: NORMAL
POTASSIUM SERPL-SCNC: 3.4 MMOL/L (ref 3.6–5.5)
RBC # BLD AUTO: 3.63 M/UL (ref 4.7–6.1)
RBC BLD AUTO: PRESENT
SODIUM SERPL-SCNC: 140 MMOL/L (ref 135–145)
WBC # BLD AUTO: 8.1 K/UL (ref 4.8–10.8)

## 2020-09-29 PROCEDURE — 700101 HCHG RX REV CODE 250: Performed by: HOSPITALIST

## 2020-09-29 PROCEDURE — A9270 NON-COVERED ITEM OR SERVICE: HCPCS | Performed by: HOSPITALIST

## 2020-09-29 PROCEDURE — 700102 HCHG RX REV CODE 250 W/ 637 OVERRIDE(OP): Performed by: SURGERY

## 2020-09-29 PROCEDURE — 80048 BASIC METABOLIC PNL TOTAL CA: CPT

## 2020-09-29 PROCEDURE — 700102 HCHG RX REV CODE 250 W/ 637 OVERRIDE(OP): Performed by: HOSPITALIST

## 2020-09-29 PROCEDURE — 85025 COMPLETE CBC W/AUTO DIFF WBC: CPT

## 2020-09-29 PROCEDURE — 36415 COLL VENOUS BLD VENIPUNCTURE: CPT

## 2020-09-29 PROCEDURE — 82962 GLUCOSE BLOOD TEST: CPT | Mod: 91

## 2020-09-29 PROCEDURE — 99239 HOSP IP/OBS DSCHRG MGMT >30: CPT | Performed by: STUDENT IN AN ORGANIZED HEALTH CARE EDUCATION/TRAINING PROGRAM

## 2020-09-29 PROCEDURE — A9270 NON-COVERED ITEM OR SERVICE: HCPCS | Performed by: SURGERY

## 2020-09-29 RX ORDER — FOLIC ACID 1 MG/1
1 TABLET ORAL DAILY
Qty: 30 TAB | Status: SHIPPED
Start: 2020-09-30

## 2020-09-29 RX ORDER — ASCORBIC ACID 500 MG
500 TABLET ORAL DAILY
Qty: 30 TAB | Refills: 3 | Status: SHIPPED | OUTPATIENT
Start: 2020-09-30

## 2020-09-29 RX ORDER — NYSTATIN 100000 [USP'U]/G
POWDER TOPICAL
Qty: 15 G | Status: SHIPPED
Start: 2020-09-29

## 2020-09-29 RX ORDER — GABAPENTIN 300 MG/1
600 CAPSULE ORAL 3 TIMES DAILY
Qty: 90 CAP | Status: SHIPPED
Start: 2020-09-29

## 2020-09-29 RX ORDER — BACITRACIN ZINC AND POLYMYXIN B SULFATE 500; 1000 [USP'U]/G; [USP'U]/G
OINTMENT TOPICAL
Status: SHIPPED
Start: 2020-09-29

## 2020-09-29 RX ORDER — DULOXETIN HYDROCHLORIDE 20 MG/1
20 CAPSULE, DELAYED RELEASE ORAL DAILY
Qty: 30 CAP | Status: SHIPPED
Start: 2020-09-30

## 2020-09-29 RX ORDER — SODIUM HYPOCHLORITE 1.25 MG/ML
473 SOLUTION TOPICAL
Status: SHIPPED
Start: 2020-09-29

## 2020-09-29 RX ORDER — AMOXICILLIN AND CLAVULANATE POTASSIUM 875; 125 MG/1; MG/1
1 TABLET, FILM COATED ORAL 2 TIMES DAILY
Refills: 0 | Status: SHIPPED
Start: 2020-09-29 | End: 2020-10-03

## 2020-09-29 RX ORDER — SULFAMETHOXAZOLE AND TRIMETHOPRIM 800; 160 MG/1; MG/1
1 TABLET ORAL 2 TIMES DAILY
Qty: 8 TAB | Refills: 0 | Status: SHIPPED | OUTPATIENT
Start: 2020-09-29 | End: 2020-10-03

## 2020-09-29 RX ADMIN — MICONAZOLE NITRATE: 20 CREAM TOPICAL at 06:06

## 2020-09-29 RX ADMIN — NYSTATIN: 100000 POWDER TOPICAL at 06:06

## 2020-09-29 RX ADMIN — FOLIC ACID 1 MG: 1 TABLET ORAL at 06:07

## 2020-09-29 RX ADMIN — Medication 4000 UNITS: at 06:07

## 2020-09-29 RX ADMIN — OXYCODONE HYDROCHLORIDE AND ACETAMINOPHEN 500 MG: 500 TABLET ORAL at 06:06

## 2020-09-29 RX ADMIN — NYSTATIN: 100000 POWDER TOPICAL at 13:09

## 2020-09-29 RX ADMIN — Medication 1 EACH: at 06:07

## 2020-09-29 RX ADMIN — GABAPENTIN 600 MG: 300 CAPSULE ORAL at 06:07

## 2020-09-29 RX ADMIN — GABAPENTIN 600 MG: 300 CAPSULE ORAL at 13:09

## 2020-09-29 RX ADMIN — CYANOCOBALAMIN TAB 500 MCG 1000 MCG: 500 TAB at 06:06

## 2020-09-29 RX ADMIN — ACETAMINOPHEN 650 MG: 325 TABLET, FILM COATED ORAL at 06:06

## 2020-09-29 RX ADMIN — ACETAMINOPHEN 650 MG: 325 TABLET, FILM COATED ORAL at 13:09

## 2020-09-29 RX ADMIN — DULOXETINE 20 MG: 20 CAPSULE, DELAYED RELEASE ORAL at 06:07

## 2020-09-29 ASSESSMENT — PAIN DESCRIPTION - PAIN TYPE: TYPE: ACUTE PAIN

## 2020-09-29 NOTE — DISCHARGE PLANNING
Martell Sheppard, MSW/LSW gave Pt IMM at patient bedside. Pt signed IMM and LSW placed it in Pt's chart.

## 2020-09-29 NOTE — THERAPY
Missed Therapy     Patient Name: Jimenez Bains  Age:  75 y.o., Sex:  male  Medical Record #: [id number]  Today's Date: 9/29/2020    Discussed missed therapy with RN       09/29/20 1011   Interdisciplinary Plan of Care Collaboration   IDT Collaboration with  Nursing   Collaboration Comments Attempted to see pt for PT tx. Pt adamantly refusing due to pain despite education. Will attempt at later day when able

## 2020-09-29 NOTE — DISCHARGE INSTRUCTIONS
Discharge Instructions    Discharged to other by van with escort. Discharged via ambulance, hospital escort: Yes.  Special equipment needed: Not Applicable    Be sure to schedule a follow-up appointment with your primary care doctor or any specialists as instructed.     Discharge Plan:   Influenza Vaccine Indication: Patient Refuses    I understand that a diet low in cholesterol, fat, and sodium is recommended for good health. Unless I have been given specific instructions below for another diet, I accept this instruction as my diet prescription.   Other diet: N/a    Special Instructions: None    · Is patient discharged on Warfarin / Coumadin?   No     Depression / Suicide Risk    As you are discharged from this [facility] facility, it is important to learn how to keep safe from harming yourself.    Recognize the warning signs:  · Abrupt changes in personality, positive or negative- including increase in energy   · Giving away possessions  · Change in eating patterns- significant weight changes-  positive or negative  · Change in sleeping patterns- unable to sleep or sleeping all the time   · Unwillingness or inability to communicate  · Depression  · Unusual sadness, discouragement and loneliness  · Talk of wanting to die  · Neglect of personal appearance   · Rebelliousness- reckless behavior  · Withdrawal from people/activities they love  · Confusion- inability to concentrate     If you or a loved one observes any of these behaviors or has concerns about self-harm, here's what you can do:  · Talk about it- your feelings and reasons for harming yourself  · Remove any means that you might use to hurt yourself (examples: pills, rope, extension cords, firearm)  · Get professional help from the community (Mental Health, Substance Abuse, psychological counseling)  · Do not be alone:Call your Safe Contact- someone whom you trust who will be there for you.  · Call your local CRISIS HOTLINE 376-7793 or  688.454.9955  · Call your local Children's Mobile Crisis Response Team [place] (558) 328-5694 or www.[employer]  · Call the toll free National Suicide Prevention Hotlines   · National Suicide Prevention Lifeline 186-221-PCVP (2885)  · National [employer] Tail Line Network 800-SUICIDE (009-1463)      Leg Amputation              Leg amputation is a procedure to remove the leg. You may have this procedure if your leg has been affected by a disease, such as peripheral artery disease or severe circulation disease, and is causing problems. There are three types of leg amputation:  · Above-the-knee amputation (transfemoral amputation). This type is done to remove the leg from above the knee.  · Below-the-knee amputation (transtibial amputation). This type is done to remove the leg from below the knee.  · Through-knee amputation. This type is done to remove the leg at the knee joint.  Tell a health care provider about:  · Any allergies you have.  · All medicines you are taking, including vitamins, herbs, eye drops, creams, and over-the-counter medicines.  · Any problems you or family members have had with anesthetic medicines.  · Any blood disorders you have.  · Any surgeries you have had.  · Any medical conditions you have.  · Whether you are pregnant or may be pregnant.  What are the risks?  Generally, this is a safe procedure. However, problems may occur, including:  · Infection.  · Bleeding.  · Allergic reactions to medicines.  · Stiffening of the hip and knee joint (hip and knee contracture).  · Blood clot in a deep vein (deep vein thrombosis, or DVT), usually in the leg.  · A blood clot in your lung (pulmonary embolism).  What happens before the procedure?  Staying hydrated  Follow instructions from your health care provider about hydration, which may include:  · Up to 2 hours before the procedure - you may continue to drink clear liquids, such as water, clear fruit juice, black coffee, and plain tea.  Eating and  drinking restrictions  Follow instructions from your health care provider about eating and drinking, which may include:  · 8 hours before the procedure - stop eating heavy meals or foods such as meat, fried foods, or fatty foods.  · 6 hours before the procedure - stop eating light meals or foods, such as toast or cereal.  · 6 hours before the procedure - stop drinking milk or drinks that contain milk.  · 2 hours before the procedure - stop drinking clear liquids.  Medicines  · Ask your health care provider about:  ? Changing or stopping your regular medicines. This is especially important if you are taking diabetes medicines or blood thinners.  ? Taking over-the-counter medicines, vitamins, herbs, and supplements.  ? Taking medicines such as aspirin and ibuprofen. These medicines can thin your blood. Do not take these medicines unless your health care provider tells you to take them.  · You may be given antibiotic medicine to help prevent an infection. If so, take the antibiotic as told by your health care provider.  General instructions  · Ask your health care provider how your surgical site will be marked or identified.  · Plan to have someone take you home from the hospital or clinic.  · Plan to have a responsible adult care for you for at least 24 hours after you leave the hospital or clinic. This is important.  What happens during the procedure?  · To lower your risk of infection:  ? Your health care team will wash or sanitize their hands.  ? Hair may be removed from the surgical area.  ? Your skin will be washed with soap.  · An IV will be inserted into one of your veins.  · You will be given one or more of the following:  ? A medicine to help you relax (sedative).  ? A medicine to numb the area (local anesthetic).  ? A medicine to make you fall asleep (general anesthetic).  ? A medicine that is injected into your spine to numb the area below and slightly above the injection site (spinal anesthetic).  ? A  medicine that is injected into an area of your body to numb everything below the injection site (regional anesthetic).  · Damaged or diseased tissue and bone will be removed.  · Uneven areas of bone will be smoothed.  · Blood vessels and nerves will be closed off.  · Muscles will be cut and reshaped so that an artificial leg (prosthesis) can be attached to the stump.  · The incision will be closed with stitches (sutures).  · A bandage (dressing) will be placed over the incision.  The procedure may vary among health care providers and hospitals.  What happens after the procedure?  · Your blood pressure, heart rate, breathing rate, and blood oxygen level will be monitored until the medicines you were given have worn off.  · You will be given medicine for pain. The medicine may include:  ? A sedative.  ? A spinal anesthetic.  ? A regional anesthetic.  · You may start physical therapy within 24 hours after your surgery.  Summary  · Leg amputation is a procedure to remove the leg from above, below, or through the knee.  · Follow instructions from your health care provider about eating or drinking restrictions before the procedure.  · Before the procedure, ask your health care provider how your surgical site will be marked or identified.  This information is not intended to replace advice given to you by your health care provider. Make sure you discuss any questions you have with your health care provider.  Document Released: 03/28/2018 Document Revised: 04/24/2019 Document Reviewed: 03/28/2018  Elsevier Patient Education © 2020 [employer]        Wound Care, Adult  Taking care of your wound properly can help to prevent pain, infection, and scarring. It can also help your wound to heal more quickly.  How to care for your wound  Wound care         · Follow instructions from your health care provider about how to take care of your wound. Make sure you:  ? Wash your hands with soap and water before you change the bandage  (dressing). If soap and water are not available, use hand .  ? Change your dressing as told by your health care provider.  ? Leave stitches (sutures), skin glue, or adhesive strips in place. These skin closures may need to stay in place for 2 weeks or longer. If adhesive strip edges start to loosen and curl up, you may trim the loose edges. Do not remove adhesive strips completely unless your health care provider tells you to do that.  · Check your wound area every day for signs of infection. Check for:  ? Redness, swelling, or pain.  ? Fluid or blood.  ? Warmth.  ? Pus or a bad smell.  · Ask your health care provider if you should clean the wound with mild soap and water. Doing this may include:  ? Using a clean towel to pat the wound dry after cleaning it. Do not rub or scrub the wound.  ? Applying a cream or ointment. Do this only as told by your health care provider.  ? Covering the incision with a clean dressing.  · Ask your health care provider when you can leave the wound uncovered.  · Keep the dressing dry until your health care provider says it can be removed. Do not take baths, swim, use a hot tub, or do anything that would put the wound underwater until your health care provider approves. Ask your health care provider if you can take showers. You may only be allowed to take sponge baths.  Medicines    · If you were prescribed an antibiotic medicine, cream, or ointment, take or use the antibiotic as told by your health care provider. Do not stop taking or using the antibiotic even if your condition improves.  · Take over-the-counter and prescription medicines only as told by your health care provider. If you were prescribed pain medicine, take it 30 or more minutes before you do any wound care or as told by your health care provider.  General instructions  · Return to your normal activities as told by your health care provider. Ask your health care provider what activities are safe.  · Do not  scratch or pick at the wound.  · Do not use any products that contain nicotine or tobacco, such as cigarettes and e-cigarettes. These may delay wound healing. If you need help quitting, ask your health care provider.  · Keep all follow-up visits as told by your health care provider. This is important.  · Eat a diet that includes protein, vitamin A, vitamin C, and other nutrient-rich foods to help the wound heal.  ? Foods rich in protein include meat, dairy, beans, nuts, and other sources.  ? Foods rich in vitamin A include carrots and dark green, leafy vegetables.  ? Foods rich in vitamin C include citrus, tomatoes, and other fruits and vegetables.  ? Nutrient-rich foods have protein, carbohydrates, fat, vitamins, or minerals. Eat a variety of healthy foods including vegetables, fruits, and whole grains.  Contact a health care provider if:  · You received a tetanus shot and you have swelling, severe pain, redness, or bleeding at the injection site.  · Your pain is not controlled with medicine.  · You have redness, swelling, or pain around the wound.  · You have fluid or blood coming from the wound.  · Your wound feels warm to the touch.  · You have pus or a bad smell coming from the wound.  · You have a fever or chills.  · You are nauseous or you vomit.  · You are dizzy.  Get help right away if:  · You have a red streak going away from your wound.  · The edges of the wound open up and separate.  · Your wound is bleeding, and the bleeding does not stop with gentle pressure.  · You have a rash.  · You faint.  · You have trouble breathing.  Summary  · Always wash your hands with soap and water before changing your bandage (dressing).  · To help with healing, eat foods that are rich in protein, vitamin A, vitamin C, and other nutrients.  · Check your wound every day for signs of infection. Contact your health care provider if you suspect that your wound is infected.  This information is not intended to replace advice  given to you by your health care provider. Make sure you discuss any questions you have with your health care provider.  Document Released: 09/26/2009 Document Revised: 04/06/2020 Document Reviewed: 07/04/2017  Elsevier Patient Education © 2020 [employer]

## 2020-09-29 NOTE — PROGRESS NOTES
Received bedside report and accepted care of patient.    Pt is A/Ox3, disoriented to event. Pt on room air with no visible or stated sign of distress, discomfort, or SOB. Pt is currently has pain, gave heat pack for comfort. Wound vac in place for the left AKA stump.    Bed in locked and lowest position. Call light and personal possessions within reach. Patient educated about use of call light and verbalized understanding.

## 2020-09-29 NOTE — PROGRESS NOTES
2 RN skin check completed with LOUIS Gomez  Devices in place: Wound vac, carpio catheter, mepilex, .  Skin assessed under devices: yes.  Confirmed pressure ulcers found: L medial thigh, traumatic penis erosion, sacrum.  New potential pressure ulcers noted: n/a.  Wound consult placed:  no.     The following interventions in place: 2RN skin check Qshift, q2hr turns, incontinence monitoring with bed linen changes PRN for incontinence episodes, barrier cream. Pillows in use for support/positioning, waffle overlay. Mepilex on b/l heels     Skin assessment:   -General: dry, flaky, scattered scabs  -Groin: red, moisture-associated skin damage, blanching  -Penis: traumatic erosion, urethral meatus is red, dried blood on dorsal side of penis  -L/t thigh: partial thickness wound  -R/t cheek: incision, secured with sutures, dried blood around sutures  -LLE: AKA, wound vac in place, dressing c/d/i  -R/t nose: black, open wound  -Sacrum: open wound, yellow/white, scant serous drainage, periwound area red

## 2020-09-29 NOTE — CONSULTS
Vascular Surgery Consult Note  -------------------------------------------------------------------------------------------------  Date: 9/24/2020    Consulting Physician: Usman Fisher M.D. [facility]  -------------------------------------------------------------------------------------------------    Reason for consultation:  Nonhealing left AKA    HPI:  This is a 75 y.o. male who is is well-known to me from a left above-knee amputation performed for an acute ischemic left lower extremity.  The amputation was performed on 6/30/2020.  Patient sustained a severe infection of his left AKA stump and he was readmitted and underwent multiple debridements from late July to early August.  He was sent to rehab with a wound VAC on the end of his amputation stump.  The wound has had difficulty healing at the rehab center and there is bone protruding through the wound once again.  The patient was readmitted for IV antibiotic therapy and surgical debridement of the amputation wound.    Past Medical History:   Diagnosis Date   • Cancer (HCC)     skin cancer   • Hypertension    • Pain 01-30-14    right lower leg, 8/10   • Pain 3/14/14    8/10 right groin, feet   • Personal history of venous thrombosis and embolism    • PVD (peripheral vascular disease) ([facility])    • Unspecified hemorrhagic conditions     takes coumadin       Past Surgical History:   Procedure Laterality Date   • KNEE AMPUTATION BELOW Left 9/24/2020    Procedure: AMPUTATION, BELOW KNEE-REVISION;  Surgeon: Usman Fisher M.D.;  Location: SURGERY [facility];  Service: Vascular   • LESION EXCISION ORTHO Right 9/24/2020    Procedure: EXCISION, LESION, FACE;  Surgeon: Usman Fisher M.D.;  Location: SURGERY [facility];  Service: Vascular   • KNEE AMPUTATION ABOVE Left 8/2/2020    Procedure: I&D AKA WOUND;  Surgeon: Usman Fisher M.D.;  Location: [facility];  Service: Vascular   • IRRIGATION & DEBRIDEMENT GENERAL Left 7/31/2020     Procedure: IRRIGATION AND DEBRIDEMENT, WOUND- WASH OUT OF AKA, WOUND VAC;  Surgeon: Usman Fisher M.D.;  Location: [facility];  Service: Vascular   • IRRIGATION & DEBRIDEMENT GENERAL Left 7/29/2020    Procedure: IRRIGATION AND DEBRIDEMENT, WOUND- WASHOUT OF ABOVE KNEE AMPUTATION, WOUND VAC CHANGE;  Surgeon: Usman Fisher M.D.;  Location: [facility];  Service: Vascular   • KNEE AMPUTATION ABOVE Left 7/27/2020    Procedure: AMPUTATION, ABOVE KNEE-FOR REVISION;  Surgeon: Usman Fisher M.D.;  Location: [facility];  Service: Vascular   • KNEE AMPUTATION ABOVE Left 6/30/2020    Procedure: AMPUTATION, ABOVE KNEE;  Surgeon: Usman Fisher M.D.;  Location: [facility];  Service: Vascular   • ANGIOGRAM  7/30/2014    Performed by Cyn Phan M.D. at [facility]   • FEMORAL POPLITEAL BYPASS  7/30/2014    Performed by Cyn Phan M.D. at [facility]   • REIMPLANTION REVASCULARIZATION  7/30/2014    Performed by Cyn Phan M.D. at [facility]   • GROIN EXPLORATION  3/17/2014    Performed by Cyn Phan M.D. at [facility]   • FEMORAL FEMORAL BYPASS  2/2/2014    Performed by Cyn Phan M.D. at [facility]   • TOE AMPUTATION  12/4/2013    Performed by Cyn Phan M.D. at [facility]   • FEMORAL FEMORAL BYPASS  12/4/2013    Performed by Cyn Phan M.D. at [facility]   • AORTOGRAM WITH RUNOFF  11/14/2013    Performed by Cyn Phan M.D. at [facility]   • ILIAC ANGIOPLASTY WITH STENT  11/14/2013    Performed by Cyn Phan M.D. at [facility]   • FEMORAL POPLITEAL BYPASS  10/18/2011    Performed by CYN PHAN at [facility]       Current Facility-Administered Medications   Medication Dose Route Frequency Provider Last Rate Last Dose   • cefTRIAXone (ROCEPHIN) 2 g in  mL IVPB  2 g Intravenous Q24HRS  Brian Us M.D.       • furosemide (LASIX) injection 20 mg  20 mg Intravenous Q12HRS Asael Avalos M.D.   20 mg at 09/28/20 0547   • gabapentin (NEURONTIN) capsule 600 mg  600 mg Oral TID DANICA Dobbins.LEONORA   600 mg at 09/28/20 1833   • dakins 0.125% (1/4 strength) topical soln   Topical QDAY PRN Debbie Laureano M.D.   473 mL at 09/28/20 0930   • nystatin (MYCOSTATIN) powder   Topical TID Debbie Laureano M.D.       • miconazole (MICOTIN) 2 % cream   Topical BID Debbie Laureano M.D.       • lactated ringers infusion (BOLUS)  500 mL Intravenous Once PRN Debbie Laureano M.D.       • ascorbic acid tablet 500 mg  500 mg Oral DAILY Debbie Laureano M.D.   500 mg at 09/28/20 0546   • insulin regular (HumuLIN R,NovoLIN R) injection  1-6 Units Subcutaneous 4X/DAY ACHS Debbie Laureano M.D.   Stopped at 09/24/20 2100    And   • glucose 4 g chewable tablet 16 g  16 g Oral Q15 MIN PRN Debbie Laureano M.D.        And   • dextrose 50% (D50W) injection 50 mL  50 mL Intravenous Q15 MIN PRN Debbie Laureano M.D.       • ondansetron (ZOFRAN) syringe/vial injection 4 mg  4 mg Intravenous Q4HRS PRN Usman Fisher M.D.       • haloperidol lactate (HALDOL) injection 1 mg  1 mg Intravenous Q6HRS PRN Usman Fisher M.D.       • scopolamine (TRANSDERM-SCOP) patch 1 Patch  1 Patch Transdermal Q72HRS PRN Usman Fisher M.D.       • acetaminophen (TYLENOL) tablet 650 mg  650 mg Oral Q6HRS Usman Fisher M.D.   650 mg at 09/28/20 1833   • calcium carbonate (TUMS) chewable tab 500 mg  500 mg Oral Q2HRS PRN Usman Fisher M.D.       • benzocaine-menthol (CEPACOL) lozenge 1 Lozenge  1 Lozenge Mouth/Throat Q4HRS PRN Usman Fisher M.D.       • diphenhydrAMINE (BENADRYL) injection 25 mg  25 mg Intravenous Q6HRS PRN Usman Fisher M.D.       • atorvastatin (LIPITOR) tablet 40 mg  40 mg Oral Q EVENING Jose Alfredo Martinez D.O.   40 mg at 09/28/20 1833   • bacitracin-polymyxin b (POLYSPORIN) 500-10280 UNIT/GM ointment   Topical  BID Jose Alfredo Martinez D.O.   1 Each at 20 1833   • cyanocobalamin (VITAMIN B-12) tablet 1,000 mcg  1,000 mcg Oral DAILY Jose Alfredo Martinez D.O.   1,000 mcg at 20 0545   • DULoxetine (CYMBALTA) capsule 20 mg  20 mg Oral DAILY RENAE BradenONelda   20 mg at 20 0546   • folic acid (FOLVITE) tablet 1 mg  1 mg Oral DAILY RENAE BradenONelda   1 mg at 20 0546   • vitamin D (cholecalciferol) tablet 4,000 Units  4,000 Units Oral DAILY Jose Alfredo Martinez D.O.   4,000 Units at 20 0545   • senna-docusate (PERICOLACE or SENOKOT S) 8.6-50 MG per tablet 2 Tab  2 Tab Oral BID Jose Alfredo Martinez D.O.   Stopped at 20 0600    And   • polyethylene glycol/lytes (MIRALAX) PACKET 1 Packet  1 Packet Oral QDAY PRN Jose Alfredo Martinez D.O.        And   • magnesium hydroxide (MILK OF MAGNESIA) suspension 30 mL  30 mL Oral QDAY PRN Jose Alfredo Martinez D.O.        And   • bisacodyl (DULCOLAX) suppository 10 mg  10 mg Rectal QDAY PRN Jose Alfredo Martinez D.O.       • ondansetron (ZOFRAN ODT) dispertab 4 mg  4 mg Oral Q4HRS PRN Jose Alfredo Martinez D.O.       • enalaprilat (VASOTEC) injection 1.25 mg  1.25 mg Intravenous Q6HRS PRN Jose lAfredo Martinez D.O.           Social History     Socioeconomic History   • Marital status:      Spouse name: Not on file   • Number of children: Not on file   • Years of education: Not on file   • Highest education level: Not on file   Occupational History   • Not on file   Social Needs   • Financial resource strain: Not on file   • Food insecurity     Worry: Never true     Inability: Never true   • Transportation needs     Medical: No     Non-medical: No   Tobacco Use   • Smoking status: Former Smoker     Packs/day: 2.00     Years: 50.00     Pack years: 100.00     Types: Cigarettes     Quit date: 2020     Years since quittin.5   • Smokeless tobacco: Never Used   • Tobacco comment: patient refused   Substance and Sexual Activity   • Alcohol use: No     Frequency: Never     Drinks per session:  "1 or 2     Binge frequency: Never     Comment: patient quit in 1990   • Drug use: No   • Sexual activity: Not on file   Lifestyle   • Physical activity     Days per week: Not on file     Minutes per session: Not on file   • Stress: Not on file   Relationships   • Social connections     Talks on phone: Not on file     Gets together: Not on file     Attends Protestant service: Not on file     Active member of club or organization: Not on file     Attends meetings of clubs or organizations: Not on file     Relationship status: Not on file   • Intimate partner violence     Fear of current or ex partner: Not on file     Emotionally abused: Not on file     Physically abused: Not on file     Forced sexual activity: Not on file   Other Topics Concern   • Not on file   Social History Narrative   • Not on file       History reviewed. No pertinent family history.    Allergies:  No known drug allergy    Review of Systems:  Noncontributory except as per HPI    Physical Exam:  /90   Pulse (!) 110   Temp 36.4 °C (97.5 °F) (Temporal)   Resp 18   Ht 1.676 m (5' 6\")   Wt 80 kg (176 lb 5.9 oz)   SpO2 96%     Constitutional: Alert, oriented, no acute distress  HEENT:  Normocephalic and atraumatic, EOMI, 3cm raised fungating lesion of the right face c/w basal cell lesion  Neck:   Supple, no JVD,   Cardiovascular: Regular rate and rhythm,   Pulmonary:  Good air entry bilaterally,    Abdominal:  Soft, non-tender, non-distended     Aortic impulse not widened  Musculoskeletal: No edema, no tenderness  Neurological:  CN II-XII grossly intact, no focal deficits  Skin:   Skin is warm and dry. No rash noted.  Psychiatric:  Normal mood and affect.  Approximately 10 cm circular wound on the end of left AKA stump.    Labs:  Recent Labs     09/26/20  0947 09/27/20  0407 09/28/20  1220   WBC 17.0* 14.5* 8.6   RBC 2.51* 2.61* 3.12*   HEMOGLOBIN 7.6* 7.8* 9.2*   HEMATOCRIT 25.4* 25.8* 30.8*   .2* 98.9* 98.7*   MCH 30.3 29.9 29.5 "   MCHC 29.9* 30.2* 29.9*   RDW 56.6* 54.3* 53.9*   PLATELETCT 284 310 381   MPV 10.5 10.0 10.6     Recent Labs     09/26/20  0947 09/27/20  0407 09/28/20  1220   SODIUM 139 139 140   POTASSIUM 3.5* 3.4* 3.4*   CHLORIDE 106 107 106   CO2 21 22 25   GLUCOSE 132* 103* 145*   BUN 20 19 15   CREATININE 1.52* 1.37 1.28   CALCIUM 8.1* 8.0* 8.5               Assessment/Plan:  -Nonhealing left above-knee amputation incision requiring revision  -3 cm skin lesion at the right face  -Hypertension  -Peripheral arterial occlusive disease    Patient will undergo surgery for revision of his left AKA wound which will require trimming the bone from the back.  We will continue the wound VAC therapy thereafter to try and save what it is left of his AKA.    Appreciate hospitalist services help managing Mr. Herson Mary Phillip ORTEGA  [facility] (General and Vascular Surgery)  Cell: [phone] (text or call is fine, if you don't reach me please try my office)  Office: [phone]  ___________________________________________________________________  Patient:Jimenez Bains   MRN:[id number]   CSN:[id number]

## 2020-09-29 NOTE — PROGRESS NOTES
2 RN skin check completed with LOUIS Chacon.   Devices in place: Wound vac, carpio catheter, mepilex, .  Skin assessed under devices: yes.  Confirmed pressure ulcers found: L medial thigh, traumatic penis erosion, sacrum.  New potential pressure ulcers noted: n/a.  Wound consult placed:  no.        Skin assessment:   General: dry, flaky, scattered scabs  Groin: red, moisture-associated skin damage, blanching  Penis: traumatic erosion, urethral meatus is red, dried blood on dorsal side of penis  L thigh: partial thickness wound  R cheek: incision, secured with sutures, dried blood around sutures  LLE: AKA, wound vac in place, dressing c/d/i  R nose: black, open wound  Sacrum: open wound, yellow/white, scant serous drainage, periwound area red            The following interventions in place: 2RN skin check Qshift, q2hr turns, incontinence monitoring with bed linen changes PRN for incontinence episodes, barrier cream. Pillows in use for support/positioning, waffle overlay. Mepilex on b/l heels.

## 2020-09-29 NOTE — PROGRESS NOTES
Pt is A&Ox3- disorientated to event. VSS. Lungs clear diminished on RA. Pt denies any pain at this time. Wound vac in place to L/t AKA stump. No signs of distress at this time. Bed low and locked, call light within reach, safety precautions in place, hourly rounding, WCM.

## 2020-09-29 NOTE — CARE PLAN
Problem: Communication  Goal: The ability to communicate needs accurately and effectively will improve  Outcome: PROGRESSING AS EXPECTED     Problem: Safety  Goal: Will remain free from injury  Outcome: PROGRESSING AS EXPECTED     Problem: Safety  Goal: Will remain free from falls  Outcome: PROGRESSING AS EXPECTED     Problem: Pain Management  Goal: Pain level will decrease to patient's comfort goal  Outcome: PROGRESSING AS EXPECTED     Problem: Respiratory:  Goal: Respiratory status will improve  Outcome: PROGRESSING AS EXPECTED

## 2020-09-29 NOTE — DISCHARGE PLANNING
Martell HERR/VIVIANA called Pt's daughter LIEN at     [phone]. RAZIAW informed her that the Pt will be transferred today at 16:30 to [facility]. RAZIAW provided Lien with the contact information to [facility].

## 2020-09-29 NOTE — DISCHARGE PLANNING
@1245  Agency/Facility Name: [facility]  Spoke To: Aquiles  Outcome: Transport set for 1630.    @1045  Agency/Facility Name: [facility]  Spoke To: Sakshi  Outcome: Checking if they have a wound vac and if they will have a bed available.    Agency/Facility Name: [facility]  Spoke To: Admission  Outcome: Has a bed and will check if they can take today.

## 2020-09-29 NOTE — PROGRESS NOTES
Pt dc'd 1622. IV removed. Pt left unit via wheelchair with escort to skilled facility. Personal belongings with pt when leaving unit. Pt given discharge instructions prior to leaving unit including where to  prescriptions and when to follow-up; verbalizes understanding. Copy of discharge instructions with pt and in the chart.    Attempted to call [facility] 6 times with their number [phone]. Attempted to call to give report. Left a voice mail and have not received a call back.

## 2020-09-29 NOTE — DISCHARGE SUMMARY
Discharge Summary    CHIEF COMPLAINT ON ADMISSION  No chief complaint on file.      Reason for Admission  Infected AKA     Admission Date  9/23/2020    CODE STATUS  DNAR/DNI    HPI & HOSPITAL COURSE  This is a 75 y.o. male with past medical history of peripheral vascular disease status post fem-pop bypass followed but above knee amputation, history of diabetes mellitus who presented here from rehab on 9/23 for revision of his left above knee amputation. Patient has had multiple revisions complicated by MRSA and enterococcus prior to this admission. He underwent revision on 9/24 in addition he also had a raised skin lesion on the right face excised due to concern for underlying skin cancer. On 9/25 patient became hypotensive and was sent to the ICU. He was treated with sepsis protocol and broad spectrum antibiotics. His pressures stabilized and he was transferred back to the floor. Sepsis was thought to be due to urinary tract infection which was treated with IV antibiotics.    Above knee amputation has been managed with IV antibiotics switched to oral bactrim/augmetin for a total of 10 days and wound care.   Patient will be discharged to skilled nursing facility for ongoing needs.     Pathology from facial excision does show basal cell carcinoma, he will need to return to surgery for suture removal and will need to follow up with dermatology for continued management and treatment of his basal cell carcinoma.      Therefore, he is discharged in fair and stable condition to skilled nursing facility.    The patient met 2-midnight criteria for an inpatient stay at the time of discharge.    Discharge Date  09/29/20      FOLLOW UP ITEMS POST DISCHARGE  Suture removal and wound Check with Dr. Eric Fisher ([facility]) on 10/1-2/2020    DISCHARGE DIAGNOSES  Principal Problem:    AKA stump complication (HCC) POA: Yes  Active Problems:    Type 2 diabetes mellitus (HCC) POA: Yes      Overview: uncontrolled    PVD  (peripheral vascular disease) with claudication (HCC) (Chronic) POA: Yes    Anemia due to vitamin B12 deficiency POA: Yes    Generalized atherosclerosis POA: Yes      Overview: ICD-10 transition    Sacral ulcer, with unspecified severity (HCC) POA: Yes    Urinary tract infection associated with indwelling urethral catheter (HCC) POA: Unknown    Skin lesion of face POA: Yes    HTN (hypertension) POA: Yes    HLD (hyperlipidemia) POA: Yes  Resolved Problems:    Sepsis (HCC) POA: Unknown    Encephalopathy in sepsis POA: Unknown      FOLLOW UP  No future appointments.  [facility]  [address] [address]  [phone]        Usman Fisher M.D.  [address]  [facility] [id number] [phone]    Schedule an appointment as soon as possible for a visit in 3 days        MEDICATIONS ON DISCHARGE     Medication List      START taking these medications      Instructions   amoxicillin-clavulanate 875-125 MG Tabs  Commonly known as: AUGMENTIN   Take 1 Tab by mouth 2 times a day for 4 days.  Dose: 1 Tab     ascorbic acid 500 MG tablet  Start taking on: September 30, 2020  Commonly known as: VITAMIN C   Take 1 Tab by mouth every day.  Dose: 500 mg     bacitracin-polymyxin b 500-42340 UNIT/GM Oint  Commonly known as: POLYSPORIN   Apply small amount to urethra twice daily and prn for erythema and skin tear     Cholecalciferol 2000 UNIT Tabs  Start taking on: September 30, 2020   Take 2 Tabs by mouth every day.  Dose: 4,000 Units     cyanocobalamin 1000 MCG Tabs  Start taking on: September 30, 2020  Commonly known as: VITAMIN B12   Take 1 Tab by mouth every day.  Dose: 1,000 mcg     dakins 0.125% (1/4 strength) 0.125 % Soln   Apply 473 mL to affected area(s) 1 time daily as needed (Instillation in to wound vac). Instill in to wound vac  Dose: 473 mL     DULoxetine 20 MG Cpep  Start taking on: September 30, 2020  Commonly known as: CYMBALTA   Take 1 Cap by mouth every day.  Dose:  20 mg     folic acid 1 MG Tabs  Start taking on: September 30, 2020  Commonly known as: FOLVITE   Take 1 Tab by mouth every day.  Dose: 1 mg     insulin regular 100 Unit/mL Soln  Commonly known as: HumuLIN R   Inject 1-6 Units as instructed 4 Times a Day,Before Meals and at Bedtime.  Dose: 1-6 Units     miconazole 2 % Crea  Commonly known as: MICOTIN   Apply to gluteal cleft and scantly on buttocks.     nystatin powder  Commonly known as: MYCOSTATIN   Apply sufficient amount topically to affected area, groin, buttocks and back.    Separate scrotum and thighs with interdry     sulfamethoxazole-trimethoprim 800-160 MG tablet  Commonly known as: BACTRIM DS   Take 1 Tab by mouth 2 times a day for 4 days.  Dose: 1 Tab        CHANGE how you take these medications      Instructions   * gabapentin 100 MG Caps  What changed: Another medication with the same name was added. Make sure you understand how and when to take each.  Commonly known as: NEURONTIN   Take 1 Cap by mouth 3 times a day.  Dose: 100 mg     * gabapentin 300 MG Caps  What changed: You were already taking a medication with the same name, and this prescription was added. Make sure you understand how and when to take each.  Commonly known as: NEURONTIN   Take 2 Caps by mouth 3 times a day.  Dose: 600 mg         * This list has 2 medication(s) that are the same as other medications prescribed for you. Read the directions carefully, and ask your doctor or other care provider to review them with you.            CONTINUE taking these medications      Instructions   aspirin 81 MG EC tablet   Take 1 Tab by mouth every day.  Dose: 81 mg     atorvastatin 40 MG Tabs  Commonly known as: LIPITOR   Take 1 Tab by mouth every evening.  Dose: 40 mg     lisinopril 5 MG Tabs  Commonly known as: PRINIVIL   Take 3 Tabs by mouth every day.  Dose: 15 mg        STOP taking these medications    NS SOLN 100 mL with ampicillin/sulbactam 3 (2-1) g SOLR 3 g     NS SOLN 50 mL with  DAPTOmycin 350 MG SOLR 1,000 mg            Allergies  Allergies   Allergen Reactions   • No Known Drug Allergy        DIET  Orders Placed This Encounter   Procedures   • Diet Order Regular, Diabetic     Standing Status:   Standing     Number of Occurrences:   1     Order Specific Question:   ERAS     Answer:   Yes     Order Specific Question:   Diet:     Answer:   Regular [1]     Order Specific Question:   Diet:     Answer:   Diabetic [3]       ACTIVITY  As tolerated and directed by skilled nursing.  Weight bearing as tolerated    CONSULTATIONS  Vascular Surgery- Dr. Fisher       PROCEDURES  9/24/2020  PROCEDURES:  1.  Sharp excisional debridement of left AKA wound including skin,   subcutaneous tissue, muscle and fascia and bone, approximately 15 cubic cm of   tissue was removed.  2.  Application of negative pressure dressing less than 50 square cm.  3.  Wide local excision of skin lesion of the right face with simple primary   closure.    LABORATORY  Lab Results   Component Value Date    SODIUM 140 09/28/2020    POTASSIUM 3.4 (L) 09/28/2020    CHLORIDE 106 09/28/2020    CO2 25 09/28/2020    GLUCOSE 145 (H) 09/28/2020    BUN 15 09/28/2020    CREATININE 1.28 09/28/2020        Lab Results   Component Value Date    WBC 8.6 09/28/2020    HEMOGLOBIN 9.2 (L) 09/28/2020    HEMATOCRIT 30.8 (L) 09/28/2020    PLATELETCT 381 09/28/2020        Total time of the discharge process exceeds 35 minutes.

## 2020-09-29 NOTE — DISCHARGE PLANNING
Martell Sheppard MSW/LSW and RN case manager Shruthi contacted [facility] about placement in a skilled facility for Pt. Pt has been accepted into [facility] but they do not have a bed until Thursday. [facility] also accepted Pt and has a bed available today. Waiting on transportation to be set up from [facility].

## 2020-09-29 NOTE — OP REPORT
DATE OF SERVICE:  09/24/2020    PREOPERATIVE DIAGNOSES:  1.  Nonhealing left above the knee amputation stump dehiscence.  2.  Raised skin lesion of the right face, 3 cm in diameter.    POSTOPERATIVE DIAGNOSES:  1.  Nonhealing left above the knee amputation stump dehiscence.  2.  Raised skin lesion of the right face, 3 cm in diameter.    PROCEDURES:  1.  Sharp excisional debridement of left AKA wound including skin,   subcutaneous tissue, muscle and fascia and bone, approximately 15 cubic cm of   tissue was removed.  2.  Application of negative pressure dressing less than 50 square cm.  3.  Wide local excision of skin lesion of the right face with simple primary   closure.    SURGEON:  Usman Fisher MD    ASSISTANT:  None.    ANESTHESIA:  General.    BLOOD LOSS:  Minimal.    SPECIMENS:  Fungating lesion from the right face sent to pathology.    COMPLICATIONS:  None.    DISPOSITION:  The patient tolerated the procedure well.  He was sent to   recovery in stable condition.    DESCRIPTION OF PROCEDURE:  Following informed consent, the patient was placed   supine on the operating table and general anesthesia was administered.  The   right face was prepped and draped sterile as well as the left AKA wound.    Surgical time-out was called.  The patient received preoperative antibiotics.    Local anesthetic was infiltrated around the right face lesion and then a wide   elliptical incision was made encompassing the grossly abnormal tissue.  Once   the lesion was removed, the resulting defect was about 3 cm in length x about   2 cm in width and I was able to reapproximate it with interrupted 4-0 nylon   sutures with no tension.  The lesion was sent to pathology.    We then turned our attention to the left AKA wound.  The bone was found to be   protruding from the base of the wound and we therefore used a periosteal   elevator to free up the surface of the bone and we then used an oscillating   saw to cut the bone back  approximately 3 cm shorter.  We also performed a   sharp excisional debridement of the surrounding tissue.  The wound was then   copiously irrigated and hemostasis was achieved with cautery.  We then used   interrupted 0 Vicryl sutures to reapproximate the surrounding soft tissue over   the end of the bone.  We then covered the wound with a black foam negative   pressure dressing and it was connected to suction and found to have a good   seal.    All counts were correct.  The patient tolerated the procedure well.  He was   extubated and sent to recovery in stable condition.       ____________________________________     MD RON Lewis / BISI    DD:  09/28/2020 21:27:27  DT:  09/28/2020 23:49:05    D#:  [id number]  Job#:  [id number]

## 2020-10-01 ENCOUNTER — TELEPHONE (OUTPATIENT)
Dept: INFECTIOUS DISEASES | Facility: MEDICAL CENTER | Age: 75
End: 2020-10-01

## 2020-10-01 LAB
BACTERIA BLD CULT: NORMAL
BACTERIA BLD CULT: NORMAL
SIGNIFICANT IND 70042: NORMAL
SIGNIFICANT IND 70042: NORMAL
SITE SITE: NORMAL
SITE SITE: NORMAL
SOURCE SOURCE: NORMAL
SOURCE SOURCE: NORMAL

## 2020-10-15 ENCOUNTER — APPOINTMENT (OUTPATIENT)
Dept: INFECTIOUS DISEASES | Facility: MEDICAL CENTER | Age: 75
End: 2020-10-15
Payer: MEDICARE

## 2021-01-14 DIAGNOSIS — Z23 NEED FOR VACCINATION: ICD-10-CM

## 2023-08-07 NOTE — PROGRESS NOTES
"Vascular    CHARLES  Pain controlled  Less emotional today    /57   Pulse 71   Temp 37.6 °C (99.6 °F) (Temporal)   Resp 18   Ht 1.676 m (5' 6\")   Wt 63 kg (138 lb 14.2 oz)   SpO2 93%   BMI 22.42 kg/m²     Vac intact    A/P)  At this point I do not believe the patient will need any additional surgical debridements. He does however still have a small wound vac in his incision and I have asked wound care to evaluate to start doing bedside changes.  If we heal the remainder of the wound with vac therapy then the patient has a better chance of avoiding any additional infection or surgery.      Anticipate bedside vac change today    Appreciate Hospitalist and ID support  Following along.    Usman Fisher MD  [facility] (General and Vascular Surgery)  Cell: [phone] (text or call is fine, if you don't reach me please try my office)  Office: [phone]  __________________________________________________________________  Patient:Jimenez Bains   MRN:[id number]   CSN:[id number]    " no

## (undated) DEVICE — ELECTRODE DUAL RETURN W/ CORD - (50/PK)

## (undated) DEVICE — SENSOR SPO2 NEO LNCS ADHESIVE (20/BX) SEE USER NOTES

## (undated) DEVICE — BLADE BATTERY PWR 2108182008

## (undated) DEVICE — SET LEADWIRE 5 LEAD BEDSIDE DISPOSABLE ECG (1SET OF 5/EA)

## (undated) DEVICE — GLOVE BIOGEL INDICATOR SZ 8.5 SURGICAL PF LTX - (50/BX 4BX/CA)

## (undated) DEVICE — CLIP MED LG INTNL HRZN TI ESCP - (20/BX)

## (undated) DEVICE — DRAPE SURGICAL U 77X120 - (10/CA)

## (undated) DEVICE — SET EXTENSION WITH 2 PORTS (48EA/CA) ***PART #2C8610 IS A SUBSTITUTE*****

## (undated) DEVICE — KIT ANESTHESIA W/CIRCUIT & 3/LT BAG W/FILTER (20EA/CA)

## (undated) DEVICE — GLOVE BIOGEL SZ 7 SURGICAL PF LTX - (50PR/BX 4BX/CA)

## (undated) DEVICE — DRAPE SPLIT ORTHOMAX (6/CA) **NO ALLOCATION OR ALLOTMENT, ORDER #4601 AS A SUB***

## (undated) DEVICE — MASK ANESTHESIA ADULT  - (100/CA)

## (undated) DEVICE — GLOVE BIOGEL PI INDICATOR SZ 6.0 SURGICAL PF LF -(200PR/CA)

## (undated) DEVICE — GOWN WARMING STANDARD FLEX - (30/CA)

## (undated) DEVICE — GLOVE SZ 6.5 BIOGEL PI MICRO - PF LF (50PR/BX)

## (undated) DEVICE — SUTURE 4-0 ETHILON FS-1 18 (36PK/BX)"

## (undated) DEVICE — GLOVE BIOGEL SZ 8 SURGICAL PF LTX - (50PR/BX 4BX/CA)

## (undated) DEVICE — ELECTRODE 850 FOAM ADHESIVE - HYDROGEL RADIOTRNSPRNT (50/PK)

## (undated) DEVICE — TUBING CLEARLINK DUO-VENT - C-FLO (48EA/CA)

## (undated) DEVICE — SUTURE 0 COATED VICRYL 6-18IN - (12PK/BX)

## (undated) DEVICE — BOVIE BLADE COATED - (50/PK)

## (undated) DEVICE — STAPLER SKIN DISP - (6/BX 10BX/CA) VISISTAT

## (undated) DEVICE — PAD LAP STERILE 18 X 18 - (5/PK 40PK/CA)

## (undated) DEVICE — BLADE SURGICAL #15 - (50/BX 3BX/CA)

## (undated) DEVICE — VAC CANISTER W/GEL 500ML - FITS NEW MACHINES (10EA/CA)

## (undated) DEVICE — SUTURE 0 VICRYL PLUS CT-1 - 8 X 18 INCH (12/BX)

## (undated) DEVICE — DETERGENT RENUZYME PLUS 10 OZ PACKET (50/BX)

## (undated) DEVICE — CANISTER INFO VAC 1000ML (5EA/CA)

## (undated) DEVICE — DRAIN J-VAC 10MM FLAT - (10/CA)

## (undated) DEVICE — PADDING CAST 6 IN STERILE - 6 X 4 YDS (24/CA)

## (undated) DEVICE — SUTURE 2-0 COATED VICRYL PLUS - 12 X 18 INCH (12/BX)

## (undated) DEVICE — CANISTER SUCTION 3000ML MECHANICAL FILTER AUTO SHUTOFF MEDI-VAC NONSTERILE LF DISP  (40EA/CA)

## (undated) DEVICE — SUTURE GENERAL

## (undated) DEVICE — BANDAGE ROLL STERILE BULKEE 4.5 IN X 4 YD (100EA/CA)

## (undated) DEVICE — TRAY SKIN SCRUB PVP WET (20EA/CA) PART #DYND70356 DISCONTINUED

## (undated) DEVICE — GLOVE BIOGEL SZ 7.5 SURGICAL PF LTX - (50PR/BX 4BX/CA)

## (undated) DEVICE — SLEEVE, VASO, THIGH, MED

## (undated) DEVICE — DRESSING, WOUND VAC MED.

## (undated) DEVICE — SUTURE 0 PROLENE CT-1 30 (36PK/BX)"

## (undated) DEVICE — NEPTUNE 4 PORT MANIFOLD - (20/PK)

## (undated) DEVICE — BLADE SAW 90X25X1.37MM SAGITTAL DUAL CUT

## (undated) DEVICE — GLOVE BIOGEL PI INDICATOR SZ 6.5 SURGICAL PF LF - (50/BX 4BX/CA)

## (undated) DEVICE — SUCTION INSTRUMENT YANKAUER BULBOUS TIP W/O VENT (50EA/CA)

## (undated) DEVICE — PROTECTOR ULNA NERVE - (36PR/CA)

## (undated) DEVICE — HANDPIECE 10FT INTPLS SCT PLS IRRIGATION HAND CONTROL SET (6/PK)

## (undated) DEVICE — GOWN SURGEONS LARGE - (32/CA)

## (undated) DEVICE — PACK LOWER EXTREMITY - (2/CA)

## (undated) DEVICE — HEAD HOLDER JUNIOR/ADULT

## (undated) DEVICE — SYRINGE 10 ML CONTROL LL (25EA/BX 4BX/CA)

## (undated) DEVICE — TRAY SRGPRP PVP IOD WT PRP - (20/CA)

## (undated) DEVICE — SUTURE 0 COATED VICRYL PLUS - CTX CR(12/BX)18 IN

## (undated) DEVICE — CLIP MED INTNL HRZN TI ESCP - (25/BX)

## (undated) DEVICE — BLADE SAGITTAL SAW DUAL CUT 25.0 X 90.0 X 1.27MM (1/EA)

## (undated) DEVICE — SYRINGE EAR/NOSE 3 OZ STERILE (50/CA

## (undated) DEVICE — GLOVE SZ 7.5 BIOGEL PI MICRO - PF LF (50PR/BX)

## (undated) DEVICE — SUTURE ETHILON 2-0 FSLX 30 (36PK/BX)"

## (undated) DEVICE — CHLORAPREP 26 ML APPLICATOR - ORANGE TINT(25/CA)

## (undated) DEVICE — BLADE SURGICAL #10 - (50/BX)

## (undated) DEVICE — WATER IRRIGATION STERILE 1000ML (12EA/CA)

## (undated) DEVICE — TOWELS CLOTH SURGICAL - (4/PK 20PK/CA)

## (undated) DEVICE — SUTURE 5-0 ETHILON P-3 18 (12PK/BX)"

## (undated) DEVICE — SODIUM CHL IRRIGATION 0.9% 1000ML (12EA/CA)

## (undated) DEVICE — SUTURE 2-0 VICRYL PLUS CT-1 - 8 X 18 INCH(12/BX)

## (undated) DEVICE — LACTATED RINGERS INJ 1000 ML - (14EA/CA 60CA/PF)

## (undated) DEVICE — GLOVE BIOGEL SZ 6.5 SURGICAL PF LTX (50PR/BX 4BX/CA)

## (undated) DEVICE — GOWN SURGEONS X-LARGE - DISP. (30/CA)

## (undated) DEVICE — DRAPE LARGE 3 QUARTER - (20/CA)

## (undated) DEVICE — SYSTEM PREVENA INCISION MNGM

## (undated) DEVICE — TIP INTPLS HFLO ML ORFC BTRY - (12/CS)  FOR SURGILAV

## (undated) DEVICE — GLOVE BIOGEL PI INDICATOR SZ 7.5 SURGICAL PF LF -(50/BX 4BX/CA)

## (undated) DEVICE — GLOVE BIOGEL PI INDICATOR SZ 7.0 SURGICAL PF LF - (50/BX 4BX/CA)

## (undated) DEVICE — BANDAGE ELASTIC 6 HONEYCOMB - 6X5YD LF (20/CA)"

## (undated) DEVICE — SODIUM CHL. IRRIGATION 0.9% 3000ML (4EA/CA 65CA/PF)

## (undated) DEVICE — BLADE SAGITTAL SAW DUAL CUT 75.0 X 25.0MM (1/EA)

## (undated) DEVICE — PACK MINOR BASIN - (2EA/CA)

## (undated) DEVICE — GLOVE BIOGEL SZ 6 PF LATEX - (50EA/BX 4BX/CA)

## (undated) DEVICE — DRAPE LG. APERTURE 33 X 51" - (10EA/BX)"